# Patient Record
Sex: FEMALE | Race: WHITE | NOT HISPANIC OR LATINO
[De-identification: names, ages, dates, MRNs, and addresses within clinical notes are randomized per-mention and may not be internally consistent; named-entity substitution may affect disease eponyms.]

---

## 2017-03-06 ENCOUNTER — TRANSCRIPTION ENCOUNTER (OUTPATIENT)
Age: 62
End: 2017-03-06

## 2017-03-09 ENCOUNTER — TRANSCRIPTION ENCOUNTER (OUTPATIENT)
Age: 62
End: 2017-03-09

## 2017-03-09 ENCOUNTER — RESULT REVIEW (OUTPATIENT)
Age: 62
End: 2017-03-09

## 2017-03-10 ENCOUNTER — INPATIENT (INPATIENT)
Facility: HOSPITAL | Age: 62
LOS: 14 days | Discharge: HOME | DRG: 167 | End: 2017-03-25
Attending: INTERNAL MEDICINE | Admitting: INTERNAL MEDICINE
Payer: COMMERCIAL

## 2017-03-10 VITALS
WEIGHT: 169.98 LBS | SYSTOLIC BLOOD PRESSURE: 126 MMHG | OXYGEN SATURATION: 97 % | HEIGHT: 66 IN | DIASTOLIC BLOOD PRESSURE: 82 MMHG | HEART RATE: 93 BPM | RESPIRATION RATE: 19 BRPM | TEMPERATURE: 98 F

## 2017-03-10 DIAGNOSIS — R06.09 OTHER FORMS OF DYSPNEA: ICD-10-CM

## 2017-03-10 DIAGNOSIS — I42.2 OTHER HYPERTROPHIC CARDIOMYOPATHY: ICD-10-CM

## 2017-03-10 DIAGNOSIS — Z41.8 ENCOUNTER FOR OTHER PROCEDURES FOR PURPOSES OTHER THAN REMEDYING HEALTH STATE: ICD-10-CM

## 2017-03-10 DIAGNOSIS — Z72.0 TOBACCO USE: ICD-10-CM

## 2017-03-10 DIAGNOSIS — K29.90 GASTRODUODENITIS, UNSPECIFIED, WITHOUT BLEEDING: ICD-10-CM

## 2017-03-10 DIAGNOSIS — R07.89 OTHER CHEST PAIN: ICD-10-CM

## 2017-03-10 DIAGNOSIS — Z98.890 OTHER SPECIFIED POSTPROCEDURAL STATES: Chronic | ICD-10-CM

## 2017-03-10 DIAGNOSIS — J18.9 PNEUMONIA, UNSPECIFIED ORGANISM: ICD-10-CM

## 2017-03-10 DIAGNOSIS — I48.91 UNSPECIFIED ATRIAL FIBRILLATION: ICD-10-CM

## 2017-03-10 PROBLEM — Z00.00 ENCOUNTER FOR PREVENTIVE HEALTH EXAMINATION: Status: ACTIVE | Noted: 2017-03-10

## 2017-03-10 LAB
ALBUMIN SERPL ELPH-MCNC: 4.2 G/DL — SIGNIFICANT CHANGE UP (ref 3.5–5)
ALP SERPL-CCNC: 80 U/L — SIGNIFICANT CHANGE UP (ref 40–120)
ALT FLD-CCNC: 61 U/L DA — HIGH (ref 10–60)
ANION GAP SERPL CALC-SCNC: 12 MMOL/L — SIGNIFICANT CHANGE UP (ref 5–17)
APPEARANCE UR: CLEAR — SIGNIFICANT CHANGE UP
APTT BLD: 21.7 SEC — LOW (ref 27.5–37.4)
AST SERPL-CCNC: 45 U/L — HIGH (ref 10–40)
BASOPHILS # BLD AUTO: 0.1 K/UL — SIGNIFICANT CHANGE UP (ref 0–0.2)
BASOPHILS NFR BLD AUTO: 0.9 % — SIGNIFICANT CHANGE UP (ref 0–2)
BILIRUB SERPL-MCNC: 0.8 MG/DL — SIGNIFICANT CHANGE UP (ref 0.2–1.2)
BILIRUB UR-MCNC: NEGATIVE — SIGNIFICANT CHANGE UP
BUN SERPL-MCNC: 9 MG/DL — SIGNIFICANT CHANGE UP (ref 7–18)
CALCIUM SERPL-MCNC: 8.9 MG/DL — SIGNIFICANT CHANGE UP (ref 8.4–10.5)
CHLORIDE SERPL-SCNC: 106 MMOL/L — SIGNIFICANT CHANGE UP (ref 96–108)
CK MB BLD-MCNC: 3.5 % — SIGNIFICANT CHANGE UP (ref 0–3.5)
CK MB CFR SERPL CALC: 3.4 NG/ML — SIGNIFICANT CHANGE UP (ref 0–3.6)
CK SERPL-CCNC: 106 U/L — SIGNIFICANT CHANGE UP (ref 21–215)
CK SERPL-CCNC: 96 U/L — SIGNIFICANT CHANGE UP (ref 21–215)
CO2 SERPL-SCNC: 23 MMOL/L — SIGNIFICANT CHANGE UP (ref 22–31)
COLOR SPEC: YELLOW — SIGNIFICANT CHANGE UP
CREAT SERPL-MCNC: 0.72 MG/DL — SIGNIFICANT CHANGE UP (ref 0.5–1.3)
DIFF PNL FLD: ABNORMAL
EOSINOPHIL # BLD AUTO: 0.1 K/UL — SIGNIFICANT CHANGE UP (ref 0–0.5)
EOSINOPHIL NFR BLD AUTO: 1.8 % — SIGNIFICANT CHANGE UP (ref 0–6)
GLUCOSE SERPL-MCNC: 105 MG/DL — HIGH (ref 70–99)
GLUCOSE UR QL: NEGATIVE — SIGNIFICANT CHANGE UP
HCT VFR BLD CALC: 43.8 % — SIGNIFICANT CHANGE UP (ref 34.5–45)
HGB BLD-MCNC: 14.7 G/DL — SIGNIFICANT CHANGE UP (ref 11.5–15.5)
INR BLD: 1.42 RATIO — HIGH (ref 0.88–1.16)
KETONES UR-MCNC: NEGATIVE — SIGNIFICANT CHANGE UP
LACTATE SERPL-SCNC: 1.1 MMOL/L — SIGNIFICANT CHANGE UP (ref 0.7–2)
LEUKOCYTE ESTERASE UR-ACNC: NEGATIVE — SIGNIFICANT CHANGE UP
LYMPHOCYTES # BLD AUTO: 0.6 K/UL — LOW (ref 1–3.3)
LYMPHOCYTES # BLD AUTO: 9.6 % — LOW (ref 13–44)
MCHC RBC-ENTMCNC: 32 PG — SIGNIFICANT CHANGE UP (ref 27–34)
MCHC RBC-ENTMCNC: 33.6 GM/DL — SIGNIFICANT CHANGE UP (ref 32–36)
MCV RBC AUTO: 95.3 FL — SIGNIFICANT CHANGE UP (ref 80–100)
MONOCYTES # BLD AUTO: 0.6 K/UL — SIGNIFICANT CHANGE UP (ref 0–0.9)
MONOCYTES NFR BLD AUTO: 9.2 % — SIGNIFICANT CHANGE UP (ref 2–14)
NEUTROPHILS # BLD AUTO: 5 K/UL — SIGNIFICANT CHANGE UP (ref 1.8–7.4)
NEUTROPHILS NFR BLD AUTO: 78.5 % — HIGH (ref 43–77)
NITRITE UR-MCNC: NEGATIVE — SIGNIFICANT CHANGE UP
NT-PROBNP SERPL-SCNC: 3621 PG/ML — HIGH (ref 0–125)
PH UR: 7 — SIGNIFICANT CHANGE UP (ref 4.8–8)
PLATELET # BLD AUTO: 210 K/UL — SIGNIFICANT CHANGE UP (ref 150–400)
POTASSIUM SERPL-MCNC: 4 MMOL/L — SIGNIFICANT CHANGE UP (ref 3.5–5.3)
POTASSIUM SERPL-SCNC: 4 MMOL/L — SIGNIFICANT CHANGE UP (ref 3.5–5.3)
PROT SERPL-MCNC: 7.2 G/DL — SIGNIFICANT CHANGE UP (ref 6–8.3)
PROT UR-MCNC: 30 MG/DL
PROTHROM AB SERPL-ACNC: 16 SEC — HIGH (ref 10–13.1)
RBC # BLD: 4.6 M/UL — SIGNIFICANT CHANGE UP (ref 3.8–5.2)
RBC # FLD: 10.9 % — SIGNIFICANT CHANGE UP (ref 10.3–14.5)
SODIUM SERPL-SCNC: 141 MMOL/L — SIGNIFICANT CHANGE UP (ref 135–145)
SP GR SPEC: 1.01 — SIGNIFICANT CHANGE UP (ref 1.01–1.02)
TROPONIN I SERPL-MCNC: <0.015 NG/ML — SIGNIFICANT CHANGE UP (ref 0–0.04)
TROPONIN I SERPL-MCNC: <0.015 NG/ML — SIGNIFICANT CHANGE UP (ref 0–0.04)
UROBILINOGEN FLD QL: NEGATIVE — SIGNIFICANT CHANGE UP
WBC # BLD: 6.4 K/UL — SIGNIFICANT CHANGE UP (ref 3.8–10.5)
WBC # FLD AUTO: 6.4 K/UL — SIGNIFICANT CHANGE UP (ref 3.8–10.5)

## 2017-03-10 PROCEDURE — 88112 CYTOPATH CELL ENHANCE TECH: CPT | Mod: 26

## 2017-03-10 PROCEDURE — 88342 IMHCHEM/IMCYTCHM 1ST ANTB: CPT | Mod: 26,59

## 2017-03-10 PROCEDURE — 99223 1ST HOSP IP/OBS HIGH 75: CPT | Mod: GC

## 2017-03-10 PROCEDURE — 99053 MED SERV 10PM-8AM 24 HR FAC: CPT

## 2017-03-10 PROCEDURE — 99285 EMERGENCY DEPT VISIT HI MDM: CPT | Mod: 25

## 2017-03-10 PROCEDURE — 71020: CPT | Mod: 26

## 2017-03-10 PROCEDURE — 88341 IMHCHEM/IMCYTCHM EA ADD ANTB: CPT | Mod: 26,59

## 2017-03-10 PROCEDURE — 88360 TUMOR IMMUNOHISTOCHEM/MANUAL: CPT | Mod: 26

## 2017-03-10 PROCEDURE — 71250 CT THORAX DX C-: CPT | Mod: 26

## 2017-03-10 PROCEDURE — 88305 TISSUE EXAM BY PATHOLOGIST: CPT | Mod: 26

## 2017-03-10 RX ORDER — LIDOCAINE 4 G/100G
5 CREAM TOPICAL EVERY 8 HOURS
Qty: 0 | Refills: 0 | Status: COMPLETED | OUTPATIENT
Start: 2017-03-10 | End: 2017-03-11

## 2017-03-10 RX ORDER — HEPARIN SODIUM 5000 [USP'U]/ML
6500 INJECTION INTRAVENOUS; SUBCUTANEOUS ONCE
Qty: 0 | Refills: 0 | Status: COMPLETED | OUTPATIENT
Start: 2017-03-10 | End: 2017-03-10

## 2017-03-10 RX ORDER — ASPIRIN/CALCIUM CARB/MAGNESIUM 324 MG
0 TABLET ORAL
Qty: 0 | Refills: 0 | COMMUNITY

## 2017-03-10 RX ORDER — FAMOTIDINE 10 MG/ML
20 INJECTION INTRAVENOUS ONCE
Qty: 0 | Refills: 0 | Status: COMPLETED | OUTPATIENT
Start: 2017-03-10 | End: 2017-03-10

## 2017-03-10 RX ORDER — WARFARIN SODIUM 2.5 MG/1
0 TABLET ORAL
Qty: 0 | Refills: 0 | COMMUNITY

## 2017-03-10 RX ORDER — IPRATROPIUM/ALBUTEROL SULFATE 18-103MCG
3 AEROSOL WITH ADAPTER (GRAM) INHALATION EVERY 6 HOURS
Qty: 0 | Refills: 0 | Status: DISCONTINUED | OUTPATIENT
Start: 2017-03-10 | End: 2017-03-11

## 2017-03-10 RX ORDER — CEFDINIR 250 MG/5ML
0 POWDER, FOR SUSPENSION ORAL
Qty: 0 | Refills: 0 | COMMUNITY

## 2017-03-10 RX ORDER — VANCOMYCIN HCL 1 G
VIAL (EA) INTRAVENOUS
Qty: 0 | Refills: 0 | Status: DISCONTINUED | OUTPATIENT
Start: 2017-03-10 | End: 2017-03-12

## 2017-03-10 RX ORDER — PANTOPRAZOLE SODIUM 20 MG/1
20 TABLET, DELAYED RELEASE ORAL ONCE
Qty: 0 | Refills: 0 | Status: COMPLETED | OUTPATIENT
Start: 2017-03-10 | End: 2017-03-10

## 2017-03-10 RX ORDER — VANCOMYCIN HCL 1 G
1000 VIAL (EA) INTRAVENOUS ONCE
Qty: 0 | Refills: 0 | Status: COMPLETED | OUTPATIENT
Start: 2017-03-10 | End: 2017-03-10

## 2017-03-10 RX ORDER — VANCOMYCIN HCL 1 G
1000 VIAL (EA) INTRAVENOUS EVERY 12 HOURS
Qty: 0 | Refills: 0 | Status: DISCONTINUED | OUTPATIENT
Start: 2017-03-11 | End: 2017-03-12

## 2017-03-10 RX ORDER — HEPARIN SODIUM 5000 [USP'U]/ML
6500 INJECTION INTRAVENOUS; SUBCUTANEOUS EVERY 6 HOURS
Qty: 0 | Refills: 0 | Status: DISCONTINUED | OUTPATIENT
Start: 2017-03-10 | End: 2017-03-13

## 2017-03-10 RX ORDER — DIPHENHYDRAMINE HCL 50 MG
25 CAPSULE ORAL EVERY 4 HOURS
Qty: 0 | Refills: 0 | Status: DISCONTINUED | OUTPATIENT
Start: 2017-03-10 | End: 2017-03-25

## 2017-03-10 RX ORDER — HEPARIN SODIUM 5000 [USP'U]/ML
3000 INJECTION INTRAVENOUS; SUBCUTANEOUS EVERY 6 HOURS
Qty: 0 | Refills: 0 | Status: DISCONTINUED | OUTPATIENT
Start: 2017-03-10 | End: 2017-03-13

## 2017-03-10 RX ORDER — HEPARIN SODIUM 5000 [USP'U]/ML
INJECTION INTRAVENOUS; SUBCUTANEOUS
Qty: 25000 | Refills: 0 | Status: DISCONTINUED | OUTPATIENT
Start: 2017-03-10 | End: 2017-03-11

## 2017-03-10 RX ORDER — DILTIAZEM HCL 120 MG
120 CAPSULE, EXT RELEASE 24 HR ORAL DAILY
Qty: 0 | Refills: 0 | Status: DISCONTINUED | OUTPATIENT
Start: 2017-03-10 | End: 2017-03-12

## 2017-03-10 RX ORDER — PIPERACILLIN AND TAZOBACTAM 4; .5 G/20ML; G/20ML
3.38 INJECTION, POWDER, LYOPHILIZED, FOR SOLUTION INTRAVENOUS ONCE
Qty: 0 | Refills: 0 | Status: DISCONTINUED | OUTPATIENT
Start: 2017-03-10 | End: 2017-03-10

## 2017-03-10 RX ORDER — LANSOPRAZOLE 15 MG/1
0 CAPSULE, DELAYED RELEASE ORAL
Qty: 0 | Refills: 0 | COMMUNITY

## 2017-03-10 RX ORDER — PIPERACILLIN AND TAZOBACTAM 4; .5 G/20ML; G/20ML
3.38 INJECTION, POWDER, LYOPHILIZED, FOR SOLUTION INTRAVENOUS EVERY 8 HOURS
Qty: 0 | Refills: 0 | Status: DISCONTINUED | OUTPATIENT
Start: 2017-03-10 | End: 2017-03-10

## 2017-03-10 RX ORDER — WARFARIN SODIUM 2.5 MG/1
5 TABLET ORAL ONCE
Qty: 0 | Refills: 0 | Status: DISCONTINUED | OUTPATIENT
Start: 2017-03-10 | End: 2017-03-10

## 2017-03-10 RX ORDER — PANTOPRAZOLE SODIUM 20 MG/1
40 TABLET, DELAYED RELEASE ORAL
Qty: 0 | Refills: 0 | Status: DISCONTINUED | OUTPATIENT
Start: 2017-03-10 | End: 2017-03-24

## 2017-03-10 RX ADMIN — HEPARIN SODIUM 6500 UNIT(S): 5000 INJECTION INTRAVENOUS; SUBCUTANEOUS at 23:15

## 2017-03-10 RX ADMIN — Medication 250 MILLIGRAM(S): at 21:39

## 2017-03-10 RX ADMIN — HEPARIN SODIUM 1400 UNIT(S)/HR: 5000 INJECTION INTRAVENOUS; SUBCUTANEOUS at 23:15

## 2017-03-10 RX ADMIN — LIDOCAINE 5 MILLILITER(S): 4 CREAM TOPICAL at 23:50

## 2017-03-10 RX ADMIN — Medication 120 MILLIGRAM(S): at 18:55

## 2017-03-10 RX ADMIN — PANTOPRAZOLE SODIUM 20 MILLIGRAM(S): 20 TABLET, DELAYED RELEASE ORAL at 23:51

## 2017-03-10 RX ADMIN — Medication 30 MILLILITER(S): at 23:37

## 2017-03-10 RX ADMIN — FAMOTIDINE 20 MILLIGRAM(S): 10 INJECTION INTRAVENOUS at 18:50

## 2017-03-10 NOTE — H&P ADULT. - ATTENDING COMMENTS
I have seen and evaluated the patient at bedside on 3/10/2017. I agree with the resident note/outlined plan of care.

## 2017-03-10 NOTE — H&P ADULT. - PROBLEM SELECTOR PLAN 7
Patient is a smoker 1 PPD for several years ( 30 ) & is interested in quitting. She refused a nicotine patch as patient says that it makes her jittery.

## 2017-03-10 NOTE — H&P ADULT. - ASSESSMENT
Patient is a 61 year old Female from home with past medical history of septal ablation in 2003 for hypertrophic cardiomyopathy, atrial fibrillation on coumadin who came in to the ed with 2 weeks history of cough productive of yellow sputum associated with mild shortness of breath that is progressively worsening with exertion . In the ed vitals are Stable. Cxr shows small bilateral plueral effusions right greater than Left with adjacent airspace opacity in right lung base .Ct chest shows small to moderate right plueral effusion that is loculated with adjacent atelectasis . There is a 4 mm perifissular nodular opacity in the inferior right upper lobe . 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 Patient is a 61 year old Female from home with past medical history of septal ablation in 2003 for hypertrophic cardiomyopathy, atrial fibrillation on coumadin who came in to the ed with 2 weeks history of cough productive of yellow sputum associated with mild shortness of breath that is progressively worsening with exertion . In the ed vitals are Stable. Cxr shows small bilateral plueral effusions right greater than Left with adjacent airspace opacity in right lung base .Ct chest shows small to moderate right plueral effusion that is loculated with adjacent atelectasis . There is a 4 mm perifissular nodular opacity in the inferior right upper lobe . Cardiomegaly with a trace pericardial effusion and ascending aorta ectasia of 4.1 cm .Admitted for community aqquired pnuemonia after failing outpatient treatment with cefdinir and azithromycin

## 2017-03-10 NOTE — ED PROVIDER NOTE - OBJECTIVE STATEMENT
Patient with 2  weeks of worsening shortness of breath and WALKER. patient was recently diagnosed with afib during medical clearance in preparation for endoscopy for possible ulcer. She has been started on diltiazem and coumadin, feels that coumadin is causing more pain in her ulcer. denies black or bloody stool. patient has been going to [facility] care for the WALKER and was told she has a pleural effusion and a pneumonia. initialy took cefdinir but then developed diarrhea, was switched  to zpak which she completed. she returned to urgent care yesteday with worsening symptoms and was told her pleural effusion and pneumonia have gotten worse and she was urged to come to the Emergency Department right away.  notes palpitations and shortness of breath. no cough no fever.

## 2017-03-10 NOTE — H&P ADULT. - PROBLEM SELECTOR PLAN 3
Patient is on cardiazem for rate control and Coumadin for anticoagulation  Inr at admission is sub therapeutic at 1.42  F/u pt/inr in am and continue coumadin

## 2017-03-10 NOTE — H&P ADULT. - RS GEN PE MLT RESP DETAILS PC
clear to auscultation bilaterally/no rhonchi/airway patent/respirations non-labored/breath sounds equal/good air movement/no chest wall tenderness/no wheezes/no rales/no intercostal retractions

## 2017-03-10 NOTE — H&P ADULT. - FAMILY HISTORY
Father  Still living? Unknown  Family history of hypertrophic cardiomyopathy, Age at diagnosis: Age Unknown     Sibling  Still living? Unknown  Family history of hypertrophic cardiomyopathy, Age at diagnosis: Age Unknown     Mother  Still living? Unknown  Family history of liver cancer, Age at diagnosis: Age Unknown

## 2017-03-10 NOTE — H&P ADULT. - PROBLEM SELECTOR PLAN 4
Right sided chest pain radiating to back likely secondary to gastritis and unlikely to be cardiac etiology  Troponin 1 is normal.continue to trend troponins and monitor on tele  EKG shows Atrial fibrillation with rvr at 116  Will not start on aspirin per discussion with attending as patient has severe gastritis and chest pain unlikely to be cardiac etiology .follow echo  As per communication with cardiologist assistant at [facility] recent echo done 2 weeks ago showed dilated left ventricle [EF unknown as poor quality echo]  Continue protonix. Patient will need endoscopy as outpatient once pneumonia resolved

## 2017-03-10 NOTE — ED ADULT TRIAGE NOTE - CHIEF COMPLAINT QUOTE
went to urgent care center yesterday and was sent for further evaluation for worseningpleural effusion and possible pna

## 2017-03-10 NOTE — ED PROVIDER NOTE - MEDICAL DECISION MAKING DETAILS
patient with worsening WALKER and palpitations. awaiting labs and xray. requires admission for cardiac workup

## 2017-03-10 NOTE — ED ADULT NURSE NOTE - ED STAT RN HANDOFF DETAILS
patient in stable condition, IV med infusing well, no adverse reaction noted, on cardiac monitor, endorsed to RN venesa.

## 2017-03-10 NOTE — H&P ADULT. - PROBLEM SELECTOR PLAN 2
Dyspnea on exertion associated with cough likely secondary to pneumonia versus secondary to heart failure  Will start on zosyn for pneumonia  F/u echo and f/u cardiologist to get reports

## 2017-03-10 NOTE — H&P ADULT. - GASTROINTESTINAL DETAILS
How Severe Is It?: mild Is This A New Presentation, Or A Follow-Up?: Follow Up Isotretinoin no masses palpable/no guarding/no distention/normal/no rigidity/no rebound tenderness/bowel sounds normal/nontender/soft

## 2017-03-10 NOTE — H&P ADULT. - NEUROLOGICAL DETAILS
alert and oriented x 3/normal strength/responds to pain/responds to verbal commands/deep reflexes intact

## 2017-03-10 NOTE — ED PROVIDER NOTE - CARE PLAN
Principal Discharge DX:	Dyspnea on exertion  Secondary Diagnosis:	Atrial fibrillation Principal Discharge DX:	Dyspnea on exertion  Secondary Diagnosis:	Atrial fibrillation  Secondary Diagnosis:	Pleural effusion

## 2017-03-10 NOTE — H&P ADULT. - MUSCULOSKELETAL
details… detailed exam ROM intact/normal strength/no joint warmth/normal/no calf tenderness/no joint erythema/no joint swelling

## 2017-03-10 NOTE — H&P ADULT. - HISTORY OF PRESENT ILLNESS
Patient is a 61 year old Female from home with past medical history of septal ablation in  for hypertrophic cardiomyopathy, atrial fibrillation on coumadin who came in to the ed with 2 weeks history of cough productive of yellow sputum associated with progressive shortness of breath that is worse on exertion . 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 She went to the urgent care where she was diagnosed with pneumonia and small pleural effusions and was started on cefdinir which she took for 4 days without any improvement in her symptoms [and she started having diarrhea] so she went to the er at [facility] and they sent her home with a z pack . Patient completed the z pack but her symptoms did not improve so she is back in the ed .She also reports a pruritic rash on her back that started at the time she started working in the warehouse which is improving now. Patient also complains of right sided chest discomfort radiating to her back associated with heartburn that started around December and she went to see a gastroenterologist who started her on Prilosec and referred her to a cardiologist to be cleared for an endoscopy as patient had a septal ablation in  for hypertrophic cardiomyopathy. She went to the cardiologist in February when her epigastric pain got much worse and she was sent in to [facility] ed where she was diagnosed with atrial fibrillation . She was started on the coumadin by her cardiologist 2 days ago with a plan for future ablation of the ectopic focus . Patient reports that the Coumadin is exacerbating the pain from her gastritis so she stopped taking it ( last dose yesterday) . She denies any weight loss or black stools . She also denies any palpitations,swelling of legs.No fevers , chills or dysuria. no diarrhea, nausea/vomiting . No recent travel or sick contacts .Family significant for liver malignancy in mother in her 90's, hypertrophic cardiomyopathy in father (  in his 80's) and brother . Patient is a smoker 1 PPD for several years ( 30 ) & is interested in quitting. She refused a nicotine patch as patient says that it makes her jittery. Allergies to penicillin ( doesn't know what happens) . Unable to reach Cardiologist [phone] dr bryan at this time [his office to fax reports] Patient is a 61 year old Female from home with past medical history of septal ablation in  for hypertrophic cardiomyopathy, atrial fibrillation on coumadin who came in to the ed with 2 weeks history of cough productive of yellow sputum associated with progressive shortness of breath that is worse on exertion . 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 She went to the urgent care where she was diagnosed with pneumonia and small pleural effusions and was started on cefdinir which she took for 4 days without any improvement in her symptoms [and she started having diarrhea] so she went to the er at [facility] and they sent her home with a z pack . Patient completed the z pack but her symptoms did not improve so she is back in the ed .She also reports a pruritic rash on her back that started at the time she started working in the warehouse which is improving now. Patient also complains of right sided chest discomfort radiating to her back associated with heartburn that started around December and she went to see a gastroenterologist who started her on Prilosec and referred her to a cardiologist to be cleared for an endoscopy as patient had a septal ablation in  for hypertrophic cardiomyopathy. She went to the cardiologist in February when her epigastric pain got much worse and she was sent in to [facility] ed where she was diagnosed with atrial fibrillation . She was started on the coumadin by her cardiologist 2 days ago with a plan for future ablation of the ectopic focus . Patient reports that the Coumadin is exacerbating the pain from her gastritis so she stopped taking it ( last dose yesterday) . She denies any weight loss or black stools . She also denies any palpitations,swelling of legs.No fevers , chills or dysuria. no diarrhea, nausea/vomiting . No recent travel or sick contacts .Family significant for liver malignancy in mother in her 90's, hypertrophic cardiomyopathy in father (  in his 80's) and brother . Patient is a smoker 1 PPD for several years ( 30 ) & is interested in quitting. Allergies to penicillin ( doesn't know what happens) . Unable to reach Cardiologist [phone] dr bryan at this time [his office to fax reports]

## 2017-03-10 NOTE — H&P ADULT. - NEGATIVE NEUROLOGICAL SYMPTOMS
no tremors/no paresthesias/no focal seizures/no loss of consciousness/no loss of sensation/no generalized seizures/no syncope/no difficulty walking/no transient paralysis/no weakness/no vertigo

## 2017-03-10 NOTE — H&P ADULT. - OTHER
Ct chest shows small to moderate right plueral effusion that is loculated with adjacent atelectasis . There is a 4 mm perifissular nodular opacity in the inferior right upper lobe . 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

## 2017-03-10 NOTE — H&P ADULT. - PROBLEM SELECTOR PLAN 1
Failed outpatient treatment with cefdinir and azithromycin  Patient is afebrile in the ed with normal white count  Ct chest shows small to moderate right plueral effusion that is loculated with adjacent atelectasis . There is a 4 mm perifissular nodular opacity in the inferior right upper lobe and Cardiomegaly with a trace pericardial effusion and ascending aorta ectasia of 4.1 cm .  PSI score for pnuemonia is 69 indicating risk class 2 with 0.6-0.9% mortality  Will start on zosyn for now  F/u procalcitonin level and consider de- escalating antibiotics  ID dr Haddad consulted  Pulmonary dr Hirsch consulted

## 2017-03-11 LAB
ANION GAP SERPL CALC-SCNC: 9 MMOL/L — SIGNIFICANT CHANGE UP (ref 5–17)
APTT BLD: 107 SEC — HIGH (ref 27.5–37.4)
APTT BLD: >200 SEC — CRITICAL HIGH (ref 27.5–37.4)
BASOPHILS # BLD AUTO: 0.1 K/UL — SIGNIFICANT CHANGE UP (ref 0–0.2)
BASOPHILS NFR BLD AUTO: 1.8 % — SIGNIFICANT CHANGE UP (ref 0–2)
BUN SERPL-MCNC: 9 MG/DL — SIGNIFICANT CHANGE UP (ref 7–18)
CALCIUM SERPL-MCNC: 8.6 MG/DL — SIGNIFICANT CHANGE UP (ref 8.4–10.5)
CHLORIDE SERPL-SCNC: 108 MMOL/L — SIGNIFICANT CHANGE UP (ref 96–108)
CHOLEST SERPL-MCNC: 178 MG/DL — SIGNIFICANT CHANGE UP (ref 10–199)
CO2 SERPL-SCNC: 24 MMOL/L — SIGNIFICANT CHANGE UP (ref 22–31)
CREAT SERPL-MCNC: 0.75 MG/DL — SIGNIFICANT CHANGE UP (ref 0.5–1.3)
CULTURE RESULTS: NO GROWTH — SIGNIFICANT CHANGE UP
EOSINOPHIL # BLD AUTO: 0.2 K/UL — SIGNIFICANT CHANGE UP (ref 0–0.5)
EOSINOPHIL NFR BLD AUTO: 3.8 % — SIGNIFICANT CHANGE UP (ref 0–6)
GLUCOSE SERPL-MCNC: 105 MG/DL — HIGH (ref 70–99)
HBA1C BLD-MCNC: 6 % — HIGH (ref 4–5.6)
HCT VFR BLD CALC: 41.2 % — SIGNIFICANT CHANGE UP (ref 34.5–45)
HDLC SERPL-MCNC: 50 MG/DL — SIGNIFICANT CHANGE UP (ref 40–125)
HGB BLD-MCNC: 13.8 G/DL — SIGNIFICANT CHANGE UP (ref 11.5–15.5)
INR BLD: 1.71 RATIO — HIGH (ref 0.88–1.16)
LIDOCAIN IGE QN: 95 U/L — SIGNIFICANT CHANGE UP (ref 73–393)
LIPID PNL WITH DIRECT LDL SERPL: 115 MG/DL — SIGNIFICANT CHANGE UP
LYMPHOCYTES # BLD AUTO: 0.8 K/UL — LOW (ref 1–3.3)
LYMPHOCYTES # BLD AUTO: 15.1 % — SIGNIFICANT CHANGE UP (ref 13–44)
MAGNESIUM SERPL-MCNC: 2 MG/DL — SIGNIFICANT CHANGE UP (ref 1.8–2.4)
MCHC RBC-ENTMCNC: 32.5 PG — SIGNIFICANT CHANGE UP (ref 27–34)
MCHC RBC-ENTMCNC: 33.5 GM/DL — SIGNIFICANT CHANGE UP (ref 32–36)
MCV RBC AUTO: 96.9 FL — SIGNIFICANT CHANGE UP (ref 80–100)
MONOCYTES # BLD AUTO: 0.6 K/UL — SIGNIFICANT CHANGE UP (ref 0–0.9)
MONOCYTES NFR BLD AUTO: 12.7 % — SIGNIFICANT CHANGE UP (ref 2–14)
NEUTROPHILS # BLD AUTO: 3.3 K/UL — SIGNIFICANT CHANGE UP (ref 1.8–7.4)
NEUTROPHILS NFR BLD AUTO: 66.6 % — SIGNIFICANT CHANGE UP (ref 43–77)
PHOSPHATE SERPL-MCNC: 3.8 MG/DL — SIGNIFICANT CHANGE UP (ref 2.5–4.5)
PLATELET # BLD AUTO: 193 K/UL — SIGNIFICANT CHANGE UP (ref 150–400)
POTASSIUM SERPL-MCNC: 3.8 MMOL/L — SIGNIFICANT CHANGE UP (ref 3.5–5.3)
POTASSIUM SERPL-SCNC: 3.8 MMOL/L — SIGNIFICANT CHANGE UP (ref 3.5–5.3)
PROCALCITONIN SERPL-MCNC: <0.05 NG/ML — SIGNIFICANT CHANGE UP (ref 0–0.05)
PROTHROM AB SERPL-ACNC: 19.3 SEC — HIGH (ref 10–13.1)
RBC # BLD: 4.25 M/UL — SIGNIFICANT CHANGE UP (ref 3.8–5.2)
RBC # FLD: 11 % — SIGNIFICANT CHANGE UP (ref 10.3–14.5)
SODIUM SERPL-SCNC: 141 MMOL/L — SIGNIFICANT CHANGE UP (ref 135–145)
SPECIMEN SOURCE: SIGNIFICANT CHANGE UP
TOTAL CHOLESTEROL/HDL RATIO MEASUREMENT: 3.6 RATIO — SIGNIFICANT CHANGE UP (ref 3.3–7.1)
TRIGL SERPL-MCNC: 65 MG/DL — SIGNIFICANT CHANGE UP (ref 10–149)
TSH SERPL-MCNC: 5.17 UU/ML — HIGH (ref 0.34–4.82)
VIT B12 SERPL-MCNC: 567 PG/ML — SIGNIFICANT CHANGE UP (ref 243–894)
WBC # BLD: 5 K/UL — SIGNIFICANT CHANGE UP (ref 3.8–10.5)
WBC # FLD AUTO: 5 K/UL — SIGNIFICANT CHANGE UP (ref 3.8–10.5)

## 2017-03-11 PROCEDURE — 99232 SBSQ HOSP IP/OBS MODERATE 35: CPT | Mod: GC

## 2017-03-11 RX ORDER — TRAMADOL HYDROCHLORIDE 50 MG/1
50 TABLET ORAL EVERY 8 HOURS
Qty: 0 | Refills: 0 | Status: DISCONTINUED | OUTPATIENT
Start: 2017-03-11 | End: 2017-03-18

## 2017-03-11 RX ORDER — ACETAMINOPHEN 500 MG
650 TABLET ORAL EVERY 6 HOURS
Qty: 0 | Refills: 0 | Status: DISCONTINUED | OUTPATIENT
Start: 2017-03-11 | End: 2017-03-25

## 2017-03-11 RX ORDER — TIOTROPIUM BROMIDE 18 UG/1
1 CAPSULE ORAL; RESPIRATORY (INHALATION) DAILY
Qty: 0 | Refills: 0 | Status: DISCONTINUED | OUTPATIENT
Start: 2017-03-11 | End: 2017-03-25

## 2017-03-11 RX ORDER — LEVALBUTEROL 1.25 MG/.5ML
0.63 SOLUTION, CONCENTRATE RESPIRATORY (INHALATION) EVERY 8 HOURS
Qty: 0 | Refills: 0 | Status: DISCONTINUED | OUTPATIENT
Start: 2017-03-11 | End: 2017-03-25

## 2017-03-11 RX ORDER — HEPARIN SODIUM 5000 [USP'U]/ML
INJECTION INTRAVENOUS; SUBCUTANEOUS
Qty: 25000 | Refills: 0 | Status: COMPLETED | OUTPATIENT
Start: 2017-03-11 | End: 2017-03-12

## 2017-03-11 RX ORDER — WARFARIN SODIUM 2.5 MG/1
5 TABLET ORAL ONCE
Qty: 0 | Refills: 0 | Status: DISCONTINUED | OUTPATIENT
Start: 2017-03-11 | End: 2017-03-11

## 2017-03-11 RX ADMIN — Medication 3 MILLILITER(S): at 14:47

## 2017-03-11 RX ADMIN — Medication 30 MILLILITER(S): at 18:17

## 2017-03-11 RX ADMIN — HEPARIN SODIUM 1400 UNIT(S)/HR: 5000 INJECTION INTRAVENOUS; SUBCUTANEOUS at 18:06

## 2017-03-11 RX ADMIN — Medication 250 MILLIGRAM(S): at 18:10

## 2017-03-11 RX ADMIN — Medication 30 MILLILITER(S): at 06:15

## 2017-03-11 RX ADMIN — HEPARIN SODIUM 1100 UNIT(S)/HR: 5000 INJECTION INTRAVENOUS; SUBCUTANEOUS at 09:40

## 2017-03-11 RX ADMIN — TRAMADOL HYDROCHLORIDE 50 MILLIGRAM(S): 50 TABLET ORAL at 23:04

## 2017-03-11 RX ADMIN — Medication 250 MILLIGRAM(S): at 06:16

## 2017-03-11 RX ADMIN — PANTOPRAZOLE SODIUM 40 MILLIGRAM(S): 20 TABLET, DELAYED RELEASE ORAL at 05:17

## 2017-03-11 RX ADMIN — Medication 3 MILLILITER(S): at 08:34

## 2017-03-11 RX ADMIN — Medication 30 MILLILITER(S): at 13:22

## 2017-03-11 RX ADMIN — LIDOCAINE 5 MILLILITER(S): 4 CREAM TOPICAL at 06:16

## 2017-03-11 RX ADMIN — HEPARIN SODIUM 0 UNIT(S)/HR: 5000 INJECTION INTRAVENOUS; SUBCUTANEOUS at 08:26

## 2017-03-11 RX ADMIN — LIDOCAINE 5 MILLILITER(S): 4 CREAM TOPICAL at 13:23

## 2017-03-12 LAB
ALBUMIN FLD-MCNC: 2.6 G/DL — SIGNIFICANT CHANGE UP
ANION GAP SERPL CALC-SCNC: 10 MMOL/L — SIGNIFICANT CHANGE UP (ref 5–17)
APTT BLD: 143.2 SEC — CRITICAL HIGH (ref 27.5–37.4)
APTT BLD: 32.2 SEC — SIGNIFICANT CHANGE UP (ref 27.5–37.4)
B PERT IGG+IGM PNL SER: ABNORMAL
BUN SERPL-MCNC: 12 MG/DL — SIGNIFICANT CHANGE UP (ref 7–18)
CALCIUM SERPL-MCNC: 8.5 MG/DL — SIGNIFICANT CHANGE UP (ref 8.4–10.5)
CHLORIDE SERPL-SCNC: 107 MMOL/L — SIGNIFICANT CHANGE UP (ref 96–108)
CO2 SERPL-SCNC: 22 MMOL/L — SIGNIFICANT CHANGE UP (ref 22–31)
COLOR FLD: YELLOW — SIGNIFICANT CHANGE UP
COMMENT - FLUIDS: SIGNIFICANT CHANGE UP
CREAT FLD-MCNC: 0.71 MG/DL — SIGNIFICANT CHANGE UP
CREAT SERPL-MCNC: 0.92 MG/DL — SIGNIFICANT CHANGE UP (ref 0.5–1.3)
FLUID INTAKE SUBSTANCE CLASS: SIGNIFICANT CHANGE UP
FLUID SEGMENTED GRANULOCYTES: 1 % — SIGNIFICANT CHANGE UP
GLUCOSE FLD-MCNC: 99 MG/DL — SIGNIFICANT CHANGE UP
GLUCOSE SERPL-MCNC: 121 MG/DL — HIGH (ref 70–99)
HCT VFR BLD CALC: 38.2 % — SIGNIFICANT CHANGE UP (ref 34.5–45)
HCT VFR BLD CALC: 39.8 % — SIGNIFICANT CHANGE UP (ref 34.5–45)
HGB BLD-MCNC: 12.9 G/DL — SIGNIFICANT CHANGE UP (ref 11.5–15.5)
HGB BLD-MCNC: 13.4 G/DL — SIGNIFICANT CHANGE UP (ref 11.5–15.5)
INR BLD: 1.4 RATIO — HIGH (ref 0.88–1.16)
LDH SERPL L TO P-CCNC: 272 U/L — HIGH (ref 120–225)
LDH SERPL L TO P-CCNC: 518 U/L — SIGNIFICANT CHANGE UP
LYMPHOCYTES # FLD: 86 % — SIGNIFICANT CHANGE UP
MCHC RBC-ENTMCNC: 32.6 PG — SIGNIFICANT CHANGE UP (ref 27–34)
MCHC RBC-ENTMCNC: 32.7 PG — SIGNIFICANT CHANGE UP (ref 27–34)
MCHC RBC-ENTMCNC: 33.6 GM/DL — SIGNIFICANT CHANGE UP (ref 32–36)
MCHC RBC-ENTMCNC: 33.9 GM/DL — SIGNIFICANT CHANGE UP (ref 32–36)
MCV RBC AUTO: 96.5 FL — SIGNIFICANT CHANGE UP (ref 80–100)
MCV RBC AUTO: 97.1 FL — SIGNIFICANT CHANGE UP (ref 80–100)
MESOTHL CELL # FLD: 9 % — SIGNIFICANT CHANGE UP
MONOS+MACROS # FLD: 4 % — SIGNIFICANT CHANGE UP
PH FLD: 7.8 — SIGNIFICANT CHANGE UP
PLATELET # BLD AUTO: 179 K/UL — SIGNIFICANT CHANGE UP (ref 150–400)
PLATELET # BLD AUTO: 183 K/UL — SIGNIFICANT CHANGE UP (ref 150–400)
POTASSIUM SERPL-MCNC: 4.2 MMOL/L — SIGNIFICANT CHANGE UP (ref 3.5–5.3)
POTASSIUM SERPL-SCNC: 4.2 MMOL/L — SIGNIFICANT CHANGE UP (ref 3.5–5.3)
PROT FLD-MCNC: 3.6 G/DL — SIGNIFICANT CHANGE UP
PROTHROM AB SERPL-ACNC: 15.7 SEC — HIGH (ref 10–13.1)
RBC # BLD: 3.96 M/UL — SIGNIFICANT CHANGE UP (ref 3.8–5.2)
RBC # BLD: 4.1 M/UL — SIGNIFICANT CHANGE UP (ref 3.8–5.2)
RBC # FLD: 11.1 % — SIGNIFICANT CHANGE UP (ref 10.3–14.5)
RBC # FLD: 11.3 % — SIGNIFICANT CHANGE UP (ref 10.3–14.5)
RCV VOL RI: 2600 /UL — HIGH (ref 0–5)
SODIUM SERPL-SCNC: 139 MMOL/L — SIGNIFICANT CHANGE UP (ref 135–145)
TOTAL NUCLEATED CELL COUNT, BODY FLUID: 7550 /UL — HIGH (ref 0–5)
TRIGL FLD-MCNC: 16 MG/DL — SIGNIFICANT CHANGE UP
TUBE TYPE: SIGNIFICANT CHANGE UP
VANCOMYCIN TROUGH SERPL-MCNC: 10.3 UG/ML — SIGNIFICANT CHANGE UP (ref 10–20)
WBC # BLD: 5.2 K/UL — SIGNIFICANT CHANGE UP (ref 3.8–10.5)
WBC # BLD: 5.7 K/UL — SIGNIFICANT CHANGE UP (ref 3.8–10.5)
WBC # FLD AUTO: 5.2 K/UL — SIGNIFICANT CHANGE UP (ref 3.8–10.5)
WBC # FLD AUTO: 5.7 K/UL — SIGNIFICANT CHANGE UP (ref 3.8–10.5)

## 2017-03-12 PROCEDURE — 99233 SBSQ HOSP IP/OBS HIGH 50: CPT | Mod: GC

## 2017-03-12 PROCEDURE — 71010: CPT | Mod: 26,77

## 2017-03-12 PROCEDURE — 71010: CPT | Mod: 26

## 2017-03-12 RX ORDER — VANCOMYCIN HCL 1 G
1250 VIAL (EA) INTRAVENOUS EVERY 12 HOURS
Qty: 0 | Refills: 0 | Status: DISCONTINUED | OUTPATIENT
Start: 2017-03-12 | End: 2017-03-13

## 2017-03-12 RX ORDER — MORPHINE SULFATE 50 MG/1
2 CAPSULE, EXTENDED RELEASE ORAL ONCE
Qty: 0 | Refills: 0 | Status: DISCONTINUED | OUTPATIENT
Start: 2017-03-12 | End: 2017-03-12

## 2017-03-12 RX ORDER — ALPRAZOLAM 0.25 MG
0.25 TABLET ORAL EVERY 12 HOURS
Qty: 0 | Refills: 0 | Status: DISCONTINUED | OUTPATIENT
Start: 2017-03-12 | End: 2017-03-19

## 2017-03-12 RX ORDER — DILTIAZEM HCL 120 MG
180 CAPSULE, EXT RELEASE 24 HR ORAL DAILY
Qty: 0 | Refills: 0 | Status: DISCONTINUED | OUTPATIENT
Start: 2017-03-12 | End: 2017-03-13

## 2017-03-12 RX ORDER — WARFARIN SODIUM 2.5 MG/1
7.5 TABLET ORAL ONCE
Qty: 0 | Refills: 0 | Status: COMPLETED | OUTPATIENT
Start: 2017-03-12 | End: 2017-03-12

## 2017-03-12 RX ADMIN — TIOTROPIUM BROMIDE 1 CAPSULE(S): 18 CAPSULE ORAL; RESPIRATORY (INHALATION) at 12:21

## 2017-03-12 RX ADMIN — TRAMADOL HYDROCHLORIDE 50 MILLIGRAM(S): 50 TABLET ORAL at 00:04

## 2017-03-12 RX ADMIN — Medication 0.25 MILLIGRAM(S): at 17:31

## 2017-03-12 RX ADMIN — Medication 180 MILLIGRAM(S): at 17:22

## 2017-03-12 RX ADMIN — Medication 120 MILLIGRAM(S): at 07:05

## 2017-03-12 RX ADMIN — MORPHINE SULFATE 2 MILLIGRAM(S): 50 CAPSULE, EXTENDED RELEASE ORAL at 13:10

## 2017-03-12 RX ADMIN — Medication 100 MILLIGRAM(S): at 17:31

## 2017-03-12 RX ADMIN — HEPARIN SODIUM 0 UNIT(S)/HR: 5000 INJECTION INTRAVENOUS; SUBCUTANEOUS at 00:45

## 2017-03-12 RX ADMIN — Medication 166.67 MILLIGRAM(S): at 17:21

## 2017-03-12 RX ADMIN — WARFARIN SODIUM 7.5 MILLIGRAM(S): 2.5 TABLET ORAL at 22:07

## 2017-03-12 RX ADMIN — MORPHINE SULFATE 2 MILLIGRAM(S): 50 CAPSULE, EXTENDED RELEASE ORAL at 12:54

## 2017-03-12 RX ADMIN — LEVALBUTEROL 0.63 MILLIGRAM(S): 1.25 SOLUTION, CONCENTRATE RESPIRATORY (INHALATION) at 14:28

## 2017-03-12 RX ADMIN — LEVALBUTEROL 0.63 MILLIGRAM(S): 1.25 SOLUTION, CONCENTRATE RESPIRATORY (INHALATION) at 21:02

## 2017-03-12 RX ADMIN — Medication 250 MILLIGRAM(S): at 07:11

## 2017-03-12 RX ADMIN — PANTOPRAZOLE SODIUM 40 MILLIGRAM(S): 20 TABLET, DELAYED RELEASE ORAL at 07:05

## 2017-03-13 LAB
ANION GAP SERPL CALC-SCNC: 11 MMOL/L — SIGNIFICANT CHANGE UP (ref 5–17)
APTT BLD: 27.1 SEC — LOW (ref 27.5–37.4)
BASOPHILS # BLD AUTO: 0.1 K/UL — SIGNIFICANT CHANGE UP (ref 0–0.2)
BASOPHILS NFR BLD AUTO: 0.8 % — SIGNIFICANT CHANGE UP (ref 0–2)
BUN SERPL-MCNC: 11 MG/DL — SIGNIFICANT CHANGE UP (ref 7–18)
CALCIUM SERPL-MCNC: 8.4 MG/DL — SIGNIFICANT CHANGE UP (ref 8.4–10.5)
CHLORIDE SERPL-SCNC: 106 MMOL/L — SIGNIFICANT CHANGE UP (ref 96–108)
CO2 SERPL-SCNC: 24 MMOL/L — SIGNIFICANT CHANGE UP (ref 22–31)
CREAT SERPL-MCNC: 0.98 MG/DL — SIGNIFICANT CHANGE UP (ref 0.5–1.3)
EOSINOPHIL # BLD AUTO: 0.2 K/UL — SIGNIFICANT CHANGE UP (ref 0–0.5)
EOSINOPHIL NFR BLD AUTO: 2.3 % — SIGNIFICANT CHANGE UP (ref 0–6)
GLUCOSE SERPL-MCNC: 113 MG/DL — HIGH (ref 70–99)
GRAM STN FLD: SIGNIFICANT CHANGE UP
HCT VFR BLD CALC: 36.3 % — SIGNIFICANT CHANGE UP (ref 34.5–45)
HGB BLD-MCNC: 13 G/DL — SIGNIFICANT CHANGE UP (ref 11.5–15.5)
INR BLD: 1.41 RATIO — HIGH (ref 0.88–1.16)
LYMPHOCYTES # BLD AUTO: 0.9 K/UL — LOW (ref 1–3.3)
LYMPHOCYTES # BLD AUTO: 12.7 % — LOW (ref 13–44)
MAGNESIUM SERPL-MCNC: 2 MG/DL — SIGNIFICANT CHANGE UP (ref 1.8–2.4)
MCHC RBC-ENTMCNC: 34.6 PG — HIGH (ref 27–34)
MCHC RBC-ENTMCNC: 36 GM/DL — SIGNIFICANT CHANGE UP (ref 32–36)
MCV RBC AUTO: 96 FL — SIGNIFICANT CHANGE UP (ref 80–100)
MONOCYTES # BLD AUTO: 0.5 K/UL — SIGNIFICANT CHANGE UP (ref 0–0.9)
MONOCYTES NFR BLD AUTO: 7 % — SIGNIFICANT CHANGE UP (ref 2–14)
NEUTROPHILS # BLD AUTO: 5.4 K/UL — SIGNIFICANT CHANGE UP (ref 1.8–7.4)
NEUTROPHILS NFR BLD AUTO: 77.2 % — HIGH (ref 43–77)
NIGHT BLUE STAIN TISS: SIGNIFICANT CHANGE UP
PHOSPHATE SERPL-MCNC: 3.4 MG/DL — SIGNIFICANT CHANGE UP (ref 2.5–4.5)
PLATELET # BLD AUTO: 179 K/UL — SIGNIFICANT CHANGE UP (ref 150–400)
POTASSIUM SERPL-MCNC: 3.5 MMOL/L — SIGNIFICANT CHANGE UP (ref 3.5–5.3)
POTASSIUM SERPL-SCNC: 3.5 MMOL/L — SIGNIFICANT CHANGE UP (ref 3.5–5.3)
PROTHROM AB SERPL-ACNC: 15.8 SEC — HIGH (ref 10–13.1)
RBC # BLD: 3.78 M/UL — LOW (ref 3.8–5.2)
RBC # FLD: 11.1 % — SIGNIFICANT CHANGE UP (ref 10.3–14.5)
SODIUM SERPL-SCNC: 141 MMOL/L — SIGNIFICANT CHANGE UP (ref 135–145)
SPECIMEN SOURCE: SIGNIFICANT CHANGE UP
SPECIMEN SOURCE: SIGNIFICANT CHANGE UP
WBC # BLD: 7 K/UL — SIGNIFICANT CHANGE UP (ref 3.8–10.5)
WBC # FLD AUTO: 7 K/UL — SIGNIFICANT CHANGE UP (ref 3.8–10.5)

## 2017-03-13 RX ORDER — ENOXAPARIN SODIUM 100 MG/ML
80 INJECTION SUBCUTANEOUS
Qty: 0 | Refills: 0 | Status: DISCONTINUED | OUTPATIENT
Start: 2017-03-13 | End: 2017-03-14

## 2017-03-13 RX ORDER — WARFARIN SODIUM 2.5 MG/1
7.5 TABLET ORAL ONCE
Qty: 0 | Refills: 0 | Status: COMPLETED | OUTPATIENT
Start: 2017-03-13 | End: 2017-03-13

## 2017-03-13 RX ORDER — HEPARIN SODIUM 5000 [USP'U]/ML
INJECTION INTRAVENOUS; SUBCUTANEOUS
Qty: 25000 | Refills: 0 | Status: DISCONTINUED | OUTPATIENT
Start: 2017-03-13 | End: 2017-03-13

## 2017-03-13 RX ORDER — LIDOCAINE 4 G/100G
5 CREAM TOPICAL EVERY 8 HOURS
Qty: 0 | Refills: 0 | Status: DISCONTINUED | OUTPATIENT
Start: 2017-03-13 | End: 2017-03-24

## 2017-03-13 RX ADMIN — LEVALBUTEROL 0.63 MILLIGRAM(S): 1.25 SOLUTION, CONCENTRATE RESPIRATORY (INHALATION) at 14:52

## 2017-03-13 RX ADMIN — Medication 166.67 MILLIGRAM(S): at 06:36

## 2017-03-13 RX ADMIN — Medication 30 MILLILITER(S): at 16:07

## 2017-03-13 RX ADMIN — LEVALBUTEROL 0.63 MILLIGRAM(S): 1.25 SOLUTION, CONCENTRATE RESPIRATORY (INHALATION) at 21:02

## 2017-03-13 RX ADMIN — LIDOCAINE 5 MILLILITER(S): 4 CREAM TOPICAL at 21:09

## 2017-03-13 RX ADMIN — Medication 30 MILLILITER(S): at 10:21

## 2017-03-13 RX ADMIN — LEVALBUTEROL 0.63 MILLIGRAM(S): 1.25 SOLUTION, CONCENTRATE RESPIRATORY (INHALATION) at 05:51

## 2017-03-13 RX ADMIN — TRAMADOL HYDROCHLORIDE 50 MILLIGRAM(S): 50 TABLET ORAL at 13:40

## 2017-03-13 RX ADMIN — Medication 30 MILLILITER(S): at 21:09

## 2017-03-13 RX ADMIN — WARFARIN SODIUM 7.5 MILLIGRAM(S): 2.5 TABLET ORAL at 21:10

## 2017-03-13 RX ADMIN — PANTOPRAZOLE SODIUM 40 MILLIGRAM(S): 20 TABLET, DELAYED RELEASE ORAL at 06:36

## 2017-03-13 RX ADMIN — ENOXAPARIN SODIUM 80 MILLIGRAM(S): 100 INJECTION SUBCUTANEOUS at 17:13

## 2017-03-13 RX ADMIN — TRAMADOL HYDROCHLORIDE 50 MILLIGRAM(S): 50 TABLET ORAL at 07:47

## 2017-03-13 RX ADMIN — TRAMADOL HYDROCHLORIDE 50 MILLIGRAM(S): 50 TABLET ORAL at 06:49

## 2017-03-13 RX ADMIN — TIOTROPIUM BROMIDE 1 CAPSULE(S): 18 CAPSULE ORAL; RESPIRATORY (INHALATION) at 11:33

## 2017-03-13 RX ADMIN — Medication 30 MILLILITER(S): at 04:38

## 2017-03-13 RX ADMIN — TRAMADOL HYDROCHLORIDE 50 MILLIGRAM(S): 50 TABLET ORAL at 14:00

## 2017-03-14 ENCOUNTER — TRANSCRIPTION ENCOUNTER (OUTPATIENT)
Age: 62
End: 2017-03-14

## 2017-03-14 LAB
ANION GAP SERPL CALC-SCNC: 12 MMOL/L — SIGNIFICANT CHANGE UP (ref 5–17)
BUN SERPL-MCNC: 11 MG/DL — SIGNIFICANT CHANGE UP (ref 7–18)
CALCIUM SERPL-MCNC: 8.5 MG/DL — SIGNIFICANT CHANGE UP (ref 8.4–10.5)
CHLORIDE SERPL-SCNC: 104 MMOL/L — SIGNIFICANT CHANGE UP (ref 96–108)
CO2 SERPL-SCNC: 21 MMOL/L — LOW (ref 22–31)
CREAT SERPL-MCNC: 0.8 MG/DL — SIGNIFICANT CHANGE UP (ref 0.5–1.3)
GLUCOSE SERPL-MCNC: 101 MG/DL — HIGH (ref 70–99)
H PYLORI AB SER-ACNC: <5 UNITS — SIGNIFICANT CHANGE UP
HCT VFR BLD CALC: 38.8 % — SIGNIFICANT CHANGE UP (ref 34.5–45)
HGB BLD-MCNC: 13.3 G/DL — SIGNIFICANT CHANGE UP (ref 11.5–15.5)
INR BLD: 2.54 RATIO — HIGH (ref 0.88–1.16)
MAGNESIUM SERPL-MCNC: 2.1 MG/DL — SIGNIFICANT CHANGE UP (ref 1.8–2.4)
MCHC RBC-ENTMCNC: 33 PG — SIGNIFICANT CHANGE UP (ref 27–34)
MCHC RBC-ENTMCNC: 34.3 GM/DL — SIGNIFICANT CHANGE UP (ref 32–36)
MCV RBC AUTO: 96.3 FL — SIGNIFICANT CHANGE UP (ref 80–100)
PHOSPHATE SERPL-MCNC: 3.7 MG/DL — SIGNIFICANT CHANGE UP (ref 2.5–4.5)
PLATELET # BLD AUTO: 168 K/UL — SIGNIFICANT CHANGE UP (ref 150–400)
POTASSIUM SERPL-MCNC: 4 MMOL/L — SIGNIFICANT CHANGE UP (ref 3.5–5.3)
POTASSIUM SERPL-SCNC: 4 MMOL/L — SIGNIFICANT CHANGE UP (ref 3.5–5.3)
PROTHROM AB SERPL-ACNC: 28.7 SEC — HIGH (ref 10–13.1)
RBC # BLD: 4.03 M/UL — SIGNIFICANT CHANGE UP (ref 3.8–5.2)
RBC # FLD: 11.3 % — SIGNIFICANT CHANGE UP (ref 10.3–14.5)
SODIUM SERPL-SCNC: 137 MMOL/L — SIGNIFICANT CHANGE UP (ref 135–145)
WBC # BLD: 7.8 K/UL — SIGNIFICANT CHANGE UP (ref 3.8–10.5)
WBC # FLD AUTO: 7.8 K/UL — SIGNIFICANT CHANGE UP (ref 3.8–10.5)

## 2017-03-14 PROCEDURE — 99232 SBSQ HOSP IP/OBS MODERATE 35: CPT | Mod: GC

## 2017-03-14 RX ORDER — DIGOXIN 250 MCG
0.25 TABLET ORAL ONCE
Qty: 0 | Refills: 0 | Status: COMPLETED | OUTPATIENT
Start: 2017-03-14 | End: 2017-03-14

## 2017-03-14 RX ORDER — LIDOCAINE 4 G/100G
10 CREAM TOPICAL ONCE
Qty: 0 | Refills: 0 | Status: COMPLETED | OUTPATIENT
Start: 2017-03-14 | End: 2017-03-14

## 2017-03-14 RX ORDER — WARFARIN SODIUM 2.5 MG/1
5 TABLET ORAL DAILY
Qty: 0 | Refills: 0 | Status: DISCONTINUED | OUTPATIENT
Start: 2017-03-14 | End: 2017-03-14

## 2017-03-14 RX ORDER — TRAMADOL HYDROCHLORIDE 50 MG/1
50 TABLET ORAL ONCE
Qty: 0 | Refills: 0 | Status: DISCONTINUED | OUTPATIENT
Start: 2017-03-14 | End: 2017-03-14

## 2017-03-14 RX ORDER — SUCRALFATE 1 G
1 TABLET ORAL ONCE
Qty: 0 | Refills: 0 | Status: COMPLETED | OUTPATIENT
Start: 2017-03-14 | End: 2017-03-14

## 2017-03-14 RX ADMIN — Medication 1 GRAM(S): at 13:35

## 2017-03-14 RX ADMIN — TRAMADOL HYDROCHLORIDE 50 MILLIGRAM(S): 50 TABLET ORAL at 03:32

## 2017-03-14 RX ADMIN — TRAMADOL HYDROCHLORIDE 50 MILLIGRAM(S): 50 TABLET ORAL at 20:42

## 2017-03-14 RX ADMIN — Medication 30 MILLILITER(S): at 14:50

## 2017-03-14 RX ADMIN — PANTOPRAZOLE SODIUM 40 MILLIGRAM(S): 20 TABLET, DELAYED RELEASE ORAL at 05:54

## 2017-03-14 RX ADMIN — Medication 30 MILLILITER(S): at 21:22

## 2017-03-14 RX ADMIN — LIDOCAINE 5 MILLILITER(S): 4 CREAM TOPICAL at 21:21

## 2017-03-14 RX ADMIN — LEVALBUTEROL 0.63 MILLIGRAM(S): 1.25 SOLUTION, CONCENTRATE RESPIRATORY (INHALATION) at 06:20

## 2017-03-14 RX ADMIN — LEVALBUTEROL 0.63 MILLIGRAM(S): 1.25 SOLUTION, CONCENTRATE RESPIRATORY (INHALATION) at 21:11

## 2017-03-14 RX ADMIN — Medication 30 MILLILITER(S): at 05:52

## 2017-03-14 RX ADMIN — TRAMADOL HYDROCHLORIDE 50 MILLIGRAM(S): 50 TABLET ORAL at 13:07

## 2017-03-14 RX ADMIN — LEVALBUTEROL 0.63 MILLIGRAM(S): 1.25 SOLUTION, CONCENTRATE RESPIRATORY (INHALATION) at 13:07

## 2017-03-14 RX ADMIN — Medication 0.25 MILLIGRAM(S): at 13:28

## 2017-03-14 RX ADMIN — LIDOCAINE 5 MILLILITER(S): 4 CREAM TOPICAL at 14:58

## 2017-03-14 RX ADMIN — Medication 30 MILLILITER(S): at 10:00

## 2017-03-14 RX ADMIN — LIDOCAINE 10 MILLILITER(S): 4 CREAM TOPICAL at 10:00

## 2017-03-14 RX ADMIN — TRAMADOL HYDROCHLORIDE 50 MILLIGRAM(S): 50 TABLET ORAL at 13:40

## 2017-03-14 RX ADMIN — TRAMADOL HYDROCHLORIDE 50 MILLIGRAM(S): 50 TABLET ORAL at 03:28

## 2017-03-14 RX ADMIN — LIDOCAINE 5 MILLILITER(S): 4 CREAM TOPICAL at 05:52

## 2017-03-14 RX ADMIN — TRAMADOL HYDROCHLORIDE 50 MILLIGRAM(S): 50 TABLET ORAL at 21:15

## 2017-03-14 RX ADMIN — ENOXAPARIN SODIUM 80 MILLIGRAM(S): 100 INJECTION SUBCUTANEOUS at 05:53

## 2017-03-14 RX ADMIN — TIOTROPIUM BROMIDE 1 CAPSULE(S): 18 CAPSULE ORAL; RESPIRATORY (INHALATION) at 11:25

## 2017-03-14 NOTE — DISCHARGE NOTE ADULT - CARE PROVIDER_API CALL
Glenn Ray), Internal Medicine  [address] [place] [address]  Phone: [phone]  Fax: [phone]    Allen Keene), Cardiology  [address] [place] [address]  Phone: [phone]  Fax: [phone] Glenn Ray), Internal Medicine  [address] [place] [address]  Phone: [phone]  Fax: [phone]    Allen Keene (SEPIDEH), Cardiology  [address] [place] [address]  Phone: [phone]  Fax: [phone]    Robina Bond (), Medicine  [facility]  [address] [place] [address]  Phone: [phone]  Fax: [phone]

## 2017-03-14 NOTE — DISCHARGE NOTE ADULT - PATIENT PORTAL LINK FT
“You can access the [facility] Patient Portal, offered by , by registering with the following website: [email or web address]”

## 2017-03-14 NOTE — DISCHARGE NOTE ADULT - MEDICATION SUMMARY - MEDICATIONS TO TAKE
I will START or STAY ON the medications listed below when I get home from the hospital:    aluminum hydroxide-magnesium hydroxide 200 mg-200 mg/5 mL oral suspension  -- 30 milliliter(s) by mouth every 8 hours  -- Indication: For Gastritis and duodenitis    Cardizem 60 mg oral tablet  -- 1 tab(s) by mouth 3 times a day  -- It is very important that you take or use this exactly as directed.  Do not skip doses or discontinue unless directed by your doctor.  Some non-prescription drugs may aggravate your condition.  Read all labels carefully.  If a warning appears, check with your doctor before taking.    -- Indication: For A fib    Coumadin 3 mg oral tablet  -- 1 tab(s) by mouth once a day  -- Do not take this drug if you are pregnant.  It is very important that you take or use this exactly as directed.  Do not skip doses or discontinue unless directed by your doctor.  Obtain medical advice before taking any non-prescription drugs as some may affect the action of this medication.    -- Indication: For A fib    ALPRAZolam 0.25 mg oral tablet  -- 1 tab(s) by mouth every 12 hours, As needed, anxiety MDD:2 tabs  -- Indication: For Anxiety    pantoprazole 40 mg oral delayed release tablet  -- 1 tab(s) by mouth once a day (before a meal)  -- Indication: For Gastritis and duodenitis    levoFLOXacin 750 mg oral tablet  -- 1 tab(s) by mouth once a day  -- Avoid prolonged or excessive exposure to direct and/or artificial sunlight while taking this medication.  Do not take dairy products, antacids, or iron preparations within one hour of this medication.  Finish all this medication unless otherwise directed by prescriber.  May cause drowsiness or dizziness.  Medication should be taken with plenty of water.    -- Indication: For Pneumonia I will START or STAY ON the medications listed below when I get home from the hospital:    aluminum hydroxide-magnesium hydroxide 200 mg-200 mg/5 mL oral suspension  -- 30 milliliter(s) by mouth every 8 hours  -- Indication: For Gastritis and duodenitis    Cardizem 60 mg oral tablet  -- 1 tab(s) by mouth 3 times a day  -- It is very important that you take or use this exactly as directed.  Do not skip doses or discontinue unless directed by your doctor.  Some non-prescription drugs may aggravate your condition.  Read all labels carefully.  If a warning appears, check with your doctor before taking.    -- Indication: For A fib    Coumadin 3 mg oral tablet  -- 1 tab(s) by mouth once a day  -- Do not take this drug if you are pregnant.  It is very important that you take or use this exactly as directed.  Do not skip doses or discontinue unless directed by your doctor.  Obtain medical advice before taking any non-prescription drugs as some may affect the action of this medication.    -- Indication: For A fib    pantoprazole 40 mg oral delayed release tablet  -- 1 tab(s) by mouth once a day (before a meal)  -- Indication: For Gastritis and duodenitis    levoFLOXacin 750 mg oral tablet  -- 1 tab(s) by mouth once a day  -- Avoid prolonged or excessive exposure to direct and/or artificial sunlight while taking this medication.  Do not take dairy products, antacids, or iron preparations within one hour of this medication.  Finish all this medication unless otherwise directed by prescriber.  May cause drowsiness or dizziness.  Medication should be taken with plenty of water.    -- Indication: For Pneumonia I will START or STAY ON the medications listed below when I get home from the hospital:    aluminum hydroxide-magnesium hydroxide 200 mg-200 mg/5 mL oral suspension  -- 30 milliliter(s) by mouth every 8 hours  -- Indication: For Gastritis    Cardizem 60 mg oral tablet  -- 1 tab(s) by mouth 3 times a day  -- It is very important that you take or use this exactly as directed.  Do not skip doses or discontinue unless directed by your doctor.  Some non-prescription drugs may aggravate your condition.  Read all labels carefully.  If a warning appears, check with your doctor before taking.    -- Indication: For A fib    Coumadin 5 mg oral tablet  -- 1 tab(s) by mouth once a day  -- Do not take this drug if you are pregnant.  It is very important that you take or use this exactly as directed.  Do not skip doses or discontinue unless directed by your doctor.  Obtain medical advice before taking any non-prescription drugs as some may affect the action of this medication.    -- Indication: For A fib    enoxaparin 120 mg/0.8 mL injectable solution  -- 120 milligram(s) injectable subcutaneously once a day  -- It is very important that you take or use this exactly as directed.  Do not skip doses or discontinue unless directed by your doctor.    -- Indication: For Afib    pantoprazole 40 mg oral delayed release tablet  -- 1 tab(s) by mouth once a day (before a meal)  -- Indication: For Gastritis

## 2017-03-14 NOTE — DISCHARGE NOTE ADULT - PLAN OF CARE
with Right Parapneumonic effusion We removed 1.1 liter of fluid from Rt side of lung and treated with antibiotic levaquin for 6 days, advise to continue antibiotic for XXX days and follow up with PCP as outpatient. control of heart rate and prevention of complications like stroke We managed your heart rate with cardizem CD initially and since your heart rate was still high, cardiology was consulted who recommended to give cardizem 60 mg every Q8h and we gave anti coagulation with heparin initailly before procedure,and then with lovenox bridging with coumadin to get goal INR and once INR was therapeutic, we discontinued lovenox and next day we discontinued coumadin as there is plan to get endoscopy as discussed with GI Dr. Eisenberg. Advise to follow up with cardiologist as outpatient. prevention of complications Advise to follow up with cardiologist as out patient. resolution of symptoms. Advise to follow up with GI Dr. Eisenberg for Endoscopy and hold coumadin for XXXXXX days. We removed 1.1 liter of fluid from Rt side of lung and treated with antibiotic levaquin for 7 days, advise to continue antibiotic for 7 more days and follow up with PCP as outpatient. We managed your heart rate with cardizem CD initially and since your heart rate was still high, cardiology was consulted who recommended to give cardizem 60 mg every Q8h and we gave anti coagulation with heparin initailly before procedure,and then with lovenox bridging with coumadin to get goal INR and once INR was therapeutic, we discontinued lovenox and next day we discontinued coumadin as there is plan to get endoscopy as discussed with GI Dr. Eisenberg. But we advise to restart coumadin at 3mg once daily and Advise to follow up with cardiologist as outpatient. Advise to follow up with GI Dr. Eisenberg for Endoscopy and advise to continue coumadin 3 mg once daily and follow up with GI and follow his recommendations regarding anti coagulation. We removed 1.1 liter of fluid from Rt side of lung and treated with antibiotic levaquin for XXXXXdays,you had a rt sided chest tube placed , which was removed on D6 as you were unable to tolerate pain with suction and not much pleural fluid was being drained.  follow up with PCP/ pulmonologist as outpatient. Advise to follow up with GI Dr. Eisenberg for Endoscopy and advise to continue XXXXXX and follow up with GI and follow his recommendations regarding anti coagulation. Advise to follow up with cardiologist Dr. Keene as out patient. We managed your heart rate with cardizem CD initially and since your heart rate was still high, cardiology was consulted who recommended to give cardizem 60 mg every Q8h and we gave anti coagulation with heparin initailly before thoracentesis procedure, and then with lovenox bridging with coumadin to get goal INR and once INR was therapeutic, we discontinued lovenox and next day we discontinued coumadin as there is plan to get endoscopy as discussed with GI Dr. Eisenberg. and since you had chest tube placed we continued with full dose lovenox and after EGD, started bridging coumadin with lovenox.  we advise to follow up with PCP Dr. Bond for monitoring INR levels and adjusting dose of coumadin, hence discharging on lovenox and coumadin. We removed 1.1 liter of fluid from Rt side of lung and treated with antibiotic levaquin for 14 days,you had a rt sided chest tube placed , which was removed on D6 as you were unable to tolerate pain with suction and not much pleural fluid was being drained. post removal of chest tube, cxr s/o no pleural effusion. follow up with PCP/ pulmonologist as outpatient. Advise to follow up with GI Dr. Eisenberg in 2-3 weeks for outpatient screening colonoscopy. please call [phone] for appointment and continue protonix daily.

## 2017-03-14 NOTE — DISCHARGE NOTE ADULT - PROVIDER TOKENS
TOKEN:'[id number]',TOKEN:'[id number]' TOKEN:'[id number]:'[id number]',TOKEN:'[id number]'

## 2017-03-14 NOTE — DISCHARGE NOTE ADULT - NSTOBACCOHOTLINE_GEN_A_CS
[facility] Smokers Quitline (071-LD-YPOAZ) [facility] Smokers Quitline (661-TL-WJPOL) [facility] Smokers Quitline (345-TS-KNALH) [facility] Smokers Quitline (372-TV-SWPDW)

## 2017-03-14 NOTE — DISCHARGE NOTE ADULT - HOSPITAL COURSE
A 61 year old Female from home with past medical history of septal ablation in 2003 for hypertrophic cardiomyopathy, atrial fibrillation on coumadin who came in to the ed with 2 weeks history of cough productive of yellow sputum associated with progressive shortness of breath that is worse on exertion . 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 She went to the urgent care where she was diagnosed with pneumonia and small pleural effusions and was started on cefdinir which she took for 4 days without any improvement in her symptoms [and she started having diarrhea] so she went to the ER at [facility] and they sent her home with a z pack . Patient completed the z pack but her symptoms did not improve so she came to the ED .Patient also complained of right sided chest discomfort radiating to her back associated with heartburn that started around December and she went to see a gastroenterologist who started her on Prilosec and referred her to a cardiologist to be cleared for an endoscopy as patient had a septal ablation in 2003 for hypertrophic cardiomyopathy. She went to the cardiologist in February when her epigastric pain got much worse and she was sent in to [facility] ED where she was diagnosed with atrial fibrillation . She was started on the coumadin by her cardiologist 2 days ago before admission with a plan for future ablation of the ectopic focus . Patient reported that the Coumadin is exacerbating the pain from her gastritis so she stopped taking it ( last dose a day before admission) . She denied any weight loss or black stools . She also denied any palpitations, swelling of legs, fevers , chills or dysuria, diarrhea, nausea/vomiting . No recent travel or sick contacts. Patient is a smoker 1 PPD for several years ( 30 ) & is interested in quitting, but refused nicotine patch while in hospital. Patient is admitted to telemetry floor for management of Pneumonia with Rt parapneumonic effusion, after failing outpatient treatment with cefdinir and azithromycin. CXR- Small bilateral pleural effusions right greater than left with adjacent mild airspace opacity right lung base. Atelectasis left lower lung zone. Lobular borders of the right lateral pleura could be related to pleural   thickening or loculated pleural effusion. Patient was started on zosyn and vancomycin which was later changed to levofloxacin and vancomycin. ID Dr. Haddad was consulted who recommended to discontinue vancomycin and continue levofloxacin. CT chest was done s/o Small to moderate right pleural effusion, loculated, with adjacent   atelectasis.4 mm perifissural nodular opacity in the inferior right upper lobe. Advised to follow up chest CT is recommended in 6 months.Cardiomegaly. 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 Pulmonology Dr. Hirsch was consulted who recommended thoracentesis which was performed the next day of admission, and patient's coumadin was discontinued and started on heparin drip which was held before procedure, and 1.1 liter pleural fluid was removed from Rt side, and intra and post procedure was uneventful. Pleural fluid analysis was s/o exudative pleural fluid likely secondary to infection. Patient's Afib was rate controlled with cardizem  mg initially which was increased to 180 mg once daily as she was having rapid Afib to 140s,cardiac enzymes X 2 were                  2)Atrial fibrillation.    rate control with Cardizem 60 mg Q8h.  Cardiology- DR. Keene   Telemetry shows A fib- rate   Anti coagulation with full dose lovenox and Coumadin     one dose today   INR today is   f/u iNR in AM   Goal INR of 2-2.5    3)Hypertrophic Cardiomyopathy   s/p septal ablation in 2003 for hypertrophic cardiomyopathy.   Echo s/o normal EF, grade 2 diastolic dysfunction, moderate AR,TR, RVSp is moderately increased.     5) Gastritis and duodenitis.   Will continue Maalox and 2% lidocaine viscus  . Will continue protonix 40 mg once daily.  Plan to follow up with Dr. guaman as outpateint    6) Chronic active smoker  Patient refused for nicotine patch but interested in quitting.    7) Pulmonary nodule  Pateint has 4 mm perifissural nodular opacity in the inferior right upper lobe.   Follow up chest CT is recommended in 6 months as outpatient.      7) DVT prophylaxis  IMPROVED VTE score-2, patient is on lovenox  GI prophylaxis with protonix. A 61 year old Female from home with past medical history of septal ablation in 2003 for hypertrophic cardiomyopathy, atrial fibrillation on coumadin who came in to the ed with 2 weeks history of cough productive of yellow sputum associated with progressive shortness of breath that is worse on exertion . 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 She went to the urgent care where she was diagnosed with pneumonia and small pleural effusions and was started on cefdinir which she took for 4 days without any improvement in her symptoms [and she started having diarrhea] so she went to the ER at [facility] and they sent her home with a z pack . Patient completed the z pack but her symptoms did not improve so she came to the ED .Patient also complained of right sided chest discomfort radiating to her back associated with heartburn that started around December and she went to see a gastroenterologist who started her on Prilosec and referred her to a cardiologist to be cleared for an endoscopy as patient had a septal ablation in 2003 for hypertrophic cardiomyopathy. She went to the cardiologist in February when her epigastric pain got much worse and she was sent in to [facility] ED where she was diagnosed with atrial fibrillation . She was started on the coumadin by her cardiologist 2 days ago before admission with a plan for future ablation of the ectopic focus . Patient reported that the Coumadin is exacerbating the pain from her gastritis so she stopped taking it ( last dose a day before admission) . She denied any weight loss or black stools . She also denied any palpitations, swelling of legs, fevers , chills or dysuria, diarrhea, nausea/vomiting . No recent travel or sick contacts. Patient is a smoker 1 PPD for several years ( 30 ) & is interested in quitting, but refused nicotine patch while in hospital. Patient is admitted to telemetry floor for management of Pneumonia with Rt parapneumonic effusion, after failing outpatient treatment with cefdinir and azithromycin. CXR- Small bilateral pleural effusions right greater than left with adjacent mild airspace opacity right lung base. Atelectasis left lower lung zone. Lobular borders of the right lateral pleura could be related to pleural   thickening or loculated pleural effusion. Patient was started on zosyn and vancomycin which was later changed to levofloxacin and vancomycin. ID Dr. Haddad was consulted who recommended to discontinue vancomycin and continue levofloxacin. CT chest was done s/o Small to moderate right pleural effusion, loculated, with adjacent   atelectasis.4 mm perifissural nodular opacity in the inferior right upper lobe. Advised to follow up chest CT is recommended in 6 months.Cardiomegaly. 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 Pulmonology Dr. Hirsch was consulted who recommended thoracentesis which was performed the next day of admission, and patient's coumadin was discontinued and started on heparin drip which was held before procedure, and 1.1 liter pleural fluid was removed from Rt side, and intra and post procedure was uneventful. Pleural fluid analysis was s/o exudative pleural fluid likely secondary to infection. Patient's Afib was rate controlled with cardizem  mg initially which was increased to 180 mg once daily as she was having rapid Afib to 140s,cardiac enzymes X 2 were negative,  Echo s/o normal EF, grade 2 diastolic dysfunction, moderate AR,TR, RVSp is moderately increased., but patient's heart rate was high and her BP was on lower side, she was not getting cardizem as scheduled for her, so cardiology DR. Keene was consulted, who recommended to start on cardizem 60 mg every 8 hours , inspite of that her HR was on higher side, so one dose of digoxin was given and cardizem was continued. Her HR was under control later on. Patient was initially sub therapeutic so was started on heparin drip as she was planned for procedure, but s/p thoracentesis, she was started on lovenox bridged with coumadin to get goal INR of 2-2.5 and once INR of 2.5 was reached, lovenox was discontinued and coumadin was continued. Since patient had c/o epigastric pain likley to be gastric ulcer according to patient, GI- Dr. Eisenberg was consulted who recommended needs cardiology clearance for endoscopy and hold coumadin  to get INR<1.5. Hence coumadin was held. XXXXXXXXX.  Patient was also anxious post thoracentesis, and was given xanax which improved her symptoms. Patient was given maalox and lidocaine viscus, sucralfate and protonix which sometimes gave relief to patient, but patient still /o abdominal pain. patient is a   Chronic active smoker, refused for nicotine patch but interested in quitting and wants to follow up with acupuncturist as outpatient to quit smoking.  Patient is advised to continue taking antibiotics for another week and to follow up with GI as outpatient, clinically stable for discharge per attending, A 61 year old Female from home with past medical history of septal ablation in 2003 for hypertrophic cardiomyopathy, atrial fibrillation on coumadin who came in to the ed with 2 weeks history of cough productive of yellow sputum associated with progressive shortness of breath that is worse on exertion . 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 She went to the urgent care where she was diagnosed with pneumonia and small pleural effusions and was started on cefdinir which she took for 4 days without any improvement in her symptoms [and she started having diarrhea] so she went to the ER at [facility] and they sent her home with a z pack . Patient completed the z pack but her symptoms did not improve so she came to the ED .Patient also complained of right sided chest discomfort radiating to her back associated with heartburn that started around December and she went to see a gastroenterologist who started her on Prilosec and referred her to a cardiologist to be cleared for an endoscopy as patient had a septal ablation in 2003 for hypertrophic cardiomyopathy. She went to the cardiologist in February when her epigastric pain got much worse and she was sent in to [facility] ED where she was diagnosed with atrial fibrillation . She was started on the coumadin by her cardiologist 2 days ago before admission with a plan for future ablation of the ectopic focus . Patient reported that the Coumadin is exacerbating the pain from her gastritis so she stopped taking it ( last dose a day before admission) . She denied any weight loss or black stools . She also denied any palpitations, swelling of legs, fevers , chills or dysuria, diarrhea, nausea/vomiting . No recent travel or sick contacts. Patient is a smoker 1 PPD for several years ( 30 ) & is interested in quitting, but refused nicotine patch while in hospital. Patient is admitted to telemetry floor for management of Pneumonia with Rt parapneumonic effusion, after failing outpatient treatment with cefdinir and azithromycin. CXR- Small bilateral pleural effusions right greater than left with adjacent mild airspace opacity right lung base. Atelectasis left lower lung zone. Lobular borders of the right lateral pleura could be related to pleural   thickening or loculated pleural effusion. Patient was started on zosyn and vancomycin which was later changed to levofloxacin and vancomycin. ID Dr. Haddad was consulted who recommended to discontinue vancomycin and continue levofloxacin. CT chest was done s/o Small to moderate right pleural effusion, loculated, with adjacent   atelectasis.4 mm perifissural nodular opacity in the inferior right upper lobe. Advised to follow up chest CT is recommended in 6 months.Cardiomegaly. 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 Pulmonology Dr. Hirsch was consulted who recommended thoracentesis which was performed the next day of admission, and patient's coumadin was discontinued and started on heparin drip which was held before procedure, and 1.1 liter pleural fluid was removed from Rt side, and intra and post procedure was uneventful. Pleural fluid analysis was s/o exudative pleural fluid likely secondary to infection. Patient's Afib was rate controlled with cardizem  mg initially which was increased to 180 mg once daily as she was having rapid Afib to 140s,cardiac enzymes X 2 were negative,  Echo s/o normal EF, grade 2 diastolic dysfunction, moderate AR,TR, RVSp is moderately increased., but patient's heart rate was high and her BP was on lower side, she was not getting cardizem as scheduled for her, so cardiology DR. Keene was consulted, who recommended to start on cardizem 60 mg every 8 hours , inspite of that her HR was on higher side, so one dose of digoxin was given and cardizem was continued. Her HR was under control later on. Patient was initially sub therapeutic so was started on heparin drip as she was planned for procedure, but s/p thoracentesis, she was started on lovenox bridged with coumadin to get goal INR of 2-2.5 and once INR of 2.5 was reached, lovenox was discontinued and coumadin was continued. Since patient had c/o epigastric pain likley to be gastric ulcer according to patient, GI- Dr. Eisenberg was consulted who recommended needs cardiology clearance for endoscopy and hold coumadin  to get INR<1.5. Hence coumadin was held for a day and restarted with lower dose of coumadin as INR is 2.6. Patient was also anxious post thoracentesis, and was given xanax which improved her symptoms. Patient was given maalox and lidocaine viscus, sucralfate and protonix which sometimes gave relief to patient, but patient still /o abdominal pain. patient is a Chronic active smoker, refused for nicotine patch but interested in quitting and wants to follow up with acupuncturist as outpatient to quit smoking.  Patient is advised to continue taking antibiotics for another week and to follow up with GI as outpatient, clinically stable for discharge per attending, A 61 year old Female from home with past medical history of septal ablation in 2003 for hypertrophic cardiomyopathy, atrial fibrillation on coumadin who came in to the ed with 2 weeks history of cough productive of yellow sputum associated with progressive shortness of breath that is worse on exertion . 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 She went to the urgent care where she was diagnosed with pneumonia and small pleural effusions and was started on cefdinir which she took for 4 days without any improvement in her symptoms [and she started having diarrhea] so she went to the ER at [facility] and they sent her home with a z pack . Patient completed the z pack but her symptoms did not improve so she came to the ED .Patient also complained of right sided chest discomfort radiating to her back associated with heartburn that started around December and she went to see a gastroenterologist who started her on Prilosec and referred her to a cardiologist to be cleared for an endoscopy as patient had a septal ablation in 2003 for hypertrophic cardiomyopathy. She went to the cardiologist in February when her epigastric pain got much worse and she was sent in to [facility] ED where she was diagnosed with atrial fibrillation . She was started on the coumadin by her cardiologist 2 days ago before admission with a plan for future ablation of the ectopic focus . Patient reported that the Coumadin is exacerbating the pain from her gastritis so she stopped taking it ( last dose a day before admission) . She denied any weight loss or black stools . She also denied any palpitations, swelling of legs, fevers , chills or dysuria, diarrhea, nausea/vomiting . No recent travel or sick contacts. Patient is a smoker 1 PPD for several years ( 30 ) & is interested in quitting, but refused nicotine patch while in hospital. Patient is admitted to telemetry floor for management of Pneumonia with Rt parapneumonic effusion, after failing outpatient treatment with cefdinir and azithromycin. CXR- Small bilateral pleural effusions right greater than left with adjacent mild airspace opacity right lung base. Atelectasis left lower lung zone. Lobular borders of the right lateral pleura could be related to pleural   thickening or loculated pleural effusion. Patient was started on zosyn and vancomycin which was later changed to levofloxacin and vancomycin. ID Dr. Haddad was consulted who recommended to discontinue vancomycin and continue levofloxacin. CT chest was done s/o Small to moderate right pleural effusion, loculated, with adjacent   atelectasis.4 mm perifissural nodular opacity in the inferior right upper lobe. Advised to follow up chest CT is recommended in 6 months.Cardiomegaly. 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 Pulmonology Dr. Hirsch was consulted who recommended thoracentesis which was performed the next day of admission, and patient's coumadin was discontinued and started on heparin drip which was held before procedure, and 1.1 liter pleural fluid was removed from Rt side, and intra and post procedure was uneventful. Pleural fluid analysis was s/o exudative pleural fluid likely secondary to infection. Patient's Afib was rate controlled with cardizem  mg initially which was increased to 180 mg once daily as she was having rapid Afib to 140s,cardiac enzymes X 2 were negative,  Echo s/o normal EF, grade 2 diastolic dysfunction, moderate AR,TR, RVSp is moderately increased., but patient's heart rate was high and her BP was on lower side, she was not getting cardizem as scheduled for her, so cardiology DR. Keene was consulted, who recommended to start on cardizem 60 mg every 8 hours , inspite of that her HR was on higher side, so one dose of digoxin was given and cardizem was continued. Her HR was under control later on. Patient was initially sub therapeutic so was started on heparin drip as she was planned for procedure, but s/p thoracentesis, she was started on lovenox bridged with coumadin to get goal INR of 2-2.5 and once INR of 2.5 was reached, lovenox was discontinued and coumadin was continued. Since patient had c/o epigastric pain likley to be gastric ulcer according to patient, GI- Dr. Eisenberg was consulted who recommended needs cardiology clearance for endoscopy and hold coumadin  to get INR<1.5. Hence coumadin was held for a day and restarted with lower dose of coumadin as INR is 2.6. Patient was also anxious post thoracentesis, and was given xanax which improved her symptoms. Patient was given maalox and lidocaine viscus, sucralfate and protonix which sometimes gave relief to patient, but patient still /o abdominal pain. patient is a Chronic active smoker, refused for nicotine patch but interested in quitting and wants to follow up with acupuncturist as outpatient to quit smoking. Patient had PFTs as outpatient.  Patient is advised to continue taking antibiotics for another week and to follow up with GI as outpatient, clinically stable for discharge per attending, A 61 year old Female from home with past medical history of septal ablation in 2003 for hypertrophic cardiomyopathy, atrial fibrillation on coumadin who came in to the ed with 2 weeks history of cough productive of yellow sputum associated with progressive shortness of breath that is worse on exertion . 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 She went to the urgent care where she was diagnosed with pneumonia and small pleural effusions and was started on cefdinir which she took for 4 days without any improvement in her symptoms [and she started having diarrhea] so she went to the ER at [facility] and they sent her home with a z pack . Patient completed the z pack but her symptoms did not improve so she came to the ED .Patient also complained of right sided chest discomfort radiating to her back associated with heartburn that started around December and she went to see a gastroenterologist who started her on Prilosec and referred her to a cardiologist to be cleared for an endoscopy as patient had a septal ablation in 2003 for hypertrophic cardiomyopathy. She went to the cardiologist in February when her epigastric pain got much worse and she was sent in to [facility] ED where she was diagnosed with atrial fibrillation . She was started on the coumadin by her cardiologist 2 days ago before admission with a plan for future ablation of the ectopic focus . Patient reported that the Coumadin is exacerbating the pain from her gastritis so she stopped taking it ( last dose a day before admission) . She denied any weight loss or black stools . She also denied any palpitations, swelling of legs, fevers , chills or dysuria, diarrhea, nausea/vomiting . No recent travel or sick contacts. Patient is a smoker 1 PPD for several years ( 30 ) & is interested in quitting, but refused nicotine patch while in hospital. Patient is admitted to telemetry floor for management of Pneumonia with Rt parapneumonic effusion, after failing outpatient treatment with cefdinir and azithromycin. CXR- Small bilateral pleural effusions right greater than left with adjacent mild airspace opacity right lung base. Atelectasis left lower lung zone. Lobular borders of the right lateral pleura could be related to pleural   thickening or loculated pleural effusion. Patient was started on zosyn and vancomycin which was later changed to levofloxacin and vancomycin. ID Dr. Haddad was consulted who recommended to discontinue vancomycin and continue levofloxacin. CT chest was done s/o Small to moderate right pleural effusion, loculated, with adjacent   atelectasis.4 mm perifissural nodular opacity in the inferior right upper lobe. Advised to follow up chest CT is recommended in 6 months.Cardiomegaly. 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 Pulmonology Dr. Hirsch was consulted who recommended thoracentesis which was performed the next day of admission, and patient's coumadin was discontinued and started on heparin drip which was held before procedure, and 1.1 liter pleural fluid was removed from Rt side, and intra and post procedure was uneventful. Pleural fluid analysis was s/o exudative pleural fluid likely secondary to infection. Patient's Afib was rate controlled with cardizem  mg initially which was increased to 180 mg once daily as she was having rapid Afib to 140s,cardiac enzymes X 2 were negative,  Echo s/o normal EF, grade 2 diastolic dysfunction, moderate AR,TR, RVSp is moderately increased., but patient's heart rate was high and her BP was on lower side, she was not getting cardizem as scheduled for her, so cardiology DR. Keene was consulted, who recommended to start on cardizem 60 mg every 8 hours , inspite of that her HR was on higher side, so one dose of digoxin was given and cardizem was continued. Her HR was under control later on. Patient was initially sub therapeutic so was started on heparin drip as she was planned for procedure, but s/p thoracentesis, she was started on lovenox bridged with coumadin to get goal INR of 2-2.5 and once INR of 2.5 was reached, lovenox was discontinued and coumadin was continued. Since patient had c/o epigastric pain likley to be gastric ulcer according to patient, GI- Dr. Eisenberg was consulted who recommended needs cardiology clearance for endoscopy and hold coumadin  to get INR<1.5. Hence coumadin was held for a day and restarted with lower dose of coumadin as INR is 2.6. Patient was also anxious post thoracentesis, and was given xanax which improved her symptoms. Patient was given maalox and lidocaine viscus, sucralfate and protonix which sometimes gave relief to patient, but patient still /o abdominal pain. patient is a Chronic active smoker, refused for nicotine patch but interested in quitting and wants to follow up with acupuncturist as outpatient to quit smoking. Patient had PFTs inpatient, pulmonology Dr. Hirsch was following the case.  Patient is advised to continue taking antibiotics for another week and to follow up with GI as outpatient, clinically stable for discharge per attending, A 61 year old Female from home with past medical history of septal ablation in 2003 for hypertrophic cardiomyopathy, atrial fibrillation on coumadin who came in to the ed with 2 weeks history of cough productive of yellow sputum associated with progressive shortness of breath that is worse on exertion . 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 She went to the urgent care where she was diagnosed with pneumonia and small pleural effusions and was started on cefdinir which she took for 4 days without any improvement in her symptoms [and she started having diarrhea] so she went to the ER at [facility] and they sent her home with a z pack . Patient completed the z pack but her symptoms did not improve so she came to the ED .Patient also complained of right sided chest discomfort radiating to her back associated with heartburn that started around December and she went to see a gastroenterologist who started her on Prilosec and referred her to a cardiologist to be cleared for an endoscopy as patient had a septal ablation in 2003 for hypertrophic cardiomyopathy. She went to the cardiologist in February when her epigastric pain got much worse and she was sent in to [facility] ED where she was diagnosed with atrial fibrillation . She was started on the coumadin by her cardiologist 2 days ago before admission with a plan for future ablation of the ectopic focus . Patient reported that the Coumadin is exacerbating the pain from her gastritis so she stopped taking it ( last dose a day before admission) . She denied any weight loss or black stools . She also denied any palpitations, swelling of legs, fevers , chills or dysuria, diarrhea, nausea/vomiting . No recent travel or sick contacts. Patient is a smoker 1 PPD for several years ( 30 ) & is interested in quitting, but refused nicotine patch while in hospital. Patient is admitted to telemetry floor for management of Pneumonia with Rt parapneumonic effusion, after failing outpatient treatment with cefdinir and azithromycin. CXR- Small bilateral pleural effusions right greater than left with adjacent mild airspace opacity right lung base. Atelectasis left lower lung zone. Lobular borders of the right lateral pleura could be related to pleural   thickening or loculated pleural effusion. Patient was started on zosyn and vancomycin which was later changed to levofloxacin and vancomycin. ID Dr. Haddad was consulted who recommended to discontinue vancomycin and continue levofloxacin. CT chest was done s/o Small to moderate right pleural effusion, loculated, with adjacent   atelectasis.4 mm perifissural nodular opacity in the inferior right upper lobe. Advised to follow up chest CT is recommended in 6 months.Cardiomegaly. 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 Pulmonology Dr. Hirsch was consulted who recommended thoracentesis which was performed the next day of admission, and patient's coumadin was discontinued and started on heparin drip which was held before procedure, and 1.1 liter pleural fluid was removed from Rt side, and intra and post procedure was uneventful. Pleural fluid analysis was s/o exudative pleural fluid likely secondary to infection. Patient's Afib was rate controlled with cardizem  mg initially which was increased to 180 mg once daily as she was having rapid Afib to 140s,cardiac enzymes X 2 were negative,  Echo s/o normal EF, grade 2 diastolic dysfunction, moderate AR,TR, RVSp is moderately increased., but patient's heart rate was high and her BP was on lower side, she was not getting cardizem as scheduled for her, so cardiology DR. Keene was consulted, who recommended to start on cardizem 60 mg every 8 hours , inspite of that her HR was on higher side, so one dose of digoxin was given and cardizem was continued. Her HR was under control later on. Patient was initially sub therapeutic so was started on heparin drip as she was planned for procedure, but s/p thoracentesis, she was started on lovenox bridged with coumadin to get goal INR of 2-2.5 and once INR of 2.5 was reached, lovenox was discontinued and coumadin was continued. Since patient had c/o epigastric pain likley to be gastric ulcer according to patient, GI- Dr. Eisenberg was consulted who recommended needs cardiology clearance for endoscopy and hold coumadin  to get INR<1.5. Hence coumadin was held for a day and restarted with lower dose of coumadin as INR is 2.6. Patient was also anxious post thoracentesis, and was given xanax which improved her symptoms. Patient was given maalox and lidocaine viscus, sucralfate and protonix which sometimes gave relief to patient, but patient still /o abdominal pain. patient is a Chronic active smoker, refused for nicotine patch but interested in quitting and wants to follow up with acupuncturist as outpatient to quit smoking. Patient had PFTs inpatient, pulmonology Dr. Hirsch was following the case. Since CXR was showing persistent pleural effusion, chest tube was placed on RT side and was continued for 6 days, since patient was unable to tolerate pain with suction On, inspite of being on morphine, tramadol and fentanyl patch, and chest tube output was less than 150cc/ 24 hours before removing tube. S/p removal of chest tube, CXR was repeated no evidence of pneumothorax, next day CXR was again repeated which s/o XXXXXXXXX. Patient was planned for endocopy while being on chest tube as patient was started on full dose lovenox as anticoagulation for A fib, but anesthesia didnot optimised patient for EGD while she is on chest tube. hence EGD was cancelled. XXXXXXX  Patient is advised to [id number] and to follow up with GI as outpatient, clinically stable for discharge per attending, A 61 year old Female from home with past medical history of septal ablation in 2003 for hypertrophic cardiomyopathy, atrial fibrillation on coumadin who came in to the ed with 2 weeks history of cough productive of yellow sputum associated with progressive shortness of breath that is worse on exertion . 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 She went to the urgent care where she was diagnosed with pneumonia and small pleural effusions and was started on cefdinir which she took for 4 days without any improvement in her symptoms [and she started having diarrhea] so she went to the ER at [facility] and they sent her home with a z pack . Patient completed the z pack but her symptoms did not improve so she came to the ED .Patient also complained of right sided chest discomfort radiating to her back associated with heartburn that started around December and she went to see a gastroenterologist who started her on Prilosec and referred her to a cardiologist to be cleared for an endoscopy as patient had a septal ablation in 2003 for hypertrophic cardiomyopathy. She went to the cardiologist in February when her epigastric pain got much worse and she was sent in to [facility] ED where she was diagnosed with atrial fibrillation . She was started on the coumadin by her cardiologist 2 days ago before admission with a plan for future ablation of the ectopic focus . Patient reported that the Coumadin is exacerbating the pain from her gastritis so she stopped taking it ( last dose a day before admission) . She denied any weight loss or black stools . She also denied any palpitations, swelling of legs, fevers , chills or dysuria, diarrhea, nausea/vomiting . No recent travel or sick contacts. Patient is a smoker 1 PPD for several years ( 30 ) & is interested in quitting, but refused nicotine patch while in hospital. Patient is admitted to telemetry floor for management of Pneumonia with Rt parapneumonic effusion, after failing outpatient treatment with cefdinir and azithromycin. CXR- Small bilateral pleural effusions right greater than left with adjacent mild airspace opacity right lung base. Atelectasis left lower lung zone. Lobular borders of the right lateral pleura could be related to pleural   thickening or loculated pleural effusion. Patient was started on zosyn and vancomycin which was later changed to levofloxacin and vancomycin. ID Dr. Haddad was consulted who recommended to discontinue vancomycin and continue levofloxacin. CT chest was done s/o Small to moderate right pleural effusion, loculated, with adjacent   atelectasis.4 mm perifissural nodular opacity in the inferior right upper lobe. Advised to follow up chest CT is recommended in 6 months.Cardiomegaly. 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 Pulmonology Dr. Hirsch was consulted who recommended thoracentesis which was performed the next day of admission, and patient's coumadin was discontinued and started on heparin drip which was held before procedure, and 1.1 liter pleural fluid was removed from Rt side, and intra and post procedure was uneventful. Pleural fluid analysis was s/o exudative pleural fluid likely secondary to infection. Patient's Afib was rate controlled with cardizem  mg initially which was increased to 180 mg once daily as she was having rapid Afib to 140s,cardiac enzymes X 2 were negative,  Echo s/o normal EF, grade 2 diastolic dysfunction, moderate AR,TR, RVSp is moderately increased., but patient's heart rate was high and her BP was on lower side, she was not getting cardizem as scheduled for her, so cardiology DR. Keene was consulted, who recommended to start on cardizem 60 mg every 8 hours , inspite of that her HR was on higher side, so one dose of digoxin was given and cardizem was continued. Her HR was under control later on. Patient was initially sub therapeutic so was started on heparin drip as she was planned for procedure, but s/p thoracentesis, she was started on lovenox bridged with coumadin to get goal INR of 2-2.5 and once INR of 2.5 was reached, lovenox was discontinued and coumadin was continued. Since patient had c/o epigastric pain likley to be gastric ulcer according to patient, GI- Dr. Eisenberg was consulted who recommended needs cardiology clearance for endoscopy and hold coumadin  to get INR<1.5. Hence coumadin was held for a day and restarted with lower dose of coumadin as INR is 2.6. Patient was also anxious post thoracentesis, and was given xanax which improved her symptoms. Patient was given maalox and lidocaine viscus, sucralfate and protonix which sometimes gave relief to patient, but patient still /o abdominal pain. patient is a Chronic active smoker, refused for nicotine patch but interested in quitting and wants to follow up with acupuncturist as outpatient to quit smoking. Patient had PFTs inpatient, pulmonology Dr. Hirsch was following the case. Since CXR was showing persistent pleural effusion, chest tube was placed on RT side and was continued for 6 days, since patient was unable to tolerate pain with suction On, inspite of being on morphine, tramadol and fentanyl patch, and chest tube output was less than 150cc/ 24 hours before removing tube. S/p removal of chest tube, CXR was repeated no evidence of pneumothorax, next day CXR was again repeated which s/o no evidence of pleural effusion. Patient was planned for endoscopy while being on chest tube as patient was started on full dose lovenox as anticoagulation for A fib, but anesthesia didnot optimised patient for EGD while she is on chest tube. hence EGD was cancelled. Once chest tube was removed, patient had EGD s/o gastritis, nodular mucosa at the body. Dr. Eisenberg advised to continue protonix and screening colonoscopy outpatient, follow up in 2-3 weeks. hence patient was restarted on lovenox, bridging with coumadin, INR at the time of discharge is 1.14.  Patient is advised to follow up with DR. Bond on 3/27/17 for titrating dose of coumadin with lovenox for anticoagulation and to follow up with GI as outpatient, clinically stable for discharge per attending, A 61 year old Female from home with past medical history of septal ablation in 2003 for hypertrophic cardiomyopathy, atrial fibrillation on coumadin who came in to the ed with 2 weeks history of cough productive of yellow sputum associated with progressive shortness of breath that is worse on exertion . 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 She went to the urgent care where she was diagnosed with pneumonia and small pleural effusions and was started on cefdinir which she took for 4 days without any improvement in her symptoms [and she started having diarrhea] so she went to the ER at [facility] and they sent her home with a z pack . Patient completed the z pack but her symptoms did not improve so she came to the ED .Patient also complained of right sided chest discomfort radiating to her back associated with heartburn that started around December and she went to see a gastroenterologist who started her on Prilosec and referred her to a cardiologist to be cleared for an endoscopy as patient had a septal ablation in 2003 for hypertrophic cardiomyopathy. She went to the cardiologist in February when her epigastric pain got much worse and she was sent in to [facility] ED where she was diagnosed with atrial fibrillation . She was started on the coumadin by her cardiologist 2 days ago before admission with a plan for future ablation of the ectopic focus . Patient reported that the Coumadin is exacerbating the pain from her gastritis so she stopped taking it ( last dose a day before admission) . She denied any weight loss or black stools . She also denied any palpitations, swelling of legs, fevers , chills or dysuria, diarrhea, nausea/vomiting . No recent travel or sick contacts. Patient is a smoker 1 PPD for several years ( 30 ) & is interested in quitting, but refused nicotine patch while in hospital. Patient is admitted to telemetry floor for management of Pneumonia with Rt parapneumonic effusion, after failing outpatient treatment with cefdinir and azithromycin. CXR- Small bilateral pleural effusions right greater than left with adjacent mild airspace opacity right lung base. Atelectasis left lower lung zone. Lobular borders of the right lateral pleura could be related to pleural   thickening or loculated pleural effusion. Patient was started on zosyn and vancomycin which was later changed to levofloxacin and vancomycin. ID Dr. Haddad was consulted who recommended to discontinue vancomycin and continue levofloxacin. CT chest was done s/o Small to moderate right pleural effusion, loculated, with adjacent   atelectasis.4 mm perifissural nodular opacity in the inferior right upper lobe. Advised to follow up chest CT is recommended in 6 months.Cardiomegaly. 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 Pulmonology Dr. Hirsch was consulted who recommended thoracentesis which was performed the next day of admission, and patient's coumadin was discontinued and started on heparin drip which was held before procedure, and 1.1 liter pleural fluid was removed from Rt side, and intra and post procedure was uneventful. Pleural fluid analysis was s/o exudative pleural fluid likely secondary to infection. Patient's Afib was rate controlled with cardizem  mg initially which was increased to 180 mg once daily as she was having rapid Afib to 140s,cardiac enzymes X 2 were negative,  Echo s/o normal EF, grade 2 diastolic dysfunction, moderate AR,TR, RVSp is moderately increased., but patient's heart rate was high and her BP was on lower side, she was not getting cardizem as scheduled for her, so cardiology DR. Keene was consulted, who recommended to start on cardizem 60 mg every 8 hours , inspite of that her HR was on higher side, so one dose of digoxin was given and cardizem was continued. Her HR was under control later on. Patient was initially sub therapeutic so was started on heparin drip as she was planned for procedure, but s/p thoracentesis, she was started on lovenox bridged with coumadin to get goal INR of 2-2.5 and once INR of 2.5 was reached, lovenox was discontinued and coumadin was continued. Since patient had c/o epigastric pain likley to be gastric ulcer according to patient, GI- Dr. Eisenberg was consulted who recommended needs cardiology clearance for endoscopy and hold coumadin  to get INR<1.5. Hence coumadin was held for a day and restarted with lower dose of coumadin as INR is 2.6. Patient was also anxious post thoracentesis, and was given xanax which improved her symptoms. Patient was given maalox and lidocaine viscus, sucralfate and protonix which sometimes gave relief to patient, but patient still /o abdominal pain. patient is a Chronic active smoker, refused for nicotine patch but interested in quitting and wants to follow up with acupuncturist as outpatient to quit smoking. Patient had PFTs inpatient, pulmonology Dr. Hirsch was following the case. Since CXR was showing persistent pleural effusion, chest tube was placed on RT side and was continued for 6 days, since patient was unable to tolerate pain with suction On, inspite of being on morphine, tramadol and fentanyl patch, and chest tube output was less than 150cc/ 24 hours before removing tube. S/p removal of chest tube, CXR was repeated no evidence of pneumothorax, next day CXR was again repeated which s/o no evidence of pleural effusion. Patient was planned for endoscopy while being on chest tube as patient was started on full dose lovenox as anticoagulation for A fib, but anesthesia didnot optimised patient for EGD while she is on chest tube. hence EGD was cancelled. Once chest tube was removed, patient had EGD s/o gastritis, nodular mucosa at the body. Dr. Eisenberg advised to continue protonix and screening colonoscopy outpatient, follow up in 2-3 weeks. hence patient was restarted on lovenox, bridging with coumadin, INR at the time of discharge is 1.14.  Patient is advised to follow up with DR. Bond on 3/27/17 at [address] [place] [address] office for titrating dose of coumadin with lovenox for anticoagulation and to follow up with GI as outpatient, clinically stable for discharge per attending, A 61 year old Female from home with past medical history of septal ablation in 2003 for hypertrophic cardiomyopathy, atrial fibrillation on coumadin who came in to the ed with 2 weeks history of cough productive of yellow sputum associated with progressive shortness of breath that is worse on exertion . 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 She went to the urgent care where she was diagnosed with pneumonia and small pleural effusions and was started on cefdinir which she took for 4 days without any improvement in her symptoms [and she started having diarrhea] so she went to the ER at [facility] and they sent her home with a z pack . Patient completed the z pack but her symptoms did not improve so she came to the ED .Patient also complained of right sided chest discomfort radiating to her back associated with heartburn that started around December and she went to see a gastroenterologist who started her on Prilosec and referred her to a cardiologist to be cleared for an endoscopy as patient had a septal ablation in 2003 for hypertrophic cardiomyopathy. She went to the cardiologist in February when her epigastric pain got much worse and she was sent in to [facility] ED where she was diagnosed with atrial fibrillation . She was started on the coumadin by her cardiologist 2 days ago before admission with a plan for future ablation of the ectopic focus . Patient reported that the Coumadin is exacerbating the pain from her gastritis so she stopped taking it ( last dose a day before admission) . She denied any weight loss or black stools . She also denied any palpitations, swelling of legs, fevers , chills or dysuria, diarrhea, nausea/vomiting . No recent travel or sick contacts. Patient is a smoker 1 PPD for several years ( 30 ) & is interested in quitting, but refused nicotine patch while in hospital. Patient is admitted to telemetry floor for management of Pneumonia with Rt parapneumonic effusion, after failing outpatient treatment with cefdinir and azithromycin. CXR- Small bilateral pleural effusions right greater than left with adjacent mild airspace opacity right lung base. Atelectasis left lower lung zone. Lobular borders of the right lateral pleura could be related to pleural   thickening or loculated pleural effusion. Patient was started on zosyn and vancomycin which was later changed to levofloxacin and vancomycin. ID Dr. Haddad was consulted who recommended to discontinue vancomycin and continue levofloxacin. CT chest was done s/o Small to moderate right pleural effusion, loculated, with adjacent   atelectasis.4 mm perifissural nodular opacity in the inferior right upper lobe. Advised to follow up chest CT is recommended in 6 months.Cardiomegaly. 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 Pulmonology Dr. Hirsch was consulted who recommended thoracentesis which was performed the next day of admission, and patient's coumadin was discontinued and started on heparin drip which was held before procedure, and 1.1 liter pleural fluid was removed from Rt side, and intra and post procedure was uneventful. Pleural fluid analysis was s/o exudative pleural fluid likely secondary to infection. Patient's Afib was rate controlled with cardizem  mg initially which was increased to 180 mg once daily as she was having rapid Afib to 140s,cardiac enzymes X 2 were negative,  Echo s/o normal EF, grade 2 diastolic dysfunction, moderate AR,TR, RVSp is moderately increased., but patient's heart rate was high and her BP was on lower side, she was not getting cardizem as scheduled for her, so cardiology DR. Keene was consulted, who recommended to start on cardizem 60 mg every 8 hours , inspite of that her HR was on higher side, so one dose of digoxin was given and cardizem was continued. Her HR was under control later on. Patient was initially sub therapeutic so was started on heparin drip as she was planned for procedure, but s/p thoracentesis, she was started on lovenox bridged with coumadin to get goal INR of 2-2.5 and once INR of 2.5 was reached, lovenox was discontinued and coumadin was continued. Since patient had c/o epigastric pain likley to be gastric ulcer according to patient, GI- Dr. Eisenberg was consulted who recommended needs cardiology clearance for endoscopy and hold coumadin  to get INR<1.5. Hence coumadin was held for a day and restarted with lower dose of coumadin as INR is 2.6. Patient was also anxious post thoracentesis, and was given xanax which improved her symptoms. Patient was given maalox and lidocaine viscus, sucralfate and protonix which sometimes gave relief to patient, but patient still /o abdominal pain. patient is a Chronic active smoker, refused for nicotine patch but interested in quitting and wants to follow up with acupuncturist as outpatient to quit smoking. Patient had PFTs inpatient, pulmonology Dr. Hirsch was following the case. Since CXR was showing persistent pleural effusion, chest tube was placed on RT side and was continued for 6 days, since patient was unable to tolerate pain with suction On, inspite of being on morphine, tramadol and fentanyl patch, and chest tube output was less than 150cc/ 24 hours before removing tube. S/p removal of chest tube, CXR was repeated no evidence of pneumothorax, next day CXR was again repeated which s/o no evidence of pleural effusion. Patient was planned for endoscopy while being on chest tube as patient was started on full dose lovenox as anticoagulation for A fib, but anesthesia did not optimised patient for EGD while she is on chest tube. hence EGD was cancelled. Once chest tube was removed, patient had EGD s/o gastritis, nodular mucosa at the body. Dr. Eisenberg advised to continue protonix and screening colonoscopy outpatient, follow up in 2-3 weeks. hence patient was restarted on lovenox, bridging with coumadin, INR at the time of discharge is 1.14.    Patient is advised to follow up with DR. Bond on 3/27/17 at [address] [place] [address] office for titrating dose of coumadin with lovenox for anticoagulation and to follow up with GI as outpatient, clinically stable for discharge per attending,    Issues for follow up include  []INR  []post EGD biopsy results  []pulm nodule A 61 year old Female from home with past medical history of septal ablation in 2003 for hypertrophic cardiomyopathy, atrial fibrillation on coumadin who came in to the ed with 2 weeks history of cough productive of yellow sputum associated with progressive shortness of breath that is worse on exertion . 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 She went to the urgent care where she was diagnosed with pneumonia and small pleural effusions and was started on cefdinir which she took for 4 days without any improvement in her symptoms [and she started having diarrhea] so she went to the ER at [facility] and they sent her home with a z pack . Patient completed the z pack but her symptoms did not improve so she came to the ED .Patient also complained of right sided chest discomfort radiating to her back associated with heartburn that started around December and she went to see a gastroenterologist who started her on Prilosec and referred her to a cardiologist to be cleared for an endoscopy as patient had a septal ablation in 2003 for hypertrophic cardiomyopathy. She went to the cardiologist in February when her epigastric pain got much worse and she was sent in to [facility] ED where she was diagnosed with atrial fibrillation . She was started on the coumadin by her cardiologist 2 days ago before admission with a plan for future ablation of the ectopic focus . Patient reported that the Coumadin is exacerbating the pain from her gastritis so she stopped taking it ( last dose a day before admission) . She denied any weight loss or black stools . She also denied any palpitations, swelling of legs, fevers , chills or dysuria, diarrhea, nausea/vomiting . No recent travel or sick contacts. Patient is a smoker 1 PPD for several years ( 30 ) & is interested in quitting, but refused nicotine patch while in hospital. Patient is admitted to telemetry floor for management of Pneumonia with Rt parapneumonic effusion, after failing outpatient treatment with cefdinir and azithromycin. CXR- Small bilateral pleural effusions right greater than left with adjacent mild airspace opacity right lung base. Atelectasis left lower lung zone. Lobular borders of the right lateral pleura could be related to pleural   thickening or loculated pleural effusion. Patient was started on zosyn and vancomycin which was later changed to levofloxacin and vancomycin. ID Dr. Haddad was consulted who recommended to discontinue vancomycin and continue levofloxacin. CT chest was done s/o Small to moderate right pleural effusion, loculated, with adjacent   atelectasis.4 mm perifissural nodular opacity in the inferior right upper lobe. Advised to follow up chest CT is recommended in 6 months.Cardiomegaly. 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 Pulmonology Dr. Hirsch was consulted who recommended thoracentesis which was performed the next day of admission, and patient's coumadin was discontinued and started on heparin drip which was held before procedure, and 1.1 liter pleural fluid was removed from Rt side, and intra and post procedure was uneventful. Pleural fluid analysis was s/o exudative pleural fluid likely secondary to infection. Patient's Afib was rate controlled with cardizem  mg initially which was increased to 180 mg once daily as she was having rapid Afib to 140s,cardiac enzymes X 2 were negative,  Echo s/o normal EF, grade 2 diastolic dysfunction, moderate AR,TR, RVSp is moderately increased., but patient's heart rate was high and her BP was on lower side, she was not getting cardizem as scheduled for her, so cardiology DR. Keene was consulted, who recommended to start on cardizem 60 mg every 8 hours , inspite of that her HR was on higher side, so one dose of digoxin was given and cardizem was continued. Her HR was under control later on. Patient was initially sub therapeutic so was started on heparin drip as she was planned for procedure, but s/p thoracentesis, she was started on lovenox bridged with coumadin to get goal INR of 2-2.5 and once INR of 2.5 was reached, lovenox was discontinued and coumadin was continued. Since patient had c/o epigastric pain likley to be gastric ulcer according to patient, GI- Dr. Eisenberg was consulted who recommended needs cardiology clearance for endoscopy and hold coumadin  to get INR<1.5. Hence coumadin was held for a day and restarted with lower dose of coumadin as INR is 2.6. Patient was also anxious post thoracentesis, and was given xanax which improved her symptoms. Patient was given maalox and lidocaine viscus, sucralfate and protonix which sometimes gave relief to patient, but patient still /o abdominal pain. patient is a Chronic active smoker, refused for nicotine patch but interested in quitting and wants to follow up with acupuncturist as outpatient to quit smoking. Patient had PFTs inpatient, pulmonology Dr. Hirsch was following the case. Since CXR was showing persistent pleural effusion, chest tube was placed on RT side and was continued for 6 days, since patient was unable to tolerate pain with suction On, inspite of being on morphine, tramadol and fentanyl patch, and chest tube output was less than 150cc/ 24 hours before removing tube. S/p removal of chest tube, CXR was repeated no evidence of pneumothorax, next day CXR was again repeated which s/o no evidence of pleural effusion. Patient was planned for endoscopy while being on chest tube as patient was started on full dose lovenox as anticoagulation for A fib, but anesthesia did not optimised patient for EGD while she is on chest tube. hence EGD was cancelled. Once chest tube was removed, patient had EGD s/o gastritis, nodular mucosa at the body. Dr. Eisenberg advised to continue protonix and screening colonoscopy outpatient, follow up in 2-3 weeks. hence patient was restarted on lovenox, bridging with coumadin, INR at the time of discharge is 1.14.    Patient is advised to follow up with DR. Bond on 3/27/17 at [address] [place] [address] office for titrating dose of coumadin with lovenox for anticoagulation and to follow up with GI as outpatient, clinically stable for discharge per attending,    Issues for follow up include  []INR  []post EGD biopsy results  []pulm nodule    35 min spent on exam, discharge planning/coordination on the day of discharge

## 2017-03-14 NOTE — DISCHARGE NOTE ADULT - CARE PLAN
Principal Discharge DX:	Pneumonia  Goal:	with Right Parapneumonic effusion  Instructions for follow-up, activity and diet:	We removed 1.1 liter of fluid from Rt side of lung and treated with antibiotic levaquin for 6 days, advise to continue antibiotic for XXX days and follow up with PCP as outpatient.  Secondary Diagnosis:	Atrial fibrillation  Goal:	control of heart rate and prevention of complications like stroke  Instructions for follow-up, activity and diet:	We managed your heart rate with cardizem CD initially and since your heart rate was still high, cardiology was consulted who recommended to give cardizem 60 mg every Q8h and we gave anti coagulation with heparin initailly before procedure,and then with lovenox bridging with coumadin to get goal INR and once INR was therapeutic, we discontinued lovenox and next day we discontinued coumadin as there is plan to get endoscopy as discussed with GI Dr. Eisenberg. Advise to follow up with cardiologist as outpatient.  Secondary Diagnosis:	Cardiomyopathy, hypertrophic  Goal:	prevention of complications  Instructions for follow-up, activity and diet:	Advise to follow up with cardiologist as out patient.  Secondary Diagnosis:	Gastritis and duodenitis  Goal:	resolution of symptoms.  Instructions for follow-up, activity and diet:	Advise to follow up with GI Dr. Eisenberg for Endoscopy and hold coumadin for XXXXXX days. Principal Discharge DX:	Pneumonia  Goal:	with Right Parapneumonic effusion  Instructions for follow-up, activity and diet:	We removed 1.1 liter of fluid from Rt side of lung and treated with antibiotic levaquin for 7 days, advise to continue antibiotic for 7 more days and follow up with PCP as outpatient.  Secondary Diagnosis:	Atrial fibrillation  Goal:	control of heart rate and prevention of complications like stroke  Instructions for follow-up, activity and diet:	We managed your heart rate with cardizem CD initially and since your heart rate was still high, cardiology was consulted who recommended to give cardizem 60 mg every Q8h and we gave anti coagulation with heparin initailly before procedure,and then with lovenox bridging with coumadin to get goal INR and once INR was therapeutic, we discontinued lovenox and next day we discontinued coumadin as there is plan to get endoscopy as discussed with GI Dr. Eisenberg. But we advise to restart coumadin at 3mg once daily and Advise to follow up with cardiologist as outpatient.  Secondary Diagnosis:	Cardiomyopathy, hypertrophic  Goal:	prevention of complications  Instructions for follow-up, activity and diet:	Advise to follow up with cardiologist as out patient.  Secondary Diagnosis:	Gastritis and duodenitis  Goal:	resolution of symptoms.  Instructions for follow-up, activity and diet:	Advise to follow up with GI Dr. Eisenberg for Endoscopy and advise to continue coumadin 3 mg once daily and follow up with GI and follow his recommendations regarding anti coagulation. Principal Discharge DX:	Pneumonia  Goal:	with Right Parapneumonic effusion  Instructions for follow-up, activity and diet:	We removed 1.1 liter of fluid from Rt side of lung and treated with antibiotic levaquin for XXXXXdays,you had a rt sided chest tube placed , which was removed on D6 as you were unable to tolerate pain with suction and not much pleural fluid was being drained.  follow up with PCP/ pulmonologist as outpatient.  Secondary Diagnosis:	Atrial fibrillation  Goal:	control of heart rate and prevention of complications like stroke  Instructions for follow-up, activity and diet:	We managed your heart rate with cardizem CD initially and since your heart rate was still high, cardiology was consulted who recommended to give cardizem 60 mg every Q8h and we gave anti coagulation with heparin initailly before procedure,and then with lovenox bridging with coumadin to get goal INR and once INR was therapeutic, we discontinued lovenox and next day we discontinued coumadin as there is plan to get endoscopy as discussed with GI Dr. Eisenberg. But we advise to restart coumadin at 3mg once daily and Advise to follow up with cardiologist as outpatient.  Secondary Diagnosis:	Cardiomyopathy, hypertrophic  Goal:	prevention of complications  Instructions for follow-up, activity and diet:	Advise to follow up with cardiologist as out patient.  Secondary Diagnosis:	Gastritis and duodenitis  Goal:	resolution of symptoms.  Instructions for follow-up, activity and diet:	Advise to follow up with GI Dr. Eisenberg for Endoscopy and advise to continue XXXXXX and follow up with GI and follow his recommendations regarding anti coagulation. Principal Discharge DX:	Pneumonia  Goal:	with Right Parapneumonic effusion  Instructions for follow-up, activity and diet:	We removed 1.1 liter of fluid from Rt side of lung and treated with antibiotic levaquin for 14 days,you had a rt sided chest tube placed , which was removed on D6 as you were unable to tolerate pain with suction and not much pleural fluid was being drained. post removal of chest tube, cxr s/o no pleural effusion. follow up with PCP/ pulmonologist as outpatient.  Secondary Diagnosis:	Atrial fibrillation  Goal:	control of heart rate and prevention of complications like stroke  Instructions for follow-up, activity and diet:	We managed your heart rate with cardizem CD initially and since your heart rate was still high, cardiology was consulted who recommended to give cardizem 60 mg every Q8h and we gave anti coagulation with heparin initailly before thoracentesis procedure, and then with lovenox bridging with coumadin to get goal INR and once INR was therapeutic, we discontinued lovenox and next day we discontinued coumadin as there is plan to get endoscopy as discussed with GI Dr. Eisenberg. and since you had chest tube placed we continued with full dose lovenox and after EGD, started bridging coumadin with lovenox.  we advise to follow up with PCP Dr. Bond for monitoring INR levels and adjusting dose of coumadin, hence discharging on lovenox and coumadin.  Secondary Diagnosis:	Cardiomyopathy, hypertrophic  Goal:	prevention of complications  Instructions for follow-up, activity and diet:	Advise to follow up with cardiologist Dr. Keene as out patient.  Secondary Diagnosis:	Gastritis and duodenitis  Goal:	resolution of symptoms.  Instructions for follow-up, activity and diet:	Advise to follow up with GI Dr. Eisenberg in 2-3 weeks for outpatient screening colonoscopy. please call [phone] for appointment and continue protonix daily. Principal Discharge DX:	Pneumonia  Goal:	with Right Parapneumonic effusion  Instructions for follow-up, activity and diet:	We removed 1.1 liter of fluid from Rt side of lung and treated with antibiotic levaquin for 14 days,you had a rt sided chest tube placed , which was removed on D6 as you were unable to tolerate pain with suction and not much pleural fluid was being drained. post removal of chest tube, cxr s/o no pleural effusion. follow up with PCP/ pulmonologist as outpatient.  Secondary Diagnosis:	Atrial fibrillation  Goal:	control of heart rate and prevention of complications like stroke  Instructions for follow-up, activity and diet:	We managed your heart rate with cardizem CD initially and since your heart rate was still high, cardiology was consulted who recommended to give cardizem 60 mg every Q8h and we gave anti coagulation with heparin initailly before thoracentesis procedure, and then with lovenox bridging with coumadin to get goal INR and once INR was therapeutic, we discontinued lovenox and next day we discontinued coumadin as there is plan to get endoscopy as discussed with GI Dr. Eisenberg. and since you had chest tube placed we continued with full dose lovenox and after EGD, started bridging coumadin with lovenox.  we advise to follow up with PCP Dr. Bond for monitoring INR levels and adjusting dose of coumadin, hence discharging on lovenox and coumadin.  Secondary Diagnosis:	Cardiomyopathy, hypertrophic  Goal:	prevention of complications  Instructions for follow-up, activity and diet:	Advise to follow up with cardiologist Dr. Keene as out patient.  Secondary Diagnosis:	Gastritis and duodenitis  Goal:	resolution of symptoms.  Instructions for follow-up, activity and diet:	Advise to follow up with GI Dr. Eisenberg in 2-3 weeks for outpatient screening colonoscopy. please call [phone] for appointment and continue protonix daily. Principal Discharge DX:	Pneumonia  Goal:	with Right Parapneumonic effusion  Instructions for follow-up, activity and diet:	We removed 1.1 liter of fluid from Rt side of lung and treated with antibiotic levaquin for 14 days,you had a rt sided chest tube placed , which was removed on D6 as you were unable to tolerate pain with suction and not much pleural fluid was being drained. post removal of chest tube, cxr s/o no pleural effusion. follow up with PCP/ pulmonologist as outpatient.  Secondary Diagnosis:	Atrial fibrillation  Goal:	control of heart rate and prevention of complications like stroke  Instructions for follow-up, activity and diet:	We managed your heart rate with cardizem CD initially and since your heart rate was still high, cardiology was consulted who recommended to give cardizem 60 mg every Q8h and we gave anti coagulation with heparin initailly before thoracentesis procedure, and then with lovenox bridging with coumadin to get goal INR and once INR was therapeutic, we discontinued lovenox and next day we discontinued coumadin as there is plan to get endoscopy as discussed with GI Dr. Eisenberg. and since you had chest tube placed we continued with full dose lovenox and after EGD, started bridging coumadin with lovenox.  we advise to follow up with PCP Dr. Bond for monitoring INR levels and adjusting dose of coumadin, hence discharging on lovenox and coumadin.  Secondary Diagnosis:	Cardiomyopathy, hypertrophic  Goal:	prevention of complications  Instructions for follow-up, activity and diet:	Advise to follow up with cardiologist Dr. Keene as out patient.  Secondary Diagnosis:	Gastritis and duodenitis  Goal:	resolution of symptoms.  Instructions for follow-up, activity and diet:	Advise to follow up with GI Dr. Eisenberg in 2-3 weeks for outpatient screening colonoscopy. please call [phone] for appointment and continue protonix daily. Principal Discharge DX:	Pneumonia  Goal:	with Right Parapneumonic effusion  Instructions for follow-up, activity and diet:	We removed 1.1 liter of fluid from Rt side of lung and treated with antibiotic levaquin for 14 days,you had a rt sided chest tube placed , which was removed on D6 as you were unable to tolerate pain with suction and not much pleural fluid was being drained. post removal of chest tube, cxr s/o no pleural effusion. follow up with PCP/ pulmonologist as outpatient.  Secondary Diagnosis:	Atrial fibrillation  Goal:	control of heart rate and prevention of complications like stroke  Instructions for follow-up, activity and diet:	We managed your heart rate with cardizem CD initially and since your heart rate was still high, cardiology was consulted who recommended to give cardizem 60 mg every Q8h and we gave anti coagulation with heparin initailly before thoracentesis procedure, and then with lovenox bridging with coumadin to get goal INR and once INR was therapeutic, we discontinued lovenox and next day we discontinued coumadin as there is plan to get endoscopy as discussed with GI Dr. Eisenberg. and since you had chest tube placed we continued with full dose lovenox and after EGD, started bridging coumadin with lovenox.  we advise to follow up with PCP Dr. Bond for monitoring INR levels and adjusting dose of coumadin, hence discharging on lovenox and coumadin.  Secondary Diagnosis:	Cardiomyopathy, hypertrophic  Goal:	prevention of complications  Instructions for follow-up, activity and diet:	Advise to follow up with cardiologist Dr. Keene as out patient.  Secondary Diagnosis:	Gastritis and duodenitis  Goal:	resolution of symptoms.  Instructions for follow-up, activity and diet:	Advise to follow up with GI Dr. Eisenberg in 2-3 weeks for outpatient screening colonoscopy. please call [phone] for appointment and continue protonix daily. Principal Discharge DX:	Pneumonia  Goal:	with Right Parapneumonic effusion  Instructions for follow-up, activity and diet:	We removed 1.1 liter of fluid from Rt side of lung and treated with antibiotic levaquin for 14 days,you had a rt sided chest tube placed , which was removed on D6 as you were unable to tolerate pain with suction and not much pleural fluid was being drained. post removal of chest tube, cxr s/o no pleural effusion. follow up with PCP/ pulmonologist as outpatient.  Secondary Diagnosis:	Atrial fibrillation  Goal:	control of heart rate and prevention of complications like stroke  Instructions for follow-up, activity and diet:	We managed your heart rate with cardizem CD initially and since your heart rate was still high, cardiology was consulted who recommended to give cardizem 60 mg every Q8h and we gave anti coagulation with heparin initailly before thoracentesis procedure, and then with lovenox bridging with coumadin to get goal INR and once INR was therapeutic, we discontinued lovenox and next day we discontinued coumadin as there is plan to get endoscopy as discussed with GI Dr. Eisenberg. and since you had chest tube placed we continued with full dose lovenox and after EGD, started bridging coumadin with lovenox.  we advise to follow up with PCP Dr. Bond for monitoring INR levels and adjusting dose of coumadin, hence discharging on lovenox and coumadin.  Secondary Diagnosis:	Cardiomyopathy, hypertrophic  Goal:	prevention of complications  Instructions for follow-up, activity and diet:	Advise to follow up with cardiologist Dr. Keene as out patient.  Secondary Diagnosis:	Gastritis and duodenitis  Goal:	resolution of symptoms.  Instructions for follow-up, activity and diet:	Advise to follow up with GI Dr. Eisenberg in 2-3 weeks for outpatient screening colonoscopy. please call [phone] for appointment and continue protonix daily. Principal Discharge DX:	Pneumonia  Goal:	with Right Parapneumonic effusion  Instructions for follow-up, activity and diet:	We removed 1.1 liter of fluid from Rt side of lung and treated with antibiotic levaquin for 14 days,you had a rt sided chest tube placed , which was removed on D6 as you were unable to tolerate pain with suction and not much pleural fluid was being drained. post removal of chest tube, cxr s/o no pleural effusion. follow up with PCP/ pulmonologist as outpatient.  Secondary Diagnosis:	Atrial fibrillation  Goal:	control of heart rate and prevention of complications like stroke  Instructions for follow-up, activity and diet:	We managed your heart rate with cardizem CD initially and since your heart rate was still high, cardiology was consulted who recommended to give cardizem 60 mg every Q8h and we gave anti coagulation with heparin initailly before thoracentesis procedure, and then with lovenox bridging with coumadin to get goal INR and once INR was therapeutic, we discontinued lovenox and next day we discontinued coumadin as there is plan to get endoscopy as discussed with GI Dr. Eisenberg. and since you had chest tube placed we continued with full dose lovenox and after EGD, started bridging coumadin with lovenox.  we advise to follow up with PCP Dr. Bond for monitoring INR levels and adjusting dose of coumadin, hence discharging on lovenox and coumadin.  Secondary Diagnosis:	Cardiomyopathy, hypertrophic  Goal:	prevention of complications  Instructions for follow-up, activity and diet:	Advise to follow up with cardiologist Dr. Keene as out patient.  Secondary Diagnosis:	Gastritis and duodenitis  Goal:	resolution of symptoms.  Instructions for follow-up, activity and diet:	Advise to follow up with GI Dr. Eisenberg in 2-3 weeks for outpatient screening colonoscopy. please call [phone] for appointment and continue protonix daily.

## 2017-03-14 NOTE — DISCHARGE NOTE ADULT - NSTOBACCOWEBSITE_GEN_A_CS
[place] [email or web address] Website --- [email or web address] [place] Website --- [email or web address] [place] --- [email or web address] website --- [email or web address]

## 2017-03-14 NOTE — DISCHARGE NOTE ADULT - CARE PROVIDERS DIRECT ADDRESSES
,[email or web address],DirectAddress_Unknown,DirectAddress_Unknown [email or web address]_Unknown,[email or web address],DirectAddress_Unknown

## 2017-03-14 NOTE — DISCHARGE NOTE ADULT - MEDICATION SUMMARY - MEDICATIONS TO CHANGE
I will SWITCH the dose or number of times a day I take the medications listed below when I get home from the hospital:    Cardizem  mg/24 hours oral tablet, extended release  -- 1 tab(s) by mouth once a day    Coumadin 5 mg oral tablet  -- 1 tab(s) by mouth once a day

## 2017-03-15 LAB
ANION GAP SERPL CALC-SCNC: 12 MMOL/L — SIGNIFICANT CHANGE UP (ref 5–17)
BUN SERPL-MCNC: 9 MG/DL — SIGNIFICANT CHANGE UP (ref 7–18)
CALCIUM SERPL-MCNC: 8.5 MG/DL — SIGNIFICANT CHANGE UP (ref 8.4–10.5)
CHLORIDE SERPL-SCNC: 104 MMOL/L — SIGNIFICANT CHANGE UP (ref 96–108)
CO2 SERPL-SCNC: 23 MMOL/L — SIGNIFICANT CHANGE UP (ref 22–31)
CREAT SERPL-MCNC: 0.84 MG/DL — SIGNIFICANT CHANGE UP (ref 0.5–1.3)
CULTURE RESULTS: SIGNIFICANT CHANGE UP
CULTURE RESULTS: SIGNIFICANT CHANGE UP
GLUCOSE SERPL-MCNC: 102 MG/DL — HIGH (ref 70–99)
HCT VFR BLD CALC: 42.9 % — SIGNIFICANT CHANGE UP (ref 34.5–45)
HGB BLD-MCNC: 13.8 G/DL — SIGNIFICANT CHANGE UP (ref 11.5–15.5)
INR BLD: 2.65 RATIO — HIGH (ref 0.88–1.16)
MAGNESIUM SERPL-MCNC: 2.1 MG/DL — SIGNIFICANT CHANGE UP (ref 1.8–2.4)
MCHC RBC-ENTMCNC: 32.2 GM/DL — SIGNIFICANT CHANGE UP (ref 32–36)
MCHC RBC-ENTMCNC: 32.3 PG — SIGNIFICANT CHANGE UP (ref 27–34)
MCV RBC AUTO: 100.5 FL — HIGH (ref 80–100)
PHOSPHATE SERPL-MCNC: 4 MG/DL — SIGNIFICANT CHANGE UP (ref 2.5–4.5)
PLATELET # BLD AUTO: 180 K/UL — SIGNIFICANT CHANGE UP (ref 150–400)
POTASSIUM SERPL-MCNC: 4 MMOL/L — SIGNIFICANT CHANGE UP (ref 3.5–5.3)
POTASSIUM SERPL-SCNC: 4 MMOL/L — SIGNIFICANT CHANGE UP (ref 3.5–5.3)
PROTHROM AB SERPL-ACNC: 30 SEC — HIGH (ref 10–13.1)
RBC # BLD: 4.27 M/UL — SIGNIFICANT CHANGE UP (ref 3.8–5.2)
RBC # FLD: 11.5 % — SIGNIFICANT CHANGE UP (ref 10.3–14.5)
SODIUM SERPL-SCNC: 139 MMOL/L — SIGNIFICANT CHANGE UP (ref 135–145)
SPECIMEN SOURCE: SIGNIFICANT CHANGE UP
SPECIMEN SOURCE: SIGNIFICANT CHANGE UP
WBC # BLD: 6.5 K/UL — SIGNIFICANT CHANGE UP (ref 3.8–10.5)
WBC # FLD AUTO: 6.5 K/UL — SIGNIFICANT CHANGE UP (ref 3.8–10.5)

## 2017-03-15 PROCEDURE — 71010: CPT | Mod: 26

## 2017-03-15 PROCEDURE — 99222 1ST HOSP IP/OBS MODERATE 55: CPT

## 2017-03-15 PROCEDURE — 99232 SBSQ HOSP IP/OBS MODERATE 35: CPT | Mod: GC

## 2017-03-15 RX ORDER — WARFARIN SODIUM 2.5 MG/1
1 TABLET ORAL
Qty: 14 | Refills: 0 | OUTPATIENT
Start: 2017-03-15 | End: 2017-03-29

## 2017-03-15 RX ORDER — CIPROFLOXACIN LACTATE 400MG/40ML
1 VIAL (ML) INTRAVENOUS
Qty: 7 | Refills: 0 | OUTPATIENT
Start: 2017-03-15 | End: 2017-03-22

## 2017-03-15 RX ORDER — FAMOTIDINE 10 MG/ML
40 INJECTION INTRAVENOUS AT BEDTIME
Qty: 0 | Refills: 0 | Status: DISCONTINUED | OUTPATIENT
Start: 2017-03-15 | End: 2017-03-25

## 2017-03-15 RX ORDER — OMEPRAZOLE 10 MG/1
1 CAPSULE, DELAYED RELEASE ORAL
Qty: 0 | Refills: 0 | COMMUNITY

## 2017-03-15 RX ORDER — WARFARIN SODIUM 2.5 MG/1
1 TABLET ORAL
Qty: 0 | Refills: 0 | COMMUNITY

## 2017-03-15 RX ORDER — SUCRALFATE 1 G
1 TABLET ORAL ONCE
Qty: 0 | Refills: 0 | Status: COMPLETED | OUTPATIENT
Start: 2017-03-15 | End: 2017-03-15

## 2017-03-15 RX ORDER — PANTOPRAZOLE SODIUM 20 MG/1
1 TABLET, DELAYED RELEASE ORAL
Qty: 14 | Refills: 0 | OUTPATIENT
Start: 2017-03-15 | End: 2017-03-29

## 2017-03-15 RX ORDER — ALPRAZOLAM 0.25 MG
1 TABLET ORAL
Qty: 10 | Refills: 0 | OUTPATIENT
Start: 2017-03-15 | End: 2017-03-20

## 2017-03-15 RX ADMIN — Medication 30 MILLILITER(S): at 05:51

## 2017-03-15 RX ADMIN — Medication 100 MILLIGRAM(S): at 21:01

## 2017-03-15 RX ADMIN — PANTOPRAZOLE SODIUM 40 MILLIGRAM(S): 20 TABLET, DELAYED RELEASE ORAL at 05:53

## 2017-03-15 RX ADMIN — Medication 30 MILLILITER(S): at 14:02

## 2017-03-15 RX ADMIN — LEVALBUTEROL 0.63 MILLIGRAM(S): 1.25 SOLUTION, CONCENTRATE RESPIRATORY (INHALATION) at 05:32

## 2017-03-15 RX ADMIN — Medication 30 MILLILITER(S): at 21:00

## 2017-03-15 RX ADMIN — LEVALBUTEROL 0.63 MILLIGRAM(S): 1.25 SOLUTION, CONCENTRATE RESPIRATORY (INHALATION) at 21:02

## 2017-03-15 RX ADMIN — LIDOCAINE 5 MILLILITER(S): 4 CREAM TOPICAL at 05:52

## 2017-03-15 RX ADMIN — Medication 650 MILLIGRAM(S): at 23:51

## 2017-03-15 RX ADMIN — FAMOTIDINE 40 MILLIGRAM(S): 10 INJECTION INTRAVENOUS at 21:00

## 2017-03-15 RX ADMIN — LIDOCAINE 5 MILLILITER(S): 4 CREAM TOPICAL at 21:00

## 2017-03-15 RX ADMIN — Medication 1 GRAM(S): at 22:52

## 2017-03-15 RX ADMIN — TRAMADOL HYDROCHLORIDE 50 MILLIGRAM(S): 50 TABLET ORAL at 19:42

## 2017-03-15 RX ADMIN — TRAMADOL HYDROCHLORIDE 50 MILLIGRAM(S): 50 TABLET ORAL at 18:31

## 2017-03-15 RX ADMIN — Medication 650 MILLIGRAM(S): at 22:52

## 2017-03-15 RX ADMIN — LIDOCAINE 5 MILLILITER(S): 4 CREAM TOPICAL at 14:02

## 2017-03-15 NOTE — DIETITIAN INITIAL EVALUATION ADULT. - OTHER INFO
Nutrition consult requested for Assessment & Education. Patient from home lives with family. Visited pt. report appetite very good PTA, denies nausea/vomiting or diarrhea/constipation PTA, stated follows gluten free diet to lose weight but not allergic to gluten. Stated on this diet lost 16 Lbs intentional,  Lbs, current wt. 172 Lbs & maintained for >2 1/2yrs. In house intake >50% & tolerating, pt. requesting education/diet meal planning for GERD, give copy & reinforce information, pt. receptive, planned EGD for today in am. Skin intact.

## 2017-03-15 NOTE — DIETITIAN INITIAL EVALUATION ADULT. - PROBLEM SELECTOR PLAN 4
Right sided chest pain radiating to back likely secondary to gastritis and unlikely to be cardiac etiology  Troponin 1 is normal.continue to trend troponins and monitor on tele  EKG shows Atrial fibrillation with rvr at 116  Will not start on aspirin per discussion with attending as patient has severe gastritis and chest pain unlikely to be cardiac etiology .follow echo  As per communication with cardiologist assistant at [facility] recent echo done 2 weeks ago showed dilated left ventricle [EF unknown as poor quality echo]  Continue protonix. Patient will need endoscopy as outpatient once pneumonia resolved

## 2017-03-16 LAB
ANION GAP SERPL CALC-SCNC: 7 MMOL/L — SIGNIFICANT CHANGE UP (ref 5–17)
APTT BLD: 32.2 SEC — SIGNIFICANT CHANGE UP (ref 27.5–37.4)
BUN SERPL-MCNC: 8 MG/DL — SIGNIFICANT CHANGE UP (ref 7–18)
CALCIUM SERPL-MCNC: 8.4 MG/DL — SIGNIFICANT CHANGE UP (ref 8.4–10.5)
CHLORIDE SERPL-SCNC: 104 MMOL/L — SIGNIFICANT CHANGE UP (ref 96–108)
CO2 SERPL-SCNC: 27 MMOL/L — SIGNIFICANT CHANGE UP (ref 22–31)
CREAT SERPL-MCNC: 0.84 MG/DL — SIGNIFICANT CHANGE UP (ref 0.5–1.3)
GLUCOSE SERPL-MCNC: 105 MG/DL — HIGH (ref 70–99)
HCT VFR BLD CALC: 41.2 % — SIGNIFICANT CHANGE UP (ref 34.5–45)
HGB BLD-MCNC: 13.8 G/DL — SIGNIFICANT CHANGE UP (ref 11.5–15.5)
INR BLD: 2.26 RATIO — HIGH (ref 0.88–1.16)
MAGNESIUM SERPL-MCNC: 2.2 MG/DL — SIGNIFICANT CHANGE UP (ref 1.8–2.4)
MCHC RBC-ENTMCNC: 32.3 PG — SIGNIFICANT CHANGE UP (ref 27–34)
MCHC RBC-ENTMCNC: 33.4 GM/DL — SIGNIFICANT CHANGE UP (ref 32–36)
MCV RBC AUTO: 96.6 FL — SIGNIFICANT CHANGE UP (ref 80–100)
PHOSPHATE SERPL-MCNC: 3.8 MG/DL — SIGNIFICANT CHANGE UP (ref 2.5–4.5)
PLATELET # BLD AUTO: 188 K/UL — SIGNIFICANT CHANGE UP (ref 150–400)
POTASSIUM SERPL-MCNC: 4.2 MMOL/L — SIGNIFICANT CHANGE UP (ref 3.5–5.3)
POTASSIUM SERPL-SCNC: 4.2 MMOL/L — SIGNIFICANT CHANGE UP (ref 3.5–5.3)
PROTHROM AB SERPL-ACNC: 25.5 SEC — HIGH (ref 10–13.1)
RBC # BLD: 4.26 M/UL — SIGNIFICANT CHANGE UP (ref 3.8–5.2)
RBC # FLD: 11.3 % — SIGNIFICANT CHANGE UP (ref 10.3–14.5)
SODIUM SERPL-SCNC: 138 MMOL/L — SIGNIFICANT CHANGE UP (ref 135–145)
WBC # BLD: 6.8 K/UL — SIGNIFICANT CHANGE UP (ref 3.8–10.5)
WBC # FLD AUTO: 6.8 K/UL — SIGNIFICANT CHANGE UP (ref 3.8–10.5)

## 2017-03-16 PROCEDURE — 99232 SBSQ HOSP IP/OBS MODERATE 35: CPT | Mod: GC

## 2017-03-16 PROCEDURE — 71250 CT THORAX DX C-: CPT | Mod: 26

## 2017-03-16 RX ORDER — SUCRALFATE 1 G
1 TABLET ORAL EVERY 8 HOURS
Qty: 0 | Refills: 0 | Status: DISCONTINUED | OUTPATIENT
Start: 2017-03-16 | End: 2017-03-24

## 2017-03-16 RX ADMIN — LIDOCAINE 5 MILLILITER(S): 4 CREAM TOPICAL at 14:34

## 2017-03-16 RX ADMIN — LEVALBUTEROL 0.63 MILLIGRAM(S): 1.25 SOLUTION, CONCENTRATE RESPIRATORY (INHALATION) at 06:30

## 2017-03-16 RX ADMIN — TRAMADOL HYDROCHLORIDE 50 MILLIGRAM(S): 50 TABLET ORAL at 19:15

## 2017-03-16 RX ADMIN — TRAMADOL HYDROCHLORIDE 50 MILLIGRAM(S): 50 TABLET ORAL at 10:45

## 2017-03-16 RX ADMIN — TRAMADOL HYDROCHLORIDE 50 MILLIGRAM(S): 50 TABLET ORAL at 18:26

## 2017-03-16 RX ADMIN — Medication 1 GRAM(S): at 14:34

## 2017-03-16 RX ADMIN — Medication 1 GRAM(S): at 21:31

## 2017-03-16 RX ADMIN — TRAMADOL HYDROCHLORIDE 50 MILLIGRAM(S): 50 TABLET ORAL at 11:15

## 2017-03-16 RX ADMIN — Medication 30 MILLILITER(S): at 14:37

## 2017-03-16 RX ADMIN — PANTOPRAZOLE SODIUM 40 MILLIGRAM(S): 20 TABLET, DELAYED RELEASE ORAL at 05:43

## 2017-03-16 RX ADMIN — Medication 650 MILLIGRAM(S): at 15:10

## 2017-03-16 RX ADMIN — FAMOTIDINE 40 MILLIGRAM(S): 10 INJECTION INTRAVENOUS at 21:32

## 2017-03-16 RX ADMIN — LEVALBUTEROL 0.63 MILLIGRAM(S): 1.25 SOLUTION, CONCENTRATE RESPIRATORY (INHALATION) at 21:00

## 2017-03-16 RX ADMIN — Medication 30 MILLILITER(S): at 05:43

## 2017-03-16 RX ADMIN — TIOTROPIUM BROMIDE 1 CAPSULE(S): 18 CAPSULE ORAL; RESPIRATORY (INHALATION) at 11:15

## 2017-03-16 RX ADMIN — Medication 650 MILLIGRAM(S): at 14:37

## 2017-03-16 RX ADMIN — Medication 0.25 MILLIGRAM(S): at 10:59

## 2017-03-16 RX ADMIN — Medication 30 MILLILITER(S): at 21:31

## 2017-03-16 RX ADMIN — LIDOCAINE 5 MILLILITER(S): 4 CREAM TOPICAL at 05:43

## 2017-03-16 RX ADMIN — LEVALBUTEROL 0.63 MILLIGRAM(S): 1.25 SOLUTION, CONCENTRATE RESPIRATORY (INHALATION) at 14:33

## 2017-03-16 RX ADMIN — LIDOCAINE 5 MILLILITER(S): 4 CREAM TOPICAL at 21:32

## 2017-03-17 LAB
ANION GAP SERPL CALC-SCNC: 11 MMOL/L — SIGNIFICANT CHANGE UP (ref 5–17)
APTT BLD: 30.3 SEC — SIGNIFICANT CHANGE UP (ref 27.5–37.4)
BUN SERPL-MCNC: 8 MG/DL — SIGNIFICANT CHANGE UP (ref 7–18)
CALCIUM SERPL-MCNC: 8.6 MG/DL — SIGNIFICANT CHANGE UP (ref 8.4–10.5)
CHLORIDE SERPL-SCNC: 103 MMOL/L — SIGNIFICANT CHANGE UP (ref 96–108)
CO2 SERPL-SCNC: 24 MMOL/L — SIGNIFICANT CHANGE UP (ref 22–31)
CREAT SERPL-MCNC: 0.73 MG/DL — SIGNIFICANT CHANGE UP (ref 0.5–1.3)
CULTURE RESULTS: NO GROWTH — SIGNIFICANT CHANGE UP
GLUCOSE SERPL-MCNC: 103 MG/DL — HIGH (ref 70–99)
HCT VFR BLD CALC: 39.3 % — SIGNIFICANT CHANGE UP (ref 34.5–45)
HGB BLD-MCNC: 13.4 G/DL — SIGNIFICANT CHANGE UP (ref 11.5–15.5)
INR BLD: 1.59 RATIO — HIGH (ref 0.88–1.16)
MAGNESIUM SERPL-MCNC: 2.2 MG/DL — SIGNIFICANT CHANGE UP (ref 1.8–2.4)
MCHC RBC-ENTMCNC: 32.8 PG — SIGNIFICANT CHANGE UP (ref 27–34)
MCHC RBC-ENTMCNC: 34.1 GM/DL — SIGNIFICANT CHANGE UP (ref 32–36)
MCV RBC AUTO: 96.4 FL — SIGNIFICANT CHANGE UP (ref 80–100)
PHOSPHATE SERPL-MCNC: 3.6 MG/DL — SIGNIFICANT CHANGE UP (ref 2.5–4.5)
PLATELET # BLD AUTO: 199 K/UL — SIGNIFICANT CHANGE UP (ref 150–400)
POTASSIUM SERPL-MCNC: 3.6 MMOL/L — SIGNIFICANT CHANGE UP (ref 3.5–5.3)
POTASSIUM SERPL-SCNC: 3.6 MMOL/L — SIGNIFICANT CHANGE UP (ref 3.5–5.3)
PROTHROM AB SERPL-ACNC: 17.9 SEC — HIGH (ref 10–13.1)
RBC # BLD: 4.08 M/UL — SIGNIFICANT CHANGE UP (ref 3.8–5.2)
RBC # FLD: 11.1 % — SIGNIFICANT CHANGE UP (ref 10.3–14.5)
SODIUM SERPL-SCNC: 138 MMOL/L — SIGNIFICANT CHANGE UP (ref 135–145)
SPECIMEN SOURCE: SIGNIFICANT CHANGE UP
WBC # BLD: 7.9 K/UL — SIGNIFICANT CHANGE UP (ref 3.8–10.5)
WBC # FLD AUTO: 7.9 K/UL — SIGNIFICANT CHANGE UP (ref 3.8–10.5)

## 2017-03-17 PROCEDURE — 99232 SBSQ HOSP IP/OBS MODERATE 35: CPT

## 2017-03-17 PROCEDURE — 71010: CPT | Mod: 26

## 2017-03-17 RX ORDER — MORPHINE SULFATE 50 MG/1
2 CAPSULE, EXTENDED RELEASE ORAL ONCE
Qty: 0 | Refills: 0 | Status: DISCONTINUED | OUTPATIENT
Start: 2017-03-17 | End: 2017-03-17

## 2017-03-17 RX ORDER — ENOXAPARIN SODIUM 100 MG/ML
80 INJECTION SUBCUTANEOUS
Qty: 0 | Refills: 0 | Status: DISCONTINUED | OUTPATIENT
Start: 2017-03-17 | End: 2017-03-25

## 2017-03-17 RX ADMIN — LIDOCAINE 5 MILLILITER(S): 4 CREAM TOPICAL at 22:19

## 2017-03-17 RX ADMIN — Medication 1 GRAM(S): at 13:55

## 2017-03-17 RX ADMIN — TRAMADOL HYDROCHLORIDE 50 MILLIGRAM(S): 50 TABLET ORAL at 22:19

## 2017-03-17 RX ADMIN — FAMOTIDINE 40 MILLIGRAM(S): 10 INJECTION INTRAVENOUS at 22:20

## 2017-03-17 RX ADMIN — LEVALBUTEROL 0.63 MILLIGRAM(S): 1.25 SOLUTION, CONCENTRATE RESPIRATORY (INHALATION) at 06:21

## 2017-03-17 RX ADMIN — LEVALBUTEROL 0.63 MILLIGRAM(S): 1.25 SOLUTION, CONCENTRATE RESPIRATORY (INHALATION) at 15:21

## 2017-03-17 RX ADMIN — MORPHINE SULFATE 2 MILLIGRAM(S): 50 CAPSULE, EXTENDED RELEASE ORAL at 13:04

## 2017-03-17 RX ADMIN — LEVALBUTEROL 0.63 MILLIGRAM(S): 1.25 SOLUTION, CONCENTRATE RESPIRATORY (INHALATION) at 21:06

## 2017-03-17 RX ADMIN — Medication 650 MILLIGRAM(S): at 16:25

## 2017-03-17 RX ADMIN — ENOXAPARIN SODIUM 80 MILLIGRAM(S): 100 INJECTION SUBCUTANEOUS at 18:25

## 2017-03-17 RX ADMIN — Medication 0.25 MILLIGRAM(S): at 00:29

## 2017-03-17 RX ADMIN — TRAMADOL HYDROCHLORIDE 50 MILLIGRAM(S): 50 TABLET ORAL at 11:27

## 2017-03-17 RX ADMIN — Medication 30 MILLILITER(S): at 22:19

## 2017-03-17 RX ADMIN — Medication 650 MILLIGRAM(S): at 15:14

## 2017-03-17 RX ADMIN — MORPHINE SULFATE 2 MILLIGRAM(S): 50 CAPSULE, EXTENDED RELEASE ORAL at 13:51

## 2017-03-17 RX ADMIN — Medication 1 GRAM(S): at 22:20

## 2017-03-17 RX ADMIN — TRAMADOL HYDROCHLORIDE 50 MILLIGRAM(S): 50 TABLET ORAL at 11:34

## 2017-03-18 LAB
ANION GAP SERPL CALC-SCNC: 9 MMOL/L — SIGNIFICANT CHANGE UP (ref 5–17)
BUN SERPL-MCNC: 11 MG/DL — SIGNIFICANT CHANGE UP (ref 7–18)
CALCIUM SERPL-MCNC: 8.4 MG/DL — SIGNIFICANT CHANGE UP (ref 8.4–10.5)
CHLORIDE SERPL-SCNC: 102 MMOL/L — SIGNIFICANT CHANGE UP (ref 96–108)
CO2 SERPL-SCNC: 24 MMOL/L — SIGNIFICANT CHANGE UP (ref 22–31)
CREAT SERPL-MCNC: 0.74 MG/DL — SIGNIFICANT CHANGE UP (ref 0.5–1.3)
GLUCOSE SERPL-MCNC: 112 MG/DL — HIGH (ref 70–99)
HCT VFR BLD CALC: 39.2 % — SIGNIFICANT CHANGE UP (ref 34.5–45)
HGB BLD-MCNC: 13.4 G/DL — SIGNIFICANT CHANGE UP (ref 11.5–15.5)
INR BLD: 1.71 RATIO — HIGH (ref 0.88–1.16)
MAGNESIUM SERPL-MCNC: 2.1 MG/DL — SIGNIFICANT CHANGE UP (ref 1.8–2.4)
MCHC RBC-ENTMCNC: 32.5 PG — SIGNIFICANT CHANGE UP (ref 27–34)
MCHC RBC-ENTMCNC: 34.1 GM/DL — SIGNIFICANT CHANGE UP (ref 32–36)
MCV RBC AUTO: 95.2 FL — SIGNIFICANT CHANGE UP (ref 80–100)
PHOSPHATE SERPL-MCNC: 3.5 MG/DL — SIGNIFICANT CHANGE UP (ref 2.5–4.5)
PLATELET # BLD AUTO: 226 K/UL — SIGNIFICANT CHANGE UP (ref 150–400)
POTASSIUM SERPL-MCNC: 3.5 MMOL/L — SIGNIFICANT CHANGE UP (ref 3.5–5.3)
POTASSIUM SERPL-SCNC: 3.5 MMOL/L — SIGNIFICANT CHANGE UP (ref 3.5–5.3)
PROTHROM AB SERPL-ACNC: 19.2 SEC — HIGH (ref 10–13.1)
RBC # BLD: 4.12 M/UL — SIGNIFICANT CHANGE UP (ref 3.8–5.2)
RBC # FLD: 11.1 % — SIGNIFICANT CHANGE UP (ref 10.3–14.5)
SODIUM SERPL-SCNC: 135 MMOL/L — SIGNIFICANT CHANGE UP (ref 135–145)
WBC # BLD: 5.6 K/UL — SIGNIFICANT CHANGE UP (ref 3.8–10.5)
WBC # FLD AUTO: 5.6 K/UL — SIGNIFICANT CHANGE UP (ref 3.8–10.5)

## 2017-03-18 PROCEDURE — 99232 SBSQ HOSP IP/OBS MODERATE 35: CPT | Mod: GC

## 2017-03-18 RX ADMIN — LIDOCAINE 5 MILLILITER(S): 4 CREAM TOPICAL at 14:39

## 2017-03-18 RX ADMIN — LEVALBUTEROL 0.63 MILLIGRAM(S): 1.25 SOLUTION, CONCENTRATE RESPIRATORY (INHALATION) at 05:13

## 2017-03-18 RX ADMIN — Medication 100 MILLIGRAM(S): at 21:43

## 2017-03-18 RX ADMIN — TRAMADOL HYDROCHLORIDE 50 MILLIGRAM(S): 50 TABLET ORAL at 09:00

## 2017-03-18 RX ADMIN — LIDOCAINE 5 MILLILITER(S): 4 CREAM TOPICAL at 05:13

## 2017-03-18 RX ADMIN — LEVALBUTEROL 0.63 MILLIGRAM(S): 1.25 SOLUTION, CONCENTRATE RESPIRATORY (INHALATION) at 21:02

## 2017-03-18 RX ADMIN — Medication 30 MILLILITER(S): at 21:49

## 2017-03-18 RX ADMIN — TIOTROPIUM BROMIDE 1 CAPSULE(S): 18 CAPSULE ORAL; RESPIRATORY (INHALATION) at 12:23

## 2017-03-18 RX ADMIN — FAMOTIDINE 40 MILLIGRAM(S): 10 INJECTION INTRAVENOUS at 21:36

## 2017-03-18 RX ADMIN — Medication 650 MILLIGRAM(S): at 21:38

## 2017-03-18 RX ADMIN — ENOXAPARIN SODIUM 80 MILLIGRAM(S): 100 INJECTION SUBCUTANEOUS at 16:49

## 2017-03-18 RX ADMIN — Medication 650 MILLIGRAM(S): at 05:12

## 2017-03-18 RX ADMIN — Medication 650 MILLIGRAM(S): at 05:42

## 2017-03-18 RX ADMIN — PANTOPRAZOLE SODIUM 40 MILLIGRAM(S): 20 TABLET, DELAYED RELEASE ORAL at 05:12

## 2017-03-18 RX ADMIN — ENOXAPARIN SODIUM 80 MILLIGRAM(S): 100 INJECTION SUBCUTANEOUS at 05:13

## 2017-03-18 RX ADMIN — Medication 1 GRAM(S): at 21:37

## 2017-03-18 RX ADMIN — LEVALBUTEROL 0.63 MILLIGRAM(S): 1.25 SOLUTION, CONCENTRATE RESPIRATORY (INHALATION) at 15:07

## 2017-03-18 RX ADMIN — Medication 650 MILLIGRAM(S): at 22:40

## 2017-03-18 RX ADMIN — Medication 1 GRAM(S): at 05:12

## 2017-03-18 RX ADMIN — TRAMADOL HYDROCHLORIDE 50 MILLIGRAM(S): 50 TABLET ORAL at 09:30

## 2017-03-18 RX ADMIN — Medication 30 MILLILITER(S): at 05:12

## 2017-03-18 RX ADMIN — TRAMADOL HYDROCHLORIDE 50 MILLIGRAM(S): 50 TABLET ORAL at 16:48

## 2017-03-18 RX ADMIN — LIDOCAINE 5 MILLILITER(S): 4 CREAM TOPICAL at 21:37

## 2017-03-18 RX ADMIN — Medication 1 GRAM(S): at 14:39

## 2017-03-18 RX ADMIN — Medication 0.25 MILLIGRAM(S): at 05:12

## 2017-03-18 RX ADMIN — Medication 30 MILLILITER(S): at 14:39

## 2017-03-18 RX ADMIN — TRAMADOL HYDROCHLORIDE 50 MILLIGRAM(S): 50 TABLET ORAL at 17:20

## 2017-03-19 LAB
ANION GAP SERPL CALC-SCNC: 8 MMOL/L — SIGNIFICANT CHANGE UP (ref 5–17)
BUN SERPL-MCNC: 13 MG/DL — SIGNIFICANT CHANGE UP (ref 7–18)
CALCIUM SERPL-MCNC: 8.4 MG/DL — SIGNIFICANT CHANGE UP (ref 8.4–10.5)
CHLORIDE SERPL-SCNC: 102 MMOL/L — SIGNIFICANT CHANGE UP (ref 96–108)
CO2 SERPL-SCNC: 28 MMOL/L — SIGNIFICANT CHANGE UP (ref 22–31)
CREAT SERPL-MCNC: 0.82 MG/DL — SIGNIFICANT CHANGE UP (ref 0.5–1.3)
GLUCOSE SERPL-MCNC: 102 MG/DL — HIGH (ref 70–99)
HCT VFR BLD CALC: 41.6 % — SIGNIFICANT CHANGE UP (ref 34.5–45)
HGB BLD-MCNC: 13.8 G/DL — SIGNIFICANT CHANGE UP (ref 11.5–15.5)
INR BLD: 1.34 RATIO — HIGH (ref 0.88–1.16)
MCHC RBC-ENTMCNC: 32.4 PG — SIGNIFICANT CHANGE UP (ref 27–34)
MCHC RBC-ENTMCNC: 33.2 GM/DL — SIGNIFICANT CHANGE UP (ref 32–36)
MCV RBC AUTO: 97.3 FL — SIGNIFICANT CHANGE UP (ref 80–100)
PLATELET # BLD AUTO: 256 K/UL — SIGNIFICANT CHANGE UP (ref 150–400)
POTASSIUM SERPL-MCNC: 4 MMOL/L — SIGNIFICANT CHANGE UP (ref 3.5–5.3)
POTASSIUM SERPL-SCNC: 4 MMOL/L — SIGNIFICANT CHANGE UP (ref 3.5–5.3)
PROTHROM AB SERPL-ACNC: 15 SEC — HIGH (ref 10–13.1)
RBC # BLD: 4.27 M/UL — SIGNIFICANT CHANGE UP (ref 3.8–5.2)
RBC # FLD: 11 % — SIGNIFICANT CHANGE UP (ref 10.3–14.5)
SODIUM SERPL-SCNC: 138 MMOL/L — SIGNIFICANT CHANGE UP (ref 135–145)
WBC # BLD: 4.8 K/UL — SIGNIFICANT CHANGE UP (ref 3.8–10.5)
WBC # FLD AUTO: 4.8 K/UL — SIGNIFICANT CHANGE UP (ref 3.8–10.5)

## 2017-03-19 PROCEDURE — 71010: CPT | Mod: 26

## 2017-03-19 PROCEDURE — 99232 SBSQ HOSP IP/OBS MODERATE 35: CPT | Mod: GC

## 2017-03-19 RX ORDER — TRAMADOL HYDROCHLORIDE 50 MG/1
50 TABLET ORAL ONCE
Qty: 0 | Refills: 0 | Status: DISCONTINUED | OUTPATIENT
Start: 2017-03-19 | End: 2017-03-19

## 2017-03-19 RX ORDER — MORPHINE SULFATE 50 MG/1
1 CAPSULE, EXTENDED RELEASE ORAL ONCE
Qty: 0 | Refills: 0 | Status: DISCONTINUED | OUTPATIENT
Start: 2017-03-19 | End: 2017-03-19

## 2017-03-19 RX ORDER — TRAMADOL HYDROCHLORIDE 50 MG/1
50 TABLET ORAL EVERY 8 HOURS
Qty: 0 | Refills: 0 | Status: DISCONTINUED | OUTPATIENT
Start: 2017-03-19 | End: 2017-03-25

## 2017-03-19 RX ADMIN — LIDOCAINE 5 MILLILITER(S): 4 CREAM TOPICAL at 06:04

## 2017-03-19 RX ADMIN — TRAMADOL HYDROCHLORIDE 50 MILLIGRAM(S): 50 TABLET ORAL at 19:00

## 2017-03-19 RX ADMIN — TRAMADOL HYDROCHLORIDE 50 MILLIGRAM(S): 50 TABLET ORAL at 10:51

## 2017-03-19 RX ADMIN — Medication 30 MILLILITER(S): at 22:30

## 2017-03-19 RX ADMIN — TRAMADOL HYDROCHLORIDE 50 MILLIGRAM(S): 50 TABLET ORAL at 18:24

## 2017-03-19 RX ADMIN — ENOXAPARIN SODIUM 80 MILLIGRAM(S): 100 INJECTION SUBCUTANEOUS at 06:04

## 2017-03-19 RX ADMIN — Medication 1 GRAM(S): at 13:03

## 2017-03-19 RX ADMIN — LIDOCAINE 5 MILLILITER(S): 4 CREAM TOPICAL at 22:31

## 2017-03-19 RX ADMIN — Medication 650 MILLIGRAM(S): at 06:05

## 2017-03-19 RX ADMIN — FAMOTIDINE 40 MILLIGRAM(S): 10 INJECTION INTRAVENOUS at 22:31

## 2017-03-19 RX ADMIN — Medication 0.25 MILLIGRAM(S): at 08:26

## 2017-03-19 RX ADMIN — TIOTROPIUM BROMIDE 1 CAPSULE(S): 18 CAPSULE ORAL; RESPIRATORY (INHALATION) at 10:52

## 2017-03-19 RX ADMIN — MORPHINE SULFATE 1 MILLIGRAM(S): 50 CAPSULE, EXTENDED RELEASE ORAL at 15:07

## 2017-03-19 RX ADMIN — Medication 650 MILLIGRAM(S): at 07:09

## 2017-03-19 RX ADMIN — PANTOPRAZOLE SODIUM 40 MILLIGRAM(S): 20 TABLET, DELAYED RELEASE ORAL at 06:03

## 2017-03-19 RX ADMIN — Medication 1 GRAM(S): at 06:03

## 2017-03-19 RX ADMIN — LEVALBUTEROL 0.63 MILLIGRAM(S): 1.25 SOLUTION, CONCENTRATE RESPIRATORY (INHALATION) at 21:17

## 2017-03-19 RX ADMIN — ENOXAPARIN SODIUM 80 MILLIGRAM(S): 100 INJECTION SUBCUTANEOUS at 18:25

## 2017-03-19 RX ADMIN — Medication 30 MILLILITER(S): at 13:05

## 2017-03-19 RX ADMIN — LIDOCAINE 5 MILLILITER(S): 4 CREAM TOPICAL at 13:03

## 2017-03-19 RX ADMIN — Medication 1 GRAM(S): at 22:31

## 2017-03-19 RX ADMIN — LEVALBUTEROL 0.63 MILLIGRAM(S): 1.25 SOLUTION, CONCENTRATE RESPIRATORY (INHALATION) at 13:05

## 2017-03-19 RX ADMIN — MORPHINE SULFATE 1 MILLIGRAM(S): 50 CAPSULE, EXTENDED RELEASE ORAL at 15:40

## 2017-03-19 RX ADMIN — TRAMADOL HYDROCHLORIDE 50 MILLIGRAM(S): 50 TABLET ORAL at 11:30

## 2017-03-19 RX ADMIN — Medication 30 MILLILITER(S): at 06:04

## 2017-03-19 RX ADMIN — LEVALBUTEROL 0.63 MILLIGRAM(S): 1.25 SOLUTION, CONCENTRATE RESPIRATORY (INHALATION) at 06:14

## 2017-03-20 LAB
ANION GAP SERPL CALC-SCNC: 11 MMOL/L — SIGNIFICANT CHANGE UP (ref 5–17)
BUN SERPL-MCNC: 11 MG/DL — SIGNIFICANT CHANGE UP (ref 7–18)
CALCIUM SERPL-MCNC: 8.5 MG/DL — SIGNIFICANT CHANGE UP (ref 8.4–10.5)
CHLORIDE SERPL-SCNC: 103 MMOL/L — SIGNIFICANT CHANGE UP (ref 96–108)
CO2 SERPL-SCNC: 25 MMOL/L — SIGNIFICANT CHANGE UP (ref 22–31)
CREAT SERPL-MCNC: 0.76 MG/DL — SIGNIFICANT CHANGE UP (ref 0.5–1.3)
GLUCOSE SERPL-MCNC: 106 MG/DL — HIGH (ref 70–99)
HCT VFR BLD CALC: 40.3 % — SIGNIFICANT CHANGE UP (ref 34.5–45)
HGB BLD-MCNC: 13.6 G/DL — SIGNIFICANT CHANGE UP (ref 11.5–15.5)
INR BLD: 1.15 RATIO — SIGNIFICANT CHANGE UP (ref 0.88–1.16)
MCHC RBC-ENTMCNC: 32.2 PG — SIGNIFICANT CHANGE UP (ref 27–34)
MCHC RBC-ENTMCNC: 33.6 GM/DL — SIGNIFICANT CHANGE UP (ref 32–36)
MCV RBC AUTO: 95.8 FL — SIGNIFICANT CHANGE UP (ref 80–100)
PLATELET # BLD AUTO: 272 K/UL — SIGNIFICANT CHANGE UP (ref 150–400)
POTASSIUM SERPL-MCNC: 3.9 MMOL/L — SIGNIFICANT CHANGE UP (ref 3.5–5.3)
POTASSIUM SERPL-SCNC: 3.9 MMOL/L — SIGNIFICANT CHANGE UP (ref 3.5–5.3)
PROTHROM AB SERPL-ACNC: 12.9 SEC — SIGNIFICANT CHANGE UP (ref 10–13.1)
RBC # BLD: 4.21 M/UL — SIGNIFICANT CHANGE UP (ref 3.8–5.2)
RBC # FLD: 11.2 % — SIGNIFICANT CHANGE UP (ref 10.3–14.5)
SODIUM SERPL-SCNC: 139 MMOL/L — SIGNIFICANT CHANGE UP (ref 135–145)
WBC # BLD: 4.7 K/UL — SIGNIFICANT CHANGE UP (ref 3.8–10.5)
WBC # FLD AUTO: 4.7 K/UL — SIGNIFICANT CHANGE UP (ref 3.8–10.5)

## 2017-03-20 PROCEDURE — 99232 SBSQ HOSP IP/OBS MODERATE 35: CPT

## 2017-03-20 PROCEDURE — 71010: CPT | Mod: 26

## 2017-03-20 RX ORDER — FENTANYL CITRATE 50 UG/ML
1 INJECTION INTRAVENOUS
Qty: 0 | Refills: 0 | Status: DISCONTINUED | OUTPATIENT
Start: 2017-03-20 | End: 2017-03-23

## 2017-03-20 RX ORDER — MORPHINE SULFATE 50 MG/1
2 CAPSULE, EXTENDED RELEASE ORAL EVERY 4 HOURS
Qty: 0 | Refills: 0 | Status: DISCONTINUED | OUTPATIENT
Start: 2017-03-20 | End: 2017-03-23

## 2017-03-20 RX ADMIN — Medication 650 MILLIGRAM(S): at 09:59

## 2017-03-20 RX ADMIN — LEVALBUTEROL 0.63 MILLIGRAM(S): 1.25 SOLUTION, CONCENTRATE RESPIRATORY (INHALATION) at 05:46

## 2017-03-20 RX ADMIN — Medication 1 GRAM(S): at 21:45

## 2017-03-20 RX ADMIN — TRAMADOL HYDROCHLORIDE 50 MILLIGRAM(S): 50 TABLET ORAL at 04:45

## 2017-03-20 RX ADMIN — Medication 1 GRAM(S): at 15:52

## 2017-03-20 RX ADMIN — LEVALBUTEROL 0.63 MILLIGRAM(S): 1.25 SOLUTION, CONCENTRATE RESPIRATORY (INHALATION) at 21:17

## 2017-03-20 RX ADMIN — TRAMADOL HYDROCHLORIDE 50 MILLIGRAM(S): 50 TABLET ORAL at 15:53

## 2017-03-20 RX ADMIN — TRAMADOL HYDROCHLORIDE 50 MILLIGRAM(S): 50 TABLET ORAL at 05:46

## 2017-03-20 RX ADMIN — LEVALBUTEROL 0.63 MILLIGRAM(S): 1.25 SOLUTION, CONCENTRATE RESPIRATORY (INHALATION) at 14:14

## 2017-03-20 RX ADMIN — LIDOCAINE 5 MILLILITER(S): 4 CREAM TOPICAL at 21:45

## 2017-03-20 RX ADMIN — FENTANYL CITRATE 1 PATCH: 50 INJECTION INTRAVENOUS at 11:48

## 2017-03-20 RX ADMIN — Medication 30 MILLILITER(S): at 15:52

## 2017-03-20 RX ADMIN — LIDOCAINE 5 MILLILITER(S): 4 CREAM TOPICAL at 15:53

## 2017-03-20 RX ADMIN — TRAMADOL HYDROCHLORIDE 50 MILLIGRAM(S): 50 TABLET ORAL at 16:00

## 2017-03-20 RX ADMIN — ENOXAPARIN SODIUM 80 MILLIGRAM(S): 100 INJECTION SUBCUTANEOUS at 17:25

## 2017-03-20 RX ADMIN — MORPHINE SULFATE 2 MILLIGRAM(S): 50 CAPSULE, EXTENDED RELEASE ORAL at 12:00

## 2017-03-20 RX ADMIN — Medication 650 MILLIGRAM(S): at 10:45

## 2017-03-20 RX ADMIN — Medication 30 MILLILITER(S): at 21:45

## 2017-03-20 RX ADMIN — MORPHINE SULFATE 2 MILLIGRAM(S): 50 CAPSULE, EXTENDED RELEASE ORAL at 11:44

## 2017-03-20 RX ADMIN — TIOTROPIUM BROMIDE 1 CAPSULE(S): 18 CAPSULE ORAL; RESPIRATORY (INHALATION) at 11:49

## 2017-03-21 PROCEDURE — 71010: CPT | Mod: 26

## 2017-03-21 RX ORDER — ONDANSETRON 8 MG/1
4 TABLET, FILM COATED ORAL EVERY 4 HOURS
Qty: 0 | Refills: 0 | Status: DISCONTINUED | OUTPATIENT
Start: 2017-03-21 | End: 2017-03-25

## 2017-03-21 RX ADMIN — ENOXAPARIN SODIUM 80 MILLIGRAM(S): 100 INJECTION SUBCUTANEOUS at 17:52

## 2017-03-21 RX ADMIN — Medication 30 MILLILITER(S): at 14:32

## 2017-03-21 RX ADMIN — FAMOTIDINE 40 MILLIGRAM(S): 10 INJECTION INTRAVENOUS at 21:20

## 2017-03-21 RX ADMIN — Medication 650 MILLIGRAM(S): at 11:34

## 2017-03-21 RX ADMIN — Medication 30 MILLILITER(S): at 21:20

## 2017-03-21 RX ADMIN — LEVALBUTEROL 0.63 MILLIGRAM(S): 1.25 SOLUTION, CONCENTRATE RESPIRATORY (INHALATION) at 21:14

## 2017-03-21 RX ADMIN — ONDANSETRON 4 MILLIGRAM(S): 8 TABLET, FILM COATED ORAL at 10:00

## 2017-03-21 RX ADMIN — Medication 1 GRAM(S): at 21:19

## 2017-03-21 RX ADMIN — Medication 650 MILLIGRAM(S): at 12:05

## 2017-03-21 RX ADMIN — PANTOPRAZOLE SODIUM 40 MILLIGRAM(S): 20 TABLET, DELAYED RELEASE ORAL at 06:17

## 2017-03-21 RX ADMIN — Medication 1 GRAM(S): at 14:30

## 2017-03-21 RX ADMIN — Medication 1 GRAM(S): at 06:17

## 2017-03-21 RX ADMIN — ENOXAPARIN SODIUM 80 MILLIGRAM(S): 100 INJECTION SUBCUTANEOUS at 06:17

## 2017-03-21 RX ADMIN — LEVALBUTEROL 0.63 MILLIGRAM(S): 1.25 SOLUTION, CONCENTRATE RESPIRATORY (INHALATION) at 14:29

## 2017-03-21 RX ADMIN — TIOTROPIUM BROMIDE 1 CAPSULE(S): 18 CAPSULE ORAL; RESPIRATORY (INHALATION) at 11:32

## 2017-03-21 RX ADMIN — Medication 30 MILLILITER(S): at 06:17

## 2017-03-22 PROCEDURE — 71010: CPT | Mod: 26,77

## 2017-03-22 PROCEDURE — 71010: CPT | Mod: 26

## 2017-03-22 RX ADMIN — MORPHINE SULFATE 2 MILLIGRAM(S): 50 CAPSULE, EXTENDED RELEASE ORAL at 02:05

## 2017-03-22 RX ADMIN — LEVALBUTEROL 0.63 MILLIGRAM(S): 1.25 SOLUTION, CONCENTRATE RESPIRATORY (INHALATION) at 05:34

## 2017-03-22 RX ADMIN — Medication 30 MILLILITER(S): at 13:25

## 2017-03-22 RX ADMIN — LEVALBUTEROL 0.63 MILLIGRAM(S): 1.25 SOLUTION, CONCENTRATE RESPIRATORY (INHALATION) at 14:40

## 2017-03-22 RX ADMIN — MORPHINE SULFATE 2 MILLIGRAM(S): 50 CAPSULE, EXTENDED RELEASE ORAL at 01:40

## 2017-03-22 RX ADMIN — TIOTROPIUM BROMIDE 1 CAPSULE(S): 18 CAPSULE ORAL; RESPIRATORY (INHALATION) at 12:25

## 2017-03-22 RX ADMIN — FENTANYL CITRATE 1 PATCH: 50 INJECTION INTRAVENOUS at 12:29

## 2017-03-22 RX ADMIN — Medication 30 MILLILITER(S): at 06:02

## 2017-03-22 RX ADMIN — Medication 1 GRAM(S): at 06:02

## 2017-03-22 RX ADMIN — MORPHINE SULFATE 2 MILLIGRAM(S): 50 CAPSULE, EXTENDED RELEASE ORAL at 19:00

## 2017-03-22 RX ADMIN — PANTOPRAZOLE SODIUM 40 MILLIGRAM(S): 20 TABLET, DELAYED RELEASE ORAL at 06:02

## 2017-03-22 RX ADMIN — TRAMADOL HYDROCHLORIDE 50 MILLIGRAM(S): 50 TABLET ORAL at 01:03

## 2017-03-22 RX ADMIN — Medication 650 MILLIGRAM(S): at 10:30

## 2017-03-22 RX ADMIN — TRAMADOL HYDROCHLORIDE 50 MILLIGRAM(S): 50 TABLET ORAL at 14:00

## 2017-03-22 RX ADMIN — Medication 1 GRAM(S): at 13:25

## 2017-03-22 RX ADMIN — FENTANYL CITRATE 1 PATCH: 50 INJECTION INTRAVENOUS at 12:28

## 2017-03-22 RX ADMIN — TRAMADOL HYDROCHLORIDE 50 MILLIGRAM(S): 50 TABLET ORAL at 13:27

## 2017-03-22 RX ADMIN — MORPHINE SULFATE 2 MILLIGRAM(S): 50 CAPSULE, EXTENDED RELEASE ORAL at 18:22

## 2017-03-22 RX ADMIN — ENOXAPARIN SODIUM 80 MILLIGRAM(S): 100 INJECTION SUBCUTANEOUS at 06:02

## 2017-03-22 RX ADMIN — Medication 650 MILLIGRAM(S): at 10:02

## 2017-03-22 RX ADMIN — ENOXAPARIN SODIUM 80 MILLIGRAM(S): 100 INJECTION SUBCUTANEOUS at 17:28

## 2017-03-22 RX ADMIN — TRAMADOL HYDROCHLORIDE 50 MILLIGRAM(S): 50 TABLET ORAL at 01:33

## 2017-03-23 ENCOUNTER — RESULT REVIEW (OUTPATIENT)
Age: 62
End: 2017-03-23

## 2017-03-23 PROCEDURE — 71020: CPT | Mod: 26

## 2017-03-23 PROCEDURE — 99232 SBSQ HOSP IP/OBS MODERATE 35: CPT | Mod: GC

## 2017-03-23 RX ORDER — ONDANSETRON 8 MG/1
4 TABLET, FILM COATED ORAL EVERY 6 HOURS
Qty: 0 | Refills: 0 | Status: DISCONTINUED | OUTPATIENT
Start: 2017-03-23 | End: 2017-03-23

## 2017-03-23 RX ORDER — SODIUM CHLORIDE 9 MG/ML
1000 INJECTION, SOLUTION INTRAVENOUS
Qty: 0 | Refills: 0 | Status: DISCONTINUED | OUTPATIENT
Start: 2017-03-23 | End: 2017-03-24

## 2017-03-23 RX ADMIN — Medication 30 MILLILITER(S): at 13:14

## 2017-03-23 RX ADMIN — ENOXAPARIN SODIUM 80 MILLIGRAM(S): 100 INJECTION SUBCUTANEOUS at 06:40

## 2017-03-23 RX ADMIN — PANTOPRAZOLE SODIUM 40 MILLIGRAM(S): 20 TABLET, DELAYED RELEASE ORAL at 06:41

## 2017-03-23 RX ADMIN — TIOTROPIUM BROMIDE 1 CAPSULE(S): 18 CAPSULE ORAL; RESPIRATORY (INHALATION) at 13:14

## 2017-03-23 RX ADMIN — Medication 650 MILLIGRAM(S): at 13:13

## 2017-03-23 RX ADMIN — ONDANSETRON 4 MILLIGRAM(S): 8 TABLET, FILM COATED ORAL at 00:27

## 2017-03-23 RX ADMIN — LEVALBUTEROL 0.63 MILLIGRAM(S): 1.25 SOLUTION, CONCENTRATE RESPIRATORY (INHALATION) at 15:33

## 2017-03-23 RX ADMIN — Medication 30 MILLILITER(S): at 06:40

## 2017-03-23 RX ADMIN — LIDOCAINE 5 MILLILITER(S): 4 CREAM TOPICAL at 13:14

## 2017-03-23 RX ADMIN — Medication 1 GRAM(S): at 21:10

## 2017-03-23 RX ADMIN — Medication 1 GRAM(S): at 06:41

## 2017-03-23 RX ADMIN — Medication 30 MILLILITER(S): at 21:10

## 2017-03-23 RX ADMIN — ONDANSETRON 4 MILLIGRAM(S): 8 TABLET, FILM COATED ORAL at 06:41

## 2017-03-23 RX ADMIN — LIDOCAINE 5 MILLILITER(S): 4 CREAM TOPICAL at 21:10

## 2017-03-23 RX ADMIN — SODIUM CHLORIDE 75 MILLILITER(S): 9 INJECTION, SOLUTION INTRAVENOUS at 18:42

## 2017-03-23 RX ADMIN — Medication 650 MILLIGRAM(S): at 10:38

## 2017-03-23 RX ADMIN — ENOXAPARIN SODIUM 80 MILLIGRAM(S): 100 INJECTION SUBCUTANEOUS at 17:03

## 2017-03-23 RX ADMIN — LIDOCAINE 5 MILLILITER(S): 4 CREAM TOPICAL at 06:40

## 2017-03-23 RX ADMIN — FAMOTIDINE 40 MILLIGRAM(S): 10 INJECTION INTRAVENOUS at 21:10

## 2017-03-23 RX ADMIN — Medication 1 GRAM(S): at 13:14

## 2017-03-23 RX ADMIN — LEVALBUTEROL 0.63 MILLIGRAM(S): 1.25 SOLUTION, CONCENTRATE RESPIRATORY (INHALATION) at 05:36

## 2017-03-24 LAB
HCT VFR BLD CALC: 39.6 % — SIGNIFICANT CHANGE UP (ref 34.5–45)
HGB BLD-MCNC: 13.3 G/DL — SIGNIFICANT CHANGE UP (ref 11.5–15.5)
INR BLD: 1.08 RATIO — SIGNIFICANT CHANGE UP (ref 0.88–1.16)
MCHC RBC-ENTMCNC: 32.3 PG — SIGNIFICANT CHANGE UP (ref 27–34)
MCHC RBC-ENTMCNC: 33.5 GM/DL — SIGNIFICANT CHANGE UP (ref 32–36)
MCV RBC AUTO: 96.2 FL — SIGNIFICANT CHANGE UP (ref 80–100)
PLATELET # BLD AUTO: 319 K/UL — SIGNIFICANT CHANGE UP (ref 150–400)
PROTHROM AB SERPL-ACNC: 11.8 SEC — SIGNIFICANT CHANGE UP (ref 9.8–12.7)
RBC # BLD: 4.12 M/UL — SIGNIFICANT CHANGE UP (ref 3.8–5.2)
RBC # FLD: 11.5 % — SIGNIFICANT CHANGE UP (ref 10.3–14.5)
WBC # BLD: 3.8 K/UL — SIGNIFICANT CHANGE UP (ref 3.8–10.5)
WBC # FLD AUTO: 3.8 K/UL — SIGNIFICANT CHANGE UP (ref 3.8–10.5)

## 2017-03-24 PROCEDURE — 88305 TISSUE EXAM BY PATHOLOGIST: CPT | Mod: 26

## 2017-03-24 PROCEDURE — 88312 SPECIAL STAINS GROUP 1: CPT | Mod: 26

## 2017-03-24 PROCEDURE — 43239 EGD BIOPSY SINGLE/MULTIPLE: CPT

## 2017-03-24 PROCEDURE — 99232 SBSQ HOSP IP/OBS MODERATE 35: CPT | Mod: GC

## 2017-03-24 RX ORDER — WARFARIN SODIUM 2.5 MG/1
7.5 TABLET ORAL ONCE
Qty: 0 | Refills: 0 | Status: COMPLETED | OUTPATIENT
Start: 2017-03-24 | End: 2017-03-24

## 2017-03-24 RX ORDER — PANTOPRAZOLE SODIUM 20 MG/1
40 TABLET, DELAYED RELEASE ORAL
Qty: 0 | Refills: 0 | Status: DISCONTINUED | OUTPATIENT
Start: 2017-03-24 | End: 2017-03-25

## 2017-03-24 RX ORDER — LIDOCAINE 4 G/100G
10 CREAM TOPICAL EVERY 8 HOURS
Qty: 0 | Refills: 0 | Status: COMPLETED | OUTPATIENT
Start: 2017-03-24 | End: 2017-03-25

## 2017-03-24 RX ORDER — SUCRALFATE 1 G
1 TABLET ORAL EVERY 8 HOURS
Qty: 0 | Refills: 0 | Status: COMPLETED | OUTPATIENT
Start: 2017-03-24 | End: 2017-03-25

## 2017-03-24 RX ORDER — MORPHINE SULFATE 50 MG/1
2 CAPSULE, EXTENDED RELEASE ORAL ONCE
Qty: 0 | Refills: 0 | Status: DISCONTINUED | OUTPATIENT
Start: 2017-03-24 | End: 2017-03-24

## 2017-03-24 RX ADMIN — TRAMADOL HYDROCHLORIDE 50 MILLIGRAM(S): 50 TABLET ORAL at 14:40

## 2017-03-24 RX ADMIN — ENOXAPARIN SODIUM 80 MILLIGRAM(S): 100 INJECTION SUBCUTANEOUS at 18:08

## 2017-03-24 RX ADMIN — WARFARIN SODIUM 7.5 MILLIGRAM(S): 2.5 TABLET ORAL at 21:47

## 2017-03-24 RX ADMIN — MORPHINE SULFATE 2 MILLIGRAM(S): 50 CAPSULE, EXTENDED RELEASE ORAL at 20:03

## 2017-03-24 RX ADMIN — Medication 1 GRAM(S): at 21:48

## 2017-03-24 RX ADMIN — FAMOTIDINE 40 MILLIGRAM(S): 10 INJECTION INTRAVENOUS at 21:48

## 2017-03-24 RX ADMIN — MORPHINE SULFATE 2 MILLIGRAM(S): 50 CAPSULE, EXTENDED RELEASE ORAL at 20:30

## 2017-03-24 RX ADMIN — LEVALBUTEROL 0.63 MILLIGRAM(S): 1.25 SOLUTION, CONCENTRATE RESPIRATORY (INHALATION) at 14:36

## 2017-03-24 RX ADMIN — Medication 10 MILLILITER(S): at 15:24

## 2017-03-24 RX ADMIN — Medication 1 GRAM(S): at 14:34

## 2017-03-24 RX ADMIN — Medication 30 MILLILITER(S): at 21:57

## 2017-03-24 RX ADMIN — LEVALBUTEROL 0.63 MILLIGRAM(S): 1.25 SOLUTION, CONCENTRATE RESPIRATORY (INHALATION) at 22:38

## 2017-03-24 RX ADMIN — TRAMADOL HYDROCHLORIDE 50 MILLIGRAM(S): 50 TABLET ORAL at 14:37

## 2017-03-24 RX ADMIN — FENTANYL CITRATE 1 PATCH: 50 INJECTION INTRAVENOUS at 14:51

## 2017-03-25 VITALS
DIASTOLIC BLOOD PRESSURE: 63 MMHG | RESPIRATION RATE: 17 BRPM | TEMPERATURE: 98 F | SYSTOLIC BLOOD PRESSURE: 94 MMHG | HEART RATE: 60 BPM | OXYGEN SATURATION: 95 %

## 2017-03-25 LAB
INR BLD: 1.14 RATIO — SIGNIFICANT CHANGE UP (ref 0.88–1.16)
PROTHROM AB SERPL-ACNC: 12.5 SEC — SIGNIFICANT CHANGE UP (ref 9.8–12.7)

## 2017-03-25 PROCEDURE — 82042 OTHER SOURCE ALBUMIN QUAN EA: CPT

## 2017-03-25 PROCEDURE — 88312 SPECIAL STAINS GROUP 1: CPT

## 2017-03-25 PROCEDURE — 87070 CULTURE OTHR SPECIMN AEROBIC: CPT

## 2017-03-25 PROCEDURE — 93306 TTE W/DOPPLER COMPLETE: CPT

## 2017-03-25 PROCEDURE — 85027 COMPLETE CBC AUTOMATED: CPT

## 2017-03-25 PROCEDURE — 83735 ASSAY OF MAGNESIUM: CPT

## 2017-03-25 PROCEDURE — 93005 ELECTROCARDIOGRAM TRACING: CPT

## 2017-03-25 PROCEDURE — 84484 ASSAY OF TROPONIN QUANT: CPT

## 2017-03-25 PROCEDURE — 80048 BASIC METABOLIC PNL TOTAL CA: CPT

## 2017-03-25 PROCEDURE — 87116 MYCOBACTERIA CULTURE: CPT

## 2017-03-25 PROCEDURE — 82607 VITAMIN B-12: CPT

## 2017-03-25 PROCEDURE — 87102 FUNGUS ISOLATION CULTURE: CPT

## 2017-03-25 PROCEDURE — 84478 ASSAY OF TRIGLYCERIDES: CPT

## 2017-03-25 PROCEDURE — 82553 CREATINE MB FRACTION: CPT

## 2017-03-25 PROCEDURE — 87075 CULTR BACTERIA EXCEPT BLOOD: CPT

## 2017-03-25 PROCEDURE — 80061 LIPID PANEL: CPT

## 2017-03-25 PROCEDURE — P9059: CPT

## 2017-03-25 PROCEDURE — 71045 X-RAY EXAM CHEST 1 VIEW: CPT

## 2017-03-25 PROCEDURE — 89051 BODY FLUID CELL COUNT: CPT

## 2017-03-25 PROCEDURE — 88341 IMHCHEM/IMCYTCHM EA ADD ANTB: CPT

## 2017-03-25 PROCEDURE — 88112 CYTOPATH CELL ENHANCE TECH: CPT

## 2017-03-25 PROCEDURE — 87086 URINE CULTURE/COLONY COUNT: CPT

## 2017-03-25 PROCEDURE — 85610 PROTHROMBIN TIME: CPT

## 2017-03-25 PROCEDURE — 82945 GLUCOSE OTHER FLUID: CPT

## 2017-03-25 PROCEDURE — 87206 SMEAR FLUORESCENT/ACID STAI: CPT

## 2017-03-25 PROCEDURE — 71046 X-RAY EXAM CHEST 2 VIEWS: CPT

## 2017-03-25 PROCEDURE — 83880 ASSAY OF NATRIURETIC PEPTIDE: CPT

## 2017-03-25 PROCEDURE — 80053 COMPREHEN METABOLIC PANEL: CPT

## 2017-03-25 PROCEDURE — 80202 ASSAY OF VANCOMYCIN: CPT

## 2017-03-25 PROCEDURE — 82570 ASSAY OF URINE CREATININE: CPT

## 2017-03-25 PROCEDURE — 94200 LUNG FUNCTION TEST (MBC/MVV): CPT

## 2017-03-25 PROCEDURE — 99239 HOSP IP/OBS DSCHRG MGMT >30: CPT

## 2017-03-25 PROCEDURE — 86901 BLOOD TYPING SEROLOGIC RH(D): CPT

## 2017-03-25 PROCEDURE — 83986 ASSAY PH BODY FLUID NOS: CPT

## 2017-03-25 PROCEDURE — 83690 ASSAY OF LIPASE: CPT

## 2017-03-25 PROCEDURE — 86850 RBC ANTIBODY SCREEN: CPT

## 2017-03-25 PROCEDURE — 84145 PROCALCITONIN (PCT): CPT

## 2017-03-25 PROCEDURE — 87015 SPECIMEN INFECT AGNT CONCNTJ: CPT

## 2017-03-25 PROCEDURE — 84443 ASSAY THYROID STIM HORMONE: CPT

## 2017-03-25 PROCEDURE — 83615 LACTATE (LD) (LDH) ENZYME: CPT

## 2017-03-25 PROCEDURE — 87040 BLOOD CULTURE FOR BACTERIA: CPT

## 2017-03-25 PROCEDURE — 84100 ASSAY OF PHOSPHORUS: CPT

## 2017-03-25 PROCEDURE — 94729 DIFFUSING CAPACITY: CPT

## 2017-03-25 PROCEDURE — 94060 EVALUATION OF WHEEZING: CPT

## 2017-03-25 PROCEDURE — 71250 CT THORAX DX C-: CPT

## 2017-03-25 PROCEDURE — 84157 ASSAY OF PROTEIN OTHER: CPT

## 2017-03-25 PROCEDURE — 88360 TUMOR IMMUNOHISTOCHEM/MANUAL: CPT

## 2017-03-25 PROCEDURE — 88342 IMHCHEM/IMCYTCHM 1ST ANTB: CPT

## 2017-03-25 PROCEDURE — 83605 ASSAY OF LACTIC ACID: CPT

## 2017-03-25 PROCEDURE — 94640 AIRWAY INHALATION TREATMENT: CPT

## 2017-03-25 PROCEDURE — 83036 HEMOGLOBIN GLYCOSYLATED A1C: CPT

## 2017-03-25 PROCEDURE — 82550 ASSAY OF CK (CPK): CPT

## 2017-03-25 PROCEDURE — 99285 EMERGENCY DEPT VISIT HI MDM: CPT | Mod: 25

## 2017-03-25 PROCEDURE — 81001 URINALYSIS AUTO W/SCOPE: CPT

## 2017-03-25 PROCEDURE — 88305 TISSUE EXAM BY PATHOLOGIST: CPT

## 2017-03-25 PROCEDURE — 87205 SMEAR GRAM STAIN: CPT

## 2017-03-25 PROCEDURE — 85730 THROMBOPLASTIN TIME PARTIAL: CPT

## 2017-03-25 PROCEDURE — 36430 TRANSFUSION BLD/BLD COMPNT: CPT

## 2017-03-25 RX ORDER — ENOXAPARIN SODIUM 100 MG/ML
120 INJECTION SUBCUTANEOUS
Qty: 7 | Refills: 0 | OUTPATIENT
Start: 2017-03-25 | End: 2017-04-01

## 2017-03-25 RX ORDER — WARFARIN SODIUM 2.5 MG/1
1 TABLET ORAL
Qty: 7 | Refills: 0 | OUTPATIENT
Start: 2017-03-25 | End: 2017-04-01

## 2017-03-25 RX ADMIN — Medication 1 GRAM(S): at 05:56

## 2017-03-25 RX ADMIN — ENOXAPARIN SODIUM 80 MILLIGRAM(S): 100 INJECTION SUBCUTANEOUS at 05:57

## 2017-03-25 RX ADMIN — Medication 30 MILLILITER(S): at 06:01

## 2017-03-25 RX ADMIN — TRAMADOL HYDROCHLORIDE 50 MILLIGRAM(S): 50 TABLET ORAL at 06:03

## 2017-03-25 RX ADMIN — PANTOPRAZOLE SODIUM 40 MILLIGRAM(S): 20 TABLET, DELAYED RELEASE ORAL at 07:00

## 2017-03-25 RX ADMIN — TIOTROPIUM BROMIDE 1 CAPSULE(S): 18 CAPSULE ORAL; RESPIRATORY (INHALATION) at 12:21

## 2017-03-25 RX ADMIN — TRAMADOL HYDROCHLORIDE 50 MILLIGRAM(S): 50 TABLET ORAL at 07:30

## 2017-03-27 ENCOUNTER — OUTPATIENT (OUTPATIENT)
Dept: OUTPATIENT SERVICES | Facility: HOSPITAL | Age: 62
LOS: 1 days | End: 2017-03-27
Payer: COMMERCIAL

## 2017-03-27 ENCOUNTER — APPOINTMENT (OUTPATIENT)
Dept: INTERNAL MEDICINE | Facility: CLINIC | Age: 62
End: 2017-03-27

## 2017-03-27 VITALS
DIASTOLIC BLOOD PRESSURE: 72 MMHG | HEIGHT: 67 IN | HEART RATE: 102 BPM | WEIGHT: 168 LBS | SYSTOLIC BLOOD PRESSURE: 102 MMHG | BODY MASS INDEX: 26.37 KG/M2

## 2017-03-27 DIAGNOSIS — Z00.00 ENCOUNTER FOR GENERAL ADULT MEDICAL EXAMINATION WITHOUT ABNORMAL FINDINGS: ICD-10-CM

## 2017-03-27 DIAGNOSIS — Z84.1 FAMILY HISTORY OF DISORDERS OF KIDNEY AND URETER: ICD-10-CM

## 2017-03-27 DIAGNOSIS — Z87.891 PERSONAL HISTORY OF NICOTINE DEPENDENCE: ICD-10-CM

## 2017-03-27 DIAGNOSIS — Z84.0 FAMILY HISTORY OF DISEASES OF THE SKIN AND SUBCUTANEOUS TISSUE: ICD-10-CM

## 2017-03-27 DIAGNOSIS — J18.9 PNEUMONIA, UNSPECIFIED ORGANISM: ICD-10-CM

## 2017-03-27 DIAGNOSIS — Z98.890 OTHER SPECIFIED POSTPROCEDURAL STATES: Chronic | ICD-10-CM

## 2017-03-27 DIAGNOSIS — R10.13 EPIGASTRIC PAIN: ICD-10-CM

## 2017-03-27 DIAGNOSIS — Z86.79 PERSONAL HISTORY OF OTHER DISEASES OF THE CIRCULATORY SYSTEM: ICD-10-CM

## 2017-03-27 LAB
INR BLD: 1.23 RATIO — HIGH (ref 0.88–1.16)
PROTHROM AB SERPL-ACNC: 13.9 SEC — HIGH (ref 10–13.1)

## 2017-03-27 PROCEDURE — G0463: CPT

## 2017-03-27 PROCEDURE — 85610 PROTHROMBIN TIME: CPT

## 2017-03-28 DIAGNOSIS — I48.1 PERSISTENT ATRIAL FIBRILLATION: ICD-10-CM

## 2017-03-28 DIAGNOSIS — R10.13 EPIGASTRIC PAIN: ICD-10-CM

## 2017-03-28 DIAGNOSIS — J18.9 PNEUMONIA, UNSPECIFIED ORGANISM: ICD-10-CM

## 2017-03-29 ENCOUNTER — RESULT REVIEW (OUTPATIENT)
Age: 62
End: 2017-03-29

## 2017-03-29 ENCOUNTER — OTHER (OUTPATIENT)
Age: 62
End: 2017-03-29

## 2017-03-29 DIAGNOSIS — J18.9 PNEUMONIA, UNSPECIFIED ORGANISM: ICD-10-CM

## 2017-03-29 DIAGNOSIS — R52 PAIN, UNSPECIFIED: ICD-10-CM

## 2017-03-29 DIAGNOSIS — J91.8 PLEURAL EFFUSION IN OTHER CONDITIONS CLASSIFIED ELSEWHERE: ICD-10-CM

## 2017-03-29 DIAGNOSIS — R07.89 OTHER CHEST PAIN: ICD-10-CM

## 2017-03-29 DIAGNOSIS — R91.1 SOLITARY PULMONARY NODULE: ICD-10-CM

## 2017-03-29 DIAGNOSIS — Z79.01 LONG TERM (CURRENT) USE OF ANTICOAGULANTS: ICD-10-CM

## 2017-03-29 DIAGNOSIS — F41.0 PANIC DISORDER [EPISODIC PAROXYSMAL ANXIETY]: ICD-10-CM

## 2017-03-29 DIAGNOSIS — I42.1 OBSTRUCTIVE HYPERTROPHIC CARDIOMYOPATHY: ICD-10-CM

## 2017-03-29 DIAGNOSIS — L29.9 PRURITUS, UNSPECIFIED: ICD-10-CM

## 2017-03-29 DIAGNOSIS — Z93.8 OTHER ARTIFICIAL OPENING STATUS: ICD-10-CM

## 2017-03-29 DIAGNOSIS — K25.9 GASTRIC ULCER, UNSPECIFIED AS ACUTE OR CHRONIC, WITHOUT HEMORRHAGE OR PERFORATION: ICD-10-CM

## 2017-03-29 DIAGNOSIS — K29.70 GASTRITIS, UNSPECIFIED, WITHOUT BLEEDING: ICD-10-CM

## 2017-03-29 DIAGNOSIS — F17.210 NICOTINE DEPENDENCE, CIGARETTES, UNCOMPLICATED: ICD-10-CM

## 2017-03-29 DIAGNOSIS — K29.80 DUODENITIS WITHOUT BLEEDING: ICD-10-CM

## 2017-03-29 DIAGNOSIS — I48.91 UNSPECIFIED ATRIAL FIBRILLATION: ICD-10-CM

## 2017-03-30 ENCOUNTER — INPATIENT (INPATIENT)
Facility: HOSPITAL | Age: 62
LOS: 1 days | Discharge: ANOTHER HOSPITAL | DRG: 305 | End: 2017-04-01
Attending: INTERNAL MEDICINE | Admitting: INTERNAL MEDICINE
Payer: COMMERCIAL

## 2017-03-30 VITALS
SYSTOLIC BLOOD PRESSURE: 135 MMHG | WEIGHT: 167.99 LBS | TEMPERATURE: 97 F | HEART RATE: 88 BPM | HEIGHT: 66 IN | RESPIRATION RATE: 20 BRPM | OXYGEN SATURATION: 96 % | DIASTOLIC BLOOD PRESSURE: 95 MMHG

## 2017-03-30 DIAGNOSIS — Z29.9 ENCOUNTER FOR PROPHYLACTIC MEASURES, UNSPECIFIED: ICD-10-CM

## 2017-03-30 DIAGNOSIS — K29.70 GASTRITIS, UNSPECIFIED, WITHOUT BLEEDING: ICD-10-CM

## 2017-03-30 DIAGNOSIS — I48.91 UNSPECIFIED ATRIAL FIBRILLATION: ICD-10-CM

## 2017-03-30 DIAGNOSIS — R06.09 OTHER FORMS OF DYSPNEA: ICD-10-CM

## 2017-03-30 DIAGNOSIS — Z98.890 OTHER SPECIFIED POSTPROCEDURAL STATES: Chronic | ICD-10-CM

## 2017-03-30 LAB
ALBUMIN SERPL ELPH-MCNC: 3.5 G/DL — SIGNIFICANT CHANGE UP (ref 3.5–5)
ALP SERPL-CCNC: 76 U/L — SIGNIFICANT CHANGE UP (ref 40–120)
ALT FLD-CCNC: 80 U/L DA — HIGH (ref 10–60)
AMYLASE P1 CFR SERPL: 21 U/L — LOW (ref 25–115)
ANION GAP SERPL CALC-SCNC: 10 MMOL/L — SIGNIFICANT CHANGE UP (ref 5–17)
APTT BLD: 40 SEC — HIGH (ref 27.5–37.4)
AST SERPL-CCNC: 42 U/L — HIGH (ref 10–40)
BASE EXCESS BLDA CALC-SCNC: -1.5 MMOL/L — SIGNIFICANT CHANGE UP (ref -2–2)
BASOPHILS # BLD AUTO: 0.1 K/UL — SIGNIFICANT CHANGE UP (ref 0–0.2)
BASOPHILS NFR BLD AUTO: 2.5 % — HIGH (ref 0–2)
BILIRUB SERPL-MCNC: 0.3 MG/DL — SIGNIFICANT CHANGE UP (ref 0.2–1.2)
BLOOD GAS COMMENTS ARTERIAL: SIGNIFICANT CHANGE UP
BUN SERPL-MCNC: 11 MG/DL — SIGNIFICANT CHANGE UP (ref 7–18)
CALCIUM SERPL-MCNC: 8.7 MG/DL — SIGNIFICANT CHANGE UP (ref 8.4–10.5)
CHLORIDE SERPL-SCNC: 109 MMOL/L — HIGH (ref 96–108)
CK MB BLD-MCNC: 5.4 % — HIGH (ref 0–3.5)
CK MB BLD-MCNC: 5.7 % — HIGH (ref 0–3.5)
CK MB CFR SERPL CALC: 3.9 NG/ML — HIGH (ref 0–3.6)
CK MB CFR SERPL CALC: 4.3 NG/ML — HIGH (ref 0–3.6)
CK SERPL-CCNC: 68 U/L — SIGNIFICANT CHANGE UP (ref 21–215)
CK SERPL-CCNC: 72 U/L — SIGNIFICANT CHANGE UP (ref 21–215)
CK SERPL-CCNC: 76 U/L — SIGNIFICANT CHANGE UP (ref 21–215)
CO2 SERPL-SCNC: 23 MMOL/L — SIGNIFICANT CHANGE UP (ref 22–31)
CREAT SERPL-MCNC: 0.79 MG/DL — SIGNIFICANT CHANGE UP (ref 0.5–1.3)
EOSINOPHIL # BLD AUTO: 0.1 K/UL — SIGNIFICANT CHANGE UP (ref 0–0.5)
EOSINOPHIL NFR BLD AUTO: 3.1 % — SIGNIFICANT CHANGE UP (ref 0–6)
GLUCOSE SERPL-MCNC: 91 MG/DL — SIGNIFICANT CHANGE UP (ref 70–99)
HCO3 BLDA-SCNC: 19 MMOL/L — LOW (ref 23–27)
HCT VFR BLD CALC: 40.7 % — SIGNIFICANT CHANGE UP (ref 34.5–45)
HGB BLD-MCNC: 13.3 G/DL — SIGNIFICANT CHANGE UP (ref 11.5–15.5)
HOROWITZ INDEX BLDA+IHG-RTO: 21 — SIGNIFICANT CHANGE UP
INR BLD: 2.2 RATIO — HIGH (ref 0.88–1.16)
LIDOCAIN IGE QN: 77 U/L — SIGNIFICANT CHANGE UP (ref 73–393)
LYMPHOCYTES # BLD AUTO: 0.8 K/UL — LOW (ref 1–3.3)
LYMPHOCYTES # BLD AUTO: 16.4 % — SIGNIFICANT CHANGE UP (ref 13–44)
MCHC RBC-ENTMCNC: 31.6 PG — SIGNIFICANT CHANGE UP (ref 27–34)
MCHC RBC-ENTMCNC: 32.6 GM/DL — SIGNIFICANT CHANGE UP (ref 32–36)
MCV RBC AUTO: 97 FL — SIGNIFICANT CHANGE UP (ref 80–100)
MONOCYTES # BLD AUTO: 0.5 K/UL — SIGNIFICANT CHANGE UP (ref 0–0.9)
MONOCYTES NFR BLD AUTO: 9.9 % — SIGNIFICANT CHANGE UP (ref 2–14)
NEUTROPHILS # BLD AUTO: 3.2 K/UL — SIGNIFICANT CHANGE UP (ref 1.8–7.4)
NEUTROPHILS NFR BLD AUTO: 68.1 % — SIGNIFICANT CHANGE UP (ref 43–77)
NT-PROBNP SERPL-SCNC: 3892 PG/ML — HIGH (ref 0–125)
PCO2 BLDA: 22 MMHG — LOW (ref 32–46)
PH BLDA: 7.54 — HIGH (ref 7.35–7.45)
PLATELET # BLD AUTO: 316 K/UL — SIGNIFICANT CHANGE UP (ref 150–400)
PO2 BLDA: 73 MMHG — LOW (ref 74–108)
POTASSIUM SERPL-MCNC: 4 MMOL/L — SIGNIFICANT CHANGE UP (ref 3.5–5.3)
POTASSIUM SERPL-SCNC: 4 MMOL/L — SIGNIFICANT CHANGE UP (ref 3.5–5.3)
PROT SERPL-MCNC: 6.4 G/DL — SIGNIFICANT CHANGE UP (ref 6–8.3)
PROTHROM AB SERPL-ACNC: 24.4 SEC — HIGH (ref 9.8–12.7)
RBC # BLD: 4.2 M/UL — SIGNIFICANT CHANGE UP (ref 3.8–5.2)
RBC # FLD: 11.7 % — SIGNIFICANT CHANGE UP (ref 10.3–14.5)
SAO2 % BLDA: 96 % — SIGNIFICANT CHANGE UP (ref 92–96)
SODIUM SERPL-SCNC: 142 MMOL/L — SIGNIFICANT CHANGE UP (ref 135–145)
TROPONIN I SERPL-MCNC: <0.015 NG/ML — SIGNIFICANT CHANGE UP (ref 0–0.04)
TSH SERPL-MCNC: 3.77 UU/ML — SIGNIFICANT CHANGE UP (ref 0.34–4.82)
WBC # BLD: 4.7 K/UL — SIGNIFICANT CHANGE UP (ref 3.8–10.5)
WBC # FLD AUTO: 4.7 K/UL — SIGNIFICANT CHANGE UP (ref 3.8–10.5)

## 2017-03-30 PROCEDURE — 71275 CT ANGIOGRAPHY CHEST: CPT | Mod: 26

## 2017-03-30 PROCEDURE — 71020: CPT | Mod: 26

## 2017-03-30 PROCEDURE — 99285 EMERGENCY DEPT VISIT HI MDM: CPT | Mod: 25

## 2017-03-30 PROCEDURE — 99253 IP/OBS CNSLTJ NEW/EST LOW 45: CPT

## 2017-03-30 RX ORDER — ASPIRIN/CALCIUM CARB/MAGNESIUM 324 MG
81 TABLET ORAL DAILY
Qty: 0 | Refills: 0 | Status: DISCONTINUED | OUTPATIENT
Start: 2017-03-30 | End: 2017-04-01

## 2017-03-30 RX ORDER — PANTOPRAZOLE SODIUM 20 MG/1
40 TABLET, DELAYED RELEASE ORAL
Qty: 0 | Refills: 0 | Status: DISCONTINUED | OUTPATIENT
Start: 2017-03-30 | End: 2017-03-30

## 2017-03-30 RX ORDER — WARFARIN SODIUM 2.5 MG/1
5 TABLET ORAL ONCE
Qty: 0 | Refills: 0 | Status: COMPLETED | OUTPATIENT
Start: 2017-03-30 | End: 2017-03-30

## 2017-03-30 RX ORDER — MORPHINE SULFATE 50 MG/1
2 CAPSULE, EXTENDED RELEASE ORAL ONCE
Qty: 0 | Refills: 0 | Status: DISCONTINUED | OUTPATIENT
Start: 2017-03-30 | End: 2017-03-30

## 2017-03-30 RX ORDER — LANOLIN ALCOHOL/MO/W.PET/CERES
3 CREAM (GRAM) TOPICAL AT BEDTIME
Qty: 0 | Refills: 0 | Status: DISCONTINUED | OUTPATIENT
Start: 2017-03-30 | End: 2017-04-01

## 2017-03-30 RX ORDER — ALBUTEROL 90 UG/1
2 AEROSOL, METERED ORAL EVERY 6 HOURS
Qty: 0 | Refills: 0 | Status: DISCONTINUED | OUTPATIENT
Start: 2017-03-30 | End: 2017-04-01

## 2017-03-30 RX ORDER — FUROSEMIDE 40 MG
40 TABLET ORAL ONCE
Qty: 0 | Refills: 0 | Status: COMPLETED | OUTPATIENT
Start: 2017-03-30 | End: 2017-03-30

## 2017-03-30 RX ORDER — NITROGLYCERIN 6.5 MG
0.4 CAPSULE, EXTENDED RELEASE ORAL
Qty: 0 | Refills: 0 | Status: DISCONTINUED | OUTPATIENT
Start: 2017-03-30 | End: 2017-04-01

## 2017-03-30 RX ORDER — ATORVASTATIN CALCIUM 80 MG/1
40 TABLET, FILM COATED ORAL AT BEDTIME
Qty: 0 | Refills: 0 | Status: DISCONTINUED | OUTPATIENT
Start: 2017-03-30 | End: 2017-04-01

## 2017-03-30 RX ORDER — FAMOTIDINE 10 MG/ML
20 INJECTION INTRAVENOUS ONCE
Qty: 0 | Refills: 0 | Status: COMPLETED | OUTPATIENT
Start: 2017-03-30 | End: 2017-03-30

## 2017-03-30 RX ORDER — SODIUM CHLORIDE 9 MG/ML
1000 INJECTION, SOLUTION INTRAVENOUS
Qty: 0 | Refills: 0 | Status: DISCONTINUED | OUTPATIENT
Start: 2017-03-30 | End: 2017-03-31

## 2017-03-30 RX ORDER — PANTOPRAZOLE SODIUM 20 MG/1
40 TABLET, DELAYED RELEASE ORAL EVERY 12 HOURS
Qty: 0 | Refills: 0 | Status: DISCONTINUED | OUTPATIENT
Start: 2017-03-30 | End: 2017-03-31

## 2017-03-30 RX ADMIN — MORPHINE SULFATE 2 MILLIGRAM(S): 50 CAPSULE, EXTENDED RELEASE ORAL at 23:02

## 2017-03-30 RX ADMIN — Medication 40 MILLIGRAM(S): at 18:33

## 2017-03-30 RX ADMIN — Medication 81 MILLIGRAM(S): at 16:10

## 2017-03-30 RX ADMIN — ATORVASTATIN CALCIUM 40 MILLIGRAM(S): 80 TABLET, FILM COATED ORAL at 21:39

## 2017-03-30 RX ADMIN — MORPHINE SULFATE 2 MILLIGRAM(S): 50 CAPSULE, EXTENDED RELEASE ORAL at 23:30

## 2017-03-30 RX ADMIN — WARFARIN SODIUM 5 MILLIGRAM(S): 2.5 TABLET ORAL at 21:39

## 2017-03-30 RX ADMIN — PANTOPRAZOLE SODIUM 40 MILLIGRAM(S): 20 TABLET, DELAYED RELEASE ORAL at 18:55

## 2017-03-30 RX ADMIN — Medication 30 MILLILITER(S): at 21:39

## 2017-03-30 RX ADMIN — MORPHINE SULFATE 2 MILLIGRAM(S): 50 CAPSULE, EXTENDED RELEASE ORAL at 18:33

## 2017-03-30 NOTE — H&P ADULT. - ATTENDING COMMENTS
Patient seen/evaluated at bedside on 3/30/17 at 7pm. I agree with the full details of the resident note/outlined plan of care.

## 2017-03-30 NOTE — H&P ADULT. - HISTORY OF PRESENT ILLNESS
61 F from home with PMH mild gastritis on recent EGD, HOCM s/p septal ablation, A fib on coumadin, recently treated for pneumonia complicated by pleural effusion s/p chest tube drainage p/w familiar complaint of lower substernal sharp chest and epigastric abdominal pain persistent in nature with episodic worsening lasting minutes at a time a/w dyspnea. Pt notes that cough and fever have resolved and pt clinically improved for a few days, but now returns with worsening of chest discomfort. Pt admits that she walked 3 blocks yesterday and became SOB. In addition, pt claims that pain at times comes about 1-2 hours postprandially, but now is consistent. Pt denies fever, chills, n/v, diaphoresis, paresthesias, dizziness, diarrhea, urinary complaints, new LE swelling, syncope, pleurisy. In ED, pt with found to be in A Fib with RVR to 120, given cardizem 60mg PO x1. 61 F from home with PMH mild gastritis on recent EGD, HOCM s/p septal ablation, HFpEF grade II DD with moderate MR and moderate PH, A fib on coumadin, recently treated for pneumonia complicated by pleural effusion s/p chest tube drainage p/w familiar complaint of lower substernal sharp chest and epigastric abdominal pain persistent in nature with episodic worsening lasting minutes at a time a/w dyspnea. Pt notes that cough and fever have resolved and pt clinically improved for a few days, but now returns with worsening of chest discomfort. Pt admits that she walked 3 blocks yesterday and became SOB. In addition, pt claims that pain at times comes about 1-2 hours postprandially, but now is consistent. Pt denies fever, chills, n/v, diaphoresis, paresthesias, dizziness, diarrhea, urinary complaints, new LE swelling, syncope, pleurisy. In ED, pt with found to be in A Fib with RVR to 120, given cardizem 60mg PO x1.   SH: former longstanding heavy smoker and alcohol use

## 2017-03-30 NOTE — H&P ADULT. - PROBLEM SELECTOR PLAN 1
c/w cardizem for rate control and coumadin for anticoagulation  trend CEx3, pt with recent ECHO revealing DD grade II, moderate MR, RV systolic pressure to 46 indicated moderate PH, severe LAE  initiate ASA, statin, centrally acting CCB  Dr Keene Cardiology

## 2017-03-30 NOTE — H&P ADULT. - MUSCULOSKELETAL
negative detailed exam no joint warmth/no joint erythema/no calf tenderness/no joint swelling/ROM intact

## 2017-03-30 NOTE — H&P ADULT. - FAMILY HISTORY
<<-----Click on this checkbox to enter Family History Family history of liver cancer     Sibling  Still living? Unknown  Family history of hypertrophic cardiomyopathy, Age at diagnosis: Age Unknown

## 2017-03-30 NOTE — ED PROVIDER NOTE - OBJECTIVE STATEMENT
Patient returns to Emergency Department with continued chest pain since discharge. also notes shortness of breath. patient on coumadin for afib, had a  chest tube placed for pleural effusion during prior admission. patient notes occasional shortness of breath. no fever.

## 2017-03-30 NOTE — ED ADULT NURSE REASSESSMENT NOTE - NS ED NURSE REASSESS COMMENT FT1
patient complains of chest pain , EKG indicated Atrial Flutter ,  , rapid response called , medicated as per Residents order , pt tolerated medications well , no adverse reactions noted 
receivedptawake alert oriented x 3 not corbin distress hooked to cardiac monitor.
verbal report given to nurse ruiz.
patient for admission in stable condition  ,continue observation via cardiac overhead monitor , NAD noted

## 2017-03-30 NOTE — H&P ADULT. - ASSESSMENT
61 F former longstanding smoker with PMH Afib on coumadin, gastritis, p/w persistent complaint of chest pain, SOB with exertion

## 2017-03-30 NOTE — H&P ADULT. - PROBLEM SELECTOR PLAN 2
a/w atypical chest pain, reproducible on palpation over subxiphoid region  pt notes that functional capacity has decreased- managing 3 blocks at normal pace before becoming dyspneic. will obtain ABG and further evaluate  pt should go for outpatient PFT, 6MWT. Pt without symptoms concerning for SABRINA and normal mallampati class I. a/w atypical chest pain, reproducible on palpation over subxiphoid region  pt notes that functional capacity has decreased- managing 3 blocks at normal pace before becoming dyspneic. will obtain ABG and further evaluate  pt should go for outpatient PFT, 6MWT.   Pt without symptoms concerning for SABRINA and normal mallampati class I.  elevated proBNP likely 2/2 chronic pulmonary process (pulmonary HTN) and/or Afib as there is no clinical evidence of acute CHF at this time. a/w atypical chest pain, reproducible on palpation over subxiphoid region  pt notes that functional capacity has decreased- managing 3 blocks at normal pace before becoming dyspneic. ABG consistent with respiratory alkalosis  R pleural effusion appears substantial on CT. Pulmonary Dr Hirsch  pt should go for outpatient PFT, 6MWT.   Pt without symptoms concerning for SABRINA and normal mallampati class I.  elevated proBNP likely 2/2 chronic pulmonary process (pulmonary HTN) and/or Afib as there is no clinical evidence of acute CHF at this time.

## 2017-03-30 NOTE — ED PROVIDER NOTE - MEDICAL DECISION MAKING DETAILS
patient with chest pain and shortness of breath, getting worse since being discharged. will give morning dose of cardizem. admit for cardiac workup.

## 2017-03-30 NOTE — H&P ADULT. - RS GEN PE MLT RESP DETAILS PC
breath sounds equal/clear to auscultation bilaterally/airway patent/respirations non-labored/diminished breath sounds, R breath sounds equal/airway patent/respirations non-labored/clear to auscultation bilaterally/chest wall tenderness/diminished breath sounds, R

## 2017-03-31 ENCOUNTER — TRANSCRIPTION ENCOUNTER (OUTPATIENT)
Age: 62
End: 2017-03-31

## 2017-03-31 LAB
ANION GAP SERPL CALC-SCNC: 13 MMOL/L — SIGNIFICANT CHANGE UP (ref 5–17)
APTT BLD: 36.2 SEC — SIGNIFICANT CHANGE UP (ref 27.5–37.4)
BASOPHILS # BLD AUTO: 0.1 K/UL — SIGNIFICANT CHANGE UP (ref 0–0.2)
BASOPHILS NFR BLD AUTO: 1.4 % — SIGNIFICANT CHANGE UP (ref 0–2)
BUN SERPL-MCNC: 11 MG/DL — SIGNIFICANT CHANGE UP (ref 7–18)
CALCIUM SERPL-MCNC: 8.7 MG/DL — SIGNIFICANT CHANGE UP (ref 8.4–10.5)
CHLORIDE SERPL-SCNC: 105 MMOL/L — SIGNIFICANT CHANGE UP (ref 96–108)
CHOLEST SERPL-MCNC: 180 MG/DL — SIGNIFICANT CHANGE UP (ref 10–199)
CO2 SERPL-SCNC: 22 MMOL/L — SIGNIFICANT CHANGE UP (ref 22–31)
CREAT SERPL-MCNC: 0.73 MG/DL — SIGNIFICANT CHANGE UP (ref 0.5–1.3)
EOSINOPHIL # BLD AUTO: 0.2 K/UL — SIGNIFICANT CHANGE UP (ref 0–0.5)
EOSINOPHIL NFR BLD AUTO: 3.1 % — SIGNIFICANT CHANGE UP (ref 0–6)
GLUCOSE SERPL-MCNC: 87 MG/DL — SIGNIFICANT CHANGE UP (ref 70–99)
HAV IGM SER-ACNC: SIGNIFICANT CHANGE UP
HBV CORE IGM SER-ACNC: SIGNIFICANT CHANGE UP
HBV SURFACE AG SER-ACNC: SIGNIFICANT CHANGE UP
HCT VFR BLD CALC: 39.2 % — SIGNIFICANT CHANGE UP (ref 34.5–45)
HCV AB S/CO SERPL IA: 0.15 S/CO — SIGNIFICANT CHANGE UP
HCV AB SERPL-IMP: SIGNIFICANT CHANGE UP
HDLC SERPL-MCNC: 38 MG/DL — LOW (ref 40–125)
HGB BLD-MCNC: 13.2 G/DL — SIGNIFICANT CHANGE UP (ref 11.5–15.5)
INR BLD: 2.41 RATIO — HIGH (ref 0.88–1.16)
LIPID PNL WITH DIRECT LDL SERPL: 118 MG/DL — SIGNIFICANT CHANGE UP
LYMPHOCYTES # BLD AUTO: 0.5 K/UL — LOW (ref 1–3.3)
LYMPHOCYTES # BLD AUTO: 10.9 % — LOW (ref 13–44)
MAGNESIUM SERPL-MCNC: 2.1 MG/DL — SIGNIFICANT CHANGE UP (ref 1.8–2.4)
MCHC RBC-ENTMCNC: 32.4 PG — SIGNIFICANT CHANGE UP (ref 27–34)
MCHC RBC-ENTMCNC: 33.6 GM/DL — SIGNIFICANT CHANGE UP (ref 32–36)
MCV RBC AUTO: 96.5 FL — SIGNIFICANT CHANGE UP (ref 80–100)
MONOCYTES # BLD AUTO: 0.5 K/UL — SIGNIFICANT CHANGE UP (ref 0–0.9)
MONOCYTES NFR BLD AUTO: 10.6 % — SIGNIFICANT CHANGE UP (ref 2–14)
NEUTROPHILS # BLD AUTO: 3.6 K/UL — SIGNIFICANT CHANGE UP (ref 1.8–7.4)
NEUTROPHILS NFR BLD AUTO: 74 % — SIGNIFICANT CHANGE UP (ref 43–77)
NT-PROBNP SERPL-SCNC: 4003 PG/ML — HIGH (ref 0–125)
PHOSPHATE SERPL-MCNC: 4.4 MG/DL — SIGNIFICANT CHANGE UP (ref 2.5–4.5)
PLATELET # BLD AUTO: 306 K/UL — SIGNIFICANT CHANGE UP (ref 150–400)
POTASSIUM SERPL-MCNC: 3.7 MMOL/L — SIGNIFICANT CHANGE UP (ref 3.5–5.3)
POTASSIUM SERPL-SCNC: 3.7 MMOL/L — SIGNIFICANT CHANGE UP (ref 3.5–5.3)
PROTHROM AB SERPL-ACNC: 26.7 SEC — HIGH (ref 9.8–12.7)
RBC # BLD: 4.06 M/UL — SIGNIFICANT CHANGE UP (ref 3.8–5.2)
RBC # FLD: 11.6 % — SIGNIFICANT CHANGE UP (ref 10.3–14.5)
SODIUM SERPL-SCNC: 140 MMOL/L — SIGNIFICANT CHANGE UP (ref 135–145)
TOTAL CHOLESTEROL/HDL RATIO MEASUREMENT: 4.7 RATIO — SIGNIFICANT CHANGE UP (ref 3.3–7.1)
TRIGL SERPL-MCNC: 120 MG/DL — SIGNIFICANT CHANGE UP (ref 10–149)
WBC # BLD: 4.9 K/UL — SIGNIFICANT CHANGE UP (ref 3.8–10.5)
WBC # FLD AUTO: 4.9 K/UL — SIGNIFICANT CHANGE UP (ref 3.8–10.5)

## 2017-03-31 PROCEDURE — 99232 SBSQ HOSP IP/OBS MODERATE 35: CPT

## 2017-03-31 PROCEDURE — 76705 ECHO EXAM OF ABDOMEN: CPT | Mod: 26

## 2017-03-31 PROCEDURE — 71010: CPT | Mod: 26

## 2017-03-31 RX ORDER — ATORVASTATIN CALCIUM 80 MG/1
1 TABLET, FILM COATED ORAL
Qty: 0 | Refills: 0 | COMMUNITY
Start: 2017-03-31

## 2017-03-31 RX ORDER — TRAMADOL HYDROCHLORIDE 50 MG/1
50 TABLET ORAL EVERY 8 HOURS
Qty: 0 | Refills: 0 | Status: DISCONTINUED | OUTPATIENT
Start: 2017-03-31 | End: 2017-04-01

## 2017-03-31 RX ORDER — LIDOCAINE 4 G/100G
10 CREAM TOPICAL EVERY 8 HOURS
Qty: 0 | Refills: 0 | Status: DISCONTINUED | OUTPATIENT
Start: 2017-03-31 | End: 2017-03-31

## 2017-03-31 RX ORDER — PANTOPRAZOLE SODIUM 20 MG/1
40 TABLET, DELAYED RELEASE ORAL
Qty: 0 | Refills: 0 | COMMUNITY
Start: 2017-03-31

## 2017-03-31 RX ORDER — PANTOPRAZOLE SODIUM 20 MG/1
40 TABLET, DELAYED RELEASE ORAL
Qty: 0 | Refills: 0 | Status: DISCONTINUED | OUTPATIENT
Start: 2017-03-31 | End: 2017-04-01

## 2017-03-31 RX ORDER — SUCRALFATE 1 G
1 TABLET ORAL
Qty: 0 | Refills: 0 | COMMUNITY
Start: 2017-03-31

## 2017-03-31 RX ORDER — FUROSEMIDE 40 MG
20 TABLET ORAL DAILY
Qty: 0 | Refills: 0 | Status: DISCONTINUED | OUTPATIENT
Start: 2017-03-31 | End: 2017-04-01

## 2017-03-31 RX ORDER — TRAMADOL HYDROCHLORIDE 50 MG/1
1 TABLET ORAL
Qty: 0 | Refills: 0 | COMMUNITY
Start: 2017-03-31

## 2017-03-31 RX ORDER — ALBUTEROL 90 UG/1
2 AEROSOL, METERED ORAL
Qty: 0 | Refills: 0 | COMMUNITY
Start: 2017-03-31

## 2017-03-31 RX ORDER — LANOLIN ALCOHOL/MO/W.PET/CERES
1 CREAM (GRAM) TOPICAL
Qty: 0 | Refills: 0 | COMMUNITY
Start: 2017-03-31

## 2017-03-31 RX ORDER — SUCRALFATE 1 G
1 TABLET ORAL EVERY 8 HOURS
Qty: 0 | Refills: 0 | Status: DISCONTINUED | OUTPATIENT
Start: 2017-03-31 | End: 2017-04-01

## 2017-03-31 RX ORDER — LIDOCAINE 4 G/100G
0 CREAM TOPICAL
Qty: 0 | Refills: 0 | COMMUNITY
Start: 2017-03-31

## 2017-03-31 RX ORDER — WARFARIN SODIUM 2.5 MG/1
5 TABLET ORAL ONCE
Qty: 0 | Refills: 0 | Status: COMPLETED | OUTPATIENT
Start: 2017-03-31 | End: 2017-03-31

## 2017-03-31 RX ORDER — ASPIRIN/CALCIUM CARB/MAGNESIUM 324 MG
1 TABLET ORAL
Qty: 0 | Refills: 0 | COMMUNITY
Start: 2017-03-31

## 2017-03-31 RX ORDER — MORPHINE SULFATE 50 MG/1
2 CAPSULE, EXTENDED RELEASE ORAL EVERY 6 HOURS
Qty: 0 | Refills: 0 | Status: DISCONTINUED | OUTPATIENT
Start: 2017-03-31 | End: 2017-03-31

## 2017-03-31 RX ORDER — LIDOCAINE 4 G/100G
5 CREAM TOPICAL EVERY 8 HOURS
Qty: 0 | Refills: 0 | Status: DISCONTINUED | OUTPATIENT
Start: 2017-03-31 | End: 2017-04-01

## 2017-03-31 RX ADMIN — PANTOPRAZOLE SODIUM 40 MILLIGRAM(S): 20 TABLET, DELAYED RELEASE ORAL at 17:18

## 2017-03-31 RX ADMIN — ATORVASTATIN CALCIUM 40 MILLIGRAM(S): 80 TABLET, FILM COATED ORAL at 23:01

## 2017-03-31 RX ADMIN — Medication 30 MILLILITER(S): at 23:01

## 2017-03-31 RX ADMIN — SODIUM CHLORIDE 60 MILLILITER(S): 9 INJECTION, SOLUTION INTRAVENOUS at 01:20

## 2017-03-31 RX ADMIN — TRAMADOL HYDROCHLORIDE 50 MILLIGRAM(S): 50 TABLET ORAL at 18:34

## 2017-03-31 RX ADMIN — Medication 3 MILLIGRAM(S): at 01:19

## 2017-03-31 RX ADMIN — FAMOTIDINE 20 MILLIGRAM(S): 10 INJECTION INTRAVENOUS at 00:41

## 2017-03-31 RX ADMIN — LIDOCAINE 5 MILLILITER(S): 4 CREAM TOPICAL at 23:01

## 2017-03-31 RX ADMIN — Medication 81 MILLIGRAM(S): at 12:09

## 2017-03-31 RX ADMIN — TRAMADOL HYDROCHLORIDE 50 MILLIGRAM(S): 50 TABLET ORAL at 19:30

## 2017-03-31 RX ADMIN — Medication 1 GRAM(S): at 23:01

## 2017-03-31 RX ADMIN — WARFARIN SODIUM 5 MILLIGRAM(S): 2.5 TABLET ORAL at 23:01

## 2017-03-31 RX ADMIN — PANTOPRAZOLE SODIUM 40 MILLIGRAM(S): 20 TABLET, DELAYED RELEASE ORAL at 05:36

## 2017-03-31 RX ADMIN — MORPHINE SULFATE 2 MILLIGRAM(S): 50 CAPSULE, EXTENDED RELEASE ORAL at 11:07

## 2017-03-31 RX ADMIN — MORPHINE SULFATE 2 MILLIGRAM(S): 50 CAPSULE, EXTENDED RELEASE ORAL at 10:13

## 2017-03-31 NOTE — DISCHARGE NOTE ADULT - HOSPITAL COURSE
HPI: 61 F from home with PMH mild gastritis on recent EGD, HOCM s/p septal ablation, HFpEF grade II DD with moderate MR and moderate PH, A fib on coumadin, recently treated for pneumonia complicated by pleural effusion s/p chest tube drainage p/w familiar complaint of lower substernal sharp chest and epigastric abdominal pain persistent in nature with episodic worsening lasting minutes at a time a/w dyspnea. Pt notes that cough and fever have resolved and pt clinically improved for a few days, but now returns with worsening of chest discomfort. Pt admits that she walked 3 blocks yesterday and became SOB. In addition, pt claims that pain at times comes about 1-2 hours postprandially, but now is consistent. Pt denies fever, chills, n/v, diaphoresis, paresthesias, dizziness, diarrhea, urinary complaints, new LE swelling, syncope, pleurisy. In ED, pt with found to be in A Fib with RVR to 120, given cardizem 60mg PO x1.   SH: former longstanding heavy smoker and alcohol use    Hospital Course:  61F PMH AFib (on Coumadin, recently diagnosed), HOCM s/p septal ablation, former longstanding smoker, gastritis (persistent R sided chest discomfort since Dec 2016, recent EGD suggestive of gastritis, nodular mucosa at body) p/w persistent complaint of chest pain, SOB with exertion. Recently admitted for pneumonia s/b parapneumonic effusion. +Respiratory Alkalosis.    #A-Fib w/ RVR: trop -ve x3. Recent ECHO revealing DD grade II, moderate MR, RV systolic pressure to 46 indicated moderate PH, severe LAE  -C/w Cardizem 60mg q6H for rate control  -Home Coumadin 5mg QD  -Giving 5mg Coumadin tonight.  -C/w ASA, Statin  -F/up Cardio (Dr. Keene)    #Chest Pain / Exertional Dyspnea: Atypical chest pain, +/- reproducible on palpation over subxiphoid region. Appears to be epigastric / R sided component. Pt notes that functional capacity has decreased- managing 3 blocks at normal pace before becoming dyspneic. ABG consistent with respiratory alkalosis. However, CP appears to be worsening recently, prompting admission. Elevated BNP: though no clinical evidence of acute CHF  -Outpatient PFT and 6MWT per pulm (Dr. Hirsch)  -Management of loculated parapneumonic effusion.    #Gastritis: RUQ ultrasound unremarkeable. Possible contributing factor to CP syndrome.  -C/w viscous lidocaine, sucralfate, and tramadol    #Loculated R Pleural Effusion: CT-A no significant change in small to moderate loculated R pleural effusion with adjacent atelectasis / consolidation on R ML and LLs. +2 perifissural nodules probably representing intrapulmonary LNs.  -Loculated effusion possibly contributing to CP / Dyspnea syndrome  -F/up Pulm (Dr. Hirsch): recommend CT Surg eval and Transfer to [facility] for possible decortication    #PPx: Therapeutic on Coumadin.	    Pulm (Dr. Hirsch) recommendation, patient to be transferred to [facility] for management by CT Surgery service (Dr. Solomon) and consideration of decortication. Discussed with medical attending Dr. Zazueta, patient medically stable for discharge.

## 2017-03-31 NOTE — DISCHARGE NOTE ADULT - MEDICATION SUMMARY - MEDICATIONS TO CHANGE
I will SWITCH the dose or number of times a day I take the medications listed below when I get home from the hospital:    Cardizem 60 mg oral tablet  -- 1 tab(s) by mouth 3 times a day  -- It is very important that you take or use this exactly as directed.  Do not skip doses or discontinue unless directed by your doctor.  Some non-prescription drugs may aggravate your condition.  Read all labels carefully.  If a warning appears, check with your doctor before taking. I will SWITCH the dose or number of times a day I take the medications listed below when I get home from the hospital:    Cardizem 60 mg oral tablet  -- 1 tab(s) by mouth 3 times a day  -- It is very important that you take or use this exactly as directed.  Do not skip doses or discontinue unless directed by your doctor.  Some non-prescription drugs may aggravate your condition.  Read all labels carefully.  If a warning appears, check with your doctor before taking.    pantoprazole 40 mg oral delayed release tablet  -- 1 tab(s) by mouth once a day (before a meal)

## 2017-03-31 NOTE — DISCHARGE NOTE ADULT - PLAN OF CARE
Proceed with decortication Transfer to [facility] under the care of Dr. Solomon (CT Surgery) for consideration of decortication procedure. Keep rate controlled and INR therapeutic Continue the digoxin 60 mg q 6h for rate control. Keep INR 2 - 3. Home dose warfarin 5mg QD keep controlled Continue pain control with viscous lidocaine, sucralfate, and tramadol. F/up GI outpatient Follow-up pulm as outpatient for Follow-up pulm as outpatient for PFT and 6MWT

## 2017-03-31 NOTE — DISCHARGE NOTE ADULT - MEDICATION SUMMARY - MEDICATIONS TO TAKE
I will START or STAY ON the medications listed below when I get home from the hospital:    traMADol 50 mg oral tablet  -- 1 tab(s) by mouth every 8 hours, As needed, Moderate Pain (4 - 6)  -- Indication: For Chest pain    aspirin 81 mg oral tablet, chewable  -- 1 tab(s) by mouth once a day  -- Indication: For Chest pain    aluminum hydroxide-magnesium hydroxide 200 mg-200 mg/5 mL oral suspension  -- 30 milliliter(s) by mouth every 8 hours, As needed, Dyspepsia  -- Indication: For Gastritis    diltiaZEM 60 mg oral tablet  -- 1 tab(s) by mouth every 6 hours  -- Indication: For Atrial fibrillation with RVR    Coumadin 5 mg oral tablet  -- 1 tab(s) by mouth once a day  -- Do not take this drug if you are pregnant.  It is very important that you take or use this exactly as directed.  Do not skip doses or discontinue unless directed by your doctor.  Obtain medical advice before taking any non-prescription drugs as some may affect the action of this medication.    -- Indication: For Atrial fibrillation with RVR    atorvastatin 40 mg oral tablet  -- 1 tab(s) by mouth once a day (at bedtime)  -- Indication: For Chest pain    albuterol 90 mcg/inh inhalation aerosol  -- 2 puff(s) inhaled every 6 hours, As needed, Shortness of Breath and/or Wheezing  -- Indication: For Exertional dyspnea    lidocaine 2% mucous membrane solution  --  mucous membrane   -- Indication: For Gastritis    sucralfate 1 g oral tablet  -- 1 tab(s) by mouth every 8 hours  -- Indication: For Gastritis    melatonin 3 mg oral tablet  -- 1 tab(s) by mouth once a day (at bedtime), As needed, Insomnia  -- Indication: For sleep    pantoprazole 40 mg intravenous injection  -- 40 milligram(s) intravenous every 12 hours  -- Indication: For Gastritis I will START or STAY ON the medications listed below when I get home from the hospital:    traMADol 50 mg oral tablet  -- 1 tab(s) by mouth every 8 hours, As needed, Moderate Pain (4 - 6)  -- Indication: For Chest pain    aspirin 81 mg oral tablet, chewable  -- 1 tab(s) by mouth once a day  -- Indication: For Chest pain    morphine  --   1mg q6h PRN for severe pain  -- Indication: For chest pain    aluminum hydroxide-magnesium hydroxide 200 mg-200 mg/5 mL oral suspension  -- 30 milliliter(s) by mouth every 8 hours  -- Indication: For Gastritis    diltiaZEM 60 mg oral tablet  -- 1 tab(s) by mouth every 6 hours  -- Indication: For Atrial fibrillation with RVR    Coumadin 5 mg oral tablet  -- 1 tab(s) by mouth once a day  -- Do not take this drug if you are pregnant.  It is very important that you take or use this exactly as directed.  Do not skip doses or discontinue unless directed by your doctor.  Obtain medical advice before taking any non-prescription drugs as some may affect the action of this medication.    -- Indication: For Atrial fibrillation with RVR    atorvastatin 40 mg oral tablet  -- 1 tab(s) by mouth once a day (at bedtime)  -- Indication: For Chest pain    albuterol 90 mcg/inh inhalation aerosol  -- 2 puff(s) inhaled every 6 hours, As needed, Shortness of Breath and/or Wheezing  -- Indication: For Exertional dyspnea    lidocaine 2% mucous membrane solution  --  mucous membrane   -- Indication: For Gastritis    furosemide 20 mg oral tablet  -- 1 tab(s) by mouth once a day  -- Indication: For swelling    sucralfate 1 g oral tablet  -- 1 tab(s) by mouth every 8 hours  -- Indication: For Gastritis    melatonin 3 mg oral tablet  -- 1 tab(s) by mouth once a day (at bedtime), As needed, Insomnia  -- Indication: For sleep    pantoprazole 40 mg intravenous injection  -- 40 milligram(s) intravenous every 12 hours  -- Indication: For Gastritis

## 2017-03-31 NOTE — PHYSICAL THERAPY INITIAL EVALUATION ADULT - PERTINENT HX OF CURRENT PROBLEM, REHAB EVAL
Pt. was recently discharged from [facility]; states returned to ED due to similar substernal area. Pt. w/ h/o Mild Gastritis, HOCM s/p septal ablation

## 2017-03-31 NOTE — DISCHARGE NOTE ADULT - MEDICATION SUMMARY - MEDICATIONS TO STOP TAKING
I will STOP taking the medications listed below when I get home from the hospital:    enoxaparin 120 mg/0.8 mL injectable solution  -- 120 milligram(s) injectable subcutaneously once a day  -- It is very important that you take or use this exactly as directed.  Do not skip doses or discontinue unless directed by your doctor.

## 2017-03-31 NOTE — DISCHARGE NOTE ADULT - CARE PLAN
Principal Discharge DX:	Loculated pleural effusion  Goal:	Proceed with decortication  Instructions for follow-up, activity and diet:	Transfer to [facility] under the care of Dr. Solomon (CT Surgery) for consideration of decortication procedure.  Secondary Diagnosis:	Atrial fibrillation  Goal:	Keep rate controlled and INR therapeutic  Instructions for follow-up, activity and diet:	Continue the digoxin 60 mg q 6h for rate control. Keep INR 2 - 3. Home dose warfarin 5mg QD  Secondary Diagnosis:	Gastritis  Goal:	keep controlled  Instructions for follow-up, activity and diet:	Continue pain control with viscous lidocaine, sucralfate, and tramadol. F/up GI outpatient  Secondary Diagnosis:	H/O cardiac radiofrequency ablation  Secondary Diagnosis:	Exertional dyspnea  Instructions for follow-up, activity and diet:	Follow-up pulm as outpatient for Principal Discharge DX:	Loculated pleural effusion  Goal:	Proceed with decortication  Instructions for follow-up, activity and diet:	Transfer to [facility] under the care of Dr. Solomon (CT Surgery) for consideration of decortication procedure.  Secondary Diagnosis:	Atrial fibrillation  Goal:	Keep rate controlled and INR therapeutic  Instructions for follow-up, activity and diet:	Continue the digoxin 60 mg q 6h for rate control. Keep INR 2 - 3. Home dose warfarin 5mg QD  Secondary Diagnosis:	Gastritis  Goal:	keep controlled  Instructions for follow-up, activity and diet:	Continue pain control with viscous lidocaine, sucralfate, and tramadol. F/up GI outpatient  Secondary Diagnosis:	H/O cardiac radiofrequency ablation  Secondary Diagnosis:	Exertional dyspnea  Instructions for follow-up, activity and diet:	Follow-up pulm as outpatient for PFT and 6MWT Principal Discharge DX:	Loculated pleural effusion  Goal:	Proceed with decortication  Instructions for follow-up, activity and diet:	Transfer to [facility] under the care of Dr. Solomon (CT Surgery) for consideration of decortication procedure.  Secondary Diagnosis:	Atrial fibrillation  Goal:	Keep rate controlled and INR therapeutic  Instructions for follow-up, activity and diet:	Continue the digoxin 60 mg q 6h for rate control. Keep INR 2 - 3. Home dose warfarin 5mg QD  Secondary Diagnosis:	Gastritis  Goal:	keep controlled  Instructions for follow-up, activity and diet:	Continue pain control with viscous lidocaine, sucralfate, and tramadol. F/up GI outpatient  Secondary Diagnosis:	H/O cardiac radiofrequency ablation  Secondary Diagnosis:	Exertional dyspnea  Instructions for follow-up, activity and diet:	Follow-up pulm as outpatient for PFT and 6MWT

## 2017-03-31 NOTE — DISCHARGE NOTE ADULT - PATIENT PORTAL LINK FT
“You can access the FollowHealth Patient Portal, offered by [facility], by registering with the following website: [email or web address]”

## 2017-04-01 ENCOUNTER — INPATIENT (INPATIENT)
Facility: HOSPITAL | Age: 62
LOS: 11 days | Discharge: HOME | End: 2017-04-13
Attending: THORACIC SURGERY (CARDIOTHORACIC VASCULAR SURGERY) | Admitting: THORACIC SURGERY (CARDIOTHORACIC VASCULAR SURGERY)
Payer: COMMERCIAL

## 2017-04-01 VITALS
SYSTOLIC BLOOD PRESSURE: 114 MMHG | OXYGEN SATURATION: 95 % | TEMPERATURE: 98 F | RESPIRATION RATE: 18 BRPM | HEART RATE: 62 BPM | DIASTOLIC BLOOD PRESSURE: 78 MMHG

## 2017-04-01 VITALS
SYSTOLIC BLOOD PRESSURE: 124 MMHG | TEMPERATURE: 97 F | HEART RATE: 91 BPM | DIASTOLIC BLOOD PRESSURE: 69 MMHG | OXYGEN SATURATION: 97 % | RESPIRATION RATE: 19 BRPM

## 2017-04-01 DIAGNOSIS — J90 PLEURAL EFFUSION, NOT ELSEWHERE CLASSIFIED: ICD-10-CM

## 2017-04-01 DIAGNOSIS — Z98.890 OTHER SPECIFIED POSTPROCEDURAL STATES: Chronic | ICD-10-CM

## 2017-04-01 LAB
ANION GAP SERPL CALC-SCNC: 10 MMOL/L — SIGNIFICANT CHANGE UP (ref 5–17)
BASOPHILS # BLD AUTO: 0.1 K/UL — SIGNIFICANT CHANGE UP (ref 0–0.2)
BASOPHILS NFR BLD AUTO: 1.4 % — SIGNIFICANT CHANGE UP (ref 0–2)
BUN SERPL-MCNC: 12 MG/DL — SIGNIFICANT CHANGE UP (ref 7–18)
CALCIUM SERPL-MCNC: 8.4 MG/DL — SIGNIFICANT CHANGE UP (ref 8.4–10.5)
CHLORIDE SERPL-SCNC: 106 MMOL/L — SIGNIFICANT CHANGE UP (ref 96–108)
CO2 SERPL-SCNC: 23 MMOL/L — SIGNIFICANT CHANGE UP (ref 22–31)
CREAT SERPL-MCNC: 0.76 MG/DL — SIGNIFICANT CHANGE UP (ref 0.5–1.3)
EOSINOPHIL # BLD AUTO: 0.2 K/UL — SIGNIFICANT CHANGE UP (ref 0–0.5)
EOSINOPHIL NFR BLD AUTO: 3.5 % — SIGNIFICANT CHANGE UP (ref 0–6)
GLUCOSE SERPL-MCNC: 90 MG/DL — SIGNIFICANT CHANGE UP (ref 70–99)
HCT VFR BLD CALC: 41.1 % — SIGNIFICANT CHANGE UP (ref 34.5–45)
HGB BLD-MCNC: 13.5 G/DL — SIGNIFICANT CHANGE UP (ref 11.5–15.5)
INR BLD: 2.99 RATIO — HIGH (ref 0.88–1.16)
LYMPHOCYTES # BLD AUTO: 0.7 K/UL — LOW (ref 1–3.3)
LYMPHOCYTES # BLD AUTO: 15 % — SIGNIFICANT CHANGE UP (ref 13–44)
MAGNESIUM SERPL-MCNC: 2 MG/DL — SIGNIFICANT CHANGE UP (ref 1.8–2.4)
MCHC RBC-ENTMCNC: 31.5 PG — SIGNIFICANT CHANGE UP (ref 27–34)
MCHC RBC-ENTMCNC: 32.8 GM/DL — SIGNIFICANT CHANGE UP (ref 32–36)
MCV RBC AUTO: 96 FL — SIGNIFICANT CHANGE UP (ref 80–100)
MONOCYTES # BLD AUTO: 0.5 K/UL — SIGNIFICANT CHANGE UP (ref 0–0.9)
MONOCYTES NFR BLD AUTO: 10.5 % — SIGNIFICANT CHANGE UP (ref 2–14)
NEUTROPHILS # BLD AUTO: 3.2 K/UL — SIGNIFICANT CHANGE UP (ref 1.8–7.4)
NEUTROPHILS NFR BLD AUTO: 69.7 % — SIGNIFICANT CHANGE UP (ref 43–77)
PHOSPHATE SERPL-MCNC: 4.1 MG/DL — SIGNIFICANT CHANGE UP (ref 2.5–4.5)
PLATELET # BLD AUTO: 305 K/UL — SIGNIFICANT CHANGE UP (ref 150–400)
POTASSIUM SERPL-MCNC: 4.2 MMOL/L — SIGNIFICANT CHANGE UP (ref 3.5–5.3)
POTASSIUM SERPL-SCNC: 4.2 MMOL/L — SIGNIFICANT CHANGE UP (ref 3.5–5.3)
PROTHROM AB SERPL-ACNC: 33.3 SEC — HIGH (ref 9.8–12.7)
RBC # BLD: 4.28 M/UL — SIGNIFICANT CHANGE UP (ref 3.8–5.2)
RBC # FLD: 11.7 % — SIGNIFICANT CHANGE UP (ref 10.3–14.5)
SODIUM SERPL-SCNC: 139 MMOL/L — SIGNIFICANT CHANGE UP (ref 135–145)
WBC # BLD: 4.6 K/UL — SIGNIFICANT CHANGE UP (ref 3.8–10.5)
WBC # FLD AUTO: 4.6 K/UL — SIGNIFICANT CHANGE UP (ref 3.8–10.5)

## 2017-04-01 PROCEDURE — 93005 ELECTROCARDIOGRAM TRACING: CPT

## 2017-04-01 PROCEDURE — 84484 ASSAY OF TROPONIN QUANT: CPT

## 2017-04-01 PROCEDURE — 76705 ECHO EXAM OF ABDOMEN: CPT

## 2017-04-01 PROCEDURE — 82803 BLOOD GASES ANY COMBINATION: CPT

## 2017-04-01 PROCEDURE — 85610 PROTHROMBIN TIME: CPT

## 2017-04-01 PROCEDURE — 80053 COMPREHEN METABOLIC PANEL: CPT

## 2017-04-01 PROCEDURE — 82553 CREATINE MB FRACTION: CPT

## 2017-04-01 PROCEDURE — 71046 X-RAY EXAM CHEST 2 VIEWS: CPT

## 2017-04-01 PROCEDURE — 85027 COMPLETE CBC AUTOMATED: CPT

## 2017-04-01 PROCEDURE — 99285 EMERGENCY DEPT VISIT HI MDM: CPT | Mod: 25

## 2017-04-01 PROCEDURE — 80061 LIPID PANEL: CPT

## 2017-04-01 PROCEDURE — 80074 ACUTE HEPATITIS PANEL: CPT

## 2017-04-01 PROCEDURE — 83735 ASSAY OF MAGNESIUM: CPT

## 2017-04-01 PROCEDURE — 82550 ASSAY OF CK (CPK): CPT

## 2017-04-01 PROCEDURE — 80048 BASIC METABOLIC PNL TOTAL CA: CPT

## 2017-04-01 PROCEDURE — 83880 ASSAY OF NATRIURETIC PEPTIDE: CPT

## 2017-04-01 PROCEDURE — 85730 THROMBOPLASTIN TIME PARTIAL: CPT

## 2017-04-01 PROCEDURE — 83690 ASSAY OF LIPASE: CPT

## 2017-04-01 PROCEDURE — 71045 X-RAY EXAM CHEST 1 VIEW: CPT

## 2017-04-01 PROCEDURE — 84100 ASSAY OF PHOSPHORUS: CPT

## 2017-04-01 PROCEDURE — 82150 ASSAY OF AMYLASE: CPT

## 2017-04-01 PROCEDURE — 71275 CT ANGIOGRAPHY CHEST: CPT

## 2017-04-01 PROCEDURE — 84443 ASSAY THYROID STIM HORMONE: CPT

## 2017-04-01 RX ORDER — FUROSEMIDE 40 MG
1 TABLET ORAL
Qty: 0 | Refills: 0 | COMMUNITY
Start: 2017-04-01

## 2017-04-01 RX ORDER — MORPHINE SULFATE 50 MG/1
1 CAPSULE, EXTENDED RELEASE ORAL EVERY 6 HOURS
Qty: 0 | Refills: 0 | Status: DISCONTINUED | OUTPATIENT
Start: 2017-04-01 | End: 2017-04-01

## 2017-04-01 RX ORDER — MORPHINE SULFATE 50 MG/1
2 CAPSULE, EXTENDED RELEASE ORAL EVERY 4 HOURS
Qty: 0 | Refills: 0 | Status: DISCONTINUED | OUTPATIENT
Start: 2017-04-01 | End: 2017-04-01

## 2017-04-01 RX ORDER — LIDOCAINE 4 G/100G
5 CREAM TOPICAL EVERY 8 HOURS
Qty: 0 | Refills: 0 | Status: DISCONTINUED | OUTPATIENT
Start: 2017-04-01 | End: 2017-04-01

## 2017-04-01 RX ORDER — MORPHINE SULFATE 50 MG/1
2 CAPSULE, EXTENDED RELEASE ORAL ONCE
Qty: 0 | Refills: 0 | Status: DISCONTINUED | OUTPATIENT
Start: 2017-04-01 | End: 2017-04-01

## 2017-04-01 RX ORDER — MORPHINE SULFATE 50 MG/1
0 CAPSULE, EXTENDED RELEASE ORAL
Qty: 0 | Refills: 0 | COMMUNITY
Start: 2017-04-01

## 2017-04-01 RX ADMIN — Medication 1 GRAM(S): at 05:50

## 2017-04-01 RX ADMIN — Medication 1 GRAM(S): at 13:22

## 2017-04-01 RX ADMIN — LIDOCAINE 5 MILLILITER(S): 4 CREAM TOPICAL at 05:49

## 2017-04-01 RX ADMIN — MORPHINE SULFATE 1 MILLIGRAM(S): 50 CAPSULE, EXTENDED RELEASE ORAL at 01:00

## 2017-04-01 RX ADMIN — TRAMADOL HYDROCHLORIDE 50 MILLIGRAM(S): 50 TABLET ORAL at 06:46

## 2017-04-01 RX ADMIN — MORPHINE SULFATE 2 MILLIGRAM(S): 50 CAPSULE, EXTENDED RELEASE ORAL at 14:47

## 2017-04-01 RX ADMIN — MORPHINE SULFATE 2 MILLIGRAM(S): 50 CAPSULE, EXTENDED RELEASE ORAL at 19:50

## 2017-04-01 RX ADMIN — TRAMADOL HYDROCHLORIDE 50 MILLIGRAM(S): 50 TABLET ORAL at 14:30

## 2017-04-01 RX ADMIN — TRAMADOL HYDROCHLORIDE 50 MILLIGRAM(S): 50 TABLET ORAL at 05:49

## 2017-04-01 RX ADMIN — MORPHINE SULFATE 1 MILLIGRAM(S): 50 CAPSULE, EXTENDED RELEASE ORAL at 10:09

## 2017-04-01 RX ADMIN — LIDOCAINE 5 MILLILITER(S): 4 CREAM TOPICAL at 21:29

## 2017-04-01 RX ADMIN — Medication 1 GRAM(S): at 21:30

## 2017-04-01 RX ADMIN — TRAMADOL HYDROCHLORIDE 50 MILLIGRAM(S): 50 TABLET ORAL at 15:00

## 2017-04-01 RX ADMIN — Medication 30 MILLILITER(S): at 13:22

## 2017-04-01 RX ADMIN — LIDOCAINE 5 MILLILITER(S): 4 CREAM TOPICAL at 13:22

## 2017-04-01 RX ADMIN — ATORVASTATIN CALCIUM 40 MILLIGRAM(S): 80 TABLET, FILM COATED ORAL at 21:30

## 2017-04-01 RX ADMIN — Medication 30 MILLILITER(S): at 21:29

## 2017-04-01 RX ADMIN — Medication 20 MILLIGRAM(S): at 05:50

## 2017-04-01 RX ADMIN — PANTOPRAZOLE SODIUM 40 MILLIGRAM(S): 20 TABLET, DELAYED RELEASE ORAL at 05:50

## 2017-04-01 RX ADMIN — Medication 81 MILLIGRAM(S): at 13:21

## 2017-04-01 RX ADMIN — MORPHINE SULFATE 2 MILLIGRAM(S): 50 CAPSULE, EXTENDED RELEASE ORAL at 15:26

## 2017-04-01 RX ADMIN — MORPHINE SULFATE 1 MILLIGRAM(S): 50 CAPSULE, EXTENDED RELEASE ORAL at 00:24

## 2017-04-02 DIAGNOSIS — Z98.890 OTHER SPECIFIED POSTPROCEDURAL STATES: Chronic | ICD-10-CM

## 2017-04-02 DIAGNOSIS — I48.2 CHRONIC ATRIAL FIBRILLATION: ICD-10-CM

## 2017-04-02 DIAGNOSIS — Z98.891 HISTORY OF UTERINE SCAR FROM PREVIOUS SURGERY: Chronic | ICD-10-CM

## 2017-04-02 DIAGNOSIS — J90 PLEURAL EFFUSION, NOT ELSEWHERE CLASSIFIED: ICD-10-CM

## 2017-04-02 LAB
ALBUMIN SERPL ELPH-MCNC: 3.6 G/DL — SIGNIFICANT CHANGE UP (ref 3.3–5)
ALP SERPL-CCNC: 64 U/L — SIGNIFICANT CHANGE UP (ref 40–120)
ALT FLD-CCNC: 61 U/L — HIGH (ref 4–33)
APTT BLD: 35.2 SEC — SIGNIFICANT CHANGE UP (ref 27.5–37.4)
AST SERPL-CCNC: 42 U/L — HIGH (ref 4–32)
BILIRUB SERPL-MCNC: 0.5 MG/DL — SIGNIFICANT CHANGE UP (ref 0.2–1.2)
BLD GP AB SCN SERPL QL: NEGATIVE — SIGNIFICANT CHANGE UP
BUN SERPL-MCNC: 14 MG/DL — SIGNIFICANT CHANGE UP (ref 7–23)
CALCIUM SERPL-MCNC: 8.8 MG/DL — SIGNIFICANT CHANGE UP (ref 8.4–10.5)
CHLORIDE SERPL-SCNC: 102 MMOL/L — SIGNIFICANT CHANGE UP (ref 98–107)
CO2 SERPL-SCNC: 22 MMOL/L — SIGNIFICANT CHANGE UP (ref 22–31)
CREAT SERPL-MCNC: 0.71 MG/DL — SIGNIFICANT CHANGE UP (ref 0.5–1.3)
GLUCOSE SERPL-MCNC: 88 MG/DL — SIGNIFICANT CHANGE UP (ref 70–99)
HCT VFR BLD CALC: 39 % — SIGNIFICANT CHANGE UP (ref 34.5–45)
HGB BLD-MCNC: 13.2 G/DL — SIGNIFICANT CHANGE UP (ref 11.5–15.5)
INR BLD: 2.75 — HIGH (ref 0.88–1.17)
MCHC RBC-ENTMCNC: 31.4 PG — SIGNIFICANT CHANGE UP (ref 27–34)
MCHC RBC-ENTMCNC: 33.8 % — SIGNIFICANT CHANGE UP (ref 32–36)
MCV RBC AUTO: 92.9 FL — SIGNIFICANT CHANGE UP (ref 80–100)
PLATELET # BLD AUTO: 299 K/UL — SIGNIFICANT CHANGE UP (ref 150–400)
PMV BLD: 10.2 FL — SIGNIFICANT CHANGE UP (ref 7–13)
POTASSIUM SERPL-MCNC: 3.8 MMOL/L — SIGNIFICANT CHANGE UP (ref 3.5–5.3)
POTASSIUM SERPL-SCNC: 3.8 MMOL/L — SIGNIFICANT CHANGE UP (ref 3.5–5.3)
PROT SERPL-MCNC: 6 G/DL — SIGNIFICANT CHANGE UP (ref 6–8.3)
PROTHROM AB SERPL-ACNC: 31.4 SEC — HIGH (ref 9.8–13.1)
RBC # BLD: 4.2 M/UL — SIGNIFICANT CHANGE UP (ref 3.8–5.2)
RBC # FLD: 12.8 % — SIGNIFICANT CHANGE UP (ref 10.3–14.5)
RH IG SCN BLD-IMP: POSITIVE — SIGNIFICANT CHANGE UP
SODIUM SERPL-SCNC: 142 MMOL/L — SIGNIFICANT CHANGE UP (ref 135–145)
WBC # BLD: 4.97 K/UL — SIGNIFICANT CHANGE UP (ref 3.8–10.5)
WBC # FLD AUTO: 4.97 K/UL — SIGNIFICANT CHANGE UP (ref 3.8–10.5)

## 2017-04-02 PROCEDURE — 71010: CPT | Mod: 26

## 2017-04-02 RX ORDER — PANTOPRAZOLE SODIUM 20 MG/1
40 TABLET, DELAYED RELEASE ORAL
Qty: 0 | Refills: 0 | Status: DISCONTINUED | OUTPATIENT
Start: 2017-04-02 | End: 2017-04-04

## 2017-04-02 RX ORDER — LANOLIN ALCOHOL/MO/W.PET/CERES
3 CREAM (GRAM) TOPICAL AT BEDTIME
Qty: 0 | Refills: 0 | Status: DISCONTINUED | OUTPATIENT
Start: 2017-04-02 | End: 2017-04-04

## 2017-04-02 RX ORDER — ATORVASTATIN CALCIUM 80 MG/1
40 TABLET, FILM COATED ORAL AT BEDTIME
Qty: 0 | Refills: 0 | Status: DISCONTINUED | OUTPATIENT
Start: 2017-04-02 | End: 2017-04-04

## 2017-04-02 RX ORDER — MORPHINE SULFATE 50 MG/1
1 CAPSULE, EXTENDED RELEASE ORAL EVERY 6 HOURS
Qty: 0 | Refills: 0 | Status: DISCONTINUED | OUTPATIENT
Start: 2017-04-02 | End: 2017-04-03

## 2017-04-02 RX ORDER — ASPIRIN/CALCIUM CARB/MAGNESIUM 324 MG
81 TABLET ORAL DAILY
Qty: 0 | Refills: 0 | Status: DISCONTINUED | OUTPATIENT
Start: 2017-04-02 | End: 2017-04-04

## 2017-04-02 RX ORDER — TRAMADOL HYDROCHLORIDE 50 MG/1
50 TABLET ORAL EVERY 8 HOURS
Qty: 0 | Refills: 0 | Status: DISCONTINUED | OUTPATIENT
Start: 2017-04-02 | End: 2017-04-04

## 2017-04-02 RX ORDER — SODIUM CHLORIDE 9 MG/ML
3 INJECTION INTRAMUSCULAR; INTRAVENOUS; SUBCUTANEOUS EVERY 8 HOURS
Qty: 0 | Refills: 0 | Status: DISCONTINUED | OUTPATIENT
Start: 2017-04-02 | End: 2017-04-04

## 2017-04-02 RX ORDER — ALBUTEROL 90 UG/1
2 AEROSOL, METERED ORAL EVERY 6 HOURS
Qty: 0 | Refills: 0 | Status: DISCONTINUED | OUTPATIENT
Start: 2017-04-02 | End: 2017-04-04

## 2017-04-02 RX ORDER — FUROSEMIDE 40 MG
20 TABLET ORAL DAILY
Qty: 0 | Refills: 0 | Status: DISCONTINUED | OUTPATIENT
Start: 2017-04-02 | End: 2017-04-04

## 2017-04-02 RX ORDER — SUCRALFATE 1 G
1 TABLET ORAL EVERY 8 HOURS
Qty: 0 | Refills: 0 | Status: DISCONTINUED | OUTPATIENT
Start: 2017-04-02 | End: 2017-04-04

## 2017-04-02 RX ADMIN — PANTOPRAZOLE SODIUM 40 MILLIGRAM(S): 20 TABLET, DELAYED RELEASE ORAL at 06:23

## 2017-04-02 RX ADMIN — Medication 30 MILLILITER(S): at 06:23

## 2017-04-02 RX ADMIN — ATORVASTATIN CALCIUM 40 MILLIGRAM(S): 80 TABLET, FILM COATED ORAL at 21:38

## 2017-04-02 RX ADMIN — TRAMADOL HYDROCHLORIDE 50 MILLIGRAM(S): 50 TABLET ORAL at 20:25

## 2017-04-02 RX ADMIN — Medication 20 MILLIGRAM(S): at 06:23

## 2017-04-02 RX ADMIN — MORPHINE SULFATE 1 MILLIGRAM(S): 50 CAPSULE, EXTENDED RELEASE ORAL at 04:14

## 2017-04-02 RX ADMIN — Medication 81 MILLIGRAM(S): at 12:22

## 2017-04-02 RX ADMIN — Medication 1 GRAM(S): at 06:23

## 2017-04-02 RX ADMIN — Medication 30 MILLILITER(S): at 21:38

## 2017-04-02 RX ADMIN — Medication 1 GRAM(S): at 21:38

## 2017-04-02 RX ADMIN — SODIUM CHLORIDE 3 MILLILITER(S): 9 INJECTION INTRAMUSCULAR; INTRAVENOUS; SUBCUTANEOUS at 13:38

## 2017-04-02 RX ADMIN — Medication 30 MILLILITER(S): at 13:37

## 2017-04-02 RX ADMIN — Medication 1 GRAM(S): at 13:37

## 2017-04-02 RX ADMIN — TRAMADOL HYDROCHLORIDE 50 MILLIGRAM(S): 50 TABLET ORAL at 11:02

## 2017-04-02 RX ADMIN — TRAMADOL HYDROCHLORIDE 50 MILLIGRAM(S): 50 TABLET ORAL at 19:50

## 2017-04-02 RX ADMIN — TRAMADOL HYDROCHLORIDE 50 MILLIGRAM(S): 50 TABLET ORAL at 12:00

## 2017-04-02 RX ADMIN — SODIUM CHLORIDE 3 MILLILITER(S): 9 INJECTION INTRAMUSCULAR; INTRAVENOUS; SUBCUTANEOUS at 06:24

## 2017-04-02 RX ADMIN — SODIUM CHLORIDE 3 MILLILITER(S): 9 INJECTION INTRAMUSCULAR; INTRAVENOUS; SUBCUTANEOUS at 22:11

## 2017-04-02 NOTE — TRANSFER ACCEPTANCE NOTE - COMMENTS
Smoked 1 PPD x 40 years and recently quit 3/2017  Drank 1 to 3 glasses of wine per day- quit 3/2017  Drank 1 to 2 cups of coffee per day - quit 3/2017

## 2017-04-02 NOTE — TRANSFER ACCEPTANCE NOTE - PMH
Chronic atrial fibrillation    Chronic gastritis without bleeding, unspecified gastritis type    HF (heart failure), diastolic  Grade II DD  HOCM (hypertrophic obstructive cardiomyopathy)  s/p septal ablation  Mitral valve insufficiency, unspecified etiology  Moderate  Pneumonia  Admitted to [facility] 2017  Pulmonary hypertension  Moderate Chronic atrial fibrillation    Gastritis and duodenitis  Mild - dx via EGD 2017  HF (heart failure), diastolic  Grade II DD  HOCM (hypertrophic obstructive cardiomyopathy)  s/p septal ablation  Mitral valve insufficiency, unspecified etiology  Moderate  Pleural effusion  Right parapneumonic drained twice 3/2017 at [facility]  Pneumonia  RML & RLL at [facility] 3/2017  Pulmonary hypertension  Moderate

## 2017-04-02 NOTE — TRANSFER ACCEPTANCE NOTE - RS GEN PE MLT RESP DETAILS PC
no rales/no intercostal retractions/chest wall tenderness/diminished breath sounds, R/no rhonchi/good air movement/no wheezes/respirations non-labored

## 2017-04-02 NOTE — TRANSFER ACCEPTANCE NOTE - HISTORY OF PRESENT ILLNESS
61 year old female was admitted to [facility] from home on 3/30/17.  Her PMH was significant for gastritis, Hypertrophic Obstructive Cardiomyopathy s/p septal ablation, CHF (Grade II DD), moderate MR, moderate Pulmonary Hypertension, A-Fib on Coumadin, recent pneumonia, and recent pleural effusion & chest tube drainage.  She was admitted c/o a recurrence of lower substernal sharp chest and epigastric pains that were persistent in nature but episodic and associated with dyspnea. She was found to be in rapid A-fib and was treated with Cardizem.  A CT scan showed no change in a small to moderate loculated right pleural effusion with adjacent atelectasis/ consolidation of the RML and RLL,  The chest pain and dyspnea were attributed to the loculated effusion and her pulmonologist consulted thoracic surgery for a possible decortication,  She was therefore transferred to [facility].  She was therapeutically anticoagulated for her A-Fib with Coumadin upon transfer. 61 year old female was admitted to [facility] from home on 3/30/17.  Her PMH was significant for gastritis, Hypertrophic Obstructive Cardiomyopathy s/p septal ablation, CHF (Grade II DD), moderate MR, moderate Pulmonary Hypertension, A-Fib on Coumadin, recent pneumonia (3/2017), and recent right pleural effusion & chest tube drainage x 2 (3/2017).  She was admitted c/o a recurrence of lower substernal sharp chest and epigastric pains that were persistent in nature but episodic and associated with dyspnea. This was after she had been discharged from [facility] for several days and had been asymptomatic.  She was found to be in rapid A-fib and was treated with Cardizem.  A CT scan showed no change in a small to moderate loculated right pleural effusion with adjacent atelectasis/ consolidation of the RML and RLL,  The chest pain and dyspnea were attributed to the loculated parapneumonic effusion and her pulmonologist consulted thoracic surgery for a possible decortication,  She was therefore transferred to [facility].  She was therapeutically anticoagulated for her A-Fib with Coumadin upon transfer.

## 2017-04-02 NOTE — TRANSFER ACCEPTANCE NOTE - RADIOLOGY RESULTS AND INTERPRETATION
CTA 3/31/17 - no significant change in moderate loculated right pleural effusion with adjacent atelectasis/ consolidation on RML and RLL

## 2017-04-03 ENCOUNTER — RESULT REVIEW (OUTPATIENT)
Age: 62
End: 2017-04-03

## 2017-04-03 ENCOUNTER — APPOINTMENT (OUTPATIENT)
Dept: INTERNAL MEDICINE | Facility: CLINIC | Age: 62
End: 2017-04-03

## 2017-04-03 LAB
BLD GP AB SCN SERPL QL: NEGATIVE — SIGNIFICANT CHANGE UP
INR BLD: 1.62 — HIGH (ref 0.88–1.17)
PROTHROM AB SERPL-ACNC: 18.3 SEC — HIGH (ref 9.8–13.1)
RH IG SCN BLD-IMP: POSITIVE — SIGNIFICANT CHANGE UP

## 2017-04-03 PROCEDURE — 99232 SBSQ HOSP IP/OBS MODERATE 35: CPT

## 2017-04-03 RX ORDER — MORPHINE SULFATE 50 MG/1
2 CAPSULE, EXTENDED RELEASE ORAL EVERY 4 HOURS
Qty: 0 | Refills: 0 | Status: DISCONTINUED | OUTPATIENT
Start: 2017-04-03 | End: 2017-04-04

## 2017-04-03 RX ADMIN — TRAMADOL HYDROCHLORIDE 50 MILLIGRAM(S): 50 TABLET ORAL at 18:26

## 2017-04-03 RX ADMIN — ATORVASTATIN CALCIUM 40 MILLIGRAM(S): 80 TABLET, FILM COATED ORAL at 21:38

## 2017-04-03 RX ADMIN — Medication 1 GRAM(S): at 13:36

## 2017-04-03 RX ADMIN — SODIUM CHLORIDE 3 MILLILITER(S): 9 INJECTION INTRAMUSCULAR; INTRAVENOUS; SUBCUTANEOUS at 13:36

## 2017-04-03 RX ADMIN — PANTOPRAZOLE SODIUM 40 MILLIGRAM(S): 20 TABLET, DELAYED RELEASE ORAL at 05:29

## 2017-04-03 RX ADMIN — Medication 1 GRAM(S): at 21:38

## 2017-04-03 RX ADMIN — Medication 1 GRAM(S): at 05:29

## 2017-04-03 RX ADMIN — Medication 20 MILLIGRAM(S): at 05:29

## 2017-04-03 RX ADMIN — MORPHINE SULFATE 2 MILLIGRAM(S): 50 CAPSULE, EXTENDED RELEASE ORAL at 19:35

## 2017-04-03 RX ADMIN — Medication 30 MILLILITER(S): at 13:35

## 2017-04-03 RX ADMIN — MORPHINE SULFATE 1 MILLIGRAM(S): 50 CAPSULE, EXTENDED RELEASE ORAL at 06:24

## 2017-04-03 RX ADMIN — SODIUM CHLORIDE 3 MILLILITER(S): 9 INJECTION INTRAMUSCULAR; INTRAVENOUS; SUBCUTANEOUS at 21:38

## 2017-04-03 RX ADMIN — Medication 30 MILLILITER(S): at 21:38

## 2017-04-03 RX ADMIN — TRAMADOL HYDROCHLORIDE 50 MILLIGRAM(S): 50 TABLET ORAL at 05:29

## 2017-04-03 RX ADMIN — TRAMADOL HYDROCHLORIDE 50 MILLIGRAM(S): 50 TABLET ORAL at 18:57

## 2017-04-03 RX ADMIN — MORPHINE SULFATE 1 MILLIGRAM(S): 50 CAPSULE, EXTENDED RELEASE ORAL at 06:45

## 2017-04-03 RX ADMIN — MORPHINE SULFATE 2 MILLIGRAM(S): 50 CAPSULE, EXTENDED RELEASE ORAL at 23:52

## 2017-04-03 RX ADMIN — MORPHINE SULFATE 2 MILLIGRAM(S): 50 CAPSULE, EXTENDED RELEASE ORAL at 15:51

## 2017-04-03 RX ADMIN — Medication 81 MILLIGRAM(S): at 13:34

## 2017-04-03 RX ADMIN — SODIUM CHLORIDE 3 MILLILITER(S): 9 INJECTION INTRAMUSCULAR; INTRAVENOUS; SUBCUTANEOUS at 05:29

## 2017-04-03 RX ADMIN — Medication 30 MILLILITER(S): at 05:29

## 2017-04-03 RX ADMIN — MORPHINE SULFATE 2 MILLIGRAM(S): 50 CAPSULE, EXTENDED RELEASE ORAL at 19:53

## 2017-04-03 RX ADMIN — MORPHINE SULFATE 2 MILLIGRAM(S): 50 CAPSULE, EXTENDED RELEASE ORAL at 15:35

## 2017-04-03 RX ADMIN — TRAMADOL HYDROCHLORIDE 50 MILLIGRAM(S): 50 TABLET ORAL at 06:02

## 2017-04-03 NOTE — ED CLERICAL - NS ED CARE COORDINATION INFORMATION
This patient is eligible for (or currently enrolled in) an outpatient care management program available through [facility]. This program can coordinate outpatient follow up and assist the patient in accessing a variety of outpatient resources. Since patient has been admitted Nurse Care Manager Barbara Molina [phone]) will follow up with patient post - discharge. If you need any further assistance or have any questions/concerns please call  Ronny Rae at [phone] with any questions or for assistance in discharge planning.

## 2017-04-04 ENCOUNTER — APPOINTMENT (OUTPATIENT)
Dept: THORACIC SURGERY | Facility: HOSPITAL | Age: 62
End: 2017-04-04
Payer: COMMERCIAL

## 2017-04-04 LAB
GLUCOSE FLD-MCNC: 99 MG/DL — SIGNIFICANT CHANGE UP
GRAM STN WND: SIGNIFICANT CHANGE UP
HAV IGM SER-ACNC: NONREACTIVE — SIGNIFICANT CHANGE UP
HBV CORE IGM SER-ACNC: NONREACTIVE — SIGNIFICANT CHANGE UP
HBV SURFACE AG SER-ACNC: NONREACTIVE — SIGNIFICANT CHANGE UP
HCV AB S/CO SERPL IA: 0.14 S/CO — SIGNIFICANT CHANGE UP
HCV AB SERPL-IMP: SIGNIFICANT CHANGE UP
INR BLD: 1.33 — HIGH (ref 0.88–1.17)
LDH SERPL L TO P-CCNC: 209 U/L — SIGNIFICANT CHANGE UP
PROT FLD-MCNC: 3.4 G/DL — SIGNIFICANT CHANGE UP
PROTHROM AB SERPL-ACNC: 15 SEC — HIGH (ref 9.8–13.1)
SPECIMEN SOURCE: SIGNIFICANT CHANGE UP

## 2017-04-04 PROCEDURE — 88305 TISSUE EXAM BY PATHOLOGIST: CPT | Mod: 26

## 2017-04-04 PROCEDURE — 88342 IMHCHEM/IMCYTCHM 1ST ANTB: CPT | Mod: 26,59

## 2017-04-04 PROCEDURE — 88112 CYTOPATH CELL ENHANCE TECH: CPT | Mod: 26

## 2017-04-04 PROCEDURE — 88341 IMHCHEM/IMCYTCHM EA ADD ANTB: CPT | Mod: 26,59

## 2017-04-04 PROCEDURE — 71010: CPT | Mod: 26

## 2017-04-04 PROCEDURE — 31622 DX BRONCHOSCOPE/WASH: CPT

## 2017-04-04 PROCEDURE — 32651 THORACOSCOPY REMOVE CORTEX: CPT

## 2017-04-04 PROCEDURE — 88365 INSITU HYBRIDIZATION (FISH): CPT | Mod: 26,59

## 2017-04-04 PROCEDURE — 88342 IMHCHEM/IMCYTCHM 1ST ANTB: CPT | Mod: 26

## 2017-04-04 PROCEDURE — 32609 THORACOSCOPY W/BX PLEURA: CPT

## 2017-04-04 PROCEDURE — 88367 INSITU HYBRIDIZATION AUTO: CPT | Mod: 26

## 2017-04-04 RX ORDER — SENNA PLUS 8.6 MG/1
2 TABLET ORAL AT BEDTIME
Qty: 0 | Refills: 0 | Status: DISCONTINUED | OUTPATIENT
Start: 2017-04-04 | End: 2017-04-08

## 2017-04-04 RX ORDER — HYDROMORPHONE HYDROCHLORIDE 2 MG/ML
0.5 INJECTION INTRAMUSCULAR; INTRAVENOUS; SUBCUTANEOUS
Qty: 0 | Refills: 0 | Status: DISCONTINUED | OUTPATIENT
Start: 2017-04-04 | End: 2017-04-06

## 2017-04-04 RX ORDER — HEPARIN SODIUM 5000 [USP'U]/ML
5000 INJECTION INTRAVENOUS; SUBCUTANEOUS EVERY 8 HOURS
Qty: 0 | Refills: 0 | Status: DISCONTINUED | OUTPATIENT
Start: 2017-04-04 | End: 2017-04-13

## 2017-04-04 RX ORDER — HYDROMORPHONE HYDROCHLORIDE 2 MG/ML
30 INJECTION INTRAMUSCULAR; INTRAVENOUS; SUBCUTANEOUS
Qty: 0 | Refills: 0 | Status: DISCONTINUED | OUTPATIENT
Start: 2017-04-04 | End: 2017-04-06

## 2017-04-04 RX ORDER — SODIUM CHLORIDE 9 MG/ML
3 INJECTION INTRAMUSCULAR; INTRAVENOUS; SUBCUTANEOUS EVERY 8 HOURS
Qty: 0 | Refills: 0 | Status: DISCONTINUED | OUTPATIENT
Start: 2017-04-04 | End: 2017-04-13

## 2017-04-04 RX ORDER — HYDROMORPHONE HYDROCHLORIDE 2 MG/ML
0.5 INJECTION INTRAMUSCULAR; INTRAVENOUS; SUBCUTANEOUS
Qty: 0 | Refills: 0 | Status: DISCONTINUED | OUTPATIENT
Start: 2017-04-04 | End: 2017-04-04

## 2017-04-04 RX ORDER — METOCLOPRAMIDE HCL 10 MG
10 TABLET ORAL ONCE
Qty: 0 | Refills: 0 | Status: COMPLETED | OUTPATIENT
Start: 2017-04-04 | End: 2017-04-04

## 2017-04-04 RX ORDER — IPRATROPIUM BROMIDE 0.2 MG/ML
500 SOLUTION, NON-ORAL INHALATION EVERY 6 HOURS
Qty: 0 | Refills: 0 | Status: DISCONTINUED | OUTPATIENT
Start: 2017-04-04 | End: 2017-04-13

## 2017-04-04 RX ORDER — ONDANSETRON 8 MG/1
4 TABLET, FILM COATED ORAL EVERY 6 HOURS
Qty: 0 | Refills: 0 | Status: DISCONTINUED | OUTPATIENT
Start: 2017-04-04 | End: 2017-04-13

## 2017-04-04 RX ORDER — ASPIRIN/CALCIUM CARB/MAGNESIUM 324 MG
81 TABLET ORAL DAILY
Qty: 0 | Refills: 0 | Status: DISCONTINUED | OUTPATIENT
Start: 2017-04-04 | End: 2017-04-13

## 2017-04-04 RX ORDER — NALOXONE HYDROCHLORIDE 4 MG/.1ML
0.1 SPRAY NASAL
Qty: 0 | Refills: 0 | Status: DISCONTINUED | OUTPATIENT
Start: 2017-04-04 | End: 2017-04-13

## 2017-04-04 RX ORDER — SUCRALFATE 1 G
1 TABLET ORAL EVERY 8 HOURS
Qty: 0 | Refills: 0 | Status: DISCONTINUED | OUTPATIENT
Start: 2017-04-04 | End: 2017-04-13

## 2017-04-04 RX ORDER — ATORVASTATIN CALCIUM 80 MG/1
40 TABLET, FILM COATED ORAL AT BEDTIME
Qty: 0 | Refills: 0 | Status: DISCONTINUED | OUTPATIENT
Start: 2017-04-04 | End: 2017-04-13

## 2017-04-04 RX ORDER — HYDROMORPHONE HYDROCHLORIDE 2 MG/ML
1 INJECTION INTRAMUSCULAR; INTRAVENOUS; SUBCUTANEOUS
Qty: 0 | Refills: 0 | Status: DISCONTINUED | OUTPATIENT
Start: 2017-04-04 | End: 2017-04-04

## 2017-04-04 RX ORDER — FUROSEMIDE 40 MG
20 TABLET ORAL DAILY
Qty: 0 | Refills: 0 | Status: DISCONTINUED | OUTPATIENT
Start: 2017-04-04 | End: 2017-04-13

## 2017-04-04 RX ORDER — SODIUM CHLORIDE 9 MG/ML
1000 INJECTION, SOLUTION INTRAVENOUS
Qty: 0 | Refills: 0 | Status: DISCONTINUED | OUTPATIENT
Start: 2017-04-04 | End: 2017-04-06

## 2017-04-04 RX ORDER — ALBUTEROL 90 UG/1
2 AEROSOL, METERED ORAL EVERY 6 HOURS
Qty: 0 | Refills: 0 | Status: DISCONTINUED | OUTPATIENT
Start: 2017-04-04 | End: 2017-04-08

## 2017-04-04 RX ORDER — ALBUTEROL 90 UG/1
2.5 AEROSOL, METERED ORAL EVERY 6 HOURS
Qty: 0 | Refills: 0 | Status: DISCONTINUED | OUTPATIENT
Start: 2017-04-04 | End: 2017-04-04

## 2017-04-04 RX ORDER — PANTOPRAZOLE SODIUM 20 MG/1
40 TABLET, DELAYED RELEASE ORAL
Qty: 0 | Refills: 0 | Status: DISCONTINUED | OUTPATIENT
Start: 2017-04-04 | End: 2017-04-13

## 2017-04-04 RX ORDER — DOCUSATE SODIUM 100 MG
100 CAPSULE ORAL THREE TIMES A DAY
Qty: 0 | Refills: 0 | Status: DISCONTINUED | OUTPATIENT
Start: 2017-04-04 | End: 2017-04-13

## 2017-04-04 RX ORDER — DIPHENHYDRAMINE HCL 50 MG
25 CAPSULE ORAL EVERY 4 HOURS
Qty: 0 | Refills: 0 | Status: DISCONTINUED | OUTPATIENT
Start: 2017-04-04 | End: 2017-04-08

## 2017-04-04 RX ADMIN — HYDROMORPHONE HYDROCHLORIDE 30 MILLILITER(S): 2 INJECTION INTRAMUSCULAR; INTRAVENOUS; SUBCUTANEOUS at 20:29

## 2017-04-04 RX ADMIN — Medication 30 MILLILITER(S): at 21:18

## 2017-04-04 RX ADMIN — SODIUM CHLORIDE 3 MILLILITER(S): 9 INJECTION INTRAMUSCULAR; INTRAVENOUS; SUBCUTANEOUS at 21:18

## 2017-04-04 RX ADMIN — HEPARIN SODIUM 5000 UNIT(S): 5000 INJECTION INTRAVENOUS; SUBCUTANEOUS at 21:18

## 2017-04-04 RX ADMIN — Medication 10 MILLIGRAM(S): at 20:00

## 2017-04-04 RX ADMIN — HYDROMORPHONE HYDROCHLORIDE 30 MILLILITER(S): 2 INJECTION INTRAMUSCULAR; INTRAVENOUS; SUBCUTANEOUS at 17:29

## 2017-04-04 RX ADMIN — MORPHINE SULFATE 2 MILLIGRAM(S): 50 CAPSULE, EXTENDED RELEASE ORAL at 13:38

## 2017-04-04 RX ADMIN — ATORVASTATIN CALCIUM 40 MILLIGRAM(S): 80 TABLET, FILM COATED ORAL at 21:18

## 2017-04-04 RX ADMIN — MORPHINE SULFATE 2 MILLIGRAM(S): 50 CAPSULE, EXTENDED RELEASE ORAL at 05:16

## 2017-04-04 RX ADMIN — Medication 20 MILLIGRAM(S): at 05:00

## 2017-04-04 RX ADMIN — PANTOPRAZOLE SODIUM 40 MILLIGRAM(S): 20 TABLET, DELAYED RELEASE ORAL at 05:00

## 2017-04-04 RX ADMIN — MORPHINE SULFATE 2 MILLIGRAM(S): 50 CAPSULE, EXTENDED RELEASE ORAL at 14:00

## 2017-04-04 RX ADMIN — SENNA PLUS 2 TABLET(S): 8.6 TABLET ORAL at 21:18

## 2017-04-04 RX ADMIN — MORPHINE SULFATE 2 MILLIGRAM(S): 50 CAPSULE, EXTENDED RELEASE ORAL at 09:39

## 2017-04-04 RX ADMIN — Medication 1 GRAM(S): at 05:00

## 2017-04-04 RX ADMIN — SODIUM CHLORIDE 50 MILLILITER(S): 9 INJECTION, SOLUTION INTRAVENOUS at 21:18

## 2017-04-04 RX ADMIN — Medication 100 MILLIGRAM(S): at 21:18

## 2017-04-04 RX ADMIN — MORPHINE SULFATE 2 MILLIGRAM(S): 50 CAPSULE, EXTENDED RELEASE ORAL at 00:17

## 2017-04-04 RX ADMIN — ONDANSETRON 4 MILLIGRAM(S): 8 TABLET, FILM COATED ORAL at 18:54

## 2017-04-04 RX ADMIN — MORPHINE SULFATE 2 MILLIGRAM(S): 50 CAPSULE, EXTENDED RELEASE ORAL at 09:19

## 2017-04-04 RX ADMIN — SODIUM CHLORIDE 3 MILLILITER(S): 9 INJECTION INTRAMUSCULAR; INTRAVENOUS; SUBCUTANEOUS at 06:05

## 2017-04-04 RX ADMIN — MORPHINE SULFATE 2 MILLIGRAM(S): 50 CAPSULE, EXTENDED RELEASE ORAL at 04:57

## 2017-04-04 RX ADMIN — SODIUM CHLORIDE 3 MILLILITER(S): 9 INJECTION INTRAMUSCULAR; INTRAVENOUS; SUBCUTANEOUS at 13:41

## 2017-04-04 RX ADMIN — Medication 1 GRAM(S): at 21:18

## 2017-04-04 RX ADMIN — Medication 30 MILLILITER(S): at 05:00

## 2017-04-04 NOTE — BRIEF OPERATIVE NOTE - PROCEDURE
Thoracoscopic biopsy of pleura  04/04/2017  RVATS, Pleural biopsy, partial decortication, mechanical pleurodesis, drainage of 1000cc pleural effusion  Active  [email or web address]

## 2017-04-05 ENCOUNTER — TRANSCRIPTION ENCOUNTER (OUTPATIENT)
Age: 62
End: 2017-04-05

## 2017-04-05 LAB
BASOPHILS # BLD AUTO: 0 K/UL — SIGNIFICANT CHANGE UP (ref 0–0.2)
BASOPHILS NFR BLD AUTO: 0 % — SIGNIFICANT CHANGE UP (ref 0–2)
BUN SERPL-MCNC: 19 MG/DL — SIGNIFICANT CHANGE UP (ref 7–23)
CALCIUM SERPL-MCNC: 8.8 MG/DL — SIGNIFICANT CHANGE UP (ref 8.4–10.5)
CHLORIDE SERPL-SCNC: 99 MMOL/L — SIGNIFICANT CHANGE UP (ref 98–107)
CO2 SERPL-SCNC: 21 MMOL/L — LOW (ref 22–31)
CREAT SERPL-MCNC: 0.76 MG/DL — SIGNIFICANT CHANGE UP (ref 0.5–1.3)
CULTURE - ACID FAST SMEAR CONCENTRATED: SIGNIFICANT CHANGE UP
EOSINOPHIL # BLD AUTO: 0 K/UL — SIGNIFICANT CHANGE UP (ref 0–0.5)
EOSINOPHIL NFR BLD AUTO: 0 % — SIGNIFICANT CHANGE UP (ref 0–6)
GLUCOSE SERPL-MCNC: 132 MG/DL — HIGH (ref 70–99)
HCT VFR BLD CALC: 40.9 % — SIGNIFICANT CHANGE UP (ref 34.5–45)
HGB BLD-MCNC: 13.7 G/DL — SIGNIFICANT CHANGE UP (ref 11.5–15.5)
IMM GRANULOCYTES NFR BLD AUTO: 0 % — SIGNIFICANT CHANGE UP (ref 0–1.5)
LYMPHOCYTES # BLD AUTO: 0.51 K/UL — LOW (ref 1–3.3)
LYMPHOCYTES # BLD AUTO: 7.9 % — LOW (ref 13–44)
MCHC RBC-ENTMCNC: 31.6 PG — SIGNIFICANT CHANGE UP (ref 27–34)
MCHC RBC-ENTMCNC: 33.5 % — SIGNIFICANT CHANGE UP (ref 32–36)
MCV RBC AUTO: 94.2 FL — SIGNIFICANT CHANGE UP (ref 80–100)
MONOCYTES # BLD AUTO: 0.17 K/UL — SIGNIFICANT CHANGE UP (ref 0–0.9)
MONOCYTES NFR BLD AUTO: 2.6 % — SIGNIFICANT CHANGE UP (ref 2–14)
NEUTROPHILS # BLD AUTO: 5.79 K/UL — SIGNIFICANT CHANGE UP (ref 1.8–7.4)
NEUTROPHILS NFR BLD AUTO: 89.5 % — HIGH (ref 43–77)
PH FLD: 8 PH — SIGNIFICANT CHANGE UP
PLATELET # BLD AUTO: 291 K/UL — SIGNIFICANT CHANGE UP (ref 150–400)
PMV BLD: 11.1 FL — SIGNIFICANT CHANGE UP (ref 7–13)
POTASSIUM SERPL-MCNC: 4.7 MMOL/L — SIGNIFICANT CHANGE UP (ref 3.5–5.3)
POTASSIUM SERPL-SCNC: 4.7 MMOL/L — SIGNIFICANT CHANGE UP (ref 3.5–5.3)
RBC # BLD: 4.34 M/UL — SIGNIFICANT CHANGE UP (ref 3.8–5.2)
RBC # FLD: 12.5 % — SIGNIFICANT CHANGE UP (ref 10.3–14.5)
SODIUM SERPL-SCNC: 137 MMOL/L — SIGNIFICANT CHANGE UP (ref 135–145)
SPECIMEN SOURCE: SIGNIFICANT CHANGE UP
WBC # BLD: 6.47 K/UL — SIGNIFICANT CHANGE UP (ref 3.8–10.5)
WBC # FLD AUTO: 6.47 K/UL — SIGNIFICANT CHANGE UP (ref 3.8–10.5)

## 2017-04-05 PROCEDURE — 71010: CPT | Mod: 26

## 2017-04-05 RX ORDER — KETOROLAC TROMETHAMINE 30 MG/ML
30 SYRINGE (ML) INJECTION ONCE
Qty: 0 | Refills: 0 | Status: DISCONTINUED | OUTPATIENT
Start: 2017-04-05 | End: 2017-04-05

## 2017-04-05 RX ORDER — WARFARIN SODIUM 2.5 MG/1
5 TABLET ORAL ONCE
Qty: 0 | Refills: 0 | Status: COMPLETED | OUTPATIENT
Start: 2017-04-05 | End: 2017-04-05

## 2017-04-05 RX ORDER — SODIUM CHLORIDE 9 MG/ML
250 INJECTION INTRAMUSCULAR; INTRAVENOUS; SUBCUTANEOUS ONCE
Qty: 0 | Refills: 0 | Status: COMPLETED | OUTPATIENT
Start: 2017-04-05 | End: 2017-04-05

## 2017-04-05 RX ADMIN — Medication 100 MILLIGRAM(S): at 06:30

## 2017-04-05 RX ADMIN — ATORVASTATIN CALCIUM 40 MILLIGRAM(S): 80 TABLET, FILM COATED ORAL at 22:04

## 2017-04-05 RX ADMIN — SENNA PLUS 2 TABLET(S): 8.6 TABLET ORAL at 22:04

## 2017-04-05 RX ADMIN — WARFARIN SODIUM 5 MILLIGRAM(S): 2.5 TABLET ORAL at 18:10

## 2017-04-05 RX ADMIN — ONDANSETRON 4 MILLIGRAM(S): 8 TABLET, FILM COATED ORAL at 17:30

## 2017-04-05 RX ADMIN — HEPARIN SODIUM 5000 UNIT(S): 5000 INJECTION INTRAVENOUS; SUBCUTANEOUS at 13:07

## 2017-04-05 RX ADMIN — Medication 1 GRAM(S): at 06:30

## 2017-04-05 RX ADMIN — HEPARIN SODIUM 5000 UNIT(S): 5000 INJECTION INTRAVENOUS; SUBCUTANEOUS at 06:30

## 2017-04-05 RX ADMIN — SODIUM CHLORIDE 3 MILLILITER(S): 9 INJECTION INTRAMUSCULAR; INTRAVENOUS; SUBCUTANEOUS at 16:23

## 2017-04-05 RX ADMIN — Medication 1 GRAM(S): at 13:07

## 2017-04-05 RX ADMIN — HYDROMORPHONE HYDROCHLORIDE 30 MILLILITER(S): 2 INJECTION INTRAMUSCULAR; INTRAVENOUS; SUBCUTANEOUS at 19:49

## 2017-04-05 RX ADMIN — SODIUM CHLORIDE 3 MILLILITER(S): 9 INJECTION INTRAMUSCULAR; INTRAVENOUS; SUBCUTANEOUS at 22:05

## 2017-04-05 RX ADMIN — PANTOPRAZOLE SODIUM 40 MILLIGRAM(S): 20 TABLET, DELAYED RELEASE ORAL at 06:30

## 2017-04-05 RX ADMIN — HEPARIN SODIUM 5000 UNIT(S): 5000 INJECTION INTRAVENOUS; SUBCUTANEOUS at 22:04

## 2017-04-05 RX ADMIN — Medication 30 MILLILITER(S): at 22:04

## 2017-04-05 RX ADMIN — Medication 100 MILLIGRAM(S): at 22:04

## 2017-04-05 RX ADMIN — HYDROMORPHONE HYDROCHLORIDE 30 MILLILITER(S): 2 INJECTION INTRAMUSCULAR; INTRAVENOUS; SUBCUTANEOUS at 07:23

## 2017-04-05 RX ADMIN — Medication 81 MILLIGRAM(S): at 13:07

## 2017-04-05 RX ADMIN — Medication 30 MILLILITER(S): at 13:07

## 2017-04-05 RX ADMIN — Medication 30 MILLIGRAM(S): at 16:42

## 2017-04-05 RX ADMIN — SODIUM CHLORIDE 500 MILLILITER(S): 9 INJECTION INTRAMUSCULAR; INTRAVENOUS; SUBCUTANEOUS at 05:44

## 2017-04-05 RX ADMIN — Medication 100 MILLIGRAM(S): at 13:07

## 2017-04-05 RX ADMIN — Medication 1 GRAM(S): at 22:04

## 2017-04-05 RX ADMIN — Medication 30 MILLIGRAM(S): at 17:00

## 2017-04-05 RX ADMIN — SODIUM CHLORIDE 50 MILLILITER(S): 9 INJECTION, SOLUTION INTRAVENOUS at 17:47

## 2017-04-05 RX ADMIN — SODIUM CHLORIDE 3 MILLILITER(S): 9 INJECTION INTRAMUSCULAR; INTRAVENOUS; SUBCUTANEOUS at 06:31

## 2017-04-05 RX ADMIN — Medication 30 MILLILITER(S): at 06:30

## 2017-04-06 LAB
INR BLD: 1.47 — HIGH (ref 0.88–1.17)
PROTHROM AB SERPL-ACNC: 16.6 SEC — HIGH (ref 9.8–13.1)
SPECIMEN SOURCE: SIGNIFICANT CHANGE UP

## 2017-04-06 RX ORDER — MORPHINE SULFATE 50 MG/1
2 CAPSULE, EXTENDED RELEASE ORAL ONCE
Qty: 0 | Refills: 0 | Status: DISCONTINUED | OUTPATIENT
Start: 2017-04-06 | End: 2017-04-06

## 2017-04-06 RX ORDER — ACETAMINOPHEN 500 MG
1000 TABLET ORAL ONCE
Qty: 0 | Refills: 0 | Status: COMPLETED | OUTPATIENT
Start: 2017-04-06 | End: 2017-04-06

## 2017-04-06 RX ORDER — POLYETHYLENE GLYCOL 3350 17 G/17G
17 POWDER, FOR SOLUTION ORAL DAILY
Qty: 0 | Refills: 0 | Status: DISCONTINUED | OUTPATIENT
Start: 2017-04-06 | End: 2017-04-08

## 2017-04-06 RX ORDER — WARFARIN SODIUM 2.5 MG/1
5 TABLET ORAL ONCE
Qty: 0 | Refills: 0 | Status: COMPLETED | OUTPATIENT
Start: 2017-04-06 | End: 2017-04-06

## 2017-04-06 RX ORDER — OXYCODONE HYDROCHLORIDE 5 MG/1
10 TABLET ORAL EVERY 4 HOURS
Qty: 0 | Refills: 0 | Status: DISCONTINUED | OUTPATIENT
Start: 2017-04-06 | End: 2017-04-07

## 2017-04-06 RX ORDER — OXYCODONE HYDROCHLORIDE 5 MG/1
5 TABLET ORAL EVERY 4 HOURS
Qty: 0 | Refills: 0 | Status: DISCONTINUED | OUTPATIENT
Start: 2017-04-06 | End: 2017-04-13

## 2017-04-06 RX ADMIN — MORPHINE SULFATE 2 MILLIGRAM(S): 50 CAPSULE, EXTENDED RELEASE ORAL at 07:49

## 2017-04-06 RX ADMIN — OXYCODONE HYDROCHLORIDE 10 MILLIGRAM(S): 5 TABLET ORAL at 06:44

## 2017-04-06 RX ADMIN — Medication 1 GRAM(S): at 14:12

## 2017-04-06 RX ADMIN — Medication 400 MILLIGRAM(S): at 09:49

## 2017-04-06 RX ADMIN — OXYCODONE HYDROCHLORIDE 10 MILLIGRAM(S): 5 TABLET ORAL at 19:45

## 2017-04-06 RX ADMIN — Medication 20 MILLIGRAM(S): at 05:49

## 2017-04-06 RX ADMIN — WARFARIN SODIUM 5 MILLIGRAM(S): 2.5 TABLET ORAL at 18:23

## 2017-04-06 RX ADMIN — Medication 1000 MILLIGRAM(S): at 10:16

## 2017-04-06 RX ADMIN — PANTOPRAZOLE SODIUM 40 MILLIGRAM(S): 20 TABLET, DELAYED RELEASE ORAL at 05:49

## 2017-04-06 RX ADMIN — HEPARIN SODIUM 5000 UNIT(S): 5000 INJECTION INTRAVENOUS; SUBCUTANEOUS at 05:49

## 2017-04-06 RX ADMIN — Medication 30 MILLILITER(S): at 05:49

## 2017-04-06 RX ADMIN — POLYETHYLENE GLYCOL 3350 17 GRAM(S): 17 POWDER, FOR SOLUTION ORAL at 12:08

## 2017-04-06 RX ADMIN — SODIUM CHLORIDE 3 MILLILITER(S): 9 INJECTION INTRAMUSCULAR; INTRAVENOUS; SUBCUTANEOUS at 05:48

## 2017-04-06 RX ADMIN — Medication 100 MILLIGRAM(S): at 21:39

## 2017-04-06 RX ADMIN — SENNA PLUS 2 TABLET(S): 8.6 TABLET ORAL at 21:39

## 2017-04-06 RX ADMIN — Medication 100 MILLIGRAM(S): at 14:12

## 2017-04-06 RX ADMIN — Medication 400 MILLIGRAM(S): at 20:43

## 2017-04-06 RX ADMIN — OXYCODONE HYDROCHLORIDE 10 MILLIGRAM(S): 5 TABLET ORAL at 05:49

## 2017-04-06 RX ADMIN — Medication 400 MILLIGRAM(S): at 03:26

## 2017-04-06 RX ADMIN — ATORVASTATIN CALCIUM 40 MILLIGRAM(S): 80 TABLET, FILM COATED ORAL at 21:39

## 2017-04-06 RX ADMIN — Medication 400 MILLIGRAM(S): at 14:12

## 2017-04-06 RX ADMIN — Medication 30 MILLILITER(S): at 18:21

## 2017-04-06 RX ADMIN — Medication 1000 MILLIGRAM(S): at 03:30

## 2017-04-06 RX ADMIN — SODIUM CHLORIDE 3 MILLILITER(S): 9 INJECTION INTRAMUSCULAR; INTRAVENOUS; SUBCUTANEOUS at 21:37

## 2017-04-06 RX ADMIN — HEPARIN SODIUM 5000 UNIT(S): 5000 INJECTION INTRAVENOUS; SUBCUTANEOUS at 21:39

## 2017-04-06 RX ADMIN — MORPHINE SULFATE 2 MILLIGRAM(S): 50 CAPSULE, EXTENDED RELEASE ORAL at 08:00

## 2017-04-06 RX ADMIN — Medication 30 MILLILITER(S): at 14:11

## 2017-04-06 RX ADMIN — Medication 1 GRAM(S): at 21:39

## 2017-04-06 RX ADMIN — HEPARIN SODIUM 5000 UNIT(S): 5000 INJECTION INTRAVENOUS; SUBCUTANEOUS at 14:12

## 2017-04-06 RX ADMIN — Medication 1000 MILLIGRAM(S): at 14:40

## 2017-04-06 RX ADMIN — OXYCODONE HYDROCHLORIDE 10 MILLIGRAM(S): 5 TABLET ORAL at 19:23

## 2017-04-06 RX ADMIN — Medication 100 MILLIGRAM(S): at 05:49

## 2017-04-06 RX ADMIN — Medication 81 MILLIGRAM(S): at 12:09

## 2017-04-06 RX ADMIN — SODIUM CHLORIDE 3 MILLILITER(S): 9 INJECTION INTRAMUSCULAR; INTRAVENOUS; SUBCUTANEOUS at 14:10

## 2017-04-06 RX ADMIN — Medication 1 GRAM(S): at 05:49

## 2017-04-06 RX ADMIN — Medication 1000 MILLIGRAM(S): at 20:30

## 2017-04-07 DIAGNOSIS — I10 ESSENTIAL (PRIMARY) HYPERTENSION: ICD-10-CM

## 2017-04-07 LAB
BUN SERPL-MCNC: 19 MG/DL — SIGNIFICANT CHANGE UP (ref 7–23)
CALCIUM SERPL-MCNC: 8.6 MG/DL — SIGNIFICANT CHANGE UP (ref 8.4–10.5)
CHLORIDE SERPL-SCNC: 100 MMOL/L — SIGNIFICANT CHANGE UP (ref 98–107)
CO2 SERPL-SCNC: 22 MMOL/L — SIGNIFICANT CHANGE UP (ref 22–31)
CREAT SERPL-MCNC: 0.73 MG/DL — SIGNIFICANT CHANGE UP (ref 0.5–1.3)
GLUCOSE SERPL-MCNC: 97 MG/DL — SIGNIFICANT CHANGE UP (ref 70–99)
HCT VFR BLD CALC: 39.2 % — SIGNIFICANT CHANGE UP (ref 34.5–45)
HGB BLD-MCNC: 13.3 G/DL — SIGNIFICANT CHANGE UP (ref 11.5–15.5)
INR BLD: 1.77 — HIGH (ref 0.88–1.17)
MCHC RBC-ENTMCNC: 32 PG — SIGNIFICANT CHANGE UP (ref 27–34)
MCHC RBC-ENTMCNC: 33.9 % — SIGNIFICANT CHANGE UP (ref 32–36)
MCV RBC AUTO: 94.2 FL — SIGNIFICANT CHANGE UP (ref 80–100)
PLATELET # BLD AUTO: 240 K/UL — SIGNIFICANT CHANGE UP (ref 150–400)
PMV BLD: 10.8 FL — SIGNIFICANT CHANGE UP (ref 7–13)
POTASSIUM SERPL-MCNC: 4 MMOL/L — SIGNIFICANT CHANGE UP (ref 3.5–5.3)
POTASSIUM SERPL-SCNC: 4 MMOL/L — SIGNIFICANT CHANGE UP (ref 3.5–5.3)
PROTHROM AB SERPL-ACNC: 20.1 SEC — HIGH (ref 9.8–13.1)
RBC # BLD: 4.16 M/UL — SIGNIFICANT CHANGE UP (ref 3.8–5.2)
RBC # FLD: 12.8 % — SIGNIFICANT CHANGE UP (ref 10.3–14.5)
SODIUM SERPL-SCNC: 137 MMOL/L — SIGNIFICANT CHANGE UP (ref 135–145)
WBC # BLD: 6.25 K/UL — SIGNIFICANT CHANGE UP (ref 3.8–10.5)
WBC # FLD AUTO: 6.25 K/UL — SIGNIFICANT CHANGE UP (ref 3.8–10.5)

## 2017-04-07 PROCEDURE — 71010: CPT | Mod: 26

## 2017-04-07 RX ORDER — OXYCODONE HYDROCHLORIDE 5 MG/1
10 TABLET ORAL
Qty: 0 | Refills: 0 | Status: DISCONTINUED | OUTPATIENT
Start: 2017-04-07 | End: 2017-04-13

## 2017-04-07 RX ORDER — WARFARIN SODIUM 2.5 MG/1
3 TABLET ORAL ONCE
Qty: 0 | Refills: 0 | Status: COMPLETED | OUTPATIENT
Start: 2017-04-07 | End: 2017-04-07

## 2017-04-07 RX ORDER — GABAPENTIN 400 MG/1
100 CAPSULE ORAL THREE TIMES A DAY
Qty: 0 | Refills: 0 | Status: DISCONTINUED | OUTPATIENT
Start: 2017-04-07 | End: 2017-04-13

## 2017-04-07 RX ADMIN — Medication 100 MILLIGRAM(S): at 21:24

## 2017-04-07 RX ADMIN — OXYCODONE HYDROCHLORIDE 10 MILLIGRAM(S): 5 TABLET ORAL at 00:37

## 2017-04-07 RX ADMIN — Medication 1 GRAM(S): at 21:24

## 2017-04-07 RX ADMIN — Medication 1 GRAM(S): at 14:40

## 2017-04-07 RX ADMIN — OXYCODONE HYDROCHLORIDE 10 MILLIGRAM(S): 5 TABLET ORAL at 00:48

## 2017-04-07 RX ADMIN — HEPARIN SODIUM 5000 UNIT(S): 5000 INJECTION INTRAVENOUS; SUBCUTANEOUS at 05:27

## 2017-04-07 RX ADMIN — OXYCODONE HYDROCHLORIDE 5 MILLIGRAM(S): 5 TABLET ORAL at 22:13

## 2017-04-07 RX ADMIN — Medication 30 MILLILITER(S): at 14:39

## 2017-04-07 RX ADMIN — GABAPENTIN 100 MILLIGRAM(S): 400 CAPSULE ORAL at 21:24

## 2017-04-07 RX ADMIN — GABAPENTIN 100 MILLIGRAM(S): 400 CAPSULE ORAL at 11:21

## 2017-04-07 RX ADMIN — Medication 1 GRAM(S): at 05:27

## 2017-04-07 RX ADMIN — OXYCODONE HYDROCHLORIDE 10 MILLIGRAM(S): 5 TABLET ORAL at 11:50

## 2017-04-07 RX ADMIN — Medication 30 MILLILITER(S): at 21:24

## 2017-04-07 RX ADMIN — OXYCODONE HYDROCHLORIDE 10 MILLIGRAM(S): 5 TABLET ORAL at 08:38

## 2017-04-07 RX ADMIN — OXYCODONE HYDROCHLORIDE 10 MILLIGRAM(S): 5 TABLET ORAL at 15:10

## 2017-04-07 RX ADMIN — OXYCODONE HYDROCHLORIDE 10 MILLIGRAM(S): 5 TABLET ORAL at 11:21

## 2017-04-07 RX ADMIN — OXYCODONE HYDROCHLORIDE 5 MILLIGRAM(S): 5 TABLET ORAL at 21:29

## 2017-04-07 RX ADMIN — WARFARIN SODIUM 3 MILLIGRAM(S): 2.5 TABLET ORAL at 17:15

## 2017-04-07 RX ADMIN — ATORVASTATIN CALCIUM 40 MILLIGRAM(S): 80 TABLET, FILM COATED ORAL at 21:24

## 2017-04-07 RX ADMIN — Medication 100 MILLIGRAM(S): at 15:02

## 2017-04-07 RX ADMIN — HEPARIN SODIUM 5000 UNIT(S): 5000 INJECTION INTRAVENOUS; SUBCUTANEOUS at 21:25

## 2017-04-07 RX ADMIN — Medication 81 MILLIGRAM(S): at 11:21

## 2017-04-07 RX ADMIN — SODIUM CHLORIDE 3 MILLILITER(S): 9 INJECTION INTRAMUSCULAR; INTRAVENOUS; SUBCUTANEOUS at 21:23

## 2017-04-07 RX ADMIN — Medication 30 MILLILITER(S): at 05:27

## 2017-04-07 RX ADMIN — OXYCODONE HYDROCHLORIDE 10 MILLIGRAM(S): 5 TABLET ORAL at 14:40

## 2017-04-07 RX ADMIN — SODIUM CHLORIDE 3 MILLILITER(S): 9 INJECTION INTRAMUSCULAR; INTRAVENOUS; SUBCUTANEOUS at 15:02

## 2017-04-07 RX ADMIN — Medication 100 MILLIGRAM(S): at 05:27

## 2017-04-07 RX ADMIN — PANTOPRAZOLE SODIUM 40 MILLIGRAM(S): 20 TABLET, DELAYED RELEASE ORAL at 05:27

## 2017-04-07 RX ADMIN — OXYCODONE HYDROCHLORIDE 10 MILLIGRAM(S): 5 TABLET ORAL at 08:08

## 2017-04-07 RX ADMIN — SENNA PLUS 2 TABLET(S): 8.6 TABLET ORAL at 21:24

## 2017-04-07 RX ADMIN — HEPARIN SODIUM 5000 UNIT(S): 5000 INJECTION INTRAVENOUS; SUBCUTANEOUS at 14:39

## 2017-04-07 RX ADMIN — SODIUM CHLORIDE 3 MILLILITER(S): 9 INJECTION INTRAMUSCULAR; INTRAVENOUS; SUBCUTANEOUS at 05:17

## 2017-04-08 ENCOUNTER — TRANSCRIPTION ENCOUNTER (OUTPATIENT)
Age: 62
End: 2017-04-08

## 2017-04-08 LAB
HCT VFR BLD CALC: 39.4 % — SIGNIFICANT CHANGE UP (ref 34.5–45)
HGB BLD-MCNC: 13 G/DL — SIGNIFICANT CHANGE UP (ref 11.5–15.5)
INR BLD: 1.89 — HIGH (ref 0.88–1.17)
MCHC RBC-ENTMCNC: 31.2 PG — SIGNIFICANT CHANGE UP (ref 27–34)
MCHC RBC-ENTMCNC: 33 % — SIGNIFICANT CHANGE UP (ref 32–36)
MCV RBC AUTO: 94.5 FL — SIGNIFICANT CHANGE UP (ref 80–100)
PLATELET # BLD AUTO: 246 K/UL — SIGNIFICANT CHANGE UP (ref 150–400)
PMV BLD: 10.8 FL — SIGNIFICANT CHANGE UP (ref 7–13)
PROTHROM AB SERPL-ACNC: 21.5 SEC — HIGH (ref 9.8–13.1)
RBC # BLD: 4.17 M/UL — SIGNIFICANT CHANGE UP (ref 3.8–5.2)
RBC # FLD: 12.8 % — SIGNIFICANT CHANGE UP (ref 10.3–14.5)
WBC # BLD: 5.91 K/UL — SIGNIFICANT CHANGE UP (ref 3.8–10.5)
WBC # FLD AUTO: 5.91 K/UL — SIGNIFICANT CHANGE UP (ref 3.8–10.5)

## 2017-04-08 PROCEDURE — 71010: CPT | Mod: 26

## 2017-04-08 RX ORDER — WARFARIN SODIUM 2.5 MG/1
2 TABLET ORAL ONCE
Qty: 0 | Refills: 0 | Status: COMPLETED | OUTPATIENT
Start: 2017-04-08 | End: 2017-04-08

## 2017-04-08 RX ORDER — CALCIUM CARBONATE 500(1250)
2 TABLET ORAL EVERY 4 HOURS
Qty: 0 | Refills: 0 | Status: DISCONTINUED | OUTPATIENT
Start: 2017-04-08 | End: 2017-04-13

## 2017-04-08 RX ORDER — HYDROMORPHONE HYDROCHLORIDE 2 MG/ML
0.5 INJECTION INTRAMUSCULAR; INTRAVENOUS; SUBCUTANEOUS ONCE
Qty: 0 | Refills: 0 | Status: DISCONTINUED | OUTPATIENT
Start: 2017-04-08 | End: 2017-04-08

## 2017-04-08 RX ORDER — OXYCODONE HYDROCHLORIDE 5 MG/1
10 TABLET ORAL EVERY 12 HOURS
Qty: 0 | Refills: 0 | Status: DISCONTINUED | OUTPATIENT
Start: 2017-04-08 | End: 2017-04-13

## 2017-04-08 RX ADMIN — PANTOPRAZOLE SODIUM 40 MILLIGRAM(S): 20 TABLET, DELAYED RELEASE ORAL at 06:04

## 2017-04-08 RX ADMIN — OXYCODONE HYDROCHLORIDE 10 MILLIGRAM(S): 5 TABLET ORAL at 07:05

## 2017-04-08 RX ADMIN — Medication 30 MILLILITER(S): at 06:04

## 2017-04-08 RX ADMIN — Medication 81 MILLIGRAM(S): at 12:06

## 2017-04-08 RX ADMIN — Medication 20 MILLIGRAM(S): at 06:05

## 2017-04-08 RX ADMIN — SODIUM CHLORIDE 3 MILLILITER(S): 9 INJECTION INTRAMUSCULAR; INTRAVENOUS; SUBCUTANEOUS at 21:46

## 2017-04-08 RX ADMIN — OXYCODONE HYDROCHLORIDE 10 MILLIGRAM(S): 5 TABLET ORAL at 15:01

## 2017-04-08 RX ADMIN — Medication 1 GRAM(S): at 21:47

## 2017-04-08 RX ADMIN — OXYCODONE HYDROCHLORIDE 10 MILLIGRAM(S): 5 TABLET ORAL at 18:25

## 2017-04-08 RX ADMIN — HYDROMORPHONE HYDROCHLORIDE 0.5 MILLIGRAM(S): 2 INJECTION INTRAMUSCULAR; INTRAVENOUS; SUBCUTANEOUS at 21:47

## 2017-04-08 RX ADMIN — Medication 30 MILLILITER(S): at 21:56

## 2017-04-08 RX ADMIN — HYDROMORPHONE HYDROCHLORIDE 0.5 MILLIGRAM(S): 2 INJECTION INTRAMUSCULAR; INTRAVENOUS; SUBCUTANEOUS at 15:17

## 2017-04-08 RX ADMIN — Medication 1 GRAM(S): at 14:14

## 2017-04-08 RX ADMIN — Medication 100 MILLIGRAM(S): at 21:47

## 2017-04-08 RX ADMIN — SODIUM CHLORIDE 3 MILLILITER(S): 9 INJECTION INTRAMUSCULAR; INTRAVENOUS; SUBCUTANEOUS at 14:13

## 2017-04-08 RX ADMIN — GABAPENTIN 100 MILLIGRAM(S): 400 CAPSULE ORAL at 06:05

## 2017-04-08 RX ADMIN — Medication 100 MILLIGRAM(S): at 06:05

## 2017-04-08 RX ADMIN — OXYCODONE HYDROCHLORIDE 10 MILLIGRAM(S): 5 TABLET ORAL at 06:36

## 2017-04-08 RX ADMIN — OXYCODONE HYDROCHLORIDE 10 MILLIGRAM(S): 5 TABLET ORAL at 17:44

## 2017-04-08 RX ADMIN — WARFARIN SODIUM 2 MILLIGRAM(S): 2.5 TABLET ORAL at 17:42

## 2017-04-08 RX ADMIN — Medication 1 GRAM(S): at 06:05

## 2017-04-08 RX ADMIN — HEPARIN SODIUM 5000 UNIT(S): 5000 INJECTION INTRAVENOUS; SUBCUTANEOUS at 21:47

## 2017-04-08 RX ADMIN — GABAPENTIN 100 MILLIGRAM(S): 400 CAPSULE ORAL at 14:14

## 2017-04-08 RX ADMIN — OXYCODONE HYDROCHLORIDE 10 MILLIGRAM(S): 5 TABLET ORAL at 14:14

## 2017-04-08 RX ADMIN — HYDROMORPHONE HYDROCHLORIDE 0.5 MILLIGRAM(S): 2 INJECTION INTRAMUSCULAR; INTRAVENOUS; SUBCUTANEOUS at 16:10

## 2017-04-08 RX ADMIN — HYDROMORPHONE HYDROCHLORIDE 0.5 MILLIGRAM(S): 2 INJECTION INTRAMUSCULAR; INTRAVENOUS; SUBCUTANEOUS at 21:30

## 2017-04-08 RX ADMIN — GABAPENTIN 100 MILLIGRAM(S): 400 CAPSULE ORAL at 21:47

## 2017-04-08 RX ADMIN — SODIUM CHLORIDE 3 MILLILITER(S): 9 INJECTION INTRAMUSCULAR; INTRAVENOUS; SUBCUTANEOUS at 06:07

## 2017-04-08 RX ADMIN — ATORVASTATIN CALCIUM 40 MILLIGRAM(S): 80 TABLET, FILM COATED ORAL at 21:47

## 2017-04-08 RX ADMIN — HEPARIN SODIUM 5000 UNIT(S): 5000 INJECTION INTRAVENOUS; SUBCUTANEOUS at 14:14

## 2017-04-08 RX ADMIN — HEPARIN SODIUM 5000 UNIT(S): 5000 INJECTION INTRAVENOUS; SUBCUTANEOUS at 06:04

## 2017-04-09 LAB
INR BLD: 1.55 — HIGH (ref 0.88–1.17)
PROTHROM AB SERPL-ACNC: 17.5 SEC — HIGH (ref 9.8–13.1)

## 2017-04-09 PROCEDURE — 71010: CPT | Mod: 26

## 2017-04-09 RX ORDER — DIGOXIN 250 MCG
0.5 TABLET ORAL ONCE
Qty: 0 | Refills: 0 | Status: COMPLETED | OUTPATIENT
Start: 2017-04-09 | End: 2017-04-09

## 2017-04-09 RX ORDER — WARFARIN SODIUM 2.5 MG/1
3 TABLET ORAL ONCE
Qty: 0 | Refills: 0 | Status: COMPLETED | OUTPATIENT
Start: 2017-04-09 | End: 2017-04-09

## 2017-04-09 RX ORDER — DIGOXIN 250 MCG
0.12 TABLET ORAL DAILY
Qty: 0 | Refills: 0 | Status: DISCONTINUED | OUTPATIENT
Start: 2017-04-10 | End: 2017-04-13

## 2017-04-09 RX ADMIN — OXYCODONE HYDROCHLORIDE 5 MILLIGRAM(S): 5 TABLET ORAL at 14:18

## 2017-04-09 RX ADMIN — Medication 30 MILLILITER(S): at 06:18

## 2017-04-09 RX ADMIN — Medication 30 MILLILITER(S): at 22:20

## 2017-04-09 RX ADMIN — GABAPENTIN 100 MILLIGRAM(S): 400 CAPSULE ORAL at 06:18

## 2017-04-09 RX ADMIN — SODIUM CHLORIDE 3 MILLILITER(S): 9 INJECTION INTRAMUSCULAR; INTRAVENOUS; SUBCUTANEOUS at 06:14

## 2017-04-09 RX ADMIN — Medication 1 GRAM(S): at 22:20

## 2017-04-09 RX ADMIN — Medication 1 GRAM(S): at 14:17

## 2017-04-09 RX ADMIN — OXYCODONE HYDROCHLORIDE 10 MILLIGRAM(S): 5 TABLET ORAL at 07:44

## 2017-04-09 RX ADMIN — OXYCODONE HYDROCHLORIDE 10 MILLIGRAM(S): 5 TABLET ORAL at 19:52

## 2017-04-09 RX ADMIN — GABAPENTIN 100 MILLIGRAM(S): 400 CAPSULE ORAL at 14:17

## 2017-04-09 RX ADMIN — Medication 30 MILLILITER(S): at 14:17

## 2017-04-09 RX ADMIN — Medication 100 MILLIGRAM(S): at 22:20

## 2017-04-09 RX ADMIN — HEPARIN SODIUM 5000 UNIT(S): 5000 INJECTION INTRAVENOUS; SUBCUTANEOUS at 22:20

## 2017-04-09 RX ADMIN — OXYCODONE HYDROCHLORIDE 10 MILLIGRAM(S): 5 TABLET ORAL at 17:57

## 2017-04-09 RX ADMIN — OXYCODONE HYDROCHLORIDE 5 MILLIGRAM(S): 5 TABLET ORAL at 09:17

## 2017-04-09 RX ADMIN — OXYCODONE HYDROCHLORIDE 10 MILLIGRAM(S): 5 TABLET ORAL at 18:38

## 2017-04-09 RX ADMIN — Medication 1 GRAM(S): at 06:18

## 2017-04-09 RX ADMIN — GABAPENTIN 100 MILLIGRAM(S): 400 CAPSULE ORAL at 22:21

## 2017-04-09 RX ADMIN — WARFARIN SODIUM 3 MILLIGRAM(S): 2.5 TABLET ORAL at 17:57

## 2017-04-09 RX ADMIN — Medication 100 MILLIGRAM(S): at 06:18

## 2017-04-09 RX ADMIN — Medication 81 MILLIGRAM(S): at 12:30

## 2017-04-09 RX ADMIN — Medication 0.5 MILLIGRAM(S): at 12:31

## 2017-04-09 RX ADMIN — SODIUM CHLORIDE 3 MILLILITER(S): 9 INJECTION INTRAMUSCULAR; INTRAVENOUS; SUBCUTANEOUS at 14:10

## 2017-04-09 RX ADMIN — HEPARIN SODIUM 5000 UNIT(S): 5000 INJECTION INTRAVENOUS; SUBCUTANEOUS at 06:18

## 2017-04-09 RX ADMIN — ATORVASTATIN CALCIUM 40 MILLIGRAM(S): 80 TABLET, FILM COATED ORAL at 22:20

## 2017-04-09 RX ADMIN — OXYCODONE HYDROCHLORIDE 5 MILLIGRAM(S): 5 TABLET ORAL at 12:04

## 2017-04-09 RX ADMIN — SODIUM CHLORIDE 3 MILLILITER(S): 9 INJECTION INTRAMUSCULAR; INTRAVENOUS; SUBCUTANEOUS at 22:21

## 2017-04-09 RX ADMIN — HEPARIN SODIUM 5000 UNIT(S): 5000 INJECTION INTRAVENOUS; SUBCUTANEOUS at 14:19

## 2017-04-09 RX ADMIN — OXYCODONE HYDROCHLORIDE 5 MILLIGRAM(S): 5 TABLET ORAL at 15:48

## 2017-04-09 RX ADMIN — OXYCODONE HYDROCHLORIDE 10 MILLIGRAM(S): 5 TABLET ORAL at 06:18

## 2017-04-09 RX ADMIN — PANTOPRAZOLE SODIUM 40 MILLIGRAM(S): 20 TABLET, DELAYED RELEASE ORAL at 06:18

## 2017-04-09 RX ADMIN — Medication 100 MILLIGRAM(S): at 14:19

## 2017-04-09 RX ADMIN — OXYCODONE HYDROCHLORIDE 10 MILLIGRAM(S): 5 TABLET ORAL at 20:50

## 2017-04-09 NOTE — DISCHARGE NOTE ADULT - NS AS ACTIVITY OBS
No Heavy lifting/straining/Do not drive or operate machinery/Showering allowed/Walking-Outdoors allowed/Walking-Indoors allowed/Stairs allowed

## 2017-04-09 NOTE — DISCHARGE NOTE ADULT - CARE PROVIDER_API CALL
Jack Solomon), Thoracic Surgery  [address] [place] [address]  Phone: [phone]  Fax: [phone] Jack Solomon  Oncology Building  Level C  Phone: [phone]  Fax: (   )    - aJck Solomon  Oncology Building  Level C  Phone: [phone]  Fax: (   )    -    Robina Bond (DO), [place] [address]  Phone: [phone]  Fax: [phone]

## 2017-04-09 NOTE — DISCHARGE NOTE ADULT - PATIENT PORTAL LINK FT
“You can access the FollowHealth Patient Portal, offered by [facility], by registering with the following website: [email or web address]”

## 2017-04-09 NOTE — DISCHARGE NOTE ADULT - CARE PLAN
Principal Discharge DX:	Pleural effusion  Goal:	prevention of recurrence  Instructions for follow-up, activity and diet:	As above Principal Discharge DX:	Pleural effusion  Goal:	prevention of recurrence; follow up final pathology for treatment plan  Instructions for follow-up, activity and diet:	As above Principal Discharge DX:	Pleural effusion  Goal:	prevention of recurrence; follow up final pathology for treatment plan  Instructions for follow-up, activity and diet:	Follow up with Dr. Solomon in 2 weeks, you must call  to make an appointment. A prescription for chest x-ray will be provided, please have it completed 2 days prior to meeting Dr. Solomon.  Follow up with PCP in 1 week for management of coumadin dosing with INR values  Follow up with your cardiologist in 1 week Principal Discharge DX:	Pleural effusion  Goal:	prevention of recurrence; follow up final pathology for treatment plan  Instructions for follow-up, activity and diet:	Follow up with Dr. Solomon in 2 weeks, you must call  to make an appointment.   Follow up with PCP in 1 week for management of coumadin dosing with INR values  Follow up with your cardiologist in 1 week  Increase walking distance daily  Use spirometry as directed

## 2017-04-09 NOTE — DISCHARGE NOTE ADULT - HOSPITAL COURSE
61 year old female was admitted to [facility] from home on 3/30/17.  Her PMH was significant for gastritis, Hypertrophic Obstructive Cardiomyopathy s/p septal ablation, CHF (Grade II DD), moderate MR, moderate Pulmonary Hypertension, A-Fib on Coumadin, recent pneumonia (3/2017), and recent right pleural effusion & chest tube drainage x 2 (3/2017).  She was admitted c/o a recurrence of lower substernal sharp chest and epigastric pains that were persistent in nature but episodic and associated with dyspnea. This was after she had been discharged from [facility] for several days and had been asymptomatic.  She was found to be in rapid A-fib and was treated with Cardizem.  A CT scan showed no change in a small to moderate loculated right pleural effusion with adjacent atelectasis/ consolidation of the RML and RLL,  The chest pain and dyspnea were attributed to the loculated parapneumonic effusion and her pulmonologist consulted thoracic surgery for a possible decortication,  She was therefore transferred to [facility].  She was therapeutically anticoagulated for her A-Fib with Coumadin upon transfer.  She underwent a right VATS, partial decortication, mechanical pleurodesis, pleural bx, and drainage of 1L of fluid.  She needed her coumadin started and her INR to be therapeutic for discharge.

## 2017-04-09 NOTE — DISCHARGE NOTE ADULT - PROVIDER TOKENS
ADÁN:'[id number]' FREE:[LAST:[Juanito],FIRST:[Jack],PHONE:[phone]],FAX:[(   )    -],ADDRESS:[[facility]  Oncology [facility]  Level C]] FREE:[LAST:[Juanito],FIRST:[Jack],PHONE:[phone]],FAX:[(   )    -],ADDRESS:[[facility]  Oncology [facility]  Level C]],TOKEN:'[id number]'

## 2017-04-09 NOTE — DISCHARGE NOTE ADULT - CARE PROVIDERS DIRECT ADDRESSES
,[email or web address] ,[email or web address] ,[email or web address]_Unknown,[email or web address]

## 2017-04-09 NOTE — DISCHARGE NOTE ADULT - ADDITIONAL INSTRUCTIONS
You may remove your dressing and take a shower in 48 hours. Allow warm water to run over incision when taking a shower, pat dry and leave to air. Please continue with daily walks and use of incentive spirometer. Call the office at  if you notice an increase in shortness of breath, fever, chills, swelling around neck/chest, purulent discharge from site of incision, or pain not relived by medication or rest.

## 2017-04-09 NOTE — DISCHARGE NOTE ADULT - MEDICATION SUMMARY - MEDICATIONS TO STOP TAKING
I will STOP taking the medications listed below when I get home from the hospital:    traMADol 50 mg oral tablet  -- 1 tab(s) by mouth every 8 hours, As Needed    morphine 1 mg/mL injectable solution  -- 1 milligram(s) injectable every 6 hours, As Needed

## 2017-04-09 NOTE — DISCHARGE NOTE ADULT - MEDICATION SUMMARY - MEDICATIONS TO TAKE
I will START or STAY ON the medications listed below when I get home from the hospital:    aspirin 81 mg oral tablet, chewable  -- 1 tab(s) by mouth once a day  -- Indication: For Home med    OxyCONTIN 10 mg oral tablet, extended release  -- 1 tab(s) by mouth every 12 hours MDD:2  -- Indication: For Pain    oxyCODONE 5 mg oral tablet  -- 2 tab(s) by mouth every 4 hours, As Needed, Moderate to severe brakethrough Pain (4 - 6) MDD:6  -- Indication: For Pain    traMADol 50 mg oral tablet  -- 1 tab(s) by mouth every 8 hours, As Needed  may resume after you have finished the oxycodone  -- Indication: For Home med May resume after youhave completed the oxycondone     calcium carbonate 500 mg (200 mg elemental calcium) oral tablet, chewable  -- 2 tab(s) by mouth every 4 hours, As needed, Indigestion  -- Indication: For Home med    aluminum hydroxide-magnesium hydroxide 200 mg-200 mg/5 mL oral suspension  -- 30 milliliter(s) by mouth every 8 hours  -- Indication: For Home med    diltiaZEM 60 mg oral tablet  -- 1 tab(s) by mouth 3 times a day  -- Indication: For Home med    digoxin 125 mcg (0.125 mg) oral tablet  -- 1 tab(s) by mouth once a day  -- Indication: For Heart rate control     Coumadin 5 mg oral tablet  -- 1 tab(s) by mouth once a day  -- Indication: For Home med    gabapentin 100 mg oral capsule  -- 1 cap(s) by mouth 3 times a day  -- Indication: For Pain    atorvastatin 40 mg oral tablet  -- 1 tab(s) by mouth once a day (at bedtime)  -- Indication: For Home med    albuterol 90 mcg/inh inhalation aerosol  -- 2 puff(s) inhaled every 6 hours, As Needed  -- Indication: For Home med    furosemide 20 mg oral tablet  -- 1 tab(s) by mouth once a day  -- Indication: For Home med    docusate sodium 100 mg oral capsule  -- 1 cap(s) by mouth 3 times a day  -- Indication: For Constipation    sucralfate 1 g oral tablet  -- 1 tab(s) by mouth every 8 hours  -- Indication: For Home med    melatonin 3 mg oral tablet  -- 1 tab(s) by mouth once (at bedtime), As Needed  -- Indication: For Home med    pantoprazole 40 mg oral delayed release tablet  -- 1 tab(s) by mouth every 12 hours  -- Indication: For Home med I will START or STAY ON the medications listed below when I get home from the hospital:    aspirin 81 mg oral tablet, chewable  -- 1 tab(s) by mouth once a day  -- Indication: For vessel protection    OxyCONTIN 10 mg oral tablet, extended release  -- 1 tab(s) by mouth every 12 hours MDD:2  -- Indication: For Pain control    oxyCODONE 5 mg oral tablet  -- 2 tab(s) by mouth every 4 hours, As Needed, Moderate to severe brakethrough Pain (4 - 6) MDD:6  -- Indication: For Pain control    traMADol 50 mg oral tablet  -- 1 tab(s) by mouth every 8 hours, As Needed  may resume after you have finished the oxycodone  -- Indication: For Home med May resume after youhave completed the oxycondone     Tylenol 500 mg oral tablet  -- 2 tab(s) by mouth every 6 hours as needed for mild pain  -- Indication: For Pain control    calcium carbonate 500 mg (200 mg elemental calcium) oral tablet, chewable  -- 2 tab(s) by mouth every 4 hours, As needed, Indigestion  -- Indication: For Home med    aluminum hydroxide-magnesium hydroxide 200 mg-200 mg/5 mL oral suspension  -- 30 milliliter(s) by mouth every 8 hours  -- Indication: For Home med    diltiaZEM 60 mg oral tablet  -- 1 tab(s) by mouth 3 times a day  -- Indication: For Heart rate, rhythm    digoxin 125 mcg (0.125 mg) oral tablet  -- 1 tab(s) by mouth once a day  -- Indication: For Heart rhythm    Coumadin 5 mg oral tablet  -- 1 tab(s) by mouth once a day  -- Indication: For anticoagulation    gabapentin 100 mg oral capsule  -- 1 cap(s) by mouth 3 times a day  -- Indication: For Pain control    atorvastatin 40 mg oral tablet  -- 1 tab(s) by mouth once a day (at bedtime)  -- Indication: For Hyperlipidemia    albuterol 90 mcg/inh inhalation aerosol  -- 2 puff(s) inhaled every 6 hours, As Needed  -- Indication: For opens airways    furosemide 20 mg oral tablet  -- 1 tab(s) by mouth once a day  -- Indication: For diuretic    docusate sodium 100 mg oral capsule  -- 1 cap(s) by mouth 3 times a day  -- Indication: For as needed for constipation    sucralfate 1 g oral tablet  -- 1 tab(s) by mouth every 8 hours  -- Indication: For stomach protection    melatonin 3 mg oral tablet  -- 1 tab(s) by mouth once (at bedtime), As Needed  -- Indication: For sleep aid    pantoprazole 40 mg oral delayed release tablet  -- 1 tab(s) by mouth every 12 hours  -- Indication: For stomach protection

## 2017-04-09 NOTE — DISCHARGE NOTE ADULT - PLAN OF CARE
prevention of recurrence As above prevention of recurrence; follow up final pathology for treatment plan Follow up with Dr. Solomon in 2 weeks, you must call  to make an appointment. A prescription for chest x-ray will be provided, please have it completed 2 days prior to meeting Dr. Solomon.  Follow up with PCP in 1 week for management of coumadin dosing with INR values  Follow up with your cardiologist in 1 week Follow up with Dr. Solomon in 2 weeks, you must call  to make an appointment.   Follow up with PCP in 1 week for management of coumadin dosing with INR values  Follow up with your cardiologist in 1 week  Increase walking distance daily  Use spirometry as directed

## 2017-04-10 LAB
CULTURE - SURGICAL SITE: SIGNIFICANT CHANGE UP
INR BLD: 1.35 — HIGH (ref 0.88–1.17)
PROTHROM AB SERPL-ACNC: 15.2 SEC — HIGH (ref 9.8–13.1)

## 2017-04-10 PROCEDURE — 71010: CPT | Mod: 26

## 2017-04-10 RX ORDER — WARFARIN SODIUM 2.5 MG/1
5 TABLET ORAL ONCE
Qty: 0 | Refills: 0 | Status: COMPLETED | OUTPATIENT
Start: 2017-04-10 | End: 2017-04-10

## 2017-04-10 RX ADMIN — Medication 1 GRAM(S): at 21:15

## 2017-04-10 RX ADMIN — OXYCODONE HYDROCHLORIDE 10 MILLIGRAM(S): 5 TABLET ORAL at 22:00

## 2017-04-10 RX ADMIN — SODIUM CHLORIDE 3 MILLILITER(S): 9 INJECTION INTRAMUSCULAR; INTRAVENOUS; SUBCUTANEOUS at 21:05

## 2017-04-10 RX ADMIN — OXYCODONE HYDROCHLORIDE 5 MILLIGRAM(S): 5 TABLET ORAL at 19:36

## 2017-04-10 RX ADMIN — OXYCODONE HYDROCHLORIDE 10 MILLIGRAM(S): 5 TABLET ORAL at 23:17

## 2017-04-10 RX ADMIN — Medication 30 MILLILITER(S): at 13:52

## 2017-04-10 RX ADMIN — HEPARIN SODIUM 5000 UNIT(S): 5000 INJECTION INTRAVENOUS; SUBCUTANEOUS at 05:17

## 2017-04-10 RX ADMIN — OXYCODONE HYDROCHLORIDE 10 MILLIGRAM(S): 5 TABLET ORAL at 15:43

## 2017-04-10 RX ADMIN — OXYCODONE HYDROCHLORIDE 10 MILLIGRAM(S): 5 TABLET ORAL at 06:10

## 2017-04-10 RX ADMIN — GABAPENTIN 100 MILLIGRAM(S): 400 CAPSULE ORAL at 13:52

## 2017-04-10 RX ADMIN — Medication 30 MILLILITER(S): at 21:15

## 2017-04-10 RX ADMIN — PANTOPRAZOLE SODIUM 40 MILLIGRAM(S): 20 TABLET, DELAYED RELEASE ORAL at 06:52

## 2017-04-10 RX ADMIN — Medication 1 GRAM(S): at 13:53

## 2017-04-10 RX ADMIN — Medication 100 MILLIGRAM(S): at 13:52

## 2017-04-10 RX ADMIN — Medication 0.12 MILLIGRAM(S): at 05:16

## 2017-04-10 RX ADMIN — ATORVASTATIN CALCIUM 40 MILLIGRAM(S): 80 TABLET, FILM COATED ORAL at 21:15

## 2017-04-10 RX ADMIN — HEPARIN SODIUM 5000 UNIT(S): 5000 INJECTION INTRAVENOUS; SUBCUTANEOUS at 13:52

## 2017-04-10 RX ADMIN — Medication 20 MILLIGRAM(S): at 05:17

## 2017-04-10 RX ADMIN — Medication 100 MILLIGRAM(S): at 05:16

## 2017-04-10 RX ADMIN — OXYCODONE HYDROCHLORIDE 10 MILLIGRAM(S): 5 TABLET ORAL at 22:25

## 2017-04-10 RX ADMIN — OXYCODONE HYDROCHLORIDE 10 MILLIGRAM(S): 5 TABLET ORAL at 21:11

## 2017-04-10 RX ADMIN — GABAPENTIN 100 MILLIGRAM(S): 400 CAPSULE ORAL at 21:16

## 2017-04-10 RX ADMIN — HEPARIN SODIUM 5000 UNIT(S): 5000 INJECTION INTRAVENOUS; SUBCUTANEOUS at 21:16

## 2017-04-10 RX ADMIN — OXYCODONE HYDROCHLORIDE 10 MILLIGRAM(S): 5 TABLET ORAL at 10:09

## 2017-04-10 RX ADMIN — Medication 81 MILLIGRAM(S): at 13:52

## 2017-04-10 RX ADMIN — WARFARIN SODIUM 5 MILLIGRAM(S): 2.5 TABLET ORAL at 08:39

## 2017-04-10 RX ADMIN — SODIUM CHLORIDE 3 MILLILITER(S): 9 INJECTION INTRAMUSCULAR; INTRAVENOUS; SUBCUTANEOUS at 05:18

## 2017-04-10 RX ADMIN — OXYCODONE HYDROCHLORIDE 5 MILLIGRAM(S): 5 TABLET ORAL at 20:18

## 2017-04-10 RX ADMIN — Medication 30 MILLILITER(S): at 05:15

## 2017-04-10 RX ADMIN — OXYCODONE HYDROCHLORIDE 10 MILLIGRAM(S): 5 TABLET ORAL at 05:15

## 2017-04-10 RX ADMIN — SODIUM CHLORIDE 3 MILLILITER(S): 9 INJECTION INTRAMUSCULAR; INTRAVENOUS; SUBCUTANEOUS at 15:33

## 2017-04-10 RX ADMIN — OXYCODONE HYDROCHLORIDE 10 MILLIGRAM(S): 5 TABLET ORAL at 16:42

## 2017-04-10 RX ADMIN — OXYCODONE HYDROCHLORIDE 10 MILLIGRAM(S): 5 TABLET ORAL at 11:00

## 2017-04-10 RX ADMIN — GABAPENTIN 100 MILLIGRAM(S): 400 CAPSULE ORAL at 05:17

## 2017-04-10 RX ADMIN — Medication 1 GRAM(S): at 05:16

## 2017-04-11 LAB
INR BLD: 1.46 — HIGH (ref 0.88–1.17)
PROTHROM AB SERPL-ACNC: 16.5 SEC — HIGH (ref 9.8–13.1)
SPECIMEN SOURCE: SIGNIFICANT CHANGE UP

## 2017-04-11 PROCEDURE — 71010: CPT | Mod: 26

## 2017-04-11 RX ORDER — WARFARIN SODIUM 2.5 MG/1
4 TABLET ORAL ONCE
Qty: 0 | Refills: 0 | Status: COMPLETED | OUTPATIENT
Start: 2017-04-11 | End: 2017-04-11

## 2017-04-11 RX ADMIN — Medication 1 GRAM(S): at 22:13

## 2017-04-11 RX ADMIN — HEPARIN SODIUM 5000 UNIT(S): 5000 INJECTION INTRAVENOUS; SUBCUTANEOUS at 22:14

## 2017-04-11 RX ADMIN — OXYCODONE HYDROCHLORIDE 10 MILLIGRAM(S): 5 TABLET ORAL at 23:10

## 2017-04-11 RX ADMIN — OXYCODONE HYDROCHLORIDE 10 MILLIGRAM(S): 5 TABLET ORAL at 20:03

## 2017-04-11 RX ADMIN — GABAPENTIN 100 MILLIGRAM(S): 400 CAPSULE ORAL at 22:13

## 2017-04-11 RX ADMIN — HEPARIN SODIUM 5000 UNIT(S): 5000 INJECTION INTRAVENOUS; SUBCUTANEOUS at 13:00

## 2017-04-11 RX ADMIN — GABAPENTIN 100 MILLIGRAM(S): 400 CAPSULE ORAL at 06:00

## 2017-04-11 RX ADMIN — GABAPENTIN 100 MILLIGRAM(S): 400 CAPSULE ORAL at 13:00

## 2017-04-11 RX ADMIN — OXYCODONE HYDROCHLORIDE 10 MILLIGRAM(S): 5 TABLET ORAL at 22:13

## 2017-04-11 RX ADMIN — Medication 20 MILLIGRAM(S): at 06:00

## 2017-04-11 RX ADMIN — Medication 100 MILLIGRAM(S): at 13:00

## 2017-04-11 RX ADMIN — PANTOPRAZOLE SODIUM 40 MILLIGRAM(S): 20 TABLET, DELAYED RELEASE ORAL at 06:02

## 2017-04-11 RX ADMIN — Medication 100 MILLIGRAM(S): at 22:13

## 2017-04-11 RX ADMIN — SODIUM CHLORIDE 3 MILLILITER(S): 9 INJECTION INTRAMUSCULAR; INTRAVENOUS; SUBCUTANEOUS at 22:50

## 2017-04-11 RX ADMIN — HEPARIN SODIUM 5000 UNIT(S): 5000 INJECTION INTRAVENOUS; SUBCUTANEOUS at 06:00

## 2017-04-11 RX ADMIN — OXYCODONE HYDROCHLORIDE 10 MILLIGRAM(S): 5 TABLET ORAL at 14:43

## 2017-04-11 RX ADMIN — OXYCODONE HYDROCHLORIDE 10 MILLIGRAM(S): 5 TABLET ORAL at 15:30

## 2017-04-11 RX ADMIN — SODIUM CHLORIDE 3 MILLILITER(S): 9 INJECTION INTRAMUSCULAR; INTRAVENOUS; SUBCUTANEOUS at 05:58

## 2017-04-11 RX ADMIN — OXYCODONE HYDROCHLORIDE 10 MILLIGRAM(S): 5 TABLET ORAL at 21:02

## 2017-04-11 RX ADMIN — Medication 0.12 MILLIGRAM(S): at 06:01

## 2017-04-11 RX ADMIN — Medication 1 GRAM(S): at 06:00

## 2017-04-11 RX ADMIN — OXYCODONE HYDROCHLORIDE 10 MILLIGRAM(S): 5 TABLET ORAL at 04:22

## 2017-04-11 RX ADMIN — Medication 30 MILLILITER(S): at 13:00

## 2017-04-11 RX ADMIN — Medication 1 GRAM(S): at 13:00

## 2017-04-11 RX ADMIN — OXYCODONE HYDROCHLORIDE 10 MILLIGRAM(S): 5 TABLET ORAL at 09:15

## 2017-04-11 RX ADMIN — OXYCODONE HYDROCHLORIDE 10 MILLIGRAM(S): 5 TABLET ORAL at 05:10

## 2017-04-11 RX ADMIN — Medication 30 MILLILITER(S): at 22:13

## 2017-04-11 RX ADMIN — Medication 30 MILLILITER(S): at 06:00

## 2017-04-11 RX ADMIN — Medication 81 MILLIGRAM(S): at 13:00

## 2017-04-11 RX ADMIN — ATORVASTATIN CALCIUM 40 MILLIGRAM(S): 80 TABLET, FILM COATED ORAL at 22:13

## 2017-04-11 RX ADMIN — SODIUM CHLORIDE 3 MILLILITER(S): 9 INJECTION INTRAMUSCULAR; INTRAVENOUS; SUBCUTANEOUS at 13:00

## 2017-04-11 RX ADMIN — WARFARIN SODIUM 4 MILLIGRAM(S): 2.5 TABLET ORAL at 17:57

## 2017-04-11 RX ADMIN — ONDANSETRON 4 MILLIGRAM(S): 8 TABLET, FILM COATED ORAL at 14:43

## 2017-04-11 NOTE — DIETITIAN INITIAL EVALUATION ADULT. - OTHER INFO
Patient was transferred to [facility] from [facility].  In the hospital, pt reports improved intake at 1/2 to 2/3 of meals.  Patient is tolerating both meals and Ensure Enlive 3x/day, and increasingly eating more food and less Ensure.  She currently has no nausea or vomiting.  Pt anticipates that intake will go down again after chest tube is removed.  Patient has been put on Warfarin and patient was educated on Vitamin K consistency while on this drug.  Patient takes a daily Multivitamin at home and was advised to share this with her MD.  Patient reports no known food allergies, but avoids gluten and reports feeling better when following this diet.

## 2017-04-11 NOTE — DIETITIAN INITIAL EVALUATION ADULT. - FACTORS AFF FOOD INTAKE
Intake changed with stomach pain that changed food prep and reduced intake./pain heart burn, stomach pain/pain

## 2017-04-11 NOTE — DIETITIAN INITIAL EVALUATION ADULT. - ADHERENCE
Patient did not restrict salt, but did follow and gluten free diet./n/a Patient follows a gluten free diet./n/a

## 2017-04-11 NOTE — DIETITIAN INITIAL EVALUATION ADULT. - DIET TYPE
DASH/TLC (sodium and cholesterol restricted diet)/Oral Ensure Enlive 3x per day./Gluten-Gliadin Restricted/supplement (specify)/regular

## 2017-04-11 NOTE — DIETITIAN INITIAL EVALUATION ADULT. - NS AS NUTRI INTERV MEALS SNACK
[place] - modified diet/Diets modified for specific foods and ingredients/1)  Continue with DASH/TLC diet and Gluten-gliadin restriction, 2)  Encouraged intake, 3)  Patient did not have any additional food requests, 4)  Continue to monitor intake, weights and labs

## 2017-04-11 NOTE — DIETITIAN INITIAL EVALUATION ADULT. - NS AS NUTRI INTERV ED CONTENT
Nutrition relationship to health/disease/Educated patient on Vitamin K and Warfarin interaction, and need for consistent intake of Vitamin K. Nutrition relationship to health/disease/Educated patient on Vitamin K and Warfarin interaction, and need for consistent intake of Vitamin K.  Advised patient that she was currently on a low salt DASH/TLC diet which was recommended to be continued.  Patient may benefit from additional education as an outpatient to reinforce low salt eating and Vitamin K consistency.

## 2017-04-11 NOTE — DIETITIAN INITIAL EVALUATION ADULT. - ORAL INTAKE PTA
fair/Before illness, patient had normal intake and appetite.  Diet consisted of three meals per day, with combination of healthy meals and then some out of home or takeout meals (ex. Chinese food, Mexican).  4-6 Weeks ago, patient started to  have an upset stomach and intake decreased and she was eating food prepared more blandly. fair/Before illness, patient had normal intake and appetite.  Diet consisted of three meals per day, with combination of healthy meals and then some out of home or takeout meals (ex. Chinese food, Mexican).  4-6 Weeks ago, patient started to  have an upset stomach resulting in decreased intake and blander food preparation.

## 2017-04-11 NOTE — DIETITIAN INITIAL EVALUATION ADULT. - NS FNS REASON FOR WEIGHT CHANG
Patient reports UBW of 184 pounds and drop of ~20 pounds due to stomach issues.   Per medical record, patient was admitted at 173 pounds from [facility] and is currently 179.8 pounds./decreased po intake Patient reports UBW of 184 pounds and drop of ~20 pounds due to stomach issues.   Per medical record, patient was admitted at 173 pounds from [facility] and is currently 179.8 pounds (net loss of 4.2 pounds)./decreased po intake

## 2017-04-12 LAB
APTT BLD: 32.1 SEC — SIGNIFICANT CHANGE UP (ref 27.5–37.4)
BUN SERPL-MCNC: 11 MG/DL — SIGNIFICANT CHANGE UP (ref 7–23)
CALCIUM SERPL-MCNC: 8.8 MG/DL — SIGNIFICANT CHANGE UP (ref 8.4–10.5)
CHLORIDE SERPL-SCNC: 100 MMOL/L — SIGNIFICANT CHANGE UP (ref 98–107)
CO2 SERPL-SCNC: 24 MMOL/L — SIGNIFICANT CHANGE UP (ref 22–31)
CREAT SERPL-MCNC: 0.79 MG/DL — SIGNIFICANT CHANGE UP (ref 0.5–1.3)
CULTURE RESULTS: SIGNIFICANT CHANGE UP
GLUCOSE SERPL-MCNC: 98 MG/DL — SIGNIFICANT CHANGE UP (ref 70–99)
HCT VFR BLD CALC: 37.2 % — SIGNIFICANT CHANGE UP (ref 34.5–45)
HGB BLD-MCNC: 12.3 G/DL — SIGNIFICANT CHANGE UP (ref 11.5–15.5)
INR BLD: 1.31 — HIGH (ref 0.88–1.17)
MCHC RBC-ENTMCNC: 30.8 PG — SIGNIFICANT CHANGE UP (ref 27–34)
MCHC RBC-ENTMCNC: 33.1 % — SIGNIFICANT CHANGE UP (ref 32–36)
MCV RBC AUTO: 93 FL — SIGNIFICANT CHANGE UP (ref 80–100)
PLATELET # BLD AUTO: 245 K/UL — SIGNIFICANT CHANGE UP (ref 150–400)
PMV BLD: 10.1 FL — SIGNIFICANT CHANGE UP (ref 7–13)
POTASSIUM SERPL-MCNC: 4.4 MMOL/L — SIGNIFICANT CHANGE UP (ref 3.5–5.3)
POTASSIUM SERPL-SCNC: 4.4 MMOL/L — SIGNIFICANT CHANGE UP (ref 3.5–5.3)
PROTHROM AB SERPL-ACNC: 14.7 SEC — HIGH (ref 9.8–13.1)
RBC # BLD: 4 M/UL — SIGNIFICANT CHANGE UP (ref 3.8–5.2)
RBC # FLD: 12.8 % — SIGNIFICANT CHANGE UP (ref 10.3–14.5)
SODIUM SERPL-SCNC: 137 MMOL/L — SIGNIFICANT CHANGE UP (ref 135–145)
SPECIMEN SOURCE: SIGNIFICANT CHANGE UP
WBC # BLD: 5.04 K/UL — SIGNIFICANT CHANGE UP (ref 3.8–10.5)
WBC # FLD AUTO: 5.04 K/UL — SIGNIFICANT CHANGE UP (ref 3.8–10.5)

## 2017-04-12 PROCEDURE — 71010: CPT | Mod: 26

## 2017-04-12 RX ORDER — DILTIAZEM HCL 120 MG
1 CAPSULE, EXT RELEASE 24 HR ORAL
Qty: 0 | Refills: 0 | DISCHARGE
Start: 2017-04-12

## 2017-04-12 RX ORDER — OXYCODONE HYDROCHLORIDE 5 MG/1
2 TABLET ORAL
Qty: 60 | Refills: 0 | DISCHARGE
Start: 2017-04-12 | End: 2017-04-17

## 2017-04-12 RX ORDER — DIGOXIN 250 MCG
1 TABLET ORAL
Qty: 0 | Refills: 0 | COMMUNITY
Start: 2017-04-12

## 2017-04-12 RX ORDER — CALCIUM CARBONATE 500(1250)
2 TABLET ORAL
Qty: 0 | Refills: 0 | DISCHARGE
Start: 2017-04-12

## 2017-04-12 RX ORDER — OXYCODONE HYDROCHLORIDE 5 MG/1
1 TABLET ORAL
Qty: 10 | Refills: 0
Start: 2017-04-12 | End: 2017-04-17

## 2017-04-12 RX ORDER — WARFARIN SODIUM 2.5 MG/1
5 TABLET ORAL ONCE
Qty: 0 | Refills: 0 | Status: COMPLETED | OUTPATIENT
Start: 2017-04-12 | End: 2017-04-12

## 2017-04-12 RX ORDER — DOCUSATE SODIUM 100 MG
1 CAPSULE ORAL
Qty: 0 | Refills: 0 | COMMUNITY
Start: 2017-04-12

## 2017-04-12 RX ORDER — DOCUSATE SODIUM 100 MG
1 CAPSULE ORAL
Qty: 90 | Refills: 0
Start: 2017-04-12 | End: 2017-05-12

## 2017-04-12 RX ORDER — DIGOXIN 250 MCG
1 TABLET ORAL
Qty: 30 | Refills: 0 | OUTPATIENT
Start: 2017-04-12 | End: 2017-05-12

## 2017-04-12 RX ORDER — OXYCODONE HYDROCHLORIDE 5 MG/1
2 TABLET ORAL
Qty: 60 | Refills: 0 | OUTPATIENT
Start: 2017-04-12 | End: 2017-04-17

## 2017-04-12 RX ORDER — KETOROLAC TROMETHAMINE 30 MG/ML
15 SYRINGE (ML) INJECTION ONCE
Qty: 0 | Refills: 0 | Status: DISCONTINUED | OUTPATIENT
Start: 2017-04-12 | End: 2017-04-12

## 2017-04-12 RX ORDER — GABAPENTIN 400 MG/1
1 CAPSULE ORAL
Qty: 90 | Refills: 0
Start: 2017-04-12 | End: 2017-05-12

## 2017-04-12 RX ADMIN — OXYCODONE HYDROCHLORIDE 10 MILLIGRAM(S): 5 TABLET ORAL at 23:36

## 2017-04-12 RX ADMIN — Medication 1 GRAM(S): at 13:50

## 2017-04-12 RX ADMIN — OXYCODONE HYDROCHLORIDE 5 MILLIGRAM(S): 5 TABLET ORAL at 20:53

## 2017-04-12 RX ADMIN — OXYCODONE HYDROCHLORIDE 10 MILLIGRAM(S): 5 TABLET ORAL at 22:36

## 2017-04-12 RX ADMIN — OXYCODONE HYDROCHLORIDE 5 MILLIGRAM(S): 5 TABLET ORAL at 10:36

## 2017-04-12 RX ADMIN — SODIUM CHLORIDE 3 MILLILITER(S): 9 INJECTION INTRAMUSCULAR; INTRAVENOUS; SUBCUTANEOUS at 22:37

## 2017-04-12 RX ADMIN — Medication 1 GRAM(S): at 22:36

## 2017-04-12 RX ADMIN — OXYCODONE HYDROCHLORIDE 10 MILLIGRAM(S): 5 TABLET ORAL at 09:19

## 2017-04-12 RX ADMIN — HEPARIN SODIUM 5000 UNIT(S): 5000 INJECTION INTRAVENOUS; SUBCUTANEOUS at 13:47

## 2017-04-12 RX ADMIN — OXYCODONE HYDROCHLORIDE 10 MILLIGRAM(S): 5 TABLET ORAL at 10:32

## 2017-04-12 RX ADMIN — Medication 100 MILLIGRAM(S): at 06:06

## 2017-04-12 RX ADMIN — SODIUM CHLORIDE 3 MILLILITER(S): 9 INJECTION INTRAMUSCULAR; INTRAVENOUS; SUBCUTANEOUS at 06:12

## 2017-04-12 RX ADMIN — GABAPENTIN 100 MILLIGRAM(S): 400 CAPSULE ORAL at 13:47

## 2017-04-12 RX ADMIN — OXYCODONE HYDROCHLORIDE 10 MILLIGRAM(S): 5 TABLET ORAL at 15:43

## 2017-04-12 RX ADMIN — GABAPENTIN 100 MILLIGRAM(S): 400 CAPSULE ORAL at 06:05

## 2017-04-12 RX ADMIN — Medication 81 MILLIGRAM(S): at 13:47

## 2017-04-12 RX ADMIN — SODIUM CHLORIDE 3 MILLILITER(S): 9 INJECTION INTRAMUSCULAR; INTRAVENOUS; SUBCUTANEOUS at 13:50

## 2017-04-12 RX ADMIN — GABAPENTIN 100 MILLIGRAM(S): 400 CAPSULE ORAL at 22:35

## 2017-04-12 RX ADMIN — OXYCODONE HYDROCHLORIDE 5 MILLIGRAM(S): 5 TABLET ORAL at 20:38

## 2017-04-12 RX ADMIN — HEPARIN SODIUM 5000 UNIT(S): 5000 INJECTION INTRAVENOUS; SUBCUTANEOUS at 06:06

## 2017-04-12 RX ADMIN — ATORVASTATIN CALCIUM 40 MILLIGRAM(S): 80 TABLET, FILM COATED ORAL at 22:36

## 2017-04-12 RX ADMIN — OXYCODONE HYDROCHLORIDE 5 MILLIGRAM(S): 5 TABLET ORAL at 12:00

## 2017-04-12 RX ADMIN — OXYCODONE HYDROCHLORIDE 10 MILLIGRAM(S): 5 TABLET ORAL at 16:40

## 2017-04-12 RX ADMIN — Medication 20 MILLIGRAM(S): at 06:06

## 2017-04-12 RX ADMIN — Medication 30 MILLILITER(S): at 06:06

## 2017-04-12 RX ADMIN — Medication 30 MILLILITER(S): at 22:35

## 2017-04-12 RX ADMIN — Medication 100 MILLIGRAM(S): at 22:36

## 2017-04-12 RX ADMIN — Medication 30 MILLILITER(S): at 13:47

## 2017-04-12 RX ADMIN — Medication 0.12 MILLIGRAM(S): at 06:05

## 2017-04-12 RX ADMIN — WARFARIN SODIUM 5 MILLIGRAM(S): 2.5 TABLET ORAL at 18:29

## 2017-04-12 RX ADMIN — PANTOPRAZOLE SODIUM 40 MILLIGRAM(S): 20 TABLET, DELAYED RELEASE ORAL at 06:06

## 2017-04-12 RX ADMIN — HEPARIN SODIUM 5000 UNIT(S): 5000 INJECTION INTRAVENOUS; SUBCUTANEOUS at 22:35

## 2017-04-12 RX ADMIN — Medication 100 MILLIGRAM(S): at 13:47

## 2017-04-12 RX ADMIN — Medication 15 MILLIGRAM(S): at 03:15

## 2017-04-12 RX ADMIN — Medication 15 MILLIGRAM(S): at 03:00

## 2017-04-12 RX ADMIN — Medication 1 GRAM(S): at 06:06

## 2017-04-12 NOTE — PROVIDER CONTACT NOTE (OTHER) - ASSESSMENT
pt sleeping, having pauses on monitor longest being 2.11 seconds
Asymptomatic. Denies any dizziness
Pt resting in bed, asymptomatic, bp 89/47, hr 76.

## 2017-04-13 VITALS — SYSTOLIC BLOOD PRESSURE: 96 MMHG | DIASTOLIC BLOOD PRESSURE: 50 MMHG

## 2017-04-13 LAB
DIGOXIN SERPL-MCNC: 0.6 NG/ML — LOW (ref 0.8–2)
INR BLD: 1.37 — HIGH (ref 0.88–1.17)
PROTHROM AB SERPL-ACNC: 15.4 SEC — HIGH (ref 9.8–13.1)

## 2017-04-13 PROCEDURE — 71010: CPT | Mod: 26

## 2017-04-13 PROCEDURE — 99238 HOSP IP/OBS DSCHRG MGMT 30/<: CPT

## 2017-04-13 RX ORDER — DIGOXIN 250 MCG
1 TABLET ORAL
Qty: 30 | Refills: 0
Start: 2017-04-13 | End: 2017-05-13

## 2017-04-13 RX ADMIN — OXYCODONE HYDROCHLORIDE 10 MILLIGRAM(S): 5 TABLET ORAL at 10:35

## 2017-04-13 RX ADMIN — OXYCODONE HYDROCHLORIDE 5 MILLIGRAM(S): 5 TABLET ORAL at 14:09

## 2017-04-13 RX ADMIN — GABAPENTIN 100 MILLIGRAM(S): 400 CAPSULE ORAL at 05:19

## 2017-04-13 RX ADMIN — Medication 20 MILLIGRAM(S): at 05:20

## 2017-04-13 RX ADMIN — HEPARIN SODIUM 5000 UNIT(S): 5000 INJECTION INTRAVENOUS; SUBCUTANEOUS at 05:19

## 2017-04-13 RX ADMIN — OXYCODONE HYDROCHLORIDE 5 MILLIGRAM(S): 5 TABLET ORAL at 10:34

## 2017-04-13 RX ADMIN — GABAPENTIN 100 MILLIGRAM(S): 400 CAPSULE ORAL at 14:09

## 2017-04-13 RX ADMIN — Medication 100 MILLIGRAM(S): at 14:09

## 2017-04-13 RX ADMIN — Medication 1 GRAM(S): at 14:09

## 2017-04-13 RX ADMIN — SODIUM CHLORIDE 3 MILLILITER(S): 9 INJECTION INTRAMUSCULAR; INTRAVENOUS; SUBCUTANEOUS at 05:20

## 2017-04-13 RX ADMIN — Medication 81 MILLIGRAM(S): at 11:50

## 2017-04-13 RX ADMIN — Medication 100 MILLIGRAM(S): at 05:19

## 2017-04-13 RX ADMIN — OXYCODONE HYDROCHLORIDE 5 MILLIGRAM(S): 5 TABLET ORAL at 10:04

## 2017-04-13 RX ADMIN — Medication 1 GRAM(S): at 05:19

## 2017-04-13 RX ADMIN — Medication 30 MILLILITER(S): at 14:09

## 2017-04-13 RX ADMIN — OXYCODONE HYDROCHLORIDE 10 MILLIGRAM(S): 5 TABLET ORAL at 12:00

## 2017-04-13 RX ADMIN — PANTOPRAZOLE SODIUM 40 MILLIGRAM(S): 20 TABLET, DELAYED RELEASE ORAL at 05:19

## 2017-04-13 RX ADMIN — Medication 0.12 MILLIGRAM(S): at 05:19

## 2017-04-13 RX ADMIN — Medication 30 MILLILITER(S): at 05:19

## 2017-04-13 RX ADMIN — HEPARIN SODIUM 5000 UNIT(S): 5000 INJECTION INTRAVENOUS; SUBCUTANEOUS at 14:09

## 2017-04-14 RX ORDER — FUROSEMIDE 40 MG
1 TABLET ORAL
Qty: 30 | Refills: 0 | OUTPATIENT
Start: 2017-04-14 | End: 2017-05-14

## 2017-04-14 RX ORDER — WARFARIN SODIUM 2.5 MG/1
1 TABLET ORAL
Qty: 30 | Refills: 0 | OUTPATIENT
Start: 2017-04-14 | End: 2017-05-14

## 2017-04-15 RX ORDER — FUROSEMIDE 40 MG
1 TABLET ORAL
Qty: 0 | Refills: 0 | COMMUNITY

## 2017-04-15 RX ORDER — MORPHINE SULFATE 50 MG/1
1 CAPSULE, EXTENDED RELEASE ORAL
Qty: 0 | Refills: 0 | COMMUNITY

## 2017-04-15 RX ORDER — TRAMADOL HYDROCHLORIDE 50 MG/1
1 TABLET ORAL
Qty: 0 | Refills: 0 | COMMUNITY

## 2017-04-15 RX ORDER — WARFARIN SODIUM 2.5 MG/1
1 TABLET ORAL
Qty: 0 | Refills: 0 | COMMUNITY

## 2017-04-19 ENCOUNTER — APPOINTMENT (OUTPATIENT)
Dept: INTERNAL MEDICINE | Facility: CLINIC | Age: 62
End: 2017-04-19

## 2017-04-19 ENCOUNTER — OUTPATIENT (OUTPATIENT)
Dept: OUTPATIENT SERVICES | Facility: HOSPITAL | Age: 62
LOS: 1 days | End: 2017-04-19
Payer: COMMERCIAL

## 2017-04-19 VITALS
SYSTOLIC BLOOD PRESSURE: 107 MMHG | HEIGHT: 67 IN | HEART RATE: 77 BPM | WEIGHT: 162 LBS | BODY MASS INDEX: 25.43 KG/M2 | DIASTOLIC BLOOD PRESSURE: 69 MMHG

## 2017-04-19 DIAGNOSIS — I25.10 ATHEROSCLEROTIC HEART DISEASE OF NATIVE CORONARY ARTERY WITHOUT ANGINA PECTORIS: ICD-10-CM

## 2017-04-19 DIAGNOSIS — I48.1 PERSISTENT ATRIAL FIBRILLATION: ICD-10-CM

## 2017-04-19 DIAGNOSIS — Z98.890 OTHER SPECIFIED POSTPROCEDURAL STATES: Chronic | ICD-10-CM

## 2017-04-19 DIAGNOSIS — I25.10 ATHEROSCLEROTIC HEART DISEASE OF NATIVE CORONARY ARTERY W/OUT ANGINA PECTORIS: ICD-10-CM

## 2017-04-19 DIAGNOSIS — Z00.00 ENCOUNTER FOR GENERAL ADULT MEDICAL EXAMINATION WITHOUT ABNORMAL FINDINGS: ICD-10-CM

## 2017-04-19 DIAGNOSIS — Z98.891 HISTORY OF UTERINE SCAR FROM PREVIOUS SURGERY: Chronic | ICD-10-CM

## 2017-04-19 DIAGNOSIS — J18.9 PNEUMONIA, UNSPECIFIED ORGANISM: ICD-10-CM

## 2017-04-19 LAB — APTT BLD: 39.2 SEC — HIGH (ref 27.5–37.4)

## 2017-04-19 PROCEDURE — 85730 THROMBOPLASTIN TIME PARTIAL: CPT

## 2017-04-19 PROCEDURE — G0463: CPT

## 2017-04-19 RX ORDER — DOCUSATE SODIUM 100 MG/1
100 CAPSULE ORAL
Refills: 0 | Status: ACTIVE | COMMUNITY

## 2017-04-19 RX ORDER — CHLORHEXIDINE GLUCONATE 4 %
LIQUID (ML) TOPICAL
Refills: 0 | Status: ACTIVE | COMMUNITY

## 2017-04-19 RX ORDER — ENOXAPARIN SODIUM 150 MG/ML
120 INJECTION SUBCUTANEOUS
Refills: 0 | Status: DISCONTINUED | COMMUNITY
End: 2017-04-19

## 2017-04-19 RX ORDER — PHENOL 1.4 %
600 AEROSOL, SPRAY (ML) MUCOUS MEMBRANE
Refills: 0 | Status: ACTIVE | COMMUNITY

## 2017-04-19 RX ORDER — AZITHROMYCIN 250 MG/1
250 TABLET, FILM COATED ORAL
Qty: 6 | Refills: 0 | Status: DISCONTINUED | COMMUNITY
Start: 2017-02-17

## 2017-04-19 RX ORDER — BENZOCAINE 20 G/100G
100 GEL, DENTIFRICE ORAL
Qty: 90 | Refills: 0 | Status: DISCONTINUED | COMMUNITY
Start: 2017-04-12

## 2017-04-19 RX ORDER — DILTIAZEM HYDROCHLORIDE 120 MG/1
120 CAPSULE, EXTENDED RELEASE ORAL
Qty: 30 | Refills: 0 | Status: DISCONTINUED | COMMUNITY
Start: 2017-01-27

## 2017-04-19 RX ORDER — WARFARIN 3 MG/1
3 TABLET ORAL
Qty: 14 | Refills: 0 | Status: DISCONTINUED | COMMUNITY
Start: 2017-03-15

## 2017-04-19 RX ORDER — LEVOFLOXACIN 750 MG/1
750 TABLET, FILM COATED ORAL
Qty: 7 | Refills: 0 | Status: DISCONTINUED | COMMUNITY
Start: 2017-03-15

## 2017-04-19 RX ORDER — MAG HYDROX/ALUMINUM HYD/SIMETH 200-200-20
SUSPENSION, ORAL (FINAL DOSE FORM) ORAL
Refills: 0 | Status: DISCONTINUED | COMMUNITY
End: 2017-04-19

## 2017-04-19 RX ORDER — CEFDINIR 300 MG/1
300 CAPSULE ORAL
Qty: 20 | Refills: 0 | Status: DISCONTINUED | COMMUNITY
Start: 2017-02-13

## 2017-04-19 RX ORDER — OXYCODONE HYDROCHLORIDE 10 MG/1
10 TABLET, FILM COATED, EXTENDED RELEASE ORAL
Qty: 10 | Refills: 0 | Status: DISCONTINUED | COMMUNITY
Start: 2017-04-12

## 2017-04-23 RX ORDER — TRAMADOL HYDROCHLORIDE 50 MG/1
1 TABLET ORAL
Qty: 9 | Refills: 0
Start: 2017-04-23 | End: 2017-04-26

## 2017-04-24 DIAGNOSIS — J91.8 PNEUMONIA, UNSPECIFIED ORGANISM: ICD-10-CM

## 2017-04-24 DIAGNOSIS — J18.9 PNEUMONIA, UNSPECIFIED ORGANISM: ICD-10-CM

## 2017-04-25 ENCOUNTER — APPOINTMENT (OUTPATIENT)
Dept: GASTROENTEROLOGY | Facility: CLINIC | Age: 62
End: 2017-04-25

## 2017-04-25 ENCOUNTER — EMERGENCY (EMERGENCY)
Facility: HOSPITAL | Age: 62
LOS: 1 days | Discharge: HOME | End: 2017-04-25
Attending: EMERGENCY MEDICINE | Admitting: EMERGENCY MEDICINE
Payer: COMMERCIAL

## 2017-04-25 ENCOUNTER — OUTPATIENT (OUTPATIENT)
Dept: OUTPATIENT SERVICES | Facility: HOSPITAL | Age: 62
LOS: 1 days | End: 2017-04-25
Payer: COMMERCIAL

## 2017-04-25 VITALS
WEIGHT: 159 LBS | HEIGHT: 67 IN | OXYGEN SATURATION: 96 % | HEART RATE: 89 BPM | DIASTOLIC BLOOD PRESSURE: 70 MMHG | BODY MASS INDEX: 24.96 KG/M2 | SYSTOLIC BLOOD PRESSURE: 104 MMHG | TEMPERATURE: 97.6 F

## 2017-04-25 VITALS
RESPIRATION RATE: 18 BRPM | SYSTOLIC BLOOD PRESSURE: 141 MMHG | HEART RATE: 84 BPM | DIASTOLIC BLOOD PRESSURE: 100 MMHG | OXYGEN SATURATION: 98 %

## 2017-04-25 VITALS
SYSTOLIC BLOOD PRESSURE: 124 MMHG | TEMPERATURE: 98 F | RESPIRATION RATE: 15 BRPM | DIASTOLIC BLOOD PRESSURE: 87 MMHG | HEART RATE: 84 BPM | OXYGEN SATURATION: 100 %

## 2017-04-25 DIAGNOSIS — Z98.891 HISTORY OF UTERINE SCAR FROM PREVIOUS SURGERY: Chronic | ICD-10-CM

## 2017-04-25 DIAGNOSIS — Z98.890 OTHER SPECIFIED POSTPROCEDURAL STATES: Chronic | ICD-10-CM

## 2017-04-25 DIAGNOSIS — Z00.00 ENCOUNTER FOR GENERAL ADULT MEDICAL EXAMINATION WITHOUT ABNORMAL FINDINGS: ICD-10-CM

## 2017-04-25 DIAGNOSIS — R10.9 UNSPECIFIED ABDOMINAL PAIN: ICD-10-CM

## 2017-04-25 LAB
ALBUMIN SERPL ELPH-MCNC: 4.2 G/DL — SIGNIFICANT CHANGE UP (ref 3.3–5)
ALP SERPL-CCNC: 73 U/L — SIGNIFICANT CHANGE UP (ref 40–120)
ALT FLD-CCNC: 26 U/L — SIGNIFICANT CHANGE UP (ref 4–33)
APTT BLD: 33.2 SEC — SIGNIFICANT CHANGE UP (ref 27.5–37.4)
AST SERPL-CCNC: 28 U/L — SIGNIFICANT CHANGE UP (ref 4–32)
BASOPHILS # BLD AUTO: 0.04 K/UL — SIGNIFICANT CHANGE UP (ref 0–0.2)
BASOPHILS NFR BLD AUTO: 0.8 % — SIGNIFICANT CHANGE UP (ref 0–2)
BILIRUB SERPL-MCNC: 0.4 MG/DL — SIGNIFICANT CHANGE UP (ref 0.2–1.2)
BUN SERPL-MCNC: 9 MG/DL — SIGNIFICANT CHANGE UP (ref 7–23)
CALCIUM SERPL-MCNC: 9.1 MG/DL — SIGNIFICANT CHANGE UP (ref 8.4–10.5)
CHLORIDE SERPL-SCNC: 104 MMOL/L — SIGNIFICANT CHANGE UP (ref 98–107)
CO2 SERPL-SCNC: 24 MMOL/L — SIGNIFICANT CHANGE UP (ref 22–31)
CREAT SERPL-MCNC: 0.71 MG/DL — SIGNIFICANT CHANGE UP (ref 0.5–1.3)
EOSINOPHIL # BLD AUTO: 0.24 K/UL — SIGNIFICANT CHANGE UP (ref 0–0.5)
EOSINOPHIL NFR BLD AUTO: 4.7 % — SIGNIFICANT CHANGE UP (ref 0–6)
GLUCOSE SERPL-MCNC: 74 MG/DL — SIGNIFICANT CHANGE UP (ref 70–99)
HCT VFR BLD CALC: 40.5 % — SIGNIFICANT CHANGE UP (ref 34.5–45)
HGB BLD-MCNC: 13.3 G/DL — SIGNIFICANT CHANGE UP (ref 11.5–15.5)
IMM GRANULOCYTES NFR BLD AUTO: 0.2 % — SIGNIFICANT CHANGE UP (ref 0–1.5)
INR BLD: 1.82 RATIO — HIGH (ref 0.88–1.16)
INR BLD: 1.96 — HIGH (ref 0.88–1.17)
LYMPHOCYTES # BLD AUTO: 0.94 K/UL — LOW (ref 1–3.3)
LYMPHOCYTES # BLD AUTO: 18.3 % — SIGNIFICANT CHANGE UP (ref 13–44)
MCHC RBC-ENTMCNC: 30.9 PG — SIGNIFICANT CHANGE UP (ref 27–34)
MCHC RBC-ENTMCNC: 32.8 % — SIGNIFICANT CHANGE UP (ref 32–36)
MCV RBC AUTO: 94 FL — SIGNIFICANT CHANGE UP (ref 80–100)
MONOCYTES # BLD AUTO: 0.63 K/UL — SIGNIFICANT CHANGE UP (ref 0–0.9)
MONOCYTES NFR BLD AUTO: 12.2 % — SIGNIFICANT CHANGE UP (ref 2–14)
NEUTROPHILS # BLD AUTO: 3.29 K/UL — SIGNIFICANT CHANGE UP (ref 1.8–7.4)
NEUTROPHILS NFR BLD AUTO: 63.8 % — SIGNIFICANT CHANGE UP (ref 43–77)
PLATELET # BLD AUTO: 328 K/UL — SIGNIFICANT CHANGE UP (ref 150–400)
PMV BLD: 9.9 FL — SIGNIFICANT CHANGE UP (ref 7–13)
POTASSIUM SERPL-MCNC: 3.6 MMOL/L — SIGNIFICANT CHANGE UP (ref 3.5–5.3)
POTASSIUM SERPL-SCNC: 3.6 MMOL/L — SIGNIFICANT CHANGE UP (ref 3.5–5.3)
PROT SERPL-MCNC: 6.9 G/DL — SIGNIFICANT CHANGE UP (ref 6–8.3)
PROTHROM AB SERPL-ACNC: 20.8 SEC — HIGH (ref 10–13.1)
PROTHROM AB SERPL-ACNC: 22.3 SEC — HIGH (ref 9.8–13.1)
RBC # BLD: 4.31 M/UL — SIGNIFICANT CHANGE UP (ref 3.8–5.2)
RBC # FLD: 12.9 % — SIGNIFICANT CHANGE UP (ref 10.3–14.5)
SODIUM SERPL-SCNC: 142 MMOL/L — SIGNIFICANT CHANGE UP (ref 135–145)
WBC # BLD: 5.15 K/UL — SIGNIFICANT CHANGE UP (ref 3.8–10.5)
WBC # FLD AUTO: 5.15 K/UL — SIGNIFICANT CHANGE UP (ref 3.8–10.5)

## 2017-04-25 PROCEDURE — 85610 PROTHROMBIN TIME: CPT

## 2017-04-25 PROCEDURE — 71020: CPT | Mod: 26

## 2017-04-25 PROCEDURE — 99285 EMERGENCY DEPT VISIT HI MDM: CPT

## 2017-04-25 RX ORDER — OXYCODONE HYDROCHLORIDE 5 MG/1
1 TABLET ORAL
Qty: 12 | Refills: 0 | OUTPATIENT
Start: 2017-04-25 | End: 2017-04-28

## 2017-04-25 RX ORDER — OXYCODONE HYDROCHLORIDE 5 MG/1
10 TABLET ORAL ONCE
Qty: 0 | Refills: 0 | Status: DISCONTINUED | OUTPATIENT
Start: 2017-04-25 | End: 2017-04-25

## 2017-04-25 RX ORDER — OXYCODONE HYDROCHLORIDE 5 MG/1
1 TABLET ORAL
Qty: 12 | Refills: 0 | OUTPATIENT
Start: 2017-04-25

## 2017-04-25 RX ADMIN — OXYCODONE HYDROCHLORIDE 10 MILLIGRAM(S): 5 TABLET ORAL at 19:43

## 2017-04-25 RX ADMIN — OXYCODONE HYDROCHLORIDE 10 MILLIGRAM(S): 5 TABLET ORAL at 16:54

## 2017-04-25 RX ADMIN — OXYCODONE HYDROCHLORIDE 10 MILLIGRAM(S): 5 TABLET ORAL at 17:00

## 2017-04-25 NOTE — ED POST DISCHARGE NOTE - RESULT SUMMARY
I rec'd a call from [employer] regarding this patient; pharmacy hung up as I was speaking to [employer] rep; rep provided me with pharmacy info ([facility] [phone]).  I called [facility] and spoke with pharmacist; issue is that Rx was written for Oxaydo, this brand name not available in pharmacy; pharmacist requesting Rx be sent for generic equivalent, oxycodone.  Rx for oxycodone 5 mg: One tab every 6 hours as needed for pain (MDD: Four); Dispense # 12 (same quantity as Rx written by LUIS MIGUEL Martinez) was sent.

## 2017-04-25 NOTE — ED ADULT NURSE NOTE - OBJECTIVE STATEMENT
Pt A+OX4 c/o R mid and lower back pain.  S/P drainage of pleural effusion with 2 sutures in placed to closed wound on upper R flank.  Denies CP/SOB/palpitations.  Hx A fib-Afib on monitor.  Pt crying and very emotional.  States she was recently fired from job and has been very ill.  Emotional support given.  CM placed.  EKG done.  Labs obtained and sent as ordered.  #20g SL L AC placed.  Med given as ordered

## 2017-04-25 NOTE — ED PROVIDER NOTE - PMH
Atrial fibrillation    Cardiomyopathy, hypertrophic    Chronic atrial fibrillation    Gastritis and duodenitis    Gastritis and duodenitis  Mild - dx via EGD 2017  HF (heart failure), diastolic  Grade II DD  HOCM (hypertrophic obstructive cardiomyopathy)  s/p septal ablation  Mitral valve insufficiency, unspecified etiology  Moderate  Pleural effusion  Right parapneumonic drained twice 3/2017 at [facility]  Pneumonia  RML & RLL at [facility] 3/2017  Pulmonary hypertension  Moderate

## 2017-04-25 NOTE — ED PROVIDER NOTE - OBJECTIVE STATEMENT
60 y/o female pmh cardiomyopathy, afib on coumadin, chf, gastritis, pleural effusion c/o R chest pain x 2days. Pt was dc'd from [facility] x1 week ago after being admitted for PNA and pleural effusion. Pt had decortication and mechanical pleurodesis with pleural biopsy. Pt was dc'd and ding well on Oxycodone for pain control. Pt admits to finishing her oxy x3-4 days ago and taking tramadol instead which has not been helping. Pt admits to severe pain at sight of chest tube. Pt went to GI today who pulled up pts results and saw that pleural biopsy was + for b-cell lymphoma. Pt became very scared and presented to ER. Pt denies sob, palpitations, diaphoresis, n/v/d, numbness, tingling, weakness, dizziness, syncope, fever or chills.

## 2017-04-25 NOTE — ED PROVIDER NOTE - PROGRESS NOTE DETAILS
LUIS MIGUEL Martinez- Spoke with CT surg PA who is comfortable with pt dc. pt has f/u with Dr. Solomon in 5 days. stable for dc

## 2017-04-25 NOTE — ED PROVIDER NOTE - PHYSICAL EXAMINATION
R lateral chest wall- 2 healing chest tube wounds w/ stitches in place, no erythema, no tenderness, no discharge.

## 2017-04-25 NOTE — ED CLERICAL - NS ED CARE COORDINATION INFORMATION
This patient is currently enrolled in an outpatient care management program (Transitional Care Management program) available through [employer]. This program can coordinate outpatient follow up and assist the patient in accessing a variety of outpatient resources.  If discharged from the ED, the patient will be contacted to see if any additional resources are needed. Please call the Nurse Clinical Call Center at [phone] with any questions or for assistance in discharge planning.

## 2017-04-25 NOTE — ED PROVIDER NOTE - ATTENDING CONTRIBUTION TO CARE
DR. PLEITEZ, ATTENDING MD-  I performed a face to face bedside interview with patient regarding history of present illness, review of symptoms and past medical history. I completed an independent physical exam.  I have discussed patient's plan of care with PA.   Documentation as above in the note.    My exam: nad, lungs clear

## 2017-04-26 LAB
CULTURE RESULTS: SIGNIFICANT CHANGE UP
SPECIMEN SOURCE: SIGNIFICANT CHANGE UP

## 2017-05-01 ENCOUNTER — APPOINTMENT (OUTPATIENT)
Dept: THORACIC SURGERY | Facility: CLINIC | Age: 62
End: 2017-05-01

## 2017-05-01 DIAGNOSIS — C85.10 UNSPECIFIED B-CELL LYMPHOMA, UNSPECIFIED SITE: ICD-10-CM

## 2017-05-01 DIAGNOSIS — G89.18 OTHER ACUTE POSTPROCEDURAL PAIN: ICD-10-CM

## 2017-05-03 LAB — FUNGUS SPEC QL CULT: SIGNIFICANT CHANGE UP

## 2017-05-16 LAB — ACID FAST STN SPEC: SIGNIFICANT CHANGE UP

## 2017-05-18 ENCOUNTER — RX RENEWAL (OUTPATIENT)
Age: 62
End: 2017-05-18

## 2017-06-01 ENCOUNTER — OUTPATIENT (OUTPATIENT)
Dept: OUTPATIENT SERVICES | Facility: HOSPITAL | Age: 62
LOS: 1 days | End: 2017-06-01
Payer: COMMERCIAL

## 2017-06-01 DIAGNOSIS — Z98.890 OTHER SPECIFIED POSTPROCEDURAL STATES: Chronic | ICD-10-CM

## 2017-06-01 DIAGNOSIS — J18.9 PNEUMONIA, UNSPECIFIED ORGANISM: ICD-10-CM

## 2017-06-01 DIAGNOSIS — Z98.891 HISTORY OF UTERINE SCAR FROM PREVIOUS SURGERY: Chronic | ICD-10-CM

## 2017-06-01 PROCEDURE — 71020: CPT | Mod: 26

## 2017-06-01 PROCEDURE — 71046 X-RAY EXAM CHEST 2 VIEWS: CPT

## 2017-06-03 ENCOUNTER — TRANSCRIPTION ENCOUNTER (OUTPATIENT)
Age: 62
End: 2017-06-03

## 2017-06-05 ENCOUNTER — APPOINTMENT (OUTPATIENT)
Dept: THORACIC SURGERY | Facility: CLINIC | Age: 62
End: 2017-06-05

## 2017-06-06 ENCOUNTER — OUTPATIENT (OUTPATIENT)
Dept: OUTPATIENT SERVICES | Facility: HOSPITAL | Age: 62
LOS: 1 days | End: 2017-06-06
Payer: COMMERCIAL

## 2017-06-06 DIAGNOSIS — Z98.890 OTHER SPECIFIED POSTPROCEDURAL STATES: Chronic | ICD-10-CM

## 2017-06-06 DIAGNOSIS — Z98.891 HISTORY OF UTERINE SCAR FROM PREVIOUS SURGERY: Chronic | ICD-10-CM

## 2017-06-06 PROCEDURE — A9552: CPT

## 2017-06-06 PROCEDURE — 78815 PET IMAGE W/CT SKULL-THIGH: CPT

## 2017-06-06 PROCEDURE — 78815 PET IMAGE W/CT SKULL-THIGH: CPT | Mod: 26

## 2017-06-12 ENCOUNTER — TRANSCRIPTION ENCOUNTER (OUTPATIENT)
Age: 62
End: 2017-06-12

## 2017-06-12 ENCOUNTER — RX RENEWAL (OUTPATIENT)
Age: 62
End: 2017-06-12

## 2017-06-18 ENCOUNTER — INPATIENT (INPATIENT)
Facility: HOSPITAL | Age: 62
LOS: 1 days | Discharge: HOME | End: 2017-06-20
Attending: INTERNAL MEDICINE | Admitting: INTERNAL MEDICINE
Payer: COMMERCIAL

## 2017-06-18 VITALS
TEMPERATURE: 98 F | DIASTOLIC BLOOD PRESSURE: 98 MMHG | OXYGEN SATURATION: 94 % | HEART RATE: 81 BPM | RESPIRATION RATE: 28 BRPM | SYSTOLIC BLOOD PRESSURE: 141 MMHG

## 2017-06-18 DIAGNOSIS — Z98.891 HISTORY OF UTERINE SCAR FROM PREVIOUS SURGERY: Chronic | ICD-10-CM

## 2017-06-18 DIAGNOSIS — E87.70 FLUID OVERLOAD, UNSPECIFIED: ICD-10-CM

## 2017-06-18 DIAGNOSIS — C34.90 MALIGNANT NEOPLASM OF UNSPECIFIED PART OF UNSPECIFIED BRONCHUS OR LUNG: ICD-10-CM

## 2017-06-18 DIAGNOSIS — I10 ESSENTIAL (PRIMARY) HYPERTENSION: ICD-10-CM

## 2017-06-18 DIAGNOSIS — Z29.9 ENCOUNTER FOR PROPHYLACTIC MEASURES, UNSPECIFIED: ICD-10-CM

## 2017-06-18 DIAGNOSIS — Z98.890 OTHER SPECIFIED POSTPROCEDURAL STATES: Chronic | ICD-10-CM

## 2017-06-18 DIAGNOSIS — I48.91 UNSPECIFIED ATRIAL FIBRILLATION: ICD-10-CM

## 2017-06-18 DIAGNOSIS — E78.5 HYPERLIPIDEMIA, UNSPECIFIED: ICD-10-CM

## 2017-06-18 DIAGNOSIS — K21.9 GASTRO-ESOPHAGEAL REFLUX DISEASE WITHOUT ESOPHAGITIS: ICD-10-CM

## 2017-06-18 DIAGNOSIS — I50.33 ACUTE ON CHRONIC DIASTOLIC (CONGESTIVE) HEART FAILURE: ICD-10-CM

## 2017-06-18 DIAGNOSIS — J90 PLEURAL EFFUSION, NOT ELSEWHERE CLASSIFIED: ICD-10-CM

## 2017-06-18 LAB
ALBUMIN SERPL ELPH-MCNC: 4.5 G/DL — SIGNIFICANT CHANGE UP (ref 3.3–5)
ALP SERPL-CCNC: 71 U/L — SIGNIFICANT CHANGE UP (ref 40–120)
ALT FLD-CCNC: 35 U/L — HIGH (ref 4–33)
APTT BLD: 29.8 SEC — SIGNIFICANT CHANGE UP (ref 27.5–37.4)
AST SERPL-CCNC: 38 U/L — HIGH (ref 4–32)
BASOPHILS # BLD AUTO: 0.06 K/UL — SIGNIFICANT CHANGE UP (ref 0–0.2)
BASOPHILS NFR BLD AUTO: 1.1 % — SIGNIFICANT CHANGE UP (ref 0–2)
BILIRUB SERPL-MCNC: 0.5 MG/DL — SIGNIFICANT CHANGE UP (ref 0.2–1.2)
BUN SERPL-MCNC: 20 MG/DL — SIGNIFICANT CHANGE UP (ref 7–23)
CALCIUM SERPL-MCNC: 9.3 MG/DL — SIGNIFICANT CHANGE UP (ref 8.4–10.5)
CHLORIDE SERPL-SCNC: 105 MMOL/L — SIGNIFICANT CHANGE UP (ref 98–107)
CK MB BLD-MCNC: 7.38 NG/ML — HIGH (ref 1–4.7)
CK MB BLD-MCNC: 9.59 NG/ML — HIGH (ref 1–4.7)
CK SERPL-CCNC: 212 U/L — HIGH (ref 25–170)
CK SERPL-CCNC: 268 U/L — HIGH (ref 25–170)
CO2 SERPL-SCNC: 19 MMOL/L — LOW (ref 22–31)
CREAT SERPL-MCNC: 0.85 MG/DL — SIGNIFICANT CHANGE UP (ref 0.5–1.3)
DIGOXIN SERPL-MCNC: 0.3 NG/ML — LOW (ref 0.8–2)
EOSINOPHIL # BLD AUTO: 0.21 K/UL — SIGNIFICANT CHANGE UP (ref 0–0.5)
EOSINOPHIL NFR BLD AUTO: 3.8 % — SIGNIFICANT CHANGE UP (ref 0–6)
GLUCOSE SERPL-MCNC: 102 MG/DL — HIGH (ref 70–99)
HCT VFR BLD CALC: 40.3 % — SIGNIFICANT CHANGE UP (ref 34.5–45)
HGB BLD-MCNC: 13.1 G/DL — SIGNIFICANT CHANGE UP (ref 11.5–15.5)
IMM GRANULOCYTES NFR BLD AUTO: 0.2 % — SIGNIFICANT CHANGE UP (ref 0–1.5)
INR BLD: 1.25 — HIGH (ref 0.88–1.17)
LYMPHOCYTES # BLD AUTO: 0.72 K/UL — LOW (ref 1–3.3)
LYMPHOCYTES # BLD AUTO: 13.1 % — SIGNIFICANT CHANGE UP (ref 13–44)
MCHC RBC-ENTMCNC: 31.2 PG — SIGNIFICANT CHANGE UP (ref 27–34)
MCHC RBC-ENTMCNC: 32.5 % — SIGNIFICANT CHANGE UP (ref 32–36)
MCV RBC AUTO: 96 FL — SIGNIFICANT CHANGE UP (ref 80–100)
MONOCYTES # BLD AUTO: 0.56 K/UL — SIGNIFICANT CHANGE UP (ref 0–0.9)
MONOCYTES NFR BLD AUTO: 10.2 % — SIGNIFICANT CHANGE UP (ref 2–14)
NEUTROPHILS # BLD AUTO: 3.94 K/UL — SIGNIFICANT CHANGE UP (ref 1.8–7.4)
NEUTROPHILS NFR BLD AUTO: 71.6 % — SIGNIFICANT CHANGE UP (ref 43–77)
NT-PROBNP SERPL-SCNC: 4044 PG/ML — SIGNIFICANT CHANGE UP
PLATELET # BLD AUTO: 232 K/UL — SIGNIFICANT CHANGE UP (ref 150–400)
PMV BLD: 9.9 FL — SIGNIFICANT CHANGE UP (ref 7–13)
POTASSIUM SERPL-MCNC: 4.1 MMOL/L — SIGNIFICANT CHANGE UP (ref 3.5–5.3)
POTASSIUM SERPL-SCNC: 4.1 MMOL/L — SIGNIFICANT CHANGE UP (ref 3.5–5.3)
PROT SERPL-MCNC: 6.9 G/DL — SIGNIFICANT CHANGE UP (ref 6–8.3)
PROTHROM AB SERPL-ACNC: 14.1 SEC — HIGH (ref 9.8–13.1)
RBC # BLD: 4.2 M/UL — SIGNIFICANT CHANGE UP (ref 3.8–5.2)
RBC # FLD: 16.3 % — HIGH (ref 10.3–14.5)
SODIUM SERPL-SCNC: 141 MMOL/L — SIGNIFICANT CHANGE UP (ref 135–145)
TROPONIN T SERPL-MCNC: < 0.06 NG/ML — SIGNIFICANT CHANGE UP (ref 0–0.06)
TROPONIN T SERPL-MCNC: < 0.06 NG/ML — SIGNIFICANT CHANGE UP (ref 0–0.06)
WBC # BLD: 5.5 K/UL — SIGNIFICANT CHANGE UP (ref 3.8–10.5)
WBC # FLD AUTO: 5.5 K/UL — SIGNIFICANT CHANGE UP (ref 3.8–10.5)

## 2017-06-18 PROCEDURE — 71010: CPT | Mod: 26

## 2017-06-18 PROCEDURE — 71275 CT ANGIOGRAPHY CHEST: CPT | Mod: 26

## 2017-06-18 RX ORDER — ASPIRIN/CALCIUM CARB/MAGNESIUM 324 MG
1 TABLET ORAL
Qty: 0 | Refills: 0 | COMMUNITY

## 2017-06-18 RX ORDER — FUROSEMIDE 40 MG
40 TABLET ORAL DAILY
Qty: 0 | Refills: 0 | Status: DISCONTINUED | OUTPATIENT
Start: 2017-06-19 | End: 2017-06-20

## 2017-06-18 RX ORDER — FUROSEMIDE 40 MG
40 TABLET ORAL EVERY 24 HOURS
Qty: 0 | Refills: 0 | Status: DISCONTINUED | OUTPATIENT
Start: 2017-06-18 | End: 2017-06-18

## 2017-06-18 RX ORDER — DIGOXIN 250 MCG
0.12 TABLET ORAL DAILY
Qty: 0 | Refills: 0 | Status: DISCONTINUED | OUTPATIENT
Start: 2017-06-18 | End: 2017-06-20

## 2017-06-18 RX ORDER — GABAPENTIN 400 MG/1
100 CAPSULE ORAL THREE TIMES A DAY
Qty: 0 | Refills: 0 | Status: DISCONTINUED | OUTPATIENT
Start: 2017-06-18 | End: 2017-06-20

## 2017-06-18 RX ORDER — IBUPROFEN 200 MG
400 TABLET ORAL EVERY 6 HOURS
Qty: 0 | Refills: 0 | Status: DISCONTINUED | OUTPATIENT
Start: 2017-06-18 | End: 2017-06-20

## 2017-06-18 RX ORDER — ASPIRIN/CALCIUM CARB/MAGNESIUM 324 MG
81 TABLET ORAL DAILY
Qty: 0 | Refills: 0 | Status: DISCONTINUED | OUTPATIENT
Start: 2017-06-18 | End: 2017-06-20

## 2017-06-18 RX ORDER — ATORVASTATIN CALCIUM 80 MG/1
40 TABLET, FILM COATED ORAL AT BEDTIME
Qty: 0 | Refills: 0 | Status: DISCONTINUED | OUTPATIENT
Start: 2017-06-18 | End: 2017-06-20

## 2017-06-18 RX ORDER — OXYCODONE HYDROCHLORIDE 5 MG/1
10 TABLET ORAL EVERY 4 HOURS
Qty: 0 | Refills: 0 | Status: DISCONTINUED | OUTPATIENT
Start: 2017-06-18 | End: 2017-06-19

## 2017-06-18 RX ORDER — WARFARIN SODIUM 2.5 MG/1
5 TABLET ORAL ONCE
Qty: 0 | Refills: 0 | Status: DISCONTINUED | OUTPATIENT
Start: 2017-06-18 | End: 2017-06-18

## 2017-06-18 RX ORDER — PANTOPRAZOLE SODIUM 20 MG/1
40 TABLET, DELAYED RELEASE ORAL
Qty: 0 | Refills: 0 | Status: DISCONTINUED | OUTPATIENT
Start: 2017-06-18 | End: 2017-06-20

## 2017-06-18 RX ORDER — OXYCODONE HYDROCHLORIDE 5 MG/1
10 TABLET ORAL ONCE
Qty: 0 | Refills: 0 | Status: DISCONTINUED | OUTPATIENT
Start: 2017-06-18 | End: 2017-06-18

## 2017-06-18 RX ORDER — FUROSEMIDE 40 MG
40 TABLET ORAL ONCE
Qty: 0 | Refills: 0 | Status: COMPLETED | OUTPATIENT
Start: 2017-06-18 | End: 2017-06-18

## 2017-06-18 RX ORDER — ENOXAPARIN SODIUM 100 MG/ML
70 INJECTION SUBCUTANEOUS EVERY 12 HOURS
Qty: 0 | Refills: 0 | Status: DISCONTINUED | OUTPATIENT
Start: 2017-06-18 | End: 2017-06-20

## 2017-06-18 RX ADMIN — Medication 40 MILLIGRAM(S): at 06:04

## 2017-06-18 RX ADMIN — OXYCODONE HYDROCHLORIDE 10 MILLIGRAM(S): 5 TABLET ORAL at 15:49

## 2017-06-18 RX ADMIN — ENOXAPARIN SODIUM 70 MILLIGRAM(S): 100 INJECTION SUBCUTANEOUS at 06:29

## 2017-06-18 RX ADMIN — PANTOPRAZOLE SODIUM 40 MILLIGRAM(S): 20 TABLET, DELAYED RELEASE ORAL at 17:50

## 2017-06-18 RX ADMIN — OXYCODONE HYDROCHLORIDE 10 MILLIGRAM(S): 5 TABLET ORAL at 19:37

## 2017-06-18 RX ADMIN — GABAPENTIN 100 MILLIGRAM(S): 400 CAPSULE ORAL at 14:53

## 2017-06-18 RX ADMIN — ATORVASTATIN CALCIUM 40 MILLIGRAM(S): 80 TABLET, FILM COATED ORAL at 22:07

## 2017-06-18 RX ADMIN — OXYCODONE HYDROCHLORIDE 10 MILLIGRAM(S): 5 TABLET ORAL at 10:21

## 2017-06-18 RX ADMIN — OXYCODONE HYDROCHLORIDE 10 MILLIGRAM(S): 5 TABLET ORAL at 15:19

## 2017-06-18 RX ADMIN — ENOXAPARIN SODIUM 70 MILLIGRAM(S): 100 INJECTION SUBCUTANEOUS at 17:50

## 2017-06-18 RX ADMIN — OXYCODONE HYDROCHLORIDE 10 MILLIGRAM(S): 5 TABLET ORAL at 11:14

## 2017-06-18 RX ADMIN — GABAPENTIN 100 MILLIGRAM(S): 400 CAPSULE ORAL at 22:07

## 2017-06-18 RX ADMIN — OXYCODONE HYDROCHLORIDE 10 MILLIGRAM(S): 5 TABLET ORAL at 20:20

## 2017-06-18 RX ADMIN — Medication 0.12 MILLIGRAM(S): at 10:21

## 2017-06-18 RX ADMIN — Medication 81 MILLIGRAM(S): at 14:52

## 2017-06-18 NOTE — ED PROVIDER NOTE - PMH
Atrial fibrillation    Cardiomyopathy, hypertrophic    Chronic atrial fibrillation    Gastritis and duodenitis    Gastritis and duodenitis  Mild - dx via EGD 2017  HF (heart failure), diastolic  Grade II DD  HOCM (hypertrophic obstructive cardiomyopathy)  s/p septal ablation  Mitral valve insufficiency, unspecified etiology  Moderate  Pleural effusion  Right parapneumonic drained twice 3/2017 at [facility]  Pneumonia  RML & RLL at [facility] 3/2017  Pulmonary hypertension  Moderate

## 2017-06-18 NOTE — ED PROVIDER NOTE - PROGRESS NOTE DETAILS
D/w pt's cardiologist (Dr. Keene), accepted for admission for SOB possibly 2/2 malignant effusion vs fluid overload. Will give 40mg lasix. tele pa signout given.

## 2017-06-18 NOTE — ED ADULT NURSE REASSESSMENT NOTE - NS ED NURSE REASSESS COMMENT FT1
pt. c/o pain on rt upper back, also states feeling SOB, MD Carp aware. Lasix was given as ordered. pt. prefers to dangle her legs, pt. repositioned and sat on the stretcher at this time. pt. admitted, awaits bed. will continue to monitor.

## 2017-06-18 NOTE — ED ADULT NURSE NOTE - FAMILY HISTORY
<<-----Click on this checkbox to enter Family History Family history of liver cancer     No pertinent family history in first degree relatives     Sibling  Still living? Unknown  Family history of hypertrophic cardiomyopathy, Age at diagnosis: Age Unknown

## 2017-06-18 NOTE — H&P ADULT - NSHPLABSRESULTS_GEN_ALL_CORE
EKG: Atrial fibrillation at 96 bpm, TWI I, AVL  CE x1: Trop negative,   CXR: Increased right pleural effusion with adjacent right basilar consolidative changes including possible pneumonia in the proper clinical context. Strand-like opacities in peripheral left mid to lower lung near cardiac apex compatible with subsegmental atelectatic change. No left pleural effusion. Clear remaining visualized lungs. No pneumothorax. Cardiomegaly. Faint aortic arch calcifications. Trachea midline. Generalized osteopenia again noted.   ProBNP: 4044  AST/ALT: 38/35    Echo, March 2017: Moderate mitral regurgitation. Severe left atrial enlargement. Mild concentric left ventricular hypertrophy. Normal left ventricular systolic function. EF 55%. Grade II diastolic dysfunction. RV systolic pressure is moderately increased (PASP 46mm Hg). There is moderate tricuspid regurgitation.

## 2017-06-18 NOTE — ED PROVIDER NOTE - OBJECTIVE STATEMENT
62F PMH HOCM s/p septal ablation, afib (on coumadin), diastolic HF, recently diagnosed with lung CA but has not yet started chemo p/w 3 days gradually worsening SOB, nonexertional nonradiating chest pain. No fever/cough/chills. Reports feelings very short of breath. No palpitations. Pt tried warm shower, albuterol inhalers at home no improvement. Also notes 2 days b/l LE edema (unusual for her). Woke up tonight with worse SOB with nausea and sweating.    Cards: Dr. Allen Keene  Onc: [facility]

## 2017-06-18 NOTE — H&P ADULT - ATTENDING COMMENTS
Patient seen and examined.    Allen Keene MD  [id number] [id number] [phone]

## 2017-06-18 NOTE — ED ADULT TRIAGE NOTE - CHIEF COMPLAINT QUOTE
pt c/o increasing SOB and R sided CP x 1 week, dx w/ lung CA a few weeks ago, not yet started on chemo.  Pt. tearful, tachypneic and unable to complete sentences in triage.  PMHx afib, mitral valve insufficiency, pulmonary hypertension, pleural effusion.

## 2017-06-18 NOTE — H&P ADULT - NEUROLOGICAL DETAILS
responds to verbal commands/cranial nerves intact/sensation intact/responds to pain/normal strength/alert and oriented x 3

## 2017-06-18 NOTE — H&P ADULT - NEGATIVE OPHTHALMOLOGIC SYMPTOMS
no pain R/no loss of vision R/no blurred vision R/no loss of vision L/no diplopia/no pain L/no photophobia

## 2017-06-18 NOTE — ED ADULT NURSE NOTE - OBJECTIVE STATEMENT
pt. came in c/o worsening SOB tonight. as per pt, she was in the hospital for pneumonia a month ago and since then has been having intermittent SOB but tonight it got worse. pt. states she was diagnosed with early stage of lung CA, not on chemo. pt. also c/o rt sided chest pain which she said as per her oncologist is related to the CA. pt. tachypneic, anxious and tearful, satting 90-91% on RA, hooked on 2L O2 via nc as per MD Mcgrath. noted (+) pitting edema on b/l feet started 2 days ago. pt. with hx of afib, HF. denies any palpitations/fever. pt. states she tried to use albuterol and warm shower but no help. pt. seen  by MD. labs sent. g20 saline lock inserted on rt ac with no ss of infiltration. x-ray done. awaits further eval.

## 2017-06-18 NOTE — H&P ADULT - PMH
AF (atrial fibrillation)  On Coumadin  CAP (community acquired pneumonia)  RML/RLL at [facility] 3/2017  Chronic diastolic (congestive) heart failure    GERD (gastroesophageal reflux disease)    GERD (gastroesophageal reflux disease)    HLD (hyperlipidemia)    HOCM (hypertrophic obstructive cardiomyopathy)  S/P septal ablation  HTN (hypertension)    Lung cancer    MR (mitral regurgitation)    Pleural effusion  Right parapneumonic drained twice 3/2017 at [facility]

## 2017-06-18 NOTE — H&P ADULT - HISTORY OF PRESENT ILLNESS
61 y/o female with a PMHx of atrial fibrillation on Coumadin, chronic grade II diastolic dysfunction with EF 55%, HOCM S/P septal ablation, recent admission for PNA complicated by newly diagnosed lung cancer (S/P right VATS, partial decortication, mechanical pleurodesis, and drainage of 1L of fluid by CT surgery in April 2017), moderate MR, HTN, HLD, and GERD presents to ED with worsening dyspnea on exertion for the past three days. 61 y/o female with a PMHx of atrial fibrillation on Coumadin, chronic grade II diastolic dysfunction with EF 55%, HOCM S/P septal ablation, recent admission for PNA complicated by newly diagnosed lung cancer (S/P right VATS, partial decortication, mechanical pleurodesis, and drainage of 1L of fluid by CT surgery in April 2017), moderate MR, HTN, HLD, and GERD presents to ED with worsening dyspnea on exertion for the past three days. Pt reports that she has noticed shortness of breath at rest, which is worsened on minimal exertion. Pt also admits to new bilateral lower extremity edema, which she has never experienced before, and orthopnea. Pt states that she woke up early this morning with shortness of breath, diaphoresis and nausea; she says that feeling actually woke her up from her sleep, which is why she presented to the hospital today. Pt says that she has not been assessed for need for home oxygen since she was diagnosed with the lung cancer. She takes Albuterol PRN for shortness of breath, with minimal to no relief. On a side note, pt has been prescribed Oxycodone for her chronic pain from the lung cancer. Pt has been experiencing worsening right breast pain and upper back pain, which are chronic symptoms that have worsened over the past couple of days. Pt denies fever, chills, recent travel, headache, dizziness, visual deficits, chest pain, palpitations, abdominal pain, V/D/C, hematochezia, melena, dysuria, hematuria, LOC, syncope. Upon arrival to ED, EKG: Atrial fibrillation at 96 bpm, TWI I, AVL. CE x1: Trop negative, . CXR: Increased right pleural effusion with adjacent right basilar consolidative changes including possible pneumonia in the proper clinical context. Strand-like opacities in peripheral left mid to lower lung near cardiac apex compatible with subsegmental atelectatic change. No left pleural effusion. Clear remaining visualized lungs. No pneumothorax. Cardiomegaly. Faint aortic arch calcifications. Trachea midline. Generalized osteopenia again noted. ProBNP: 4044. AST/ALT: 38/35. Pt admitted to telemetry.

## 2017-06-18 NOTE — H&P ADULT - NSHPSOCIALHISTORY_GEN_ALL_CORE
. Lives with son and daughter. Former 40 pack year smoker (quit 3 months ago when she was in the hospital for PNA and new diagnosis of lung cancer). Works as a .

## 2017-06-18 NOTE — ED ADULT NURSE NOTE - PMH
Atrial fibrillation    Cardiomyopathy, hypertrophic    Chronic atrial fibrillation    Gastritis and duodenitis    Gastritis and duodenitis  Mild - dx via EGD 2017  HF (heart failure), diastolic  Grade II DD  HOCM (hypertrophic obstructive cardiomyopathy)  s/p septal ablation  Mitral valve insufficiency, unspecified etiology  Moderate  Pleural effusion  Right parapneumonic drained twice 3/2017 at [facility]  Pneumonia  RML & RLL at [facility] 3/2017  Pulmonary hypertension  Moderate

## 2017-06-18 NOTE — H&P ADULT - PROBLEM SELECTOR PLAN 4
Consider oncology and pulm consults if clinically indicated  No chemo/radiation at this time  Oxycodone PRN for pain; pt refuses Morphine

## 2017-06-18 NOTE — ED PROVIDER NOTE - MEDICAL DECISION MAKING DETAILS
Minerva: Middle-age woman with recent diagnosis of lung cancer and pneumonia, also a history of atrial fib on warfarin, presents with slowly-progressive shortness of breath and leg swelling. No chest pain or cough. Appears in mild respiratory discomfort. Heart rate 100 to 110 bpm. 2+ bilateral symmetrical, non-erythematous, non-tender lower extremity edema. Lungs clear. ED bedside ultrasound notable for bilateral the lines. Concern for congestive heart failure. Consider also pneumonia, cancer-related pleural effusion, or, less likely, pulmonary embolism. Will check labs and x-ray and likely admission.

## 2017-06-18 NOTE — ED PROVIDER NOTE - PSH
H/O cardiac radiofrequency ablation    H/O cardiac radiofrequency ablation  for HOCM  History of   15 years ago

## 2017-06-18 NOTE — H&P ADULT - NEGATIVE GASTROINTESTINAL SYMPTOMS
no abdominal pain/no hematochezia/no melena/no constipation/no change in bowel habits/no diarrhea/no flatulence/no vomiting

## 2017-06-18 NOTE — H&P ADULT - ASSESSMENT
61 y/o female with a PMHx of atrial fibrillation on Coumadin, chronic grade II diastolic dysfunction with EF 55%, HOCM S/P septal ablation, recent admission for PNA complicated by newly diagnosed lung cancer (S/P right VATS, partial decortication, mechanical pleurodesis, and drainage of 1L of fluid by CT surgery in April 2017), moderate MR, HTN, HLD, and GERD presents to ED with dyspnea on exertion, PND, orthopnea and lower extremity edema, in the setting of acute on chronic diastolic heart failure, complicated by rapid atrial fibrillation and a right loculated pleural effusion.

## 2017-06-18 NOTE — H&P ADULT - PROBLEM SELECTOR PLAN 1
Admit to telemetry, serial cardiac enzymes, serial EKGs  Check CBC, CMP, TSH, FLP, HgA1C, BNP  Start Lasix 40mg IVP daily  Oxygen PRN  Strict I&Os, monitor daily weights, 1500 cc fluid restriction, sodium restriction  Echocardiogram ordered  PT eval ordered  F/U MD note

## 2017-06-18 NOTE — H&P ADULT - PROBLEM SELECTOR PLAN 8
Pt already therapeutically anticoagulated  Hold Coumadin for possible tap; on Lovenox for now to cover for AF

## 2017-06-18 NOTE — ED PROVIDER NOTE - ATTENDING CONTRIBUTION TO CARE
I performed a face-to-face evaluation of the patient and performed a history and physical examination. I agree with the history and physical examination.       Middle-age woman with recent diagnosis of lung cancer and pneumonia, also a history of atrial fib on warfarin, presents with slowly-progressive shortness of breath and leg swelling. No chest pain or cough. Appears in mild respiratory discomfort. Heart rate 100 to 110 bpm. 2+ bilateral symmetrical, non-erythematous, non-tender lower extremity edema. Lungs clear. ED bedside ultrasound notable for bilateral the lines. Concern for congestive heart failure. Consider also pneumonia, cancer-related pleural effusion, or, less likely, pulmonary embolism. Will check labs and x-ray and likely admission.

## 2017-06-18 NOTE — H&P ADULT - NEGATIVE NEUROLOGICAL SYMPTOMS
no focal seizures/no vertigo/no loss of consciousness/no hemiparesis/no syncope/no headache/no confusion/no weakness/no loss of sensation/no transient paralysis/no paresthesias/no difficulty walking/no generalized seizures/no tremors

## 2017-06-19 LAB
BUN SERPL-MCNC: 17 MG/DL — SIGNIFICANT CHANGE UP (ref 7–23)
CALCIUM SERPL-MCNC: 8.8 MG/DL — SIGNIFICANT CHANGE UP (ref 8.4–10.5)
CHLORIDE SERPL-SCNC: 103 MMOL/L — SIGNIFICANT CHANGE UP (ref 98–107)
CHOLEST SERPL-MCNC: 141 MG/DL — SIGNIFICANT CHANGE UP (ref 120–199)
CO2 SERPL-SCNC: 24 MMOL/L — SIGNIFICANT CHANGE UP (ref 22–31)
CREAT SERPL-MCNC: 0.85 MG/DL — SIGNIFICANT CHANGE UP (ref 0.5–1.3)
GLUCOSE SERPL-MCNC: 92 MG/DL — SIGNIFICANT CHANGE UP (ref 70–99)
HBA1C BLD-MCNC: 5.6 % — SIGNIFICANT CHANGE UP (ref 4–5.6)
HCT VFR BLD CALC: 38.1 % — SIGNIFICANT CHANGE UP (ref 34.5–45)
HDLC SERPL-MCNC: 51 MG/DL — SIGNIFICANT CHANGE UP (ref 45–65)
HGB BLD-MCNC: 12.9 G/DL — SIGNIFICANT CHANGE UP (ref 11.5–15.5)
INR BLD: 1.28 — HIGH (ref 0.88–1.17)
LIPID PNL WITH DIRECT LDL SERPL: 75 MG/DL — SIGNIFICANT CHANGE UP
MAGNESIUM SERPL-MCNC: 1.9 MG/DL — SIGNIFICANT CHANGE UP (ref 1.6–2.6)
MCHC RBC-ENTMCNC: 32.1 PG — SIGNIFICANT CHANGE UP (ref 27–34)
MCHC RBC-ENTMCNC: 33.9 % — SIGNIFICANT CHANGE UP (ref 32–36)
MCV RBC AUTO: 94.8 FL — SIGNIFICANT CHANGE UP (ref 80–100)
PLATELET # BLD AUTO: 228 K/UL — SIGNIFICANT CHANGE UP (ref 150–400)
PMV BLD: 10.5 FL — SIGNIFICANT CHANGE UP (ref 7–13)
POTASSIUM SERPL-MCNC: 3.7 MMOL/L — SIGNIFICANT CHANGE UP (ref 3.5–5.3)
POTASSIUM SERPL-SCNC: 3.7 MMOL/L — SIGNIFICANT CHANGE UP (ref 3.5–5.3)
PROTHROM AB SERPL-ACNC: 14.4 SEC — HIGH (ref 9.8–13.1)
RBC # BLD: 4.02 M/UL — SIGNIFICANT CHANGE UP (ref 3.8–5.2)
RBC # FLD: 16.3 % — HIGH (ref 10.3–14.5)
SODIUM SERPL-SCNC: 141 MMOL/L — SIGNIFICANT CHANGE UP (ref 135–145)
TRIGL SERPL-MCNC: 105 MG/DL — SIGNIFICANT CHANGE UP (ref 10–149)
TSH SERPL-MCNC: 7.92 UIU/ML — HIGH (ref 0.27–4.2)
WBC # BLD: 5.7 K/UL — SIGNIFICANT CHANGE UP (ref 3.8–10.5)
WBC # FLD AUTO: 5.7 K/UL — SIGNIFICANT CHANGE UP (ref 3.8–10.5)

## 2017-06-19 RX ORDER — OXYCODONE HYDROCHLORIDE 5 MG/1
5 TABLET ORAL EVERY 4 HOURS
Qty: 0 | Refills: 0 | Status: DISCONTINUED | OUTPATIENT
Start: 2017-06-19 | End: 2017-06-20

## 2017-06-19 RX ADMIN — ENOXAPARIN SODIUM 70 MILLIGRAM(S): 100 INJECTION SUBCUTANEOUS at 19:02

## 2017-06-19 RX ADMIN — OXYCODONE HYDROCHLORIDE 10 MILLIGRAM(S): 5 TABLET ORAL at 10:45

## 2017-06-19 RX ADMIN — OXYCODONE HYDROCHLORIDE 10 MILLIGRAM(S): 5 TABLET ORAL at 10:44

## 2017-06-19 RX ADMIN — GABAPENTIN 100 MILLIGRAM(S): 400 CAPSULE ORAL at 14:32

## 2017-06-19 RX ADMIN — OXYCODONE HYDROCHLORIDE 5 MILLIGRAM(S): 5 TABLET ORAL at 20:57

## 2017-06-19 RX ADMIN — OXYCODONE HYDROCHLORIDE 5 MILLIGRAM(S): 5 TABLET ORAL at 19:57

## 2017-06-19 RX ADMIN — PANTOPRAZOLE SODIUM 40 MILLIGRAM(S): 20 TABLET, DELAYED RELEASE ORAL at 06:05

## 2017-06-19 RX ADMIN — GABAPENTIN 100 MILLIGRAM(S): 400 CAPSULE ORAL at 05:58

## 2017-06-19 RX ADMIN — ENOXAPARIN SODIUM 70 MILLIGRAM(S): 100 INJECTION SUBCUTANEOUS at 05:58

## 2017-06-19 RX ADMIN — Medication 0.12 MILLIGRAM(S): at 05:58

## 2017-06-19 RX ADMIN — Medication 81 MILLIGRAM(S): at 12:45

## 2017-06-19 RX ADMIN — Medication 40 MILLIGRAM(S): at 05:58

## 2017-06-19 RX ADMIN — GABAPENTIN 100 MILLIGRAM(S): 400 CAPSULE ORAL at 21:14

## 2017-06-19 RX ADMIN — PANTOPRAZOLE SODIUM 40 MILLIGRAM(S): 20 TABLET, DELAYED RELEASE ORAL at 19:02

## 2017-06-19 RX ADMIN — ATORVASTATIN CALCIUM 40 MILLIGRAM(S): 80 TABLET, FILM COATED ORAL at 21:14

## 2017-06-19 NOTE — PHYSICAL THERAPY INITIAL EVALUATION ADULT - PERTINENT HX OF CURRENT PROBLEM, REHAB EVAL
63 y/o female with a PMHx of atrial fibrillation on Coumadin, chronic grade II diastolic dysfunction with EF 55%, HOCM S/P septal ablation, recent admission for PNA complicated by newly diagnosed lung cancer (S/P right VATS, partial decortication, mechanical pleurodesis, and drainage of 1L of fluid by CT surgery in April 2017), moderate MR, HTN, HLD, and GERD presents to ED with worsening dyspnea on exertion for the past three days.

## 2017-06-19 NOTE — CHART NOTE - NSCHARTNOTEFT_GEN_A_CORE
Status post Cath, Right Radial site without bleeding or hematoma.  Dressing is intact.  Positive Pulses, Capillary refill less than two seconds.  Will continue to monitor.                                                                                                                                                  VESNA Goldsmith, RPA-C [id number]

## 2017-06-19 NOTE — PROGRESS NOTE ADULT - SUBJECTIVE AND OBJECTIVE BOX
SUBJ:      MEDICATIONS  (STANDING):  enoxaparin Injectable 70milliGRAM(s) SubCutaneous every 12 hours  aspirin enteric coated 81milliGRAM(s) Oral daily  digoxin     Tablet 0.125milliGRAM(s) Oral daily  diltiazem    Tablet 60milliGRAM(s) Oral three times a day  gabapentin 100milliGRAM(s) Oral three times a day  atorvastatin 40milliGRAM(s) Oral at bedtime  pantoprazole    Tablet 40milliGRAM(s) Oral two times a day before meals  furosemide   Injectable 40milliGRAM(s) IV Push daily    MEDICATIONS  (PRN):  ibuprofen  Tablet 400milliGRAM(s) Oral every 6 hours PRN Mild pain  oxyCODONE IR 10milliGRAM(s) Oral every 4 hours PRN Moderate and severe pain            Vital Signs Last 24 Hrs  T(C): 36.4, Max: 36.9 (06-18 @ 18:10)  T(F): 97.6, Max: 98.5 (06-18 @ 18:10)  HR: 85 (70 - 104)  BP: 117/79 (105/78 - 124/79)  BP(mean): --  RR: 18 (17 - 26)  SpO2: 93% (93% - 98%)    REVIEW OF SYSTEMS:  CONSTITUTIONAL: No fever, weight loss, or fatigue  EYES: No eye pain, visual disturbances, or discharge  ENMT:  No difficulty hearing, tinnitus, vertigo; No sinus or throat pain  NECK: No pain or stiffness  RESPIRATORY: No cough, wheezing, chills or hemoptysis; No shortness of breath  CARDIOVASCULAR: No chest pain, palpitations, dizziness, or leg swelling  GASTROINTESTINAL: No abdominal or epigastric pain. No nausea, vomiting, or hematemesis; No diarrhea or constipation. No melena or hematochezia.  GENITOURINARY: No dysuria, frequency, hematuria, or incontinence  NEUROLOGICAL: No headaches, memory loss, loss of strength, numbness, or tremors  SKIN: No itching, burning, rashes, or lesions   LYMPH NODES: No enlarged glands  ENDOCRINE: No heat or cold intolerance; No hair loss  MUSCULOSKELETAL: No joint pain or swelling; No muscle, back, or extremity pain  PSYCHIATRIC: No depression, anxiety, mood swings, or difficulty sleeping  HEME/LYMPH: No easy bruising, or bleeding gums  ALLERY AND IMMUNOLOGIC: No hives or eczema    PHYSICAL EXAM:  · CONSTITUTIONAL:	Well-developed, well nourished    BMI-  · EYES:	EOMI; PERRL; no drainage or redness  · ENMT	No oral lesions; no gross abnormalities  · NECK:	No bruits; no thyromegaly or nodules  ·BACK:	No deformity or limitation of movement  ·RESPIRATORY:   airway patent; breath sounds equal; good air movement; respirations non-labored; clear to auscultation bilaterally; no chest wall tenderness; no intercostal retractions; no rales,rhonchi or wheeze  · CARDIOVASCULAR	regular rate and rhythm  no rub  no murmur  normal PMI  . GASTROINTESTINAL:  no distention; no masses palpable; bowel sounds normal; no rebound tenderness; no guarding; no rigidity; no organomegaly  · EXTREMITIES: No cyanosis, clubbing or edema  · VASCULAR: 	Equal and normal pulses (carotid, femoral, dorsalis pedis)  ·NEUROLOGICAL:   alert and oriented x 3; sensation intact; deep reflexes intact; cranial nerves intact; no spontaneous movement; superficial reflexes intact; normal strength  · SKIN:	No lesions; no rash  . LYMPH NODES:	No lymphadedenopathy  · MUSCULOSKELETAL:   No calf tenderness  no joint swelling	  TELEMETRY:    ECG:    TTE:    LABS:                        12.9   5.70  )-----------( 228      ( 19 Jun 2017 05:15 )             38.1     06-19    141  |  103  |  17  ----------------------------<  92  3.7   |  24  |  0.85    Ca    8.8      19 Jun 2017 05:15  Mg     1.9     06-19    TPro  6.9  /  Alb  4.5  /  TBili  0.5  /  DBili  x   /  AST  38<H>  /  ALT  35<H>  /  AlkPhos  71  06-18    CARDIAC MARKERS ( 18 Jun 2017 10:53 )  x     / < 0.06 ng/mL / 212 u/L / 7.38 ng/mL / x      CARDIAC MARKERS ( 18 Jun 2017 04:05 )  x     / < 0.06 ng/mL / 268 u/L / 9.59 ng/mL / x          PT/INR - ( 19 Jun 2017 05:15 )   PT: 14.4 SEC;   INR: 1.28          PTT - ( 18 Jun 2017 04:06 )  PTT:29.8 SEC    I&O's Summary    I & Os for current day (as of 19 Jun 2017 10:11)  =============================================  IN: 0 ml / OUT: 1000 ml / NET: -1000 ml    BNP  RADIOLOGY & ADDITIONAL STUDIES:    IMPRESSION AND PLAN:

## 2017-06-20 VITALS
TEMPERATURE: 98 F | HEART RATE: 58 BPM | OXYGEN SATURATION: 98 % | RESPIRATION RATE: 18 BRPM | SYSTOLIC BLOOD PRESSURE: 112 MMHG | DIASTOLIC BLOOD PRESSURE: 54 MMHG

## 2017-06-20 LAB
BUN SERPL-MCNC: 14 MG/DL — SIGNIFICANT CHANGE UP (ref 7–23)
CALCIUM SERPL-MCNC: 9.4 MG/DL — SIGNIFICANT CHANGE UP (ref 8.4–10.5)
CHLORIDE SERPL-SCNC: 99 MMOL/L — SIGNIFICANT CHANGE UP (ref 98–107)
CO2 SERPL-SCNC: 25 MMOL/L — SIGNIFICANT CHANGE UP (ref 22–31)
CREAT SERPL-MCNC: 0.79 MG/DL — SIGNIFICANT CHANGE UP (ref 0.5–1.3)
GLUCOSE SERPL-MCNC: 91 MG/DL — SIGNIFICANT CHANGE UP (ref 70–99)
HCT VFR BLD CALC: 41.7 % — SIGNIFICANT CHANGE UP (ref 34.5–45)
HGB BLD-MCNC: 13.7 G/DL — SIGNIFICANT CHANGE UP (ref 11.5–15.5)
MAGNESIUM SERPL-MCNC: 1.9 MG/DL — SIGNIFICANT CHANGE UP (ref 1.6–2.6)
MCHC RBC-ENTMCNC: 31.5 PG — SIGNIFICANT CHANGE UP (ref 27–34)
MCHC RBC-ENTMCNC: 32.9 % — SIGNIFICANT CHANGE UP (ref 32–36)
MCV RBC AUTO: 95.9 FL — SIGNIFICANT CHANGE UP (ref 80–100)
PLATELET # BLD AUTO: 249 K/UL — SIGNIFICANT CHANGE UP (ref 150–400)
PMV BLD: 10.6 FL — SIGNIFICANT CHANGE UP (ref 7–13)
POTASSIUM SERPL-MCNC: 3.5 MMOL/L — SIGNIFICANT CHANGE UP (ref 3.5–5.3)
POTASSIUM SERPL-SCNC: 3.5 MMOL/L — SIGNIFICANT CHANGE UP (ref 3.5–5.3)
RBC # BLD: 4.35 M/UL — SIGNIFICANT CHANGE UP (ref 3.8–5.2)
RBC # FLD: 16 % — HIGH (ref 10.3–14.5)
SODIUM SERPL-SCNC: 141 MMOL/L — SIGNIFICANT CHANGE UP (ref 135–145)
WBC # BLD: 4.9 K/UL — SIGNIFICANT CHANGE UP (ref 3.8–10.5)
WBC # FLD AUTO: 4.9 K/UL — SIGNIFICANT CHANGE UP (ref 3.8–10.5)

## 2017-06-20 RX ORDER — ALBUTEROL 90 UG/1
2 AEROSOL, METERED ORAL
Qty: 0 | Refills: 0 | COMMUNITY

## 2017-06-20 RX ORDER — FUROSEMIDE 40 MG
1 TABLET ORAL
Qty: 30 | Refills: 0
Start: 2017-06-20 | End: 2017-07-20

## 2017-06-20 RX ADMIN — OXYCODONE HYDROCHLORIDE 5 MILLIGRAM(S): 5 TABLET ORAL at 04:30

## 2017-06-20 RX ADMIN — PANTOPRAZOLE SODIUM 40 MILLIGRAM(S): 20 TABLET, DELAYED RELEASE ORAL at 05:40

## 2017-06-20 RX ADMIN — GABAPENTIN 100 MILLIGRAM(S): 400 CAPSULE ORAL at 05:40

## 2017-06-20 RX ADMIN — OXYCODONE HYDROCHLORIDE 5 MILLIGRAM(S): 5 TABLET ORAL at 00:18

## 2017-06-20 RX ADMIN — OXYCODONE HYDROCHLORIDE 5 MILLIGRAM(S): 5 TABLET ORAL at 12:24

## 2017-06-20 RX ADMIN — Medication 0.12 MILLIGRAM(S): at 05:40

## 2017-06-20 RX ADMIN — GABAPENTIN 100 MILLIGRAM(S): 400 CAPSULE ORAL at 13:48

## 2017-06-20 RX ADMIN — OXYCODONE HYDROCHLORIDE 5 MILLIGRAM(S): 5 TABLET ORAL at 05:30

## 2017-06-20 RX ADMIN — OXYCODONE HYDROCHLORIDE 5 MILLIGRAM(S): 5 TABLET ORAL at 11:24

## 2017-06-20 RX ADMIN — Medication 81 MILLIGRAM(S): at 11:24

## 2017-06-20 RX ADMIN — Medication 40 MILLIGRAM(S): at 05:40

## 2017-06-20 RX ADMIN — OXYCODONE HYDROCHLORIDE 5 MILLIGRAM(S): 5 TABLET ORAL at 01:18

## 2017-06-20 RX ADMIN — ENOXAPARIN SODIUM 70 MILLIGRAM(S): 100 INJECTION SUBCUTANEOUS at 05:41

## 2017-06-20 NOTE — CONSULT NOTE ADULT - ASSESSMENT
63 y/o female with a PMHx of atrial fibrillation on Coumadin, chronic grade II diastolic dysfunction with EF 55%, HOCM S/P septal ablation, recent admission for PNA complicated by newly diagnosed lung cancer (S/P right VATS, partial decortication, mechanical pleurodesis, and drainage of 1L of fluid by CT surgery in April 2017), moderate MR, HTN, HLD, and GERD presents to ED with dyspnea on exertion, PND, orthopnea and lower extremity edema, in the setting of acute on chronic diastolic heart failure, complicated by rapid atrial fibrillation and a right loculated pleural effusion.

## 2017-06-20 NOTE — DISCHARGE NOTE ADULT - HOSPITAL COURSE
61 y/o female with a PMHx of atrial fibrillation on Coumadin, chronic grade II diastolic dysfunction with EF 55%, HOCM S/P septal ablation, recent admission for PNA complicated by newly diagnosed lung cancer (S/P right VATS, partial decortication, mechanical pleurodesis, and drainage of 1L of fluid by CT surgery in April 2017), moderate MR, HTN, HLD, and GERD presents to ED with dyspnea on exertion, PND, orthopnea and lower extremity edema, in the setting of acute on chronic diastolic heart failure, complicated by rapid atrial fibrillation and a right loculated pleural effusion.     + Acute on chronic diastolic CHF exacerbation- on Lasix 40mg IVP daily  + Atrial fibrillation with RVR- on Cardizem and Digoxin for rate control, on Lovenox 70mg SQ BID for CVA prevention (Coumadin held for possible pleural tap)  +s/p CTPA:  Moderate-sized loculated right pleural effusion- No PE  Case discussed with attending and Pts Oncologist, Pt to be discharged Home and Plan for outpt lymph node Biopsy  Stable for Discharge 63 y/o female with a PMHx of atrial fibrillation on Coumadin, chronic grade II diastolic dysfunction with EF 55%, HOCM S/P septal ablation, recent admission for PNA complicated by newly diagnosed lung cancer (S/P right VATS, partial decortication, mechanical pleurodesis, and drainage of 1L of fluid by CT surgery in April 2017), moderate MR, HTN, HLD, and GERD presents to ED with dyspnea on exertion, PND, orthopnea and lower extremity edema, in the setting of acute on chronic diastolic heart failure, complicated by rapid atrial fibrillation and a right loculated pleural effusion.     + Acute on chronic diastolic CHF exacerbation- on Lasix 40mg IVP daily  + Atrial fibrillation with RVR- on Cardizem and Digoxin for rate control, on Lovenox 70mg SQ BID for CVA prevention   +s/p CTPA:  Moderate-sized loculated right pleural effusion- No PE  Case discussed with attending and Pts Oncologist, Pt to be discharged Home and Plan for outpt lymph node Biopsy, Ok to resume Coumadin  Stable for Discharge

## 2017-06-20 NOTE — DISCHARGE NOTE ADULT - PLAN OF CARE
Prevent further occurence Follow up with Your oncologist in 1 week will need outpt Lymph node Biopsy Compliance with Meds will need outpt Lymph node Biopsy, They instruct you on when to hold the Coumadin

## 2017-06-20 NOTE — DISCHARGE NOTE ADULT - MEDICATION SUMMARY - MEDICATIONS TO TAKE
I will START or STAY ON the medications listed below when I get home from the hospital:    Aspirin Enteric Coated 81 mg oral delayed release tablet  -- 1 tab(s) by mouth once a day  -- Indication: For CArdioprotective    oxyCODONE 5 mg oral tablet  -- 2 tab(s) by mouth every 4 hours as needed  -- Indication: For Pain    Motrin 400 mg oral tablet  -- 1 tab(s) by mouth every 6 hours, as needed  -- Indication: For Pain    diltiaZEM 60 mg oral tablet  -- 1 tab(s) by mouth 3 times a day  -- Indication: For A Fib    digoxin 125 mcg (0.125 mg) oral tablet  -- 1 tab(s) by mouth once a day  -- Indication: For A Fib    warfarin 5 mg oral tablet  -- 1 tab(s) by mouth once a day  -- Indication: For A Fib    gabapentin 100 mg oral capsule  -- 1 cap(s) by mouth 3 times a day  -- Indication: For Neuropathy    atorvastatin 40 mg oral tablet  -- 1 tab(s) by mouth once a day (at bedtime)  -- Indication: For Cholesterol    furosemide 20 mg oral tablet  -- 1 tab(s) by mouth once a day  -- Avoid prolonged or excessive exposure to direct and/or artificial sunlight while taking this medication.  It is very important that you take or use this exactly as directed.  Do not skip doses or discontinue unless directed by your doctor.  It may be advisable to drink a full glass orange juice or eat a banana daily while taking this medication.    -- Indication: For ChF    pantoprazole 40 mg oral delayed release tablet  -- 1 tab(s) by mouth every 12 hours  -- Indication: For GERD (gastroesophageal reflux disease)

## 2017-06-20 NOTE — CONSULT NOTE ADULT - SUBJECTIVE AND OBJECTIVE BOX
Patient is a 62y old  Female who presents with a chief complaint of Shortness of breath (2017 14:29)      HPI:  63 y/o female with a PMHx of atrial fibrillation on Coumadin, chronic grade II diastolic dysfunction with EF 55%, HOCM S/P septal ablation, recent admission for PNA complicated by newly diagnosed lung cancer (S/P right VATS, partial decortication, mechanical pleurodesis, and drainage of 1L of fluid by CT surgery in 2017), moderate MR, HTN, HLD, and GERD presents to ED with worsening dyspnea on exertion for the past three days. Pt reports that she has noticed shortness of breath at rest, which is worsened on minimal exertion. Pt also admits to new bilateral lower extremity edema, which she has never experienced before, and orthopnea. Pt states that she woke up early this morning with shortness of breath, diaphoresis and nausea; she says that feeling actually woke her up from her sleep, which is why she presented to the hospital today. Pt says that she has not been assessed for need for home oxygen since she was diagnosed with the lung cancer. She takes Albuterol PRN for shortness of breath, with minimal to no relief. On a side note, pt has been prescribed Oxycodone for her chronic pain from the lung cancer. Pt has been experiencing worsening right breast pain and upper back pain, which are chronic symptoms that have worsened over the past couple of days. Pt denies fever, chills, recent travel, headache, dizziness, visual deficits, chest pain, palpitations, abdominal pain, V/D/C, hematochezia, melena, dysuria, hematuria, LOC, syncope. Upon arrival to ED, EKG: Atrial fibrillation at 96 bpm, TWI I, AVL. CE x1: Trop negative, . CXR: Increased right pleural effusion with adjacent right basilar consolidative changes including possible pneumonia in the proper clinical context. Strand-like opacities in peripheral left mid to lower lung near cardiac apex compatible with subsegmental atelectatic change. No left pleural effusion. Clear remaining visualized lungs. No pneumothorax. Cardiomegaly. Faint aortic arch calcifications. Trachea midline. Generalized osteopenia again noted. ProBNP: 4044. AST/ALT: 38/35. Pt admitted to telemetry. (2017 10:41)      PAST MEDICAL & SURGICAL HISTORY:  GERD (gastroesophageal reflux disease)  HLD (hyperlipidemia)  HTN (hypertension)  CAP (community acquired pneumonia): RML/RLL at [facility] 3/2017  Lung cancer  MR (mitral regurgitation)  Chronic diastolic (congestive) heart failure  GERD (gastroesophageal reflux disease)  AF (atrial fibrillation): On Coumadin  Gastritis and duodenitis: Mild - dx via EGD   Pleural effusion: Right parapneumonic drained twice 3/2017 at [facility]  Pneumonia: RML &amp; RLL at [facility] 3/2017  Chronic atrial fibrillation  Pulmonary hypertension: Moderate  Mitral valve insufficiency, unspecified etiology: Moderate  HF (heart failure), diastolic: Grade II DD  HOCM (hypertrophic obstructive cardiomyopathy): S/P septal ablation  Chronic gastritis without bleeding, unspecified gastritis type  Gastritis and duodenitis  Cardiomyopathy, hypertrophic  PNA (pneumonia)  Atrial fibrillation  H/O cardiac radiofrequency ablation: for HOCM  History of : 15 years ago  H/O cardiac radiofrequency ablation      Review of Systems:   CONSTITUTIONAL: No fever, weight loss, or fatigue  EYES: No eye pain, visual disturbances, or discharge  ENMT:  No difficulty hearing, tinnitus, vertigo; No sinus or throat pain  NECK: No pain or stiffness  BREASTS: No pain, masses, or nipple discharge  RESPIRATORY: No cough, wheezing, chills or hemoptysis; No shortness of breath  CARDIOVASCULAR: No chest pain, palpitations, dizziness, or leg swelling  GASTROINTESTINAL: No abdominal or epigastric pain. No nausea, vomiting, or hematemesis; No diarrhea or constipation. No melena or hematochezia.  GENITOURINARY: No dysuria, frequency, hematuria, or incontinence  NEUROLOGICAL: No headaches, memory loss, loss of strength, numbness, or tremors  SKIN: No itching, burning, rashes, or lesions   LYMPH NODES: No enlarged glands  ENDOCRINE: No heat or cold intolerance; No hair loss  MUSCULOSKELETAL: No joint pain or swelling; No muscle, back, or extremity pain  PSYCHIATRIC: No depression, anxiety, mood swings, or difficulty sleeping  HEME/LYMPH: No easy bruising, or bleeding gums  ALLERY AND IMMUNOLOGIC: No hives or eczema    Allergies    cefdinir (Rash)  moxifloxacin (Rash)  penicillin (Rash)    Intolerances        Social History:     FAMILY HISTORY:  No pertinent family history in first degree relatives  Family history of liver cancer  Family history of GI malignancy  Family history of hypertrophic cardiomyopathy (Sibling)      MEDICATIONS  (STANDING):  enoxaparin Injectable 70milliGRAM(s) SubCutaneous every 12 hours  aspirin enteric coated 81milliGRAM(s) Oral daily  digoxin     Tablet 0.125milliGRAM(s) Oral daily  diltiazem    Tablet 60milliGRAM(s) Oral three times a day  gabapentin 100milliGRAM(s) Oral three times a day  atorvastatin 40milliGRAM(s) Oral at bedtime  pantoprazole    Tablet 40milliGRAM(s) Oral two times a day before meals  furosemide   Injectable 40milliGRAM(s) IV Push daily    MEDICATIONS  (PRN):  ibuprofen  Tablet 400milliGRAM(s) Oral every 6 hours PRN Mild pain  oxyCODONE IR 5milliGRAM(s) Oral every 4 hours PRN Moderate Pain (4 - 6)      Vital Signs Last 24 Hrs  T(C): 36.4, Max: 36.6 (06-19 @ 20:35)  HR: 58 (58 - 76)  BP: 112/54 (103/71 - 112/62)  RR: 18 (17 - 23)  SpO2: 98% (96% - 99%)  Wt(kg): --  CAPILLARY BLOOD GLUCOSE    I&O's Summary  I & Os for 24h ending 2017 07:00  =============================================  IN: 100 ml / OUT: 0 ml / NET: 100 ml    I & Os for current day (as of 2017 15:16)  =============================================  IN: 360 ml / OUT: 0 ml / NET: 360 ml      PHYSICAL EXAM:  GENERAL: NAD, well-developed  HEAD:  Atraumatic, Normocephalic  EYES: EOMI, PERRLA, conjunctiva and sclera clear  NECK: Supple, No JVD  CHEST/LUNG: Clear to auscultation bilaterally; No wheeze  HEART: Regular rate and rhythm; No murmurs, rubs, or gallops  ABDOMEN: Soft, Nontender, Nondistended; Bowel sounds present  EXTREMITIES:  2+ Peripheral Pulses, No clubbing, cyanosis, or edema  PSYCH: AAOx3  NEUROLOGY: non-focal  SKIN: No rashes or lesions    LABS:                        13.7   4.90  )-----------( 249      ( 2017 07:05 )             41.7     06-20    141  |  99  |  14  ----------------------------<  91  3.5   |  25  |  0.79    Ca    9.4      2017 07:05  Mg     1.9     06-20      PT/INR - ( 2017 05:15 )   PT: 14.4 SEC;   INR: 1.28                  CAPILLARY BLOOD GLUCOSE                RADIOLOGY & ADDITIONAL TESTS:    Imaging Personally Reviewed:    Consultant(s) Notes Reviewed:      Care Discussed with Consultants/Other Providers:    Thanks for consult. Will follow.

## 2017-06-20 NOTE — DIETITIAN INITIAL EVALUATION ADULT. - OTHER INFO
Nutrition Consult X Registered Dietitian. Pt 61 yo female appears alert, oriented. Per Pt her appetite "okay" now; No chew/swallow problem voiced; no nausea/vomiting/diarrhea reported @ present. Per Pt her height: 66" & her weight (PTA): 164#. Pt also stated she had lost 20# of her weight (from at the end of 2016 to the beginning of 2017). But for past 4-6 weeks her weight has been stable (at 164#). RDN offered PO supplement: Ensure Enlive, but Pt declined. No other food related concern voiced. RDN remains available, Pt made aware.

## 2017-06-20 NOTE — DIETITIAN INITIAL EVALUATION ADULT. - NS AS NUTRI INTERV MEALS SNACK
Diets modified for specific foods and ingredients/Other (specify)/1. Suggest: PO diet rx: Regular, DASH/TLC (cholesterol and sodium restricted); Fluid restriction per MD discretion;           2. Encourage & assist Pt with meals; Monitor PO diet tolerance;           3. Monitor labs, weights, hydration status;

## 2017-06-20 NOTE — DISCHARGE NOTE ADULT - NS AS DC VTE INSTRUCTION
Coumadin/Warfarin - Dietary Advice.../Coumadin/Warfarin - Compliance.../Coumadin/Warfarin - Follow-up monitoring.../Coumadin/Warfarin - Potential for adverse drug reactions and interactions

## 2017-06-20 NOTE — DISCHARGE NOTE ADULT - SECONDARY DIAGNOSIS.
AF (atrial fibrillation) GERD (gastroesophageal reflux disease) HLD (hyperlipidemia) HTN (hypertension) Lung cancer

## 2017-06-20 NOTE — CONSULT NOTE ADULT - PROBLEM SELECTOR RECOMMENDATION 4
Onc f/up. needs Cervical LN biopsy. Pt has approval as OP approval for Biopsy. She wants to get as an out pt.

## 2017-06-20 NOTE — PROGRESS NOTE ADULT - SUBJECTIVE AND OBJECTIVE BOX
SUBJ:63 y/o female with a PMHx of atrial fibrillation on Coumadin, chronic grade II diastolic dysfunction with EF 55%, HOCM S/P septal ablation,newly diagnosed lung cancer (S/P right VATS, partial decortication, mechanical pleurodesis, and drainage of 1L of fluid by CT surgery in April 2017),presents to ED with worsening dyspnea on exertion,CTPA-no pulmonary embolism,loculated pleural effusion noted,less dyspneac,no chest pain.      MEDICATIONS  (STANDING):  enoxaparin Injectable 70milliGRAM(s) SubCutaneous every 12 hours  aspirin enteric coated 81milliGRAM(s) Oral daily  digoxin     Tablet 0.125milliGRAM(s) Oral daily  diltiazem    Tablet 60milliGRAM(s) Oral three times a day  gabapentin 100milliGRAM(s) Oral three times a day  atorvastatin 40milliGRAM(s) Oral at bedtime  pantoprazole    Tablet 40milliGRAM(s) Oral two times a day before meals  furosemide   Injectable 40milliGRAM(s) IV Push daily    MEDICATIONS  (PRN):  ibuprofen  Tablet 400milliGRAM(s) Oral every 6 hours PRN Mild pain  oxyCODONE IR 5milliGRAM(s) Oral every 4 hours PRN Moderate Pain (4 - 6)    Vital Signs Last 24 Hrs  T(C): 36.6, Max: 36.6 (06-19 @ 10:19)  T(F): 97.9, Max: 97.9 (06-19 @ 20:35)  HR: 66 (62 - 85)  BP: 103/71 (94/57 - 116/77)  BP(mean): --  RR: 17 (17 - 23)  SpO2: 97% (96% - 98%)      PHYSICAL EXAM:  CONSTITUTIONAL:BMI- 26 .  EYES:Anicteric [x ]EOMI.  ENMT:No oral lesions.  NECK:[ ]Bruits [ ]Thyroid [x ]JVD.  RESPIRATORY:Decreased entry right base,[ ]Clear to auscultation[ ]Rales[x ]Rhonchi[ ]Wheeze.  CARDIOVASCULAR:Irregular irregular rhythm[ ]Rub[x ]Murmur-  2/6 systolic            [x ]Normal PMI.  GASTROINTESTINAL:[x ]BS[ ]Tenderness[ ]Reboumd[ ]Ridgitity[ ]Organomegaly.  EXTREMITIES:[ ]Clubbing[ ]Cyanosis[x ]Edema.  VASCULAR:[x ]Pulses intact and symmetrical.  NEUROLOGICAL:Alert &OrientetedX 3[x ]Normal strength[x ]NoFocal deficit.  SKIN:[ ]Lesions[ ]Rash.  MUSCULOSKEKETAL:[ ]Calf tenderness[ ]Joint abnormalities.    TELEMETRY:Atrial fibrillation 60-80's    ECG:Atrial fibrillation T wave abnormality, consider lateral ischemia    TTE:Study Date: 3/11/2017 Normal Left Ventricular Systolic Function,  (EF = 55%)Grade II diastolic dysfunction. RV systolic pressure is moderately increased (PASP 46mm Hg).    LABS:                        13.7   4.90  )-----------( 249      ( 20 Jun 2017 07:05 )             41.7     06-19    141  |  103  |  17  ----------------------------<  92  3.7   |  24  |  0.85    Ca    8.8      19 Jun 2017 05:15  Mg     1.9     06-19      CARDIAC MARKERS ( 18 Jun 2017 10:53 )  x     / < 0.06 ng/mL / 212 u/L / 7.38 ng/mL / x          PT/INR - ( 19 Jun 2017 05:15 )   PT: 14.4 SEC;   INR: 1.28       I&O's Summary    I & Os for current day (as of 20 Jun 2017 08:18)  =============================================  IN: 100 ml / OUT: 0 ml / NET: 100 ml    RADIOLOGY & ADDITIONAL STUDIES:CT ANGIO CHEST  Moderate-sized loculated right pleural effusion and marked soft tissue  thickening posteromedially compatible with lymphomatous involvement.No pulmonary embolism.    IMPRESSION AND PLAN:Acute on chronic diastolic (congestive) heart failure.on IV lasix Repeat TTE-pulmonary pressures.    AF (atrial fibrillation)-Permanent-CHADS2-3 on lovenox,NOAC's held fpr possible LN biopsy,pleural Tap.Rate control on digoxin,diltiazem.     Pleural effusion2/2 .Lung cancer.Oncology consult-Dr House,Thoracic evaluation for possible tap

## 2017-06-20 NOTE — DISCHARGE NOTE ADULT - MEDICATION SUMMARY - MEDICATIONS TO STOP TAKING
I will STOP taking the medications listed below when I get home from the hospital:    sucralfate 1 g oral tablet  -- 1 tab(s) by mouth every 8 hours

## 2017-06-20 NOTE — DISCHARGE NOTE ADULT - PATIENT PORTAL LINK FT
“You can access the [facility] Patient Portal, offered by [facility], by registering with the following website: [email or web address]”

## 2017-06-20 NOTE — DISCHARGE NOTE ADULT - CARE PROVIDER_API CALL
Bonifacio House), Hematology; Medical Oncology  [address] [place] [address]  Phone: [phone]  Fax: [phone]

## 2017-06-20 NOTE — DISCHARGE NOTE ADULT - CARE PLAN
Principal Discharge DX:	Pleural effusion on right  Secondary Diagnosis:	AF (atrial fibrillation)  Secondary Diagnosis:	GERD (gastroesophageal reflux disease)  Secondary Diagnosis:	HLD (hyperlipidemia)  Secondary Diagnosis:	HTN (hypertension)  Secondary Diagnosis:	Lung cancer Principal Discharge DX:	Pleural effusion on right  Goal:	Prevent further occurence  Instructions for follow-up, activity and diet:	Follow up with Your oncologist in 1 week  Secondary Diagnosis:	AF (atrial fibrillation)  Secondary Diagnosis:	GERD (gastroesophageal reflux disease)  Secondary Diagnosis:	HLD (hyperlipidemia)  Goal:	Compliance with Meds  Secondary Diagnosis:	HTN (hypertension)  Goal:	Compliance with Meds  Secondary Diagnosis:	Lung cancer  Instructions for follow-up, activity and diet:	will need outpt Lymph node Biopsy Principal Discharge DX:	Pleural effusion on right  Goal:	Prevent further occurence  Instructions for follow-up, activity and diet:	Follow up with Your oncologist in 1 week  Secondary Diagnosis:	AF (atrial fibrillation)  Secondary Diagnosis:	GERD (gastroesophageal reflux disease)  Secondary Diagnosis:	HLD (hyperlipidemia)  Goal:	Compliance with Meds  Secondary Diagnosis:	HTN (hypertension)  Goal:	Compliance with Meds  Secondary Diagnosis:	Lung cancer  Instructions for follow-up, activity and diet:	will need outpt Lymph node Biopsy, They instruct you on when to hold the Coumadin

## 2017-06-28 ENCOUNTER — RESULT REVIEW (OUTPATIENT)
Age: 62
End: 2017-06-28

## 2017-06-28 ENCOUNTER — OUTPATIENT (OUTPATIENT)
Dept: OUTPATIENT SERVICES | Facility: HOSPITAL | Age: 62
LOS: 1 days | End: 2017-06-28
Payer: COMMERCIAL

## 2017-06-28 DIAGNOSIS — Z98.891 HISTORY OF UTERINE SCAR FROM PREVIOUS SURGERY: Chronic | ICD-10-CM

## 2017-06-28 DIAGNOSIS — Z98.890 OTHER SPECIFIED POSTPROCEDURAL STATES: Chronic | ICD-10-CM

## 2017-06-28 PROCEDURE — 76942 ECHO GUIDE FOR BIOPSY: CPT

## 2017-06-28 PROCEDURE — 38505 NEEDLE BIOPSY LYMPH NODES: CPT

## 2017-06-28 PROCEDURE — 88173 CYTOPATH EVAL FNA REPORT: CPT

## 2017-06-28 PROCEDURE — 38505 NEEDLE BIOPSY LYMPH NODES: CPT | Mod: RT

## 2017-06-28 PROCEDURE — 88341 IMHCHEM/IMCYTCHM EA ADD ANTB: CPT

## 2017-06-28 PROCEDURE — 88305 TISSUE EXAM BY PATHOLOGIST: CPT

## 2017-06-28 PROCEDURE — 88307 TISSUE EXAM BY PATHOLOGIST: CPT

## 2017-06-28 PROCEDURE — 76942 ECHO GUIDE FOR BIOPSY: CPT | Mod: 26

## 2017-06-28 PROCEDURE — 88360 TUMOR IMMUNOHISTOCHEM/MANUAL: CPT

## 2017-07-03 LAB — SURGICAL PATHOLOGY STUDY: SIGNIFICANT CHANGE UP

## 2017-10-03 ENCOUNTER — OUTPATIENT (OUTPATIENT)
Dept: OUTPATIENT SERVICES | Facility: HOSPITAL | Age: 62
LOS: 1 days | End: 2017-10-03
Payer: SELF-PAY

## 2017-10-03 DIAGNOSIS — Z98.890 OTHER SPECIFIED POSTPROCEDURAL STATES: Chronic | ICD-10-CM

## 2017-10-03 DIAGNOSIS — Z98.891 HISTORY OF UTERINE SCAR FROM PREVIOUS SURGERY: Chronic | ICD-10-CM

## 2017-10-03 PROCEDURE — A9552: CPT

## 2017-10-03 PROCEDURE — 78815 PET IMAGE W/CT SKULL-THIGH: CPT | Mod: 26

## 2017-10-03 PROCEDURE — 78815 PET IMAGE W/CT SKULL-THIGH: CPT

## 2017-10-23 ENCOUNTER — TRANSCRIPTION ENCOUNTER (OUTPATIENT)
Age: 62
End: 2017-10-23

## 2017-11-11 PROBLEM — I48.91 UNSPECIFIED ATRIAL FIBRILLATION: Chronic | Status: INACTIVE | Noted: 2017-03-10 | Resolved: 2017-06-18

## 2018-02-13 ENCOUNTER — TRANSCRIPTION ENCOUNTER (OUTPATIENT)
Age: 63
End: 2018-02-13

## 2018-02-13 RX ORDER — AZITHROMYCIN 500 MG/1
0 TABLET, FILM COATED ORAL
Qty: 0 | Refills: 0 | DISCHARGE
Start: 2018-02-13

## 2018-02-14 ENCOUNTER — INPATIENT (INPATIENT)
Facility: HOSPITAL | Age: 63
LOS: 2 days | Discharge: HOME | End: 2018-02-17
Attending: INTERNAL MEDICINE | Admitting: INTERNAL MEDICINE
Payer: COMMERCIAL

## 2018-02-14 VITALS
TEMPERATURE: 98 F | DIASTOLIC BLOOD PRESSURE: 97 MMHG | HEART RATE: 144 BPM | OXYGEN SATURATION: 100 % | SYSTOLIC BLOOD PRESSURE: 121 MMHG | RESPIRATION RATE: 18 BRPM

## 2018-02-14 DIAGNOSIS — I48.91 UNSPECIFIED ATRIAL FIBRILLATION: ICD-10-CM

## 2018-02-14 DIAGNOSIS — Z98.890 OTHER SPECIFIED POSTPROCEDURAL STATES: Chronic | ICD-10-CM

## 2018-02-14 DIAGNOSIS — Z98.891 HISTORY OF UTERINE SCAR FROM PREVIOUS SURGERY: Chronic | ICD-10-CM

## 2018-02-14 LAB
ALBUMIN SERPL ELPH-MCNC: 4.4 G/DL — SIGNIFICANT CHANGE UP (ref 3.3–5)
ALP SERPL-CCNC: 88 U/L — SIGNIFICANT CHANGE UP (ref 40–120)
ALT FLD-CCNC: 42 U/L — HIGH (ref 4–33)
APTT BLD: 31.2 SEC — SIGNIFICANT CHANGE UP (ref 27.5–37.4)
AST SERPL-CCNC: 43 U/L — HIGH (ref 4–32)
BASOPHILS # BLD AUTO: 0.05 K/UL — SIGNIFICANT CHANGE UP (ref 0–0.2)
BASOPHILS NFR BLD AUTO: 1.1 % — SIGNIFICANT CHANGE UP (ref 0–2)
BASOPHILS NFR SPEC: 0.9 % — SIGNIFICANT CHANGE UP (ref 0–2)
BILIRUB SERPL-MCNC: 1.1 MG/DL — SIGNIFICANT CHANGE UP (ref 0.2–1.2)
BUN SERPL-MCNC: 16 MG/DL — SIGNIFICANT CHANGE UP (ref 7–23)
CALCIUM SERPL-MCNC: 8.8 MG/DL — SIGNIFICANT CHANGE UP (ref 8.4–10.5)
CHLORIDE SERPL-SCNC: 102 MMOL/L — SIGNIFICANT CHANGE UP (ref 98–107)
CK MB BLD-MCNC: 3.5 — HIGH (ref 0–2.5)
CK MB BLD-MCNC: 5.77 NG/ML — HIGH (ref 1–4.7)
CK SERPL-CCNC: 164 U/L — SIGNIFICANT CHANGE UP (ref 25–170)
CO2 SERPL-SCNC: 17 MMOL/L — LOW (ref 22–31)
CREAT SERPL-MCNC: 0.97 MG/DL — SIGNIFICANT CHANGE UP (ref 0.5–1.3)
EOSINOPHIL # BLD AUTO: 0.14 K/UL — SIGNIFICANT CHANGE UP (ref 0–0.5)
EOSINOPHIL NFR BLD AUTO: 3.1 % — SIGNIFICANT CHANGE UP (ref 0–6)
EOSINOPHIL NFR FLD: 7 % — HIGH (ref 0–6)
GIANT PLATELETS BLD QL SMEAR: PRESENT — SIGNIFICANT CHANGE UP
GLUCOSE SERPL-MCNC: 128 MG/DL — HIGH (ref 70–99)
HCT VFR BLD CALC: 36.8 % — SIGNIFICANT CHANGE UP (ref 34.5–45)
HGB BLD-MCNC: 12.1 G/DL — SIGNIFICANT CHANGE UP (ref 11.5–15.5)
IMM GRANULOCYTES # BLD AUTO: 0.37 # — SIGNIFICANT CHANGE UP
IMM GRANULOCYTES NFR BLD AUTO: 8.3 % — HIGH (ref 0–1.5)
INR BLD: 1.82 — HIGH (ref 0.88–1.17)
LYMPHOCYTES # BLD AUTO: 0.71 K/UL — LOW (ref 1–3.3)
LYMPHOCYTES # BLD AUTO: 15.9 % — SIGNIFICANT CHANGE UP (ref 13–44)
LYMPHOCYTES NFR SPEC AUTO: 14 % — SIGNIFICANT CHANGE UP (ref 13–44)
MCHC RBC-ENTMCNC: 32.8 PG — SIGNIFICANT CHANGE UP (ref 27–34)
MCHC RBC-ENTMCNC: 32.9 % — SIGNIFICANT CHANGE UP (ref 32–36)
MCV RBC AUTO: 99.7 FL — SIGNIFICANT CHANGE UP (ref 80–100)
METAMYELOCYTES # FLD: 0.9 % — SIGNIFICANT CHANGE UP (ref 0–1)
MONOCYTES # BLD AUTO: 0.43 K/UL — SIGNIFICANT CHANGE UP (ref 0–0.9)
MONOCYTES NFR BLD AUTO: 9.6 % — SIGNIFICANT CHANGE UP (ref 2–14)
MONOCYTES NFR BLD: 2.6 % — SIGNIFICANT CHANGE UP (ref 2–9)
MORPHOLOGY BLD-IMP: NORMAL — SIGNIFICANT CHANGE UP
NEUTROPHIL AB SER-ACNC: 63.2 % — SIGNIFICANT CHANGE UP (ref 43–77)
NEUTROPHILS # BLD AUTO: 2.76 K/UL — SIGNIFICANT CHANGE UP (ref 1.8–7.4)
NEUTROPHILS NFR BLD AUTO: 62 % — SIGNIFICANT CHANGE UP (ref 43–77)
NEUTS BAND # BLD: 6.1 % — HIGH (ref 0–6)
NRBC # FLD: 0 — SIGNIFICANT CHANGE UP
PLATELET # BLD AUTO: 178 K/UL — SIGNIFICANT CHANGE UP (ref 150–400)
PLATELET COUNT - ESTIMATE: NORMAL — SIGNIFICANT CHANGE UP
PMV BLD: 10.6 FL — SIGNIFICANT CHANGE UP (ref 7–13)
POTASSIUM SERPL-MCNC: 4 MMOL/L — SIGNIFICANT CHANGE UP (ref 3.5–5.3)
POTASSIUM SERPL-SCNC: 4 MMOL/L — SIGNIFICANT CHANGE UP (ref 3.5–5.3)
PROT SERPL-MCNC: 7.1 G/DL — SIGNIFICANT CHANGE UP (ref 6–8.3)
PROTHROM AB SERPL-ACNC: 20.4 SEC — HIGH (ref 9.8–13.1)
RBC # BLD: 3.69 M/UL — LOW (ref 3.8–5.2)
RBC # FLD: 12.2 % — SIGNIFICANT CHANGE UP (ref 10.3–14.5)
SODIUM SERPL-SCNC: 140 MMOL/L — SIGNIFICANT CHANGE UP (ref 135–145)
TROPONIN T SERPL-MCNC: < 0.06 NG/ML — SIGNIFICANT CHANGE UP (ref 0–0.06)
VARIANT LYMPHS # BLD: 5.3 % — SIGNIFICANT CHANGE UP
WBC # BLD: 4.46 K/UL — SIGNIFICANT CHANGE UP (ref 3.8–10.5)
WBC # FLD AUTO: 4.46 K/UL — SIGNIFICANT CHANGE UP (ref 3.8–10.5)

## 2018-02-14 PROCEDURE — 71046 X-RAY EXAM CHEST 2 VIEWS: CPT | Mod: 26

## 2018-02-14 RX ORDER — SODIUM CHLORIDE 9 MG/ML
1000 INJECTION INTRAMUSCULAR; INTRAVENOUS; SUBCUTANEOUS ONCE
Qty: 0 | Refills: 0 | Status: COMPLETED | OUTPATIENT
Start: 2018-02-14 | End: 2018-02-14

## 2018-02-14 RX ORDER — ASPIRIN/CALCIUM CARB/MAGNESIUM 324 MG
162 TABLET ORAL ONCE
Qty: 0 | Refills: 0 | Status: COMPLETED | OUTPATIENT
Start: 2018-02-14 | End: 2018-02-14

## 2018-02-14 RX ADMIN — Medication 162 MILLIGRAM(S): at 22:19

## 2018-02-14 RX ADMIN — SODIUM CHLORIDE 1000 MILLILITER(S): 9 INJECTION INTRAMUSCULAR; INTRAVENOUS; SUBCUTANEOUS at 21:00

## 2018-02-14 NOTE — ED PROVIDER NOTE - CARE PLAN
Principal Discharge DX:	Afib Principal Discharge DX:	Atrial fibrillation with RVR  Secondary Diagnosis:	ACS (acute coronary syndrome)

## 2018-02-14 NOTE — ED PROVIDER NOTE - OBJECTIVE STATEMENT
63 y/o female with a PMHx a. fib on Coumadin, HOCM S/P septal ablation, Lung CA, moderate MR, HTN, HLD, and GERD  p/w sob. SOB x 1 week, saw urgicenter yesterday and started on abx for pna despite neg xray, persistent symptoms of SOB + chest tightness. No lightheadedness. No known fevers or chills. Mild cough.

## 2018-02-14 NOTE — ED PROVIDER NOTE - PMH
AF (atrial fibrillation)  On Coumadin  CAP (community acquired pneumonia)  RML/RLL at [facility] 3/2017  Chronic diastolic (congestive) heart failure    GERD (gastroesophageal reflux disease)    GERD (gastroesophageal reflux disease)    HLD (hyperlipidemia)    HOCM (hypertrophic obstructive cardiomyopathy)  S/P septal ablation  HTN (hypertension)    Lung cancer    MR (mitral regurgitation)    Pleural effusion  Right parapneumonic drained twice 3/2017 at [facility]

## 2018-02-14 NOTE — ED PROVIDER NOTE - CRITICAL CARE PROVIDED
consult w/ pt's family directly relating to pts condition/documentation/additional history taking/direct patient care (not related to procedure)/interpretation of diagnostic studies/consultation with other physicians

## 2018-02-14 NOTE — ED ADULT NURSE NOTE - OBJECTIVE STATEMENT
pt. received in Tr-A , A&Ox4 c/o chest pressure since this morning. Pt. arrives rapid a-fib on EKG , Pt. immediately placed on monitor , rapid afib in the 140's . Pt. appears to be uncomfortable , states she is on coumadin for hx. a-fib. Pt. has a hx. lung ca currently in remission. Iv placed on right wrist 20 gauge , labs drawn and sent. Pt. given 20 IV cardizem , Pt. Vitals as noted, and in no acute distress. Will continue to monitor while in the ED.

## 2018-02-14 NOTE — ED ADULT TRIAGE NOTE - CHIEF COMPLAINT QUOTE
Pt. with c/o chest pain and sob for a few days.  Pt. with hx of afib diagnosed last year.  Pt. was on chemo; last treatment in [facility] for lung CA

## 2018-02-14 NOTE — ED PROVIDER NOTE - ATTENDING CONTRIBUTION TO CARE
62F p/w chest pain and SOB x 1 week, significantly worsening today so came in.  Seen at  yesterday and started on Abx for presumed pna.  No fever.  Pt feeling unwell at present, still having lower SSCP, pressure like as well as SOB.  Pt with Afib and RVR as well as lateral ST depression concerning for rate related ACS.  Rx rate control, check labs, XR, rx ASA and admit to tele.  Good control with diltiazem.  VS:  tachycardia, BP adequate    GEN - mild distress CP; A+O x3   HEAD - NC/AT     ENT - PEERL, EOMI, mucous membranes  moist , no discharge      NECK: Neck supple, non-tender without lymphadenopathy, no masses, no JVD  PULM - CTA b/l,  symmetric breath sounds  COR -  normal heart sounds    ABD - , ND, NT, soft, no guarding, no rebound, no masses    BACK - no CVA tenderness, nontender spine     EXTREMS - no edema, no deformity, warm and well perfused    SKIN - no rash or bruising      NEUROLOGIC - alert, CN 2-12 intact, sensation nl, motor 5/5 RUE/LUE/RLE/LLE.

## 2018-02-15 DIAGNOSIS — I48.91 UNSPECIFIED ATRIAL FIBRILLATION: ICD-10-CM

## 2018-02-15 DIAGNOSIS — I50.32 CHRONIC DIASTOLIC (CONGESTIVE) HEART FAILURE: ICD-10-CM

## 2018-02-15 DIAGNOSIS — Z29.9 ENCOUNTER FOR PROPHYLACTIC MEASURES, UNSPECIFIED: ICD-10-CM

## 2018-02-15 DIAGNOSIS — E78.5 HYPERLIPIDEMIA, UNSPECIFIED: ICD-10-CM

## 2018-02-15 DIAGNOSIS — I10 ESSENTIAL (PRIMARY) HYPERTENSION: ICD-10-CM

## 2018-02-15 LAB
APTT BLD: 30.4 SEC — SIGNIFICANT CHANGE UP (ref 27.5–37.4)
B PERT DNA SPEC QL NAA+PROBE: SIGNIFICANT CHANGE UP
BUN SERPL-MCNC: 14 MG/DL — SIGNIFICANT CHANGE UP (ref 7–23)
C PNEUM DNA SPEC QL NAA+PROBE: NOT DETECTED — SIGNIFICANT CHANGE UP
CALCIUM SERPL-MCNC: 8.4 MG/DL — SIGNIFICANT CHANGE UP (ref 8.4–10.5)
CHLORIDE SERPL-SCNC: 105 MMOL/L — SIGNIFICANT CHANGE UP (ref 98–107)
CHOLEST SERPL-MCNC: 141 MG/DL — SIGNIFICANT CHANGE UP (ref 120–199)
CK MB BLD-MCNC: 4.54 NG/ML — SIGNIFICANT CHANGE UP (ref 1–4.7)
CK SERPL-CCNC: 121 U/L — SIGNIFICANT CHANGE UP (ref 25–170)
CO2 SERPL-SCNC: 22 MMOL/L — SIGNIFICANT CHANGE UP (ref 22–31)
CREAT SERPL-MCNC: 0.91 MG/DL — SIGNIFICANT CHANGE UP (ref 0.5–1.3)
FLUAV H1 2009 PAND RNA SPEC QL NAA+PROBE: NOT DETECTED — SIGNIFICANT CHANGE UP
FLUAV H1 RNA SPEC QL NAA+PROBE: NOT DETECTED — SIGNIFICANT CHANGE UP
FLUAV H3 RNA SPEC QL NAA+PROBE: NOT DETECTED — SIGNIFICANT CHANGE UP
FLUAV SUBTYP SPEC NAA+PROBE: SIGNIFICANT CHANGE UP
FLUBV RNA SPEC QL NAA+PROBE: NOT DETECTED — SIGNIFICANT CHANGE UP
GLUCOSE SERPL-MCNC: 99 MG/DL — SIGNIFICANT CHANGE UP (ref 70–99)
HADV DNA SPEC QL NAA+PROBE: NOT DETECTED — SIGNIFICANT CHANGE UP
HAV IGM SER-ACNC: NONREACTIVE — SIGNIFICANT CHANGE UP
HBA1C BLD-MCNC: 5.4 % — SIGNIFICANT CHANGE UP (ref 4–5.6)
HBV CORE IGM SER-ACNC: NONREACTIVE — SIGNIFICANT CHANGE UP
HBV SURFACE AG SER-ACNC: NONREACTIVE — SIGNIFICANT CHANGE UP
HCOV 229E RNA SPEC QL NAA+PROBE: NOT DETECTED — SIGNIFICANT CHANGE UP
HCOV HKU1 RNA SPEC QL NAA+PROBE: NOT DETECTED — SIGNIFICANT CHANGE UP
HCOV NL63 RNA SPEC QL NAA+PROBE: NOT DETECTED — SIGNIFICANT CHANGE UP
HCOV OC43 RNA SPEC QL NAA+PROBE: NOT DETECTED — SIGNIFICANT CHANGE UP
HCT VFR BLD CALC: 35.1 % — SIGNIFICANT CHANGE UP (ref 34.5–45)
HCV AB S/CO SERPL IA: 0.09 S/CO — SIGNIFICANT CHANGE UP
HCV AB SERPL-IMP: SIGNIFICANT CHANGE UP
HDLC SERPL-MCNC: 45 MG/DL — SIGNIFICANT CHANGE UP (ref 45–65)
HGB BLD-MCNC: 12.1 G/DL — SIGNIFICANT CHANGE UP (ref 11.5–15.5)
HMPV RNA SPEC QL NAA+PROBE: NOT DETECTED — SIGNIFICANT CHANGE UP
HPIV1 RNA SPEC QL NAA+PROBE: NOT DETECTED — SIGNIFICANT CHANGE UP
HPIV2 RNA SPEC QL NAA+PROBE: NOT DETECTED — SIGNIFICANT CHANGE UP
HPIV3 RNA SPEC QL NAA+PROBE: NOT DETECTED — SIGNIFICANT CHANGE UP
HPIV4 RNA SPEC QL NAA+PROBE: NOT DETECTED — SIGNIFICANT CHANGE UP
INR BLD: 1.69 — HIGH (ref 0.88–1.17)
LIPID PNL WITH DIRECT LDL SERPL: 90 MG/DL — SIGNIFICANT CHANGE UP
M PNEUMO DNA SPEC QL NAA+PROBE: NOT DETECTED — SIGNIFICANT CHANGE UP
MAGNESIUM SERPL-MCNC: 1.9 MG/DL — SIGNIFICANT CHANGE UP (ref 1.6–2.6)
MCHC RBC-ENTMCNC: 34.5 % — SIGNIFICANT CHANGE UP (ref 32–36)
MCHC RBC-ENTMCNC: 34.8 PG — HIGH (ref 27–34)
MCV RBC AUTO: 100.9 FL — HIGH (ref 80–100)
NRBC # FLD: 0 — SIGNIFICANT CHANGE UP
NT-PROBNP SERPL-SCNC: 3941 PG/ML — SIGNIFICANT CHANGE UP
PLATELET # BLD AUTO: 157 K/UL — SIGNIFICANT CHANGE UP (ref 150–400)
PMV BLD: 10.6 FL — SIGNIFICANT CHANGE UP (ref 7–13)
POTASSIUM SERPL-MCNC: 3.6 MMOL/L — SIGNIFICANT CHANGE UP (ref 3.5–5.3)
POTASSIUM SERPL-SCNC: 3.6 MMOL/L — SIGNIFICANT CHANGE UP (ref 3.5–5.3)
PROTHROM AB SERPL-ACNC: 19.7 SEC — HIGH (ref 9.8–13.1)
RBC # BLD: 3.48 M/UL — LOW (ref 3.8–5.2)
RBC # FLD: 12.3 % — SIGNIFICANT CHANGE UP (ref 10.3–14.5)
RSV RNA SPEC QL NAA+PROBE: NOT DETECTED — SIGNIFICANT CHANGE UP
RV+EV RNA SPEC QL NAA+PROBE: NOT DETECTED — SIGNIFICANT CHANGE UP
SODIUM SERPL-SCNC: 141 MMOL/L — SIGNIFICANT CHANGE UP (ref 135–145)
T3 SERPL-MCNC: 78.9 NG/DL — LOW (ref 80–200)
T4 FREE SERPL-MCNC: 1.36 NG/DL — SIGNIFICANT CHANGE UP (ref 0.9–1.8)
TRIGL SERPL-MCNC: 81 MG/DL — SIGNIFICANT CHANGE UP (ref 10–149)
TROPONIN T SERPL-MCNC: < 0.06 NG/ML — SIGNIFICANT CHANGE UP (ref 0–0.06)
TSH SERPL-MCNC: 4.6 UIU/ML — HIGH (ref 0.27–4.2)
WBC # BLD: 3.59 K/UL — LOW (ref 3.8–10.5)
WBC # FLD AUTO: 3.59 K/UL — LOW (ref 3.8–10.5)

## 2018-02-15 PROCEDURE — 99223 1ST HOSP IP/OBS HIGH 75: CPT

## 2018-02-15 PROCEDURE — 12345: CPT | Mod: NC

## 2018-02-15 RX ORDER — GABAPENTIN 400 MG/1
1 CAPSULE ORAL
Qty: 0 | Refills: 0 | COMMUNITY

## 2018-02-15 RX ORDER — GABAPENTIN 400 MG/1
100 CAPSULE ORAL THREE TIMES A DAY
Qty: 0 | Refills: 0 | Status: DISCONTINUED | OUTPATIENT
Start: 2018-02-15 | End: 2018-02-17

## 2018-02-15 RX ORDER — OXYCODONE HYDROCHLORIDE 5 MG/1
5 TABLET ORAL DAILY
Qty: 0 | Refills: 0 | Status: DISCONTINUED | OUTPATIENT
Start: 2018-02-15 | End: 2018-02-17

## 2018-02-15 RX ORDER — FUROSEMIDE 40 MG
20 TABLET ORAL DAILY
Qty: 0 | Refills: 0 | Status: DISCONTINUED | OUTPATIENT
Start: 2018-02-15 | End: 2018-02-15

## 2018-02-15 RX ORDER — ASPIRIN/CALCIUM CARB/MAGNESIUM 324 MG
81 TABLET ORAL DAILY
Qty: 0 | Refills: 0 | Status: DISCONTINUED | OUTPATIENT
Start: 2018-02-15 | End: 2018-02-17

## 2018-02-15 RX ORDER — FUROSEMIDE 40 MG
20 TABLET ORAL DAILY
Qty: 0 | Refills: 0 | Status: DISCONTINUED | OUTPATIENT
Start: 2018-02-15 | End: 2018-02-17

## 2018-02-15 RX ORDER — WARFARIN SODIUM 2.5 MG/1
5 TABLET ORAL ONCE
Qty: 0 | Refills: 0 | Status: COMPLETED | OUTPATIENT
Start: 2018-02-15 | End: 2018-02-15

## 2018-02-15 RX ADMIN — OXYCODONE HYDROCHLORIDE 5 MILLIGRAM(S): 5 TABLET ORAL at 05:28

## 2018-02-15 RX ADMIN — Medication 20 MILLIGRAM(S): at 17:04

## 2018-02-15 RX ADMIN — GABAPENTIN 100 MILLIGRAM(S): 400 CAPSULE ORAL at 05:28

## 2018-02-15 RX ADMIN — WARFARIN SODIUM 5 MILLIGRAM(S): 2.5 TABLET ORAL at 17:00

## 2018-02-15 RX ADMIN — GABAPENTIN 100 MILLIGRAM(S): 400 CAPSULE ORAL at 21:17

## 2018-02-15 RX ADMIN — Medication 81 MILLIGRAM(S): at 12:04

## 2018-02-15 RX ADMIN — GABAPENTIN 100 MILLIGRAM(S): 400 CAPSULE ORAL at 13:50

## 2018-02-15 RX ADMIN — Medication 20 MILLIGRAM(S): at 05:31

## 2018-02-15 NOTE — H&P ADULT - PROBLEM SELECTOR PLAN 2
Currently not on any medications  Lipid profile in am, low cholesterol diet recommended Monitor on telemetry   Low sodium diet, daily weights, monitor I's and O's, fluid restrictions 1000cc/day  Started on Lasix IV 20mg QD   TTE ordered to evaluate LVEF  Will need to call Dr. Keene in am for Cardiology consult

## 2018-02-15 NOTE — H&P ADULT - FAMILY HISTORY
Sibling  Still living? Unknown  Family history of hypertrophic cardiomyopathy, Age at diagnosis: Age Unknown

## 2018-02-15 NOTE — H&P ADULT - PMH
AF (atrial fibrillation)  On Coumadin  CAP (community acquired pneumonia)  RML/RLL at [facility] 3/2017  Chronic diastolic (congestive) heart failure    GERD (gastroesophageal reflux disease)    HLD (hyperlipidemia)    HOCM (hypertrophic obstructive cardiomyopathy)  S/P septal ablation  HTN (hypertension)    Lung cancer  s/p chemotherapy  MR (mitral regurgitation)    Pleural effusion  Right parapneumonic drained twice 3/2017 at [facility]

## 2018-02-15 NOTE — H&P ADULT - PROBLEM SELECTOR PLAN 3
Continue with antihypertensive medications   DASH diet recommended Currently not on any medications  Lipid profile in am, low cholesterol diet recommended

## 2018-02-15 NOTE — H&P ADULT - NEGATIVE OPHTHALMOLOGIC SYMPTOMS
no lacrimation L/no lacrimation R/no discharge L/no blurred vision R/no discharge R/no blurred vision L/no diplopia/no photophobia

## 2018-02-15 NOTE — H&P ADULT - NEGATIVE NEUROLOGICAL SYMPTOMS
no tremors/no loss of sensation/no headache/no vertigo/no difficulty walking/no loss of consciousness/no confusion/no hemiparesis/no focal seizures/no syncope/no transient paralysis/no weakness/no paresthesias/no generalized seizures

## 2018-02-15 NOTE — H&P ADULT - NEGATIVE MUSCULOSKELETAL SYMPTOMS
no neck pain/no joint swelling/no muscle weakness/no arthritis/no muscle cramps/no myalgia/no stiffness/no arthralgia

## 2018-02-15 NOTE — H&P ADULT - HISTORY OF PRESENT ILLNESS
63 y/o F with PMH of Afib(on Coumadin), CHF(with EF of 55%), GERD, HLD, HOCM(s/p septal ablation), Lung cancer(s/p chemo), MR and TR, HTN presented with the complaint of WALKER and SOB. As per the patient she had chronic SOB but for the past 3 days her SOB has worsened. Patient stated that climbing a flight of stairs she would feel winded and would have to stop multiple times to catch her breath. Patient stated that she is able to walk on a flat surface without any problem(as she is on her feet the whole day due to her work). Patient also stated that she had chronic dry cough which has also worsened for the past 3 days. Patient stated that she normally sleeps with 1 pillow at night, but for the past 3 days she has been sleeping with 3 pillows and endorsed PND and orthopnea. Patient also endorsed of chronic midsternal chest pain that started in March of 2017. Patient stated that the chest pain is intermittent, characterized as a pressure like sensation, without any aggravating factors, self alleviating, with radiation of the pain to the back, with a severity of 6/10. Patient stated that for the past 3 days her chest pain has gotten worse than her baseline and stated that "the chest pain is worse 2/2 to her sickness". Patient was seen at urgent care center and was started on antibiotics for possible PNA. Patient denied any fevers, chills, body aches, runny nose, sore throat, N/V/D/C, abdominal pain, melena, dysuria, hematochezia, recent travel, sick contact, pleuritic or positional chest pain.     On ED admission EKG revealed Atrial fibrillation with RVR at a rate of 105 with TWI in lead V6, I, AVL and QTc of 420, CE x 1: Negative, Gluc: 128, AST: 43, ALT: 42. Prelim CXR: No focal consolidation. Patient was given IV Cardizem 20mg and PO Cardizem 30mg in the ED. When examined patient is resting in the stretcher and endorsed a 2/10 chest pressure, denied any other complaints.

## 2018-02-15 NOTE — H&P ADULT - PROBLEM SELECTOR PLAN 1
[id number] score of 3 and is on Coumadin for anticoagulation. Patient received IV and PO Cardizem in the ED with better rate control   Will monitor on telemetry, serial EKG and Patrice prn for episodes of chest pain/palpitations  HgbA1C, TSH, lipid profile, CBC, CMP in am   TTE ordered to evaluate LVEF  Will need to call Dr. Keene in am for Cardiology consult   Continue with Cardizem 60mg QID for rate control  Will check INR in am and dose coumadin accordingly(patient took her Coumadin prior to coming to the ER)  RVP ordered

## 2018-02-15 NOTE — PROGRESS NOTE ADULT - PROBLEM SELECTOR PLAN 5
Patient diagnosed early last year with B -Cell lymphoma. Per pt she is s/p 4 cycles of chemo that was completed in OCt or Nov 2017. She states she had a PET scan around that time that did not show residual disease. She states that she is scheduled for another PET scan soon. CT surgery notes in [employer]scripts. Oncologist Dr. Walls. Patient diagnosed early last year with B -Cell lymphoma. Per pt she is s/p 4 cycles of chemo that was completed in OCt or Nov 2017.  CT surgery notes in [employer]scripts. Oncologist Dr. Walls.    Addendum: Was able to get in contact w/ Dr. Walls today he states pt s/p 4 cycles of chemotherapy  (rituxan) that ended in 10/17. She had a PET scan around that time that showed minimal disease. Pt was suppose to have a repeat scan for restaging for 2/1.

## 2018-02-15 NOTE — H&P ADULT - NSHPSOCIALHISTORY_GEN_ALL_CORE
Single, lives with children, works as a scenic   Quit smoking in March 2017 and smoked 1 pack a day, drinks wine occasionally(2x a week)

## 2018-02-15 NOTE — H&P ADULT - ASSESSMENT
61 y/o F with PMH of Afib(on Coumadin), CHF(with EF of 55%), GERD, HLD, HOCM(s/p septal ablation), Lung cancer(s/p chemo), MR and TR, HTN presented with the complaint of WALKER and SOB. Found to be in atrial fibrillation with RVR    +Atrial fibrillation with RVR-s/p IV and PO Cardizem in the ED, on coumadin for anticoagulation 61 y/o F with PMH of Afib(on Coumadin), CHF(with EF of 55%), GERD, HLD, HOCM(s/p septal ablation), Lung cancer(s/p chemo), MR and TR, HTN presented with the complaint of WALKER and SOB. Found to be in atrial fibrillation with RVR    +Atrial fibrillation with RVR-s/p IV and PO Cardizem in the ED, on coumadin  +Acute on chronic diastolic CHF-On IV Lasix 20mg QD

## 2018-02-15 NOTE — H&P ADULT - NEGATIVE GASTROINTESTINAL SYMPTOMS
no diarrhea/no change in bowel habits/no constipation/no vomiting/no abdominal pain/no melena/no hematochezia/no nausea

## 2018-02-15 NOTE — H&P ADULT - RS GEN PE MLT RESP DETAILS PC
no chest wall tenderness/no intercostal retractions/respirations non-labored/normal/no rhonchi/airway patent/no wheezes/rales

## 2018-02-15 NOTE — H&P ADULT - PROBLEM SELECTOR PLAN 4
Monitor on telemetry   Low sodium diet, daily weights, monitor I's and O's, fluid restrictions 1000cc/day  Started on Lasix IV 20mg QD   TTE ordered to evaluate LVEF  Will need to call Dr. Keene in am for Cardiology consult Continue with antihypertensive medications   DASH diet recommended

## 2018-02-15 NOTE — H&P ADULT - ATTENDING COMMENTS
Pt presently feels somewhat better, but still with dyspnea on minimal exertion, and chest pressure.  HR now in 80's. Lungs with mild bibasilar rales.  1+ LE edema.    CE neg x 2  Will consult cardiology (Dr Keene away from office, will call covering doctor)  continue diuresis with IV lasix  monitor on tele.  Rate control with cardizem

## 2018-02-15 NOTE — H&P ADULT - GASTROINTESTINAL DETAILS
no rebound tenderness/no rigidity/normal/soft/no guarding/nontender/no distention/bowel sounds normal

## 2018-02-15 NOTE — H&P ADULT - NSHPLABSRESULTS_GEN_ALL_CORE
12.1   4.46  )-----------( 178      ( 14 Feb 2018 20:50 )             36.8     02-14    140  |  102  |  16  ----------------------------<  128<H>  4.0   |  17<L>  |  0.97    Ca    8.8      14 Feb 2018 20:50    TPro  7.1  /  Alb  4.4  /  TBili  1.1  /  DBili  x   /  AST  43<H>  /  ALT  42<H>  /  AlkPhos  88  02-14    CARDIAC MARKERS ( 14 Feb 2018 20:50 )  x     / < 0.06 ng/mL / 164 u/L / 5.77 ng/mL / x        Prelim CXR: No focal consolidation.    EKG: Atrial fibrillation with RVR at a rate of 105 with TWI in lead V6, I, AVL and QTc of 420

## 2018-02-16 LAB
APTT BLD: 29.7 SEC — SIGNIFICANT CHANGE UP (ref 27.5–37.4)
BUN SERPL-MCNC: 11 MG/DL — SIGNIFICANT CHANGE UP (ref 7–23)
CALCIUM SERPL-MCNC: 8.4 MG/DL — SIGNIFICANT CHANGE UP (ref 8.4–10.5)
CHLORIDE SERPL-SCNC: 103 MMOL/L — SIGNIFICANT CHANGE UP (ref 98–107)
CO2 SERPL-SCNC: 24 MMOL/L — SIGNIFICANT CHANGE UP (ref 22–31)
CREAT SERPL-MCNC: 0.76 MG/DL — SIGNIFICANT CHANGE UP (ref 0.5–1.3)
GLUCOSE SERPL-MCNC: 94 MG/DL — SIGNIFICANT CHANGE UP (ref 70–99)
HCT VFR BLD CALC: 36 % — SIGNIFICANT CHANGE UP (ref 34.5–45)
HGB BLD-MCNC: 12.6 G/DL — SIGNIFICANT CHANGE UP (ref 11.5–15.5)
INR BLD: 1.69 — HIGH (ref 0.88–1.17)
MCHC RBC-ENTMCNC: 35 % — SIGNIFICANT CHANGE UP (ref 32–36)
MCHC RBC-ENTMCNC: 35 PG — HIGH (ref 27–34)
MCV RBC AUTO: 100 FL — SIGNIFICANT CHANGE UP (ref 80–100)
NRBC # FLD: 0 — SIGNIFICANT CHANGE UP
PLATELET # BLD AUTO: 165 K/UL — SIGNIFICANT CHANGE UP (ref 150–400)
PMV BLD: 10.4 FL — SIGNIFICANT CHANGE UP (ref 7–13)
POTASSIUM SERPL-MCNC: 3.4 MMOL/L — LOW (ref 3.5–5.3)
POTASSIUM SERPL-SCNC: 3.4 MMOL/L — LOW (ref 3.5–5.3)
PROTHROM AB SERPL-ACNC: 19 SEC — HIGH (ref 9.8–13.1)
RBC # BLD: 3.6 M/UL — LOW (ref 3.8–5.2)
RBC # FLD: 12.1 % — SIGNIFICANT CHANGE UP (ref 10.3–14.5)
SODIUM SERPL-SCNC: 141 MMOL/L — SIGNIFICANT CHANGE UP (ref 135–145)
WBC # BLD: 2.63 K/UL — LOW (ref 3.8–10.5)
WBC # FLD AUTO: 2.63 K/UL — LOW (ref 3.8–10.5)

## 2018-02-16 PROCEDURE — 93306 TTE W/DOPPLER COMPLETE: CPT | Mod: 26

## 2018-02-16 PROCEDURE — 99239 HOSP IP/OBS DSCHRG MGMT >30: CPT

## 2018-02-16 RX ORDER — POTASSIUM CHLORIDE 20 MEQ
40 PACKET (EA) ORAL ONCE
Qty: 0 | Refills: 0 | Status: COMPLETED | OUTPATIENT
Start: 2018-02-16 | End: 2018-02-16

## 2018-02-16 RX ORDER — DILTIAZEM HCL 120 MG
240 CAPSULE, EXT RELEASE 24 HR ORAL DAILY
Qty: 0 | Refills: 0 | Status: DISCONTINUED | OUTPATIENT
Start: 2018-02-16 | End: 2018-02-17

## 2018-02-16 RX ORDER — WARFARIN SODIUM 2.5 MG/1
5 TABLET ORAL ONCE
Qty: 0 | Refills: 0 | Status: DISCONTINUED | OUTPATIENT
Start: 2018-02-16 | End: 2018-02-16

## 2018-02-16 RX ORDER — WARFARIN SODIUM 2.5 MG/1
6 TABLET ORAL ONCE
Qty: 0 | Refills: 0 | Status: COMPLETED | OUTPATIENT
Start: 2018-02-16 | End: 2018-02-16

## 2018-02-16 RX ADMIN — OXYCODONE HYDROCHLORIDE 5 MILLIGRAM(S): 5 TABLET ORAL at 12:17

## 2018-02-16 RX ADMIN — Medication 240 MILLIGRAM(S): at 14:35

## 2018-02-16 RX ADMIN — Medication 20 MILLIGRAM(S): at 05:54

## 2018-02-16 RX ADMIN — WARFARIN SODIUM 6 MILLIGRAM(S): 2.5 TABLET ORAL at 17:56

## 2018-02-16 RX ADMIN — GABAPENTIN 100 MILLIGRAM(S): 400 CAPSULE ORAL at 20:59

## 2018-02-16 RX ADMIN — Medication 81 MILLIGRAM(S): at 12:17

## 2018-02-16 RX ADMIN — GABAPENTIN 100 MILLIGRAM(S): 400 CAPSULE ORAL at 05:54

## 2018-02-16 RX ADMIN — GABAPENTIN 100 MILLIGRAM(S): 400 CAPSULE ORAL at 14:34

## 2018-02-16 RX ADMIN — Medication 40 MILLIEQUIVALENT(S): at 12:17

## 2018-02-16 NOTE — PROGRESS NOTE ADULT - PROBLEM SELECTOR PLAN 5
Patient diagnosed early last year with B -Cell lymphoma. Per pt she is s/p 4 cycles of chemo that was completed in OCt or Nov 2017.  CT surgery notes in [employer]scripts. Oncologist Dr. Walls.    Addendum: Was able to get in contact w/ Dr. Walls today he states pt s/p 4 cycles of chemotherapy  (rituxan) that ended in 10/17. She had a PET scan around that time that showed minimal disease. Pt was suppose to have a repeat scan for restaging for 2/1 will need outpt f/u with Onc

## 2018-02-16 NOTE — CONSULT NOTE ADULT - SUBJECTIVE AND OBJECTIVE BOX
CHIEF COMPLAINT:Dyspnea    HPI:-63 y/o F with PMH of Atrial Fibrillation(on Coumadin), HFpEF-(with EF of 55%), GERD, HLD, HOCM(s/p septal ablation), Lung cancer(s/p chemo), MR and TR, HTN presented with the complaint of WALKER and SOB. As per the patient she had chronic SOB but for the past 3 days her SOB has worsened. Patient stated that climbing a flight of stairs she would feel winded and would have to stop multiple times to catch her breath. Patient stated that she is able to walk on a flat surface without any problem(as she is on her feet the whole day due to her work). Patient also stated that she had chronic dry cough which has also worsened for the past 3 days. Patient stated that she normally sleeps with 1 pillow at night, but for the past 3 days she has been sleeping with 3 pillows and endorsed PND and orthopnea. Patient also endorsed of chronic midsternal chest pain that started in 2017. Patient stated that the chest pain is intermittent, characterized as a pressure like sensation, without any aggravating factors, self alleviating, with radiation of the pain to the back, with a severity of 6/10. Patient stated that for the past 3 days her chest pain has gotten worse than her baseline and stated that "the chest pain is worse 2/2 to her sickness". Patient was seen at urgent care center and was started on antibiotics for possible PNA. Patient denied any fevers, chills, body aches, runny nose, sore throat, N/V/D/C, abdominal pain, melena, dysuria, hematochezia, recent travel, sick contact, pleuritic or positional chest pain.     On ED admission EKG revealed Atrial fibrillation with RVR at a rate of 105 with TWI in lead V6, I, AVL and QTc of 420, CE x 1: Negative, Gluc: 128, AST: 43, ALT: 42. Prelim CXR: No focal consolidation. Patient was given IV Cardizem 20mg and PO Cardizem 30mg in the ED. When examined patient is resting in the stretcher and endorsed a 2/10 chest pressure, denied any other complaints.Her dyspnea has improved since admission-no further chest pain,remains in atrial fibrillation-rate is controlled.    PAST MEDICAL & SURGICAL HISTORY:  GERD (gastroesophageal reflux disease)  HLD (hyperlipidemia)  HTN (hypertension)  CAP (community acquired pneumonia): RML/RLL at [facility] 3/2017  Lung cancer: s/p chemotherapy  MR (mitral regurgitation)  Chronic diastolic (congestive) heart failure  AF (atrial fibrillation): On Coumadin  Pleural effusion: Right parapneumonic drained twice 3/2017 at [facility]  HOCM (hypertrophic obstructive cardiomyopathy): S/P septal ablation  H/O cardiac radiofrequency ablation: for HOCM  History of : 15 years ago      MEDICATIONS  (STANDING):  aspirin enteric coated 81 milliGRAM(s) Oral daily  diltiazem    Tablet 60 milliGRAM(s) Oral four times a day  furosemide    Tablet 20 milliGRAM(s) Oral daily  gabapentin 100 milliGRAM(s) Oral three times a day    MEDICATIONS  (PRN):  oxyCODONE    IR 5 milliGRAM(s) Oral daily PRN Severe Pain (7 - 10)      FAMILY HISTORY:  Family history of hypertrophic cardiomyopathy (Sibling)    No family history of premature coronary artery disease or sudden cardiac death    SOCIAL HISTORY:  Smoking-Former smoker  Alcohol-Denies  Ilicit Drug use-Denies    REVIEW OF SYSTEMS:  Constitutional: [ ] fever, [ ]weight loss, [x ]fatigue Activity [ ] Bedbound,[x ] Ambulates [x ] Unassisted[ ] Cane/Walker [ ] Assistence.  Eyes: [ ] visual changes  Respiratory: [x ]shortness of breath;  [x ] cough, [ ]wheezing, [ ]chills, [ ]hemoptysis  Cardiovascular: [x ] chest pain, [x ]palpitations, [ ]dizziness,  [ ]leg swelling[ ]orthopnea [ ]PND  Gastrointestinal: [ ] abdominal pain, [ ]nausea, [ ]vomiting,  [ ]diarrhea,[ ]constipation  Genitourinary: [ ] dysuria, [ ] hematuria  Neurologic: [ ] headaches [ ] tremors[ ] weakness  Skin: [ ] itching, [ ]burning, [ ] rashes  Endocrine: [ ] heat or cold intolerance  Musculoskeletal: [ ] joint pain or swelling; [ ] muscle, back, or extremity pain  Psychiatric: [ ] depression, [ ]anxiety, [ ]mood swings, or [ ]difficulty sleeping  Hematologic: [ ] easy bruising, [ ] bleeding gums       [ x] All others negative	  [ ] Unable to obtain    Vital Signs Last 24 Hrs  T(C): 36.6 (2018 05:28), Max: 36.7 (15 Feb 2018 15:39)  T(F): 97.8 (2018 05:28), Max: 98 (15 Feb 2018 15:39)  HR: 86 (2018 05:30) (68 - 91)  BP: 112/68 (2018 05:30) (99/64 - 127/87)  RR: 918 (2018 05:28) (17 - 918)  SpO2: 97% (15 Feb 2018 23:44) (94% - 99%)  I&O's Summary    15 Feb 2018 07:01  -  2018 07:00  --------------------------------------------------------  IN: 0 mL / OUT: 600 mL / NET: -600 mL        PHYSICAL EXAM:  General: No acute distress BMI-30  HEENT: EOMI, PERRL[ ] Icteric  Neck: Supple, No JVD  Lungs: Equal air entry bilaterally; [ ] Rales [ ] Rhonchi [ ] Wheezing  Heart: Irregular irregular rhythm;[x ] Murmurs-  2 /6 [x ] Systolic [ ] Diastolic [ ] Radiation,No rubs, or gallops  Abdomen: Nontender, bowel sounds present  Extremities: No clubbing, cyanosis, or edema[ ] Calf tenderness  Nervous system:  Alert & Oriented X3, no focal deficits  Psychiatric: Normal affect  Skin: No rashes or lesions      LABS:      141  |  103  |  11  ----------------------------<  94  3.4<L>   |  24  |  0.76    Ca    8.4      2018 06:20  Mg     1.9     02-15    TPro  7.1  /  Alb  4.4  /  TBili  1.1  /  DBili  x   /  AST  43<H>  /  ALT  42<H>  /  AlkPhos  88  02-14    Creatinine Trend: 0.76<--, 0.91<--, 0.97<--                        12.6   2.63  )-----------( 165      ( 2018 06:20 )             36.0     PT/INR - ( 2018 06:20 )   PT: 19.0 SEC;   INR: 1.69          PTT - ( 2018 06:20 )  PTT:29.7 SEC    Lipid Panel:   Cardiac Enzymes: CARDIAC MARKERS ( 15 Feb 2018 04:10 )  x     / < 0.06 ng/mL / 121 u/L / 4.54 ng/mL / x      CARDIAC MARKERS ( 2018 20:50 )  x     / < 0.06 ng/mL / 164 u/L / 5.77 ng/mL / x          Serum Pro-Brain Natriuretic Peptide: 3941 pg/mL (-18 @ 20:50)    02-15 [id number] 5.4      RADIOLOGY: XR CHEST PA LAT 2V  IMPRESSION: Cardiomegaly with clear lungs.    ECG [my interpretation]:-Atrial fibrillation with rapid ventricular response at 150 BPM,ST & T wave abnormality, consider lateral ischemia    ECHO: Study Date: 3/11/2017  Normal Left Ventricular Systolic Function, (EF = 55%)  Grade II diastolic dysfunction  RV systolic pressure is moderately increased (PASP 46mm Hg).  Severe left atrial enlargement.  Moderate mitral regurgitation.

## 2018-02-16 NOTE — CHART NOTE - NSCHARTNOTEFT_GEN_A_CORE
D/W Dr. Keene.  He would like to monitor the patient overnight as the patient was switched to Caridizem LA 240mg QD instead of 60mg PO Q6.  At home she was taking it non-compliantly 60mg PO BID, and here was not getting the short acting because of BP parameters, which were changed.  D/W attending.

## 2018-02-16 NOTE — CONSULT NOTE ADULT - ASSESSMENT
· Assessment		  63 y/o F with PMH of Afib(on Coumadin), CHF(with EF of 55%), GERD, HLD, HOCM(s/p septal ablation), Lung cancer(s/p chemo), MR and TR, HTN presented with the complaint of WALKER and SOB. Found to be in atrial fibrillation with RVR    Problem/Plan - 1:  ·  Problem: Atrial fibrillation with RVR.  Plan: [id number] score of 3 and is on Coumadin for anticoagulation,Goal INR-2-2.5.  Rate currently controlled on Cardizem.      .Problem/Plan - 2:  ·  Problem: Chronic diastolic (congestive) heart failure.  Plan: Monitor on telemetry.  No evidence for Decompensated HF.       Problem/Plan - 3:  ·  Problem: Chest Pain-ACS  Plan: No evidence for recent cardiac injury.  Prior TTE-EF55%,GII Diastolic Dysfunction.  To repeat TTE-LVEF/WMA      At present stable on current medications,no further cardiac work-up considered.

## 2018-02-16 NOTE — CONSULT NOTE ADULT - ATTENDING COMMENTS
Thank you for the courtesy of the consultation,I would be available for any further discussion if needed.  Allen Keene MD,FACC.  [address] [place], [place] [id number] [phone]

## 2018-02-17 ENCOUNTER — TRANSCRIPTION ENCOUNTER (OUTPATIENT)
Age: 63
End: 2018-02-17

## 2018-02-17 VITALS
DIASTOLIC BLOOD PRESSURE: 61 MMHG | SYSTOLIC BLOOD PRESSURE: 111 MMHG | HEART RATE: 74 BPM | TEMPERATURE: 97 F | RESPIRATION RATE: 18 BRPM | OXYGEN SATURATION: 98 %

## 2018-02-17 LAB
BUN SERPL-MCNC: 10 MG/DL — SIGNIFICANT CHANGE UP (ref 7–23)
CALCIUM SERPL-MCNC: 8.6 MG/DL — SIGNIFICANT CHANGE UP (ref 8.4–10.5)
CHLORIDE SERPL-SCNC: 100 MMOL/L — SIGNIFICANT CHANGE UP (ref 98–107)
CO2 SERPL-SCNC: 23 MMOL/L — SIGNIFICANT CHANGE UP (ref 22–31)
CREAT SERPL-MCNC: 0.89 MG/DL — SIGNIFICANT CHANGE UP (ref 0.5–1.3)
GLUCOSE SERPL-MCNC: 89 MG/DL — SIGNIFICANT CHANGE UP (ref 70–99)
HCT VFR BLD CALC: 36.2 % — SIGNIFICANT CHANGE UP (ref 34.5–45)
HGB BLD-MCNC: 12.3 G/DL — SIGNIFICANT CHANGE UP (ref 11.5–15.5)
INR BLD: 1.69 — HIGH (ref 0.88–1.17)
MCHC RBC-ENTMCNC: 34 % — SIGNIFICANT CHANGE UP (ref 32–36)
MCHC RBC-ENTMCNC: 34 PG — SIGNIFICANT CHANGE UP (ref 27–34)
MCV RBC AUTO: 100 FL — SIGNIFICANT CHANGE UP (ref 80–100)
NRBC # FLD: 0 — SIGNIFICANT CHANGE UP
PLATELET # BLD AUTO: 171 K/UL — SIGNIFICANT CHANGE UP (ref 150–400)
PMV BLD: 10.4 FL — SIGNIFICANT CHANGE UP (ref 7–13)
POTASSIUM SERPL-MCNC: 3.7 MMOL/L — SIGNIFICANT CHANGE UP (ref 3.5–5.3)
POTASSIUM SERPL-SCNC: 3.7 MMOL/L — SIGNIFICANT CHANGE UP (ref 3.5–5.3)
PROTHROM AB SERPL-ACNC: 19.6 SEC — HIGH (ref 9.8–13.1)
RBC # BLD: 3.62 M/UL — LOW (ref 3.8–5.2)
RBC # FLD: 12.1 % — SIGNIFICANT CHANGE UP (ref 10.3–14.5)
SODIUM SERPL-SCNC: 137 MMOL/L — SIGNIFICANT CHANGE UP (ref 135–145)
WBC # BLD: 1.92 K/UL — LOW (ref 3.8–10.5)
WBC # FLD AUTO: 1.92 K/UL — LOW (ref 3.8–10.5)

## 2018-02-17 PROCEDURE — 99239 HOSP IP/OBS DSCHRG MGMT >30: CPT

## 2018-02-17 RX ORDER — WARFARIN SODIUM 2.5 MG/1
1 TABLET ORAL
Qty: 0 | Refills: 0 | COMMUNITY

## 2018-02-17 RX ORDER — DILTIAZEM HCL 120 MG
1 CAPSULE, EXT RELEASE 24 HR ORAL
Qty: 0 | Refills: 0 | COMMUNITY

## 2018-02-17 RX ORDER — GABAPENTIN 400 MG/1
1 CAPSULE ORAL
Qty: 0 | Refills: 0 | DISCHARGE
Start: 2018-02-17

## 2018-02-17 RX ORDER — ASPIRIN/CALCIUM CARB/MAGNESIUM 324 MG
1 TABLET ORAL
Qty: 0 | Refills: 0 | COMMUNITY

## 2018-02-17 RX ORDER — DILTIAZEM HCL 120 MG
1 CAPSULE, EXT RELEASE 24 HR ORAL
Qty: 30 | Refills: 0
Start: 2018-02-17 | End: 2018-03-18

## 2018-02-17 RX ORDER — FUROSEMIDE 40 MG
1 TABLET ORAL
Qty: 0 | Refills: 0 | COMMUNITY

## 2018-02-17 RX ORDER — FUROSEMIDE 40 MG
1 TABLET ORAL
Qty: 0 | Refills: 0 | DISCHARGE
Start: 2018-02-17

## 2018-02-17 RX ORDER — ASPIRIN/CALCIUM CARB/MAGNESIUM 324 MG
1 TABLET ORAL
Qty: 0 | Refills: 0 | DISCHARGE
Start: 2018-02-17

## 2018-02-17 RX ORDER — GABAPENTIN 400 MG/1
1 CAPSULE ORAL
Qty: 0 | Refills: 0 | COMMUNITY

## 2018-02-17 RX ADMIN — Medication 20 MILLIGRAM(S): at 06:03

## 2018-02-17 RX ADMIN — GABAPENTIN 100 MILLIGRAM(S): 400 CAPSULE ORAL at 13:39

## 2018-02-17 RX ADMIN — GABAPENTIN 100 MILLIGRAM(S): 400 CAPSULE ORAL at 06:03

## 2018-02-17 RX ADMIN — Medication 240 MILLIGRAM(S): at 06:03

## 2018-02-17 RX ADMIN — Medication 81 MILLIGRAM(S): at 11:53

## 2018-02-17 NOTE — PROGRESS NOTE ADULT - PROBLEM SELECTOR PLAN 3
Currently not on any medications. LDL 90

## 2018-02-17 NOTE — PROGRESS NOTE ADULT - PROBLEM SELECTOR PLAN 6
Continue with antihypertensive medications   DASH diet recommended

## 2018-02-17 NOTE — PROGRESS NOTE ADULT - PROBLEM SELECTOR PLAN 4
Patient reports having chronic CP since having pleurodesis, partial decortication, and pleural bx early last year 4/2017. She states pain is intermittent, sharp, not associated w/ exertion. She currently dose not have CP but did report CP on admission. CE neg x2. EKG w/o acute ischemic changes. CXR w/ clear lungs   -c/w home dose analgesic oxycodone PRN for now   -would obtain TTE  -monitor for any clinical changes

## 2018-02-17 NOTE — PROGRESS NOTE ADULT - ATTENDING COMMENTS
Afib rvr now rate controlled. D/C home with f/u with Dr. Keene next week for HR check and INR. Time on d/c 35 minutes.
will need to f/u with PCP INR check on monday and also  repeat imaging. change ca channel blocker to 240. discharge 32 min

## 2018-02-17 NOTE — DISCHARGE NOTE ADULT - CARE PROVIDER_API CALL
Allen Keene (SEPIDEH), Cardiology  [address] [id number]  Phone: [phone]  Fax: [phone]

## 2018-02-17 NOTE — PROGRESS NOTE ADULT - PROBLEM SELECTOR PLAN 1
[id number] score of 3 and is on Coumadin for anticoagulation. Patient s/p IV and PO Cardizem in the ED with better rate control. Unclear trigger. Pt had cough that she states is resolving. Otherwise she denies any other infectious symptoms. CXR w/o acute pathology. CE neg x2. Admission EKG w/o acute ischemic changes. She reports compliance with her medications. Suspect SOB was in the setting of her afib w/ rvr  -f/u TTE   -Continue with Cardizem 60mg QID for rate control consider changing to 240 CD after discussion with Dr. Keene  -INR subtherapuetic today at 1.69, will dose home dose of 6mg today
[id number] score of 3 and is on Coumadin for anticoagulation. Patient s/p IV and PO Cardizem in the ED with better rate control. Unclear trigger. Pt had cough that she states is resolving. Otherwise she denies any other infectious symptoms. CXR w/o acute pathology. CE neg x2. Admission EKG w/o acute ischemic changes. She reports compliance with her medications. Suspect SOB was in the setting of her afib w/ rvr  -c/w telemetry  -f/u TTE   -Continue with Cardizem 60mg QID for rate control  -INR subtherapuetic today at 1.69, will dose home dose of 5mg today
[id number] score of 3 and is on Coumadin for anticoagulation. Patient s/p IV and PO Cardizem in the ED with better rate control. Unclear trigger. Pt had cough that she states is resolving. Otherwise she denies any other infectious symptoms. CXR w/o acute pathology. CE neg x2. Admission EKG w/o acute ischemic changes. She reports compliance with her medications. Suspect SOB was in the setting of her afib w/ rvr  -f/u TTE   -Continue with Cardizem 60mg QID for rate control consider changing to 240 CD after discussion with Dr. Keene  -INR subtherapuetic today at 1.69, will dose home dose of 6mg today

## 2018-02-17 NOTE — PROGRESS NOTE ADULT - PROBLEM SELECTOR PLAN 2
TTE from 3/2017 w/ EF 55% and grade II diastolic dysfunction  Patient did report SOB over the past couple of days prior to admission, however, she denies increasing LE swelling and states she has been at her baseline in that regard. Today her SOB is more improved and near baseline, lungs clear on exam and no significant LE edema. Suspect SOB was in setting of afib w/ RVR. Pt does not appear to be in decompensated HF at this time.  -Low sodium diet, daily weights, monitor I's and O's,   -would switch Lasix IV 20mg QD back to her home dose of 20mg qd   -would order TTE, given presentation of afib w/ rvr and pt reports completing chemo in Oct/Sept 2017
TTE from 3/2017 w/ EF 55% and grade II diastolic dysfunction  Patient did report SOB over the past couple of days prior to admission, however, she denies increasing LE swelling to this writer and states she has been at her baseline in that regard. Today her SOB is more improved and near baseline, lungs clear on exam and no significant LE edema. Suspect SOB was in setting of afib w/ RVR. Pt does not appear to be in decompensated HF at this time.  -Low sodium diet, daily weights, monitor I's and O's,   -would switch Lasix IV 20mg QD back to her home dose of 20mg qd   -would order TTE, given presentation of afib w/ rvr and pt reports completing chemo in Oct/Sept 2017  -will consider cards consult pending
TTE from 3/2017 w/ EF 55% and grade II diastolic dysfunction  Patient did report SOB over the past couple of days prior to admission, however, she denies increasing LE swelling and states she has been at her baseline in that regard. Today her SOB is more improved and near baseline, lungs clear on exam and no significant LE edema. Suspect SOB was in setting of afib w/ RVR. Pt does not appear to be in decompensated HF at this time.  -Low sodium diet, daily weights, monitor I's and O's,   -would switch Lasix IV 20mg QD back to her home dose of 20mg qd   -would order TTE, given presentation of afib w/ rvr and pt reports completing chemo in Oct/Sept 2017

## 2018-02-17 NOTE — DISCHARGE NOTE ADULT - MEDICATION SUMMARY - MEDICATIONS TO TAKE
I will START or STAY ON the medications listed below when I get home from the hospital:    oxyCODONE 5 mg oral tablet  -- 1 tab(s) by mouth once a day  -- Indication: For pain    aspirin 81 mg oral delayed release tablet  -- 1 tab(s) by mouth once a day  -- Indication: For preventative    dilTIAZem 240 mg/24 hours oral capsule, extended release  -- 1 cap(s) by mouth once a day  -- Indication: For Atrial fibrillation with RVR    warfarin 5 mg oral tablet  -- Take 1.5 tabs tonight, then 1 tab tomorrow night, then 1.5 tabs on Sunday night.  Check INR Mon  -- Indication: For Atrial fibrillation    gabapentin 100 mg oral capsule  -- 1 cap(s) by mouth 3 times a day  -- Indication: For pain    ProAir HFA 90 mcg/inh inhalation aerosol  -- 2 puff(s) inhaled 4 times a day, As Needed  -- Indication: For bronnchospasm    albuterol 2.5 mg/3 mL (0.083%) inhalation solution  -- 3 milliliter(s) inhaled every 4 to 6 hours, As Needed  -- Indication: For bronnchospasm    furosemide 20 mg oral tablet  -- 1 tab(s) by mouth once a day  -- Indication: For Chronic diastolic (congestive) heart failure

## 2018-02-17 NOTE — DISCHARGE NOTE ADULT - CARE PLAN
Principal Discharge DX:	Atrial fibrillation with RVR  Goal:	rate control and anticoagulation  Assessment and plan of treatment:	continue current meds   Check PT/INR on Monday  Secondary Diagnosis:	Chronic diastolic (congestive) heart failure  Assessment and plan of treatment:	low salt diet  continue current meds  F/U with your cardioliologist in 1 week  Secondary Diagnosis:	Essential hypertension  Assessment and plan of treatment:	low salt diet  continue current meds  Secondary Diagnosis:	HLD (hyperlipidemia)  Assessment and plan of treatment:	low chol diet  continue current meds

## 2018-02-17 NOTE — DISCHARGE NOTE ADULT - PATIENT PORTAL LINK FT
You can access the [facility] Patient Portal, offered by [facility], by registering with the following website: [email or web address]

## 2018-02-17 NOTE — DISCHARGE NOTE ADULT - HOSPITAL COURSE
63 y/o Female, with a PmHx of Afib (on Coumadin), CHF (with EF of 55%), GERD, HLD, HOCM (s/p septal ablation), Lung cancer (s/p chemo), MR and TR, HTN, presented with the complaint of WALKER and SOB. Found to be in atrial fibrillation with RVR.  The following was the hospital course:  2/14 Med - pt presently feels somewhat better, but still with dyspnea on minimal exertion, and chest pressure, HR now in 80's, Lungs with mild bibasilar rales, 1+ edema. CE x 2 neg. Will consult cardiology Dr. Keene but away from office, will call covering doctor, continue diuresis with IV lasix, monitor on tele. Rate control with cardizem  2/15 Med: Coumadin dosed, TTE, Tele, consider cards  cards  2/16 Medicine: Continue with Cardizem 60mg QID for rate control consider changing to 240 CD after discussion with Dr. Keene. INR subtherapuetic today at 1.69, will dose home dose of 6mg today. switch Lasix IV 20mg QD back to her home dose of 20mg qd 63 y/o Female, with a PmHx of Afib (on Coumadin), CHF (with EF of 55%), GERD, HLD, HOCM (s/p septal ablation), Lung cancer (s/p chemo), MR and TR, HTN, presented with the complaint of WALKER and SOB. Found to be in atrial fibrillation with RVR.  The following was the hospital course:  2/14 Med - pt presently feels somewhat better, but still with dyspnea on minimal exertion, and chest pressure, HR now in 80's, Lungs with mild bibasilar rales, 1+ edema. CE x 2 neg. Will consult cardiology Dr. Keene but away from office, will call covering doctor, continue diuresis with IV lasix, monitor on tele. Rate control with cardizem  2/15 Med: Coumadin dosed, TTE, Tele, consider cards  cards  2/16 Medicine: Continue with Cardizem 60mg QID for rate control consider changing to 240 CD after discussion with Dr. Keene. INR subtherapuetic today at 1.69, will dose home dose of 6mg today. switch Lasix IV 20mg Qday back to her home dose of 20mg qd

## 2018-02-17 NOTE — DISCHARGE NOTE ADULT - PLAN OF CARE
rate control and anticoagulation continue current meds   Check PT/INR on Monday low salt diet  continue current meds  F/U with your cardioliologist in 1 week low salt diet  continue current meds low chol diet  continue current meds

## 2018-02-17 NOTE — PROGRESS NOTE ADULT - ASSESSMENT
61 y/o F with PMH of Afib(on Coumadin), CHF(with EF of 55%  and Grade II diastolic dysfunction 3/ 2017), GERD, HLD, HOCM(s/p septal ablation), Lung cancer(s/p chemo in oct/sept 2017), MR and TR, HTN presented with the complaint of WALKER and SOB, found to be in atrial fibrillation with RVR

## 2018-02-17 NOTE — PROGRESS NOTE ADULT - PROBLEM SELECTOR PROBLEM 2
Chronic diastolic (congestive) heart failure

## 2018-02-17 NOTE — DISCHARGE NOTE ADULT - MEDICATION SUMMARY - MEDICATIONS TO STOP TAKING
I will STOP taking the medications listed below when I get home from the hospital:    diltiaZEM 60 mg oral tablet  -- 1 tab(s) by mouth 2 times a day    Cardizem 60 mg oral tablet  -- 1 tab(s) by mouth 4 times a day

## 2018-02-17 NOTE — DISCHARGE NOTE ADULT - OTHER SIGNIFICANT FINDINGS
EKG: Atrial fibrillation w/ RVR @ 105 with TWI in lead V6, I, AVL and QTc of 420  CE x 2 neg             WBC: 3.59                TSH: 4.60                  BNP: 3,941  RVP neg                AST/ALT: 43/42                     Glucose: 128  2/14 CXR - cardiomegaly with clear lungs

## 2018-02-17 NOTE — PROGRESS NOTE ADULT - SUBJECTIVE AND OBJECTIVE BOX
Patient is a 62y old  Female who presents with a chief complaint of SOB and WALKER (15 Feb 2018 00:58)      SUBJECTIVE / OVERNIGHT EVENTS: Patient feels that her SOB has improved.     Review of Systems:     MEDICATIONS  (STANDING):  aspirin enteric coated 81 milliGRAM(s) Oral daily  diltiazem    Tablet 60 milliGRAM(s) Oral four times a day  furosemide   Injectable 20 milliGRAM(s) IV Push daily  gabapentin 100 milliGRAM(s) Oral three times a day  warfarin 5 milliGRAM(s) Oral once    MEDICATIONS  (PRN):  oxyCODONE    IR 5 milliGRAM(s) Oral daily PRN Severe Pain (7 - 10)      PHYSICAL EXAM:  Vital Signs Last 24 Hrs  T(C): 36.6 (15 Feb 2018 10:55), Max: 36.8 (15 Feb 2018 00:59)  T(F): 97.9 (15 Feb 2018 10:55), Max: 98.2 (15 Feb 2018 00:59)  HR: 83 (15 Feb 2018 10:55) (77 - 144)  BP: 113/59 (15 Feb 2018 10:55) (98/65 - 127/101)  RR: 19 (15 Feb 2018 10:55) (18 - 27)  SpO2: 97% (15 Feb 2018 10:55) (93% - 100%)  I&O's Summary    GENERAL: NAD,  HEAD:  Atraumatic, Normocephalic  EYES: EOMI, conjunctiva and sclera clear  NECK: Supple, No JVD  CHEST/LUNG: Clear to auscultation bilaterally; No wheeze  HEART: Regular rate and rhythm; No murmurs, rubs, or gallops  ABDOMEN: Soft, Nontender, Nondistended; Bowel sounds present  EXTREMITIES:  2+ Peripheral Pulses, No clubbing, cyanosis, or edema  MSK: L hand w/ hand splint for carpel tunnel   PSYCH: AAOx3  NEUROLOGY: non-focal  SKIN: No rashes or lesions    LABS:  CAPILLARY BLOOD GLUCOSE                              12.1   3.59  )-----------( 157      ( 15 Feb 2018 04:10 )             35.1     02-15    141  |  105  |  14  ----------------------------<  99  3.6   |  22  |  0.91    Ca    8.4      15 Feb 2018 04:10  Mg     1.9     02-15    TPro  7.1  /  Alb  4.4  /  TBili  1.1  /  DBili  x   /  AST  43<H>  /  ALT  42<H>  /  AlkPhos  88  02-14    PT/INR - ( 15 Feb 2018 04:10 )   PT: 19.7 SEC;   INR: 1.69          PTT - ( 15 Feb 2018 04:10 )  PTT:30.4 SEC  CARDIAC MARKERS ( 15 Feb 2018 04:10 )  x     / < 0.06 ng/mL / 121 u/L / 4.54 ng/mL / x      CARDIAC MARKERS ( 14 Feb 2018 20:50 )  x     / < 0.06 ng/mL / 164 u/L / 5.77 ng/mL / x              RADIOLOGY & ADDITIONAL TESTS:    Imaging Personally Reviewed:    Consultant(s) Notes Reviewed:      Care Discussed with Consultants/Other Providers:
Patient is a 62y old  Female who presents with a chief complaint of SOB and WALKER (15 Feb 2018 00:58)      SUBJECTIVE / OVERNIGHT EVENTS: Pt states SOB improved Tele HR primarily in 70's     MEDICATIONS  (STANDING):  aspirin enteric coated 81 milliGRAM(s) Oral daily  diltiazem    Tablet 60 milliGRAM(s) Oral four times a day  furosemide    Tablet 20 milliGRAM(s) Oral daily  gabapentin 100 milliGRAM(s) Oral three times a day  warfarin 6 milliGRAM(s) Oral once    MEDICATIONS  (PRN):  oxyCODONE    IR 5 milliGRAM(s) Oral daily PRN Severe Pain (7 - 10)        CAPILLARY BLOOD GLUCOSE        I&O's Summary    15 Feb 2018 07:01  -  16 Feb 2018 07:00  --------------------------------------------------------  IN: 0 mL / OUT: 600 mL / NET: -600 mL        T(C): 36.4 (02-16-18 @ 09:45), Max: 36.7 (02-15-18 @ 15:39)  HR: 90 (02-16-18 @ 11:54) (68 - 96)  BP: 110/81 (02-16-18 @ 11:54) (99/64 - 127/87)  RR: 18 (02-16-18 @ 11:54) (17 - 918)  SpO2: 96% (02-16-18 @ 11:54) (94% - 99%)    PHYSICAL EXAM:  GENERAL: NAD, well-developed  HEAD:  Atraumatic, Normocephalic  EYES: EOMI, PERRLA, conjunctiva and sclera clear  NECK: Supple, No JVD  CHEST/LUNG: Clear to auscultation bilaterally; No wheeze  HEART: irregular irregular  ABDOMEN: Soft, Nontender, Nondistended; Bowel sounds present  EXTREMITIES:  2+ Peripheral Pulses, No clubbing, cyanosis, or edema  PSYCH: AAOx3  NEUROLOGY: non-focal  SKIN: No rashes or lesions    LABS:                        12.6   2.63  )-----------( 165      ( 16 Feb 2018 06:20 )             36.0     02-16    141  |  103  |  11  ----------------------------<  94  3.4<L>   |  24  |  0.76    Ca    8.4      16 Feb 2018 06:20  Mg     1.9     02-15    TPro  7.1  /  Alb  4.4  /  TBili  1.1  /  DBili  x   /  AST  43<H>  /  ALT  42<H>  /  AlkPhos  88  02-14    PT/INR - ( 16 Feb 2018 06:20 )   PT: 19.0 SEC;   INR: 1.69          PTT - ( 16 Feb 2018 06:20 )  PTT:29.7 SEC  CARDIAC MARKERS ( 15 Feb 2018 04:10 )  x     / < 0.06 ng/mL / 121 u/L / 4.54 ng/mL / x      CARDIAC MARKERS ( 14 Feb 2018 20:50 )  x     / < 0.06 ng/mL / 164 u/L / 5.77 ng/mL / x                RADIOLOGY & ADDITIONAL TESTS:    Imaging Personally Reviewed:    Consultant(s) Notes Reviewed:      Care Discussed with Consultants/Other Providers:
Patient is a 62y old  Female who presents with a chief complaint of SOB and WALKER (17 Feb 2018 10:16)        SUBJECTIVE / OVERNIGHT EVENTS: Feeling much better overall. No dyspnea, no chest pain, no palpitations.      MEDICATIONS  (STANDING):  aspirin enteric coated 81 milliGRAM(s) Oral daily  diltiazem    milliGRAM(s) Oral daily  furosemide    Tablet 20 milliGRAM(s) Oral daily  gabapentin 100 milliGRAM(s) Oral three times a day    MEDICATIONS  (PRN):  oxyCODONE    IR 5 milliGRAM(s) Oral daily PRN Severe Pain (7 - 10)      Vital Signs Last 24 Hrs  T(C): 36.4 (17 Feb 2018 11:35), Max: 36.9 (17 Feb 2018 09:35)  T(F): 97.6 (17 Feb 2018 11:35), Max: 98.4 (17 Feb 2018 09:35)  HR: 63 (17 Feb 2018 11:35) (63 - 104)  BP: 97/72 (17 Feb 2018 11:35) (97/72 - 113/76)  BP(mean): --  RR: 18 (17 Feb 2018 11:35) (18 - 18)  SpO2: 100% (17 Feb 2018 11:35) (95% - 100%)  CAPILLARY BLOOD GLUCOSE        I&O's Summary        PHYSICAL EXAM  GENERAL: NAD, well-developed  HEAD:  Atraumatic, Normocephalic  EYES: EOMI, PERRLA, conjunctiva and sclera clear  NECK: Supple, No JVD  CHEST/LUNG: Clear to auscultation bilaterally; No wheeze  HEART: irregularly irregular; No murmurs, rubs, or gallops  ABDOMEN: Soft, Nontender, Nondistended; Bowel sounds present  EXTREMITIES:  2+ Peripheral Pulses, No clubbing, cyanosis, or edema  PSYCH: AAOx3  SKIN: No rashes or lesions    LABS:                        12.3   1.92  )-----------( 171      ( 17 Feb 2018 07:05 )             36.2     02-17    137  |  100  |  10  ----------------------------<  89  3.7   |  23  |  0.89    Ca    8.6      17 Feb 2018 07:05      PT/INR - ( 17 Feb 2018 07:05 )   PT: 19.6 SEC;   INR: 1.69          PTT - ( 16 Feb 2018 06:20 )  PTT:29.7 SEC            RADIOLOGY & ADDITIONAL TESTS:    Imaging Personally Reviewed:  Consultant(s) Notes Reviewed:    Care Discussed with Consultants/Other Providers:

## 2018-02-27 ENCOUNTER — OUTPATIENT (OUTPATIENT)
Dept: OUTPATIENT SERVICES | Facility: HOSPITAL | Age: 63
LOS: 1 days | End: 2018-02-27
Payer: COMMERCIAL

## 2018-02-27 DIAGNOSIS — Z98.891 HISTORY OF UTERINE SCAR FROM PREVIOUS SURGERY: Chronic | ICD-10-CM

## 2018-02-27 DIAGNOSIS — Z98.890 OTHER SPECIFIED POSTPROCEDURAL STATES: Chronic | ICD-10-CM

## 2018-02-27 PROCEDURE — A9552: CPT

## 2018-02-27 PROCEDURE — 82962 GLUCOSE BLOOD TEST: CPT

## 2018-02-27 PROCEDURE — 78815 PET IMAGE W/CT SKULL-THIGH: CPT

## 2018-02-27 PROCEDURE — 78815 PET IMAGE W/CT SKULL-THIGH: CPT | Mod: 26

## 2018-05-16 NOTE — TRANSFER ACCEPTANCE NOTE - PROBLEM SELECTOR PROBLEM 1
Mary Mcgrath is a 75 y.o. female.   5/11/2018  Wt Readings from Last 1 Encounters:   05/16/18 60 kg (132 lb 3.2 oz)   She was last seen in February 2018, weight 131 then, 132 now.    She returns for follow-up of hypertension, hyperlipidemia, hypothyroidism    History of Present Illness   She has hypertension treated with losartan 50 mg daily, spironolactone 25 mg daily and metoprolol succinate 50 mg daily area blood pressures at home are good.    She has hypothyroidism treated with levothyroxin 50 µg daily.    She has hyperlipidemia treated with atorvastatin 10 mg daily.  No medication problems described.    She has osteopenia treated with Caltrate D twice a day and supplemental vitamin D 1000 international units daily.    She has bronchiectasis and is followed by pulmonary specialist.    She has allergy symptoms and has problems when she goes out.    She has anxiety treated with BuSpar 10 mg  The following portions of the patient's history were reviewed and updated as appropriate: allergies, current medications, past family history, past medical history, past social history, past surgical history and problem list.    Review of Systems   Constitutional: Negative.    HENT: Positive for postnasal drip and rhinorrhea.    Eyes: Negative.    Respiratory: Negative.    Cardiovascular: Negative.    Endocrine: Negative.    Genitourinary: Negative.    Skin: Negative.    Neurological: Negative.    Hematological: Negative.    Psychiatric/Behavioral: Negative.        Objective   Physical Exam   Constitutional: She appears well-developed and well-nourished.   HENT:   Head: Normocephalic and atraumatic.   Cardiovascular: Normal rate and regular rhythm.  Exam reveals no gallop and no friction rub.    No murmur heard.  Pulmonary/Chest: Effort normal and breath sounds normal. No respiratory distress. She has no wheezes. She has no rales.   Abdominal: Soft. Bowel sounds are normal. She exhibits no distension and no  mass. There is no tenderness.   Neurological: She is alert.   Skin: Skin is warm.   Psychiatric: Her behavior is normal.   Vitals reviewed.      Assessment/Plan   Karly was seen today for hypertension, hyperlipidemia and hypothyroidism.    Diagnoses and all orders for this visit:    Essential hypertension  Comments:  Continue losartan 50 mg daily, metoprolol succinate 50 mg daily, spironolactone 25 mg daily, potassium chloride 10 mg, 2 each day  Orders:  -     Comprehensive Metabolic Panel  -     Urinalysis With Microscopic - Urine, Clean Catch    Hyperlipidemia, unspecified hyperlipidemia type  Comments:  Continue atorvastatin 10 mg daily, we will check chemistries and lipids  Orders:  -     Lipid Panel    Acquired hypothyroidism  Comments:  Continue levothyroxin 50 µg daily, we will check thyroid levels  Orders:  -     TSH  -     T4, Free    Anxiety  Comments:  Continue BuSpar 10 mg    Bronchiectasis without complication  Comments:  Followed by pulmonary specialist    Health care maintenance  Comments:  Consider hepatitis A vaccine in view of the current epidemic        Schedule office follow-up about 6 months, return sooner if needed                       EMR Dragon/Transcription disclaimer:      Much of this encounter note is an electronic transcription/translation of spoken language to printed text. The electronic translation of spoken language may permit erroneous, or at times, nonsensical words or phrases to be inadvertently transcribed; Although I have reviewed the note for such errors, some may still exist.    Pleural effusion

## 2018-07-16 PROBLEM — I27.2 OTHER SECONDARY PULMONARY HYPERTENSION: Chronic | Status: INACTIVE | Noted: 2017-04-02 | Resolved: 2017-06-18

## 2018-07-16 PROBLEM — I50.30 UNSPECIFIED DIASTOLIC (CONGESTIVE) HEART FAILURE: Chronic | Status: INACTIVE | Noted: 2017-04-02 | Resolved: 2017-06-18

## 2018-07-16 PROBLEM — K21.9 GASTRO-ESOPHAGEAL REFLUX DISEASE WITHOUT ESOPHAGITIS: Chronic | Status: INACTIVE | Noted: 2017-06-18 | Resolved: 2018-02-15

## 2018-07-16 PROBLEM — K29.90 GASTRODUODENITIS, UNSPECIFIED, WITHOUT BLEEDING: Chronic | Status: INACTIVE | Noted: 2017-03-10 | Resolved: 2017-06-18

## 2018-07-16 PROBLEM — K29.90 GASTRODUODENITIS, UNSPECIFIED, WITHOUT BLEEDING: Chronic | Status: INACTIVE | Noted: 2017-04-02 | Resolved: 2017-06-18

## 2018-07-16 PROBLEM — I42.2 OTHER HYPERTROPHIC CARDIOMYOPATHY: Chronic | Status: INACTIVE | Noted: 2017-03-10 | Resolved: 2017-06-18

## 2018-07-16 PROBLEM — I48.2 CHRONIC ATRIAL FIBRILLATION: Chronic | Status: INACTIVE | Noted: 2017-04-02 | Resolved: 2017-06-18

## 2018-07-16 PROBLEM — I34.0 NONRHEUMATIC MITRAL (VALVE) INSUFFICIENCY: Chronic | Status: INACTIVE | Noted: 2017-04-02 | Resolved: 2017-06-18

## 2018-07-16 PROBLEM — J18.9 PNEUMONIA, UNSPECIFIED ORGANISM: Chronic | Status: INACTIVE | Noted: 2017-04-02 | Resolved: 2017-06-18

## 2018-09-29 ENCOUNTER — TRANSCRIPTION ENCOUNTER (OUTPATIENT)
Age: 63
End: 2018-09-29

## 2018-11-17 ENCOUNTER — INPATIENT (INPATIENT)
Facility: HOSPITAL | Age: 63
LOS: 2 days | Discharge: HOME | End: 2018-11-20
Attending: INTERNAL MEDICINE | Admitting: INTERNAL MEDICINE
Payer: COMMERCIAL

## 2018-11-17 ENCOUNTER — TRANSCRIPTION ENCOUNTER (OUTPATIENT)
Age: 63
End: 2018-11-17

## 2018-11-17 VITALS
DIASTOLIC BLOOD PRESSURE: 87 MMHG | TEMPERATURE: 98 F | HEART RATE: 109 BPM | SYSTOLIC BLOOD PRESSURE: 118 MMHG | RESPIRATION RATE: 18 BRPM | OXYGEN SATURATION: 98 %

## 2018-11-17 DIAGNOSIS — I50.32 CHRONIC DIASTOLIC (CONGESTIVE) HEART FAILURE: ICD-10-CM

## 2018-11-17 DIAGNOSIS — I10 ESSENTIAL (PRIMARY) HYPERTENSION: ICD-10-CM

## 2018-11-17 DIAGNOSIS — I48.91 UNSPECIFIED ATRIAL FIBRILLATION: ICD-10-CM

## 2018-11-17 DIAGNOSIS — C34.90 MALIGNANT NEOPLASM OF UNSPECIFIED PART OF UNSPECIFIED BRONCHUS OR LUNG: ICD-10-CM

## 2018-11-17 DIAGNOSIS — Z98.890 OTHER SPECIFIED POSTPROCEDURAL STATES: Chronic | ICD-10-CM

## 2018-11-17 DIAGNOSIS — Z29.9 ENCOUNTER FOR PROPHYLACTIC MEASURES, UNSPECIFIED: ICD-10-CM

## 2018-11-17 DIAGNOSIS — Z98.891 HISTORY OF UTERINE SCAR FROM PREVIOUS SURGERY: Chronic | ICD-10-CM

## 2018-11-17 PROBLEM — J18.9 PNEUMONIA, UNSPECIFIED ORGANISM: Chronic | Status: ACTIVE | Noted: 2017-06-18

## 2018-11-17 LAB
ALBUMIN SERPL ELPH-MCNC: 4.8 G/DL — SIGNIFICANT CHANGE UP (ref 3.3–5)
ALP SERPL-CCNC: 83 U/L — SIGNIFICANT CHANGE UP (ref 40–120)
ALT FLD-CCNC: 25 U/L — SIGNIFICANT CHANGE UP (ref 4–33)
ANISOCYTOSIS BLD QL: SLIGHT — SIGNIFICANT CHANGE UP
APPEARANCE UR: CLEAR — SIGNIFICANT CHANGE UP
APTT BLD: 28.5 SEC — SIGNIFICANT CHANGE UP (ref 27.5–36.3)
AST SERPL-CCNC: 56 U/L — HIGH (ref 4–32)
B PERT DNA SPEC QL NAA+PROBE: NOT DETECTED — SIGNIFICANT CHANGE UP
BASE EXCESS BLDV CALC-SCNC: 2.1 MMOL/L — SIGNIFICANT CHANGE UP
BASOPHILS # BLD AUTO: 0.06 K/UL — SIGNIFICANT CHANGE UP (ref 0–0.2)
BASOPHILS NFR BLD AUTO: 0.8 % — SIGNIFICANT CHANGE UP (ref 0–2)
BASOPHILS NFR SPEC: 0 % — SIGNIFICANT CHANGE UP (ref 0–2)
BILIRUB SERPL-MCNC: 1.7 MG/DL — HIGH (ref 0.2–1.2)
BILIRUB UR-MCNC: NEGATIVE — SIGNIFICANT CHANGE UP
BLASTS # FLD: 0 % — SIGNIFICANT CHANGE UP (ref 0–0)
BLOOD GAS VENOUS - CREATININE: 0.81 MG/DL — SIGNIFICANT CHANGE UP (ref 0.5–1.3)
BLOOD UR QL VISUAL: NEGATIVE — SIGNIFICANT CHANGE UP
BUN SERPL-MCNC: 15 MG/DL — SIGNIFICANT CHANGE UP (ref 7–23)
C PNEUM DNA SPEC QL NAA+PROBE: NOT DETECTED — SIGNIFICANT CHANGE UP
CALCIUM SERPL-MCNC: 9.7 MG/DL — SIGNIFICANT CHANGE UP (ref 8.4–10.5)
CHLORIDE BLDV-SCNC: 106 MMOL/L — SIGNIFICANT CHANGE UP (ref 96–108)
CHLORIDE SERPL-SCNC: 99 MMOL/L — SIGNIFICANT CHANGE UP (ref 98–107)
CK SERPL-CCNC: 155 U/L — SIGNIFICANT CHANGE UP (ref 25–170)
CO2 SERPL-SCNC: 22 MMOL/L — SIGNIFICANT CHANGE UP (ref 22–31)
COLOR SPEC: SIGNIFICANT CHANGE UP
CREAT SERPL-MCNC: 0.89 MG/DL — SIGNIFICANT CHANGE UP (ref 0.5–1.3)
DACRYOCYTES BLD QL SMEAR: SLIGHT — SIGNIFICANT CHANGE UP
EOSINOPHIL # BLD AUTO: 0.08 K/UL — SIGNIFICANT CHANGE UP (ref 0–0.5)
EOSINOPHIL NFR BLD AUTO: 1.1 % — SIGNIFICANT CHANGE UP (ref 0–6)
EOSINOPHIL NFR FLD: 0.9 % — SIGNIFICANT CHANGE UP (ref 0–6)
FLUAV H1 2009 PAND RNA SPEC QL NAA+PROBE: NOT DETECTED — SIGNIFICANT CHANGE UP
FLUAV H1 RNA SPEC QL NAA+PROBE: NOT DETECTED — SIGNIFICANT CHANGE UP
FLUAV H3 RNA SPEC QL NAA+PROBE: NOT DETECTED — SIGNIFICANT CHANGE UP
FLUAV SUBTYP SPEC NAA+PROBE: SIGNIFICANT CHANGE UP
FLUBV RNA SPEC QL NAA+PROBE: NOT DETECTED — SIGNIFICANT CHANGE UP
GAS PNL BLDV: 134 MMOL/L — LOW (ref 136–146)
GIANT PLATELETS BLD QL SMEAR: PRESENT — SIGNIFICANT CHANGE UP
GLUCOSE BLDV-MCNC: 104 — HIGH (ref 70–99)
GLUCOSE SERPL-MCNC: 103 MG/DL — HIGH (ref 70–99)
GLUCOSE UR-MCNC: NEGATIVE — SIGNIFICANT CHANGE UP
HADV DNA SPEC QL NAA+PROBE: NOT DETECTED — SIGNIFICANT CHANGE UP
HCO3 BLDV-SCNC: 25 MMOL/L — SIGNIFICANT CHANGE UP (ref 20–27)
HCOV PNL SPEC NAA+PROBE: SIGNIFICANT CHANGE UP
HCT VFR BLD CALC: 40.7 % — SIGNIFICANT CHANGE UP (ref 34.5–45)
HCT VFR BLDV CALC: 40.6 % — SIGNIFICANT CHANGE UP (ref 34.5–45)
HGB BLD-MCNC: 13.7 G/DL — SIGNIFICANT CHANGE UP (ref 11.5–15.5)
HGB BLDV-MCNC: 13.2 G/DL — SIGNIFICANT CHANGE UP (ref 11.5–15.5)
HMPV RNA SPEC QL NAA+PROBE: NOT DETECTED — SIGNIFICANT CHANGE UP
HPIV1 RNA SPEC QL NAA+PROBE: NOT DETECTED — SIGNIFICANT CHANGE UP
HPIV2 RNA SPEC QL NAA+PROBE: NOT DETECTED — SIGNIFICANT CHANGE UP
HPIV3 RNA SPEC QL NAA+PROBE: NOT DETECTED — SIGNIFICANT CHANGE UP
HPIV4 RNA SPEC QL NAA+PROBE: NOT DETECTED — SIGNIFICANT CHANGE UP
IMM GRANULOCYTES # BLD AUTO: 0.34 # — SIGNIFICANT CHANGE UP
IMM GRANULOCYTES NFR BLD AUTO: 4.5 % — HIGH (ref 0–1.5)
INR BLD: 1.59 — HIGH (ref 0.88–1.17)
KETONES UR-MCNC: NEGATIVE — SIGNIFICANT CHANGE UP
LACTATE BLDV-MCNC: 3.9 MMOL/L — HIGH (ref 0.5–2)
LEUKOCYTE ESTERASE UR-ACNC: NEGATIVE — SIGNIFICANT CHANGE UP
LIDOCAIN IGE QN: 18.6 U/L — SIGNIFICANT CHANGE UP (ref 7–60)
LYMPHOCYTES # BLD AUTO: 0.49 K/UL — LOW (ref 1–3.3)
LYMPHOCYTES # BLD AUTO: 6.5 % — LOW (ref 13–44)
LYMPHOCYTES NFR SPEC AUTO: 4.4 % — LOW (ref 13–44)
MACROCYTES BLD QL: SLIGHT — SIGNIFICANT CHANGE UP
MAGNESIUM SERPL-MCNC: 2.2 MG/DL — SIGNIFICANT CHANGE UP (ref 1.6–2.6)
MCHC RBC-ENTMCNC: 33.7 % — SIGNIFICANT CHANGE UP (ref 32–36)
MCHC RBC-ENTMCNC: 36.1 PG — HIGH (ref 27–34)
MCV RBC AUTO: 107.1 FL — HIGH (ref 80–100)
METAMYELOCYTES # FLD: 0 % — SIGNIFICANT CHANGE UP (ref 0–1)
MONOCYTES # BLD AUTO: 0.6 K/UL — SIGNIFICANT CHANGE UP (ref 0–0.9)
MONOCYTES NFR BLD AUTO: 8 % — SIGNIFICANT CHANGE UP (ref 2–14)
MONOCYTES NFR BLD: 7.9 % — SIGNIFICANT CHANGE UP (ref 2–9)
MYELOCYTES NFR BLD: 0 % — SIGNIFICANT CHANGE UP (ref 0–0)
NEUTROPHIL AB SER-ACNC: 82.4 % — HIGH (ref 43–77)
NEUTROPHILS # BLD AUTO: 5.96 K/UL — SIGNIFICANT CHANGE UP (ref 1.8–7.4)
NEUTROPHILS NFR BLD AUTO: 79.1 % — HIGH (ref 43–77)
NEUTS BAND # BLD: 0 % — SIGNIFICANT CHANGE UP (ref 0–6)
NITRITE UR-MCNC: NEGATIVE — SIGNIFICANT CHANGE UP
NRBC # BLD: 0.9 /100WBC — SIGNIFICANT CHANGE UP
NRBC # FLD: 0 — SIGNIFICANT CHANGE UP
NT-PROBNP SERPL-SCNC: 8533 PG/ML — SIGNIFICANT CHANGE UP
OTHER - HEMATOLOGY %: 0 — SIGNIFICANT CHANGE UP
OVALOCYTES BLD QL SMEAR: SLIGHT — SIGNIFICANT CHANGE UP
PCO2 BLDV: 45 MMHG — SIGNIFICANT CHANGE UP (ref 41–51)
PH BLDV: 7.39 PH — SIGNIFICANT CHANGE UP (ref 7.32–7.43)
PH UR: 6.5 — SIGNIFICANT CHANGE UP (ref 5–8)
PHOSPHATE SERPL-MCNC: 3.3 MG/DL — SIGNIFICANT CHANGE UP (ref 2.5–4.5)
PLATELET # BLD AUTO: 206 K/UL — SIGNIFICANT CHANGE UP (ref 150–400)
PLATELET COUNT - ESTIMATE: NORMAL — SIGNIFICANT CHANGE UP
PMV BLD: 10.7 FL — SIGNIFICANT CHANGE UP (ref 7–13)
PO2 BLDV: 26 MMHG — LOW (ref 35–40)
POLYCHROMASIA BLD QL SMEAR: SLIGHT — SIGNIFICANT CHANGE UP
POTASSIUM BLDV-SCNC: 3.6 MMOL/L — SIGNIFICANT CHANGE UP (ref 3.4–4.5)
POTASSIUM SERPL-MCNC: 5 MMOL/L — SIGNIFICANT CHANGE UP (ref 3.5–5.3)
POTASSIUM SERPL-SCNC: 5 MMOL/L — SIGNIFICANT CHANGE UP (ref 3.5–5.3)
PROMYELOCYTES # FLD: 0 % — SIGNIFICANT CHANGE UP (ref 0–0)
PROT SERPL-MCNC: 8.1 G/DL — SIGNIFICANT CHANGE UP (ref 6–8.3)
PROT UR-MCNC: 10 — SIGNIFICANT CHANGE UP
PROTHROM AB SERPL-ACNC: 17.9 SEC — HIGH (ref 9.8–13.1)
RBC # BLD: 3.8 M/UL — SIGNIFICANT CHANGE UP (ref 3.8–5.2)
RBC # FLD: 13.5 % — SIGNIFICANT CHANGE UP (ref 10.3–14.5)
RSV RNA SPEC QL NAA+PROBE: NOT DETECTED — SIGNIFICANT CHANGE UP
RV+EV RNA SPEC QL NAA+PROBE: NOT DETECTED — SIGNIFICANT CHANGE UP
SAO2 % BLDV: 35.8 % — LOW (ref 60–85)
SODIUM SERPL-SCNC: 138 MMOL/L — SIGNIFICANT CHANGE UP (ref 135–145)
SP GR SPEC: 1.01 — SIGNIFICANT CHANGE UP (ref 1–1.04)
TROPONIN T, HIGH SENSITIVITY: 24 NG/L — SIGNIFICANT CHANGE UP (ref ?–14)
UROBILINOGEN FLD QL: NORMAL — SIGNIFICANT CHANGE UP
VARIANT LYMPHS # BLD: 4.4 % — SIGNIFICANT CHANGE UP
WBC # BLD: 7.53 K/UL — SIGNIFICANT CHANGE UP (ref 3.8–10.5)
WBC # FLD AUTO: 7.53 K/UL — SIGNIFICANT CHANGE UP (ref 3.8–10.5)

## 2018-11-17 PROCEDURE — 71045 X-RAY EXAM CHEST 1 VIEW: CPT | Mod: 26

## 2018-11-17 PROCEDURE — 71250 CT THORAX DX C-: CPT | Mod: 26

## 2018-11-17 RX ORDER — FUROSEMIDE 40 MG
20 TABLET ORAL
Qty: 0 | Refills: 0 | Status: DISCONTINUED | OUTPATIENT
Start: 2018-11-17 | End: 2018-11-20

## 2018-11-17 RX ORDER — HEPARIN SODIUM 5000 [USP'U]/ML
6500 INJECTION INTRAVENOUS; SUBCUTANEOUS ONCE
Qty: 0 | Refills: 0 | Status: COMPLETED | OUTPATIENT
Start: 2018-11-17 | End: 2018-11-17

## 2018-11-17 RX ORDER — DILTIAZEM HCL 120 MG
5 CAPSULE, EXT RELEASE 24 HR ORAL
Qty: 125 | Refills: 0 | Status: DISCONTINUED | OUTPATIENT
Start: 2018-11-17 | End: 2018-11-18

## 2018-11-17 RX ORDER — HEPARIN SODIUM 5000 [USP'U]/ML
6500 INJECTION INTRAVENOUS; SUBCUTANEOUS EVERY 6 HOURS
Qty: 0 | Refills: 0 | Status: DISCONTINUED | OUTPATIENT
Start: 2018-11-17 | End: 2018-11-19

## 2018-11-17 RX ORDER — WARFARIN SODIUM 2.5 MG/1
5 TABLET ORAL ONCE
Qty: 0 | Refills: 0 | Status: COMPLETED | OUTPATIENT
Start: 2018-11-17 | End: 2018-11-17

## 2018-11-17 RX ORDER — DILTIAZEM HCL 120 MG
240 CAPSULE, EXT RELEASE 24 HR ORAL DAILY
Qty: 0 | Refills: 0 | Status: DISCONTINUED | OUTPATIENT
Start: 2018-11-17 | End: 2018-11-20

## 2018-11-17 RX ORDER — ACETAMINOPHEN 500 MG
650 TABLET ORAL EVERY 6 HOURS
Qty: 0 | Refills: 0 | Status: DISCONTINUED | OUTPATIENT
Start: 2018-11-17 | End: 2018-11-20

## 2018-11-17 RX ORDER — AZTREONAM 2 G
1000 VIAL (EA) INJECTION ONCE
Qty: 0 | Refills: 0 | Status: COMPLETED | OUTPATIENT
Start: 2018-11-17 | End: 2018-11-17

## 2018-11-17 RX ORDER — DILTIAZEM HCL 120 MG
5 CAPSULE, EXT RELEASE 24 HR ORAL
Qty: 125 | Refills: 0 | Status: DISCONTINUED | OUTPATIENT
Start: 2018-11-17 | End: 2018-11-17

## 2018-11-17 RX ORDER — ACETAMINOPHEN 500 MG
975 TABLET ORAL ONCE
Qty: 0 | Refills: 0 | Status: COMPLETED | OUTPATIENT
Start: 2018-11-17 | End: 2018-11-17

## 2018-11-17 RX ORDER — ALBUTEROL 90 UG/1
2.5 AEROSOL, METERED ORAL EVERY 6 HOURS
Qty: 0 | Refills: 0 | Status: DISCONTINUED | OUTPATIENT
Start: 2018-11-17 | End: 2018-11-20

## 2018-11-17 RX ORDER — MAGNESIUM SULFATE 500 MG/ML
2 VIAL (ML) INJECTION ONCE
Qty: 0 | Refills: 0 | Status: COMPLETED | OUTPATIENT
Start: 2018-11-17 | End: 2018-11-17

## 2018-11-17 RX ORDER — AZITHROMYCIN 500 MG/1
500 TABLET, FILM COATED ORAL ONCE
Qty: 0 | Refills: 0 | Status: COMPLETED | OUTPATIENT
Start: 2018-11-17 | End: 2018-11-17

## 2018-11-17 RX ORDER — HEPARIN SODIUM 5000 [USP'U]/ML
3000 INJECTION INTRAVENOUS; SUBCUTANEOUS EVERY 6 HOURS
Qty: 0 | Refills: 0 | Status: DISCONTINUED | OUTPATIENT
Start: 2018-11-17 | End: 2018-11-19

## 2018-11-17 RX ORDER — ASPIRIN/CALCIUM CARB/MAGNESIUM 324 MG
81 TABLET ORAL DAILY
Qty: 0 | Refills: 0 | Status: DISCONTINUED | OUTPATIENT
Start: 2018-11-17 | End: 2018-11-20

## 2018-11-17 RX ORDER — HEPARIN SODIUM 5000 [USP'U]/ML
INJECTION INTRAVENOUS; SUBCUTANEOUS
Qty: 25000 | Refills: 0 | Status: DISCONTINUED | OUTPATIENT
Start: 2018-11-17 | End: 2018-11-18

## 2018-11-17 RX ADMIN — HEPARIN SODIUM 1500 UNIT(S)/HR: 5000 INJECTION INTRAVENOUS; SUBCUTANEOUS at 18:05

## 2018-11-17 RX ADMIN — Medication 81 MILLIGRAM(S): at 18:06

## 2018-11-17 RX ADMIN — Medication 50 GRAM(S): at 14:43

## 2018-11-17 RX ADMIN — AZITHROMYCIN 250 MILLIGRAM(S): 500 TABLET, FILM COATED ORAL at 16:22

## 2018-11-17 RX ADMIN — Medication 5 MG/HR: at 18:45

## 2018-11-17 RX ADMIN — Medication 240 MILLIGRAM(S): at 18:06

## 2018-11-17 RX ADMIN — Medication 975 MILLIGRAM(S): at 14:50

## 2018-11-17 RX ADMIN — Medication 20 MILLIGRAM(S): at 18:06

## 2018-11-17 RX ADMIN — Medication 1000 MILLIGRAM(S): at 16:15

## 2018-11-17 RX ADMIN — Medication 50 MILLIGRAM(S): at 15:29

## 2018-11-17 RX ADMIN — Medication 975 MILLIGRAM(S): at 15:28

## 2018-11-17 RX ADMIN — HEPARIN SODIUM 5000 UNIT(S): 5000 INJECTION INTRAVENOUS; SUBCUTANEOUS at 18:05

## 2018-11-17 RX ADMIN — WARFARIN SODIUM 5 MILLIGRAM(S): 2.5 TABLET ORAL at 18:06

## 2018-11-17 RX ADMIN — Medication 2 GRAM(S): at 15:28

## 2018-11-17 NOTE — ED PROVIDER NOTE - CARE PLAN
Principal Discharge DX:	Atrial fibrillation with RVR  Secondary Diagnosis:	SOB (shortness of breath)

## 2018-11-17 NOTE — ED PROVIDER NOTE - PMH
AF (atrial fibrillation)  On Coumadin  CAP (community acquired pneumonia)  RML/RLL at [facility] 3/2017  Chronic diastolic (congestive) heart failure    GERD (gastroesophageal reflux disease)    HLD (hyperlipidemia)    HOCM (hypertrophic obstructive cardiomyopathy)  S/P septal ablation  HTN (hypertension)    Lung cancer  s/p chemotherapy  MR (mitral regurgitation)    Pleural effusion  Right parapneumonic drained twice 3/2017 at [facility]

## 2018-11-17 NOTE — H&P ADULT - PROBLEM SELECTOR PLAN 1
ekg/telemetry, ce x 1 neg, mg, tsh, recent echo done, subtherapeutic inr, started on heparin gtt with bridge with coumadin (INR goal of 2-3), rate control, cont cardizem  po, started on a cardizem gtt for better rate control

## 2018-11-17 NOTE — ED ADULT NURSE NOTE - CHIEF COMPLAINT QUOTE
Patient c/o a stabbing back pain for a few days getting worse today, also feeling sob. Went to the [facility] today and sent in for an abnormal EKG and elevated HR.  Dull ache of chest pain.

## 2018-11-17 NOTE — H&P ADULT - NEGATIVE OPHTHALMOLOGIC SYMPTOMS
no blurred vision L/no blurred vision R/no loss of vision R/no diplopia/no photophobia/no loss of vision L

## 2018-11-17 NOTE — ED PROVIDER NOTE - ATTENDING CONTRIBUTION TO CARE
63F h/o afib on coumadin, hocm, CHF, lung cancer s/p chemo in remission presents from home for cough, sputum, shortness of breath, orthopnea, intermittent chest pain, exertional chest pressure. Patient also with chronic lower back pain and chronic leg pain that is unchanged.     Patient denies headache, vision changes, focal weakness/numbness, neck pain, fever, cough, chest pain, shortness of breath, back pain, abd pain, nausea, vomiting, diarrhea, constipation, blood in stool, dysuria, rash, trauma, falls. Patient is well appearing, conversant, cooperative, smiling, head atraumatic, neck supple with full range of motion, oropharynx clear, lungs with bibasilar crackles, speaking full sentences, heart irregularly irregular with tachycardia to 160s, distal pulses equal in all 4 extremities, abdomen soft nontender nondistended with no masses, positive leg edema, no calf tenderness, nonfocal neurologic exam.     Patient found to be in afib RVR. Cardiac monitor. IVF. Diltiazem. Magnesium. CXR. Troponin. Labs. Reassess.

## 2018-11-17 NOTE — ED PROVIDER NOTE - OBJECTIVE STATEMENT
64 y/o F with PMH of Afib(on Coumadin), CHF(with EF of 55%), GERD, HLD, HOCM(s/p septal ablation), Lung cancer(s/p chemo), MR and TR, HTN presents from urgent care for eval of cp, back pain, sob 64 y/o F with PMH of Afib(on Coumadin), CHF(with EF of 55%), GERD, HLD, HOCM(s/p septal ablation), Lung cancer(s/p chemo), MR and TR, HTN, chronic back pain presents from urgent care for eval of sob. has had sob, orthopnea w/ productive cough for a few days. today also feeling mild cp, worse w/ coughing but also with exertion. has chronic sharp midscapular pain for years which pt currently has for years, just more severe today. pt states symptoms similar to prior episodes pna. ROS negative for: fever, Nausea, vomiting, diarrhea, abdominal pain,

## 2018-11-17 NOTE — PATIENT PROFILE ADULT - VISION (WITH CORRECTIVE LENSES IF THE PATIENT USUALLY WEARS THEM):
Partially impaired: cannot see medication labels or newsprint, but can see obstacles in path, and the surrounding layout; can count fingers at arm's length/redaing  glasseses

## 2018-11-17 NOTE — ED ADULT TRIAGE NOTE - CHIEF COMPLAINT QUOTE
Patient c/o a stabbing back pain for a few days getting worse today, also feeling sob. Went to the [facility] today and sent in for an abnormal EKG and elevated HR.  Dull ache of chest pain.

## 2018-11-17 NOTE — H&P ADULT - PMH
AF (atrial fibrillation)  On Coumadin  CAP (community acquired pneumonia)  RML/RLL at [facility] 3/2017  Chronic diastolic (congestive) heart failure    GERD (gastroesophageal reflux disease)    HLD (hyperlipidemia)    HOCM (hypertrophic obstructive cardiomyopathy)  S/P septal ablation  HTN (hypertension)    Lung cancer  s/p chemotherapy  Pleural effusion  Right parapneumonic drained twice 3/2017 at [facility]

## 2018-11-17 NOTE — ED PROVIDER NOTE - CONSTITUTIONAL, MLM
Patient recd this morning A/Ox3, hypertensive, Dr. Barrientos at bedside and aware of BP, denies any sort of pain, will continue to monitor. normal... Well appearing, well nourished, awake, alert, oriented to person, place, time/situation and in no apparent distress.

## 2018-11-17 NOTE — H&P ADULT - HISTORY OF PRESENT ILLNESS
64 y/o female, with a PmHx of Afib (on coumadin), CHF (EF 55%), Gerd, HLD, HOCM (s/p septal ablation), Lung Ca (s/p chemo), HTN, Chronic back, pain, presented to the [facility] ED c/o chest discomfort and SOB. Pt states for the past year she has been having intermittent sob but for the past few days she states it was getting worse. For the past few days she states she has been feeling very sob trying to walk [place] from the subway and yesterday at work she started to feel very sluggish and had to constantly stop to try and catch her breath. Today, when she woke up from sleep (she states she slept about 12 hours last night into this morning because she wasn't feeling well) she started to have a sharp, non-radiating pain in the lower part of the center of chest. She states at first the pain was sharp and then there was a pressure like sensation so she went to an Urgent Care center and then was sent to the ED for a further evaluation. While in the ED, she was found to be in Afib w/RVR and the ED thought she might have had pneumonia as was given aztreonam and azithromycin IV x 1. She had recently finished a course of steroids 1 week ago (was being treated by Dr. Beltran). Currently she states she has no chest pain. Earlier in the week she did have an episode of emesis, but she denies any fever, chills, HA, dizziness, blurred vision, abd pain, diaphoresis, palpitations, recent travel. Pt is now being admitted to telemetry for chest pain r/o acs, afib w/rvr with a subtherapeutic INR and r/o acs.    => Pt admits to have a frequent occurance of short term memory loss

## 2018-11-17 NOTE — H&P ADULT - PROBLEM SELECTOR PLAN 2
started on Lasix 20mg iv bid, daily wts, fluid restriction, strict I & O's, probnp, CT chest w/o cont

## 2018-11-17 NOTE — ED PROVIDER NOTE - MEDICAL DECISION MAKING DETAILS
-initial impression: symptoms likely due to LLL pna vs chf exacerbation. will check trop, bnp. neurovascularly intact,   -initial plan: labs, EKG, UA, CXR, symptomatic treatment w/ analgesics, -reassess, -consider admission if not improved or if work up reveals any acute pathology

## 2018-11-17 NOTE — H&P ADULT - ASSESSMENT
62 y/o female, with a PmHx of Afib (on coumadin), CHF (EF 55%), Gerd, HLD, HOCM (s/p septal ablation), Lung Ca (s/p chemo), HTN, Chronic back, pain, presented to the [facility] ED c/o chest discomfort and SOB. Pt is being admitted to telemetry for Afib w/RVR with a subtherapeutic INR, Chest pain r/o acs and acute on chronic diastolic heart failure.

## 2018-11-17 NOTE — H&P ADULT - RS GEN PE MLT RESP DETAILS PC
breath sounds equal/good air movement/clear to auscultation bilaterally/airway patent/respirations non-labored/no chest wall tenderness

## 2018-11-17 NOTE — H&P ADULT - NSHPSOCIALHISTORY_GEN_ALL_CORE
Marital Status: , lives with her children    Occupation: [employer] for [email or web address]    Tobacco Use: d/c smoking 1 year ago; used to smoke 1pk/day since age 22    ETOH Use: 1-2 glasses of wine per night    Flu Vaccine:   neg                               Pneumonia Vaccine: neg

## 2018-11-17 NOTE — H&P ADULT - FAMILY HISTORY
Sibling  Still living? No  Family history of ulcerative colitis, Age at diagnosis: Age Unknown  Family history of psoriasis, Age at diagnosis: Age Unknown     Mother  Still living? No  Family history of liver cancer, Age at diagnosis: Age Unknown

## 2018-11-17 NOTE — ED ADULT NURSE NOTE - OBJECTIVE STATEMENT
pt alert,oriented x3. reports increasing back pains,ch pains,cough,diff breathing. reports diff breathing upon exertion md to eval,iv access,labs sent

## 2018-11-17 NOTE — ED CLERICAL - NS ED CLERK NOTE PRE-ARRIVAL INFORMATION; ADDITIONAL PRE-ARRIVAL INFORMATION
pt with history of lung cancer treated with chemo also history of afib on anticoagulation pt with rapid afib and cough today

## 2018-11-18 DIAGNOSIS — I10 ESSENTIAL (PRIMARY) HYPERTENSION: ICD-10-CM

## 2018-11-18 DIAGNOSIS — Z29.9 ENCOUNTER FOR PROPHYLACTIC MEASURES, UNSPECIFIED: ICD-10-CM

## 2018-11-18 DIAGNOSIS — R06.02 SHORTNESS OF BREATH: ICD-10-CM

## 2018-11-18 DIAGNOSIS — I50.9 HEART FAILURE, UNSPECIFIED: ICD-10-CM

## 2018-11-18 DIAGNOSIS — I48.2 CHRONIC ATRIAL FIBRILLATION: ICD-10-CM

## 2018-11-18 PROBLEM — I34.0 NONRHEUMATIC MITRAL (VALVE) INSUFFICIENCY: Chronic | Status: INACTIVE | Noted: 2017-06-18 | Resolved: 2018-11-17

## 2018-11-18 LAB
APTT BLD: 192.4 SEC — CRITICAL HIGH (ref 27.5–36.3)
APTT BLD: 71.4 SEC — HIGH (ref 27.5–36.3)
APTT BLD: 74.7 SEC — HIGH (ref 27.5–36.3)
BUN SERPL-MCNC: 13 MG/DL — SIGNIFICANT CHANGE UP (ref 7–23)
CALCIUM SERPL-MCNC: 8.6 MG/DL — SIGNIFICANT CHANGE UP (ref 8.4–10.5)
CHLORIDE SERPL-SCNC: 97 MMOL/L — LOW (ref 98–107)
CO2 SERPL-SCNC: 24 MMOL/L — SIGNIFICANT CHANGE UP (ref 22–31)
CREAT SERPL-MCNC: 0.88 MG/DL — SIGNIFICANT CHANGE UP (ref 0.5–1.3)
GLUCOSE SERPL-MCNC: 96 MG/DL — SIGNIFICANT CHANGE UP (ref 70–99)
HCT VFR BLD CALC: 32.3 % — LOW (ref 34.5–45)
HCT VFR BLD CALC: 34.7 % — SIGNIFICANT CHANGE UP (ref 34.5–45)
HGB BLD-MCNC: 11.1 G/DL — LOW (ref 11.5–15.5)
HGB BLD-MCNC: 12 G/DL — SIGNIFICANT CHANGE UP (ref 11.5–15.5)
INR BLD: 1.69 — HIGH (ref 0.88–1.17)
MCHC RBC-ENTMCNC: 34.4 % — SIGNIFICANT CHANGE UP (ref 32–36)
MCHC RBC-ENTMCNC: 34.6 % — SIGNIFICANT CHANGE UP (ref 32–36)
MCHC RBC-ENTMCNC: 36 PG — HIGH (ref 27–34)
MCHC RBC-ENTMCNC: 36.4 PG — HIGH (ref 27–34)
MCV RBC AUTO: 104.9 FL — HIGH (ref 80–100)
MCV RBC AUTO: 105.2 FL — HIGH (ref 80–100)
NRBC # FLD: 0 — SIGNIFICANT CHANGE UP
NRBC # FLD: 0 — SIGNIFICANT CHANGE UP
PLATELET # BLD AUTO: 155 K/UL — SIGNIFICANT CHANGE UP (ref 150–400)
PLATELET # BLD AUTO: 161 K/UL — SIGNIFICANT CHANGE UP (ref 150–400)
PMV BLD: 10.1 FL — SIGNIFICANT CHANGE UP (ref 7–13)
PMV BLD: 10.2 FL — SIGNIFICANT CHANGE UP (ref 7–13)
POTASSIUM SERPL-MCNC: 3.3 MMOL/L — LOW (ref 3.5–5.3)
POTASSIUM SERPL-SCNC: 3.3 MMOL/L — LOW (ref 3.5–5.3)
PROTHROM AB SERPL-ACNC: 19.1 SEC — HIGH (ref 9.8–13.1)
RBC # BLD: 3.08 M/UL — LOW (ref 3.8–5.2)
RBC # BLD: 3.3 M/UL — LOW (ref 3.8–5.2)
RBC # FLD: 13.4 % — SIGNIFICANT CHANGE UP (ref 10.3–14.5)
RBC # FLD: 13.8 % — SIGNIFICANT CHANGE UP (ref 10.3–14.5)
SODIUM SERPL-SCNC: 136 MMOL/L — SIGNIFICANT CHANGE UP (ref 135–145)
SPECIMEN SOURCE: SIGNIFICANT CHANGE UP
SPECIMEN SOURCE: SIGNIFICANT CHANGE UP
WBC # BLD: 3.64 K/UL — LOW (ref 3.8–10.5)
WBC # BLD: 4.3 K/UL — SIGNIFICANT CHANGE UP (ref 3.8–10.5)
WBC # FLD AUTO: 3.64 K/UL — LOW (ref 3.8–10.5)
WBC # FLD AUTO: 4.3 K/UL — SIGNIFICANT CHANGE UP (ref 3.8–10.5)

## 2018-11-18 RX ORDER — WARFARIN SODIUM 2.5 MG/1
6 TABLET ORAL ONCE
Qty: 0 | Refills: 0 | Status: COMPLETED | OUTPATIENT
Start: 2018-11-18 | End: 2018-11-18

## 2018-11-18 RX ORDER — POTASSIUM CHLORIDE 20 MEQ
40 PACKET (EA) ORAL ONCE
Qty: 0 | Refills: 0 | Status: COMPLETED | OUTPATIENT
Start: 2018-11-18 | End: 2018-11-18

## 2018-11-18 RX ORDER — BUDESONIDE AND FORMOTEROL FUMARATE DIHYDRATE 160; 4.5 UG/1; UG/1
2 AEROSOL RESPIRATORY (INHALATION)
Qty: 0 | Refills: 0 | Status: DISCONTINUED | OUTPATIENT
Start: 2018-11-18 | End: 2018-11-20

## 2018-11-18 RX ORDER — HEPARIN SODIUM 5000 [USP'U]/ML
1200 INJECTION INTRAVENOUS; SUBCUTANEOUS
Qty: 25000 | Refills: 0 | Status: DISCONTINUED | OUTPATIENT
Start: 2018-11-18 | End: 2018-11-19

## 2018-11-18 RX ADMIN — HEPARIN SODIUM 0 UNIT(S)/HR: 5000 INJECTION INTRAVENOUS; SUBCUTANEOUS at 01:00

## 2018-11-18 RX ADMIN — HEPARIN SODIUM 1200 UNIT(S)/HR: 5000 INJECTION INTRAVENOUS; SUBCUTANEOUS at 08:55

## 2018-11-18 RX ADMIN — WARFARIN SODIUM 6 MILLIGRAM(S): 2.5 TABLET ORAL at 17:50

## 2018-11-18 RX ADMIN — Medication 40 MILLIEQUIVALENT(S): at 09:33

## 2018-11-18 RX ADMIN — Medication 200 MILLIGRAM(S): at 21:21

## 2018-11-18 RX ADMIN — HEPARIN SODIUM 1200 UNIT(S)/HR: 5000 INJECTION INTRAVENOUS; SUBCUTANEOUS at 02:34

## 2018-11-18 RX ADMIN — Medication 81 MILLIGRAM(S): at 11:34

## 2018-11-18 RX ADMIN — Medication 20 MILLIGRAM(S): at 17:50

## 2018-11-18 RX ADMIN — BUDESONIDE AND FORMOTEROL FUMARATE DIHYDRATE 2 PUFF(S): 160; 4.5 AEROSOL RESPIRATORY (INHALATION) at 20:53

## 2018-11-18 RX ADMIN — HEPARIN SODIUM 1200 UNIT(S)/HR: 5000 INJECTION INTRAVENOUS; SUBCUTANEOUS at 17:20

## 2018-11-18 RX ADMIN — Medication 240 MILLIGRAM(S): at 11:34

## 2018-11-18 RX ADMIN — Medication 5 MG/HR: at 08:57

## 2018-11-18 NOTE — CONSULT NOTE ADULT - SUBJECTIVE AND OBJECTIVE BOX
Patient is a 63y old  Female who presents with a chief complaint of Chest Pain/SOB (2018 10:32)      HPI:  62 y/o female, with a PmHx of Afib (on coumadin), CHF (EF 55%), Gerd, HLD, HOCM (s/p septal ablation), Lung Ca (s/p chemo), HTN, Chronic back, pain, presented to the [facility] ED c/o chest discomfort and SOB. Pt states for the past year she has been having intermittent sob but for the past few days she states it was getting worse. For the past few days she states she has been feeling very sob trying to walk [place] from [place] subway and yesterday at work she started to feel very sluggish and had to constantly stop to try and catch her breath. Today, when she woke up from sleep (she states she slept about 12 hours last night into this morning because she wasn't feeling well) she started to have a sharp, non-radiating pain in the lower part of the center of chest. She states at first the pain was sharp and then there was a pressure like sensation so she went to an Urgent Care center and then was sent to the ED for a further evaluation. While in the ED, she was found to be in Afib w/RVR and the ED thought she might have had pneumonia as was given aztreonam and azithromycin IV x 1. She had recently finished a course of steroids 1 week ago (was being treated by Dr. Beltran). Currently she states she has no chest pain. Earlier in the week she did have an episode of emesis, but she denies any fever, chills, HA, dizziness, blurred vision, abd pain, diaphoresis, palpitations, recent travel. Pt is now being admitted to telemetry for chest pain r/o acs, afib w/rvr with a subtherapeutic INR and r/o acs.    => Pt admits to have a frequent occurance of short term memory loss (2018 17:35)    PT FOLLOWS WITH DR AVENDAÑO: sHE SAYS SHE WAS RECENTLY GIVEN INHALERS SYMBICORT AS WELL AS ALBUTEROL PRN which did not help her: She deniEs having COPD or ASTHMA.       ?FOLLOWING PRESENT  [ X] Hx of PE/DVT, [X] Hx COPD, [ X] Hx of Asthma, [X ] Hx of Hospitalization, [ X]  Hx of BiPAP/CPAP use, X[ ] Hx of SABRINA    Allergies    cefdinir (Rash)  moxifloxacin (Rash)  penicillin (Rash)    Intolerances        PAST MEDICAL & SURGICAL HISTORY:  GERD (gastroesophageal reflux disease)  HLD (hyperlipidemia)  HTN (hypertension)  CAP (community acquired pneumonia): RML/RLL at [facility] 3/2017  Lung cancer: s/p chemotherapy  Chronic diastolic (congestive) heart failure  AF (atrial fibrillation): On Coumadin  Pleural effusion: Right parapneumonic drained twice 3/2017 at [facility]  HOCM (hypertrophic obstructive cardiomyopathy): S/P septal ablation  H/O cardiac radiofrequency ablation: for HOCM - 16 years ago  History of : 15 years ago      FAMILY HISTORY:  Family history of psoriasis (Sibling): Brother  Family history of ulcerative colitis (Sibling): Brother  Family history of liver cancer (Mother): Mother      Social History: [  40 PK YEARS] TOBACCO                  [ X ] ETOH                                 [  X] IVDA/DRUGS    REVIEW OF SYSTEMS      General:	x    Skin/Breast:x  	  Ophthalmologic:x  	  ENMT:	x    Respiratory and Thorax: WALKER, SOB   	  Cardiovascular:	x    Gastrointestinal:	x    Genitourinary:	x    Musculoskeletal:	x    Neurological:	xx    Psychiatric:	x    Hematology/Lymphatics:	x    Endocrine:	x    Allergic/Immunologic:	x    MEDICATIONS  (STANDING):  aspirin enteric coated 81 milliGRAM(s) Oral daily  diltiazem    milliGRAM(s) Oral daily  furosemide   Injectable 20 milliGRAM(s) IV Push two times a day  heparin  Infusion. 1200 Unit(s)/Hr (12 mL/Hr) IV Continuous <Continuous>    MEDICATIONS  (PRN):  acetaminophen   Tablet .. 650 milliGRAM(s) Oral every 6 hours PRN Temp greater or equal to 38C (100.4F), Mild Pain (1 - 3), Moderate Pain (4 - 6)  ALBUTerol    0.083% 2.5 milliGRAM(s) Nebulizer every 6 hours PRN Shortness of Breath and/or Wheezing  heparin  Injectable 6500 Unit(s) IV Push every 6 hours PRN For aPTT less than 40  heparin  Injectable 3000 Unit(s) IV Push every 6 hours PRN For aPTT between 40 - 57       Vital Signs Last 24 Hrs  T(C): 36.6 (2018 05:43), Max: 39 (2018 14:37)  T(F): 97.9 (2018 05:43), Max: 102.2 (2018 14:37)  HR: 67 (2018 11:28) (64 - 166)  BP: 114/76 (2018 11:28) (98/70 - 129/81)  BP(mean): --  RR: 18 (2018 11:28) (16 - 25)  SpO2: 96% (2018 11:28) (95% - 100%)        I&O's Summary    2018 07:01  -  2018 07:00  --------------------------------------------------------  IN: 273 mL / OUT: 450 mL / NET: -177 mL    2018 07:01  -  2018 12:06  --------------------------------------------------------  IN: 300 mL / OUT: 0 mL / NET: 300 mL        Physical Exam:   GENERAL: NAD, well-groomed, well-developed  HEENT: HEIKE/   Atraumatic, Normocephalic  ENMT: No tonsillar erythema, exudates, or enlargement; Moist mucous membranes, Good dentition, No lesions  NECK: Supple, No JVD, Normal thyroid  CHEST/LUNG: Clear to auscultation bilaterally; No rales, rhonchi, wheezing, or rubs  CVS: Regular rate and rhythm; No murmurs, rubs, or gallops  GI: : Soft, Nontender, Nondistended; Bowel sounds present  NERVOUS SYSTEM:  Alert & Oriented X3  EXTREMITIES:  2+ Peripheral Pulses, No clubbing, cyanosis, or edema  LYMPH: No lymphadenopathy noted  SKIN: No rashes or lesions  ENDOCRINOLOGY: No Thyromegaly  PSYCH: Appropriate    Labs:  2.1<45<4>>26<<7.395>>2.1<<3><<4><<5<<269>>  CARDIAC MARKERS ( 2018 14:15 )  x     / x     / 155 u/L / x     / x                                12.0   3.64  )-----------( 161      ( 2018 07:59 )             34.7                         11.1   4.30  )-----------( 155      ( 2018 00:15 )             32.3                         13.7   7.53  )-----------( 206      ( 2018 14:30 )             40.7         136  |  97<L>  |  13  ----------------------------<  96  3.3<L>   |  24  |  0.88      138  |  99  |  15  ----------------------------<  103<H>  5.0   |  22  |  0.89    Ca    8.6      2018 07:59  Ca    9.7      2018 14:15  Phos  3.3       Mg     2.2         TPro  8.1  /  Alb  4.8  /  TBili  1.7<H>  /  DBili  x   /  AST  56<H>  /  ALT  25  /  AlkPhos  83      CAPILLARY BLOOD GLUCOSE        LIVER FUNCTIONS - ( 2018 14:15 )  Alb: 4.8 g/dL / Pro: 8.1 g/dL / ALK PHOS: 83 u/L / ALT: 25 u/L / AST: 56 u/L / GGT: x           PT/INR - ( 2018 07:59 )   PT: 19.1 SEC;   INR: 1.69          PTT - ( 2018 07:59 )  PTT:71.4 SEC  Urinalysis Basic - ( 2018 19:20 )    Color: LIGHT YELLOW / Appearance: CLEAR / S.012 / pH: 6.5  Gluc: NEGATIVE / Ketone: NEGATIVE  / Bili: NEGATIVE / Urobili: NORMAL   Blood: NEGATIVE / Protein: 10 / Nitrite: NEGATIVE   Leuk Esterase: NEGATIVE / RBC: x / WBC x   Sq Epi: x / Non Sq Epi: x / Bacteria: x      D DImer  Serum Pro-Brain Natriuretic Peptide: 8533 pg/mL ( @ 14:15)      Studies  Chest X-RAY  CT SCAN Chest   CT Abdomen< from: CT Chest No Cont (17.18 @ 18:14) >  LUNGS AND LARGE AIRWAYS: Patent central airways. Focal right lower lobe   groundglass and nodular opacity.  PLEURA: Partially loculated small right pleural effusion.  VESSELS: Thoracic aortic and coronary artery atherosclerosis.  HEART: Cardiomegaly. No pericardial effusion.  MEDIASTINUM AND BRANDON: No lymphadenopathy.  CHEST WALL AND LOWER NECK: Within normal limits.  VISUALIZED UPPER ABDOMEN: Within normal limits.  BONES: Multilevel degenerative disease.    IMPRESSION:   Focal right lower lobe groundglass and nodular opacity is new from the   prior study may be infectious/inflammatory however 3 month follow-up CT   is needed for complete evaluation.    Partially loculated right pleural effusion is decreased in size compared   to the prior study.              DIMITRIS LOPEZ M.D., RADIOLOGY RESIDENT  This document has been electronically signed.  CHARLIE HA M.D., ATTENDING RADIOLOGIST  This document has been electronically signed. 2018 10:41AM        < end of copied text >    Venous Dopplers: LE:   Others

## 2018-11-18 NOTE — CONSULT NOTE ADULT - PROBLEM SELECTOR RECOMMENDATION 9
The pt improved with diltiazem infusion,tapering off. continue po diltiazem . The pt improved with diltiazem infusion, tapering off. continue po diltiazem .

## 2018-11-18 NOTE — CONSULT NOTE ADULT - SUBJECTIVE AND OBJECTIVE BOX
CHIEF COMPLAINT:    HPI:HPI:  64 y/o female, with a PmHx of Afib (on coumadin), CHF (EF 55%), Gerd, HLD, HOCM (s/p septal ablation), Lung Ca (s/p chemo), HTN, Chronic back, pain, presented to the [facility] ED c/o chest discomfort and SOB . Pt states for the past year she has been having intermittent sob but for the past few days she states it was getting worse. For the past few days she states she has been feeling very sob trying to walk up the stairs .she started to have a sharp, non-radiating pain in the lower part of the center of chest. She states at first the pain was sharp and then there was a pressure like sensation so she went to an Urgent Care center and then was sent to the ED for a further evaluation.     While in the ED, she was found to be in Afib w/RVR and the ED thought she might have had pneumonia as was given aztreonam and azithromycin IV x 1. She had recently finished a course of steroids 1 week ago (was being treated by Dr. Beltran). Currently she states she has no chest pain. Earlier in the week she did have an episode of emesis, but she denies any fever, chills, HA, dizziness, blurred vision, abd pain, diaphoresis, palpitations, recent travel. Pt is now being admitted to telemetry for chest pain r/o acs, afib w/rvr with a subtherapeutic INR and r/o acs.    => Pt admits to have a frequent occurance of short term memory loss (2018 17:35)      PAST MEDICAL & SURGICAL HISTORY:  GERD (gastroesophageal reflux disease)  HLD (hyperlipidemia)  HTN (hypertension)  CAP (community acquired pneumonia): RML/RLL at [facility] 3/2017  Lung cancer: s/p chemotherapy  Chronic diastolic (congestive) heart failure  AF (atrial fibrillation): On Coumadin  Pleural effusion: Right parapneumonic drained twice 3/2017 at [facility]  HOCM (hypertrophic obstructive cardiomyopathy): S/P septal ablation  H/O cardiac radiofrequency ablation: for HOCM - 16 years ago  History of : 15 years ago      MEDICATIONS  (STANDING):  aspirin enteric coated 81 milliGRAM(s) Oral daily  diltiazem    milliGRAM(s) Oral daily  furosemide   Injectable 20 milliGRAM(s) IV Push two times a day  heparin  Infusion. 1200 Unit(s)/Hr (12 mL/Hr) IV Continuous <Continuous>  Diltiazem infusuion discontinued     MEDICATIONS  (PRN):  acetaminophen   Tablet .. 650 milliGRAM(s) Oral every 6 hours PRN Temp greater or equal to 38C (100.4F), Mild Pain (1 - 3), Moderate Pain (4 - 6)  ALBUTerol    0.083% 2.5 milliGRAM(s) Nebulizer every 6 hours PRN Shortness of Breath and/or Wheezing  heparin  Injectable 6500 Unit(s) IV Push every 6 hours PRN For aPTT less than 40  heparin  Injectable 3000 Unit(s) IV Push every 6 hours PRN For aPTT between 40 - 57      FAMILY HISTORY:  Family history of psoriasis (Sibling): Brother  Family history of ulcerative colitis (Sibling): Brother  Family history of liver cancer (Mother): Mother    No family history of premature coronary artery disease or sudden cardiac death    SOCIAL HISTORY:  Smoking-  Alcohol-  Ilicit Drug use-    REVIEW OF SYSTEMS:  Constitutional: [ ] fever, [ ]weight loss, [ ]fatigue Activity [ ] Bedbound,[ ] Ambulates [ ] Unassisted[ ] Cane/Walker [ ] Assistence.  Eyes: [ ] visual changes  Respiratory: [ ]shortness of breath;  [ ] cough, [ ]wheezing, [ ]chills, [ ]hemoptysis  Cardiovascular: [ ] chest pain, [ ]palpitations, [ ]dizziness,  [ ]leg swelling[ ]orthopnea [ ]PND  Gastrointestinal: [ ] abdominal pain, [ ]nausea, [ ]vomiting,  [ ]diarrhea,[ ]constipation  Genitourinary: [ ] dysuria, [ ] hematuria  Neurologic: [ ] headaches [ ] tremors[ ] weakness  Skin: [ ] itching, [ ]burning, [ ] rashes  Endocrine: [ ] heat or cold intolerance  Musculoskeletal: [ ] joint pain or swelling; [ ] muscle, back, or extremity pain  Psychiatric: [ ] depression, [ ]anxiety, [ ]mood swings, or [ ]difficulty sleeping  Hematologic: [ ] easy bruising, [ ] bleeding gums       [ x] All others negative	  [ ] Unable to obtain    Vital Signs Last 24 Hrs  T(C): 36.6 (2018 05:43), Max: 39 (2018 14:37)  T(F): 97.9 (2018 05:43), Max: 102.2 (2018 14:37)  HR: 74 (2018 08:58) (64 - 166)  BP: 102/61 (2018 08:58) (98/70 - 129/81)  BP(mean): --  RR: 18 (2018 08:58) (16 - 25)  SpO2: 96% (2018 08:58) (95% - 100%)  I&O's Summary    2018 07:01  -  2018 07:00  --------------------------------------------------------  IN: 273 mL / OUT: 450 mL / NET: -177 mL    2018 07:01  -  2018 10:32  --------------------------------------------------------  IN: 300 mL / OUT: 0 mL / NET: 300 mL        PHYSICAL EXAM:  General: No acute distress BMI-  HEENT: EOMI, PERRL[ ] Icteric  Neck: Supple, No JVD  Lungs: Equal air entry bilaterally; [ ] Rales [ ] Rhonchi [ ] Wheezing  Heart: Regular rate and rhythm;[ ] Murmurs-   /6 [ ] Systolic [ ] Diastolic [ ] Radiation,No rubs, or gallops  Abdomen: Nontender, bowel sounds present  Extremities: No clubbing, cyanosis, or edema[ ] Calf tenderness  Nervous system:  Alert & Oriented X3, no focal deficits  Psychiatric: Normal affect  Skin: No rashes or lesions      LABS:      136  |  97<L>  |  13  ----------------------------<  96  3.3<L>   |  24  |  0.88    Ca    8.6      2018 07:59  Phos  3.3     -  Mg     2.2     -    TPro  8.1  /  Alb  4.8  /  TBili  1.7<H>  /  DBili  x   /  AST  56<H>  /  ALT  25  /  AlkPhos  83      Creatinine Trend: 0.88<--, 0.89<--                        12.0   3.64  )-----------( 161      ( 2018 07:59 )             34.7     PT/INR - ( 2018 07:59 )   PT: 19.1 SEC;   INR: 1.69          PTT - ( 2018 07:59 )  PTT:71.4 SEC    Lipid Panel:   Cardiac Enzymes: CARDIAC MARKERS ( 2018 14:15 )  x     / x     / 155 u/L / x     / x          Serum Pro-Brain Natriuretic Peptide: 8533 pg/mL (18 @ 14:15)        RADIOLOGY: < from: Xray Chest 1 View- PORTABLE-Urgent (18 @ 14:40) >  IMPRESSION:   Right basilar atelectasis.    	    ECG [my interpretation]:12 Lead ECG (18 @ 13:14) >   Atrial fibrillation with rapid ventricular response  ST & T wave abnormality, consider lateral ischemia  Abnormal ECG          TELEMETRY:    2018 22:20:14  AFIB @75    ECHO:< from: Transthoracic Echocardiogram (18 @ 13:00) >  CONCLUSIONS:  1. Mitral annular calcification, otherwise normal mitral  valve. Mild-moderate mitral regurgitation.  2. Severely dilated left atrium.  LA volume index = 69  cc/m2.  3. Endocardium not well visualized; grossly normal overall  left ventricular systolic function.  A focal area of  dyskinesis is seen in the mid-interventricular septum.  Cannot exclude a left ventricular diverticulum.  4. The right ventricle is not well visualized; grossly  normal right ventricular systolic function.  5. Estimated right ventricular systolic pressure equals 54  mm Hg, assuming right atrial pressure equals 10 mm Hg,  consistent with moderate pulmonary hypertension. CHIEF COMPLAINT:    HPI:HPI:  64 y/o female, with a PmHx of Afib (on coumadin), CHF (EF 55%), Gerd, HLD, HOCM (s/p septal ablation), Lung Ca (s/p chemo), HTN, Chronic back, pain, presented to the [facility] ED c/o chest discomfort and SOB . Pt states for the past year she has been having intermittent sob but for the past few days she states it was getting worse. For the past few days she states she has been feeling very sob trying to walk up the stairs .she started to have a sharp, non-radiating pain in the lower part of the center of chest. She states at first the pain was sharp and then there was a pressure like sensation so she went to an Urgent Care center and then was sent to the ED for a further evaluation.     While in the ED, she was found to be in Afib w/RVR and the ED thought she might have had pneumonia as was given aztreonam and azithromycin IV x 1. Currently she states she has no chest pain. Earlier in the week she did have an episode of emesis, but she denies any fever, chills, HA, dizziness, blurred vision, abd pain, diaphoresis, palpitations, recent travel. Pt is now being admitted to telemetry for chest pain r/o acs, afib w/rvr with a subtherapeutic INR and r/o acs.    PAST MEDICAL & SURGICAL HISTORY:  GERD (gastroesophageal reflux disease)  HLD (hyperlipidemia)  HTN (hypertension)  CAP (community acquired pneumonia): RML/RLL at [facility] 3/2017  Lung cancer: s/p chemotherapy  Chronic diastolic (congestive) heart failure  AF (atrial fibrillation): On Coumadin  Pleural effusion: Right parapneumonic drained twice 3/2017 at [facility]  HOCM (hypertrophic obstructive cardiomyopathy): S/P septal ablation  H/O cardiac radiofrequency ablation: for HOCM - 16 years ago  History of : 15 years ago      MEDICATIONS  (STANDING):  aspirin enteric coated 81 milliGRAM(s) Oral daily  diltiazem    milliGRAM(s) Oral daily  furosemide   Injectable 20 milliGRAM(s) IV Push two times a day  heparin  Infusion. 1200 Unit(s)/Hr (12 mL/Hr) IV Continuous <Continuous>  Diltiazem infusuion discontinued     MEDICATIONS  (PRN):  acetaminophen   Tablet .. 650 milliGRAM(s) Oral every 6 hours PRN Temp greater or equal to 38C (100.4F), Mild Pain (1 - 3), Moderate Pain (4 - 6)  ALBUTerol    0.083% 2.5 milliGRAM(s) Nebulizer every 6 hours PRN Shortness of Breath and/or Wheezing  heparin  Injectable 6500 Unit(s) IV Push every 6 hours PRN For aPTT less than 40  heparin  Injectable 3000 Unit(s) IV Push every 6 hours PRN For aPTT between 40 - 57      FAMILY HISTORY:  Family history of psoriasis (Sibling): Brother  Family history of ulcerative colitis (Sibling): Brother  Family history of liver cancer (Mother): Mother    No family history of premature coronary artery disease or sudden cardiac death    SOCIAL HISTORY:  Smoking- 40 years smoking   Alcohol-socially   Ilicit Drug use-none    REVIEW OF SYSTEMS:  Constitutional: [ ] fever, [ ]weight loss, [ ]fatigue Activity [ ] Bedbound,[ ] Ambulates [ ] Unassisted[ ] Cane/Walker [ ] Assistence.  Eyes: [ ] visual changes  Respiratory: [ x]shortness of breath;  [ ] cough, [ ]wheezing, [ ]chills, [ ]hemoptysis  Cardiovascular: [ ] chest pain, [ ]palpitations, [ ]dizziness,  [ ]leg swelling[ ]orthopnea [ ]PND  Gastrointestinal: [ ] abdominal pain, [ ]nausea, [ ]vomiting,  [ ]diarrhea,[ ]constipation  Genitourinary: [ ] dysuria, [ ] hematuria  Neurologic: [ ] headaches [ ] tremors[ ] weakness  Skin: [ ] itching, [ ]burning, [ ] rashes  Endocrine: [ ] heat or cold intolerance  Musculoskeletal: [ ] joint pain or swelling; [ ] muscle, back, or extremity pain  Psychiatric: [ ] depression, [ ]anxiety, [ ]mood swings, or [ ]difficulty sleeping  Hematologic: [ ] easy bruising, [ ] bleeding gums       [ x] All others negative	  [ ] Unable to obtain    Vital Signs Last 24 Hrs  T(C): 36.6 (2018 05:43), Max: 39 (2018 14:37)  T(F): 97.9 (2018 05:43), Max: 102.2 (2018 14:37)  HR: 74 (2018 08:58) (64 - 166)  BP: 102/61 (2018 08:58) (98/70 - 129/81)  BP(mean): --  RR: 18 (2018 08:58) (16 - 25)  SpO2: 96% (2018 08:58) (95% - 100%)  I&O's Summary    2018 07:  -  2018 07:00  --------------------------------------------------------  IN: 273 mL / OUT: 450 mL / NET: -177 mL    2018 07:  -  2018 10:32  --------------------------------------------------------  IN: 300 mL / OUT: 0 mL / NET: 300 mL        PHYSICAL EXAM:  General: No acute distress BMI-  HEENT: EOMI, PERRL[ ] Icteric  Neck: Supple, No JVD  Lungs: Equal air entry bilaterally; [ ] Rales [ ] Rhonchi [ ] Wheezing  Heart: Regular rate and rhythm;[x ] Murmurs-2  /6 [ ] Systolic [ ] Diastolic [ ] Radiation,No rubs, or gallops  Abdomen: Nontender, bowel sounds present  Extremities: No clubbing, cyanosis, or edema[ x] Calf tenderness  Nervous system:  Alert & Oriented X3, no focal deficits  Psychiatric: Normal affect  Skin: No rashes or lesions      LABS:      136  |  97<L>  |  13  ----------------------------<  96  3.3<L>   |  24  |  0.88    Ca    8.6      2018 07:59  Phos  3.3       Mg     2.2         TPro  8.1  /  Alb  4.8  /  TBili  1.7<H>  /  DBili  x   /  AST  56<H>  /  ALT  25  /  AlkPhos  83      Creatinine Trend: 0.88<--, 0.89<--                        12.0   3.64  )-----------( 161      ( 2018 07:59 )             34.7     PT/INR - ( 2018 07:59 )   PT: 19.1 SEC;   INR: 1.69          PTT - ( 2018 07:59 )  PTT:71.4 SEC    Lipid Panel:   Cardiac Enzymes: CARDIAC MARKERS ( 2018 14:15 )  x     / x     / 155 u/L / x     / x          Serum Pro-Brain Natriuretic Peptide: 8533 pg/mL (18 @ 14:15)        RADIOLOGY: < from: Xray Chest 1 View- PORTABLE-Urgent (18 @ 14:40) >  IMPRESSION:   Right basilar atelectasis.    	    ECG [my interpretation]:12 Lead ECG (18 @ 13:14) >   Atrial fibrillation with rapid ventricular response  ST & T wave abnormality, consider lateral ischemia  Abnormal ECG          TELEMETRY:    2018 22:20:14  AFIB @75    ECHO:< from: Transthoracic Echocardiogram (18 @ 13:00) >  CONCLUSIONS:  1. Mitral annular calcification, otherwise normal mitral  valve. Mild-moderate mitral regurgitation.  2. Severely dilated left atrium.  LA volume index = 69  cc/m2.  3. Endocardium not well visualized; grossly normal overall  left ventricular systolic function.  A focal area of  dyskinesis is seen in the mid-interventricular septum.  Cannot exclude a left ventricular diverticulum.  4. The right ventricle is not well visualized; grossly  normal right ventricular systolic function.  5. Estimated right ventricular systolic pressure equals 54  mm Hg, assuming right atrial pressure equals 10 mm Hg,  consistent with moderate pulmonary hypertension.

## 2018-11-18 NOTE — PROVIDER CONTACT NOTE (CRITICAL VALUE NOTIFICATION) - NS PROVIDER READ BACK TO LAB
Neurofibromatosis (NF) Clinic  [facility], 9th Floor - [facility]  [address] [place] [address]  Scheduling/Appointments: [phone]  Fax: [phone]    Numbers to call:   Monday - Friday, 8:00 am - 5:00 pm:    Non-urgent or same-day call-back concerns: [facility] Nurse Triage - Voicemail: [phone]    Urgent concerns: NF Care Coordinator - Darya House RN - Pager: [phone]    Scheduling/Appointments: [phone]  Nights and weekends:   Call [phone] and ask the  to page the 'Pediatric Heme/Onc fellow on call' if you have an urgent concern that can't wait until the clinic opens.  Genetic Counselors:  Alycia Valencia: [phone]   Blanca Rollemark: [phone]    Draya House RN, MS  NF Care Coordinator  Pager: [phone]  E-mail: [email or web address]    --------------------------------------------------------------------------------------------------------------------------------    It was a pleasure to see you in our Neurofibromatosis clinic today.    Here's our recommendations for follow-up care:    Referrals:  [facility]    Other Instructions:  Cervical spine MRI - within the month.    Return to Clinic:  After MRI to review results.     yes

## 2018-11-18 NOTE — CONSULT NOTE ADULT - PROBLEM SELECTOR RECOMMENDATION 9
I think her sob IS REALTED TO a FIBRIALLTION: ct CHEST REVIEWED: hAS ONLY SUBTLE ggo IN THE RIGTH LOWER LOBE: dOESNTO LOOK TO BE A WHOPPING PNEUMONIA: SHE IS ON ANTIBIOTICS : Would cont for now : complete 67 days of antibiotics: However since she had lung cancer, THIS must be followed  up as an outpt to ensure its resolution:

## 2018-11-18 NOTE — PROGRESS NOTE ADULT - SUBJECTIVE AND OBJECTIVE BOX
PRESENTING CC:Dyspnea    SUBJ: 62 y/o female, with a PmHx of Atrial Fib (on coumadin), HFpEF (EF 55%), Gerd, HLD, HOCM (s/p septal ablation), Lung Ca (s/p chemo), HTN, Chronic back, pain, presented to the [facility] ED c/o chest discomfort and SOB.Noted AF with RVR-      PMH -reviewed admission note, no change since admission  Heart failure: acute [ ] chronic [x ] acute or chronic [ ] diastolic [x ] systolic [ ] combined systolic and diastolic[ ]  RONY: ATN[ ] renal medullary necrosis [ ] CKD I [ ]CKDII [ ]CKD III [ ]CKD IV [ ]CKD V [ ]Other pathological lesions [ ]    MEDICATIONS  (STANDING):  aspirin enteric coated 81 milliGRAM(s) Oral daily  diltiazem    milliGRAM(s) Oral daily  diltiazem Infusion 5 mG/Hr (5 mL/Hr) IV Continuous <Continuous>  furosemide   Injectable 20 milliGRAM(s) IV Push two times a day  heparin  Infusion. 1200 Unit(s)/Hr (12 mL/Hr) IV Continuous <Continuous>    MEDICATIONS  (PRN):  acetaminophen   Tablet .. 650 milliGRAM(s) Oral every 6 hours PRN Temp greater or equal to 38C (100.4F), Mild Pain (1 - 3), Moderate Pain (4 - 6)  ALBUTerol    0.083% 2.5 milliGRAM(s) Nebulizer every 6 hours PRN Shortness of Breath and/or Wheezing  heparin  Injectable 6500 Unit(s) IV Push every 6 hours PRN For aPTT less than 40  heparin  Injectable 3000 Unit(s) IV Push every 6 hours PRN For aPTT between 40 - 57          FAMILY HISTORY:  Family history of psoriasis (Sibling): Brother  Family history of ulcerative colitis (Sibling): Brother  Family history of liver cancer (Mother): Mother  No family history of premature coronary artery disease or sudden cardiac death      REVIEW OF SYSTEMS:  Constitutional: [x ] fever, [ ]weight loss,  [x ]fatigue  Eyes: [ ] visual changes  Respiratory: [x]shortness of breath;  [x ] cough, [ ]wheezing, [ ]chills, [ ]hemoptysis  Cardiovascular: [x ] chest pain, [ ]palpitations, [ ]dizziness,  [ ]leg swelling[ ]orthopnea[ ]PND  Gastrointestinal: [ ] abdominal pain, [ ]nausea, [ ]vomiting,  [ ]diarrhea   Genitourinary: [ ] dysuria, [ ] hematuria  Neurologic: [ ] headaches [ ] tremors[ ]weakness  Skin: [ ] itching, [ ]burning, [ ] rashes  Endocrine: [ ] heat or cold intolerance  Musculoskeletal: [ ] joint pain or swelling; [ ] muscle, back, or extremity pain  Psychiatric: [ ] depression, [ ]anxiety, [ ]mood swings, or [ ]difficulty sleeping  Hematologic: [ ] easy bruising, [ ] bleeding gums    [x] All remaining systems negative except as per above.   [ ]Unable to obtain.    Vital Signs Last 24 Hrs  T(C): 36.6 (18 Nov 2018 05:43), Max: 39 (17 Nov 2018 14:37)  T(F): 97.9 (18 Nov 2018 05:43), Max: 102.2 (17 Nov 2018 14:37)  HR: 69 (18 Nov 2018 05:43) (64 - 166)  BP: 98/70 (18 Nov 2018 05:43) (98/70 - 129/81)  RR: 17 (18 Nov 2018 05:43) (16 - 25)  SpO2: 95% (18 Nov 2018 05:43) (95% - 100%)  I&O's Summary    17 Nov 2018 07:01  -  18 Nov 2018 07:00  --------------------------------------------------------  IN: 273 mL / OUT: 450 mL / NET: -177 mL        PHYSICAL EXAM:  General: No acute distress BMI-29  HEENT: EOMI, PERRL  Neck: Supple, [ ] JVD  Lungs: Fair air entry bilaterally; [ ] rales [ ] wheezing [x ] rhonchi  Heart: Irregular rate and rhythm; [x ] murmur   2/6 [x ] systolic [ ] diastolic [ ] radiation[ ] rubs [ ]  gallops  Abdomen: Nontender, bowel sounds present  Extremities: No clubbing, cyanosis, [x ] edema  Nervous system:  Alert & Oriented X3, no focal deficits  Psychiatric: Normal affect  Skin: No rashes or lesions    LABS:  11-18    136  |  97<L>  |  13  ----------------------------<  96  3.3<L>   |  24  |  0.88    Ca    8.6      18 Nov 2018 07:59  Phos  3.3     11-17  Mg     2.2     11-17    TPro  8.1  /  Alb  4.8  /  TBili  1.7<H>  /  DBili  x   /  AST  56<H>  /  ALT  25  /  AlkPhos  83  11-17    Creatinine Trend: 0.88<--, 0.89<--                        12.0   3.64  )-----------( 161      ( 18 Nov 2018 07:59 )             34.7     PT/INR - ( 18 Nov 2018 07:59 )   PT: 19.1 SEC;   INR: 1.69     PTT - ( 18 Nov 2018 07:59 )  PTT:71.4 SEC     Serum Pro-Brain Natriuretic Peptide: 8533 pg/mL (11-17 @ 14:15)    Cardiac Enzymes: CARDIAC MARKERS ( 17 Nov 2018 14:15 )  x     / x     / 155 u/L / x     / x          RADIOLOGY:    ECG [my interpretation]:Atrial fibrillation with rapid ventricular response  ST & T wave abnormality, consider lateral ischemia      TELEMETRY:    ECHO:Study Date: 2/16/2018   Grossly normal overall left ventricular systolic function.  A focal area of dyskinesis is seen in the mid-interventricular septum.  Cannot exclude a left ventricular diverticulum.  Mild-moderate mitral regurgitation.  Severely dilated left atrium.   Moderate pulmonary hypertension.      IMPRESSION AND PLAN:    62 y/o female, with a PmHx of Afib (on coumadin), CHF (EF 55%), Gerd, HLD, HOCM (s/p septal ablation), Lung Ca (s/p chemo), HTN, Chronic back, pain, presented to the [facility] ED c/o chest discomfort and SOB. Pt is being admitted to telemetry for Afib w/RVR with a subtherapeutic INR, Chest pain r/o acs and acute on chronic diastolic heart failure.      Problem/Plan - 1:  ·  Problem: Atrial Fibrillation with RVR.CHADS2-4  Plan:Rate control on Diltiazem gtt-now controlled.  Taper off Diltiazem gtt  Resume PO Diltiazem-Heparin gtt bridge to goal INR-2-2.5      Problem/Plan - 2:  ·  Problem: Chronic diastolic (congestive) heart failure.  Plan: started on Lasix 20mg iv bid, daily wts, fluid restriction, strict I & O's,     Problem/Plan - 3:  ·  Problem: HTN (hypertension).  Plan: monitor bp, cont meds, adjust as needed.     Problem/Plan - 4:  ·  Problem: Lung cancer.  Plan: Pulmonary consult-Dr Beltran  CT chest w/o cont to evaluate sob.     Problem/Plan - 5:  ·  Problem: Need for prophylactic measure.  Plan: on heparin gtt to coumadin, not needed.

## 2018-11-18 NOTE — CONSULT NOTE ADULT - ASSESSMENT
IMPRESSION AND PLAN:    62 y/o female, with a PmHx of Afib (on coumadin), CHF (EF 55%), Gerd, HLD, HOCM (s/p septal ablation), Lung Ca (s/p chemo), HTN, Chronic back, pain, presented to the [facility] ED c/o chest discomfort and SOB. Pt is being admitted to telemetry for Afib w/RVR with a subtherapeutic INR, Chest pain r/o acs and acute on chronic diastolic heart failure.       Problem/Plan - 1:  ·  Problem: Atrial Fibrillation with RVR.CHADS2-4  Plan:Rate control on Diltiazem gtt-now controlled.  Taper off Diltiazem gtt  Resume PO Diltiazem-Heparin gtt bridge to goal INR-2-2.5       Problem/Plan - 2:  ·  Problem: Chronic diastolic (congestive) heart failure.  Plan: started on Lasix 20mg iv bid, daily wts, fluid restriction, strict I & O's,      Problem/Plan - 3:  ·  Problem: HTN (hypertension).  Plan: monitor bp, cont meds, adjust as needed.      Problem/Plan - 4:  ·  Problem: Lung cancer.  Plan: Pulmonary consult-Dr Beltran  CT chest w/o cont to evaluate sob.      Problem/Plan - 5:  ·  Problem: Need for prophylactic measure.  Plan: on heparin gtt to coumadin, not needed.

## 2018-11-18 NOTE — CONSULT NOTE ADULT - ASSESSMENT
64 y/o female, with a PmHx of Afib (on coumadin), CHF (EF 55%), Gerd, HLD, HOCM (s/p septal ablation), Lung Ca (s/p chemo), HTN, Chronic back, pain, presented to the [facility] ED c/o chest discomfort and SOB. Pt is being admitted to telemetry for Afib w/RVR with a subtherapeutic INR, Chest pain r/o acs and acute on chronic diastolic heart failure.

## 2018-11-19 LAB
APTT BLD: 99.3 SEC — HIGH (ref 27.5–36.3)
BACTERIA UR CULT: SIGNIFICANT CHANGE UP
BUN SERPL-MCNC: 13 MG/DL — SIGNIFICANT CHANGE UP (ref 7–23)
CALCIUM SERPL-MCNC: 9.4 MG/DL — SIGNIFICANT CHANGE UP (ref 8.4–10.5)
CHLORIDE SERPL-SCNC: 101 MMOL/L — SIGNIFICANT CHANGE UP (ref 98–107)
CO2 SERPL-SCNC: 26 MMOL/L — SIGNIFICANT CHANGE UP (ref 22–31)
CREAT SERPL-MCNC: 0.94 MG/DL — SIGNIFICANT CHANGE UP (ref 0.5–1.3)
GLUCOSE SERPL-MCNC: 92 MG/DL — SIGNIFICANT CHANGE UP (ref 70–99)
HCT VFR BLD CALC: 36.9 % — SIGNIFICANT CHANGE UP (ref 34.5–45)
HGB BLD-MCNC: 12.3 G/DL — SIGNIFICANT CHANGE UP (ref 11.5–15.5)
INR BLD: 1.62 — HIGH (ref 0.88–1.17)
MAGNESIUM SERPL-MCNC: 2.3 MG/DL — SIGNIFICANT CHANGE UP (ref 1.6–2.6)
MCHC RBC-ENTMCNC: 33.3 % — SIGNIFICANT CHANGE UP (ref 32–36)
MCHC RBC-ENTMCNC: 35.4 PG — HIGH (ref 27–34)
MCV RBC AUTO: 106.3 FL — HIGH (ref 80–100)
NRBC # FLD: 0 — SIGNIFICANT CHANGE UP
NT-PROBNP SERPL-SCNC: 2168 PG/ML — SIGNIFICANT CHANGE UP
PLATELET # BLD AUTO: 171 K/UL — SIGNIFICANT CHANGE UP (ref 150–400)
PMV BLD: 10.7 FL — SIGNIFICANT CHANGE UP (ref 7–13)
POTASSIUM SERPL-MCNC: 3.9 MMOL/L — SIGNIFICANT CHANGE UP (ref 3.5–5.3)
POTASSIUM SERPL-SCNC: 3.9 MMOL/L — SIGNIFICANT CHANGE UP (ref 3.5–5.3)
PROTHROM AB SERPL-ACNC: 18.2 SEC — HIGH (ref 9.8–13.1)
RBC # BLD: 3.47 M/UL — LOW (ref 3.8–5.2)
RBC # FLD: 13.3 % — SIGNIFICANT CHANGE UP (ref 10.3–14.5)
SODIUM SERPL-SCNC: 138 MMOL/L — SIGNIFICANT CHANGE UP (ref 135–145)
SPECIMEN SOURCE: SIGNIFICANT CHANGE UP
WBC # BLD: 3.45 K/UL — LOW (ref 3.8–10.5)
WBC # FLD AUTO: 3.45 K/UL — LOW (ref 3.8–10.5)

## 2018-11-19 PROCEDURE — 93458 L HRT ARTERY/VENTRICLE ANGIO: CPT | Mod: 26

## 2018-11-19 PROCEDURE — 99152 MOD SED SAME PHYS/QHP 5/>YRS: CPT

## 2018-11-19 RX ORDER — WARFARIN SODIUM 2.5 MG/1
6 TABLET ORAL ONCE
Qty: 0 | Refills: 0 | Status: COMPLETED | OUTPATIENT
Start: 2018-11-19 | End: 2018-11-19

## 2018-11-19 RX ADMIN — BUDESONIDE AND FORMOTEROL FUMARATE DIHYDRATE 2 PUFF(S): 160; 4.5 AEROSOL RESPIRATORY (INHALATION) at 20:13

## 2018-11-19 RX ADMIN — Medication 81 MILLIGRAM(S): at 13:07

## 2018-11-19 RX ADMIN — Medication 20 MILLIGRAM(S): at 05:57

## 2018-11-19 RX ADMIN — Medication 20 MILLIGRAM(S): at 19:34

## 2018-11-19 RX ADMIN — Medication 200 MILLIGRAM(S): at 05:57

## 2018-11-19 RX ADMIN — Medication 240 MILLIGRAM(S): at 05:57

## 2018-11-19 RX ADMIN — HEPARIN SODIUM 1200 UNIT(S)/HR: 5000 INJECTION INTRAVENOUS; SUBCUTANEOUS at 09:58

## 2018-11-19 RX ADMIN — WARFARIN SODIUM 6 MILLIGRAM(S): 2.5 TABLET ORAL at 20:13

## 2018-11-19 NOTE — PROGRESS NOTE ADULT - SUBJECTIVE AND OBJECTIVE BOX
Patient is a 63y old  Female who presents with a chief complaint of Chest Pain/SOB (2018 10:20)      Any change in ROS: Still feels SOB     MEDICATIONS  (STANDING):  aspirin enteric coated 81 milliGRAM(s) Oral daily  buDESOnide 160 MICROgram(s)/formoterol 4.5 MICROgram(s) Inhaler 2 Puff(s) Inhalation two times a day  diltiazem    milliGRAM(s) Oral daily  furosemide   Injectable 20 milliGRAM(s) IV Push two times a day  heparin  Infusion. 1200 Unit(s)/Hr (12 mL/Hr) IV Continuous <Continuous>    MEDICATIONS  (PRN):  acetaminophen   Tablet .. 650 milliGRAM(s) Oral every 6 hours PRN Temp greater or equal to 38C (100.4F), Mild Pain (1 - 3), Moderate Pain (4 - 6)  ALBUTerol    0.083% 2.5 milliGRAM(s) Nebulizer every 6 hours PRN Shortness of Breath and/or Wheezing  guaiFENesin   Syrup  (Sugar-Free) 200 milliGRAM(s) Oral every 6 hours PRN Cough  heparin  Injectable 6500 Unit(s) IV Push every 6 hours PRN For aPTT less than 40  heparin  Injectable 3000 Unit(s) IV Push every 6 hours PRN For aPTT between 40 - 57    Vital Signs Last 24 Hrs  T(C): 36.4 (2018 07:32), Max: 37.1 (2018 17:49)  T(F): 97.6 (2018 07:32), Max: 98.7 (2018 17:49)  HR: 68 (2018 07:32) (65 - 79)  BP: 101/65 (2018 07:32) (95/68 - 106/71)  BP(mean): --  RR: 22 (2018 07:32) (16 - 22)  SpO2: 95% (2018 07:32) (95% - 98%)    I&O's Summary    2018 07:01  -  2018 07:00  --------------------------------------------------------  IN: 1300 mL / OUT: 2000 mL / NET: -700 mL          Physical Exam:   GENERAL: NAD, well-groomed, well-developed  HEENT: HEIKE/   Atraumatic, Normocephalic  ENMT: No tonsillar erythema, exudates, or enlargement; Moist mucous membranes, Good dentition, No lesions  NECK: Supple, No JVD, Normal thyroid  CHEST/LUNG: Clear to auscultaion, ; No rales, rhonchi, wheezing, or rubs  CVS: Regular rate and rhythm; No murmurs, rubs, or gallops  GI: : Soft, Nontender, Nondistended; Bowel sounds present  NERVOUS SYSTEM:  Alert & Oriented X3  EXTREMITIES:  2+ Peripheral Pulses, No clubbing, cyanosis, or edema  LYMPH: No lymphadenopathy noted  SKIN: No rashes or lesions  ENDOCRINOLOGY: No Thyromegaly  PSYCH: Appropriate    Labs:  25  CARDIAC MARKERS ( 2018 14:15 )  x     / x     / 155 u/L / x     / x                                12.3   3.45  )-----------( 171      ( 2018 06:00 )             36.9                         12.0   3.64  )-----------( 161      ( 2018 07:59 )             34.7                         11.1   4.30  )-----------( 155      ( 2018 00:15 )             32.3                         13.7   7.53  )-----------( 206      ( 2018 14:30 )             40.7         138  |  101  |  13  ----------------------------<  92  3.9   |  26  |  0.94      136  |  97<L>  |  13  ----------------------------<  96  3.3<L>   |  24  |  0.88      138  |  99  |  15  ----------------------------<  103<H>  5.0   |  22  |  0.89    Ca    9.4      2018 06:00  Ca    8.6      2018 07:59  Ca    9.7      2018 14:15  Phos  3.3       Mg     2.3       Mg     2.2         TPro  8.1  /  Alb  4.8  /  TBili  1.7<H>  /  DBili  x   /  AST  56<H>  /  ALT  25  /  AlkPhos  83      CAPILLARY BLOOD GLUCOSE          LIVER FUNCTIONS - ( 2018 14:15 )  Alb: 4.8 g/dL / Pro: 8.1 g/dL / ALK PHOS: 83 u/L / ALT: 25 u/L / AST: 56 u/L / GGT: x           PT/INR - ( 2018 07:59 )   PT: 19.1 SEC;   INR: 1.69          PTT - ( 2018 06:00 )  PTT:99.3 SEC  Urinalysis Basic - ( 2018 19:20 )    Color: LIGHT YELLOW / Appearance: CLEAR / S.012 / pH: 6.5  Gluc: NEGATIVE / Ketone: NEGATIVE  / Bili: NEGATIVE / Urobili: NORMAL   Blood: NEGATIVE / Protein: 10 / Nitrite: NEGATIVE   Leuk Esterase: NEGATIVE / RBC: x / WBC x   Sq Epi: x / Non Sq Epi: x / Bacteria: x      Serum Pro-Brain Natriuretic Peptide: 2168 pg/mL ( @ 06:00)  Serum Pro-Brain Natriuretic Peptide: 8533 pg/mL ( @ 14:15)        RECENT CULTURES:   @ 20:15 URINE MIDSTREAM                   @ 14:47 BLOOD PERIPHERAL         < from: CT Chest No Cont (18 @ 18:14) >  CHEST:     LUNGS AND LARGE AIRWAYS: Patent central airways. Focal right lower lobe   groundglass and nodular opacity.  PLEURA: Partially loculated small right pleural effusion.  VESSELS: Thoracic aortic and coronary artery atherosclerosis.  HEART: Cardiomegaly. No pericardial effusion.  MEDIASTINUM AND BRANDON: No lymphadenopathy.  CHEST WALL AND LOWER NECK: Within normal limits.  VISUALIZED UPPER ABDOMEN: Within normal limits.  BONES: Multilevel degenerative disease.    IMPRESSION:   Focal right lower lobe groundglass and nodular opacity is new from the   prior study may be infectious/inflammatory however 3 month follow-up CT   is needed for complete evaluation.    Partially loculated right pleural effusion is decreased in size compared   to the prior study.              DIMITRIS LOPEZ M.D., RADIOLOGY RESIDENT  This document has been electronically signed.  CHARLIE HA M.D., ATTENDING RADIOLOGIST  This document has been electronically signed. 2018 10:41AM        < end of copied text >           NO ORGANISMS ISOLATED  NO ORGANISMS ISOLATED AT 24 HOURS        RESPIRATORY CULTURES:          Studies  Chest X-RAY  CT SCAN Chest   Venous Dopplers: LE:   CT Abdomen  Others    < from: CT Chest No Cont (18 @ 18:14) >    PROCEDURE:   CT of the Chest was performed without intravenous contrast.  Sagittal and coronal reformatswere performed.  Axial MIP reformats are performed.    FINDINGS:    CHEST:     LUNGS AND LARGE AIRWAYS: Patent central airways. Focal right lower lobe   groundglass and nodular opacity.  PLEURA: Partially loculated small right pleural effusion.  VESSELS: Thoracic aortic and coronary artery atherosclerosis.  HEART: Cardiomegaly. No pericardial effusion.  MEDIASTINUM AND BRANDON: No lymphadenopathy.  CHEST WALL AND LOWER NECK: Within normal limits.  VISUALIZED UPPER ABDOMEN: Within normal limits.  BONES: Multilevel degenerative disease.    IMPRESSION:   Focal right lower lobe groundglass and nodular opacity is new from the   prior study may be infectious/inflammatory however 3 month follow-up CT   is needed for complete evaluation.    Partially loculated right pleural effusion is decreased in size compared   to the prior study.              DIMITRIS LOPEZ M.D., RADIOLOGY RESIDENT  This document has been electronically signed.  CHARLIE HA M.D., ATTENDING RADIOLOGIST  This document has been electronically signed. 2018 10:41AM    < end of copied text >        < from: NM PET/CT Onc FDG Skull to Thigh, Subsq (18 @ 11:05) >    There is cardiomegaly. 4.2 cm aneurysm of ascending aorta. Mild   atherosclerotic calcifications are seen within the thoracic aorta.No   pericardial effusion.    The liver, gallbladder, pancreas, spleen, adrenal glands, and right   kidney are normal in appearance. There is again scarring of the left   kidney.    No ascites or lymphadenopathy in abdomen or pelvis.     Evaluation of the bowel demonstrates no abnormalities.     Moderate calcified plaqueabdominal aorta and pelvic arteries.    Uterus and ovaries unremarkable.    Evaluation of the osseous structures demonstrates no metastatic lesions.   There is no longer increasedactivity throughout the bones in pattern   suggesting marrow stimulating medication. Mild degenerative changes of   the thoracolumbar spine.    Impression:   1. Since 10/3/2017, there is still no abnormal activity to suggest   recurrent lymphoma.    2. Continued decrease in size of lymphadenopathy in the neck and chest   with near resolution of pleural-based masses right hemithorax and right   pleural effusion.            "Thank you for the opportunity to participate in the care of this   patient."    MANISHA MILAN M.D., RADIOLOGY RESIDENT  This document has been electronically signed.  LUIS MIGUEL CEJA M.D., ATTENDING RADIOLOGIST  This document has been electronically signed. Mar  2 2018  3:44PM        < end of copied text >

## 2018-11-19 NOTE — CONSULT NOTE ADULT - SUBJECTIVE AND OBJECTIVE BOX
HPI:  64 y/o female, with a PmHx of Afib (on coumadin), CHF (EF 55%), Gerd, HLD, HOCM (s/p septal ablation), Lung Ca (s/p chemo), HTN, Chronic back, pain, presented to the [facility] ED c/o chest discomfort and SOB. Pt states for the past year she has been having intermittent sob but for the past few days she states it was getting worse. For the past few days she states she has been feeling very sob trying to walk [place] subway and [place] at work she started to feel very sluggish and had to constantly stop to try and catch her breath. Today, when she woke up from sleep (she states she slept about 12 hours last night into this morning because she wasn't feeling well) she started to have a sharp, non-radiating pain in the lower part of the center of chest. She states at first the pain was sharp and then there was a pressure like sensation so she went to an Urgent Care center and then was sent to the ED for a further evaluation. While in the ED, she was found to be in Afib w/RVR and the ED thought she might have had pneumonia as was given aztreonam and azithromycin IV x 1. She had recently finished a course of steroids 1 week ago (was being treated by Dr. Beltran). Currently she states she has no chest pain. Earlier in the week she did have an episode of emesis, but she denies any fever, chills, HA, dizziness, blurred vision, abd pain, diaphoresis, palpitations, recent travel. Pt is now being admitted to telemetry for chest pain r/o acs, afib w/rvr with a subtherapeutic INR and r/o acs.    => Pt admits to have a frequent occurance of short term memory loss (2018 17:35)      PAST MEDICAL & SURGICAL HISTORY:  GERD (gastroesophageal reflux disease)  HLD (hyperlipidemia)  HTN (hypertension)  CAP (community acquired pneumonia): RML/RLL at [facility] 3/2017  Lung cancer: s/p chemotherapy  Chronic diastolic (congestive) heart failure  AF (atrial fibrillation): On Coumadin  Pleural effusion: Right parapneumonic drained twice 3/2017 at [facility]  HOCM (hypertrophic obstructive cardiomyopathy): S/P septal ablation  H/O cardiac radiofrequency ablation: for HOCM - 16 years ago  History of : 15 years ago      Allergies  cefdinir (Rash)  moxifloxacin (Rash)  penicillin (Rash)  ID note-unclear allergy history. Says she develops diarrhea and nausea to penicillin, and avoids all abx related to penicillins      ANTIMICROBIALS:      OTHER MEDS: MEDICATIONS  (STANDING):  acetaminophen   Tablet .. 650 every 6 hours PRN  ALBUTerol    0.083% 2.5 every 6 hours PRN  aspirin enteric coated 81 daily  buDESOnide 160 MICROgram(s)/formoterol 4.5 MICROgram(s) Inhaler 2 two times a day  diltiazem    daily  furosemide   Injectable 20 two times a day  guaiFENesin   Syrup  (Sugar-Free) 200 every 6 hours PRN  heparin  Infusion. 1200 <Continuous>  heparin  Injectable 6500 every 6 hours PRN  heparin  Injectable 3000 every 6 hours PRN      SOCIAL HISTORY:  [ ] etoh [ ] tobacco [x ] former smoker [ ] IVDU    FAMILY HISTORY:  Family history of psoriasis (Sibling): Brother  Family history of ulcerative colitis (Sibling): Brother  Family history of liver cancer (Mother): Mother      REVIEW OF SYSTEMS  [  ] ROS unobtainable because:    [ x ] All other systems negative except as noted below:	    Constitutional:  [ ] fever [ ] chills  [ ] weight loss  [ ] weakness  Skin:  [ ] rash [ ] phlebitis	  Eyes: [ ] icterus [ ] pain  [ ] discharge	  ENMT: [ ] sore throat  [ ] thrush [ ] ulcers [ ] exudates  Respiratory: [x ] dyspnea [ ] hemoptysis [ x] cough [ ] sputum	  Cardiovascular:  [ ] chest pain [ ] palpitations [ ] edema	  Gastrointestinal:  [ ] nausea [ ] vomiting [ ] diarrhea [ ] constipation [ ] pain	  Genitourinary:  [ ] dysuria [ ] frequency [ ] hematuria [ ] discharge [ ] flank pain  [ ] incontinence  Musculoskeletal:  [ ] myalgias [ ] arthralgias [ ] arthritis  [ ] back pain  Neurological:  [ ] headache [ ] seizures  [ ] confusion/altered mental status  Psychiatric:  [ ] anxiety [ ] depression	  Hematology/Lymphatics:  [ ] lymphadenopathy  Endocrine:  [ ] adrenal [ ] thyroid  Allergic/Immunologic:	 [ ] transplant [ ] seasonal    Vital Signs Last 24 Hrs  T(F): 97.6 (18 @ 07:32), Max: 102.2 (18 @ 14:37)    Vital Signs Last 24 Hrs  HR: 68 (18 @ 07:32) (65 - 79)  BP: 101/65 (18 @ 07:32) (95/68 - 106/71)  RR: 22 (18 @ 07:32)  SpO2: 95% (18 @ 07:32) (95% - 98%)  Wt(kg): --    PHYSICAL EXAM:  General: non-toxic  HEAD/EYES: anicteric, PERRL  ENT:  supple  Cardiovascular:   S1, S2  Respiratory:  clear bilaterally  GI:  soft, non-tender, normal bowel sounds  :  no CVA tenderness   Musculoskeletal:  no synovitis  Neurologic:  grossly non-focal  Skin:  no rash  Lymph: no lymphadenopathy  Psychiatric:  appropriate affect  Vascular:  no phlebitis                                12.3   3.45  )-----------( 171      ( 2018 06:00 )             36.9           138  |  101  |  13  ----------------------------<  92  3.9   |  26  |  0.94    Ca    9.4      2018 06:00  Mg     2.3             Urinalysis Basic - ( 2018 19:20 )    Color: LIGHT YELLOW / Appearance: CLEAR / S.012 / pH: 6.5  Gluc: NEGATIVE / Ketone: NEGATIVE  / Bili: NEGATIVE / Urobili: NORMAL   Blood: NEGATIVE / Protein: 10 / Nitrite: NEGATIVE   Leuk Esterase: NEGATIVE / RBC: x / WBC x   Sq Epi: x / Non Sq Epi: x / Bacteria: x        MICROBIOLOGY:  URINE MIDSTREAM  18 --  --  --      BLOOD PERIPHERAL  18 --  --  --              v            RADIOLOGY: < from: CT Chest No Cont (18 @ 18:14) >    IMPRESSION:   Focal right lower lobe ground glass and nodular opacity is new from the   prior study may be infectious/inflammatory however 3 month follow-up CT   is needed for complete evaluation.    Partially loculated right pleural effusion is decreased in size compared   to the prior study.    < end of copied text >    < from: Xray Chest 1 View- PORTABLE-Urgent (18 @ 14:40) >  FINDINGS:  Right basilar atelectasis. No pleural effusion or pneumothorax. Cardiac   silhouette is enlarged on this AP projection. Bilateral shoulder   osteoarthrosis.    IMPRESSION:   Right basilar atelectasis.    < end of copied text > HPI:  64 y/o female, with a PmHx of Afib (on coumadin), CHF (EF 55%), Gerd, HLD, HOCM (s/p septal ablation), Lung Ca (s/p chemo), HTN, Chronic back, pain, presented to the [facility] ED c/o chest discomfort and SOB. Pt states for the past year she has been having intermittent sob but for the past few days she states it was getting worse. For the past few days she states she has been feeling very sob trying to walk [place] stairs from the subway and yesterday at work she started to feel very sluggish and had to constantly stop to try and catch her breath. Today, when she woke up from sleep (she states she slept about 12 hours last night into this morning because she wasn't feeling well) she started to have a sharp, non-radiating pain in the lower part of the center of chest. She states at first the pain was sharp and then there was a pressure like sensation so she went to an Urgent Care center and then was sent to the ED for a further evaluation. While in the ED, she was found to be in Afib w/RVR and the ED thought she might have had pneumonia as was given aztreonam and azithromycin IV x 1. She had recently finished a course of steroids 1 week ago (was being treated by Dr. Beltran). Currently she states she has no chest pain. Earlier in the week she did have an episode of emesis, but she denies any fever, chills, HA, dizziness, blurred vision, abd pain, diaphoresis, palpitations, recent travel. Pt is now being admitted to telemetry for chest pain r/o acs, afib w/rvr with a subtherapeutic INR and r/o acs.  Above reviewed:   Pt was admitted with A fib with RVR and ACS. S/p cath yesterday.  In ED had temp to 102 F, no leukocytosis. CXR with right basilar atelectasis, s/p azithromycin x 1 and has been off Abx since then.  Currently feeling well, chest pain resolved and shortness of breath is improved. She has remained afebrile. She has chronic dry cough which was hacking and worse on admission and is now back to baseline. Pt did no have chills or rigors, no sore throat, + sick contacts at work where everyone was coughing.         PAST MEDICAL & SURGICAL HISTORY:  GERD (gastroesophageal reflux disease)  HLD (hyperlipidemia)  HTN (hypertension)  CAP (community acquired pneumonia): RML/RLL at [facility] 3/2017  Lung cancer: s/p chemotherapy  Chronic diastolic (congestive) heart failure  AF (atrial fibrillation): On Coumadin  Pleural effusion: Right parapneumonic drained twice 3/2017 at [facility]  HOCM (hypertrophic obstructive cardiomyopathy): S/P septal ablation  H/O cardiac radiofrequency ablation: for HOCM - 16 years ago  History of : 15 years ago      Allergies  cefdinir (Rash)  moxifloxacin (Rash)  penicillin (Rash)  ID note-unclear allergy history. Says she develops diarrhea and nausea to penicillin, and avoids all abx related to penicillins      ANTIMICROBIALS:      OTHER MEDS: MEDICATIONS  (STANDING):  acetaminophen   Tablet .. 650 every 6 hours PRN  ALBUTerol    0.083% 2.5 every 6 hours PRN  aspirin enteric coated 81 daily  buDESOnide 160 MICROgram(s)/formoterol 4.5 MICROgram(s) Inhaler 2 two times a day  diltiazem    daily  furosemide   Injectable 20 two times a day  guaiFENesin   Syrup  (Sugar-Free) 200 every 6 hours PRN  heparin  Infusion. 1200 <Continuous>  heparin  Injectable 6500 every 6 hours PRN  heparin  Injectable 3000 every 6 hours PRN      SOCIAL HISTORY:  [ ] etoh [ ] tobacco [x ] former smoker [ ] IVDU    FAMILY HISTORY:  Family history of psoriasis (Sibling): Brother  Family history of ulcerative colitis (Sibling): Brother  Family history of liver cancer (Mother): Mother      REVIEW OF SYSTEMS  [  ] ROS unobtainable because:    [ x ] All other systems negative except as noted below:	    Constitutional:  [ ] fever [ ] chills  [ ] weight loss  [ ] weakness  Skin:  [ ] rash [ ] phlebitis	  Eyes: [ ] icterus [ ] pain  [ ] discharge	  ENMT: [ ] sore throat  [ ] thrush [ ] ulcers [ ] exudates  Respiratory: [x ] dyspnea [ ] hemoptysis [ x] cough [ ] sputum	  Cardiovascular:  [ ] chest pain [ ] palpitations [ ] edema	  Gastrointestinal:  [ ] nausea [ ] vomiting [ ] diarrhea [ ] constipation [ ] pain	  Genitourinary:  [ ] dysuria [ ] frequency [ ] hematuria [ ] discharge [ ] flank pain  [ ] incontinence  Musculoskeletal:  [ ] myalgias [ ] arthralgias [ ] arthritis  [ ] back pain  Neurological:  [ ] headache [ ] seizures  [ ] confusion/altered mental status  Psychiatric:  [ ] anxiety [ ] depression	  Endocrine:  [ ] adrenal [ ] thyroid  Allergic/Immunologic:	 [ ] transplant [ ] seasonal      Vital Signs Last 24 Hrs  T(F): 97.6 (18 @ 07:32), Max: 102.2 (18 @ 14:37)      Vital Signs Last 24 Hrs  HR: 68 (18 @ 07:32) (65 - 79)  BP: 101/65 (18 @ 07:32) (95/68 - 106/71)  RR: 22 (18 @ 07:32)  SpO2: 95% (18 @ 07:32) (95% - 98%)        PHYSICAL EXAM:  General: non-toxic  HEAD/EYES: anicteric, PERRL  ENT:  supple  Cardiovascular:   S1, S2 normal, irregular.   Respiratory:  clear bilaterally  GI:  soft, non-tender, normal bowel sounds  :  no CVA tenderness   Musculoskeletal:  no synovitis  Neurologic:  grossly non-focal  Skin:  no rash  Extremities: No edema, venous stasis changes rt lower extremity.    Psychiatric:  appropriate affect  Vascular:  no phlebitis                            12.3   3.45  )-----------( 171      ( 2018 06:00 )             36.9           138  |  101  |  13  ----------------------------<  92  3.9   |  26  |  0.94    Ca    9.4      2018 06:00  Mg     2.3           Urinalysis Basic - ( 2018 19:20 )      Color: LIGHT YELLOW / Appearance: CLEAR / S.012 / pH: 6.5  Gluc: NEGATIVE / Ketone: NEGATIVE  / Bili: NEGATIVE / Urobili: NORMAL   Blood: NEGATIVE / Protein: 10 / Nitrite: NEGATIVE   Leuk Esterase: NEGATIVE / RBC: x / WBC x   Sq Epi: x / Non Sq Epi: x / Bacteria: x        MICROBIOLOGY:  URINE MIDSTREAM  18 --  --  --      BLOOD PERIPHERAL  18 --  --  --      RADIOLOGY: Imaging reviewed personally.   < from: CT Chest No Cont (18 @ 18:14) >    IMPRESSION:   Focal right lower lobe ground glass and nodular opacity is new from the   prior study may be infectious/inflammatory however 3 month follow-up CT   is needed for complete evaluation.    Partially loculated right pleural effusion is decreased in size compared   to the prior study.        < from: Xray Chest 1 View- PORTABLE-Urgent (18 @ 14:40) >  FINDINGS:  Right basilar atelectasis. No pleural effusion or pneumothorax. Cardiac   silhouette is enlarged on this AP projection. Bilateral shoulder   osteoarthrosis.    IMPRESSION:   Right basilar atelectasis.

## 2018-11-19 NOTE — PROGRESS NOTE ADULT - SUBJECTIVE AND OBJECTIVE BOX
PRESENTING CC: coughing since yesterday ,no chest pain .    SUBJ:   HPI:HPI:  62 y/o female, with a PmHx of Afib (on coumadin), CHF (EF 55%), Gerd, HLD, HOCM (s/p septal ablation), Lung Ca (s/p chemo), HTN, Chronic back, pain, presented to the [facility] ED c/o chest discomfort and SOB . Pt states for the past year she has been having intermittent sob but for the past few days she states it was getting worse. For the past few days she states she has been feeling very sob trying to walk up the stairs .she started to have a sharp, non-radiating pain in the lower part of the center of chest. She states at first the pain was sharp and then there was a pressure like sensation so she went to an Urgent Care center and then was sent to the ED for a further evaluation.     While in the ED, she was found to be in Afib w/RVR and the ED thought she might have had pneumonia as was given aztreonam and azithromycin IV x 1. Currently she states she has no chest pain. Earlier in the week she did have an episode of emesis, but she denies any fever, chills, HA, dizziness, blurred vision, abd pain, diaphoresis, palpitations, recent travel. Pt is now being monitored to telemetry for chest pain,, afib w/rvr with a subtherapeutic INR and r/o acs.    PMH -reviewed admission note, no change since admission  Heart failure: acute [ ] chronic [ ] acute or chronic [ ] diastolic [ ] systolic [ ] combined systolic and diastolic[ ]  RONY: ATN[ ] renal medullary necrosis [ ] CKD I [ ]CKDII [ ]CKD III [ ]CKD IV [ ]CKD V [ ]Other pathological lesions [ ]    MEDICATIONS  (STANDING):  aspirin enteric coated 81 milliGRAM(s) Oral daily  buDESOnide 160 MICROgram(s)/formoterol 4.5 MICROgram(s) Inhaler 2 Puff(s) Inhalation two times a day  diltiazem    milliGRAM(s) Oral daily  furosemide   Injectable 20 milliGRAM(s) IV Push two times a day  heparin  Infusion. 1200 Unit(s)/Hr (12 mL/Hr) IV Continuous <Continuous>    MEDICATIONS  (PRN):  acetaminophen   Tablet .. 650 milliGRAM(s) Oral every 6 hours PRN Temp greater or equal to 38C (100.4F), Mild Pain (1 - 3), Moderate Pain (4 - 6)  ALBUTerol    0.083% 2.5 milliGRAM(s) Nebulizer every 6 hours PRN Shortness of Breath and/or Wheezing  guaiFENesin   Syrup  (Sugar-Free) 200 milliGRAM(s) Oral every 6 hours PRN Cough  heparin  Injectable 6500 Unit(s) IV Push every 6 hours PRN For aPTT less than 40  heparin  Injectable 3000 Unit(s) IV Push every 6 hours PRN For aPTT between 40 - 57    FAMILY HISTORY:  Family history of psoriasis (Sibling): Brother  Family history of ulcerative colitis (Sibling): Brother  Family history of liver cancer (Mother): Mother    No family history of premature coronary artery disease or sudden cardiac death      REVIEW OF SYSTEMS:  Constitutional: [ ] fever, [ ]weight loss,  [ ]fatigue  Eyes: [ ] visual changes  Respiratory: [ ]shortness of breath;  [ ] cough, [ ]wheezing, [ ]chills, [ ]hemoptysis  Cardiovascular: [ ] chest pain, [ ]palpitations, [ ]dizziness,  [ ]leg swelling[ ]orthopnea[ ]PND  Gastrointestinal: [ ] abdominal pain, [ ]nausea, [ ]vomiting,  [ ]diarrhea   Genitourinary: [ ] dysuria, [ ] hematuria  Neurologic: [ ] headaches [ ] tremors[ ]weakness  Skin: [ ] itching, [ ]burning, [ ] rashes  Endocrine: [ ] heat or cold intolerance  Musculoskeletal: [ ] joint pain or swelling; [ ] muscle, back, or extremity pain  Psychiatric: [ ] depression, [ ]anxiety, [ ]mood swings, or [ ]difficulty sleeping  Hematologic: [ ] easy bruising, [ ] bleeding gums    [x] All remaining systems negative except as per above.   [ ]Unable to obtain.    Vital Signs Last 24 Hrs  T(C): 36.4 (19 Nov 2018 07:32), Max: 37.1 (18 Nov 2018 17:49)  T(F): 97.6 (19 Nov 2018 07:32), Max: 98.7 (18 Nov 2018 17:49)  HR: 68 (19 Nov 2018 07:32) (65 - 79)  BP: 101/65 (19 Nov 2018 07:32) (95/68 - 114/76)  BP(mean): --  RR: 22 (19 Nov 2018 07:32) (16 - 22)  SpO2: 95% (19 Nov 2018 07:32) (95% - 98%)  I&O's Summary    18 Nov 2018 07:01  -  19 Nov 2018 07:00  --------------------------------------------------------  IN: 1300 mL / OUT: 2000 mL / NET: -700 mL        PHYSICAL EXAM:  General: No acute distress BMI-  HEENT: EOMI, PERRL  Neck: Supple, [ ] JVD  Lungs: Equal air entry bilaterally; [ ] rales [ ] wheezing [ ] rhonchi  Heart: Regular rate and rhythm; [ ] murmur   /6 [ ] systolic [ ] diastolic [ ] radiation[ ] rubs [ ]  gallops  Abdomen: Nontender, bowel sounds present  Extremities: No clubbing, cyanosis, [ ] edema  Nervous system:  Alert & Oriented X3, no focal deficits  Psychiatric: Normal affect  Skin: No rashes or lesions    LABS:  11-19    138  |  101  |  13  ----------------------------<  92  3.9   |  26  |  0.94    Ca    9.4      19 Nov 2018 06:00  Phos  3.3     11-17  Mg     2.3     11-19    TPro  8.1  /  Alb  4.8  /  TBili  1.7<H>  /  DBili  x   /  AST  56<H>  /  ALT  25  /  AlkPhos  83  11-17    Creatinine Trend: 0.94<--, 0.88<--, 0.89<--                        12.3   3.45  )-----------( 171      ( 19 Nov 2018 06:00 )             36.9     PT/INR - ( 18 Nov 2018 07:59 )   PT: 19.1 SEC;   INR: 1.69          PTT - ( 19 Nov 2018 06:00 )  PTT:99.3 SEC  Lipid Panel: Serum Pro-Brain Natriuretic Peptide: 2168 pg/mL (11-19 @ 06:00)    Cardiac Enzymes: CARDIAC MARKERS ( 17 Nov 2018 14:15 )  x     / x     / 155 u/L / x     / x          Serum Pro-Brain Natriuretic Peptide: 2168 pg/mL (11-19-18 @ 06:00)  Serum Pro-Brain Natriuretic Peptide: 8533 pg/mL (11-17-18 @ 14:15)    ECG [my interpretation]:12 Lead ECG (11.17.18 @ 13:14)   ----------------------------------------------------------------------   Atrial fibrillation with rapid ventricular response  ST & T wave abnormality, consider lateral ischemia  Abnormal ECG        RADIOLOGY:    : Xray Chest 1 View- PORTABLE-Urgent (11.17.18 @ 14:40)   ---------------------------------------------------------------------  IMPRESSION:   Right basilar atelectasis.      11/17/2018 22:20:14  AFIB @75    ECHO:< from: Transthoracic Echocardiogram (02.16.18 @ 13:00)   -------------------------------------------------------------------------------  CONCLUSIONS:  1. Mitral annular calcification, otherwise normal mitral  valve. Mild-moderate mitral regurgitation.  2. Severely dilated left atrium.  LA volume index = 69  cc/m2.  3. Endocardium not well visualized; grossly normal overall  left ventricular systolic function.  A focal area of  dyskinesis is seen in the mid-interventricular septum.  Cannot exclude a left ventricular diverticulum.  4. The right ventricle is not well visualized; grossly  normal right ventricular systolic function.  5. Estimated right ventricular systolic pressure equals 54  mm Hg, assuming right atrial pressure equals 10 mm Hg,  consistent with moderate pulmonary hypertension.       CT Chest No Cont (11.17.18 @ 18:14)   ---------------------------------------------------------  FINDINGS:    CHEST:     LUNGS AND LARGE AIRWAYS: Patent central airways. Focal right lower lobe   groundglass and nodular opacity.  PLEURA: Partially loculated small right pleural effusion.  VESSELS: Thoracic aortic and coronary artery atherosclerosis.  HEART: Cardiomegaly. No pericardial effusion.  MEDIASTINUM AND BRANDON: No lymphadenopathy.  CHEST WALL AND LOWER NECK: Within normal limits.  VISUALIZED UPPER ABDOMEN: Within normal limits.  BONES: Multilevel degenerative disease.    IMPRESSION:   Focal right lower lobe groundglass and nodular opacity is new from the   prior study may be infectious/inflammatory however 3 month follow-up CT   is needed for complete evaluation.    Partially loculated right pleural effusion is decreased in size compared   to the prior study.      Assessment and Recommendation:    Problem/Recommendation - 1:  Problem: Chronic atrial fibrillation. Recommendation:  .Resume PO Diltiazem-Heparin gtt bridge to goal INR-2-2.5  Continue telemetry-episodes bradycardia    Problem/Recommendation - 2:  ·  Problem: Other congestive heart failure.  Recommendation: lasix 20 mg iv bid   echo   Daily input output monitoring  fluid restriction ,daily weights.      Problem/Recommendation - 3:  ·  Problem: Essential hypertension.  Recommendation: Monitoring   Dash diet   po diltiazem continue.      Problem/Recommendation - 4:  ·  Problem: Lung cancer.  Appreciated  pulmonary consult  .  CT of chest without contrast reviewed .  follow up as out patient ..        Problem/Recommendation - 5:  ·  Problem: Need for prophylactic measure. Recommendation: not needed   on heparin drip bridging.

## 2018-11-19 NOTE — CHART NOTE - NSCHARTNOTEFT_GEN_A_CORE
Status post Cath, left Radial site without bleeding or hematoma.  Dressing is intact.  Positive Pulses, Capillary refill less than two seconds.  Will continue to monitor.                                                                                                                                                  VESNA Goldsmith, RPA-C [id number]

## 2018-11-19 NOTE — PROGRESS NOTE ADULT - PROBLEM SELECTOR PLAN 5
she has new opacity : small in the right lower lobe: This must be followed as she has high chances of recurrence of lung cancer:

## 2018-11-19 NOTE — CONSULT NOTE ADULT - ATTENDING COMMENTS
Fernando Alcala  Pager: [phone]. If no response or past 5 pm call [phone].
do dopplers bl le

## 2018-11-19 NOTE — CONSULT NOTE ADULT - ASSESSMENT
62 y/o female, with a PMH of Afib (on coumadin), CHF (EF 55%), GERD, HLD, HOCM (s/p septal ablation), Lung Ca (s/p chemo), HTN, Chronic back, pain, presented to the [facility] ED c/o chest discomfort and SOB. Was admitted with A fib with RVR and ACS. Going for cath today  In ED had temp to 102 F, no leukocytosis. CXR with right basilar atelactasis. CT chest with new focal RLL GGO/nodular opacity, partially loculated right pleural effusion  s/p azithromycin x 1 and has been off Abx since then  Currently feeling well, chest pain and shortness of breath are better controlled. Has remained afebrile. RVP (-), Blood cx NGTD    Recommend:  Continue to monitor off abx  Needs f/u CT chest in 3 months 64 y/o female, with a PMH of Afib (on coumadin), CHF (EF 55%), GERD, HLD, HOCM (s/p septal ablation), Lung Ca (s/p chemo), HTN, Chronic back, pain, presented to the [facility] ED c/o chest discomfort and SOB. Was admitted with A fib with RVR and ACS. Going for cath today  In ED had temp to 102 F, no leukocytosis. CXR with right basilar atelactasis. CT chest with new focal RLL GGO/nodular opacity, partially loculated right pleural effusion  s/p azithromycin x 1 and has been off Abx since then  Currently feeling well, chest pain and shortness of breath are better controlled. Has remained afebrile. RVP (-), Blood cx NGTD    Recommend:  Continue to monitor off abx  Needs f/u CT chest in 3 months  Discussed with [id number] 62 y/o female, with a PMH of Afib (on coumadin), CHF (EF 55%), GERD, HLD, HOCM (s/p septal ablation), Lung Ca (s/p chemo), HTN, Chronic back, pain, presented to the [facility] ED c/o chest discomfort and SOB. Was admitted with A fib with RVR and ACS.  In ED had temp to 102 F, no leukocytosis. CXR with right basilar atelactasis. CT chest with new focal RLL GGO/nodular opacity, partially loculated right pleural effusion  s/p azithromycin x 1 and has been off Abx since then  Currently feeling well, chest pain and shortness of breath are better controlled. Has remained afebrile. RVP (-), Blood cx NGTD.   Overall fever, abnormal CT chest, SOB.   Clinically feels well.       Recommend:  Continue to monitor off abx  Needs f/u CT chest in 3 months  Discussed with [id number]

## 2018-11-19 NOTE — PROGRESS NOTE ADULT - ASSESSMENT
64 y/o female, with a PmHx of Afib (on coumadin), CHF (EF 55%), Gerd, HLD, HOCM (s/p septal ablation), Lung Ca (s/p chemo), HTN, Chronic back, pain, presented to the [facility] ED c/o chest discomfort and SOB. Pt is being admitted to telemetry for Afib w/RVR with a subtherapeutic INR, Chest pain r/o acs and acute on chronic diastolic heart failure.

## 2018-11-19 NOTE — PROGRESS NOTE ADULT - SUBJECTIVE AND OBJECTIVE BOX
PRESENTING CC:Chest pain    SUBJ: 64 y/o female, with a PmHx of Atrial Fib (on coumadin), HFpEF (EF 55%), Gerd, HLD, HOCM (s/p septal ablation), Lung Ca (s/p chemo), HTN, Chronic back, pain, presented to the [facility] ED c/o chest discomfort and SOB.Noted AF with RVR-off Diltiazem gtt rate better controlled-Pulmonary Consult noted.Overnight remains in AF-rates isauro during  sleep.      PMH -reviewed admission note, no change since admission  Heart failure: acute [ ] chronic [x] acute or chronic [ ] diastolic [x ] systolic [ ] combined systolic and diastolic[ ]  RONY: ATN[ ] renal medullary necrosis [ ] CKD I [ ]CKDII [ ]CKD III [ ]CKD IV [ ]CKD V [ ]Other pathological lesions [ ]    MEDICATIONS  (STANDING):  aspirin enteric coated 81 milliGRAM(s) Oral daily  buDESOnide 160 MICROgram(s)/formoterol 4.5 MICROgram(s) Inhaler 2 Puff(s) Inhalation two times a day  diltiazem    milliGRAM(s) Oral daily  furosemide   Injectable 20 milliGRAM(s) IV Push two times a day  heparin  Infusion. 1200 Unit(s)/Hr (12 mL/Hr) IV Continuous <Continuous>    MEDICATIONS  (PRN):  acetaminophen   Tablet .. 650 milliGRAM(s) Oral every 6 hours PRN Temp greater or equal to 38C (100.4F), Mild Pain (1 - 3), Moderate Pain (4 - 6)  ALBUTerol    0.083% 2.5 milliGRAM(s) Nebulizer every 6 hours PRN Shortness of Breath and/or Wheezing  guaiFENesin   Syrup  (Sugar-Free) 200 milliGRAM(s) Oral every 6 hours PRN Cough  heparin  Injectable 6500 Unit(s) IV Push every 6 hours PRN For aPTT less than 40  heparin  Injectable 3000 Unit(s) IV Push every 6 hours PRN For aPTT between 40 - 57          FAMILY HISTORY:  Family history of psoriasis (Sibling): Brother  Family history of ulcerative colitis (Sibling): Brother  Family history of liver cancer (Mother): Mother  No family history of premature coronary artery disease or sudden cardiac death      REVIEW OF SYSTEMS:  Constitutional: [ ] fever, [ ]weight loss,  [x ]fatigue  Eyes: [ ] visual changes  Respiratory: [x ]shortness of breath;  [x ] cough, [ ]wheezing, [ ]chills, [ ]hemoptysis  Cardiovascular: [x ] chest pain, [ ]palpitations, [ ]dizziness,  [ ]leg swelling[ ]orthopnea[ ]PND  Gastrointestinal: [ ] abdominal pain, [ ]nausea, [ ]vomiting,  [ ]diarrhea   Genitourinary: [ ] dysuria, [ ] hematuria  Neurologic: [ ] headaches [ ] tremors[ ]weakness  Skin: [ ] itching, [ ]burning, [ ] rashes  Endocrine: [ ] heat or cold intolerance  Musculoskeletal: [ ] joint pain or swelling; [ ] muscle, back, or extremity pain  Psychiatric: [ ] depression, [ ]anxiety, [ ]mood swings, or [ ]difficulty sleeping  Hematologic: [ ] easy bruising, [ ] bleeding gums    [x] All remaining systems negative except as per above.   [ ]Unable to obtain.    Vital Signs Last 24 Hrs  T(C): 36 (19 Nov 2018 05:49), Max: 37.1 (18 Nov 2018 17:49)  T(F): 96.8 (19 Nov 2018 05:49), Max: 98.7 (18 Nov 2018 17:49)  HR: 68 (19 Nov 2018 06:43) (65 - 79)  BP: 104/64 (19 Nov 2018 05:53) (95/68 - 114/76)  RR: 16 (19 Nov 2018 05:49) (16 - 18)  SpO2: 95% (19 Nov 2018 05:49) (95% - 98%)  I&O's Summary    18 Nov 2018 07:01  -  19 Nov 2018 07:00  --------------------------------------------------------  IN: 1300 mL / OUT: 2000 mL / NET: -700 mL        PHYSICAL EXAM:  General: No acute distress BMI-29  HEENT: EOMI, PERRL  Neck: Supple, [ ] JVD  Lungs: Fair air entry bilaterally; [ ] rales [ ] wheezing [x ] rhonchi  Heart: Irregular rate and rhythm; [x ] murmur  2 /6 [ ] systolic [ ] diastolic [ ] radiation[ ] rubs [ ]  gallops  Abdomen: Nontender, bowel sounds present  Extremities: No clubbing, cyanosis, [x ] edema  Nervous system:  Alert & Oriented X3, no focal deficits  Psychiatric: Normal affect  Skin: No rashes or lesions    LABS:  11-18    136  |  97<L>  |  13  ----------------------------<  96  3.3<L>   |  24  |  0.88    Ca    8.6      18 Nov 2018 07:59  Phos  3.3     11-17  Mg     2.2     11-17    TPro  8.1  /  Alb  4.8  /  TBili  1.7<H>  /  DBili  x   /  AST  56<H>  /  ALT  25  /  AlkPhos  83  11-17    Creatinine Trend: 0.88<--, 0.89<--                        12.0   3.64  )-----------( 161      ( 18 Nov 2018 07:59 )             34.7     PT/INR - ( 18 Nov 2018 07:59 )   PT: 19.1 SEC;   INR: 1.69          PTT - ( 18 Nov 2018 14:40 )  PTT:74.7 SEC  Lipid Panel:   Cardiac Enzymes: CARDIAC MARKERS ( 17 Nov 2018 14:15 )  x     / x     / 155 u/L / x     / x          Serum Pro-Brain Natriuretic Peptide: 8533 pg/mL (11-17-18 @ 14:15)        RADIOLOGY: CT CHEST  IMPRESSION:   Focal right lower lobe groundglass and nodular opacity is new from the  prior study may be infectious/inflammatory however 3 month follow-up CT  is needed for complete evaluation.  Partially loculated right pleural effusion is decreased in size compared  to the prior study.      TELEMETRY:Atrial fibrillation     ECHO:Study Date: 2/16/2018  Grossly normal overall left ventricular systolic function.    A focal area of dyskinesis is seen in the mid-interventricular septum.  Cannot exclude a left ventricular diverticulum.  Mild-moderate mitral regurgitation.  Severely dilated left atrium.  Moderate pulmonary hypertension.        IMPRESSION AND PLAN:    64 y/o female, with a PmHx of Afib (on coumadin), CHF (EF 55%), Gerd, HLD, HOCM (s/p septal ablation), Lung Ca (s/p chemo), HTN, Chronic back, pain, presented to the [facility] ED c/o chest discomfort and SOB. Pt is being admitted to telemetry for Afib w/RVR with a subtherapeutic INR, Chest pain r/o acs and acute on chronic diastolic heart failure.      Problem/Plan - 1:  ·  Problem: Atrial Fibrillation with RVR.CHADS2-4  Plan:Rate control off Diltiazem gtt-now controlled.  Resume PO Diltiazem-Heparin gtt bridge to goal INR-2-2.5  Continue telemetry-episodes bradycardia      Problem/Plan - 2:  ·  Problem: Chronic diastolic (congestive) heart failure.  Plan: started on Lasix 20mg iv bid,   Ischemic [id number] cath today    Problem/Plan - 3:·  Problem: HTN (hypertension).  Plan: monitor bp, cont meds, adjust as needed.     Problem/Plan - 4:  ·  Problem: Lung cancer.  Plan: Pulmonary consult-Dr Beltran-noted  CT chest w/o cont no obvious PNA    Problem/Plan - 5:  ·  Problem: Need for prophylactic measure.  Plan: on heparin gtt to coumadin, not needed.

## 2018-11-20 ENCOUNTER — TRANSCRIPTION ENCOUNTER (OUTPATIENT)
Age: 63
End: 2018-11-20

## 2018-11-20 VITALS
OXYGEN SATURATION: 99 % | RESPIRATION RATE: 18 BRPM | SYSTOLIC BLOOD PRESSURE: 100 MMHG | DIASTOLIC BLOOD PRESSURE: 79 MMHG | TEMPERATURE: 98 F | HEART RATE: 67 BPM

## 2018-11-20 LAB
APTT BLD: 28.5 SEC — SIGNIFICANT CHANGE UP (ref 27.5–36.3)
BASOPHILS # BLD AUTO: 0.04 K/UL — SIGNIFICANT CHANGE UP (ref 0–0.2)
BASOPHILS NFR BLD AUTO: 0.9 % — SIGNIFICANT CHANGE UP (ref 0–2)
BUN SERPL-MCNC: 15 MG/DL — SIGNIFICANT CHANGE UP (ref 7–23)
CALCIUM SERPL-MCNC: 8.9 MG/DL — SIGNIFICANT CHANGE UP (ref 8.4–10.5)
CHLORIDE SERPL-SCNC: 101 MMOL/L — SIGNIFICANT CHANGE UP (ref 98–107)
CO2 SERPL-SCNC: 22 MMOL/L — SIGNIFICANT CHANGE UP (ref 22–31)
CREAT SERPL-MCNC: 0.97 MG/DL — SIGNIFICANT CHANGE UP (ref 0.5–1.3)
EOSINOPHIL # BLD AUTO: 0.14 K/UL — SIGNIFICANT CHANGE UP (ref 0–0.5)
EOSINOPHIL NFR BLD AUTO: 3.2 % — SIGNIFICANT CHANGE UP (ref 0–6)
GLUCOSE SERPL-MCNC: 121 MG/DL — HIGH (ref 70–99)
HCT VFR BLD CALC: 37.4 % — SIGNIFICANT CHANGE UP (ref 34.5–45)
HGB BLD-MCNC: 12.9 G/DL — SIGNIFICANT CHANGE UP (ref 11.5–15.5)
IMM GRANULOCYTES # BLD AUTO: 0.06 # — SIGNIFICANT CHANGE UP
IMM GRANULOCYTES NFR BLD AUTO: 1.4 % — SIGNIFICANT CHANGE UP (ref 0–1.5)
INR BLD: 1.62 — HIGH (ref 0.88–1.17)
LYMPHOCYTES # BLD AUTO: 0.3 K/UL — LOW (ref 1–3.3)
LYMPHOCYTES # BLD AUTO: 6.9 % — LOW (ref 13–44)
MAGNESIUM SERPL-MCNC: 2.2 MG/DL — SIGNIFICANT CHANGE UP (ref 1.6–2.6)
MCHC RBC-ENTMCNC: 34.5 % — SIGNIFICANT CHANGE UP (ref 32–36)
MCHC RBC-ENTMCNC: 35.5 PG — HIGH (ref 27–34)
MCV RBC AUTO: 103 FL — HIGH (ref 80–100)
MONOCYTES # BLD AUTO: 0.39 K/UL — SIGNIFICANT CHANGE UP (ref 0–0.9)
MONOCYTES NFR BLD AUTO: 8.9 % — SIGNIFICANT CHANGE UP (ref 2–14)
NEUTROPHILS # BLD AUTO: 3.44 K/UL — SIGNIFICANT CHANGE UP (ref 1.8–7.4)
NEUTROPHILS NFR BLD AUTO: 78.7 % — HIGH (ref 43–77)
NRBC # FLD: 0 — SIGNIFICANT CHANGE UP
PLATELET # BLD AUTO: 186 K/UL — SIGNIFICANT CHANGE UP (ref 150–400)
PMV BLD: 10.4 FL — SIGNIFICANT CHANGE UP (ref 7–13)
POTASSIUM SERPL-MCNC: 3.4 MMOL/L — LOW (ref 3.5–5.3)
POTASSIUM SERPL-SCNC: 3.4 MMOL/L — LOW (ref 3.5–5.3)
PROTHROM AB SERPL-ACNC: 18.2 SEC — HIGH (ref 9.8–13.1)
RBC # BLD: 3.63 M/UL — LOW (ref 3.8–5.2)
RBC # FLD: 13.4 % — SIGNIFICANT CHANGE UP (ref 10.3–14.5)
SODIUM SERPL-SCNC: 140 MMOL/L — SIGNIFICANT CHANGE UP (ref 135–145)
WBC # BLD: 4.37 K/UL — SIGNIFICANT CHANGE UP (ref 3.8–10.5)
WBC # FLD AUTO: 4.37 K/UL — SIGNIFICANT CHANGE UP (ref 3.8–10.5)

## 2018-11-20 PROCEDURE — 99222 1ST HOSP IP/OBS MODERATE 55: CPT | Mod: GC

## 2018-11-20 PROCEDURE — 93970 EXTREMITY STUDY: CPT | Mod: 26

## 2018-11-20 RX ORDER — WARFARIN SODIUM 2.5 MG/1
1 TABLET ORAL
Qty: 0 | Refills: 0 | COMMUNITY

## 2018-11-20 RX ORDER — WARFARIN SODIUM 2.5 MG/1
6 TABLET ORAL ONCE
Qty: 0 | Refills: 0 | Status: COMPLETED | OUTPATIENT
Start: 2018-11-20 | End: 2018-11-20

## 2018-11-20 RX ORDER — POTASSIUM CHLORIDE 20 MEQ
40 PACKET (EA) ORAL EVERY 4 HOURS
Qty: 0 | Refills: 0 | Status: COMPLETED | OUTPATIENT
Start: 2018-11-20 | End: 2018-11-20

## 2018-11-20 RX ADMIN — Medication 650 MILLIGRAM(S): at 07:01

## 2018-11-20 RX ADMIN — Medication 240 MILLIGRAM(S): at 10:33

## 2018-11-20 RX ADMIN — Medication 40 MILLIEQUIVALENT(S): at 10:33

## 2018-11-20 RX ADMIN — Medication 650 MILLIGRAM(S): at 06:01

## 2018-11-20 RX ADMIN — Medication 20 MILLIGRAM(S): at 10:33

## 2018-11-20 RX ADMIN — WARFARIN SODIUM 6 MILLIGRAM(S): 2.5 TABLET ORAL at 18:48

## 2018-11-20 RX ADMIN — BUDESONIDE AND FORMOTEROL FUMARATE DIHYDRATE 2 PUFF(S): 160; 4.5 AEROSOL RESPIRATORY (INHALATION) at 08:22

## 2018-11-20 RX ADMIN — Medication 40 MILLIEQUIVALENT(S): at 13:49

## 2018-11-20 RX ADMIN — Medication 81 MILLIGRAM(S): at 13:49

## 2018-11-20 NOTE — DISCHARGE NOTE ADULT - CARE PROVIDER_API CALL
Allen Keene), Medicine  Dept Director  [address] [place] [address]  Phone: [phone]  Fax: [phone]    Parviz Beltran), Critical Care Medicine; Internal Medicine; Pulmonary Disease  [id number] [place] [address]  Phone: [phone]  Fax: [phone]

## 2018-11-20 NOTE — PROGRESS NOTE ADULT - ASSESSMENT
64 y/o female, with a PmHx of Afib (on coumadin), CHF (EF 55%), Gerd, HLD, HOCM (s/p septal ablation), Lung Ca (s/p chemo), HTN, Chronic back, pain, presented to the [facility] ED c/o chest discomfort and SOB. Pt is being admitted to telemetry for Afib w/RVR with a subtherapeutic INR, Chest pain r/o acs and acute on chronic diastolic heart failure.

## 2018-11-20 NOTE — DISCHARGE NOTE ADULT - OTHER SIGNIFICANT FINDINGS
EKG: Afib @ 144, ST dep III, V6, T inv I, AVL  hsTrop: 24                 Glucose: 103               Tot Bili: 1.7                BNP: 8,533  RVP neg    11/17 CXR - right basilar atelectasis    CT chest: Focal right lower lobe groundglass and nodular opacity is new from the prior study may be infectious/inflammatory however 3 month follow-up CT is needed for complete evaluation.  Partially loculated right pleural effusion is decreased in size compared to the prior study.    11/19/18 Cardiac cath - NCA. LRA access. No need to restart Heparin qtt post cardiac cath, continue Coumadin, as per Dr. Keene    BCX neg X 48 hrs   Urine Cx - neg    Discharge INR: 1.62    LE Doppler:   No evidence of bilateral lower extremity deep venous thrombosis.    2/6/18 Echo -   1. Mitral annular calcification, otherwise normal mitral valve. Mild-moderate mitral regurgitation.  2. Severely dilated left atrium.  LA volume index = 69 cc/m2.  3. Endocardium not well visualized; grossly normal overall left ventricular systolic function.  A focal area of dyskinesis is seen in the mid-interventricular septum. Cannot exclude a left ventricular diverticulum.  4. The right ventricle is not well visualized; grossly normal right ventricular systolic function.  5. Estimated right ventricular systolic pressure equals 54 mm Hg, assuming right atrial pressure equals 10 mm Hg, consistent with moderate pulmonary hypertension.

## 2018-11-20 NOTE — DISCHARGE NOTE ADULT - MEDICATION SUMMARY - MEDICATIONS TO TAKE
I will START or STAY ON the medications listed below when I get home from the hospital:    aspirin 81 mg oral delayed release tablet  -- 1 tab(s) by mouth once a day  -- Indication: For Afib    dilTIAZem 240 mg/24 hours oral capsule, extended release  -- 1 cap(s) by mouth once a day  -- Indication: For Afib    warfarin 5 mg oral tablet  -- take one tab daily  -- Indication: For Afib     ProAir HFA 90 mcg/inh inhalation aerosol  -- 2 puff(s) inhaled 4 times a day, As Needed  -- Indication: For broncodilator     albuterol 2.5 mg/3 mL (0.083%) inhalation solution  -- 3 milliliter(s) inhaled every 4 to 6 hours, As Needed  -- Indication: For bronchodilator     furosemide 20 mg oral tablet  -- 1 tab(s) by mouth once a day  -- Indication: For Chf

## 2018-11-20 NOTE — DISCHARGE NOTE ADULT - NS AS ACTIVITY OBS
Walking-Outdoors allowed/Showering allowed/Walking-Indoors allowed/Stairs allowed/Return to Work/School allowed

## 2018-11-20 NOTE — PROGRESS NOTE ADULT - SUBJECTIVE AND OBJECTIVE BOX
Patient is a 63y old  Female who presents with a chief complaint of Chest pain and shortness of breath (20 Nov 2018 12:11)      Any change in ROS: I feel better:     MEDICATIONS  (STANDING):  aspirin enteric coated 81 milliGRAM(s) Oral daily  buDESOnide 160 MICROgram(s)/formoterol 4.5 MICROgram(s) Inhaler 2 Puff(s) Inhalation two times a day  diltiazem    milliGRAM(s) Oral daily  furosemide   Injectable 20 milliGRAM(s) IV Push two times a day  warfarin 6 milliGRAM(s) Oral once    MEDICATIONS  (PRN):  acetaminophen   Tablet .. 650 milliGRAM(s) Oral every 6 hours PRN Temp greater or equal to 38C (100.4F), Mild Pain (1 - 3), Moderate Pain (4 - 6)  ALBUTerol    0.083% 2.5 milliGRAM(s) Nebulizer every 6 hours PRN Shortness of Breath and/or Wheezing  guaiFENesin   Syrup  (Sugar-Free) 200 milliGRAM(s) Oral every 6 hours PRN Cough    Vital Signs Last 24 Hrs  T(C): 36.8 (20 Nov 2018 13:47), Max: 36.8 (20 Nov 2018 13:47)  T(F): 98.3 (20 Nov 2018 13:47), Max: 98.3 (20 Nov 2018 13:47)  HR: 67 (20 Nov 2018 13:47) (67 - 78)  BP: 100/79 (20 Nov 2018 13:47) (90/64 - 114/71)  BP(mean): --  RR: 18 (20 Nov 2018 13:47) (16 - 18)  SpO2: 99% (20 Nov 2018 13:47) (95% - 99%)    I&O's Summary    19 Nov 2018 07:01  -  20 Nov 2018 07:00  --------------------------------------------------------  IN: 400 mL / OUT: 700 mL / NET: -300 mL          Physical Exam:   GENERAL: NAD, well-groomed, well-developed  HEENT: HEIKE/   Atraumatic, Normocephalic  ENMT: No tonsillar erythema, exudates, or enlargement; Moist mucous membranes, Good dentition, No lesions  NECK: Supple, No JVD, Normal thyroid  CHEST/LUNG: Clear to auscultaion, ; No rales, rhonchi, wheezing, or rubs  CVS: Regular rate and rhythm; No murmurs, rubs, or gallops  GI: : Soft, Nontender, Nondistended; Bowel sounds present  NERVOUS SYSTEM:  Alert & Oriented X3  EXTREMITIES:  2+ Peripheral Pulses, No clubbing, cyanosis, or edema  LYMPH: No lymphadenopathy noted  SKIN: No rashes or lesions  ENDOCRINOLOGY: No Thyromegaly  PSYCH: Appropriate    Labs:  25                            12.9   4.37  )-----------( 186      ( 20 Nov 2018 05:00 )             37.4                         12.3   3.45  )-----------( 171      ( 19 Nov 2018 06:00 )             36.9                         12.0   3.64  )-----------( 161      ( 18 Nov 2018 07:59 )             34.7                         11.1   4.30  )-----------( 155      ( 18 Nov 2018 00:15 )             32.3                         13.7   7.53  )-----------( 206      ( 17 Nov 2018 14:30 )             40.7     11-20    140  |  101  |  15  ----------------------------<  121<H>  3.4<L>   |  22  |  0.97  11-19    138  |  101  |  13  ----------------------------<  92  3.9   |  26  |  0.94  11-18    136  |  97<L>  |  13  ----------------------------<  96  3.3<L>   |  24  |  0.88  11-17    138  |  99  |  15  ----------------------------<  103<H>  5.0   |  22  |  0.89    Ca    8.9      20 Nov 2018 05:20  Ca    9.4      19 Nov 2018 06:00  Mg     2.2     11-20  Mg     2.3     11-19    TPro  8.1  /  Alb  4.8  /  TBili  1.7<H>  /  DBili  x   /  AST  56<H>  /  ALT  25  /  AlkPhos  83  11-17    CAPILLARY BLOOD GLUCOSE            PT/INR - ( 20 Nov 2018 05:20 )   PT: 18.2 SEC;   INR: 1.62          PTT - ( 20 Nov 2018 05:20 )  PTT:28.5 SEC    Serum Pro-Brain Natriuretic Peptide: 2168 pg/mL (11-19 @ 06:00)  Serum Pro-Brain Natriuretic Peptide: 8533 pg/mL (11-17 @ 14:15)        RECENT CULTURES:  11-17 @ 20:15 URINE MIDSTREAM                  11-17 @ 14:47 BLOOD PERIPHERAL       < from: US Duplex Venous Lower Ext Complete, Bilateral (11.20.18 @ 09:13) >  EXAM:  US DPLX LWR EXT VEINS COMPL BI        PROCEDURE DATE:  Nov 20 2018         INTERPRETATION:  CLINICAL INFORMATION: 63-year-old female with shortness   of breath and bilateral lower extremity swelling.    COMPARISON: None available.    TECHNIQUE: Duplex sonography of the BILATERAL LOWER extremities with   color and spectral Doppler, with and without compression.      FINDINGS:    There is normal compressibility of the bilateral common femoral, femoral   and popliteal veins. No calf vein thrombosis is detected.    Doppler examination shows normal spontaneous and phasic flow.    IMPRESSION:     No evidence of bilateral lower extremity deep venous thrombosis.                      FREDY PIERRE M.D., ATTENDING RADIOLOGIST  This document has been electronically signed. Nov 20 2018  9:37AM        < end of copied text >         < from: CT Chest No Cont (11.17.18 @ 18:14) >  PROCEDURE DATE:  Nov 17 2018         INTERPRETATION:  CLINICAL INFORMATION: Shortness of breath.    COMPARISON: None.    PROCEDURE:   CT of the Chest was performed without intravenous contrast.  Sagittal and coronal reformatswere performed.  Axial MIP reformats are performed.    FINDINGS:    CHEST:     LUNGS AND LARGE AIRWAYS: Patent central airways. Focal right lower lobe   groundglass and nodular opacity.  PLEURA: Partially loculated small right pleural effusion.  VESSELS: Thoracic aortic and coronary artery atherosclerosis.  HEART: Cardiomegaly. No pericardial effusion.  MEDIASTINUM AND BRANDON: No lymphadenopathy.  CHEST WALL AND LOWER NECK: Within normal limits.  VISUALIZED UPPER ABDOMEN: Within normal limits.  BONES: Multilevel degenerative disease.    IMPRESSION:   Focal right lower lobe groundglass and nodular opacity is new from the   prior study may be infectious/inflammatory however 3 month follow-up CT   is needed for complete evaluation.    Partially loculated right pleural effusion is decreased in size compared   to the prior study.              DIMITRIS LOPEZ M.D., RADIOLOGY RESIDENT  This document has been electronically signed.  CHARLIE HA M.D., ATTENDING RADIOLOGIST  This document has been electronically signed. Nov 18 2018 10:41AM    < end of copied text >      NO ORGANISMS ISOLATED  NO ORGANISMS ISOLATED AT 48 HRS.        RESPIRATORY CULTURES:          Studies  Chest X-RAY  CT SCAN Chest   Venous Dopplers: LE:   CT Abdomen  Others

## 2018-11-20 NOTE — PROGRESS NOTE ADULT - SUBJECTIVE AND OBJECTIVE BOX
PRESENTING CC:Chest pain    SUBJ: 62 y/o female, with a PmHx of Atrial Fib (on coumadin), HFpEF (EF 55%), Gerd, HLD, HOCM (s/p septal ablation), Lung Ca (s/p chemo), HTN, Chronic back, pain, presented to the [facility] ED c/o chest discomfort and SOB.Noted AF with RVR-off Diltiazem gtt rate better controlled-Pulmonary Consult noted.Overnight remains in AF-rates isauro during  sleep.Had cath-non obstructive CAD-feels better no chest pain.      PMH -reviewed admission note, no change since admission  Heart failure: acute [ ] chronic [x ] acute or chronic [ ] diastolic [x ] systolic [ ] combined systolic and diastolic[ ]  RONY: ATN[ ] renal medullary necrosis [ ] CKD I [ ]CKDII [ ]CKD III [ ]CKD IV [ ]CKD V [ ]Other pathological lesions [ ]    MEDICATIONS  (STANDING):  aspirin enteric coated 81 milliGRAM(s) Oral daily  buDESOnide 160 MICROgram(s)/formoterol 4.5 MICROgram(s) Inhaler 2 Puff(s) Inhalation two times a day  diltiazem    milliGRAM(s) Oral daily  furosemide   Injectable 20 milliGRAM(s) IV Push two times a day    MEDICATIONS  (PRN):  acetaminophen   Tablet .. 650 milliGRAM(s) Oral every 6 hours PRN Temp greater or equal to 38C (100.4F), Mild Pain (1 - 3), Moderate Pain (4 - 6)  ALBUTerol    0.083% 2.5 milliGRAM(s) Nebulizer every 6 hours PRN Shortness of Breath and/or Wheezing  guaiFENesin   Syrup  (Sugar-Free) 200 milliGRAM(s) Oral every 6 hours PRN Cough          FAMILY HISTORY:  Family history of psoriasis (Sibling): Brother  Family history of ulcerative colitis (Sibling): Brother  Family history of liver cancer (Mother): Mother    No family history of premature coronary artery disease or sudden cardiac death      REVIEW OF SYSTEMS:  Constitutional: [ ] fever, [ ]weight loss,  [x ]fatigue  Eyes: [ ] visual changes  Respiratory: [x ]shortness of breath;  [x ] cough, [ ]wheezing, [ ]chills, [ ]hemoptysis  Cardiovascular: [ ] chest pain, [ ]palpitations, [ ]dizziness,  [ ]leg swelling[ ]orthopnea[ ]PND  Gastrointestinal: [ ] abdominal pain, [ ]nausea, [ ]vomiting,  [ ]diarrhea   Genitourinary: [ ] dysuria, [ ] hematuria  Neurologic: [ ] headaches [ ] tremors[ ]weakness  Skin: [ ] itching, [ ]burning, [ ] rashes  Endocrine: [ ] heat or cold intolerance  Musculoskeletal: [ ] joint pain or swelling; [ ] muscle, back, or extremity pain  Psychiatric: [ ] depression, [ ]anxiety, [ ]mood swings, or [ ]difficulty sleeping  Hematologic: [ ] easy bruising, [ ] bleeding gums    [x] All remaining systems negative except as per above.   [ ]Unable to obtain.    Vital Signs Last 24 Hrs  T(C): 36.4 (20 Nov 2018 05:58), Max: 36.7 (19 Nov 2018 19:27)  T(F): 97.6 (20 Nov 2018 05:58), Max: 98 (19 Nov 2018 19:27)  HR: 74 (20 Nov 2018 05:58) (68 - 78)  BP: 90/64 (20 Nov 2018 05:58) (90/64 - 111/70)  RR: 18 (20 Nov 2018 05:58) (16 - 22)  SpO2: 97% (20 Nov 2018 05:58) (95% - 97%)  I&O's Summary    18 Nov 2018 07:01  -  19 Nov 2018 07:00  --------------------------------------------------------  IN: 1300 mL / OUT: 2000 mL / NET: -700 mL    19 Nov 2018 07:01  -  20 Nov 2018 06:59  --------------------------------------------------------  IN: 400 mL / OUT: 700 mL / NET: -300 mL        PHYSICAL EXAM:  General: No acute distress BMI-29  HEENT: EOMI, PERRL  Neck: Supple, [ ] JVD  Lungs: Fair air entry bilaterally; [ ] rales [ ] wheezing [ ] rhonchi  Heart: Irregular rate and rhythm; [x ] murmur   2/6 [x ] systolic [ ] diastolic [ ] radiation[ ] rubs [ ]  gallops  Abdomen: Nontender, bowel sounds present  Extremities: No clubbing, cyanosis, [ ] edema  Nervous system:  Alert & Oriented X3, no focal deficits  Psychiatric: Normal affect  Skin: No rashes or lesions    LABS:  11-19    138  |  101  |  13  ----------------------------<  92  3.9   |  26  |  0.94    Ca    9.4      19 Nov 2018 06:00  Mg     2.3     11-19      Creatinine Trend: 0.94<--, 0.88<--, 0.89<--                        12.3   3.45  )-----------( 171      ( 19 Nov 2018 06:00 )             36.9     PT/INR - ( 19 Nov 2018 12:50 )   PT: 18.2 SEC;   INR: 1.62          PTT - ( 19 Nov 2018 06:00 )  PTT:99.3 SEC  Serum Pro-Brain Natriuretic Peptide: 2168 pg/mL (11-19-18 @ 06:00)      IMPRESSION AND PLAN:      62 y/o female, with a PmHx of Afib (on coumadin), CHF (EF 55%), Gerd, HLD, HOCM (s/p septal ablation), Lung Ca (s/p chemo), HTN, Chronic back, pain, presented to the [facility] ED c/o chest discomfort and SOB. Pt is being admitted to telemetry for Afib w/RVR with a subtherapeutic INR, Chest pain r/o acs and acute on chronic diastolic heart failure.      Problem/Plan - 1:  ·  Problem: Atrial Fibrillation with RVR.CHADS2-4  Plan:Rate control off Diltiazem gtt-now controlled.  Resume PO Diltiazem-  Continue telemetry-episodes bradycardia-asymptomatic.  Continue coumadin to goal INR 2-2.5.  discharge home-follow up in office for INR check on Monday.      Problem/Plan - 2:  ·  Problem: Chronic diastolic (congestive) heart failure.  Plan: started on Lasix 20mg iv bid,   Ischemic uizj-qv-Gviqsyh cath today    Problem/Plan - 3:·  Problem: HTN (hypertension).  Plan: monitor bp, cont meds, adjust as needed.     Problem/Plan - 4:  ·  Problem: Lung cancer.  Plan: Pulmonary consult-Dr Beltran-noted  CT chest w/o cont no obvious PNA  ID consult noted-keep off ABx    Problem/Plan - 5:  ·  Problem: Need for prophylactic measure.  Plan: on heparin gtt to coumadin, not needed.

## 2018-11-20 NOTE — PROGRESS NOTE ADULT - REASON FOR ADMISSION
Chest Pain/SOB

## 2018-11-20 NOTE — DISCHARGE NOTE ADULT - PLAN OF CARE
remain symptom and hospitalization free -continue with Lasix as prescribed  -stay on low salt diet   -monitor weight for signs of fluid retention BP <140/80 -continue with Diltiazem as prescribed   -continue with Lasix as prescribed appropriate follow-up CT chest with focal RLL groundglass and nodular opacity    Follow up CT scan recommended in 3 months  Follow up with pulmonologist Dr. Andujar normal sinus rhythm and prevention of clots -continue with WARFARIN 5MG daily  -continue with aspirin as prescribed  -INR GOAL 2-2.5  -Follow up with Dr. Keene this MONDAY NOVEMBER 26th

## 2018-11-20 NOTE — PROGRESS NOTE ADULT - ATTENDING COMMENTS
Brandi Sullivan MD  [address] [facility] [id number]  [facility] [phone]
Brandi Sullivan MD  [address] [facility]   [id number]  Cell [phone]
Patient was seen and examined,interim events noted,labs and radiology studies reviewed.  Allen Keene MD,FACC.  [address] [place] [address].  [facility].  [phone]
Patient was seen and examined,interim events noted,labs and radiology studies reviewed.  Allen Keene MD,FACC.  [address] [place] [address].  [id number].  [phone]
Patient was seen and examined,interim events noted,labs and radiology studies reviewed.  Allen Keene MD,FACC.  [address] [place] [address].  [facility].  [phone]
do dopplers bl le
do dopplers bl le  11/20: Negative dopplers!

## 2018-11-20 NOTE — PROGRESS NOTE ADULT - PROBLEM SELECTOR PLAN 5
she has new opacity : small in the right lower lobe: This must be followed as she has high chances of recurrence of lung cancer:  11/20: must follow as an outpatient with repeat ct scan chest

## 2018-11-20 NOTE — DISCHARGE NOTE ADULT - ADDITIONAL INSTRUCTIONS
Please follow up with Dr. Keene on Monday NOVEMBER 26th for INR check. Please call Dr. Andujar to arrange follow up appointment.   If you develop new or worsening symptoms, return to the hospital.

## 2018-11-20 NOTE — DISCHARGE NOTE ADULT - CARE PLAN
Principal Discharge DX:	Acute on chronic diastolic (congestive) heart failure  Goal:	remain symptom and hospitalization free  Assessment and plan of treatment:	-continue with Lasix as prescribed  -stay on low salt diet   -monitor weight for signs of fluid retention  Secondary Diagnosis:	Essential hypertension  Goal:	BP <140/80  Assessment and plan of treatment:	-continue with Diltiazem as prescribed   -continue with Lasix as prescribed  Secondary Diagnosis:	Lung cancer  Goal:	appropriate follow-up  Assessment and plan of treatment:	CT chest with focal RLL groundglass and nodular opacity    Follow up CT scan recommended in 3 months  Follow up with pulmonologist Dr. Andujar  Secondary Diagnosis:	AF (atrial fibrillation)  Goal:	normal sinus rhythm and prevention of clots  Assessment and plan of treatment:	-continue with WARFARIN 5MG daily  -continue with aspirin as prescribed  -INR GOAL 2-2.5  -Follow up with Dr. Yecenia crouch MONDAY NOVEMBER 26th

## 2018-11-20 NOTE — PROGRESS NOTE ADULT - SUBJECTIVE AND OBJECTIVE BOX
CC: no chest pain ,no SOB  62 y/o female, with a PmHx of Afib (on coumadin), CHF (EF 55%), Gerd, HLD, HOCM (s/p septal ablation), Lung Ca (s/p chemo), HTN, Chronic back, pain, presented to the [facility] ED c/o chest discomfort and SOB . Pt states for the past year she has been having intermittent sob but for the past few days she states it was getting worse. For the past few days she states she has been feeling very sob trying to walk up the stairs .she started to have a sharp, non-radiating pain in the lower part of the center of chest. She states at first the pain was sharp and then there was a pressure like sensation so she went to an Urgent Care center and then was sent to the ED for a further evaluation.     While in the ED, she was found to be in Afib w/RVR and the ED thought she might have had pneumonia as was given aztreonam and azithromycin IV x 1. Currently she states she has no chest pain. Earlier in the week she did have an episode of emesis, but she denies any fever, chills, HA, dizziness, blurred vision, abd pain, diaphoresis, palpitations, recent travel. Pt is now being monitored to telemetry for chest pain,, afib w/rvr with a subtherapeutic INR and r/o acs.    PMH -reviewed admission note, no change since admission  Heart failure: acute [ ] chronic [ ] acute or chronic [ ] diastolic [ ] systolic [ ] combined systolic and diastolic[ ]  RONY: ATN[ ] renal medullary necrosis [ ] CKD I [ ]CKDII [ ]CKD III [ ]CKD IV [ ]CKD V [ ]Other pathological lesions [ ]      MEDICATIONS  (STANDING):  aspirin enteric coated 81 milliGRAM(s) Oral daily  buDESOnide 160 MICROgram(s)/formoterol 4.5 MICROgram(s) Inhaler 2 Puff(s) Inhalation two times a day  diltiazem    milliGRAM(s) Oral daily  furosemide   Injectable 20 milliGRAM(s) IV Push two times a day  potassium chloride    Tablet ER 40 milliEquivalent(s) Oral every 4 hours    MEDICATIONS  (PRN):  acetaminophen   Tablet .. 650 milliGRAM(s) Oral every 6 hours PRN Temp greater or equal to 38C (100.4F), Mild Pain (1 - 3), Moderate Pain (4 - 6)  ALBUTerol    0.083% 2.5 milliGRAM(s) Nebulizer every 6 hours PRN Shortness of Breath and/or Wheezing  guaiFENesin   Syrup  (Sugar-Free) 200 milliGRAM(s) Oral every 6 hours PRN Cough          FAMILY HISTORY:  Family history of psoriasis (Sibling): Brother  Family history of ulcerative colitis (Sibling): Brother  Family history of liver cancer (Mother): Mother    No family history of premature coronary artery disease or sudden cardiac death      REVIEW OF SYSTEMS:  Constitutional: [ ] fever, [ ]weight loss,  [ ]fatigue  Eyes: [ ] visual changes  Respiratory: [ ]shortness of breath;  [ ] cough, [ ]wheezing, [ ]chills, [ ]hemoptysis  Cardiovascular: [ ] chest pain, [ ]palpitations, [ ]dizziness,  [ ]leg swelling[ ]orthopnea[ ]PND  Gastrointestinal: [ ] abdominal pain, [ ]nausea, [ ]vomiting,  [ ]diarrhea   Genitourinary: [ ] dysuria, [ ] hematuria  Neurologic: [ ] headaches [ ] tremors[ ]weakness  Skin: [ ] itching, [ ]burning, [ ] rashes  Endocrine: [ ] heat or cold intolerance  Musculoskeletal: [ ] joint pain or swelling; [ ] muscle, back, or extremity pain  Psychiatric: [ ] depression, [ ]anxiety, [ ]mood swings, or [ ]difficulty sleeping  Hematologic: [ ] easy bruising, [ ] bleeding gums    [x] All remaining systems negative except as per above.   [ ]Unable to obtain.    Vital Signs Last 24 Hrs  T(C): 36.4 (20 Nov 2018 05:58), Max: 36.7 (19 Nov 2018 19:27)  T(F): 97.6 (20 Nov 2018 05:58), Max: 98 (19 Nov 2018 19:27)  HR: 69 (20 Nov 2018 08:22) (69 - 78)  BP: 96/59 (20 Nov 2018 08:22) (90/64 - 111/70)  BP(mean): --  RR: 18 (20 Nov 2018 05:58) (16 - 18)  SpO2: 97% (20 Nov 2018 05:58) (95% - 97%)  I&O's Summary    19 Nov 2018 07:01  -  20 Nov 2018 07:00  --------------------------------------------------------  IN: 400 mL / OUT: 700 mL / NET: -300 mL        PHYSICAL EXAM:  General: No acute distress BMI-  HEENT: EOMI, PERRL  Neck: Supple, [ ] JVD  Lungs: Equal air entry bilaterally; [ ] rales [ ] wheezing [ ] rhonchi  Heart: Regular rate and rhythm; [ ] murmur   /6 [ ] systolic [ ] diastolic [ ] radiation[ ] rubs [ ]  gallops  Abdomen: Nontender, bowel sounds present  Extremities: No clubbing, cyanosis, [ ] edema  Nervous system:  Alert & Oriented X3, no focal deficits  Psychiatric: Normal affect  Skin: No rashes or lesions    LABS:  11-20    140  |  101  |  15  ----------------------------<  121<H>  3.4<L>   |  22  |  0.97    Ca    8.9      20 Nov 2018 05:20  Mg     2.2     11-20      Creatinine Trend: 0.97<--, 0.94<--, 0.88<--, 0.89<--                        12.9   4.37  )-----------( 186      ( 20 Nov 2018 05:00 )             37.4     PT/INR - ( 20 Nov 2018 05:20 )   PT: 18.2 SEC;   INR: 1.62          PTT - ( 20 Nov 2018 05:20 )  PTT:28.5 SEC  Lipid Panel:   Cardiac Enzymes:     Serum Pro-Brain Natriuretic Peptide: 2168 pg/mL (11-19-18 @ 06:00)    ECG [my interpretation]:12 Lead ECG (11.17.18 @ 13:14)   ----------------------------------------------------------------------   Atrial fibrillation with rapid ventricular response  ST & T wave abnormality, consider lateral ischemia  Abnormal ECG        RADIOLOGY:    : Xray Chest 1 View- PORTABLE-Urgent (11.17.18 @ 14:40)   ---------------------------------------------------------------------  IMPRESSION:   Right basilar atelectasis.      11/17/2018 22:20:14  AFIB @75    ECHO:< from: Transthoracic Echocardiogram (02.16.18 @ 13:00)   -------------------------------------------------------------------------------  CONCLUSIONS:  1. Mitral annular calcification, otherwise normal mitral  valve. Mild-moderate mitral regurgitation.  2. Severely dilated left atrium.  LA volume index = 69  cc/m2.  3. Endocardium not well visualized; grossly normal overall  left ventricular systolic function.  A focal area of  dyskinesis is seen in the mid-interventricular septum.  Cannot exclude a left ventricular diverticulum.  4. The right ventricle is not well visualized; grossly  normal right ventricular systolic function.  5. Estimated right ventricular systolic pressure equals 54  mm Hg, assuming right atrial pressure equals 10 mm Hg,  consistent with moderate pulmonary hypertension.       CT Chest No Cont (11.17.18 @ 18:14)   ---------------------------------------------------------  FINDINGS:    CHEST:     LUNGS AND LARGE AIRWAYS: Patent central airways. Focal right lower lobe   groundglass and nodular opacity.  PLEURA: Partially loculated small right pleural effusion.  VESSELS: Thoracic aortic and coronary artery atherosclerosis.  HEART: Cardiomegaly. No pericardial effusion.  MEDIASTINUM AND BRANDON: No lymphadenopathy.  CHEST WALL AND LOWER NECK: Within normal limits.  VISUALIZED UPPER ABDOMEN: Within normal limits.  BONES: Multilevel degenerative disease.    IMPRESSION:   Focal right lower lobe groundglass and nodular opacity is new from the   prior study may be infectious/inflammatory however 3 month follow-up CT   is needed for complete evaluation.    Partially loculated right pleural effusion is decreased in size compared   to the prior study.      CATH done 11/19/2018   official result pending     62 y/o female, with a PmHx of Afib (on coumadin), CHF (EF 55%), Gerd, HLD, HOCM (s/p septal ablation), Lung Ca (s/p chemo), HTN, Chronic back, pain, presented to the [facility] ED c/o chest discomfort and SOB. Pt is being admitted to telemetry for Afib w/RVR with a subtherapeutic INR, Chest pain r/o acs and acute on chronic diastolic heart failure.      Assessment and Recommendation:    Problem/Recommendation - 1:  Problem: Chronic atrial fibrillation. Recommendation:  rate controll off cardizem infusion .  .Resume PO Diltiazem-  and coumadin.Aoal inr 2 to 2.5  Continue telemetry-bradycardia   echo ,  Daily input output monitoring  fluid restriction ,daily weights.   cath done , official report pending ,     Problem/Recommendation - 3:  ·  Problem: Essential hypertension.  Recommendation: Monitoring   Dash diet   po diltiazem continue.      Problem/Recommendation - 4:  ·  Problem: Lung cancer.  Appreciated  pulmonary consult  .  CT of chest without contrast reviewed .  follow up as out patient .  ID consult reviewed and appreciated .       Problem/Recommendation - 5:  ·  Problem: Need for prophylactic measure. Recommendation: INR 1.62 CC: no chest pain ,no SOB  64 y/o female, with a PmHx of Afib (on coumadin), CHF (EF 55%), Gerd, HLD, HOCM (s/p septal ablation), Lung Ca (s/p chemo), HTN, Chronic back, pain, presented to the [facility] ED c/o chest discomfort and SOB . Pt states for the past year she has been having intermittent sob but for the past few days she states it was getting worse. For the past few days she states she has been feeling very sob trying to walk up the stairs .she started to have a sharp, non-radiating pain in the lower part of the center of chest. She states at first the pain was sharp and then there was a pressure like sensation so she went to an Urgent Care center and then was sent to the ED for a further evaluation.     While in the ED, she was found to be in Afib w/RVR and the ED thought she might have had pneumonia as was given aztreonam and azithromycin IV x 1. Currently she states she has no chest pain. Earlier in the week she did have an episode of emesis, but she denies any fever, chills, HA, dizziness, blurred vision, abd pain, diaphoresis, palpitations, recent travel. Pt is now being monitored to telemetry for chest pain,, afib w/rvr with a subtherapeutic INR and r/o acs.    Afib rate controlled , cath done no obstruction .DVT rulled out.ID consult appreciated .    PMH -reviewed admission note, no change since admission  Heart failure: acute [ ] chronic [ ] acute or chronic [ ] diastolic [ ] systolic [ ] combined systolic and diastolic[ ]  RONY: ATN[ ] renal medullary necrosis [ ] CKD I [ ]CKDII [ ]CKD III [ ]CKD IV [ ]CKD V [ ]Other pathological lesions [ ]      MEDICATIONS  (STANDING):  aspirin enteric coated 81 milliGRAM(s) Oral daily  buDESOnide 160 MICROgram(s)/formoterol 4.5 MICROgram(s) Inhaler 2 Puff(s) Inhalation two times a day  diltiazem    milliGRAM(s) Oral daily  furosemide   Injectable 20 milliGRAM(s) IV Push two times a day  potassium chloride    Tablet ER 40 milliEquivalent(s) Oral every 4 hours    MEDICATIONS  (PRN):  acetaminophen   Tablet .. 650 milliGRAM(s) Oral every 6 hours PRN Temp greater or equal to 38C (100.4F), Mild Pain (1 - 3), Moderate Pain (4 - 6)  ALBUTerol    0.083% 2.5 milliGRAM(s) Nebulizer every 6 hours PRN Shortness of Breath and/or Wheezing  guaiFENesin   Syrup  (Sugar-Free) 200 milliGRAM(s) Oral every 6 hours PRN Cough          FAMILY HISTORY:  Family history of psoriasis (Sibling): Brother  Family history of ulcerative colitis (Sibling): Brother  Family history of liver cancer (Mother): Mother    No family history of premature coronary artery disease or sudden cardiac death      REVIEW OF SYSTEMS:  Constitutional: [ ] fever, [ ]weight loss,  [ ]fatigue  Eyes: [ ] visual changes  Respiratory: [ ]shortness of breath;  [ ] cough, [ ]wheezing, [ ]chills, [ ]hemoptysis  Cardiovascular: [ ] chest pain, [ ]palpitations, [ ]dizziness,  [ ]leg swelling[ ]orthopnea[ ]PND  Gastrointestinal: [ ] abdominal pain, [ ]nausea, [ ]vomiting,  [ ]diarrhea   Genitourinary: [ ] dysuria, [ ] hematuria  Neurologic: [ ] headaches [ ] tremors[ ]weakness  Skin: [ ] itching, [ ]burning, [ ] rashes  Endocrine: [ ] heat or cold intolerance  Musculoskeletal: [ ] joint pain or swelling; [ ] muscle, back, or extremity pain  Psychiatric: [ ] depression, [ ]anxiety, [ ]mood swings, or [ ]difficulty sleeping  Hematologic: [ ] easy bruising, [ ] bleeding gums    [x] All remaining systems negative except as per above.   [ ]Unable to obtain.    Vital Signs Last 24 Hrs  T(C): 36.4 (20 Nov 2018 05:58), Max: 36.7 (19 Nov 2018 19:27)  T(F): 97.6 (20 Nov 2018 05:58), Max: 98 (19 Nov 2018 19:27)  HR: 69 (20 Nov 2018 08:22) (69 - 78)  BP: 96/59 (20 Nov 2018 08:22) (90/64 - 111/70)  BP(mean): --  RR: 18 (20 Nov 2018 05:58) (16 - 18)  SpO2: 97% (20 Nov 2018 05:58) (95% - 97%)  I&O's Summary    19 Nov 2018 07:01  -  20 Nov 2018 07:00  --------------------------------------------------------  IN: 400 mL / OUT: 700 mL / NET: -300 mL        PHYSICAL EXAM:  General: No acute distress BMI-  HEENT: EOMI, PERRL  Neck: Supple, [ ] JVD  Lungs: Equal air entry bilaterally; [ ] rales [ ] wheezing [ ] rhonchi  Heart: Regular rate and rhythm; [ ] murmur   /6 [ ] systolic [ ] diastolic [ ] radiation[ ] rubs [ ]  gallops  Abdomen: Nontender, bowel sounds present  Extremities: No clubbing, cyanosis, [ ] edema  Nervous system:  Alert & Oriented X3, no focal deficits  Psychiatric: Normal affect  Skin: No rashes or lesions    LABS:  11-20    140  |  101  |  15  ----------------------------<  121<H>  3.4<L>   |  22  |  0.97    Ca    8.9      20 Nov 2018 05:20  Mg     2.2     11-20      Creatinine Trend: 0.97<--, 0.94<--, 0.88<--, 0.89<--                        12.9   4.37  )-----------( 186      ( 20 Nov 2018 05:00 )             37.4     PT/INR - ( 20 Nov 2018 05:20 )   PT: 18.2 SEC;   INR: 1.62          PTT - ( 20 Nov 2018 05:20 )  PTT:28.5 SEC  Lipid Panel:   Cardiac Enzymes:     Serum Pro-Brain Natriuretic Peptide: 2168 pg/mL (11-19-18 @ 06:00)    ECG [my interpretation]:12 Lead ECG (11.17.18 @ 13:14)   ----------------------------------------------------------------------   Atrial fibrillation with rapid ventricular response  ST & T wave abnormality, consider lateral ischemia  Abnormal ECG        RADIOLOGY:    : Xray Chest 1 View- PORTABLE-Urgent (11.17.18 @ 14:40)   ---------------------------------------------------------------------  IMPRESSION:   Right basilar atelectasis.      11/17/2018 22:20:14  AFIB @75    ECHO:< from: Transthoracic Echocardiogram (02.16.18 @ 13:00)   -------------------------------------------------------------------------------  CONCLUSIONS:  1. Mitral annular calcification, otherwise normal mitral  valve. Mild-moderate mitral regurgitation.  2. Severely dilated left atrium.  LA volume index = 69  cc/m2.  3. Endocardium not well visualized; grossly normal overall  left ventricular systolic function.  A focal area of  dyskinesis is seen in the mid-interventricular septum.  Cannot exclude a left ventricular diverticulum.  4. The right ventricle is not well visualized; grossly  normal right ventricular systolic function.  5. Estimated right ventricular systolic pressure equals 54  mm Hg, assuming right atrial pressure equals 10 mm Hg,  consistent with moderate pulmonary hypertension.       CT Chest No Cont (11.17.18 @ 18:14)   ---------------------------------------------------------  FINDINGS:    CHEST:     LUNGS AND LARGE AIRWAYS: Patent central airways. Focal right lower lobe   groundglass and nodular opacity.  PLEURA: Partially loculated small right pleural effusion.  VESSELS: Thoracic aortic and coronary artery atherosclerosis.  HEART: Cardiomegaly. No pericardial effusion.  MEDIASTINUM AND BRANDON: No lymphadenopathy.  CHEST WALL AND LOWER NECK: Within normal limits.  VISUALIZED UPPER ABDOMEN: Within normal limits.  BONES: Multilevel degenerative disease.    IMPRESSION:   Focal right lower lobe groundglass and nodular opacity is new from the   prior study may be infectious/inflammatory however 3 month follow-up CT   is needed for complete evaluation.    Partially loculated right pleural effusion is decreased in size compared   to the prior study.      CATH done 11/19/2018   official result pending     64 y/o female, with a PmHx of Afib (on coumadin), CHF (EF 55%), Gerd, HLD, HOCM (s/p septal ablation), Lung Ca (s/p chemo), HTN, Chronic back, pain, presented to the [facility] ED c/o chest discomfort and SOB. Pt is being admitted to telemetry for Afib w/RVR with a subtherapeutic INR, Chest pain r/o acs and acute on chronic diastolic heart failure.      Assessment and Recommendation:    Problem/Recommendation - 1:  Problem: Chronic atrial fibrillation. Recommendation:  rate controll off cardizem infusion .  .Resume PO Diltiazem-  and coumadin.Aoal inr 2 to 2.5  Continue telemetry-bradycardia   echo ,  Daily input output monitoring  fluid restriction ,daily weights.   cath done , official report pending ,     Problem/Recommendation - 3:  ·  Problem: Essential hypertension.  Recommendation: Monitoring   Dash diet   po diltiazem continue.      Problem/Recommendation - 4:  ·  Problem: Lung cancer.  Appreciated  pulmonary consult  .  CT of chest without contrast reviewed .  follow up as out patient .  ID consult reviewed and appreciated .       Problem/Recommendation - 5:  ·  Problem: Need for prophylactic measure. Recommendation: INR 1.62      possible dc home today .

## 2018-11-20 NOTE — DISCHARGE NOTE ADULT - HOSPITAL COURSE
62 y/o female, with a PmHx of Afib (on coumadin), CHF (EF 55%), Gerd, HLD, HOCM (s/p septal ablation), Lung Ca (s/p chemo), HTN, Chronic back, pain, presented to the [facility] ED c/o chest discomfort and SOB. Patient was admitted to telemetry for Afib w/ RVR with a subtherapeutic INR, Chest pain r/o acs and acute on chronic diastolic heart failure. Patient was diuresed with IV Lasix. Patient was placed on Diltiazem gtt and transitioned back to Diltiazem PO for Afib. Patient was subtherapeutic on Coumadin and bridged to coumadin with heparin. On tele, patient with asymptomatic episodes of bradycardia and 4 beats of Vtac on 11/19. A cardiac cath was performed on 11/19- NCA. LRA access. LE dopplers negative for DVT bilaterally. Patient to continue Coumadin 5mg outpatient with goal INR 2-2.5.     On CT chest, a focal RLL groundglass and nodular opacity was found and 3 month follow-up outpatient CT scan is recommended. Pulmonologist Dr. Andujar reviewed the CT results and will follow up with the patient for further workup given history of lung cancer. Infectious Disease was consulted for new opacity on CT chest, however no antibiotics were required.     Patient discussed on 11/20 with Dr. Yecenia Villa- the patient is medically stable for discharge and has appropriate outpatient follow up.

## 2018-11-20 NOTE — DISCHARGE NOTE ADULT - PATIENT PORTAL LINK FT
You can access the [facility] Patient Portal, offered by [facility], by registering with the following website: [email or web address]

## 2018-11-22 LAB
BACTERIA BLD CULT: SIGNIFICANT CHANGE UP
BACTERIA BLD CULT: SIGNIFICANT CHANGE UP

## 2018-11-28 LAB — PROCALCITONIN SERPL-MCNC: 0.05 — SIGNIFICANT CHANGE UP

## 2019-05-10 NOTE — ED PROVIDER NOTE - SKIN NEGATIVE STATEMENT, MLM
PRE-SEDATION ASSESSMENT    CONSENT  Consent for procedure and sedation obtained: Yes    MEDICAL HISTORY  Significant medical/surgical history: No  Past Complications with Sedation/Anesthesia: No  Significant Family History: No  Smoking History: No  Alcohol/Drug abuse: No  Possible Pregnancy (LMP): No  Cardiac History: No  Respiratory History: No    PHYSICAL EXAM  History and Physical Reviewed: H&P completed today  Airway Risk History: No previous complications  Airway Anatomy : Class II  Heart : Normal  Lungs : Normal  LOC/Mental Status : Normal    OTHER FINDINGS  Reviewed current medications and allergies: Yes  Pertinent lab/diagnostic test reviewed: Yes    SEDATION RISK ASSESSMENT  Risk Status ASA: Class II - Normal patient with mild systemic disease  Plan for Sedation: Moderate Sedation  EKG Monitoring: Yes    NARRATIVE FINDINGS     
no abrasions, no jaundice, no lesions, no pruritis, and no rashes.

## 2019-05-28 ENCOUNTER — OUTPATIENT (OUTPATIENT)
Dept: OUTPATIENT SERVICES | Facility: HOSPITAL | Age: 64
LOS: 1 days | End: 2019-05-28
Payer: COMMERCIAL

## 2019-05-28 ENCOUNTER — APPOINTMENT (OUTPATIENT)
Dept: CT IMAGING | Facility: HOSPITAL | Age: 64
End: 2019-05-28
Payer: MEDICAID

## 2019-05-28 DIAGNOSIS — Z98.890 OTHER SPECIFIED POSTPROCEDURAL STATES: Chronic | ICD-10-CM

## 2019-05-28 DIAGNOSIS — Z98.891 HISTORY OF UTERINE SCAR FROM PREVIOUS SURGERY: Chronic | ICD-10-CM

## 2019-05-28 PROCEDURE — 71250 CT THORAX DX C-: CPT | Mod: 26

## 2019-05-28 PROCEDURE — 71250 CT THORAX DX C-: CPT

## 2019-07-02 NOTE — ED ADULT NURSE NOTE - ED STAT RN HANDOFF DETAILS
no pt.remained   stable.  on CM  with  sinus  tachycardia. not  in  distress pt.remained   stable.  on CM  with  sinus  tachycardia. not  in  distress.endorsed to rn ashleigh RN.not in distress

## 2019-09-01 ENCOUNTER — INPATIENT (INPATIENT)
Facility: HOSPITAL | Age: 64
LOS: 3 days | Discharge: HOME | End: 2019-09-05
Attending: INTERNAL MEDICINE | Admitting: INTERNAL MEDICINE
Payer: MEDICAID

## 2019-09-01 ENCOUNTER — OUTPATIENT (OUTPATIENT)
Dept: OUTPATIENT SERVICES | Facility: HOSPITAL | Age: 64
LOS: 1 days | End: 2019-09-01
Payer: MEDICAID

## 2019-09-01 ENCOUNTER — TRANSCRIPTION ENCOUNTER (OUTPATIENT)
Age: 64
End: 2019-09-01

## 2019-09-01 VITALS
RESPIRATION RATE: 16 BRPM | TEMPERATURE: 98 F | HEART RATE: 90 BPM | OXYGEN SATURATION: 99 % | DIASTOLIC BLOOD PRESSURE: 99 MMHG | SYSTOLIC BLOOD PRESSURE: 145 MMHG

## 2019-09-01 DIAGNOSIS — Z98.891 HISTORY OF UTERINE SCAR FROM PREVIOUS SURGERY: Chronic | ICD-10-CM

## 2019-09-01 DIAGNOSIS — I48.91 UNSPECIFIED ATRIAL FIBRILLATION: ICD-10-CM

## 2019-09-01 DIAGNOSIS — R07.9 CHEST PAIN, UNSPECIFIED: ICD-10-CM

## 2019-09-01 DIAGNOSIS — Z98.890 OTHER SPECIFIED POSTPROCEDURAL STATES: Chronic | ICD-10-CM

## 2019-09-01 DIAGNOSIS — J44.9 CHRONIC OBSTRUCTIVE PULMONARY DISEASE, UNSPECIFIED: ICD-10-CM

## 2019-09-01 DIAGNOSIS — C34.90 MALIGNANT NEOPLASM OF UNSPECIFIED PART OF UNSPECIFIED BRONCHUS OR LUNG: ICD-10-CM

## 2019-09-01 DIAGNOSIS — Z29.9 ENCOUNTER FOR PROPHYLACTIC MEASURES, UNSPECIFIED: ICD-10-CM

## 2019-09-01 LAB
ALBUMIN SERPL ELPH-MCNC: 5 G/DL — SIGNIFICANT CHANGE UP (ref 3.3–5)
ALP SERPL-CCNC: 91 U/L — SIGNIFICANT CHANGE UP (ref 40–120)
ALT FLD-CCNC: 25 U/L — SIGNIFICANT CHANGE UP (ref 4–33)
ANION GAP SERPL CALC-SCNC: 13 MMO/L — SIGNIFICANT CHANGE UP (ref 7–14)
APTT BLD: 46.1 SEC — HIGH (ref 27.5–36.3)
AST SERPL-CCNC: 31 U/L — SIGNIFICANT CHANGE UP (ref 4–32)
BASE EXCESS BLDV CALC-SCNC: 1.3 MMOL/L — SIGNIFICANT CHANGE UP
BASOPHILS # BLD AUTO: 0.07 K/UL — SIGNIFICANT CHANGE UP (ref 0–0.2)
BASOPHILS NFR BLD AUTO: 1.2 % — SIGNIFICANT CHANGE UP (ref 0–2)
BILIRUB SERPL-MCNC: 0.7 MG/DL — SIGNIFICANT CHANGE UP (ref 0.2–1.2)
BUN SERPL-MCNC: 15 MG/DL — SIGNIFICANT CHANGE UP (ref 7–23)
CALCIUM SERPL-MCNC: 9.9 MG/DL — SIGNIFICANT CHANGE UP (ref 8.4–10.5)
CHLORIDE SERPL-SCNC: 102 MMOL/L — SIGNIFICANT CHANGE UP (ref 98–107)
CO2 SERPL-SCNC: 26 MMOL/L — SIGNIFICANT CHANGE UP (ref 22–31)
CREAT SERPL-MCNC: 0.9 MG/DL — SIGNIFICANT CHANGE UP (ref 0.5–1.3)
DIGOXIN SERPL-MCNC: 2.5 NG/ML — HIGH (ref 0.8–2)
EOSINOPHIL # BLD AUTO: 0.15 K/UL — SIGNIFICANT CHANGE UP (ref 0–0.5)
EOSINOPHIL NFR BLD AUTO: 2.6 % — SIGNIFICANT CHANGE UP (ref 0–6)
GAS PNL BLDV: 139 MMOL/L — SIGNIFICANT CHANGE UP (ref 136–146)
GLUCOSE BLDV-MCNC: 89 MG/DL — SIGNIFICANT CHANGE UP (ref 70–99)
GLUCOSE SERPL-MCNC: 89 MG/DL — SIGNIFICANT CHANGE UP (ref 70–99)
HCO3 BLDV-SCNC: 24 MMOL/L — SIGNIFICANT CHANGE UP (ref 20–27)
HCT VFR BLD CALC: 42 % — SIGNIFICANT CHANGE UP (ref 34.5–45)
HCT VFR BLDV CALC: 45 % — SIGNIFICANT CHANGE UP (ref 34.5–45)
HGB BLD-MCNC: 14.3 G/DL — SIGNIFICANT CHANGE UP (ref 11.5–15.5)
HGB BLDV-MCNC: 14.7 G/DL — SIGNIFICANT CHANGE UP (ref 11.5–15.5)
IMM GRANULOCYTES NFR BLD AUTO: 0.3 % — SIGNIFICANT CHANGE UP (ref 0–1.5)
INR BLD: 5.83 — CRITICAL HIGH (ref 0.88–1.17)
LIDOCAIN IGE QN: 16.8 U/L — SIGNIFICANT CHANGE UP (ref 7–60)
LYMPHOCYTES # BLD AUTO: 0.71 K/UL — LOW (ref 1–3.3)
LYMPHOCYTES # BLD AUTO: 12.3 % — LOW (ref 13–44)
MCHC RBC-ENTMCNC: 34 % — SIGNIFICANT CHANGE UP (ref 32–36)
MCHC RBC-ENTMCNC: 34.9 PG — HIGH (ref 27–34)
MCV RBC AUTO: 102.4 FL — HIGH (ref 80–100)
MONOCYTES # BLD AUTO: 0.65 K/UL — SIGNIFICANT CHANGE UP (ref 0–0.9)
MONOCYTES NFR BLD AUTO: 11.2 % — SIGNIFICANT CHANGE UP (ref 2–14)
NEUTROPHILS # BLD AUTO: 4.19 K/UL — SIGNIFICANT CHANGE UP (ref 1.8–7.4)
NEUTROPHILS NFR BLD AUTO: 72.4 % — SIGNIFICANT CHANGE UP (ref 43–77)
NRBC # FLD: 0 K/UL — SIGNIFICANT CHANGE UP (ref 0–0)
PCO2 BLDV: 43 MMHG — SIGNIFICANT CHANGE UP (ref 41–51)
PH BLDV: 7.39 PH — SIGNIFICANT CHANGE UP (ref 7.32–7.43)
PLATELET # BLD AUTO: 202 K/UL — SIGNIFICANT CHANGE UP (ref 150–400)
PMV BLD: 10.1 FL — SIGNIFICANT CHANGE UP (ref 7–13)
PO2 BLDV: 30 MMHG — LOW (ref 35–40)
POTASSIUM BLDV-SCNC: 3.5 MMOL/L — SIGNIFICANT CHANGE UP (ref 3.4–4.5)
POTASSIUM SERPL-MCNC: 3.8 MMOL/L — SIGNIFICANT CHANGE UP (ref 3.5–5.3)
POTASSIUM SERPL-SCNC: 3.8 MMOL/L — SIGNIFICANT CHANGE UP (ref 3.5–5.3)
PROT SERPL-MCNC: 8.6 G/DL — HIGH (ref 6–8.3)
PROTHROM AB SERPL-ACNC: 68.3 SEC — HIGH (ref 9.8–13.1)
RBC # BLD: 4.1 M/UL — SIGNIFICANT CHANGE UP (ref 3.8–5.2)
RBC # FLD: 12.7 % — SIGNIFICANT CHANGE UP (ref 10.3–14.5)
SAO2 % BLDV: 53.1 % — LOW (ref 60–85)
SODIUM SERPL-SCNC: 141 MMOL/L — SIGNIFICANT CHANGE UP (ref 135–145)
TROPONIN T, HIGH SENSITIVITY: 30 NG/L — SIGNIFICANT CHANGE UP (ref ?–14)
TROPONIN T, HIGH SENSITIVITY: 41 NG/L — SIGNIFICANT CHANGE UP (ref ?–14)
WBC # BLD: 5.79 K/UL — SIGNIFICANT CHANGE UP (ref 3.8–10.5)
WBC # FLD AUTO: 5.79 K/UL — SIGNIFICANT CHANGE UP (ref 3.8–10.5)

## 2019-09-01 PROCEDURE — 74174 CTA ABD&PLVS W/CONTRAST: CPT | Mod: 26

## 2019-09-01 PROCEDURE — G9001: CPT

## 2019-09-01 PROCEDURE — 71046 X-RAY EXAM CHEST 2 VIEWS: CPT | Mod: 26

## 2019-09-01 PROCEDURE — 71275 CT ANGIOGRAPHY CHEST: CPT | Mod: 26

## 2019-09-01 RX ORDER — DILTIAZEM HCL 120 MG
60 CAPSULE, EXT RELEASE 24 HR ORAL
Refills: 0 | Status: DISCONTINUED | OUTPATIENT
Start: 2019-09-01 | End: 2019-09-05

## 2019-09-01 RX ORDER — ACETAMINOPHEN 500 MG
1000 TABLET ORAL ONCE
Refills: 0 | Status: COMPLETED | OUTPATIENT
Start: 2019-09-01 | End: 2019-09-01

## 2019-09-01 RX ORDER — ASPIRIN/CALCIUM CARB/MAGNESIUM 324 MG
324 TABLET ORAL ONCE
Refills: 0 | Status: COMPLETED | OUTPATIENT
Start: 2019-09-01 | End: 2019-09-01

## 2019-09-01 RX ORDER — FUROSEMIDE 40 MG
20 TABLET ORAL DAILY
Refills: 0 | Status: DISCONTINUED | OUTPATIENT
Start: 2019-09-01 | End: 2019-09-05

## 2019-09-01 RX ORDER — OXYCODONE HYDROCHLORIDE 5 MG/1
5 TABLET ORAL EVERY 4 HOURS
Refills: 0 | Status: DISCONTINUED | OUTPATIENT
Start: 2019-09-01 | End: 2019-09-05

## 2019-09-01 RX ORDER — TIOTROPIUM BROMIDE 18 UG/1
1 CAPSULE ORAL; RESPIRATORY (INHALATION) DAILY
Refills: 0 | Status: DISCONTINUED | OUTPATIENT
Start: 2019-09-01 | End: 2019-09-05

## 2019-09-01 RX ORDER — ACETAMINOPHEN 500 MG
650 TABLET ORAL EVERY 4 HOURS
Refills: 0 | Status: DISCONTINUED | OUTPATIENT
Start: 2019-09-01 | End: 2019-09-05

## 2019-09-01 RX ORDER — ONDANSETRON 8 MG/1
4 TABLET, FILM COATED ORAL ONCE
Refills: 0 | Status: COMPLETED | OUTPATIENT
Start: 2019-09-01 | End: 2019-09-01

## 2019-09-01 RX ORDER — OXYCODONE HYDROCHLORIDE 5 MG/1
10 TABLET ORAL EVERY 4 HOURS
Refills: 0 | Status: DISCONTINUED | OUTPATIENT
Start: 2019-09-01 | End: 2019-09-05

## 2019-09-01 RX ORDER — INFLUENZA VIRUS VACCINE 15; 15; 15; 15 UG/.5ML; UG/.5ML; UG/.5ML; UG/.5ML
0.5 SUSPENSION INTRAMUSCULAR ONCE
Refills: 0 | Status: COMPLETED | OUTPATIENT
Start: 2019-09-01 | End: 2019-09-01

## 2019-09-01 RX ORDER — MORPHINE SULFATE 50 MG/1
2 CAPSULE, EXTENDED RELEASE ORAL ONCE
Refills: 0 | Status: DISCONTINUED | OUTPATIENT
Start: 2019-09-01 | End: 2019-09-01

## 2019-09-01 RX ORDER — KETOROLAC TROMETHAMINE 30 MG/ML
15 SYRINGE (ML) INJECTION ONCE
Refills: 0 | Status: DISCONTINUED | OUTPATIENT
Start: 2019-09-01 | End: 2019-09-01

## 2019-09-01 RX ORDER — ASPIRIN/CALCIUM CARB/MAGNESIUM 324 MG
81 TABLET ORAL DAILY
Refills: 0 | Status: DISCONTINUED | OUTPATIENT
Start: 2019-09-02 | End: 2019-09-05

## 2019-09-01 RX ADMIN — Medication 15 MILLIGRAM(S): at 09:03

## 2019-09-01 RX ADMIN — Medication 324 MILLIGRAM(S): at 07:24

## 2019-09-01 RX ADMIN — OXYCODONE HYDROCHLORIDE 10 MILLIGRAM(S): 5 TABLET ORAL at 17:43

## 2019-09-01 RX ADMIN — Medication 15 MILLIGRAM(S): at 10:00

## 2019-09-01 RX ADMIN — Medication 60 MILLIGRAM(S): at 18:33

## 2019-09-01 RX ADMIN — Medication 1000 MILLIGRAM(S): at 21:56

## 2019-09-01 RX ADMIN — OXYCODONE HYDROCHLORIDE 10 MILLIGRAM(S): 5 TABLET ORAL at 14:19

## 2019-09-01 RX ADMIN — MORPHINE SULFATE 2 MILLIGRAM(S): 50 CAPSULE, EXTENDED RELEASE ORAL at 10:00

## 2019-09-01 RX ADMIN — Medication 60 MILLIGRAM(S): at 13:51

## 2019-09-01 RX ADMIN — Medication 60 MILLIGRAM(S): at 23:21

## 2019-09-01 RX ADMIN — ONDANSETRON 4 MILLIGRAM(S): 8 TABLET, FILM COATED ORAL at 15:44

## 2019-09-01 RX ADMIN — MORPHINE SULFATE 2 MILLIGRAM(S): 50 CAPSULE, EXTENDED RELEASE ORAL at 12:10

## 2019-09-01 RX ADMIN — Medication 400 MILLIGRAM(S): at 21:42

## 2019-09-01 NOTE — ED PROVIDER NOTE - PSH
H/O cardiac radiofrequency ablation  for HOCM - 16 years ago  History of   15 years ago family/patient

## 2019-09-01 NOTE — ED PROVIDER NOTE - PMH
AF (atrial fibrillation)  On Coumadin  CAP (community acquired pneumonia)  RML/RLL at [facility] 3/2017  Chronic diastolic (congestive) heart failure    GERD (gastroesophageal reflux disease)    HLD (hyperlipidemia)    HOCM (hypertrophic obstructive cardiomyopathy)  S/P septal ablation  HTN (hypertension)    Lung cancer  s/p chemotherapy  Pleural effusion  Right parapneumonic drained twice 3/2017 at [facility]

## 2019-09-01 NOTE — ED ADULT NURSE REASSESSMENT NOTE - NS ED NURSE REASSESS COMMENT FT1
Tele LUIS MIGUEL Salguero made aware of pt's chest pain, states he will be placing orders for pain medications soon. Pt stable and in NAD at this time, taken to [facility] at this time.

## 2019-09-01 NOTE — ED ADULT TRIAGE NOTE - CHIEF COMPLAINT QUOTE
Pt arrives to ED reporting she believes she inhaled chemicals Wed, Thurs and Friday at work.  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  Pt wore a protective suit with respirator rated for chemicals.  Pt does state at times she lifted the mask to speak to people.  Pt reports right sided CP.  Hx of Lung ca in remission for 1 year.  EKG in triage.

## 2019-09-01 NOTE — CONSULT NOTE ADULT - PROBLEM SELECTOR RECOMMENDATION 9
underneath the rigth breast radiating towards back , this edwin new pain form what she has chronically: dom is awaiting ct scan chest : Her INR was high on admission, so doubt PE

## 2019-09-01 NOTE — H&P ADULT - ASSESSMENT
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

## 2019-09-01 NOTE — ED ADULT NURSE REASSESSMENT NOTE - NS ED NURSE REASSESS COMMENT FT1
Zoë Anthony called to make aware of critical PT and INR values for patient. Tele LUIS MIGUEL Salguero made aware at this time, states "Pt will not be receiving coumadin today." No other interventions ordered at this time.

## 2019-09-01 NOTE — ED PROVIDER NOTE - PHYSICAL EXAMINATION
VITALS: reviewed  GEN: NAD  HEAD/EYES: NCAT, EOMI, anicteric sclerae, no conjunctival pallor  ENT: mucus membranes moist, oropharynx WNL, trachea midline  RESP: lungs CTA with equal breath sounds bilaterally, chest wall nontender and atraumatic  CV: heart with irreg rhythm S1, S2  : no CVAT  MSK: extremities atraumatic and nontender, no edema, no asymmetry  SKIN: warm, dry, no rash, no bruising, no cyanosis. color appropriate for ethnicity  NEURO: alert, mentating appropriately, no facial asymmetry.   PSYCH: Affect appropriate VITALS: reviewed  GEN: NAD  HEAD/EYES: NCAT, EOMI, anicteric sclerae, no conjunctival pallor  ENT: mucus membranes moist, oropharynx WNL, trachea midline  RESP: lungs CTA with equal breath sounds bilaterally, chest wall nontender and atraumatic  CV: heart with irreg rhythm S1, S2  : no CVAT  MSK: extremities atraumatic and nontender, no edema, no asymmetry  SKIN: warm, dry, no rash, no bruising, no cyanosis. color appropriate for ethnicity  NEURO: alert, mentating appropriately, no facial asymmetry.   PSYCH: labile affect

## 2019-09-01 NOTE — ED ADULT NURSE REASSESSMENT NOTE - NS ED NURSE REASSESS COMMENT FT1
Pt returned from CT, unable to receive due to pain at IV site, IV patent w/ positive blood return but pt states "It is too painful." Pt returned from CT, unable to receive due to pain at IV site, IV patent w/ positive blood return but pt states "It is too painful." 2nd IV placement in progress by lópez VELASQUEZ at this time.

## 2019-09-01 NOTE — ED PROVIDER NOTE - ATTENDING CONTRIBUTION TO CARE
Luanne Galarza M.D: 64F hx HOCM, AFib on coumadin/digoxin, CHF, HTN, HLD, p/w 5 days of substerna;/right sdied chest pressure 'feels like chest will explode' with some sob today which she attributes to inhaling chemical spray at work by accident. no diaphoresis no n/v neevr had pain like this in the past. pain is not pleuritic. unsure of last stress/echo. on exam pt appears ancious, with joey llung exam, irregularly irregualr cardiac exam, no lower extremity edema. EKG showing AFib, unchanged from prior. unclear eitology of pt's chest pain. pt with multiple cardiac risk factors, and being female could have atypical presentation for acs, and as such requires workup, including troponins and likely provocative testing in the hospital. no infectious symptoms to suggest pna, no pleuritic pain to suggest PE, no tearing pain/neuro features to suggest dissection. plan for basic labs, trops, cxr, and discussion with pt's caridologist dr mcdonough for further testing, likely requiring admission to the hospital.

## 2019-09-01 NOTE — ED ADULT NURSE REASSESSMENT NOTE - NS ED NURSE REASSESS COMMENT FT1
Pt resting comfortably in bed, continues to c/o chest pain at this time, VSS, pt stable, in NAD, will continue to monitor.

## 2019-09-01 NOTE — H&P ADULT - PROBLEM SELECTOR PLAN 2
CHADS_Vasc = 2  On coumadin for lifelong AC  INR 5.83 so will hold coumadin for now until improves  no evidence of bleeding at this time

## 2019-09-01 NOTE — H&P ADULT - NSHPREVIEWOFSYSTEMS_GEN_ALL_CORE
Decreased appetite. Denies fever, chills, nausea, vomiting, abd pain, dysphagia, headache, visual change, dizziness, syncope, fall, loc, head trauma, diarrhea, brbpr, melena, dysuria, hematuria, edema,  orthopnea, pnd.

## 2019-09-01 NOTE — ED PROVIDER NOTE - OBJECTIVE STATEMENT
63 yo F hx HOCM s/p septal ablation, Afib on coumadin, digoxin, and diltiazem, HFpEF, HTN, HLD, lung cancer in remission, presenting with 5 days of substernal chest pain described as non-radiating pressure like pain, "feels like my chest is going to explode." Patient was using chemical spray at work Wed-Fri, wearing mask/gown but feels like she may have inhaled some chemicals when removing mask intermittently to talk. Denies associated diaphoresis, nausa or vomiting. Pain was initially intermittent, but has been constant for the past day. Did not take aspirin today. No recent travel/surgeries, no active cancer diagnosis, no leg pain/swelling. Pain is non-pleuritic. No associate HA, dizziness or blurry vision. Last stress/echo " a long time ago."    Cardiologist Dr. ANTOINE Keene. 63 yo F PMhx HOCM s/p septal ablation, Afib on coumadin, digoxin, and diltiazem, HFpEF, HTN, HLD, lung cancer in remission, presenting with 5 days of substernal right sided chest pain described as non-radiating pressure like pain, "feels like my chest is going to explode." Patient was using chemical spray at work Wed-Fri, wearing tyvek body suits and organic vapor respirator but feels like she may have inhaled some chemicals when removing mask intermittently to talk. Complains of metallic taste in mouth.  Denies associated diaphoresis, nausea or vomiting. Pain was initially intermittent, but has been constant for the past day. Did not take aspirin today. No recent travel/surgeries, no active cancer diagnosis, no leg pain/swelling. Pain is non-pleuritic. No associated HA, dizziness or blurry vision. Last stress/echo " a long time ago."    Cardiologist Dr. ANTOINE Keene.

## 2019-09-01 NOTE — H&P ADULT - NSHPLABSRESULTS_GEN_ALL_CORE
14.3   5.79  )-----------( 202      ( 01 Sep 2019 07:25 )             42.0     09-01    141  |  102  |  15  ----------------------------<  89  3.8   |  26  |  0.90    Ca    9.9      01 Sep 2019 07:25    TPro  8.6<H>  /  Alb  5.0  /  TBili  0.7  /  DBili  x   /  AST  31  /  ALT  25  /  AlkPhos  91  09-01    EKG AFIB @ 92 c IRBBB and poor r-wave progression  CXR No emergent findings  INR 5.83  Trop 41

## 2019-09-01 NOTE — H&P ADULT - NSICDXPASTSURGICALHX_GEN_ALL_CORE_FT
PAST SURGICAL HISTORY:  H/O cardiac radiofrequency ablation for HOCM - 16 years ago    History of  15 years ago

## 2019-09-01 NOTE — H&P ADULT - HISTORY OF PRESENT ILLNESS
64 y.o female hx of HOCM s/p septal ablation, AFib on coumadin, HFpEF, Lung Ca s/p chemo (6 treatments) now in remission presents with chest pain, States that she has progressive chest pain starting on 8/28/2019. behind her right breast, pre, 7/10, radiation into back, associated with a metallic taste and occasional SOB, constant and progressively worsening. 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 Exposure was on 8/28 as well. IN past when she has experienced chest pain it was different feeling. SHe has been taking her medications even with pain, no relief with her medications. Decreased appetite. Denies fever, chills, nausea, vomiting, abd pain, dysphagia, headache, visual change, dizziness, syncope, fall, loc, head trauma, diarrhea, brbpr, melena, dysuria, hematuria, edema,  orthopnea, pnd.

## 2019-09-01 NOTE — H&P ADULT - NSICDXPASTMEDICALHX_GEN_ALL_CORE_FT
PAST MEDICAL HISTORY:  AF (atrial fibrillation) On Coumadin    CAP (community acquired pneumonia) RML/RLL at [facility] 3/2017    Chronic diastolic (congestive) heart failure     GERD (gastroesophageal reflux disease)     HOCM (hypertrophic obstructive cardiomyopathy) S/P septal ablation    Lung cancer s/p chemotherapy    Lung cancer s/p chemotherapy ~2017 now in remission    Pleural effusion Right parapneumonic drained twice 3/2017 at [facility]

## 2019-09-01 NOTE — ED ADULT NURSE NOTE - OBJECTIVE STATEMENT
pt is A&Ox3, ambulatory, able to make needs known and presents with C/O right sided chest pain and SOB from what PT relates to chemical exposure at her work. PT reports using a "new silver plating process" at work everyday since Wednesday and has developed worsening of symptoms. PT admits to lifting her respirator to talk while at work and believes that she may have "poisoned herself" by doing so. PT has HX of lung CA that is in remission x 1 year. At time of assessment PT is noted with a dry cough, unlabored breathing and O2 sat WNL.

## 2019-09-01 NOTE — H&P ADULT - PROBLEM SELECTOR PLAN 1
annalisa to tele  serial Ekg and CE  Will check CTA C/A/P  to r/o dissection  Pain is atypical had a Cath in december without disease   Pulm called to evaluate given history of Lung Ca

## 2019-09-01 NOTE — ED ADULT NURSE REASSESSMENT NOTE - NS ED NURSE REASSESS COMMENT FT1
Pt taken to CT at this time, before leaving pt states pain is beginning to return, requesing meds, Tele LUIS MIGUEL Salguero made aware states he will wait to see the CT and place orders for pain medications. Pt made aware. Pt stable and in NAD.

## 2019-09-01 NOTE — H&P ADULT - NSHPSOCIALHISTORY_GEN_ALL_CORE
Lives with children, , apartment  Work [employer]   Former smoker, 1ppd x 40 years quit 4 years ago  2 glasses of wine a day, CAGE - C yes A - no, G - yes, E - no  Has edible marijuana daily to help with pain, sleeping, and appetite.  No other illicit drug use.

## 2019-09-01 NOTE — CONSULT NOTE ADULT - SUBJECTIVE AND OBJECTIVE BOX
Patient is a 64y old  Female who presents with a chief complaint of Chest pain (01 Sep 2019 09:01)      HPI:  64 y.o female hx of HOCM s/p septal ablation, AFib on coumadin, HFpEF, Lung Ca s/p chemo (6 treatments) now in remission presents with chest pain, States that she has progressive chest pain starting on 2019. behind her right breast, pre, 7/10, radiation into back, associated with a metallic taste and occasional SOB, constant and progressively worsening. 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 Exposure was on  as well. IN past when she has experienced chest pain it was different feeling. SHe has been taking her medications even with pain, no relief with her medications. Decreased appetite. Denies fever, chills, nausea, vomiting, abd pain, dysphagia, headache, visual change, dizziness, syncope, fall, loc, head trauma, diarrhea, brbpr, melena, dysuria, hematuria, edema,  orthopnea, pnd. (01 Sep 2019 09:01)    she is an ex smoker: she deniess any OS : But has hx of lung cancer:   ?FOLLOWING PRESENT  [x ] Hx of PE/DVT, [x ] Hx COPD, [x ] Hx of Asthma, [y ] Hx of Hospitalization, [ x]  Hx of BiPAP/CPAP use, [x ] Hx of SABRINA    Allergies    cefdinir (Rash)  moxifloxacin (Rash)  penicillin (Rash)    Intolerances        PAST MEDICAL & SURGICAL HISTORY:  Lung cancer: s/p chemotherapy ~ now in remission  GERD (gastroesophageal reflux disease)  CAP (community acquired pneumonia): RML/RLL at [facility] 3/2017  Lung cancer: s/p chemotherapy  Chronic diastolic (congestive) heart failure  AF (atrial fibrillation): On Coumadin  Pleural effusion: Right parapneumonic drained twice 3/2017 at [facility]  HOCM (hypertrophic obstructive cardiomyopathy): S/P septal ablation  H/O cardiac radiofrequency ablation: for HOCM - 16 years ago  History of : 15 years ago      FAMILY HISTORY:  Family history of psoriasis (Sibling): Brother  Family history of ulcerative colitis (Sibling): Brother  Family history of liver cancer: Mother      Social History: [ chr snoker: ex now: 2 pk per day  ] TOBACCO                  [ x ] ETOH                                 [  oral ingestion of Marijuna] IVDA/DRUGS    REVIEW OF SYSTEMS      General:X	    Skin/Breast:X  	  Ophthalmologic:X  	  ENMT:	X    Respiratory and Thorax:X  	  Cardiovascular:	CHEST PAIN UNDERNEATH RIGHT BREAST    Gastrointestinal:	x    Genitourinary:	x    Musculoskeletal:	x    Neurological:	x    Psychiatric:	x    Hematology/Lymphatics:	x    Endocrine:	x    Allergic/Immunologic:	x    MEDICATIONS  (STANDING):  aspirin enteric coated 81 milliGRAM(s) Oral daily  diltiazem    Tablet 60 milliGRAM(s) Oral four times a day  furosemide    Tablet 20 milliGRAM(s) Oral daily    MEDICATIONS  (PRN):       Vital Signs Last 24 Hrs  T(C): 36.1 (01 Sep 2019 08:37), Max: 36.4 (01 Sep 2019 06:16)  T(F): 97 (01 Sep 2019 08:37), Max: 97.6 (01 Sep 2019 06:16)  HR: 59 (01 Sep 2019 09:51) (59 - 90)  BP: 132/79 (01 Sep 2019 09:51) (124/73 - 145/99)  BP(mean): --  RR: 20 (01 Sep 2019 09:51) (16 - 20)  SpO2: 98% (01 Sep 2019 09:51) (98% - 99%)        I&O's Summary      Physical Exam:   GENERAL: NAD, well-groomed, well-developed  HEENT: HEIKE/   Atraumatic, Normocephalic  ENMT: No tonsillar erythema, exudates, or enlargement; Moist mucous membranes, Good dentition, No lesions  NECK: Supple, No JVD, Normal thyroid  CHEST/LUNG: Clear to auscultation bilaterally; No rales, rhonchi, wheezing, or rubs  CVS: Regular rate and rhythm; No murmurs, rubs, or gallops  GI: : Soft, Nontender, Nondistended; Bowel sounds present  NERVOUS SYSTEM:  Alert & Oriented X3  EXTREMITIES:  2+ Peripheral Pulses, No clubbing, cyanosis, or edema  LYMPH: No lymphadenopathy noted  SKIN: No rashes or lesions  ENDOCRINOLOGY: No Thyromegaly  PSYCH: Appropriate    Labs:  1.3<43<4>>30<<7.395>>1.3<<3><<4><<5<<309>>                            14.3   5.79  )-----------( 202      ( 01 Sep 2019 07:25 )             42.0     09    141  |  102  |  15  ----------------------------<  89  3.8   |  26  |  0.90    Ca    9.9      01 Sep 2019 07:25    TPro  8.6<H>  /  Alb  5.0  /  TBili  0.7  /  DBili  x   /  AST  31  /  ALT  25  /  AlkPhos  91      CAPILLARY BLOOD GLUCOSE        LIVER FUNCTIONS - ( 01 Sep 2019 07:25 )  Alb: 5.0 g/dL / Pro: 8.6 g/dL / ALK PHOS: 91 u/L / ALT: 25 u/L / AST: 31 u/L / GGT: x           PT/INR - ( 01 Sep 2019 07:25 )   PT: 68.3 SEC;   INR: 5.83          PTT - ( 01 Sep 2019 07:25 )  PTT:46.1 SEC    D DImer      Studies  Chest X-RAY  CT SCAN Chest   CT Abdomen  Venous Dopplers: LE:   Others      < from: Xray Chest 2 Views PA/Lat (19 @ 07:21) >    ******PRELIMINARY REPORT******    ******PRELIMINARY REPORT******            EXAM:  XR CHEST PA LAT 2V        PROCEDURE DATE:  Sep  1 2019     ******PRELIMINARY REPORT******    ******PRELIMINARY REPORT******            INTERPRETATION:  no emergent findings            ******PRELIMINARY REPORT******    ******PRELIMINARY REPORT******          WILLA DAS M.D., RADIOLOGY RESIDENT    < end of copied text >    < from: CT Chest No Cont (19 @ 16:34) >  pulmonary granulomas. 4 mm right lower lobe perifissural nodule,   unchanged. Multiple triangularly shaped areas of pleural thickening in   the right major fissure, unchanged. Centrilobular emphysema. Linear   atelectasis or scarring in the lingula and right middle lobe..      Limited evaluation of the upper abdomen demonstrates left renal scarring.   Anterior focal bulge in the interpolar region of the left kidney which   may be secondary to adjacent scar.    Evaluation of the osseous structures demonstrates degenerative changes.    IMPRESSION:  Interval resolution of the previously seen right lower lobe part solid   nodule on CT of the chest dated 2018.    Bilateral mosaic lung attenuation likely represents of trapping.     Evidence of old granulomatous disease.    Mild centrilobular emphysema.     1 cm right thyroid nodule. Thyroid ultrasound is recommended.        < end of copied text >

## 2019-09-01 NOTE — ED PROVIDER NOTE - NS ED ROS FT
REVIEW OF SYSTEMS    General:	no fevers, no chills, patient reports 10 lb wt loss over the past 1 year  Skin: patient has scattered ecchmysoses on the upper limbs due to warfarin	  ENMT:  no sore throat, no congestion	  Respiratory and Thorax:  +SOB  Cardiovascular:  +CP, no palpitations  Gastrointestinal:  No Nausea, vomiting, constipation, diarrhea	  Genitourinary:  no urinary symptoms 	  Musculoskeletal:  long term pain between shoulder blades  Neurological:  no weakness, numbness, tingling

## 2019-09-01 NOTE — H&P ADULT - NSICDXFAMILYHX_GEN_ALL_CORE_FT
FAMILY HISTORY:  Family history of liver cancer, Mother    Sibling  Still living? No  Family history of psoriasis, Age at diagnosis: Age Unknown  Family history of ulcerative colitis, Age at diagnosis: Age Unknown

## 2019-09-01 NOTE — CONSULT NOTE ADULT - PROBLEM SELECTOR RECOMMENDATION 2
She has copd and is an ex smoker: she says she used all sorts of Inhalers which did nto help: She has not been wheezing here: Will start spiriva

## 2019-09-02 LAB
ANION GAP SERPL CALC-SCNC: 10 MMO/L — SIGNIFICANT CHANGE UP (ref 7–14)
BUN SERPL-MCNC: 16 MG/DL — SIGNIFICANT CHANGE UP (ref 7–23)
CALCIUM SERPL-MCNC: 8.7 MG/DL — SIGNIFICANT CHANGE UP (ref 8.4–10.5)
CHLORIDE SERPL-SCNC: 102 MMOL/L — SIGNIFICANT CHANGE UP (ref 98–107)
CHOLEST SERPL-MCNC: 209 MG/DL — HIGH (ref 120–199)
CO2 SERPL-SCNC: 24 MMOL/L — SIGNIFICANT CHANGE UP (ref 22–31)
CREAT SERPL-MCNC: 0.86 MG/DL — SIGNIFICANT CHANGE UP (ref 0.5–1.3)
GLUCOSE SERPL-MCNC: 83 MG/DL — SIGNIFICANT CHANGE UP (ref 70–99)
HBA1C BLD-MCNC: 5.3 % — SIGNIFICANT CHANGE UP (ref 4–5.6)
HCT VFR BLD CALC: 37.6 % — SIGNIFICANT CHANGE UP (ref 34.5–45)
HDLC SERPL-MCNC: 61 MG/DL — SIGNIFICANT CHANGE UP (ref 45–65)
HGB BLD-MCNC: 12.7 G/DL — SIGNIFICANT CHANGE UP (ref 11.5–15.5)
HIV 1+2 AB+HIV1 P24 AG SERPL QL IA: SIGNIFICANT CHANGE UP
INR BLD: 4.25 — HIGH (ref 0.88–1.17)
LIPID PNL WITH DIRECT LDL SERPL: 139 MG/DL — SIGNIFICANT CHANGE UP
MAGNESIUM SERPL-MCNC: 2.1 MG/DL — SIGNIFICANT CHANGE UP (ref 1.6–2.6)
MCHC RBC-ENTMCNC: 33.8 % — SIGNIFICANT CHANGE UP (ref 32–36)
MCHC RBC-ENTMCNC: 34.6 PG — HIGH (ref 27–34)
MCV RBC AUTO: 102.5 FL — HIGH (ref 80–100)
NRBC # FLD: 0 K/UL — SIGNIFICANT CHANGE UP (ref 0–0)
PHOSPHATE SERPL-MCNC: 3.4 MG/DL — SIGNIFICANT CHANGE UP (ref 2.5–4.5)
PLATELET # BLD AUTO: 190 K/UL — SIGNIFICANT CHANGE UP (ref 150–400)
PMV BLD: 10.2 FL — SIGNIFICANT CHANGE UP (ref 7–13)
POTASSIUM SERPL-MCNC: 3.5 MMOL/L — SIGNIFICANT CHANGE UP (ref 3.5–5.3)
POTASSIUM SERPL-SCNC: 3.5 MMOL/L — SIGNIFICANT CHANGE UP (ref 3.5–5.3)
PROTHROM AB SERPL-ACNC: 50.7 SEC — HIGH (ref 9.8–13.1)
RBC # BLD: 3.67 M/UL — LOW (ref 3.8–5.2)
RBC # FLD: 12.7 % — SIGNIFICANT CHANGE UP (ref 10.3–14.5)
SODIUM SERPL-SCNC: 136 MMOL/L — SIGNIFICANT CHANGE UP (ref 135–145)
TRIGL SERPL-MCNC: 84 MG/DL — SIGNIFICANT CHANGE UP (ref 10–149)
WBC # BLD: 4.07 K/UL — SIGNIFICANT CHANGE UP (ref 3.8–10.5)
WBC # FLD AUTO: 4.07 K/UL — SIGNIFICANT CHANGE UP (ref 3.8–10.5)

## 2019-09-02 RX ORDER — TRAMADOL HYDROCHLORIDE 50 MG/1
50 TABLET ORAL
Refills: 0 | Status: DISCONTINUED | OUTPATIENT
Start: 2019-09-02 | End: 2019-09-03

## 2019-09-02 RX ADMIN — TRAMADOL HYDROCHLORIDE 50 MILLIGRAM(S): 50 TABLET ORAL at 10:15

## 2019-09-02 RX ADMIN — Medication 60 MILLIGRAM(S): at 11:51

## 2019-09-02 RX ADMIN — Medication 20 MILLIGRAM(S): at 09:27

## 2019-09-02 RX ADMIN — Medication 60 MILLIGRAM(S): at 23:25

## 2019-09-02 RX ADMIN — TRAMADOL HYDROCHLORIDE 50 MILLIGRAM(S): 50 TABLET ORAL at 09:28

## 2019-09-02 RX ADMIN — TRAMADOL HYDROCHLORIDE 50 MILLIGRAM(S): 50 TABLET ORAL at 17:46

## 2019-09-02 RX ADMIN — TIOTROPIUM BROMIDE 1 CAPSULE(S): 18 CAPSULE ORAL; RESPIRATORY (INHALATION) at 09:27

## 2019-09-02 RX ADMIN — Medication 60 MILLIGRAM(S): at 17:46

## 2019-09-02 RX ADMIN — Medication 81 MILLIGRAM(S): at 11:51

## 2019-09-03 LAB
ANION GAP SERPL CALC-SCNC: 12 MMO/L — SIGNIFICANT CHANGE UP (ref 7–14)
BUN SERPL-MCNC: 14 MG/DL — SIGNIFICANT CHANGE UP (ref 7–23)
CALCIUM SERPL-MCNC: 8.8 MG/DL — SIGNIFICANT CHANGE UP (ref 8.4–10.5)
CHLORIDE SERPL-SCNC: 101 MMOL/L — SIGNIFICANT CHANGE UP (ref 98–107)
CO2 SERPL-SCNC: 26 MMOL/L — SIGNIFICANT CHANGE UP (ref 22–31)
CREAT SERPL-MCNC: 0.84 MG/DL — SIGNIFICANT CHANGE UP (ref 0.5–1.3)
GLUCOSE SERPL-MCNC: 95 MG/DL — SIGNIFICANT CHANGE UP (ref 70–99)
HCT VFR BLD CALC: 36.6 % — SIGNIFICANT CHANGE UP (ref 34.5–45)
HGB BLD-MCNC: 12.3 G/DL — SIGNIFICANT CHANGE UP (ref 11.5–15.5)
INR BLD: 3.62 — HIGH (ref 0.88–1.17)
MAGNESIUM SERPL-MCNC: 2.1 MG/DL — SIGNIFICANT CHANGE UP (ref 1.6–2.6)
MCHC RBC-ENTMCNC: 33.6 % — SIGNIFICANT CHANGE UP (ref 32–36)
MCHC RBC-ENTMCNC: 34.5 PG — HIGH (ref 27–34)
MCV RBC AUTO: 102.5 FL — HIGH (ref 80–100)
NRBC # FLD: 0 K/UL — SIGNIFICANT CHANGE UP (ref 0–0)
PHOSPHATE SERPL-MCNC: 2.7 MG/DL — SIGNIFICANT CHANGE UP (ref 2.5–4.5)
PLATELET # BLD AUTO: 194 K/UL — SIGNIFICANT CHANGE UP (ref 150–400)
PMV BLD: 10.3 FL — SIGNIFICANT CHANGE UP (ref 7–13)
POTASSIUM SERPL-MCNC: 3.6 MMOL/L — SIGNIFICANT CHANGE UP (ref 3.5–5.3)
POTASSIUM SERPL-SCNC: 3.6 MMOL/L — SIGNIFICANT CHANGE UP (ref 3.5–5.3)
PROTHROM AB SERPL-ACNC: 41.8 SEC — HIGH (ref 9.8–13.1)
RBC # BLD: 3.57 M/UL — LOW (ref 3.8–5.2)
RBC # FLD: 12.6 % — SIGNIFICANT CHANGE UP (ref 10.3–14.5)
SODIUM SERPL-SCNC: 139 MMOL/L — SIGNIFICANT CHANGE UP (ref 135–145)
WBC # BLD: 6.06 K/UL — SIGNIFICANT CHANGE UP (ref 3.8–10.5)
WBC # FLD AUTO: 6.06 K/UL — SIGNIFICANT CHANGE UP (ref 3.8–10.5)

## 2019-09-03 PROCEDURE — 99222 1ST HOSP IP/OBS MODERATE 55: CPT

## 2019-09-03 RX ORDER — GABAPENTIN 400 MG/1
300 CAPSULE ORAL AT BEDTIME
Refills: 0 | Status: DISCONTINUED | OUTPATIENT
Start: 2019-09-03 | End: 2019-09-05

## 2019-09-03 RX ORDER — VALACYCLOVIR 500 MG/1
1000 TABLET, FILM COATED ORAL THREE TIMES A DAY
Refills: 0 | Status: DISCONTINUED | OUTPATIENT
Start: 2019-09-03 | End: 2019-09-05

## 2019-09-03 RX ORDER — ACETAMINOPHEN 500 MG
650 TABLET ORAL EVERY 6 HOURS
Refills: 0 | Status: DISCONTINUED | OUTPATIENT
Start: 2019-09-03 | End: 2019-09-05

## 2019-09-03 RX ORDER — LIDOCAINE 4 G/100G
1 CREAM TOPICAL EVERY 24 HOURS
Refills: 0 | Status: DISCONTINUED | OUTPATIENT
Start: 2019-09-03 | End: 2019-09-05

## 2019-09-03 RX ADMIN — TIOTROPIUM BROMIDE 1 CAPSULE(S): 18 CAPSULE ORAL; RESPIRATORY (INHALATION) at 09:05

## 2019-09-03 RX ADMIN — Medication 20 MILLIGRAM(S): at 05:15

## 2019-09-03 RX ADMIN — TRAMADOL HYDROCHLORIDE 50 MILLIGRAM(S): 50 TABLET ORAL at 15:53

## 2019-09-03 RX ADMIN — OXYCODONE HYDROCHLORIDE 10 MILLIGRAM(S): 5 TABLET ORAL at 02:53

## 2019-09-03 RX ADMIN — TRAMADOL HYDROCHLORIDE 50 MILLIGRAM(S): 50 TABLET ORAL at 09:53

## 2019-09-03 RX ADMIN — TRAMADOL HYDROCHLORIDE 50 MILLIGRAM(S): 50 TABLET ORAL at 09:08

## 2019-09-03 RX ADMIN — Medication 81 MILLIGRAM(S): at 11:13

## 2019-09-03 RX ADMIN — Medication 60 MILLIGRAM(S): at 11:14

## 2019-09-03 RX ADMIN — TRAMADOL HYDROCHLORIDE 50 MILLIGRAM(S): 50 TABLET ORAL at 15:08

## 2019-09-03 RX ADMIN — Medication 60 MILLIGRAM(S): at 05:15

## 2019-09-03 RX ADMIN — LIDOCAINE 1 PATCH: 4 CREAM TOPICAL at 21:49

## 2019-09-03 RX ADMIN — Medication 60 MILLIGRAM(S): at 23:56

## 2019-09-03 RX ADMIN — GABAPENTIN 300 MILLIGRAM(S): 400 CAPSULE ORAL at 21:51

## 2019-09-03 RX ADMIN — Medication 60 MILLIGRAM(S): at 17:59

## 2019-09-03 RX ADMIN — VALACYCLOVIR 1000 MILLIGRAM(S): 500 TABLET, FILM COATED ORAL at 21:54

## 2019-09-03 NOTE — PROVIDER CONTACT NOTE (MEDICATION) - ASSESSMENT
a/ox4, denies any pain at this time,  V/S 107/57 and HR fluctuates between 50-70 when awake but flucuates between 45-50 when asleep/resting

## 2019-09-03 NOTE — PROGRESS NOTE ADULT - PROBLEM SELECTOR PLAN 1
ct chest noted: no dissection: pain control perp [facility] team  9/3: has rash on the right side mid back: the pain is Underneath the right breast , radiating to back side: ? herpetic rash

## 2019-09-04 DIAGNOSIS — Z71.89 OTHER SPECIFIED COUNSELING: ICD-10-CM

## 2019-09-04 LAB
ANION GAP SERPL CALC-SCNC: 15 MMO/L — HIGH (ref 7–14)
BASOPHILS # BLD AUTO: 0.02 K/UL — SIGNIFICANT CHANGE UP (ref 0–0.2)
BASOPHILS NFR BLD AUTO: 0.3 % — SIGNIFICANT CHANGE UP (ref 0–2)
BUN SERPL-MCNC: 15 MG/DL — SIGNIFICANT CHANGE UP (ref 7–23)
CALCIUM SERPL-MCNC: 9.4 MG/DL — SIGNIFICANT CHANGE UP (ref 8.4–10.5)
CHLORIDE SERPL-SCNC: 100 MMOL/L — SIGNIFICANT CHANGE UP (ref 98–107)
CO2 SERPL-SCNC: 23 MMOL/L — SIGNIFICANT CHANGE UP (ref 22–31)
CREAT SERPL-MCNC: 0.79 MG/DL — SIGNIFICANT CHANGE UP (ref 0.5–1.3)
EOSINOPHIL # BLD AUTO: 0.01 K/UL — SIGNIFICANT CHANGE UP (ref 0–0.5)
EOSINOPHIL NFR BLD AUTO: 0.1 % — SIGNIFICANT CHANGE UP (ref 0–6)
GLUCOSE SERPL-MCNC: 122 MG/DL — HIGH (ref 70–99)
HCT VFR BLD CALC: 39.3 % — SIGNIFICANT CHANGE UP (ref 34.5–45)
HGB BLD-MCNC: 13 G/DL — SIGNIFICANT CHANGE UP (ref 11.5–15.5)
IMM GRANULOCYTES NFR BLD AUTO: 0.4 % — SIGNIFICANT CHANGE UP (ref 0–1.5)
INR BLD: 2.45 — HIGH (ref 0.88–1.17)
LYMPHOCYTES # BLD AUTO: 0.57 K/UL — LOW (ref 1–3.3)
LYMPHOCYTES # BLD AUTO: 7.8 % — LOW (ref 13–44)
MAGNESIUM SERPL-MCNC: 2 MG/DL — SIGNIFICANT CHANGE UP (ref 1.6–2.6)
MCHC RBC-ENTMCNC: 33.1 % — SIGNIFICANT CHANGE UP (ref 32–36)
MCHC RBC-ENTMCNC: 34 PG — SIGNIFICANT CHANGE UP (ref 27–34)
MCV RBC AUTO: 102.9 FL — HIGH (ref 80–100)
MONOCYTES # BLD AUTO: 0.37 K/UL — SIGNIFICANT CHANGE UP (ref 0–0.9)
MONOCYTES NFR BLD AUTO: 5 % — SIGNIFICANT CHANGE UP (ref 2–14)
NEUTROPHILS # BLD AUTO: 6.33 K/UL — SIGNIFICANT CHANGE UP (ref 1.8–7.4)
NEUTROPHILS NFR BLD AUTO: 86.4 % — HIGH (ref 43–77)
NRBC # FLD: 0 K/UL — SIGNIFICANT CHANGE UP (ref 0–0)
PHOSPHATE SERPL-MCNC: 2.8 MG/DL — SIGNIFICANT CHANGE UP (ref 2.5–4.5)
PLATELET # BLD AUTO: 220 K/UL — SIGNIFICANT CHANGE UP (ref 150–400)
PMV BLD: 10.6 FL — SIGNIFICANT CHANGE UP (ref 7–13)
POTASSIUM SERPL-MCNC: 3.7 MMOL/L — SIGNIFICANT CHANGE UP (ref 3.5–5.3)
POTASSIUM SERPL-SCNC: 3.7 MMOL/L — SIGNIFICANT CHANGE UP (ref 3.5–5.3)
PROTHROM AB SERPL-ACNC: 28 SEC — HIGH (ref 9.8–13.1)
RBC # BLD: 3.82 M/UL — SIGNIFICANT CHANGE UP (ref 3.8–5.2)
RBC # FLD: 12.8 % — SIGNIFICANT CHANGE UP (ref 10.3–14.5)
SODIUM SERPL-SCNC: 138 MMOL/L — SIGNIFICANT CHANGE UP (ref 135–145)
WBC # BLD: 7.33 K/UL — SIGNIFICANT CHANGE UP (ref 3.8–10.5)
WBC # FLD AUTO: 7.33 K/UL — SIGNIFICANT CHANGE UP (ref 3.8–10.5)

## 2019-09-04 PROCEDURE — 93010 ELECTROCARDIOGRAM REPORT: CPT

## 2019-09-04 RX ORDER — ACETAMINOPHEN 500 MG
1000 TABLET ORAL ONCE
Refills: 0 | Status: COMPLETED | OUTPATIENT
Start: 2019-09-04 | End: 2019-09-04

## 2019-09-04 RX ORDER — WARFARIN SODIUM 2.5 MG/1
5 TABLET ORAL ONCE
Refills: 0 | Status: COMPLETED | OUTPATIENT
Start: 2019-09-04 | End: 2019-09-04

## 2019-09-04 RX ORDER — TRAMADOL HYDROCHLORIDE 50 MG/1
50 TABLET ORAL ONCE
Refills: 0 | Status: DISCONTINUED | OUTPATIENT
Start: 2019-09-04 | End: 2019-09-04

## 2019-09-04 RX ADMIN — Medication 400 MILLIGRAM(S): at 08:22

## 2019-09-04 RX ADMIN — WARFARIN SODIUM 5 MILLIGRAM(S): 2.5 TABLET ORAL at 18:10

## 2019-09-04 RX ADMIN — GABAPENTIN 300 MILLIGRAM(S): 400 CAPSULE ORAL at 21:05

## 2019-09-04 RX ADMIN — TRAMADOL HYDROCHLORIDE 50 MILLIGRAM(S): 50 TABLET ORAL at 15:44

## 2019-09-04 RX ADMIN — VALACYCLOVIR 1000 MILLIGRAM(S): 500 TABLET, FILM COATED ORAL at 15:45

## 2019-09-04 RX ADMIN — VALACYCLOVIR 1000 MILLIGRAM(S): 500 TABLET, FILM COATED ORAL at 05:52

## 2019-09-04 RX ADMIN — VALACYCLOVIR 1000 MILLIGRAM(S): 500 TABLET, FILM COATED ORAL at 21:05

## 2019-09-04 RX ADMIN — Medication 400 MILLIGRAM(S): at 20:47

## 2019-09-04 RX ADMIN — LIDOCAINE 1 PATCH: 4 CREAM TOPICAL at 09:54

## 2019-09-04 RX ADMIN — TRAMADOL HYDROCHLORIDE 50 MILLIGRAM(S): 50 TABLET ORAL at 16:14

## 2019-09-04 RX ADMIN — Medication 81 MILLIGRAM(S): at 15:45

## 2019-09-04 RX ADMIN — LIDOCAINE 1 PATCH: 4 CREAM TOPICAL at 08:22

## 2019-09-04 RX ADMIN — TRAMADOL HYDROCHLORIDE 50 MILLIGRAM(S): 50 TABLET ORAL at 03:41

## 2019-09-04 RX ADMIN — Medication 1000 MILLIGRAM(S): at 08:52

## 2019-09-04 RX ADMIN — LIDOCAINE 1 PATCH: 4 CREAM TOPICAL at 21:05

## 2019-09-04 RX ADMIN — Medication 1000 MILLIGRAM(S): at 21:17

## 2019-09-04 RX ADMIN — Medication 20 MILLIGRAM(S): at 05:52

## 2019-09-04 NOTE — PROGRESS NOTE ADULT - PROBLEM SELECTOR PLAN 1
ct chest noted: no dissection: pain control perp [facility] team  9/3: has rash on the right side mid back: the pain is Underneath the right breast , radiating to back side: ? herpetic rash  9/*4: now dx with herpese zoster: on anti viral nmedicatios: Seen be dermatology

## 2019-09-04 NOTE — CONSULT NOTE ADULT - ASSESSMENT
#Zoster, dermatomal on the right back extending to the chest. No evidence of dissemination  -Continue with Valtrex therapy   -Recommend Vaseline to the area as healing to prevent eschar formation     Patient to follow up at [facility] [address] [place] [address] [phone] when ready for discharge     Darcy Graham MD   Dermatology Resident PGY-3   [phone]

## 2019-09-04 NOTE — CONSULT NOTE ADULT - SUBJECTIVE AND OBJECTIVE BOX
HPI:  64 y.o female hx of HOCM s/p septal ablation, AFib on coumadin, HFpEF, Lung Ca s/p chemo (6 treatments) now in remission presents with chest pain    Dermatology is consulted for rash on the right back. Present x several days. Very painful. No itch. Denies fevers or chills. Has not had shingles in the past. No hx of Zoster Vaccination. No modifying factors. Constant.     PAST MEDICAL & SURGICAL HISTORY:  Lung cancer: s/p chemotherapy ~ now in remission  GERD (gastroesophageal reflux disease)  CAP (community acquired pneumonia): RML/RLL at [facility] 3/2017  Lung cancer: s/p chemotherapy  Chronic diastolic (congestive) heart failure  AF (atrial fibrillation): On Coumadin  Pleural effusion: Right parapneumonic drained twice 3/2017 at [facility]  HOCM (hypertrophic obstructive cardiomyopathy): S/P septal ablation  H/O cardiac radiofrequency ablation: for HOCM - 16 years ago  History of : 15 years ago      REVIEW OF SYSTEMS    General: no fevers/chills, no lethargy	    Skin/Breast: see HPI  	  Ophthalmologic: no eye pain or change in vision  	  ENMT: no dysphagia or change in hearing    Respiratory and Thorax: no SOB or cough  	  Cardiovascular: no palpitations or chest pain    Gastrointestinal: no abdominal pain or blood in stool     Genitourinary: no dysuria or frequency    Musculoskeletal: no joint pains    Neurological: no weakness, numbness , or tingling    MEDICATIONS  (STANDING):  aspirin enteric coated 81 milliGRAM(s) Oral daily  diltiazem    Tablet 60 milliGRAM(s) Oral four times a day  furosemide    Tablet 20 milliGRAM(s) Oral daily  gabapentin 300 milliGRAM(s) Oral at bedtime  influenza   Vaccine 0.5 milliLiter(s) IntraMuscular once  lidocaine   Patch 1 Patch Transdermal every 24 hours  predniSONE   Tablet 20 milliGRAM(s) Oral daily  tiotropium 18 MICROgram(s) Capsule 1 Capsule(s) Inhalation daily  valACYclovir 1000 milliGRAM(s) Oral three times a day    MEDICATIONS  (PRN):  acetaminophen   Tablet .. 650 milliGRAM(s) Oral every 4 hours PRN Mild Pain (1 - 3)  acetaminophen   Tablet .. 650 milliGRAM(s) Oral every 6 hours PRN Mild Pain (1 - 3)  oxyCODONE    IR 5 milliGRAM(s) Oral every 4 hours PRN Moderate Pain (4 - 6)  oxyCODONE    IR 10 milliGRAM(s) Oral every 4 hours PRN Severe Pain (7 - 10)      Allergies    cefdinir (Rash)  moxifloxacin (Rash)  penicillin (Rash)    Intolerances        SOCIAL HISTORY:    FAMILY HISTORY:  Family history of psoriasis (Sibling): Brother  Family history of ulcerative colitis (Sibling): Brother  Family history of liver cancer: Mother      Vital Signs Last 24 Hrs  T(C): 36.3 (04 Sep 2019 08:21), Max: 36.3 (03 Sep 2019 21:48)  T(F): 97.4 (04 Sep 2019 08:21), Max: 97.4 (03 Sep 2019 21:48)  HR: 69 (04 Sep 2019 08:21) (48 - 78)  BP: 108/66 (04 Sep 2019 08:21) (106/75 - 122/82)  BP(mean): --  RR: 16 (04 Sep 2019 08:21) (16 - 18)  SpO2: 94% (04 Sep 2019 08:21) (94% - 97%)    PHYSICAL EXAM:     The patient was alert and oriented X 3, well nourished, and in no  apparent distress.  OP showed no ulcerations  There was no visible lymphadenopathy.  Conjunctiva were non injected  There was no clubbing or edema of extremities.  The scalp, hair, face, eyebrows, lips, OP, neck, chest, back,   extremities X 4, nails were examined.  There was no hyperhidrosis or bromhidrosis.    Of note on skin exam:   the right mid back and right chest and chest wall with grouped vesicles on an erythematous base in a dermatomal distribution     LABS:                        12.3   6.06  )-----------( 194      ( 03 Sep 2019 06:34 )             36.6     09-03    139  |  101  |  14  ----------------------------<  95  3.6   |  26  |  0.84    Ca    8.8      03 Sep 2019 06:34  Phos  2.7     09-03  Mg     2.1     09-03      PT/INR - ( 03 Sep 2019 06:34 )   PT: 41.8 SEC;   INR: 3.62                RADIOLOGY & ADDITIONAL STUDIES:

## 2019-09-04 NOTE — CHART NOTE - NSCHARTNOTEFT_GEN_A_CORE
Notified by RN patient bradycardic to 30s-40s.   Patient seen and examined at bedside. She denies any chest pain, SOB, dizziness, and only complains of pain to her shingles area.   ECG demonstrated Atrial fibrilation 54 bpm.   Patient with hx of a fib and noted episode of bradycardia previously- digoxin on hold for now.   Will continue to monitor

## 2019-09-05 ENCOUNTER — TRANSCRIPTION ENCOUNTER (OUTPATIENT)
Age: 64
End: 2019-09-05

## 2019-09-05 VITALS
RESPIRATION RATE: 16 BRPM | DIASTOLIC BLOOD PRESSURE: 85 MMHG | TEMPERATURE: 97 F | SYSTOLIC BLOOD PRESSURE: 130 MMHG | OXYGEN SATURATION: 99 % | HEART RATE: 78 BPM

## 2019-09-05 LAB
ANION GAP SERPL CALC-SCNC: 14 MMO/L — SIGNIFICANT CHANGE UP (ref 7–14)
APTT BLD: 33 SEC — SIGNIFICANT CHANGE UP (ref 27.5–36.3)
BASOPHILS # BLD AUTO: 0.04 K/UL — SIGNIFICANT CHANGE UP (ref 0–0.2)
BASOPHILS NFR BLD AUTO: 0.6 % — SIGNIFICANT CHANGE UP (ref 0–2)
BUN SERPL-MCNC: 14 MG/DL — SIGNIFICANT CHANGE UP (ref 7–23)
CALCIUM SERPL-MCNC: 9 MG/DL — SIGNIFICANT CHANGE UP (ref 8.4–10.5)
CHLORIDE SERPL-SCNC: 102 MMOL/L — SIGNIFICANT CHANGE UP (ref 98–107)
CO2 SERPL-SCNC: 25 MMOL/L — SIGNIFICANT CHANGE UP (ref 22–31)
CREAT SERPL-MCNC: 0.78 MG/DL — SIGNIFICANT CHANGE UP (ref 0.5–1.3)
EOSINOPHIL # BLD AUTO: 0.18 K/UL — SIGNIFICANT CHANGE UP (ref 0–0.5)
EOSINOPHIL NFR BLD AUTO: 2.9 % — SIGNIFICANT CHANGE UP (ref 0–6)
GLUCOSE SERPL-MCNC: 81 MG/DL — SIGNIFICANT CHANGE UP (ref 70–99)
HCT VFR BLD CALC: 36.4 % — SIGNIFICANT CHANGE UP (ref 34.5–45)
HGB BLD-MCNC: 12.4 G/DL — SIGNIFICANT CHANGE UP (ref 11.5–15.5)
IMM GRANULOCYTES NFR BLD AUTO: 0.3 % — SIGNIFICANT CHANGE UP (ref 0–1.5)
INR BLD: 2.38 — HIGH (ref 0.88–1.17)
LYMPHOCYTES # BLD AUTO: 0.97 K/UL — LOW (ref 1–3.3)
LYMPHOCYTES # BLD AUTO: 15.7 % — SIGNIFICANT CHANGE UP (ref 13–44)
MCHC RBC-ENTMCNC: 34.1 % — SIGNIFICANT CHANGE UP (ref 32–36)
MCHC RBC-ENTMCNC: 34.8 PG — HIGH (ref 27–34)
MCV RBC AUTO: 102.2 FL — HIGH (ref 80–100)
MONOCYTES # BLD AUTO: 0.63 K/UL — SIGNIFICANT CHANGE UP (ref 0–0.9)
MONOCYTES NFR BLD AUTO: 10.2 % — SIGNIFICANT CHANGE UP (ref 2–14)
NEUTROPHILS # BLD AUTO: 4.33 K/UL — SIGNIFICANT CHANGE UP (ref 1.8–7.4)
NEUTROPHILS NFR BLD AUTO: 70.3 % — SIGNIFICANT CHANGE UP (ref 43–77)
NRBC # FLD: 0 K/UL — SIGNIFICANT CHANGE UP (ref 0–0)
PLATELET # BLD AUTO: 204 K/UL — SIGNIFICANT CHANGE UP (ref 150–400)
PMV BLD: 10.9 FL — SIGNIFICANT CHANGE UP (ref 7–13)
POTASSIUM SERPL-MCNC: 3.4 MMOL/L — LOW (ref 3.5–5.3)
POTASSIUM SERPL-SCNC: 3.4 MMOL/L — LOW (ref 3.5–5.3)
PROTHROM AB SERPL-ACNC: 27.1 SEC — HIGH (ref 9.8–13.1)
RBC # BLD: 3.56 M/UL — LOW (ref 3.8–5.2)
RBC # FLD: 12.7 % — SIGNIFICANT CHANGE UP (ref 10.3–14.5)
SODIUM SERPL-SCNC: 141 MMOL/L — SIGNIFICANT CHANGE UP (ref 135–145)
WBC # BLD: 6.17 K/UL — SIGNIFICANT CHANGE UP (ref 3.8–10.5)
WBC # FLD AUTO: 6.17 K/UL — SIGNIFICANT CHANGE UP (ref 3.8–10.5)

## 2019-09-05 PROCEDURE — 93306 TTE W/DOPPLER COMPLETE: CPT | Mod: 26

## 2019-09-05 RX ORDER — TRAMADOL HYDROCHLORIDE 50 MG/1
50 TABLET ORAL ONCE
Refills: 0 | Status: DISCONTINUED | OUTPATIENT
Start: 2019-09-05 | End: 2019-09-05

## 2019-09-05 RX ORDER — DILTIAZEM HCL 120 MG
1 CAPSULE, EXT RELEASE 24 HR ORAL
Qty: 0 | Refills: 0 | DISCHARGE

## 2019-09-05 RX ORDER — WARFARIN SODIUM 2.5 MG/1
5 TABLET ORAL ONCE
Refills: 0 | Status: COMPLETED | OUTPATIENT
Start: 2019-09-05 | End: 2019-09-05

## 2019-09-05 RX ORDER — GABAPENTIN 400 MG/1
1 CAPSULE ORAL
Qty: 5 | Refills: 0
Start: 2019-09-05 | End: 2019-09-09

## 2019-09-05 RX ORDER — DILTIAZEM HCL 120 MG
1 CAPSULE, EXT RELEASE 24 HR ORAL
Qty: 120 | Refills: 0
Start: 2019-09-05 | End: 2019-10-04

## 2019-09-05 RX ORDER — KETOROLAC TROMETHAMINE 30 MG/ML
15 SYRINGE (ML) INJECTION ONCE
Refills: 0 | Status: DISCONTINUED | OUTPATIENT
Start: 2019-09-05 | End: 2019-09-05

## 2019-09-05 RX ORDER — ACETAMINOPHEN 500 MG
650 TABLET ORAL ONCE
Refills: 0 | Status: COMPLETED | OUTPATIENT
Start: 2019-09-05 | End: 2019-09-05

## 2019-09-05 RX ORDER — ASPIRIN/CALCIUM CARB/MAGNESIUM 324 MG
1 TABLET ORAL
Qty: 30 | Refills: 0
Start: 2019-09-05 | End: 2019-10-04

## 2019-09-05 RX ORDER — DIGOXIN 250 MCG
1 TABLET ORAL
Qty: 0 | Refills: 0 | DISCHARGE

## 2019-09-05 RX ORDER — FUROSEMIDE 40 MG
1 TABLET ORAL
Qty: 30 | Refills: 0
Start: 2019-09-05 | End: 2019-10-04

## 2019-09-05 RX ORDER — POTASSIUM CHLORIDE 20 MEQ
40 PACKET (EA) ORAL ONCE
Refills: 0 | Status: COMPLETED | OUTPATIENT
Start: 2019-09-05 | End: 2019-09-05

## 2019-09-05 RX ADMIN — TRAMADOL HYDROCHLORIDE 50 MILLIGRAM(S): 50 TABLET ORAL at 10:26

## 2019-09-05 RX ADMIN — VALACYCLOVIR 1000 MILLIGRAM(S): 500 TABLET, FILM COATED ORAL at 14:02

## 2019-09-05 RX ADMIN — VALACYCLOVIR 1000 MILLIGRAM(S): 500 TABLET, FILM COATED ORAL at 05:01

## 2019-09-05 RX ADMIN — Medication 60 MILLIGRAM(S): at 00:31

## 2019-09-05 RX ADMIN — Medication 20 MILLIGRAM(S): at 05:01

## 2019-09-05 RX ADMIN — Medication 260 MILLIGRAM(S): at 05:01

## 2019-09-05 RX ADMIN — Medication 60 MILLIGRAM(S): at 18:41

## 2019-09-05 RX ADMIN — LIDOCAINE 1 PATCH: 4 CREAM TOPICAL at 09:34

## 2019-09-05 RX ADMIN — TRAMADOL HYDROCHLORIDE 50 MILLIGRAM(S): 50 TABLET ORAL at 10:56

## 2019-09-05 RX ADMIN — Medication 60 MILLIGRAM(S): at 12:02

## 2019-09-05 RX ADMIN — LIDOCAINE 1 PATCH: 4 CREAM TOPICAL at 07:00

## 2019-09-05 RX ADMIN — WARFARIN SODIUM 5 MILLIGRAM(S): 2.5 TABLET ORAL at 18:41

## 2019-09-05 RX ADMIN — TRAMADOL HYDROCHLORIDE 50 MILLIGRAM(S): 50 TABLET ORAL at 19:24

## 2019-09-05 RX ADMIN — Medication 650 MILLIGRAM(S): at 05:31

## 2019-09-05 RX ADMIN — Medication 40 MILLIEQUIVALENT(S): at 18:40

## 2019-09-05 RX ADMIN — Medication 81 MILLIGRAM(S): at 14:00

## 2019-09-05 NOTE — PROGRESS NOTE ADULT - ATTENDING COMMENTS
Patient was seen and examined,interim events noted,labs and radiology studies reviewed.  Allen Keene MD,FACC.  [address] [place] [address].  [facility].  [phone]
Patient was seen and examined,interim events noted,labs and radiology studies reviewed.  Allen Keene MD,FACC.  [address] [place] [address].  [facility].  [phone]
Pulmonary wise she seems stable!
Pulmonary wise she seems stable!  9/3: SEESM TO BE DOING OK ? DEVELOPING HERPETIC RASH: ANNA np
Pulmonary wise she seems stable!  9/3: SEESM TO BE DOING OK ? DEVELOPING HERPETIC RASH: ANNA np  9/4: Now dx with Herpes: cont pain control and valtrex:
Pulmonary wise she seems stable!  9/3: SEESM TO BE DOING OK ? DEVELOPING HERPETIC RASH: ANNA np  9/4: Now dx with Herpes: cont pain control and valtrex:  9/5:? deep trinidad

## 2019-09-05 NOTE — PROVIDER CONTACT NOTE (OTHER) - ACTION/TREATMENT ORDERED:
Per provider, monitor the patient HR and callback if any changes or additional pauses
EKG and reviewing cardiac meds
EKG ordered
PA made aware  continue to monitor
consulting with cardiology
continue to monitor

## 2019-09-05 NOTE — DISCHARGE NOTE NURSING/CASE MANAGEMENT/SOCIAL WORK - NSDCPEEMAIL_GEN_ALL_CORE
[facility] email [email or web address]

## 2019-09-05 NOTE — PROGRESS NOTE ADULT - ASSESSMENT
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      Problem/Plan - 1:  ·  Problem: Chest pain, unspecified type.  Plan: admit to tele  serial Ekg and CE-no evidence for cardiac injury  Will check CTA C/A/P  to r/o dissection  Pain is atypical had a Cath in December non obstructive CAD.  Pulm called to evaluate given history of Lung Ca.   -Analgesics  -Skin rash - Zoster-Started on Valtrex,Dermatology consult noted.  Possible discharge tomorrow    Problem/Plan - 2:  ·  Problem: Atrial fibrillation, unspecified type.  Plan: CHADS_Vasc = 2  On coumadin for lifelong AC  INR 2.45 - hold coumadin for now   no evidence of bleeding at this time.     Problem/Plan - 3:  ·  Problem: Lung cancer.  Plan: Continue outpatient follow up with oncology.     Problem/Plan - 4:  ·  Problem: Prophylactic measure.  Plan: No further DVT prevention needed.
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      Problem/Plan - 1:  ·  Problem: Chest pain, unspecified type.  Plan: admit to tele  serial Ekg and CE-no evidence for cardiac injury  Will check CTA C/A/P  to r/o dissection  Pain is atypical had a Cath in December non obstructive CAD.  Pulm called to evaluate given history of Lung Ca.   -Analgesics  -Skin rash suspicious for Zoster-Start Vencyclovir Derm consult    Problem/Plan - 2:  ·  Problem: Atrial fibrillation, unspecified type.  Plan: CHADS_Vasc = 2  On coumadin for lifelong AC  INR  3.62 - hold coumadin for now   no evidence of bleeding at this time.     Problem/Plan - 3:  ·  Problem: Lung cancer.  Plan: Continue outpatient follow up with oncology.     Problem/Plan - 4:  ·  Problem: Prophylactic measure.  Plan: No further DVT prevention needed.
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

## 2019-09-05 NOTE — DISCHARGE NOTE NURSING/CASE MANAGEMENT/SOCIAL WORK - PATIENT PORTAL LINK FT
You can access the FollowMyHealth Patient Portal offered by [facility] by registering at the following website: [email or web address]. By joining [facility] FollowMyHealth portal, you will also be able to view your health information using other applications (apps) compatible with our system.

## 2019-09-05 NOTE — PROGRESS NOTE ADULT - PROBLEM SELECTOR PLAN 3
HR controlled: on AC: INR supratheraeptutic:
HR controlled: on AC: INR supratheraeptutic:  9/3: mantian INR between 2-3
HR controlled: on AC: INR supratheraeptutic:  9/3: mantian INR between 2-3  9/4: cont AC: hr control per cards
HR controlled: on AC: INR supratheraeptutic:  9/3: mantian INR between 2-3  9/4: cont AC: hr control per cards  9/5: INR is therapeutic

## 2019-09-05 NOTE — DISCHARGE NOTE PROVIDER - NSDCCPCAREPLAN_GEN_ALL_CORE_FT
PRINCIPAL DISCHARGE DIAGNOSIS  Diagnosis: Chest pain  Assessment and Plan of Treatment: No evidence of cardiac etiology of chest pain was found. Advised to follow up outpatient with Dr. Keene for outpatient follow up. No need for any further inpatient workup.      SECONDARY DISCHARGE DIAGNOSES  Diagnosis: Bradycardia  Assessment and Plan of Treatment: Digoxin was held due to episodes of bradycardia. You are advised to stop taking digoxin for the time being and follow up outpatient with Dr. Keene in 1-2 weeks for outpatient follow up.    Diagnosis: Herpes zoster  Assessment and Plan of Treatment: You were started on valtrex for herpes zoster found on back. You are advised to continue taking antiviral medication (valtrex) until 9/10.

## 2019-09-05 NOTE — PROGRESS NOTE ADULT - PROBLEM SELECTOR PLAN 2
stable: on spiriva: No wheezing  9/3: stbale , no wheezing
stable: on spiriva: No wheezing  9/3: stbale , no wheezing  9/4: no wheezing
stable: on spiriva: No wheezing  9/3: stbale , no wheezing  9/4: no wheezing  9/5: no wheezing ? why on prednisone ? DC
stable: on spiriva: No wheezing

## 2019-09-05 NOTE — DISCHARGE NOTE NURSING/CASE MANAGEMENT/SOCIAL WORK - NSDCPEWEB_GEN_ALL_CORE
[facility] for Tobacco Control website --- [email or web address] website --- [email or web address]

## 2019-09-05 NOTE — PROGRESS NOTE ADULT - PROBLEM SELECTOR PLAN 4
the previolsy seen RLL opacites have disappeared: some ateelctasis: otherwise she seems to be doingok
the previolsy seen RLL opacites have disappeared: some ateelctasis: otherwise she seems to be doingok  9/3: per primary team

## 2019-09-05 NOTE — DISCHARGE NOTE NURSING/CASE MANAGEMENT/SOCIAL WORK - NSDCPNDISPN_GEN_ALL_CORE
Activities of daily living, including home environment that might     exacerbate pain or reduce effectiveness of the pain management plan of care as well as strategies to address these issues/Opioids not applicable/not prescribed/Education provided on the pain management plan of care

## 2019-09-05 NOTE — PROGRESS NOTE ADULT - PROBLEM SELECTOR PLAN 1
ct chest noted: no dissection: pain control perp riay team  9/3: has rash on the right side mid back: the pain is Underneath the right breast , radiating to back side: ? herpetic rash  9/*4: now dx with herpese zoster: on anti viral nmedicatios: Seen be dermatology  9/5: pain control

## 2019-09-05 NOTE — DISCHARGE NOTE PROVIDER - CARE PROVIDER_API CALL
Allen Keene)  Cardiology  [address] [id number]  Phone: [phone]  Fax: [phone]  Follow Up Time: 1 week

## 2019-09-05 NOTE — PROGRESS NOTE ADULT - SUBJECTIVE AND OBJECTIVE BOX
PRESENTING CC:Chest pain    SUBJ: 64 y.o female hx of HOCM s/p septal ablation, AFib on coumadin, HFpEF, Lung Ca s/p chemo (6 treatments) now in remission presents with chest pain, States that she has progressive chest pain starting on 8/28/2019. behind her right breast, pre, 7/10, radiation into back, associated with a metallic taste and occasional SOB, constant and progressively worsening. Prior to pain started she believes she inhaled chemicals at work at a [employer] ;No dyspnea still has right chest pain.      PMH -reviewed admission note, no change since admission  Heart failure: acute [ ] chronic [ ] acute or chronic [ ] diastolic [ ] systolic [ ] combined systolic and diastolic[ ]  RONY: ATN[ ] renal medullary necrosis [ ] CKD I [ ]CKDII [ ]CKD III [ ]CKD IV [ ]CKD V [ ]Other pathological lesions [ ]    MEDICATIONS  (STANDING):  aspirin enteric coated 81 milliGRAM(s) Oral daily  diltiazem    Tablet 60 milliGRAM(s) Oral four times a day  furosemide    Tablet 20 milliGRAM(s) Oral daily  influenza   Vaccine 0.5 milliLiter(s) IntraMuscular once  tiotropium 18 MICROgram(s) Capsule 1 Capsule(s) Inhalation daily    MEDICATIONS  (PRN):  acetaminophen   Tablet .. 650 milliGRAM(s) Oral every 4 hours PRN Mild Pain (1 - 3)  oxyCODONE    IR 5 milliGRAM(s) Oral every 4 hours PRN Moderate Pain (4 - 6)  oxyCODONE    IR 10 milliGRAM(s) Oral every 4 hours PRN Severe Pain (7 - 10)          FAMILY HISTORY:  Family history of psoriasis (Sibling): Brother  Family history of ulcerative colitis (Sibling): Brother  Family history of liver cancer: Mother    No family history of premature coronary artery disease or sudden cardiac death      REVIEW OF SYSTEMS:  Constitutional: [ ] fever, [ ]weight loss,  [ ]fatigue  Eyes: [ ] visual changes  Respiratory: [ ]shortness of breath;  [ ] cough, [ ]wheezing, [ ]chills, [ ]hemoptysis  Cardiovascular: [x ] chest pain, [ ]palpitations, [ ]dizziness,  [ ]leg swelling[ ]orthopnea[ ]PND  Gastrointestinal: [ ] abdominal pain, [ ]nausea, [ ]vomiting,  [ ]diarrhea   Genitourinary: [ ] dysuria, [ ] hematuria  Neurologic: [ ] headaches [ ] tremors[ ]weakness  Skin: [ ] itching, [ ]burning, [ ] rashes  Endocrine: [ ] heat or cold intolerance  Musculoskeletal: [ ] joint pain or swelling; [ ] muscle, back, or extremity pain  Psychiatric: [ ] depression, [ ]anxiety, [ ]mood swings, or [ ]difficulty sleeping  Hematologic: [ ] easy bruising, [ ] bleeding gums    [x] All remaining systems negative except as per above.   [ ]Unable to obtain.    Vital Signs Last 24 Hrs  T(C): 36.6 (02 Sep 2019 01:27), Max: 36.6 (01 Sep 2019 23:22)  T(F): 97.8 (02 Sep 2019 01:27), Max: 97.8 (01 Sep 2019 23:22)  HR: 60 (02 Sep 2019 01:27) (59 - 99)  BP: 99/65 (02 Sep 2019 01:27) (99/65 - 150/94)  RR: 18 (02 Sep 2019 01:27) (17 - 20)  SpO2: 96% (02 Sep 2019 01:27) (95% - 98%)  I&O's Summary      PHYSICAL EXAM:  General: No acute distress BMI-  HEENT: EOMI, PERRL  Neck: Supple, [ ] JVD  Lungs: Equal air entry bilaterally; [ ] rales [- ] wheezing [ ] rhonchi  Heart: Irregular rate and rhythm; [x ] murmur  2 /6 [x ] systolic [ ] diastolic [ ] radiation[ ] rubs [ ]  gallops  Abdomen: Nontender, bowel sounds present  Extremities: No clubbing, cyanosis, [ ] edema  Nervous system:  Alert & Oriented X3, no focal deficits  Psychiatric: Normal affect  Skin: No rashes or lesions    LABS:  09-02    136  |  102  |  16  ----------------------------<  83  3.5   |  24  |  0.86    Ca    8.7      02 Sep 2019 07:09  Phos  3.4     09-02  Mg     2.1     09-02    TPro  8.6<H>  /  Alb  5.0  /  TBili  0.7  /  DBili  x   /  AST  31  /  ALT  25  /  AlkPhos  91  09-01    Creatinine Trend: 0.86<--, 0.90<--                        12.7   4.07  )-----------( 190      ( 02 Sep 2019 07:09 )             37.6     PT/INR - ( 02 Sep 2019 07:09 )   PT: 50.7 SEC;   INR: 4.25          PTT - ( 01 Sep 2019 07:25 )  PTT:46.1 SEC      IMPRESSION AND PLAN:      · Assessment		  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      Problem/Plan - 1:  ·  Problem: Chest pain, unspecified type.  Plan: admit to tele  serial Ekg and CE-no evidence for cardiac injury  Will check CTA C/A/P  to r/o dissection  Pain is atypical had a Cath in December non obstructive CAD.  Pulm called to evaluate given history of Lung Ca.   CT Chest  -Analgesics    Problem/Plan - 2:  ·  Problem: Atrial fibrillation, unspecified type.  Plan: CHADS_Vasc = 2  On coumadin for lifelong AC  INR 4.25 so will hold coumadin for now until improves  no evidence of bleeding at this time.     Problem/Plan - 3:  ·  Problem: Lung cancer.  Plan: Continue outpatient follow up with oncology.     Problem/Plan - 4:  ·  Problem: Prophylactic measure.  Plan: No further DVT prevention needed.
Patient is a 64y old  Female who presents with a chief complaint of Chest pain (02 Sep 2019 12:03)      Any change in ROS: pt is doing ok but still complains of pain     MEDICATIONS  (STANDING):  aspirin enteric coated 81 milliGRAM(s) Oral daily  diltiazem    Tablet 60 milliGRAM(s) Oral four times a day  furosemide    Tablet 20 milliGRAM(s) Oral daily  influenza   Vaccine 0.5 milliLiter(s) IntraMuscular once  predniSONE   Tablet 20 milliGRAM(s) Oral daily  tiotropium 18 MICROgram(s) Capsule 1 Capsule(s) Inhalation daily    MEDICATIONS  (PRN):  acetaminophen   Tablet .. 650 milliGRAM(s) Oral every 4 hours PRN Mild Pain (1 - 3)  oxyCODONE    IR 5 milliGRAM(s) Oral every 4 hours PRN Moderate Pain (4 - 6)  oxyCODONE    IR 10 milliGRAM(s) Oral every 4 hours PRN Severe Pain (7 - 10)  traMADol 50 milliGRAM(s) Oral two times a day PRN Moderate Pain (4 - 6)    Vital Signs Last 24 Hrs  T(C): 36.5 (03 Sep 2019 09:03), Max: 36.8 (02 Sep 2019 17:44)  T(F): 97.7 (03 Sep 2019 09:03), Max: 98.2 (02 Sep 2019 17:44)  HR: 81 (03 Sep 2019 09:03) (57 - 83)  BP: 112/70 (03 Sep 2019 09:03) (98/66 - 115/72)  BP(mean): --  RR: 18 (03 Sep 2019 09:03) (18 - 18)  SpO2: 95% (03 Sep 2019 09:03) (90% - 99%)    I&O's Summary        Physical Exam:   GENERAL: NAD, well-groomed, well-developed  HEENT: HEIKE/   Atraumatic, Normocephalic  ENMT: No tonsillar erythema, exudates, or enlargement; Moist mucous membranes, Good dentition, No lesions  NECK: Supple, No JVD, Normal thyroid  CHEST/LUNG: Clear to auscultaion, ; No rales, rhonchi, wheezing, or rubs  CVS: Regular rate and rhythm; No murmurs, rubs, or gallops  GI: : Soft, Nontender, Nondistended; Bowel sounds present  NERVOUS SYSTEM:  Alert & Oriented X3  EXTREMITIES:  2+ Peripheral Pulses, No clubbing, cyanosis, or edema  LYMPH: No lymphadenopathy noted  SKIN: rash on mid rigth sided back   ENDOCRINOLOGY: No Thyromegaly  PSYCH: Appropriate    Labs:  24                            12.3   6.06  )-----------( 194      ( 03 Sep 2019 06:34 )             36.6                         12.7   4.07  )-----------( 190      ( 02 Sep 2019 07:09 )             37.6                         14.3   5.79  )-----------( 202      ( 01 Sep 2019 07:25 )             42.0     09-03    139  |  101  |  14  ----------------------------<  95  3.6   |  26  |  0.84  09-02    136  |  102  |  16  ----------------------------<  83  3.5   |  24  |  0.86  09-01    141  |  102  |  15  ----------------------------<  89  3.8   |  26  |  0.90    Ca    8.8      03 Sep 2019 06:34  Ca    8.7      02 Sep 2019 07:09  Phos  2.7     09-03  Phos  3.4     09-02  Mg     2.1     09-03  Mg     2.1     09-02    TPro  8.6<H>  /  Alb  5.0  /  TBili  0.7  /  DBili  x   /  AST  31  /  ALT  25  /  AlkPhos  91  09-01    CAPILLARY BLOOD GLUCOSE            PT/INR - ( 03 Sep 2019 06:34 )   PT: 41.8 SEC;   INR: 3.62          < from: CT Angio Chest w/ IV Cont (09.01.19 @ 13:39) >  S: Within normal limits.  KIDNEYS/URETERS: Nonspecific bilateral perinephric fat stranding.   Scarring in the left upper and lower poles. No nephrolithiasis or   ureteral calculi.    BLADDER: Within normal limits.  REPRODUCTIVE ORGANS: Uterus and adnexa within normal limits.    BOWEL: No bowel obstruction. Appendix is normal.  PERITONEUM: No ascites.  VESSELS: Atherosclerotic changes.  RETROPERITONEUM/LYMPH NODES: No lymphadenopathy.    ABDOMINAL WALL: Within normal limits.  BONES: Degenerative changes.    IMPRESSION:     No aortic dissection is noted.                    JOSE MALLOY MD,RADIOLOGY FELLOW  This document has been electronically signed.  DEBORAH SANTOS M.D., ATTENDING RADIOLOGIST  This document has been electronically signed. Sep  1 2019 2:13PM    < end of copied text >            RECENT CULTURES:        RESPIRATORY CULTURES:          Studies  Chest X-RAY  CT SCAN Chest   Venous Dopplers: LE:   CT Abdomen  Others
Patient is a 64y old  Female who presents with a chief complaint of Chest pain (04 Sep 2019 09:08)      Any change in ROS:   doingok ?; still with pain and now in isolation  MEDICATIONS  (STANDING):  aspirin enteric coated 81 milliGRAM(s) Oral daily  diltiazem    Tablet 60 milliGRAM(s) Oral four times a day  furosemide    Tablet 20 milliGRAM(s) Oral daily  gabapentin 300 milliGRAM(s) Oral at bedtime  influenza   Vaccine 0.5 milliLiter(s) IntraMuscular once  lidocaine   Patch 1 Patch Transdermal every 24 hours  predniSONE   Tablet 20 milliGRAM(s) Oral daily  tiotropium 18 MICROgram(s) Capsule 1 Capsule(s) Inhalation daily  valACYclovir 1000 milliGRAM(s) Oral three times a day    MEDICATIONS  (PRN):  acetaminophen   Tablet .. 650 milliGRAM(s) Oral every 4 hours PRN Mild Pain (1 - 3)  acetaminophen   Tablet .. 650 milliGRAM(s) Oral every 6 hours PRN Mild Pain (1 - 3)  oxyCODONE    IR 5 milliGRAM(s) Oral every 4 hours PRN Moderate Pain (4 - 6)  oxyCODONE    IR 10 milliGRAM(s) Oral every 4 hours PRN Severe Pain (7 - 10)    Vital Signs Last 24 Hrs  T(C): 36.3 (04 Sep 2019 08:21), Max: 36.3 (03 Sep 2019 21:48)  T(F): 97.4 (04 Sep 2019 08:21), Max: 97.4 (03 Sep 2019 21:48)  HR: 69 (04 Sep 2019 08:21) (48 - 78)  BP: 108/66 (04 Sep 2019 08:21) (106/75 - 122/82)  BP(mean): --  RR: 16 (04 Sep 2019 08:21) (16 - 18)  SpO2: 94% (04 Sep 2019 08:21) (94% - 97%)    I&O's Summary        Physical Exam:   GENERAL: NAD, well-groomed, well-developed  HEENT: HEIKE/   Atraumatic, Normocephalic  ENMT: No tonsillar erythema, exudates, or enlargement; Moist mucous membranes, Good dentition, No lesions  NECK: Supple, No JVD, Normal thyroid  CHEST/LUNG: Clear to auscultaion, ; No rales, rhonchi, wheezing, or rubs  CVS: Regular rate and rhythm; No murmurs, rubs, or gallops  GI: : Soft, Nontender, Nondistended; Bowel sounds present  NERVOUS SYSTEM:  Alert & Oriented X3  EXTREMITIES:  2+ Peripheral Pulses, No clubbing, cyanosis, or edema  LYMPH: No lymphadenopathy noted  SKIN: rash++  ENDOCRINOLOGY: No Thyromegaly  PSYCH: Appropriate    Labs:  24                            12.3   6.06  )-----------( 194      ( 03 Sep 2019 06:34 )             36.6                         12.7   4.07  )-----------( 190      ( 02 Sep 2019 07:09 )             37.6                         14.3   5.79  )-----------( 202      ( 01 Sep 2019 07:25 )             42.0     09-03    139  |  101  |  14  ----------------------------<  95  3.6   |  26  |  0.84  09-02    136  |  102  |  16  ----------------------------<  83  3.5   |  24  |  0.86  09-01    141  |  102  |  15  ----------------------------<  89  3.8   |  26  |  0.90    Ca    8.8      03 Sep 2019 06:34  Phos  2.7     09-03  Mg     2.1     09-03    TPro  8.6<H>  /  Alb  5.0  /  TBili  0.7  /  DBili  x   /  AST  31  /  ALT  25  /  AlkPhos  91  09-01    CAPILLARY BLOOD GLUCOSE            PT/INR - ( 03 Sep 2019 06:34 )   PT: 41.8 SEC;   INR: 3.62            < from: CT Angio Chest w/ IV Cont (09.01.19 @ 13:39) >  ABDOMEN AND PELVIS:    LIVER: Within normal limits.  BILE DUCTS: Normal caliber.  GALLBLADDER: Within normal limits.  SPLEEN: Within normal limits.  PANCREAS: Within normal limits.  ADRENALS: Within normal limits.  KIDNEYS/URETERS: Nonspecific bilateral perinephric fat stranding.   Scarring in the left upper and lower poles. No nephrolithiasis or   ureteral calculi.    BLADDER: Within normal limits.  REPRODUCTIVE ORGANS: Uterus and adnexa within normal limits.    BOWEL: No bowel obstruction. Appendix is normal.  PERITONEUM: No ascites.  VESSELS: Atherosclerotic changes.  RETROPERITONEUM/LYMPH NODES: No lymphadenopathy.    ABDOMINAL WALL: Within normal limits.  BONES: Degenerative changes.    IMPRESSION:     No aortic dissection is noted.                    JOSE MALLOY MD,RADIOLOGY FELLOW  This document has been electronically signed.  DEBORAH SANTOS M.D., ATTENDING RADIOLOGIST  This document has been electronically signed. Sep  1 2019 2:13PM                < end of copied text >          RECENT CULTURES:        RESPIRATORY CULTURES:          Studies  Chest X-RAY  CT SCAN Chest   Venous Dopplers: LE:   CT Abdomen  Others
Patient is a 64y old  Female who presents with a chief complaint of Chest pain (04 Sep 2019 15:45)      Any change in ROS: pain is still present:     MEDICATIONS  (STANDING):  aspirin enteric coated 81 milliGRAM(s) Oral daily  diltiazem    Tablet 60 milliGRAM(s) Oral four times a day  furosemide    Tablet 20 milliGRAM(s) Oral daily  gabapentin 300 milliGRAM(s) Oral at bedtime  influenza   Vaccine 0.5 milliLiter(s) IntraMuscular once  lidocaine   Patch 1 Patch Transdermal every 24 hours  predniSONE   Tablet 20 milliGRAM(s) Oral daily  tiotropium 18 MICROgram(s) Capsule 1 Capsule(s) Inhalation daily  valACYclovir 1000 milliGRAM(s) Oral three times a day    MEDICATIONS  (PRN):  acetaminophen   Tablet .. 650 milliGRAM(s) Oral every 4 hours PRN Mild Pain (1 - 3)  acetaminophen   Tablet .. 650 milliGRAM(s) Oral every 6 hours PRN Mild Pain (1 - 3)  oxyCODONE    IR 5 milliGRAM(s) Oral every 4 hours PRN Moderate Pain (4 - 6)  oxyCODONE    IR 10 milliGRAM(s) Oral every 4 hours PRN Severe Pain (7 - 10)    Vital Signs Last 24 Hrs  T(C): 36.3 (05 Sep 2019 04:55), Max: 36.8 (05 Sep 2019 00:30)  T(F): 97.4 (05 Sep 2019 04:55), Max: 98.3 (05 Sep 2019 00:30)  HR: 47 (05 Sep 2019 04:55) (42 - 82)  BP: 106/69 (05 Sep 2019 04:55) (106/69 - 129/83)  BP(mean): --  RR: 16 (05 Sep 2019 04:55) (16 - 18)  SpO2: 95% (05 Sep 2019 04:55) (95% - 99%)    I&O's Summary    04 Sep 2019 07:01  -  05 Sep 2019 07:00  --------------------------------------------------------  IN: 480 mL / OUT: 200 mL / NET: 280 mL          Physical Exam:   GENERAL: NAD, well-groomed, well-developed  HEENT: HEIKE/   Atraumatic, Normocephalic  ENMT: No tonsillar erythema, exudates, or enlargement; Moist mucous membranes, Good dentition, No lesions  NECK: Supple, No JVD, Normal thyroid  CHEST/LUNG: Clear to auscultaion, ; No rales, rhonchi, wheezing, or rubs  CVS: Regular rate and rhythm; No murmurs, rubs, or gallops  GI: : Soft, Nontender, Nondistended; Bowel sounds present  NERVOUS SYSTEM:  Alert & Oriented X3  EXTREMITIES:  2+ Peripheral Pulses, No clubbing, cyanosis, or edema  LYMPH: No lymphadenopathy noted  SKIN: rash  ENDOCRINOLOGY: No Thyromegaly  PSYCH: Appropriate    Labs:  24                            12.4   6.17  )-----------( 204      ( 05 Sep 2019 05:14 )             36.4                         13.0   7.33  )-----------( 220      ( 04 Sep 2019 14:57 )             39.3                         12.3   6.06  )-----------( 194      ( 03 Sep 2019 06:34 )             36.6                         12.7   4.07  )-----------( 190      ( 02 Sep 2019 07:09 )             37.6     09-05    141  |  102  |  14  ----------------------------<  81  3.4<L>   |  25  |  0.78  09-04    138  |  100  |  15  ----------------------------<  122<H>  3.7   |  23  |  0.79  09-03    139  |  101  |  14  ----------------------------<  95  3.6   |  26  |  0.84  09-02    136  |  102  |  16  ----------------------------<  83  3.5   |  24  |  0.86    Ca    9.0      05 Sep 2019 05:14  Ca    9.4      04 Sep 2019 14:57  Phos  2.8     09-04  Mg     2.0     09-04      CAPILLARY BLOOD GLUCOSE            PT/INR - ( 05 Sep 2019 05:14 )   PT: 27.1 SEC;   INR: 2.38          PTT - ( 05 Sep 2019 05:14 )  PTT:33.0 SEC          RECENT CULTURES:        RESPIRATORY CULTURES:          Studies  Chest X-RAY  CT SCAN Chest   Venous Dopplers: LE:   CT Abdomen  Others
Patient is a 64y old  Female who presents with a chief complaint of Chest pain -MI ruled out noted rash right back        MEDICATIONS  (STANDING):  aspirin enteric coated 81 milliGRAM(s) Oral daily  diltiazem    Tablet 60 milliGRAM(s) Oral four times a day  furosemide    Tablet 20 milliGRAM(s) Oral daily  influenza   Vaccine 0.5 milliLiter(s) IntraMuscular once  predniSONE   Tablet 20 milliGRAM(s) Oral daily  tiotropium 18 MICROgram(s) Capsule 1 Capsule(s) Inhalation daily    MEDICATIONS  (PRN):  acetaminophen   Tablet .. 650 milliGRAM(s) Oral every 4 hours PRN Mild Pain (1 - 3)  oxyCODONE    IR 5 milliGRAM(s) Oral every 4 hours PRN Moderate Pain (4 - 6)  oxyCODONE    IR 10 milliGRAM(s) Oral every 4 hours PRN Severe Pain (7 - 10)  traMADol 50 milliGRAM(s) Oral two times a day PRN Moderate Pain (4 - 6)    Vital Signs Last 24 Hrs  T(C): 36.5 (03 Sep 2019 09:03), Max: 36.8 (02 Sep 2019 17:44)  T(F): 97.7 (03 Sep 2019 09:03), Max: 98.2 (02 Sep 2019 17:44)  HR: 81 (03 Sep 2019 09:03) (57 - 83)  BP: 112/70 (03 Sep 2019 09:03) (98/66 - 115/72)  RR: 18 (03 Sep 2019 09:03) (18 - 18)  SpO2: 95% (03 Sep 2019 09:03) (90% - 99%)          Physical Exam:   GENERAL: NAD, well-groomed, well-developed  HEENT: HEIKE/   Atraumatic, Normocephalic  ENMT: No tonsillar erythema, exudates, or enlargement; Moist mucous membranes, Good dentition, No lesions  NECK: Supple, No JVD, Normal thyroid  CHEST/LUNG: Clear to auscultaion, ; No rales, rhonchi, wheezing, or rubs  CVS: Regular rate and rhythm; No murmurs, rubs, or gallops  GI: : Soft, Nontender, Nondistended; Bowel sounds present  NERVOUS SYSTEM:  Alert & Oriented X3  EXTREMITIES:  2+ Peripheral Pulses, No clubbing, cyanosis, or edema  LYMPH: No lymphadenopathy noted  SKIN: rash on mid rigth sided back   ENDOCRINOLOGY: No Thyromegaly  PSYCH: Appropriate    Labs:  24                            12.3   6.06  )-----------( 194      ( 03 Sep 2019 06:34 )             36.6                         12.7   4.07  )-----------( 190      ( 02 Sep 2019 07:09 )             37.6                         14.3   5.79  )-----------( 202      ( 01 Sep 2019 07:25 )             42.0     09-03    139  |  101  |  14  ----------------------------<  95  3.6   |  26  |  0.84  09-02    136  |  102  |  16  ----------------------------<  83  3.5   |  24  |  0.86  09-01    141  |  102  |  15  ----------------------------<  89  3.8   |  26  |  0.90    Ca    8.8      03 Sep 2019 06:34  Ca    8.7      02 Sep 2019 07:09  Phos  2.7     09-03  Phos  3.4     09-02  Mg     2.1     09-03  Mg     2.1     09-02    TPro  8.6<H>  /  Alb  5.0  /  TBili  0.7  /  DBili  x   /  AST  31  /  ALT  25  /  AlkPhos  91  09-01    CAPILLARY BLOOD GLUCOSE            PT/INR - ( 03 Sep 2019 06:34 )   PT: 41.8 SEC;   INR: 3.62
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-noted rash right back seen by Dermatology-Zoster on Valtrex.Still has pain        MEDICATIONS  (STANDING):  aspirin enteric coated 81 milliGRAM(s) Oral daily  diltiazem    Tablet 60 milliGRAM(s) Oral four times a day  furosemide    Tablet 20 milliGRAM(s) Oral daily  gabapentin 300 milliGRAM(s) Oral at bedtime  influenza   Vaccine 0.5 milliLiter(s) IntraMuscular once  lidocaine   Patch 1 Patch Transdermal every 24 hours  predniSONE   Tablet 20 milliGRAM(s) Oral daily  tiotropium 18 MICROgram(s) Capsule 1 Capsule(s) Inhalation daily  valACYclovir 1000 milliGRAM(s) Oral three times a day    MEDICATIONS  (PRN):  acetaminophen   Tablet .. 650 milliGRAM(s) Oral every 4 hours PRN Mild Pain (1 - 3)  acetaminophen   Tablet .. 650 milliGRAM(s) Oral every 6 hours PRN Mild Pain (1 - 3)  oxyCODONE    IR 5 milliGRAM(s) Oral every 4 hours PRN Moderate Pain (4 - 6)  oxyCODONE    IR 10 milliGRAM(s) Oral every 4 hours PRN Severe Pain (7 - 10)    Vital Signs Last 24 Hrs  T(C): 36.3 (04 Sep 2019 08:21), Max: 36.3 (03 Sep 2019 21:48)  T(F): 97.4 (04 Sep 2019 08:21), Max: 97.4 (03 Sep 2019 21:48)  HR: 69 (04 Sep 2019 08:21) (48 - 78)  BP: 108/66 (04 Sep 2019 08:21) (106/75 - 122/82)  RR: 16 (04 Sep 2019 08:21) (16 - 18)  SpO2: 94% (04 Sep 2019 08:21) (94% - 97%)          Physical Exam:   GENERAL: NAD, well-groomed, well-developed  HEENT: HEIKE/   Atraumatic, Normocephalic  ENMT: No tonsillar erythema, exudates, or enlargement; Moist mucous membranes, Good dentition, No lesions  NECK: Supple, No JVD, Normal thyroid  CHEST/LUNG: Clear to auscultaion, ; No rales, rhonchi, wheezing, or rubs  CVS: Regular rate and rhythm; No murmurs, rubs, or gallops  GI: : Soft, Nontender, Nondistended; Bowel sounds present  NERVOUS SYSTEM:  Alert & Oriented X3  EXTREMITIES:  2+ Peripheral Pulses, No clubbing, cyanosis, or edema  LYMPH: No lymphadenopathy noted  SKIN: rash++  ENDOCRINOLOGY: No Thyromegaly  PSYCH: Appropriate    Labs:  24                            12.3   6.06  )-----------( 194      ( 03 Sep 2019 06:34 )             36.6                         12.7   4.07  )-----------( 190      ( 02 Sep 2019 07:09 )             37.6                         14.3   5.79  )-----------( 202      ( 01 Sep 2019 07:25 )             42.0     09-03    139  |  101  |  14  ----------------------------<  95  3.6   |  26  |  0.84  09-02    136  |  102  |  16  ----------------------------<  83  3.5   |  24  |  0.86  09-01    141  |  102  |  15  ----------------------------<  89  3.8   |  26  |  0.90    Ca    8.8      03 Sep 2019 06:34  Phos  2.7     09-03  Mg     2.1     09-03    TPro  8.6<H>  /  Alb  5.0  /  TBili  0.7  /  DBili  x   /  AST  31  /  ALT  25  /  AlkPhos  91  09-01    Prothrombin Time and INR, Plasma in AM (09.04.19 @ 14:57)    Prothrombin Time, Plasma: 28.0 SEC    INR: 2.45
Patient is a 64y old  Female who presents with a chief complaint of Chest pain (02 Sep 2019 09:06)      Any change in ROS: SHE WAS ON TYLER COMFORTABLY BUT AS SOON AS SHE SAW ME STARTED COMPLAINING OF TERRIBLE PAIN: SHE SAYS HER BREATHING IS ok :     MEDICATIONS  (STANDING):  aspirin enteric coated 81 milliGRAM(s) Oral daily  diltiazem    Tablet 60 milliGRAM(s) Oral four times a day  furosemide    Tablet 20 milliGRAM(s) Oral daily  influenza   Vaccine 0.5 milliLiter(s) IntraMuscular once  predniSONE   Tablet 20 milliGRAM(s) Oral daily  tiotropium 18 MICROgram(s) Capsule 1 Capsule(s) Inhalation daily    MEDICATIONS  (PRN):  acetaminophen   Tablet .. 650 milliGRAM(s) Oral every 4 hours PRN Mild Pain (1 - 3)  oxyCODONE    IR 5 milliGRAM(s) Oral every 4 hours PRN Moderate Pain (4 - 6)  oxyCODONE    IR 10 milliGRAM(s) Oral every 4 hours PRN Severe Pain (7 - 10)  traMADol 50 milliGRAM(s) Oral two times a day PRN Moderate Pain (4 - 6)    Vital Signs Last 24 Hrs  T(C): 36.6 (02 Sep 2019 09:00), Max: 36.6 (01 Sep 2019 23:22)  T(F): 97.8 (02 Sep 2019 09:00), Max: 97.8 (01 Sep 2019 23:22)  HR: 62 (02 Sep 2019 09:00) (60 - 99)  BP: 103/66 (02 Sep 2019 09:00) (99/65 - 150/94)  BP(mean): --  RR: 17 (02 Sep 2019 09:00) (17 - 18)  SpO2: 97% (02 Sep 2019 09:00) (95% - 97%)    I&O's Summary        Physical Exam:   GENERAL: NAD, well-groomed, well-developed  HEENT: HEIKE/   Atraumatic, Normocephalic  ENMT: No tonsillar erythema, exudates, or enlargement; Moist mucous membranes, Good dentition, No lesions  NECK: Supple, No JVD, Normal thyroid  CHEST/LUNG: Clear to auscultaion, ; No rales, rhonchi, wheezing, or rubs  CVS: Regular rate and rhythm; No murmurs, rubs, or gallops  GI: : Soft, Nontender, Nondistended; Bowel sounds present  NERVOUS SYSTEM:  Alert & Oriented X3  EXTREMITIES:  2+ Peripheral Pulses, No clubbing, cyanosis, or edema  LYMPH: No lymphadenopathy noted  SKIN: No rashes or lesions  ENDOCRINOLOGY: No Thyromegaly  PSYCH: Appropriate    Labs:  24                            12.7   4.07  )-----------( 190      ( 02 Sep 2019 07:09 )             37.6                         14.3   5.79  )-----------( 202      ( 01 Sep 2019 07:25 )             42.0     09-02    136  |  102  |  16  ----------------------------<  83  3.5   |  24  |  0.86  09-01    141  |  102  |  15  ----------------------------<  89  3.8   |  26  |  0.90    Ca    8.7      02 Sep 2019 07:09  Ca    9.9      01 Sep 2019 07:25  Phos  3.4     09-02  Mg     2.1     09-02    TPro  8.6<H>  /  Alb  5.0  /  TBili  0.7  /  DBili  x   /  AST  31  /  ALT  25  /  AlkPhos  91  09-01    CAPILLARY BLOOD GLUCOSE          LIVER FUNCTIONS - ( 01 Sep 2019 07:25 )  Alb: 5.0 g/dL / Pro: 8.6 g/dL / ALK PHOS: 91 u/L / ALT: 25 u/L / AST: 31 u/L / GGT: x           PT/INR - ( 02 Sep 2019 07:09 )   PT: 50.7 SEC;   INR: 4.25          PTT - ( 01 Sep 2019 07:25 )  PTT:46.1 SEC    < from: CT Angio Chest w/ IV Cont (09.01.19 @ 13:39) >      PROCEDURE DATE:  Sep  1 2019         INTERPRETATION:  CLINICAL INFORMATION: Chest pain radiating to back.   Evaluate for dissection B-cell lymphoma.    COMPARISON: CT chest 5/20/2019.    PROCEDURE:   CT Angiography of the Chest, Abdomen and Pelvis.   Precontrast imaging was performed through the chest followed by arterial   phase imaging of the chest, abdomen and pelvis.  Intravenous contrast: 90 ml Omnipaque 350. 10 ml discarded.  Oral contrast: None.  Sagittal and coronal reformats were performed as well as 3D (MIP)   reconstructions.    FINDINGS:    CHEST:     LUNGS AND LARGE AIRWAYS: Patent central airways. No pulmonary nodules.   Subsegmental atelectasis in the lingula and left lower lobe. The   previously seen groundglass and nodular opacities in the right lower lobe   are no longer visualized.  PLEURA: No pleural effusion.  VESSELS: No evidence of aortic dissection. The main pulmonary artery   measures up to 3.5 cm.  HEART: Heart size is enlarged. Fatty infiltration is noted involving the   left ventricular myocardium suggestive of prior infarct. No pericardial   effusion. Mild coronary artery opacification.  MEDIASTINUM AND BRANDON: No lymphadenopathy.  CHEST WALL AND LOWER NECK: Within normal limits.    ABDOMEN AND PELVIS:    LIVER: Within normal limits.  BILE DUCTS: Normal caliber.  GALLBLADDER: Within normal limits.  SPLEEN: Within normal limits.  PANCREAS: Within normal limits.  ADRENALS: Within normal limits.  KIDNEYS/URETERS: Nonspecific bilateral perinephric fat stranding.   Scarring in the left upper and lower poles. No nephrolithiasis or   ureteral calculi.    BLADDER: Within normal limits.  REPRODUCTIVE ORGANS: Uterus and adnexa within normal limits.    BOWEL: No bowel obstruction. Appendix is normal.  PERITONEUM: No ascites.  VESSELS: Atherosclerotic changes.  RETROPERITONEUM/LYMPH NODES: No lymphadenopathy.    ABDOMINAL WALL: Within normal limits.  BONES: Degenerative changes.    IMPRESSION:     No aortic dissection is noted.                    JOSE MALLOY MD,RADIOLOGY FELLOW  This document has been electronically signed.  DEBORAH SANTOS M.D., ATTENDING RADIOLOGIST  This document has been electronically signed. Sep  1 2019 2:13PM        < end of copied text >        RECENT CULTURES:        RESPIRATORY CULTURES:          Studies  Chest X-RAY  CT SCAN Chest   Venous Dopplers: LE:   CT Abdomen  Others

## 2019-09-05 NOTE — DISCHARGE NOTE NURSING/CASE MANAGEMENT/SOCIAL WORK - NSDCPNINST_GEN_ALL_CORE
patient vital signs were within normal limits. D/C tele and IV as per orders and Pt. is discharged to home as per orders. patient vital signs were within normal limits. echo done today. safety maintained. D/C tele and IV as per orders and Pt. is discharged to home as per orders.

## 2019-09-05 NOTE — DISCHARGE NOTE PROVIDER - HOSPITAL COURSE
64 y.o female hx of HOCM s/p septal ablation, AFib on coumadin, HFpEF, Lung Ca s/p chemo (6 treatments) now in remission presents with chest pain, States that she has progressive chest pain starting on 8/28/2019. behind her right breast, pre, 7/10, radiation into back, associated with a metallic taste and occasional SOB, constant and progressively worsening. Prior to pain started she believes she inhaled chemicals at work at a [employer] ; wears a tyvek body suit. 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 Exposure was on 8/28 as well. IN past when she has experienced chest pain it was different feeling. SHe has been taking her medications even with pain, no relief with her medications. Decreased appetite. Denies fever, chills, nausea, vomiting, abd pain, dysphagia, headache, visual change, dizziness, syncope, fall, loc, head trauma, diarrhea, brbpr, melena, dysuria, hematuria, edema, orthopnea, pnd.         Problem/Plan - 1:     Chest pain, unspecified type.  Plan: admit to tele    serial Ekg and CE    Will check CTA C/A/P  to r/o dissection    Pain is atypical had a Cath in december without disease     Pulm called to evaluate given history of Lung Ca.         Problem/Plan - 2:    ·  Problem: Atrial fibrillation, unspecified type.  Plan: CHADS_Vasc = 2    On coumadin for lifelong AC    INR 5.83 so will hold coumadin for now until improves    no evidence of bleeding at this time.         Problem/Plan - 3:      Zoster, dermatomal on the right back extending to the chest. No evidence of dissemination    -Valtrex x7 days. Day 2 of tx. Will need 5 more days of antivirals.    -Recommend Vaseline to the area as healing to prevent eschar formation        Problem/Plan- 4:    Problem: Bradycardia     Stop taking digoxin until seen by cardiology outpatient.         Problem/Plan - 5:    ·  Problem: Lung cancer.  Plan: Continue outpatient follow up with oncology.    Pulmonology consulted-->no interventions at this time        Case reviewed with attending who cleared for discharge. All medications sent to pharmacy including pain medication for herpes zoster.

## 2019-09-06 RX ORDER — TRAMADOL HYDROCHLORIDE 50 MG/1
1 TABLET ORAL
Qty: 6 | Refills: 0
Start: 2019-09-06 | End: 2019-09-08

## 2019-09-06 RX ORDER — VALACYCLOVIR 500 MG/1
1 TABLET, FILM COATED ORAL
Qty: 15 | Refills: 0
Start: 2019-09-06 | End: 2019-09-10

## 2019-12-12 NOTE — DISCHARGE NOTE ADULT - MEDICATION SUMMARY - MEDICATIONS TO STOP TAKING
ED Nurse Note:



Pt requested food. ERMD made aware and approved pt to eat. I will STOP taking the medications listed below when I get home from the hospital:    PriLOSEC 20 mg oral delayed release capsule  -- 1 cap(s) by mouth once a day

## 2019-12-28 NOTE — ED ADULT TRIAGE NOTE - STATUS:
Applied
*GEN: NAD; well appearing; A+O x3   *HEAD: NC/AT   *EYES/NOSE: PERRL & EOMI b/l +nasal congestion  *THROAT: airway patent, moist mucous membranes, no exudate +red  *NECK: Neck supple, no masses  *PULMONARY: CTA b/l, symmetric breath sounds.   *CARDIAC: s1s2, regular rhythm, no Murmur  *ABDOMEN:  ND, NT, soft, no guarding, no rebound, no masses   *BACK: no CVA tenderness, Normal  spine   *EXTREMITIES: symmetric pulses, 2+ dp & radial pulses, capillary refill < 2 seconds, no cyanosis, no edema   *SKIN: no rash or bruising   *NEUROLOGIC: alert, CN 2-12 intact, moves all 4 extremities, full active & passive ROM in all extremities, normal baseline gait  *PSYCH: insight and judgment nl, memory nl, affect nl, thought nl

## 2020-01-12 ENCOUNTER — EMERGENCY (EMERGENCY)
Facility: HOSPITAL | Age: 65
LOS: 1 days | Discharge: HOME | End: 2020-01-12
Attending: PERSONAL EMERGENCY RESPONSE ATTENDANT | Admitting: PERSONAL EMERGENCY RESPONSE ATTENDANT
Payer: MEDICAID

## 2020-01-12 VITALS
OXYGEN SATURATION: 100 % | HEART RATE: 74 BPM | TEMPERATURE: 98 F | DIASTOLIC BLOOD PRESSURE: 95 MMHG | RESPIRATION RATE: 16 BRPM | SYSTOLIC BLOOD PRESSURE: 139 MMHG

## 2020-01-12 VITALS
OXYGEN SATURATION: 100 % | SYSTOLIC BLOOD PRESSURE: 117 MMHG | HEART RATE: 80 BPM | RESPIRATION RATE: 16 BRPM | DIASTOLIC BLOOD PRESSURE: 83 MMHG

## 2020-01-12 DIAGNOSIS — Z98.891 HISTORY OF UTERINE SCAR FROM PREVIOUS SURGERY: Chronic | ICD-10-CM

## 2020-01-12 DIAGNOSIS — Z98.890 OTHER SPECIFIED POSTPROCEDURAL STATES: Chronic | ICD-10-CM

## 2020-01-12 LAB
ANION GAP SERPL CALC-SCNC: 17 MMO/L — HIGH (ref 7–14)
BUN SERPL-MCNC: 13 MG/DL — SIGNIFICANT CHANGE UP (ref 7–23)
CALCIUM SERPL-MCNC: 9.4 MG/DL — SIGNIFICANT CHANGE UP (ref 8.4–10.5)
CHLORIDE SERPL-SCNC: 99 MMOL/L — SIGNIFICANT CHANGE UP (ref 98–107)
CO2 SERPL-SCNC: 22 MMOL/L — SIGNIFICANT CHANGE UP (ref 22–31)
CREAT SERPL-MCNC: 0.97 MG/DL — SIGNIFICANT CHANGE UP (ref 0.5–1.3)
GLUCOSE SERPL-MCNC: 110 MG/DL — HIGH (ref 70–99)
HCT VFR BLD CALC: 37.3 % — SIGNIFICANT CHANGE UP (ref 34.5–45)
HGB BLD-MCNC: 12.4 G/DL — SIGNIFICANT CHANGE UP (ref 11.5–15.5)
MCHC RBC-ENTMCNC: 33.2 % — SIGNIFICANT CHANGE UP (ref 32–36)
MCHC RBC-ENTMCNC: 34.3 PG — HIGH (ref 27–34)
MCV RBC AUTO: 103 FL — HIGH (ref 80–100)
NRBC # FLD: 0 K/UL — SIGNIFICANT CHANGE UP (ref 0–0)
PLATELET # BLD AUTO: 200 K/UL — SIGNIFICANT CHANGE UP (ref 150–400)
PMV BLD: 10.2 FL — SIGNIFICANT CHANGE UP (ref 7–13)
POTASSIUM SERPL-MCNC: 3.8 MMOL/L — SIGNIFICANT CHANGE UP (ref 3.5–5.3)
POTASSIUM SERPL-SCNC: 3.8 MMOL/L — SIGNIFICANT CHANGE UP (ref 3.5–5.3)
RBC # BLD: 3.62 M/UL — LOW (ref 3.8–5.2)
RBC # FLD: 13.2 % — SIGNIFICANT CHANGE UP (ref 10.3–14.5)
SODIUM SERPL-SCNC: 138 MMOL/L — SIGNIFICANT CHANGE UP (ref 135–145)
WBC # BLD: 11.14 K/UL — HIGH (ref 3.8–10.5)
WBC # FLD AUTO: 11.14 K/UL — HIGH (ref 3.8–10.5)

## 2020-01-12 PROCEDURE — 99284 EMERGENCY DEPT VISIT MOD MDM: CPT

## 2020-01-12 PROCEDURE — 70487 CT MAXILLOFACIAL W/DYE: CPT | Mod: 26

## 2020-01-12 NOTE — ED PROVIDER NOTE - OBJECTIVE STATEMENT
64F h/o lung cancer s/p chemo (now in remission), shingles, p/w L jaw swelling since yesterday. seen at urgent care yesterday and was given clindamycin and told to come to the ED but pt decided to wait. reports swelling got much worse today. +chills, no fever

## 2020-01-12 NOTE — ED PROVIDER NOTE - NSFOLLOWUPINSTRUCTIONS_ED_ALL_ED_FT
Follow up with your primary care doctor and a DENTIST within the next 3-5 days.     Continue taking your CLINDAMYCIN antibiotic as prescribed.    Please take ACETAMINOPHEN and IBUPROFEN as directed for your pain.

## 2020-01-12 NOTE — ED ADULT NURSE NOTE - OBJECTIVE STATEMENT
Received Pt in room 7. Pt A&OX3, ambulatory. Pt c/o facial swelling beginning 3 days ago, worsening. Pt with abscess to the left side of mouth, left side swollen, red and tender to touch. Pt also c/o nausea. Pt with history of colon CA (in remission), Afib. States her teeth started to rot after chemotherapy for cancer. Appears to be stable and in no apparent distress at this time. Vitals stable as noted. Airway patent, pt speaking in full sentences. respirations are equal and nonlabored, no respiratory distress noted, sating 100% on room air. Denies any other medical complaints. denies chest pain, sob, vomiting, diarrhea, fever, cough, dizziness, headache. No stridor, difficulty breathing or drooling noted. pt stable, awaiting further plan Received Pt in room 7. Pt A&OX3, ambulatory. Pt c/o facial swelling beginning 3 days ago, worsening. Pt with abscess to the left side of mouth, left side swollen, red and tender to touch. Pt also c/o nausea. Pt with history of colon CA (in remission), Afib. States her teeth started to rot after chemotherapy for cancer. Appears to be stable and in no apparent distress at this time. Vitals stable as noted. Airway patent, pt speaking in full sentences. respirations are equal and nonlabored, no respiratory distress noted, sating 100% on room air. Denies any other medical complaints. denies chest pain, sob, vomiting, diarrhea, fever, cough, dizziness, headache. No stridor, difficulty breathing or drooling noted. 20 gauge IV placed in the left ac, labs sent. pt stable, awaiting further plan

## 2020-01-12 NOTE — ED PROVIDER NOTE - CLINICAL SUMMARY MEDICAL DECISION MAKING FREE TEXT BOX
64F h/o lung cancer s/p chemo (now in remission), shingles, p/w L jaw swelling since yesterday. ? sialoadenitis v parotitis

## 2020-01-12 NOTE — ED PROVIDER NOTE - ATTENDING CONTRIBUTION TO CARE
Attending MD Marcos.  Agree with above.  Pt is a 65 yo female with pmhx of afib on coumadin and lung CA in remission who presents with L cheek/mandibular swelling that began on Friday.  PT seen at urgent care and rxed clindamycin which she began yesterday.  No submental swelling/brawny induration, no trismus or pain with jaw movement.  Firm induration lateral to L mandible.  Multiple dental caries without clear infection/swelling around individual tooth. PT protecting airway.  Exam c/w sialoadenitis vs. parotitis vs. dental abscess.  PT well appearing, signed out to incoming team pending CT and dispo per CT findings.

## 2020-01-12 NOTE — ED PROVIDER NOTE - PROGRESS NOTE DETAILS
Nelson Willett PGY3: sx improved CT w/ cellulitis and tooth infection - no abscess, will dc w/ close outpt dental f/u

## 2020-01-12 NOTE — ED ADULT TRIAGE NOTE - CHIEF COMPLAINT QUOTE
facial swelling    abscess left side of mouth... states her teeth started rotting after chemo for colon cancer (in remission).  left cheek very swollen and nauseated.   no stridor, diff breathing, drooling.

## 2020-01-12 NOTE — ED PROVIDER NOTE - PHYSICAL EXAMINATION
Vitals: WNL  Gen: laying comfortably in NAD  Head: NCAT  ENT: poor dentition, hard swelling/tenderness L mandible, no swelling of gums, no pus expressed intraorally  CV: RRR. Audible S1 and S2. No murmurs, rubs, gallops, S3, nor S4  Pulm: Clear to auscultation bilaterally. No wheezes, rales, or rhonchi  Abd: soft, normoactive BS x4, NTND, no rebound, no guarding, no rashes  Musculoskeletal:  No peripheral edema  Skin: no lesions or scars noted  Neurologic: AAOx3  : no CVA tenderness  Psych: no SI/HI

## 2020-01-12 NOTE — ED PROVIDER NOTE - NSFOLLOWUPCLINICS_GEN_ALL_ED_FT
[facility]  Dental  [address] [place] [id number]  Phone: [phone]  Fax:   Follow Up Time:     [facility]  Dentistry  .  [place] [address]  Phone: [phone]  Fax:   Follow Up Time:

## 2020-01-12 NOTE — ED PROVIDER NOTE - PATIENT PORTAL LINK FT
You can access the FollowMyHealth Patient Portal offered by [facility] by registering at the following website: [email or web address]. By joining [facility]’s FollowMyHealth portal, you will also be able to view your health information using other applications (apps) compatible with our system.

## 2020-01-12 NOTE — ED PROVIDER NOTE - NS ED ROS FT
Gen: No fever, normal appetite  Eyes: No eye irritation or discharge  ENT: + L jaw pain/swelling  Resp: No cough or trouble breathing  Cardiovascular: No chest pain or palpitation  Gastroenteric: No nausea/vomiting, diarrhea, constipation  : No dysuria  MS: No joint or muscle pain  Skin: No rashes  Neuro: No headache  Remainder negative, except as per the HPI

## 2020-05-20 NOTE — PATIENT PROFILE ADULT. - PATIENT REPRESENTATIVE: ( YOU CAN CHOOSE ANY PERSON THAT CAN ASSIST YOU WITH YOUR HEALTH CARE PREFERENCES, DOES NOT HAVE TO BE A SPOUSE, IMMEDIATE FAMILY OR SIGNIFICANT OTHER/PARTNER)
----- Message from Luis Dumont M.D. sent at 5/19/2020  6:18 PM PDT -----  Regarding: [email or web address]  Contact: [phone]  Please call [facility] and obtain:    EBV antibody test (titers)      ----- Message -----  From: Silvestre Montero III  Sent: 5/19/2020   5:54 AM PDT  To: Luis Dumont M.D.  Subject: [email or web address]                                    Please request the results from [facility] for the Mono tests that were requested.      Thank you   Silvestre Montero III    
Yes

## 2020-05-22 NOTE — ED PROVIDER NOTE - CRITICAL CARE INDICATION, MLM
Has The Growth Been Previously Biopsied?: has been previously biopsied Body Location Override (Optional): Inferior mid forehead Year Removed: Yrs ago Location: Right forearm patient was critically ill... Patient was critically ill with a high probability of imminent or life threatening deterioration.

## 2020-07-05 ENCOUNTER — TRANSCRIPTION ENCOUNTER (OUTPATIENT)
Age: 65
End: 2020-07-05

## 2020-11-09 NOTE — PATIENT PROFILE ADULT. - PRO SERVICES AT DISCH
Patient taken to room 26 changed to gown warm blankets applied- daughter at bedside updated on plan of care by Dr. Lesli Flores none

## 2020-12-20 ENCOUNTER — INPATIENT (INPATIENT)
Facility: HOSPITAL | Age: 65
LOS: 10 days | Discharge: HOME | End: 2020-12-31
Attending: INTERNAL MEDICINE | Admitting: INTERNAL MEDICINE
Payer: MEDICAID

## 2020-12-20 VITALS
HEART RATE: 111 BPM | DIASTOLIC BLOOD PRESSURE: 86 MMHG | SYSTOLIC BLOOD PRESSURE: 102 MMHG | TEMPERATURE: 98 F | HEIGHT: 66 IN | OXYGEN SATURATION: 97 % | RESPIRATION RATE: 20 BRPM

## 2020-12-20 DIAGNOSIS — N17.9 ACUTE KIDNEY FAILURE, UNSPECIFIED: ICD-10-CM

## 2020-12-20 DIAGNOSIS — C34.90 MALIGNANT NEOPLASM OF UNSPECIFIED PART OF UNSPECIFIED BRONCHUS OR LUNG: ICD-10-CM

## 2020-12-20 DIAGNOSIS — A41.9 SEPSIS, UNSPECIFIED ORGANISM: ICD-10-CM

## 2020-12-20 DIAGNOSIS — I48.91 UNSPECIFIED ATRIAL FIBRILLATION: ICD-10-CM

## 2020-12-20 DIAGNOSIS — Z98.890 OTHER SPECIFIED POSTPROCEDURAL STATES: Chronic | ICD-10-CM

## 2020-12-20 DIAGNOSIS — R06.02 SHORTNESS OF BREATH: ICD-10-CM

## 2020-12-20 DIAGNOSIS — Z98.891 HISTORY OF UTERINE SCAR FROM PREVIOUS SURGERY: Chronic | ICD-10-CM

## 2020-12-20 DIAGNOSIS — Z29.9 ENCOUNTER FOR PROPHYLACTIC MEASURES, UNSPECIFIED: ICD-10-CM

## 2020-12-20 DIAGNOSIS — R74.01 ELEVATION OF LEVELS OF LIVER TRANSAMINASE LEVELS: ICD-10-CM

## 2020-12-20 LAB
ADD ON TEST-SPECIMEN IN LAB: SIGNIFICANT CHANGE UP
ADD ON TEST-SPECIMEN IN LAB: SIGNIFICANT CHANGE UP
ALBUMIN SERPL ELPH-MCNC: 4.4 G/DL — SIGNIFICANT CHANGE UP (ref 3.3–5)
ALP SERPL-CCNC: 101 U/L — SIGNIFICANT CHANGE UP (ref 40–120)
ALT FLD-CCNC: 46 U/L — HIGH (ref 4–33)
ANION GAP SERPL CALC-SCNC: 14 MMOL/L — SIGNIFICANT CHANGE UP (ref 7–14)
APTT BLD: 25.7 SEC — LOW (ref 27–36.3)
AST SERPL-CCNC: 55 U/L — HIGH (ref 4–32)
BASOPHILS # BLD AUTO: 0.06 K/UL — SIGNIFICANT CHANGE UP (ref 0–0.2)
BASOPHILS NFR BLD AUTO: 0.5 % — SIGNIFICANT CHANGE UP (ref 0–2)
BILIRUB DIRECT SERPL-MCNC: 0.3 MG/DL — HIGH (ref 0–0.2)
BILIRUB SERPL-MCNC: 1.7 MG/DL — HIGH (ref 0.2–1.2)
BLOOD GAS VENOUS COMPREHENSIVE RESULT: SIGNIFICANT CHANGE UP
BUN SERPL-MCNC: 15 MG/DL — SIGNIFICANT CHANGE UP (ref 7–23)
CALCIUM SERPL-MCNC: 9.3 MG/DL — SIGNIFICANT CHANGE UP (ref 8.4–10.5)
CHLORIDE SERPL-SCNC: 99 MMOL/L — SIGNIFICANT CHANGE UP (ref 98–107)
CO2 SERPL-SCNC: 24 MMOL/L — SIGNIFICANT CHANGE UP (ref 22–31)
CREAT SERPL-MCNC: 1.31 MG/DL — HIGH (ref 0.5–1.3)
CRP SERPL-MCNC: 116.8 MG/L — HIGH
EOSINOPHIL # BLD AUTO: 0.12 K/UL — SIGNIFICANT CHANGE UP (ref 0–0.5)
EOSINOPHIL NFR BLD AUTO: 1 % — SIGNIFICANT CHANGE UP (ref 0–6)
GLUCOSE SERPL-MCNC: 88 MG/DL — SIGNIFICANT CHANGE UP (ref 70–99)
HCT VFR BLD CALC: 36.9 % — SIGNIFICANT CHANGE UP (ref 34.5–45)
HGB BLD-MCNC: 12.2 G/DL — SIGNIFICANT CHANGE UP (ref 11.5–15.5)
IANC: 9.55 K/UL — HIGH (ref 1.5–8.5)
IMM GRANULOCYTES NFR BLD AUTO: 0.6 % — SIGNIFICANT CHANGE UP (ref 0–1.5)
INR BLD: 1.39 RATIO — HIGH (ref 0.88–1.17)
LDH SERPL L TO P-CCNC: 618 U/L — HIGH (ref 135–225)
LYMPHOCYTES # BLD AUTO: 1.52 K/UL — SIGNIFICANT CHANGE UP (ref 1–3.3)
LYMPHOCYTES # BLD AUTO: 12.2 % — LOW (ref 13–44)
MAGNESIUM SERPL-MCNC: 1.9 MG/DL — SIGNIFICANT CHANGE UP (ref 1.6–2.6)
MCHC RBC-ENTMCNC: 32.9 PG — SIGNIFICANT CHANGE UP (ref 27–34)
MCHC RBC-ENTMCNC: 33.1 GM/DL — SIGNIFICANT CHANGE UP (ref 32–36)
MCV RBC AUTO: 99.5 FL — SIGNIFICANT CHANGE UP (ref 80–100)
MONOCYTES # BLD AUTO: 1.14 K/UL — HIGH (ref 0–0.9)
MONOCYTES NFR BLD AUTO: 9.1 % — SIGNIFICANT CHANGE UP (ref 2–14)
NEUTROPHILS # BLD AUTO: 9.55 K/UL — HIGH (ref 1.8–7.4)
NEUTROPHILS NFR BLD AUTO: 76.6 % — SIGNIFICANT CHANGE UP (ref 43–77)
NRBC # BLD: 0 /100 WBCS — SIGNIFICANT CHANGE UP
NRBC # FLD: 0 K/UL — SIGNIFICANT CHANGE UP
NT-PROBNP SERPL-SCNC: 9657 PG/ML — HIGH
PLATELET # BLD AUTO: 240 K/UL — SIGNIFICANT CHANGE UP (ref 150–400)
POTASSIUM SERPL-MCNC: 3.4 MMOL/L — LOW (ref 3.5–5.3)
POTASSIUM SERPL-SCNC: 3.4 MMOL/L — LOW (ref 3.5–5.3)
PROCALCITONIN SERPL-MCNC: 0.08 NG/ML — SIGNIFICANT CHANGE UP (ref 0.02–0.1)
PROT SERPL-MCNC: 8 G/DL — SIGNIFICANT CHANGE UP (ref 6–8.3)
PROTHROM AB SERPL-ACNC: 15.6 SEC — HIGH (ref 9.8–13.1)
RBC # BLD: 3.71 M/UL — LOW (ref 3.8–5.2)
RBC # FLD: 12.8 % — SIGNIFICANT CHANGE UP (ref 10.3–14.5)
SARS-COV-2 RNA SPEC QL NAA+PROBE: SIGNIFICANT CHANGE UP
SODIUM SERPL-SCNC: 137 MMOL/L — SIGNIFICANT CHANGE UP (ref 135–145)
TROPONIN T, HIGH SENSITIVITY RESULT: 33 NG/L — SIGNIFICANT CHANGE UP
TROPONIN T, HIGH SENSITIVITY RESULT: 40 NG/L — SIGNIFICANT CHANGE UP
WBC # BLD: 12.46 K/UL — HIGH (ref 3.8–10.5)
WBC # FLD AUTO: 12.46 K/UL — HIGH (ref 3.8–10.5)

## 2020-12-20 PROCEDURE — 99223 1ST HOSP IP/OBS HIGH 75: CPT

## 2020-12-20 PROCEDURE — 71045 X-RAY EXAM CHEST 1 VIEW: CPT | Mod: 26

## 2020-12-20 PROCEDURE — 99285 EMERGENCY DEPT VISIT HI MDM: CPT

## 2020-12-20 RX ORDER — DILTIAZEM HCL 120 MG
60 CAPSULE, EXT RELEASE 24 HR ORAL ONCE
Refills: 0 | Status: COMPLETED | OUTPATIENT
Start: 2020-12-20 | End: 2020-12-20

## 2020-12-20 RX ORDER — SODIUM CHLORIDE 9 MG/ML
500 INJECTION, SOLUTION INTRAVENOUS ONCE
Refills: 0 | Status: DISCONTINUED | OUTPATIENT
Start: 2020-12-20 | End: 2020-12-20

## 2020-12-20 RX ORDER — SODIUM CHLORIDE 9 MG/ML
500 INJECTION INTRAMUSCULAR; INTRAVENOUS; SUBCUTANEOUS ONCE
Refills: 0 | Status: COMPLETED | OUTPATIENT
Start: 2020-12-20 | End: 2020-12-20

## 2020-12-20 RX ORDER — POTASSIUM CHLORIDE 20 MEQ
40 PACKET (EA) ORAL ONCE
Refills: 0 | Status: COMPLETED | OUTPATIENT
Start: 2020-12-20 | End: 2020-12-20

## 2020-12-20 RX ORDER — WARFARIN SODIUM 2.5 MG/1
5 TABLET ORAL ONCE
Refills: 0 | Status: COMPLETED | OUTPATIENT
Start: 2020-12-20 | End: 2020-12-20

## 2020-12-20 RX ORDER — ACETAMINOPHEN 500 MG
650 TABLET ORAL ONCE
Refills: 0 | Status: COMPLETED | OUTPATIENT
Start: 2020-12-20 | End: 2020-12-20

## 2020-12-20 RX ORDER — DILTIAZEM HCL 120 MG
60 CAPSULE, EXT RELEASE 24 HR ORAL
Refills: 0 | Status: DISCONTINUED | OUTPATIENT
Start: 2020-12-20 | End: 2020-12-23

## 2020-12-20 RX ADMIN — Medication 40 MILLIEQUIVALENT(S): at 18:08

## 2020-12-20 RX ADMIN — SODIUM CHLORIDE 2000 MILLILITER(S): 9 INJECTION INTRAMUSCULAR; INTRAVENOUS; SUBCUTANEOUS at 16:26

## 2020-12-20 RX ADMIN — Medication 650 MILLIGRAM(S): at 15:45

## 2020-12-20 RX ADMIN — Medication 60 MILLIGRAM(S): at 16:26

## 2020-12-20 RX ADMIN — Medication 60 MILLIGRAM(S): at 22:02

## 2020-12-20 NOTE — ED PROVIDER NOTE - PHYSICAL EXAMINATION
General: elderly female in NAD on RA, does appear SOB with prolonged speaking   HEENT: NCAT.  PERRL.  EOMI.  No scleral icterus or injection.  Moist MM.  No oropharyngeal exudates. No head and neck LAD   Heart: irregular irregular. Normal S1 and S2.  No murmurs, rubs, or gallops. No JVD.   Lungs: CTAB. No wheezes, crackles, or rhonchi.    Abdomen: BS+, soft, NT/ND.  No organomegaly.  Skin: Warm and dry. Dry dermatomal rash. Right upper back, no vesicles.   Extremities: No peripheral edema   Neuro: A&Ox3.

## 2020-12-20 NOTE — H&P ADULT - NSHPSOCIALHISTORY_GEN_ALL_CORE
Patient lives at home with her son and daughter. She smoked cigarettes ( 1PPD since she was a teenager) until about 2 years ago; she quit when she was diagnosed with lung cancer. She drinks about 1-2 bottles of wine per week; her last drink was about 5 days ago, when she began having symptoms.

## 2020-12-20 NOTE — H&P ADULT - PROBLEM SELECTOR PLAN 5
-currently in remission -continue Cardizem  -will dose coumadin 5 mg, INR is currently subtherapeutic   -f/u INR in am

## 2020-12-20 NOTE — H&P ADULT - NSHPREVIEWOFSYSTEMS_GEN_ALL_CORE
REVIEW OF SYSTEMS:    CONSTITUTIONAL: No weakness, fevers or chills  EYES/ENT: No visual changes;  No vertigo or throat pain   NECK: No pain or stiffness  RESPIRATORY: No cough, wheezing, hemoptysis; + shortness of breath  CARDIOVASCULAR: No chest pain or palpitations  GASTROINTESTINAL: No abdominal or epigastric pain. No nausea, vomiting, or hematemesis; No diarrhea or constipation. No melena or hematochezia.  GENITOURINARY: No dysuria, frequency or hematuria  NEUROLOGICAL: No numbness or weakness  PSYCH: No A/V hallucinations  MSK: No joint pain  SKIN: No itching, rashes

## 2020-12-20 NOTE — ED PROVIDER NOTE - OBJECTIVE STATEMENT
66 yo female with a PMHx of HOCM s/p septal ab 66 yo female with a PMHx of HOCM s/p septal ablation, afib on coumadin, HFpEF (EF 55% 9/2019), lung cancer (s/p chemo, in remission), right sided shingles presenting w/ 4 days of dry cough, sob, fatigue. Denies fevers, chills, sweats, orthopnea, LE edema, abdominal pain, dysuria. Endorses nausea and diarrhea 2 days PTP which she attributes to eating Mexican food. No sick contacts or recent travel. Endorses taking coumadin as prescribed. Rectal temp on presentation 102.3. 64 yo female with a PMHx of HOCM s/p septal ablation, afib on coumadin, HFpEF (EF 55% 9/2019), lung cancer (s/p chemo, in remission), right sided shingles presenting w/ 4 days of dry cough, sob, fatigue. Denies fevers, chills, sweats, orthopnea, LE edema, abdominal pain vomiting, dysuria. Endorses nausea and diarrhea 2 days PTP which she attributes to eating Mexican food. No sick contacts or recent travel. Endorses taking coumadin as prescribed. Rectal temp on presentation 102.3.

## 2020-12-20 NOTE — ED ADULT TRIAGE NOTE - CHIEF COMPLAINT QUOTE
Patient c/o sob, fatigue, cough, no appetite, chills, did not check her temp.  H/o lung ca 4 years ago.

## 2020-12-20 NOTE — ED ADULT NURSE REASSESSMENT NOTE - NS ED NURSE REASSESS COMMENT FT1
Pt received from day shift RN. Vitals as noted. Pt in no acute distress at this time. Respirations even and unlabored. Pt on cardiac monitor. Comfort measures provided. Will continue to monitor.

## 2020-12-20 NOTE — ED ADULT NURSE NOTE - NSIMPLEMENTINTERV_GEN_ALL_ED
Implemented All Fall Risk Interventions:  [place] to call system. Call bell, personal items and telephone within reach. Instruct patient to call for assistance. Room bathroom lighting operational. Non-slip footwear when patient is off stretcher. Physically safe environment: no spills, clutter or unnecessary equipment. Stretcher in lowest position, wheels locked, appropriate side rails in place. Provide visual cue, wrist band, yellow gown, etc. Monitor gait and stability. Monitor for mental status changes and reorient to person, place, and time. Review medications for side effects contributing to fall risk. Reinforce activity limits and safety measures with patient and family.

## 2020-12-20 NOTE — H&P ADULT - NSICDXPASTMEDICALHX_GEN_ALL_CORE_FT
PAST MEDICAL HISTORY:  AF (atrial fibrillation) On Coumadin    CAP (community acquired pneumonia) RML/RLL at [facility] 3/2017    Chronic diastolic (congestive) heart failure     GERD (gastroesophageal reflux disease)     HOCM (hypertrophic obstructive cardiomyopathy) S/P septal ablation    Lung cancer s/p chemotherapy    Lung cancer s/p chemotherapy ~2017 now in remission    Pleural effusion Right parapneumonic drained twice 3/2017 at [facility]

## 2020-12-20 NOTE — H&P ADULT - NSHPPHYSICALEXAM_GEN_ALL_CORE
Vital Signs Last 24 Hrs  T(C): 39.1 (20 Dec 2020 15:12), Max: 39.1 (20 Dec 2020 15:12)  T(F): 102.3 (20 Dec 2020 15:12), Max: 102.3 (20 Dec 2020 15:12)  HR: 105 (20 Dec 2020 15:14) (105 - 135)  BP: 140/94 (20 Dec 2020 15:12) (102/86 - 140/94)  BP(mean): --  RR: 32 (20 Dec 2020 15:12) (20 - 32)  SpO2: 97% (20 Dec 2020 15:12) (97% - 97%)    General: WN/WD NAD  Neurology: A&Ox3, nonfocal, GARCIA x 4  Eyes: PERRLA/ EOMI, Gross vision intact  ENT/Neck: Neck supple, trachea midline, No JVD, Gross hearing intact  Respiratory: Normal respiratory rate. CTA B/L, No wheezing, rales, rhonchi  CV: irregularly irregular rhythm, S1S2, no murmurs, rubs or gallops  Abdominal: Soft, NT, ND +BS,   Extremities: No edema, + peripheral pulses  Skin: No Rashes, Hematoma, Ecchymosis

## 2020-12-20 NOTE — H&P ADULT - PROBLEM SELECTOR PLAN 2
-Patient with fever, tachycardia, and leukocytosis  -procalcitonin is negative; pt has respiratory symptoms but no EKG changes. No wheezing  -suspect viral respiratory infection, most likely COVID-19  -procalcitonin also negative, will monitor off antibiotics at this point  -f/u COVID-19 PCR and RVP -Patient with fever, tachycardia, and leukocytosis  -procalcitonin is negative; pt has respiratory symptoms but no EKG changes. No wheezing  -suspect viral respiratory infection, most likely COVID-19  -procalcitonin also negative, will monitor off antibiotics at this point  -f/u COVID-19 PCR and RVP  -will hold lasix for today, can re-evaluate in morning

## 2020-12-20 NOTE — ED PROVIDER NOTE - CLINICAL SUMMARY MEDICAL DECISION MAKING FREE TEXT BOX
66 yo female with a PMHx of HOCM s/p septal ablation, afib on coumadin, HFpEF (EF 55% 9/2019), lung cancer (s/p chemo, in remission), right sided shingles presenting w/ 4 days of dry cough, sob, fatigue found to be septic on presentation Tmax 102.3, , RR 20. ddx includes viral (including Covid 19) vs. bacterial PNA vs. ?HF exacerbation, though less likely as patient euvolemic. CBC/CMP, CXR, Covid 19, UA, UCx, HST and BNP. Likely admission. 66 yo female with a PMHx of HOCM s/p septal ablation, afib on coumadin, HFpEF (EF 55% 9/2019), lung cancer (s/p chemo, in remission), right sided shingles presenting w/ 4 days of dry cough, sob, fatigue found to have sirs criteria on presentation Tmax 102.3, , RR 20. ddx includes viral (including Covid 19) vs. PNA vs. ?HF exacerbation, though less likely as patient euvolemic. CBC/CMP, CXR, Covid 19, UA, UCx, HST and BNP. Likely admission.  will hold on abx for now given higher suspicion fo viral etiology in setting of covid 19 pandemic.

## 2020-12-20 NOTE — H&P ADULT - HISTORY OF PRESENT ILLNESS
65 y.o. F with PMH of HOCM s/p septal ablation, afib on coumadin, HFpEF (EF 55% 9/2019), lung cancer (diagnosed about 2 years ago, s/p chemo, in remission), presents  w/ 4 days of worsening dry cough. Patient states that she was trying lozenges and tea at home, but these were not helping her symptoms. Further, she states that about 2-3 days ago, she developed "violent abdominal pain" and diarrhea, which resolved on its own approximately 1 day later. She had attributed her symptoms to eating Mexican food. She otherwise denies fevers at home. ROS is positive for SOB that accompanies cough. Patient denies sick contacts; she states her son and daughter, with home she lives with, are asymptomatic, as is her boyfriend.  65 y.o. F with PMH of HOCM s/p septal ablation, afib on coumadin, HFpEF (EF 55% 9/2019), lung cancer (diagnosed about 2 years ago, s/p chemo, in remission), presents  w/ 4 days of worsening dry cough. Patient states that she was trying lozenges and tea at home, but these were not helping her symptoms. Further, she states that about 2-3 days ago, she developed "violent abdominal pain" and diarrhea, which resolved on its own approximately 1 day later. She had attributed her symptoms to eating Mexican food. She otherwise denies fevers at home. ROS is positive for SOB that accompanies cough. Patient denies sick contacts; she states her son and daughter, with home she lives with, are asymptomatic, as is her boyfriend.     In the ED, patient was found to be febrile to tmax of 102.3 F.   65 y.o. F with PMH of HOCM s/p septal ablation, afib on coumadin, HFpEF (EF 55% 9/2019), lung cancer (diagnosed about 2 years ago, s/p chemo, in remission), who presents w/ 4 days of worsening dry cough. Patient states that she was trying lozenges and tea at home, but these were not helping her symptoms. Further, she states that about 2-3 days ago, she developed "violent abdominal pain" and diarrhea, which resolved on its own approximately 1 day later. She had attributed her symptoms to eating Mexican food. She otherwise denies fevers at home. ROS is positive for SOB that accompanies cough. Patient denies sick contacts; she states her son and daughter, with home she lives with, are asymptomatic, as is her boyfriend.     In the ED, patient was found to be febrile to tmax of 102.3 F.

## 2020-12-20 NOTE — H&P ADULT - PROBLEM SELECTOR PLAN 4
-continue Cardizem  -will dose coumadin 5 mg, INR is currently subtherapeutic   -f/u INR in am -pt with creatinine elevated from baseline  -suspect pre-renal in setting of sepsis and hypovolemia  -s/p 1L IVF in ED, hold today's dose of lasix  -f/u urine studies

## 2020-12-20 NOTE — H&P ADULT - ASSESSMENT
65 y.o. F with PMH of HOCM s/p septal ablation, afib on coumadin, HFpEF (EF 55% 9/2019), lung cancer (diagnosed about 2 years ago, s/p chemo, in remission) who presents with sepsis likely in setting of COVID-19.

## 2020-12-20 NOTE — ED ADULT NURSE REASSESSMENT NOTE - NS ED NURSE REASSESS COMMENT FT1
Pt resting comfortably in stretcher. Vitals as noted. Pt in no acute distress at this time. Respirations even and unlabored. Comfort measures provided. Will continue to monitor.

## 2020-12-20 NOTE — ED PROVIDER NOTE - PROGRESS NOTE DETAILS
Labs c/w Covid-19, PCR still pending. Will admit to medicine Dr. Baker. Labs c/w more liklely viral ie Covid-19, PCR still pending. Will admit to medicine Dr. Baker.

## 2020-12-20 NOTE — H&P ADULT - NSHPLABSRESULTS_GEN_ALL_CORE
12.2   12.46 )-----------( 240      ( 20 Dec 2020 16:00 )             36.9     Hgb Trend: 12.2<--  12-20    137  |  99  |  15  ----------------------------<  88  3.4<L>   |  24  |  1.31<H>    Ca    9.3      20 Dec 2020 16:03  Mg     1.9     12-20    TPro  8.0  /  Alb  4.4  /  TBili  1.7<H>  /  DBili  x   /  AST  55<H>  /  ALT  46<H>  /  AlkPhos  101  12-20    Creatinine Trend: 1.31<--  PT/INR - ( 20 Dec 2020 16:06 )   PT: 15.6 sec;   INR: 1.39 ratio         PTT - ( 20 Dec 2020 16:06 )  PTT:25.7 sec      EKG: Afib, rate controlled, my read    CXR: No patchy consolidations, my read

## 2020-12-20 NOTE — ED ADULT NURSE NOTE - OBJECTIVE STATEMENT
Parag RN: 66 yo female, hx of afib (on coumadin), Lung Ca (4 years ago), CHF, GERD, c/o faitgue, cough, nausea, decreased appetite x 1 week. pt tachypneic, febrile upon exam. pt denies headache, dizziness, chest pain, abdominal pain, urinary sx. 20g IV established to L AC, labs sent as ordered. handoff report given to primary RN.

## 2020-12-20 NOTE — H&P ADULT - PROBLEM SELECTOR PLAN 1
-patient with cough and SOB x 4 days. No evidence of consolidations on CXR. Patient is not hypoxic.  -EKG without acute ischemic changes, patient does not appear volume overloaded on exam  -symptomatically better s/p hycodan in ED; will continue PRN Tessalon Perles and Hycodan  -f/u COVID-19 PCR  -PRN albuterol  -Wells score of 1.5, i.e. low risk for PE

## 2020-12-20 NOTE — ED PROVIDER NOTE - PMH
AF (atrial fibrillation)  On Coumadin  CAP (community acquired pneumonia)  RML/RLL at [facility] 3/2017  Chronic diastolic (congestive) heart failure    GERD (gastroesophageal reflux disease)    HOCM (hypertrophic obstructive cardiomyopathy)  S/P septal ablation  Lung cancer  s/p chemotherapy  Lung cancer  s/p chemotherapy ~2017 now in remission  Pleural effusion  Right parapneumonic drained twice 3/2017 at [facility]

## 2020-12-20 NOTE — ED ADULT NURSE NOTE - ED STAT RN HANDOFF DETAILS
Report given to EVA Pratt. pt a&ox4 and ambulatory. pt respirations even and unlabored. Pt aware of plan of care and awaiting transport at this time. Vital signs as noted, call bell in reach, comfort measures provided, will continue to monitor till transport.

## 2020-12-20 NOTE — ED PROVIDER NOTE - ATTENDING CONTRIBUTION TO CARE
ZULEYMA Meyer: I have personally performed a face to face bedside history and physical examination of this patient. I have discussed the history, examination, review of systems, assessment and plan of management with the resident. I have reviewed the electronic medical record and amended it to reflect my history, review of systems, physical exam, assessment and plan.

## 2020-12-20 NOTE — H&P ADULT - PROBLEM SELECTOR PLAN 3
-patient with transaminitis, likely in setting of EtOH consumption.   -T bili elevated, f/u D bili  -it has been 5 days since pt's last drink, will not initiate CIWA protocol

## 2020-12-20 NOTE — ED PROVIDER NOTE - NS ED ROS FT
REVIEW OF SYSTEMS:    CONSTITUTIONAL: per HPI   EYES/ENT: No visual changes;  No vertigo or throat pain   NECK: No pain or stiffness  RESPIRATORY: per HPI   CARDIOVASCULAR: No chest pain or palpitations  GASTROINTESTINAL: +N, +D, no vomiting   GENITOURINARY: No dysuria, frequency or hematuria  NEUROLOGICAL: No numbness or weakness  SKIN: No itching, burning, rashes, or lesions   All other review of systems is negative unless indicated above.

## 2020-12-21 LAB
ALBUMIN SERPL ELPH-MCNC: 3.6 G/DL — SIGNIFICANT CHANGE UP (ref 3.3–5)
ALP SERPL-CCNC: 85 U/L — SIGNIFICANT CHANGE UP (ref 40–120)
ALT FLD-CCNC: 36 U/L — HIGH (ref 4–33)
ANION GAP SERPL CALC-SCNC: 14 MMOL/L — SIGNIFICANT CHANGE UP (ref 7–14)
APPEARANCE UR: ABNORMAL
APTT BLD: 25.6 SEC — LOW (ref 27–36.3)
AST SERPL-CCNC: 36 U/L — HIGH (ref 4–32)
B PERT DNA SPEC QL NAA+PROBE: SIGNIFICANT CHANGE UP
BACTERIA # UR AUTO: ABNORMAL
BASOPHILS # BLD AUTO: 0.07 K/UL — SIGNIFICANT CHANGE UP (ref 0–0.2)
BASOPHILS NFR BLD AUTO: 0.7 % — SIGNIFICANT CHANGE UP (ref 0–2)
BILIRUB SERPL-MCNC: 1.5 MG/DL — HIGH (ref 0.2–1.2)
BILIRUB UR-MCNC: NEGATIVE — SIGNIFICANT CHANGE UP
BUN SERPL-MCNC: 14 MG/DL — SIGNIFICANT CHANGE UP (ref 7–23)
C PNEUM DNA SPEC QL NAA+PROBE: SIGNIFICANT CHANGE UP
CALCIUM SERPL-MCNC: 8.8 MG/DL — SIGNIFICANT CHANGE UP (ref 8.4–10.5)
CHLORIDE SERPL-SCNC: 101 MMOL/L — SIGNIFICANT CHANGE UP (ref 98–107)
CHOLEST SERPL-MCNC: 171 MG/DL — SIGNIFICANT CHANGE UP
CO2 SERPL-SCNC: 21 MMOL/L — LOW (ref 22–31)
COLOR SPEC: YELLOW — SIGNIFICANT CHANGE UP
CREAT ?TM UR-MCNC: 87 MG/DL — SIGNIFICANT CHANGE UP
CREAT SERPL-MCNC: 1.05 MG/DL — SIGNIFICANT CHANGE UP (ref 0.5–1.3)
D DIMER BLD IA.RAPID-MCNC: 368 NG/ML DDU — HIGH
DIFF PNL FLD: NEGATIVE — SIGNIFICANT CHANGE UP
EOSINOPHIL # BLD AUTO: 0.41 K/UL — SIGNIFICANT CHANGE UP (ref 0–0.5)
EOSINOPHIL NFR BLD AUTO: 4.2 % — SIGNIFICANT CHANGE UP (ref 0–6)
EPI CELLS # UR: 16 /HPF — HIGH (ref 0–5)
FERRITIN SERPL-MCNC: 244 NG/ML — HIGH (ref 15–150)
FLUAV H1 2009 PAND RNA SPEC QL NAA+PROBE: SIGNIFICANT CHANGE UP
FLUAV H1 RNA SPEC QL NAA+PROBE: SIGNIFICANT CHANGE UP
FLUAV H3 RNA SPEC QL NAA+PROBE: SIGNIFICANT CHANGE UP
FLUAV SUBTYP SPEC NAA+PROBE: SIGNIFICANT CHANGE UP
FLUBV RNA SPEC QL NAA+PROBE: SIGNIFICANT CHANGE UP
GLUCOSE SERPL-MCNC: 85 MG/DL — SIGNIFICANT CHANGE UP (ref 70–99)
GLUCOSE UR QL: NEGATIVE — SIGNIFICANT CHANGE UP
HADV DNA SPEC QL NAA+PROBE: SIGNIFICANT CHANGE UP
HCOV PNL SPEC NAA+PROBE: SIGNIFICANT CHANGE UP
HCT VFR BLD CALC: 32.8 % — LOW (ref 34.5–45)
HCV AB S/CO SERPL IA: 0.09 S/CO — SIGNIFICANT CHANGE UP (ref 0–0.99)
HCV AB SERPL-IMP: SIGNIFICANT CHANGE UP
HDLC SERPL-MCNC: 54 MG/DL — SIGNIFICANT CHANGE UP
HGB BLD-MCNC: 10.6 G/DL — LOW (ref 11.5–15.5)
HMPV RNA SPEC QL NAA+PROBE: SIGNIFICANT CHANGE UP
HPIV1 RNA SPEC QL NAA+PROBE: SIGNIFICANT CHANGE UP
HPIV2 RNA SPEC QL NAA+PROBE: SIGNIFICANT CHANGE UP
HPIV3 RNA SPEC QL NAA+PROBE: SIGNIFICANT CHANGE UP
HPIV4 RNA SPEC QL NAA+PROBE: SIGNIFICANT CHANGE UP
HYALINE CASTS # UR AUTO: 0 /LPF — SIGNIFICANT CHANGE UP (ref 0–7)
IANC: 7.38 K/UL — SIGNIFICANT CHANGE UP (ref 1.5–8.5)
IMM GRANULOCYTES NFR BLD AUTO: 0.5 % — SIGNIFICANT CHANGE UP (ref 0–1.5)
INR BLD: 1.45 RATIO — HIGH (ref 0.88–1.17)
KETONES UR-MCNC: NEGATIVE — SIGNIFICANT CHANGE UP
LEUKOCYTE ESTERASE UR-ACNC: ABNORMAL
LIPID PNL WITH DIRECT LDL SERPL: 102 MG/DL — HIGH
LYMPHOCYTES # BLD AUTO: 0.86 K/UL — LOW (ref 1–3.3)
LYMPHOCYTES # BLD AUTO: 8.9 % — LOW (ref 13–44)
MAGNESIUM SERPL-MCNC: 1.9 MG/DL — SIGNIFICANT CHANGE UP (ref 1.6–2.6)
MCHC RBC-ENTMCNC: 32.3 GM/DL — SIGNIFICANT CHANGE UP (ref 32–36)
MCHC RBC-ENTMCNC: 32.9 PG — SIGNIFICANT CHANGE UP (ref 27–34)
MCV RBC AUTO: 101.9 FL — HIGH (ref 80–100)
MONOCYTES # BLD AUTO: 0.88 K/UL — SIGNIFICANT CHANGE UP (ref 0–0.9)
MONOCYTES NFR BLD AUTO: 9.1 % — SIGNIFICANT CHANGE UP (ref 2–14)
NEUTROPHILS # BLD AUTO: 7.38 K/UL — SIGNIFICANT CHANGE UP (ref 1.8–7.4)
NEUTROPHILS NFR BLD AUTO: 76.6 % — SIGNIFICANT CHANGE UP (ref 43–77)
NITRITE UR-MCNC: NEGATIVE — SIGNIFICANT CHANGE UP
NON HDL CHOLESTEROL: 117 MG/DL — SIGNIFICANT CHANGE UP
NRBC # BLD: 0 /100 WBCS — SIGNIFICANT CHANGE UP
NRBC # FLD: 0 K/UL — SIGNIFICANT CHANGE UP
PH UR: 6 — SIGNIFICANT CHANGE UP (ref 5–8)
PHOSPHATE SERPL-MCNC: 2.7 MG/DL — SIGNIFICANT CHANGE UP (ref 2.5–4.5)
PLATELET # BLD AUTO: 195 K/UL — SIGNIFICANT CHANGE UP (ref 150–400)
POTASSIUM SERPL-MCNC: 3.7 MMOL/L — SIGNIFICANT CHANGE UP (ref 3.5–5.3)
POTASSIUM SERPL-SCNC: 3.7 MMOL/L — SIGNIFICANT CHANGE UP (ref 3.5–5.3)
PROT SERPL-MCNC: 7.2 G/DL — SIGNIFICANT CHANGE UP (ref 6–8.3)
PROT UR-MCNC: ABNORMAL
PROTHROM AB SERPL-ACNC: 16.4 SEC — HIGH (ref 9.8–13.1)
RAPID RVP RESULT: SIGNIFICANT CHANGE UP
RBC # BLD: 3.22 M/UL — LOW (ref 3.8–5.2)
RBC # FLD: 12.7 % — SIGNIFICANT CHANGE UP (ref 10.3–14.5)
RBC CASTS # UR COMP ASSIST: 7 /HPF — HIGH (ref 0–4)
RSV RNA SPEC QL NAA+PROBE: SIGNIFICANT CHANGE UP
RV+EV RNA SPEC QL NAA+PROBE: SIGNIFICANT CHANGE UP
SARS-COV-2 RNA SPEC QL NAA+PROBE: SIGNIFICANT CHANGE UP
SODIUM SERPL-SCNC: 136 MMOL/L — SIGNIFICANT CHANGE UP (ref 135–145)
SP GR SPEC: 1.01 — SIGNIFICANT CHANGE UP (ref 1.01–1.02)
TRIGL SERPL-MCNC: 73 MG/DL — SIGNIFICANT CHANGE UP
UROBILINOGEN FLD QL: SIGNIFICANT CHANGE UP
UUN UR-MCNC: 490.9 MG/DL — SIGNIFICANT CHANGE UP
WBC # BLD: 9.65 K/UL — SIGNIFICANT CHANGE UP (ref 3.8–10.5)
WBC # FLD AUTO: 9.65 K/UL — SIGNIFICANT CHANGE UP (ref 3.8–10.5)
WBC UR QL: >50 /HPF — HIGH (ref 0–5)

## 2020-12-21 PROCEDURE — 71250 CT THORAX DX C-: CPT | Mod: 26

## 2020-12-21 PROCEDURE — 99231 SBSQ HOSP IP/OBS SF/LOW 25: CPT

## 2020-12-21 PROCEDURE — 99223 1ST HOSP IP/OBS HIGH 75: CPT

## 2020-12-21 PROCEDURE — 74177 CT ABD & PELVIS W/CONTRAST: CPT | Mod: 26

## 2020-12-21 RX ORDER — ENOXAPARIN SODIUM 100 MG/ML
70 INJECTION SUBCUTANEOUS EVERY 12 HOURS
Refills: 0 | Status: DISCONTINUED | OUTPATIENT
Start: 2020-12-21 | End: 2020-12-26

## 2020-12-21 RX ORDER — AZTREONAM 2 G
VIAL (EA) INJECTION
Refills: 0 | Status: DISCONTINUED | OUTPATIENT
Start: 2020-12-21 | End: 2020-12-21

## 2020-12-21 RX ORDER — WARFARIN SODIUM 2.5 MG/1
5 TABLET ORAL ONCE
Refills: 0 | Status: COMPLETED | OUTPATIENT
Start: 2020-12-21 | End: 2020-12-21

## 2020-12-21 RX ORDER — AZTREONAM 2 G
1000 VIAL (EA) INJECTION EVERY 12 HOURS
Refills: 0 | Status: DISCONTINUED | OUTPATIENT
Start: 2020-12-21 | End: 2020-12-21

## 2020-12-21 RX ORDER — CEFTRIAXONE 500 MG/1
1000 INJECTION, POWDER, FOR SOLUTION INTRAMUSCULAR; INTRAVENOUS EVERY 24 HOURS
Refills: 0 | Status: COMPLETED | OUTPATIENT
Start: 2020-12-21 | End: 2020-12-23

## 2020-12-21 RX ORDER — AZTREONAM 2 G
1000 VIAL (EA) INJECTION ONCE
Refills: 0 | Status: COMPLETED | OUTPATIENT
Start: 2020-12-21 | End: 2020-12-21

## 2020-12-21 RX ORDER — INFLUENZA VIRUS VACCINE 15; 15; 15; 15 UG/.5ML; UG/.5ML; UG/.5ML; UG/.5ML
0.5 SUSPENSION INTRAMUSCULAR ONCE
Refills: 0 | Status: DISCONTINUED | OUTPATIENT
Start: 2020-12-21 | End: 2020-12-31

## 2020-12-21 RX ADMIN — Medication 1 TABLET(S): at 13:23

## 2020-12-21 RX ADMIN — Medication 60 MILLIGRAM(S): at 13:23

## 2020-12-21 RX ADMIN — CEFTRIAXONE 100 MILLIGRAM(S): 500 INJECTION, POWDER, FOR SOLUTION INTRAMUSCULAR; INTRAVENOUS at 16:37

## 2020-12-21 RX ADMIN — ENOXAPARIN SODIUM 70 MILLIGRAM(S): 100 INJECTION SUBCUTANEOUS at 16:37

## 2020-12-21 RX ADMIN — Medication 100 MILLIGRAM(S): at 16:37

## 2020-12-21 RX ADMIN — Medication 60 MILLIGRAM(S): at 06:23

## 2020-12-21 RX ADMIN — Medication 50 MILLIGRAM(S): at 06:23

## 2020-12-21 RX ADMIN — Medication 60 MILLIGRAM(S): at 17:51

## 2020-12-21 RX ADMIN — Medication 100 MILLIGRAM(S): at 09:52

## 2020-12-21 RX ADMIN — Medication 100 MILLIGRAM(S): at 02:19

## 2020-12-21 RX ADMIN — WARFARIN SODIUM 5 MILLIGRAM(S): 2.5 TABLET ORAL at 17:51

## 2020-12-21 NOTE — PROGRESS NOTE ADULT - PROBLEM SELECTOR PLAN 4
-pt with creatinine elevated from baseline  -suspect pre-renal in setting of sepsis and hypovolemia  -s/p 1L IVF in ED, hold today's dose of lasix  -f/u urine studies -pt with creatinine elevated from baseline  -suspect pre-renal in setting of sepsis and hypovolemia  -

## 2020-12-21 NOTE — SBIRT NOTE ADULT - NSSBIRTALCPOSREINDET_GEN_A_CORE
Provided SBIRT services: Full screen Positive. Positive reinforcement provided given patient currently within healthy guidelines. Education materials reviewed and given to patient.

## 2020-12-21 NOTE — CHART NOTE - NSCHARTNOTEFT_GEN_A_CORE
[facility] MEDICINE NIGHT COVERAGE    Discussed w/ on call radiology re: preliminary results of CTCAP. DDx: Pyelonephritis vs. Renal infarction. Results as below. Discussed findings w/ Dr. Baker (). Recommending Renal US (ordered), c/w full dose Lovenox, and f/u D-dimer. Will continue to monitor closely.    Vital Signs Last 24 Hrs  T(C): 36.8 (21 Dec 2020 21:00), Max: 37.2 (21 Dec 2020 13:32)  T(F): 98.3 (21 Dec 2020 21:00), Max: 98.9 (21 Dec 2020 13:32)  HR: 78 (21 Dec 2020 21:00) (78 - 105)  BP: 111/71 (21 Dec 2020 21:00) (103/72 - 129/84)  RR: 20 (21 Dec 2020 21:00) (18 - 20)  SpO2: 98% (21 Dec 2020 21:00) (95% - 98%)    < from: CT Chest No Cont (12.21.20 @ 19:57) >  ******PRELIMINARY REPORT******        INTERPRETATION:  Left kidney lower pole hypoenhancement and hazy contour with associated perinephric and paranephric stranding. Finding is consistent with pyelonephritis in the context of fever. Renal infarction is a differential diagnosis..  LUIS MIGUEL Gomez was informed.    LUIS MIGUEL Haque-C  Medicine [id number] [facility] MEDICINE NIGHT COVERAGE    Discussed w/ on call radiology re: preliminary results of CTCAP. DDx: Pyelonephritis vs. Renal infarction. Results as below. Discussed findings w/ Dr. Baker (). Recommending Renal US (ordered), c/w full dose Lovenox, f/u D-dimer, and c/w CTX. Will continue to monitor closely.    Vital Signs Last 24 Hrs  T(C): 36.8 (21 Dec 2020 21:00), Max: 37.2 (21 Dec 2020 13:32)  T(F): 98.3 (21 Dec 2020 21:00), Max: 98.9 (21 Dec 2020 13:32)  HR: 78 (21 Dec 2020 21:00) (78 - 105)  BP: 111/71 (21 Dec 2020 21:00) (103/72 - 129/84)  RR: 20 (21 Dec 2020 21:00) (18 - 20)  SpO2: 98% (21 Dec 2020 21:00) (95% - 98%)    < from: CT Chest No Cont (12.21.20 @ 19:57) >  ******PRELIMINARY REPORT******        INTERPRETATION:  Left kidney lower pole hypoenhancement and hazy contour with associated perinephric and paranephric stranding. Finding is consistent with pyelonephritis in the context of fever. Renal infarction is a differential diagnosis..  LUIS MIGUEL Gomez was informed.    LUIS MIGUEL Haque-C  Medicine [id number] [facility] MEDICINE NIGHT COVERAGE    Discussed w/ on call radiology re: preliminary results of CTCAP. DDx: Pyelonephritis vs. Renal infarction. Results as below. Discussed findings w/ Dr. Baker (). Recommending Renal US (ordered), c/w full dose Lovenox, f/u D-dimer, and c/w CTX. Will continue to monitor closely.    Vital Signs Last 24 Hrs  T(C): 36.8 (21 Dec 2020 21:00), Max: 37.2 (21 Dec 2020 13:32)  T(F): 98.3 (21 Dec 2020 21:00), Max: 98.9 (21 Dec 2020 13:32)  HR: 78 (21 Dec 2020 21:00) (78 - 105)  BP: 111/71 (21 Dec 2020 21:00) (103/72 - 129/84)  RR: 20 (21 Dec 2020 21:00) (18 - 20)  SpO2: 98% (21 Dec 2020 21:00) (95% - 98%)    < from: CT Chest No Cont (20 @ 19:57) >  ******PRELIMINARY REPORT******        INTERPRETATION:  Left kidney lower pole hypoenhancement and hazy contour with associated perinephric and paranephric stranding. Finding is consistent with pyelonephritis in the context of fever. Renal infarction is a differential diagnosis..  LUIS MIGUEL Gomez was informed.    Labs:                        10.6   9.65  )-----------( 195      ( 21 Dec 2020 06:40 )             32.8         136  |  101  |  14  ----------------------------<  85  3.7   |  21<L>  |  1.05    Ca    8.8      21 Dec 2020 06:40  Phos  2.7       Mg     1.9         TPro  7.2  /  Alb  3.6  /  TBili  1.5<H>  /  DBili  x   /  AST  36<H>  /  ALT  36<H>  /  AlkPhos  85      PT/INR: PT: 16.4 sec | INR: 1.45 ratio (20 @ 06:40)  PTT: 25.6 sec (20 @ 06:40)  PT/INR: PT: 15.6 sec | INR: 1.39 ratio (20 @ 16:06)  PTT: 25.7 sec (20 @ 16:06)    Serum Pro-Brain Natriuretic Peptide : 9657 pg/mL<H> (20 @ 16:03)     D-Dimer Assay: 368 ng/mL DDU<H> (20 @ 06:40)     Blood Gas Venous:  pH: 7.46 | HCO3: 25 | pCO2: 35 | pO2: 30 | Lactate: 1.6 (20 @ 16:06)    Urinanalysis Basic (20 @ 23:40):  Color: Yellow / Appearance: Slightly Turbid / S.011 / pH: x  Gluc: x / Ketone: Negative / Bili: Negative / Urobili: <2 mg/dL  Blood: x / Protein: Trace / Nitrite: Negative  Leuk Esterase: Large / RBC: 7 / WBC: >50  Sq Epi: x / Non Sq Epi: x / Bacteria: Few    COVID-19 PCR:  NotDetec (20 @ 17:04)    [x] Low complexity (Time < 45min)  Miya Gomez PA-C  Medicine [id number] [facility] MEDICINE NIGHT COVERAGE    Discussed w/ on call radiology re: preliminary results of CTCAP. DDx: Pyelonephritis vs. Renal infarction. Results as below. Discussed findings w/ Dr. Baker (). Recommending Renal US (ordered), c/w full dose Lovenox, and c/w IV Ceftriaxone. Will continue to monitor closely.    Vital Signs Last 24 Hrs  T(C): 36.8 (21 Dec 2020 21:00), Max: 37.2 (21 Dec 2020 13:32)  T(F): 98.3 (21 Dec 2020 21:00), Max: 98.9 (21 Dec 2020 13:32)  HR: 78 (21 Dec 2020 21:00) (78 - 105)  BP: 111/71 (21 Dec 2020 21:00) (103/72 - 129/84)  RR: 20 (21 Dec 2020 21:00) (18 - 20)  SpO2: 98% (21 Dec 2020 21:00) (95% - 98%)    < from: CT Chest No Cont (20 @ 19:57) >  ******PRELIMINARY REPORT******        INTERPRETATION:  Left kidney lower pole hypo-enhancement and hazy contour with associated perinephric and paranephric stranding. Finding is consistent with pyelonephritis in the context of fever. Renal infarction is a differential diagnosis..  LUIS MIGUEL Gomez was informed.    Labs:                        10.6   9.65  )-----------( 195      ( 21 Dec 2020 06:40 )             32.8         136  |  101  |  14  ----------------------------<  85  3.7   |  21<L>  |  1.05    Ca    8.8      21 Dec 2020 06:40  Phos  2.7       Mg     1.9         TPro  7.2  /  Alb  3.6  /  TBili  1.5<H>  /  DBili  x   /  AST  36<H>  /  ALT  36<H>  /  AlkPhos  85      PT/INR: PT: 16.4 sec | INR: 1.45 ratio (20 @ 06:40)  PTT: 25.6 sec (20 @ 06:40)  PT/INR: PT: 15.6 sec | INR: 1.39 ratio (20 @ 16:06)  PTT: 25.7 sec (20 @ 16:06)    Serum Pro-Brain Natriuretic Peptide : 9657 pg/mL<H> (20 @ 16:03)     D-Dimer Assay: 368 ng/mL DDU<H> (20 @ 06:40)     Blood Gas Venous:  pH: 7.46 | HCO3: 25 | pCO2: 35 | pO2: 30 | Lactate: 1.6 (20 @ 16:06)    Urinanalysis Basic (20 @ 23:40):  Color: Yellow / Appearance: Slightly Turbid / S.011 / pH: x  Gluc: x / Ketone: Negative / Bili: Negative / Urobili: <2 mg/dL  Blood: x / Protein: Trace / Nitrite: Negative  Leuk Esterase: Large / RBC: 7 / WBC: >50  Sq Epi: x / Non Sq Epi: x / Bacteria: Few    COVID-19 PCR:  NotDetec (20 @ 17:04)    [x] Low complexity (Time < 45min)  Miya Gomez PA-C  Medicine [id number]

## 2020-12-21 NOTE — ED ADULT NURSE REASSESSMENT NOTE - NS ED NURSE REASSESS COMMENT FT1
Pt a&ox4 and ambulatory. ACP contacted regarding 60mg PO of Cardizem. Medication not given at this time due to recent dose. Vital signs as noted, call bell in reach, will continue to monitor.

## 2020-12-21 NOTE — PROGRESS NOTE ADULT - SUBJECTIVE AND OBJECTIVE BOX
Medicine Progress Note    Patient is a 65y old  Female who presents with a chief complaint of Cough x 4 days (20 Dec 2020 18:31)      SUBJECTIVE / OVERNIGHT EVENTS: pt seen and examined    ADDITIONAL REVIEW OF SYSTEMS: Denies CP, Palpitations    MEDICATIONS  (STANDING):  aztreonam  IVPB      aztreonam  IVPB 1000 milliGRAM(s) IV Intermittent every 12 hours  diltiazem    Tablet 60 milliGRAM(s) Oral four times a day  influenza   Vaccine 0.5 milliLiter(s) IntraMuscular once  multivitamin 1 Tablet(s) Oral daily    MEDICATIONS  (PRN):  benzonatate 100 milliGRAM(s) Oral three times a day PRN Cough  hydrocodone/homatropine Syrup 5 milliLiter(s) Oral four times a day PRN Cough    CAPILLARY BLOOD GLUCOSE        I&O's Summar  Vital Signs Last 24 Hrs  T(C): 37.1 (21 Dec 2020 06:20), Max: 39.1 (20 Dec 2020 15:12)  T(F): 98.7 (21 Dec 2020 06:20), Max: 102.3 (20 Dec 2020 15:12)  HR: 105 (21 Dec 2020 06:20) (86 - 135)  BP: 105/64 (21 Dec 2020 06:20) (102/86 - 140/94)  BP(mean): --  RR: 20 (21 Dec 2020 06:20) (20 - 32)  SpO2: 95% (21 Dec 2020 06:20) (95% - 98%)    Physical exam:  CONSTITUTIONAL: NAD  ENMT: Moist oral mucosa  RESPIRATORY: lungs are clear to auscultation bilaterally  CARDIOVASCULAR: S1 and S2  ABDOMEN: Nontender, Soft  Extremities : no edema  Neuro: awake, alert  LABS:                        10.6   9.65  )-----------( 195      ( 21 Dec 2020 06:40 )             32.8     12-21    136  |  101  |  14  ----------------------------<  85  3.7   |  21<L>  |  1.05    Ca    8.8      21 Dec 2020 06:40  Phos  2.7     12-21  Mg     1.9     12-21    TPro  7.2  /  Alb  3.6  /  TBili  1.5<H>  /  DBili  x   /  AST  36<H>  /  ALT  36<H>  /  AlkPhos  85  12-21    PT/INR - ( 21 Dec 2020 06:40 )   PT: 16.4 sec;   INR: 1.45 ratio         PTT - ( 21 Dec 2020 06:40 )  PTT:25.6 sec      Urinalysis Basic - ( 21 Dec 2020 02:37 )    Color: Yellow / Appearance: Slightly Turbid / S.011 / pH: x  Gluc: x / Ketone: Negative  / Bili: Negative / Urobili: <2 mg/dL   Blood: x / Protein: Trace / Nitrite: Negative   Leuk Esterase: Large / RBC: 7 /HPF / WBC >50 /HPF   Sq Epi: x / Non Sq Epi: 16 /HPF / Bacteria: Few        SARS-CoV-2: NotDetec (20 Dec 2020 22:29)  COVID-19 PCR: NotDetec (20 Dec 2020 17:04)      RADIOLOGY & ADDITIONAL TESTS:  Imaging from Last 24 Hours:    Electrocardiogram/QTc Interval:    COORDINATION OF CARE:  Care Discussed with Consultants/Other Providers:

## 2020-12-21 NOTE — PROGRESS NOTE ADULT - PROBLEM SELECTOR PLAN 2
-Patient with fever, tachycardia, and leukocytosis  -procalcitonin is negative; pt has respiratory symptoms but no EKG changes. No wheezing  -suspect viral respiratory infection, most likely COVID-19  -procalcitonin also negative, will monitor off antibiotics at this point  -COVID times 2 neg: RVP neg  -will hold lasix for today, can re-evaluate in morning abx as per ID   f/u cx

## 2020-12-21 NOTE — PROGRESS NOTE ADULT - PROBLEM SELECTOR PLAN 3
-patient with transaminitis, likely in setting of EtOH consumption.   -T bili elevated, f/u D bili  -it has been 5 days since pt's last drink, will not initiate CIWA protocol monitor closely

## 2020-12-21 NOTE — SBIRT NOTE ADULT - NSSBIRTDRGBRIEFINTDET_GEN_A_CORE
Pt notes engagement in mja brownies to assist with her daily pain, increase her appetite, and aide in her sleep. This writer reviewed Mja Effects on the Body literature. Motivational interviewing provided and risk reduction discussed. This writer educated and encouraged pt to discuss medical mja card with PCP, if deemed medically eligible.

## 2020-12-21 NOTE — CONSULT NOTE ADULT - SUBJECTIVE AND OBJECTIVE BOX
Pulmonary Consult Note    FERDINAND ALMENDAREZ  MRN-[id number]    Chief Complaint: Patient is a 65y old  Female who presents with a chief complaint of Cough x 4 days (21 Dec 2020 11:07)      HPI:  Per chart review:  65 y.o. F with PMH of HOCM s/p septal ablation, afib on coumadin, HFpEF (EF 55% 9/2019), lung cancer (diagnosed about 2 years ago, s/p chemo, in remission), who presents w/ 4 days of worsening dry cough. Patient states that she was trying lozenges and tea at home, but these were not helping her symptoms. Further, she states that about 2-3 days ago, she developed "violent abdominal pain" and diarrhea, which resolved on its own approximately 1 day later. She had attributed her symptoms to eating Mexican food. She otherwise denies fevers at home. ROS is positive for SOB that accompanies cough. Patient denies sick contacts; she states her son and daughter, with home she lives with, are asymptomatic, as is her boyfriend.     In the ED, patient was found to be febrile to tmax of 102.3 F.     -  -  -on my exam:  -has been feeling weak for about 7 days, GI upset, WALKER, dry persistent cough.  Hx of lung ca s/p resection and chemo (does not know what stage/type), no inhaler use at home.  Former smoker x 40 years 1 ppd, quit 2 yrs ago.    ROS:  -  -  All other systems reviewed and negative    PAST MEDICAL HISTORY: HEALTH ISSUES - PROBLEM Dx:  RONY (acute kidney injury)  RONY (acute kidney injury)    Need for prophylactic measure  Need for prophylactic measure    Lung cancer  Lung cancer    Atrial fibrillation  Atrial fibrillation    Transaminitis  Transaminitis    Sepsis  Sepsis    Shortness of breath  Shortness of breath        SOCIAL HISTORY: former smoker    ACTIVE MEDICATION LIST:  MEDICATIONS  (STANDING):  cefTRIAXone   IVPB 1000 milliGRAM(s) IV Intermittent every 24 hours  diltiazem    Tablet 60 milliGRAM(s) Oral four times a day  enoxaparin Injectable 70 milliGRAM(s) SubCutaneous every 12 hours  influenza   Vaccine 0.5 milliLiter(s) IntraMuscular once  multivitamin 1 Tablet(s) Oral daily  warfarin 5 milliGRAM(s) Oral once    MEDICATIONS  (PRN):  benzonatate 100 milliGRAM(s) Oral three times a day PRN Cough  hydrocodone/homatropine Syrup 5 milliLiter(s) Oral four times a day PRN Cough      EXAM:  Vital Signs Last 24 Hrs  T(C): 37.2 (21 Dec 2020 13:32), Max: 39.1 (20 Dec 2020 15:12)  T(F): 98.9 (21 Dec 2020 13:32), Max: 102.3 (20 Dec 2020 15:12)  HR: 89 (21 Dec 2020 13:32) (86 - 135)  BP: 104/62 (21 Dec 2020 13:32) (103/72 - 140/94)  BP(mean): --  RR: 18 (21 Dec 2020 13:32) (18 - 32)  SpO2: 95% (21 Dec 2020 13:32) (95% - 98%)  GENERAL: No acute distress  NEURO: Alert and oriented x 3  LUNGS: Clear to auscultation bilaterally, no rales or wheezes  CV: S1/S2  ABDOMEN: +BS, nontender  EXTREMITIES: no clubbing, cyanosis, edema    LABS/IMAGING: reviewed                        10.6   9.65  )-----------( 195      ( 21 Dec 2020 06:40 )             32.8       12-21    136  |  101  |  14  ----------------------------<  85  3.7   |  21<L>  |  1.05    Ca    8.8      21 Dec 2020 06:40  Phos  2.7     12-21  Mg     1.9     12-21    TPro  7.2  /  Alb  3.6  /  TBili  1.5<H>  /  DBili  x   /  AST  36<H>  /  ALT  36<H>  /  AlkPhos  85  12-21    < from: Xray Chest 1 View- PORTABLE-Urgent (Xray Chest 1 View- PORTABLE-Urgent .) (12.20.20 @ 15:18) >    EXAM:  XR CHEST PORTABLE URGENT 1V        PROCEDURE DATE:  Dec 20 2020         INTERPRETATION:  INDICATION: Rule out pneumonia. No other clinical history is provided.    TECHNIQUE: 2 portable views of the chest.    COMPARISON: 11/17/2018    FINDINGS: The heart size cannot be adequately assessed on this single view. The left costophrenic angle was omitted from these films. There are no focal consolidations or significant pleural effusions. The hilar and mediastinal structures appear unremarkable.    IMPRESSION: Clear lungs as visualized.        < end of copied text >      < from: CT Maxillofacial w/ IV Cont (01.12.20 @ 09:44) >    EXAM:  CT MAXILLOFACIAL  IC        PROCEDURE DATE:  Jan 12 2020         INTERPRETATION:  CLINICAL INFORMATION: Right mandibular swelling and pain.    TECHNIQUE: CT scan of the maxillofacial bones was performed after the administration of intravenous contrast. 90 cc of Omnipaque 350 was administered and 10 cc was discarded. Thin section axial images with sagittal and coronal reformations were obtained.    COMPARISON: No similar prior studies available for comparison.    FINDINGS:    There is soft tissue swelling and fat stranding of the left chin with no discrete fluid collection or abscess visualized, consistent with cellulitis. There is a periapical lucency surrounding the left mandibular first molar tooth with a tract towards the area of cellulitis.    No acute facial bone fractures are visualized. The temporomandibular joints are intact. No septal hematoma is seen.    There are no traumatic hemorrhagic air-fluid levels within the paranasal sinuses. Mild mucosal thickening of the leftmaxillary sinus.    The globes are symmetric in size and contour. Extraocular muscles are not enlarged or deviated. No radiopaque orbital foreign bodies are visualized. The retrobulbar fat is preserved.    IMPRESSION:   Left chin cellulitis, likely secondary to a left mandibular molar tooth infection.          < end of copied text >    PROBLEM LIST:  65yFemale with HEALTH ISSUES - PROBLEM Dx:  RONY (acute kidney injury)  RONY (acute kidney injury)      Lung cancer  Lung cancer    Atrial fibrillation  Atrial fibrillation    Transaminitis  Transaminitis    Sepsis  Sepsis    Shortness of breath  Shortness of breath    RECS:  -Presumed COVID infection, cont isolation  -monitor O2 sats  -DVT prophylaxis  -if sats drop would start dexamethasone  -check ct chest/abdomen per ID recs, pt also with hx of frequent tooth infections and recent cellulitis of face..  -supportive care        Thank you for this consultation, please feel free to call with any questions [phone]  Brittany Harrington MD

## 2020-12-21 NOTE — CONSULT NOTE ADULT - ASSESSMENT
64 y/o F PMHx of HOCM s/p septal ablation, Afib, Lung ca (last chemo 2 years ago, in remission) presented with a variety of symptoms starting 3 weeks prior, worsening over the last week. Pt with signs of sepsis on admission now resolved, symptoms suggestive of possible COVID infection in spite of negative PCRs.    Sepsis  -could this all be from previous COVID infection? would send COVID serology  -CT Chest without contrast, CT abdomen/pelvis with contrast to evaluate for source of infection given symptoms of dry cough, GI symptoms now resolved with elevated bilirubin and transaminases  -f/u Bcx  -unclear allergy hx but patient states she tolerated amoxicillin. Would d/c aztreonam and treat with CTX empirically pending further work up  -UA contaminated, pt without symptoms    Discussed recommendations with primary team 66 y/o F PMHx of HOCM s/p septal ablation, Afib, Lung ca (last chemo 2 years ago, in remission) presented with a variety of symptoms starting 3 weeks prior, worsening over the last week. Pt with signs of sepsis on admission now resolved, symptoms suggestive of possible COVID infection in spite of negative PCRs.    Severe Sepsis on presentation, fever, leucocytosis, RONY, ? etiology. + Transaminitis, elevated markers of inflammation. Abnormal U/A.       Plan:   -could this all be from previous COVID infection? would send COVID serology  -CT Chest without contrast, CT abdomen/pelvis with contrast to evaluate for source of infection given symptoms of dry cough, GI symptoms now resolved with elevated bilirubin and transaminases  -f/u Bcx  -unclear allergy hx but patient states she tolerated amoxicillin. Would d/c aztreonam and treat with CTX empirically pending further work up  -UA contaminated, with many epithelial cells. pt without symptoms  - c/w isolation      Discussed recommendations with primary team

## 2020-12-21 NOTE — PATIENT PROFILE ADULT - NSPROPTRIGHTCAREGIVER_GEN_A_NUR
08/21/19 1707   Events/Summary/Plan   Events/Summary/Plan albuterol 5mg/atrovent tx given   Interdisciplinary Plan of Care-Goals (Indications)   Bronchodilator Indications Physical Exam / Hyperinflation / Wheezing (bronchospasm)   Interdisciplinary Plan of Care-Outcomes    Bronchodilator Outcome Diminished Wheezing and Volume of Air Movement Increased   Education   Education Yes - Pt. / Family has been Instructed in use of Respiratory Medications and Adverse Reactions   RT Assessment of Delivered Medications   Evaluation of Medication Delivery Daily Yes-- Pt /Family has been Instructed in use of Respiratory Medications and Adverse Reactions   SVN Group   #SVN Performed Yes   Given By: Mask   Respiratory WDL   Respiratory (WDL) X   Chest Exam   Work Of Breathing / Effort Moderate   Respiration 26   Pulse 124   Breath Sounds   RUL Breath Sounds Expiratory Wheezes   RML Breath Sounds Expiratory Wheezes   RLL Breath Sounds Expiratory Wheezes   TOM Breath Sounds Expiratory Wheezes   LLL Breath Sounds Expiratory Wheezes   Oximetry   Continuous Oximetry Yes   Oxygen   Pulse Oximetry 94 %   O2 (LPM) 0   O2 Daily Delivery Respiratory  Room Air with O2 Available      no

## 2020-12-21 NOTE — CONSULT NOTE ADULT - SUBJECTIVE AND OBJECTIVE BOX
Patient is a 65y old  Female who presents with a chief complaint of Cough x 4 days (21 Dec 2020 10:19)    HPI:  66 y/o F PMHx of HOCM s/p septal ablation, Afib, Lung ca (last chemo 2 years ago, in remission) presented with a variety of symptoms starting 3 weeks prior, worsening over the last week. Pt states that since the beginning of the month she has felt weakness and malaise which have impacted her yoga regimen. Over the last week she developed significant dry cough, non-productive and not associated with SOB. Pt has one day of nausea, vomiting, abdominal pain, and a loose BM  which she attributed to her mexican takeout meal, now resolved Otherwise denies systemic symptoms including significant fever, chills, dysuria, flank pain, HA, loss of taste and smell. Pt was swabbed for COVID approximately one month ago, which was negative. Was not experiencing symptoms, just thought she should be checked. She lives with her children and daughters boyfriend, none of whom have symptoms. Patient has been isolating at home but her children leave the house. She checks a pulse oximetry at home regularly and has noted some hypoxia with SPO2 89 and tachycardia to 130s on ambulation. No known sick contacts.    Pt is listed as having several antibiotics allergies but does not recall any adverse     prior hospital charts reviewed [  ]  primary team notes reviewed [  ]  other consultant notes reviewed [  ]    PAST MEDICAL & SURGICAL HISTORY:  Lung cancer  s/p chemotherapy ~ now in remission    GERD (gastroesophageal reflux disease)    CAP (community acquired pneumonia)  RML/RLL at [facility] 3/2017    Lung cancer  s/p chemotherapy    Chronic diastolic (congestive) heart failure    AF (atrial fibrillation)  On Coumadin    Pleural effusion  Right parapneumonic drained twice 3/2017 at [facility]    HOCM (hypertrophic obstructive cardiomyopathy)  S/P septal ablation    H/O cardiac radiofrequency ablation  for HOCM - 16 years ago    History of   15 years ago      ANTIMICROBIALS:    Allergies  cefdinir (Rash)  moxifloxacin (Rash)  penicillin (Rash)    ANTIMICROBIALS (past 90 days)  MEDICATIONS  (STANDING):    aztreonam  IVPB   50 mL/Hr IV Intermittent (20 @ 06:23)        aztreonam  IVPB    aztreonam  IVPB 1000 every 12 hours    OTHER MEDS: MEDICATIONS  (STANDING):  benzonatate 100 three times a day PRN  diltiazem    Tablet 60 four times a day  hydrocodone/homatropine Syrup 5 four times a day PRN  influenza   Vaccine 0.5 once    SOCIAL HISTORY:   hx smoking  non-smoker    FAMILY HISTORY:  Family history of psoriasis (Sibling)  Brother    Family history of ulcerative colitis (Sibling)  Brother    Family history of liver cancer  Mother      REVIEW OF SYSTEMS  [  ] ROS unobtainable because:    [  ] All other systems negative except as noted below:	    Constitutional:  [ ] fever [ ] chills  [ ] weight loss  [ ] weakness  Skin:  [ ] rash [ ] phlebitis	  Eyes: [ ] icterus [ ] pain  [ ] discharge	  ENMT: [ ] sore throat  [ ] thrush [ ] ulcers [ ] exudates  Respiratory: [ ] dyspnea [ ] hemoptysis [ ] cough [ ] sputum	  Cardiovascular:  [ ] chest pain [ ] palpitations [ ] edema	  Gastrointestinal:  [ ] nausea [ ] vomiting [ ] diarrhea [ ] constipation [ ] pain	  Genitourinary:  [ ] dysuria [ ] frequency [ ] hematuria [ ] discharge [ ] flank pain  [ ] incontinence  Musculoskeletal:  [ ] myalgias [ ] arthralgias [ ] arthritis  [ ] back pain  Neurological:  [ ] headache [ ] seizures  [ ] confusion/altered mental status  Psychiatric:  [ ] anxiety [ ] depression	  Hematology/Lymphatics:  [ ] lymphadenopathy  Endocrine:  [ ] adrenal [ ] thyroid  Allergic/Immunologic:	 [ ] transplant [ ] seasonal    Vital Signs Last 24 Hrs  T(F): 98.7 (20 @ 06:20), Max: 102.3 (20 @ 15:12)  Vital Signs Last 24 Hrs  HR: 105 (20 @ 06:20) (86 - 135)  BP: 105/64 (20 @ 06:20) (102/86 - 140/94)  RR: 20 (20 @ 06:20)  SpO2: 95% (20 @ 06:20) (95% - 98%)  Wt(kg): --    PHYSICAL EXAM:  Constitutional: non-toxic, no distress  HEAD/EYES: anicteric, no conjunctival injection  ENT:  supple, no thrush  Cardiovascular:   normal S1, S2, no murmur, no edema  Respiratory:  clear BS bilaterally, no wheezes, no rales  GI:  soft, non-tender, normal bowel sounds  :  no max, no CVA tenderness  Musculoskeletal:  no synovitis, normal ROM  Neurologic: awake and alert, normal strength, no focal findings  Skin:  no rash, no erythema, no phlebitis  Heme/Onc: no lymphadenopathy   Psychiatric:  awake, alert, appropriate mood                            10.6   9.65  )-----------( 195      ( 21 Dec 2020 06:40 )             32.8       136  |  101  |  14  ----------------------------<  85  3.7   |  21<L>  |  1.05    Ca    8.8      21 Dec 2020 06:40  Phos  2.7       Mg     1.9         TPro  7.2  /  Alb  3.6  /  TBili  1.5<H>  /  DBili  x   /  AST  36<H>  /  ALT  36<H>  /  AlkPhos  85      Urinalysis Basic - ( 21 Dec 2020 02:37 )    Color: Yellow / Appearance: Slightly Turbid / S.011 / pH: x  Gluc: x / Ketone: Negative  / Bili: Negative / Urobili: <2 mg/dL   Blood: x / Protein: Trace / Nitrite: Negative   Leuk Esterase: Large / RBC: 7 /HPF / WBC >50 /HPF   Sq Epi: x / Non Sq Epi: 16 /HPF / Bacteria: Few    MICROBIOLOGY:          Rapid RVP Result: Deysi ( @ 22:29)      RADIOLOGY:  imaging below personally reviewed     Patient is a 65y old  Female who presents with a chief complaint of Cough x 4 days (21 Dec 2020 10:19)    HPI:  64 y/o F PMHx of HOCM s/p septal ablation, Afib, Lung ca (last chemo 2 years ago, in remission) presented with a variety of symptoms starting 3 weeks prior, worsening over the last week. Pt states that since the beginning of the month she has felt weakness and malaise which have impacted her yoga regimen. Over the last week she developed significant dry cough, non-productive and not associated with SOB. Pt has one day of nausea, vomiting, abdominal pain, and a loose BM  which she attributed to her mexican takeout meal, now resolved Otherwise denies systemic symptoms including significant fever, chills, dysuria, flank pain, HA, loss of taste and smell. Pt was swabbed for COVID approximately one month ago, which was negative. Was not experiencing symptoms, just thought she should be checked. She lives with her children and daughters boyfriend, none of whom have symptoms. Patient has been isolating at home but her children leave the house. She checks a pulse oximetry at home regularly and has noted some hypoxia with SPO2 89 and tachycardia to 130s on ambulation. No known sick contacts.    In the hospital pt with fever to 102.3, leukocytosis 12.46, mildly elevated T Bili and transaminates, and elevated inflammatory markers. UA is contaminated by epithelial cells. CXR unremarkable. COVID PCR negative x 2. Pt was given aztreonam, WBC and fever have since resolved.    Pt is listed as having several antibiotics allergies but does not recall any adverse reactions including rash, has tolerated amoxicillin previously per patient.     prior hospital charts reviewed [ x ]  primary team notes reviewed [ x ]  other consultant notes reviewed [ x ]    PAST MEDICAL & SURGICAL HISTORY:  Lung cancer  s/p chemotherapy ~ now in remission    GERD (gastroesophageal reflux disease)    CAP (community acquired pneumonia)  RML/RLL at [facility] 3/2017    Lung cancer  s/p chemotherapy    Chronic diastolic (congestive) heart failure    AF (atrial fibrillation)  On Coumadin    Pleural effusion  Right parapneumonic drained twice 3/2017 at [facility]    HOCM (hypertrophic obstructive cardiomyopathy)  S/P septal ablation    H/O cardiac radiofrequency ablation  for HOCM - 16 years ago    History of   15 years ago      ANTIMICROBIALS:    Allergies  cefdinir (Rash) - pt does not recall  moxifloxacin (Rash) - pt does not recall  penicillin (Rash) - childhood, pt states she has tolerated amoxicillin    ANTIMICROBIALS (past 90 days)  MEDICATIONS  (STANDING):    aztreonam  IVPB   50 mL/Hr IV Intermittent (20 @ 06:23)        aztreonam  IVPB    aztreonam  IVPB 1000 every 12 hours    OTHER MEDS: MEDICATIONS  (STANDING):  benzonatate 100 three times a day PRN  diltiazem    Tablet 60 four times a day  hydrocodone/homatropine Syrup 5 four times a day PRN  influenza   Vaccine 0.5 once    SOCIAL HISTORY:   former smoker, former significant alcohol use now drinks bottle of wine weekly, no recreational drug use    FAMILY HISTORY:  Family history of psoriasis (Sibling)  Brother    Family history of ulcerative colitis (Sibling)  Brother    Family history of liver cancer  Mother      REVIEW OF SYSTEMS  [  ] ROS unobtainable because:    [x  ] All other systems negative except as noted below:	    Constitutional:  [ ] fever [ ] chills  [ ] weight loss  [x ] weakness  Skin:  [ ] rash [ ] phlebitis	  Eyes: [ ] icterus [ ] pain  [ ] discharge	  ENMT: [ ] sore throat  [ ] thrush [ ] ulcers [ ] exudates  Respiratory: [ ] dyspnea [ ] hemoptysis [x ] cough [ ] sputum	  Cardiovascular:  [ ] chest pain [ ] palpitations [ ] edema	  Gastrointestinal:  [ ] nausea [ ] vomiting [ ] diarrhea [ ] constipation [ ] pain	  Genitourinary:  [ ] dysuria [ ] frequency [ ] hematuria [ ] discharge [ ] flank pain  [ ] incontinence  Musculoskeletal:  [ ] myalgias [ ] arthralgias [ ] arthritis  [ ] back pain  Neurological:  [ ] headache [ ] seizures  [ ] confusion/altered mental status  Psychiatric:  [ ] anxiety [ ] depression	  Hematology/Lymphatics:  [ ] lymphadenopathy  Endocrine:  [ ] adrenal [ ] thyroid  Allergic/Immunologic:	 [ ] transplant [ ] seasonal    Vital Signs Last 24 Hrs  T(F): 98.7 (20 @ 06:20), Max: 102.3 (20 @ 15:12)  Vital Signs Last 24 Hrs  HR: 105 (20 @ 06:20) (86 - 135)  BP: 105/64 (20 @ 06:20) (102/86 - 140/94)  RR: 20 (20 @ 06:20)  SpO2: 95% (20 @ 06:20) (95% - 98%)  Wt(kg): --    PHYSICAL EXAM:  Constitutional: non-toxic, anxious, minimal dry cough  HEAD/EYES: anicteric, no conjunctival injection  ENT:  supple, no thrush, somewhat poor dentition  Cardiovascular:   normal S1, S2, no murmur, no edema  Respiratory:  clear BS bilaterally, no wheezes, no rales  GI:  soft, non-tender, normal bowel sounds  :  no max, no CVA tenderness  Musculoskeletal:  no synovitis, normal ROM  Neurologic: awake and alert, normal strength, no focal findings  Skin:  no rash, no erythema, no phlebitis  Heme/Onc: no lymphadenopathy   Psychiatric:  awake, alert, anxious                            10.6   9.65  )-----------( 195      ( 21 Dec 2020 06:40 )             32.8       136  |  101  |  14  ----------------------------<  85  3.7   |  21<L>  |  1.05    Ca    8.8      21 Dec 2020 06:40  Phos  2.7       Mg     1.9         TPro  7.2  /  Alb  3.6  /  TBili  1.5<H>  /  DBili  x   /  AST  36<H>  /  ALT  36<H>  /  AlkPhos  85      Urinalysis Basic - ( 21 Dec 2020 02:37 )    Color: Yellow / Appearance: Slightly Turbid / S.011 / pH: x  Gluc: x / Ketone: Negative  / Bili: Negative / Urobili: <2 mg/dL   Blood: x / Protein: Trace / Nitrite: Negative   Leuk Esterase: Large / RBC: 7 /HPF / WBC >50 /HPF   Sq Epi: x / Non Sq Epi: 16 /HPF / Bacteria: Few    MICROBIOLOGY:    COVID-19 PCR . (20 @ 17:04)   COVID-19 PCR: NotDetec: You can help in the fight against COVID-19. [facility] may contact       Rapid RVP Result: NotDetec ( @ 22:29)      RADIOLOGY:  < from: Xray Chest 1 View- PORTABLE-Urgent (Xray Chest 1 View- PORTABLE-Urgent .) (20 @ 15:18) >    IMPRESSION: Clear lungs as visualized.      < end of copied text >       Patient is a 65y old  Female who presents with a chief complaint of Cough x 4 days (21 Dec 2020 10:19)    HPI:  66 y/o F PMHx of HOCM s/p septal ablation, Afib, Lung ca (last chemo 2 years ago, in remission) presented with a variety of symptoms starting 3 weeks prior, worsening over the last week. Pt states that since the beginning of the month she has felt weakness and malaise which have impacted her yoga regimen. Over the last week she developed significant dry cough, non-productive and not associated with SOB. Pt has one day of nausea, vomiting, abdominal pain, and a loose BM  which she attributed to her mexican takeout meal, now resolved Otherwise denies systemic symptoms including significant fever, chills, dysuria, flank pain, HA, loss of taste and smell. Pt was swabbed for COVID approximately one month ago, which was negative. Was not experiencing symptoms, just thought she should be checked. She lives with her children and daughters boyfriend, none of whom have symptoms. Patient has been isolating at home but her children leave the house. She checks a pulse oximetry at home regularly and has noted some hypoxia with SPO2 89 and tachycardia to 130s on ambulation. No known sick contacts.    In the hospital pt with fever to 102.3, leukocytosis 12.46, mildly elevated T Bili and transaminates, and elevated inflammatory markers. UA is contaminated by epithelial cells. CXR unremarkable. COVID PCR negative x 2. Pt was given aztreonam, WBC and fever have since resolved.  Pt is listed as having several antibiotics allergies but does not recall any adverse reactions including rash, has tolerated amoxicillin previously per patient. The only thing she remembers is upset stomach. No sick contacts.      prior hospital charts reviewed [ x ]  primary team notes reviewed [ x ]  other consultant notes reviewed [ x ]    PAST MEDICAL & SURGICAL HISTORY:  Lung cancer  s/p chemotherapy ~2017 now in remission    GERD (gastroesophageal reflux disease)    CAP (community acquired pneumonia)  RML/RLL at [facility] 3/2017    Lung cancer  s/p chemotherapy    Chronic diastolic (congestive) heart failure    AF (atrial fibrillation)  On Coumadin    Pleural effusion  Right parapneumonic drained twice 3/2017 at [facility]    HOCM (hypertrophic obstructive cardiomyopathy)  S/P septal ablation    H/O cardiac radiofrequency ablation  for HOCM - 16 years ago    History of   15 years ago      ANTIMICROBIALS:    Allergies  cefdinir (Rash) - pt does not recall  moxifloxacin (Rash) - pt does not recall  penicillin (Rash) - childhood, pt states she has tolerated amoxicillin    ANTIMICROBIALS (past 90 days)  MEDICATIONS  (STANDING):    aztreonam  IVPB   50 mL/Hr IV Intermittent (20 @ 06:23)        aztreonam  IVPB    aztreonam  IVPB 1000 every 12 hours    OTHER MEDS: MEDICATIONS  (STANDING):  benzonatate 100 three times a day PRN  diltiazem    Tablet 60 four times a day  hydrocodone/homatropine Syrup 5 four times a day PRN  influenza   Vaccine 0.5 once    SOCIAL HISTORY:   former smoker, former significant alcohol use now drinks bottle of wine weekly, no recreational drug use      FAMILY HISTORY:  Family history of psoriasis (Sibling)  Brother    Family history of ulcerative colitis (Sibling)  Brother    Family history of liver cancer  Mother      REVIEW OF SYSTEMS  [  ] ROS unobtainable because:    [x  ] All other systems negative except as noted below:	    Constitutional:  [ ] fever [ ] chills  [ ] weight loss  [x ] weakness  Skin:  [ ] rash [ ] phlebitis	  Eyes: [ ] icterus [ ] pain  [ ] discharge	  ENMT: [ ] sore throat  [ ] thrush [ ] ulcers [ ] exudates  Respiratory: [ ] dyspnea [ ] hemoptysis [x ] cough [ ] sputum	  Cardiovascular:  [ ] chest pain [ ] palpitations [ ] edema	  Gastrointestinal:  [ ] nausea [ ] vomiting [ ] diarrhea [ ] constipation [ ] pain	  Genitourinary:  [ ] dysuria [ ] frequency [ ] hematuria [ ] discharge [ ] flank pain  [ ] incontinence  Musculoskeletal:  [ ] myalgias [ ] arthralgias [ ] arthritis  [ ] back pain  Neurological:  [ ] headache [ ] seizures  [ ] confusion/altered mental status  Psychiatric:  [ ] anxiety [ ] depression	  Endocrine:  [ ] adrenal [ ] thyroid  Allergic/Immunologic:	 [ ] transplant [ ] seasonal      Vital Signs Last 24 Hrs  T(F): 98.7 (20 @ 06:20), Max: 102.3 (20 @ 15:12)  Vital Signs Last 24 Hrs  HR: 105 (20 @ 06:20) (86 - 135)  BP: 105/64 (20 @ 06:20) (102/86 - 140/94)  RR: 20 (20 @ 06:20)  SpO2: 95% (20 @ 06:20) (95% - 98%)  Wt(kg): --      PHYSICAL EXAM:  Constitutional: non-toxic, anxious, minimal dry cough  HEAD/EYES: anicteric, no conjunctival injection  ENT:  supple, no thrush, somewhat poor dentition  Cardiovascular:   normal S1, S2, tachy   Respiratory:  clear BS bilaterally,   GI:  soft, non-tender, normal bowel sounds  :  no max, no CVA tenderness  Musculoskeletal:  no synovitis, normal ROM  Neurologic: awake and alert, normal strength, no focal findings  Skin:  no rash, no erythema, no phlebitis  Extremities: no swelling   Vascular: palpable pulses.   Psychiatric:  awake, alert, anxious                            10.6   9.65  )-----------( 195      ( 21 Dec 2020 06:40 )             32.8       136  |  101  |  14  ----------------------------<  85  3.7   |  21<L>  |  1.05    Ca    8.8      21 Dec 2020 06:40  Phos  2.7       Mg     1.9         TPro  7.2  /  Alb  3.6  /  TBili  1.5<H>  /  DBili  x   /  AST  36<H>  /  ALT  36<H>  /  AlkPhos  85      Urinalysis Basic - ( 21 Dec 2020 02:37 )    Color: Yellow / Appearance: Slightly Turbid / S.011 / pH: x  Gluc: x / Ketone: Negative  / Bili: Negative / Urobili: <2 mg/dL   Blood: x / Protein: Trace / Nitrite: Negative   Leuk Esterase: Large / RBC: 7 /HPF / WBC >50 /HPF   Sq Epi: x / Non Sq Epi: 16 /HPF / Bacteria: Few      MICROBIOLOGY:  COVID 19 PCR . (20 @ 17:04)   COVID-19 PCR: NotDetec: You can help in the fight against COVID-19. [facility] may contact     Rapid RVP Result: NotDetec ( @ 22:29)        RADIOLOGY: Imaging reviewed personally and interpretation as mentioned below.     < from: Xray Chest 1 View- PORTABLE-Urgent (Xray Chest 1 View- PORTABLE-Urgent .) (20.20 @ 15:18) >    IMPRESSION: Clear lungs as visualized.

## 2020-12-22 LAB
ALBUMIN SERPL ELPH-MCNC: 3.8 G/DL — SIGNIFICANT CHANGE UP (ref 3.3–5)
ALP SERPL-CCNC: 102 U/L — SIGNIFICANT CHANGE UP (ref 40–120)
ALT FLD-CCNC: 43 U/L — HIGH (ref 4–33)
ANION GAP SERPL CALC-SCNC: 13 MMOL/L — SIGNIFICANT CHANGE UP (ref 7–14)
AST SERPL-CCNC: 39 U/L — HIGH (ref 4–32)
BILIRUB SERPL-MCNC: 1 MG/DL — SIGNIFICANT CHANGE UP (ref 0.2–1.2)
BUN SERPL-MCNC: 11 MG/DL — SIGNIFICANT CHANGE UP (ref 7–23)
CALCIUM SERPL-MCNC: 9.2 MG/DL — SIGNIFICANT CHANGE UP (ref 8.4–10.5)
CHLORIDE SERPL-SCNC: 98 MMOL/L — SIGNIFICANT CHANGE UP (ref 98–107)
CO2 SERPL-SCNC: 23 MMOL/L — SIGNIFICANT CHANGE UP (ref 22–31)
CREAT SERPL-MCNC: 1.07 MG/DL — SIGNIFICANT CHANGE UP (ref 0.5–1.3)
CULTURE RESULTS: SIGNIFICANT CHANGE UP
GLUCOSE SERPL-MCNC: 102 MG/DL — HIGH (ref 70–99)
HCT VFR BLD CALC: 34 % — LOW (ref 34.5–45)
HGB BLD-MCNC: 11.2 G/DL — LOW (ref 11.5–15.5)
INR BLD: 1.56 RATIO — HIGH (ref 0.88–1.17)
MAGNESIUM SERPL-MCNC: 2.1 MG/DL — SIGNIFICANT CHANGE UP (ref 1.6–2.6)
MCHC RBC-ENTMCNC: 32.9 GM/DL — SIGNIFICANT CHANGE UP (ref 32–36)
MCHC RBC-ENTMCNC: 32.9 PG — SIGNIFICANT CHANGE UP (ref 27–34)
MCV RBC AUTO: 100 FL — SIGNIFICANT CHANGE UP (ref 80–100)
NRBC # BLD: 0 /100 WBCS — SIGNIFICANT CHANGE UP
NRBC # FLD: 0 K/UL — SIGNIFICANT CHANGE UP
PHOSPHATE SERPL-MCNC: 3.5 MG/DL — SIGNIFICANT CHANGE UP (ref 2.5–4.5)
PLATELET # BLD AUTO: 225 K/UL — SIGNIFICANT CHANGE UP (ref 150–400)
POTASSIUM SERPL-MCNC: 3.6 MMOL/L — SIGNIFICANT CHANGE UP (ref 3.5–5.3)
POTASSIUM SERPL-SCNC: 3.6 MMOL/L — SIGNIFICANT CHANGE UP (ref 3.5–5.3)
PROT SERPL-MCNC: 7.8 G/DL — SIGNIFICANT CHANGE UP (ref 6–8.3)
PROTHROM AB SERPL-ACNC: 17.4 SEC — HIGH (ref 9.8–13.1)
RBC # BLD: 3.4 M/UL — LOW (ref 3.8–5.2)
RBC # FLD: 12.5 % — SIGNIFICANT CHANGE UP (ref 10.3–14.5)
SODIUM SERPL-SCNC: 134 MMOL/L — LOW (ref 135–145)
SPECIMEN SOURCE: SIGNIFICANT CHANGE UP
WBC # BLD: 6.83 K/UL — SIGNIFICANT CHANGE UP (ref 3.8–10.5)
WBC # FLD AUTO: 6.83 K/UL — SIGNIFICANT CHANGE UP (ref 3.8–10.5)

## 2020-12-22 PROCEDURE — 99232 SBSQ HOSP IP/OBS MODERATE 35: CPT

## 2020-12-22 PROCEDURE — 99233 SBSQ HOSP IP/OBS HIGH 50: CPT

## 2020-12-22 PROCEDURE — 93306 TTE W/DOPPLER COMPLETE: CPT | Mod: 26

## 2020-12-22 RX ORDER — WARFARIN SODIUM 2.5 MG/1
5 TABLET ORAL ONCE
Refills: 0 | Status: COMPLETED | OUTPATIENT
Start: 2020-12-22 | End: 2020-12-22

## 2020-12-22 RX ADMIN — Medication 60 MILLIGRAM(S): at 11:39

## 2020-12-22 RX ADMIN — ENOXAPARIN SODIUM 70 MILLIGRAM(S): 100 INJECTION SUBCUTANEOUS at 05:39

## 2020-12-22 RX ADMIN — Medication 100 MILLIGRAM(S): at 06:39

## 2020-12-22 RX ADMIN — Medication 1 TABLET(S): at 11:39

## 2020-12-22 RX ADMIN — Medication 60 MILLIGRAM(S): at 17:46

## 2020-12-22 RX ADMIN — Medication 100 MILLIGRAM(S): at 20:53

## 2020-12-22 RX ADMIN — WARFARIN SODIUM 5 MILLIGRAM(S): 2.5 TABLET ORAL at 17:42

## 2020-12-22 RX ADMIN — ENOXAPARIN SODIUM 70 MILLIGRAM(S): 100 INJECTION SUBCUTANEOUS at 17:45

## 2020-12-22 RX ADMIN — CEFTRIAXONE 100 MILLIGRAM(S): 500 INJECTION, POWDER, FOR SOLUTION INTRAMUSCULAR; INTRAVENOUS at 13:58

## 2020-12-22 RX ADMIN — Medication 100 MILLIGRAM(S): at 13:42

## 2020-12-22 NOTE — PROGRESS NOTE ADULT - PROBLEM SELECTOR PLAN 1
-patient with cough and SOB x 4 days. No evidence of consolidations on CXR. Patient is not hypoxic.  -EKG without acute ischemic changes, patient does not appear volume overloaded on exam  -symptomatically better s/p hycodan in ED; will continue PRN Tessalon Perles and Hycodan  -COVID neg  -PRN albuterol  -Wells score of 1.5, i.e. low risk for PE
-patient with cough and SOB x 4 days. No evidence of consolidations on CXR. Patient is not hypoxic.  -EKG without acute ischemic changes, patient does not appear volume overloaded on exam  -symptomatically better s/p hycodan in ED; will continue PRN Tessalon Perles and Hycodan  -COVID neg  -PRN albuterol  -Wells score of 1.5, i.e. low risk for PE

## 2020-12-22 NOTE — PROGRESS NOTE ADULT - SUBJECTIVE AND OBJECTIVE BOX
Pulmonary Follow Up Note    FERDINAND ALMENDAREZ  [id number]    Chief Complaint: Patient is a 65y old  Female who presents with a chief complaint of Cough x 4 days (21 Dec 2020 11:07)      HPI:  doing ok today  still not requiring O2        SOCIAL HISTORY: former smoker    MEDICATIONS  (STANDING):  cefTRIAXone   IVPB 1000 milliGRAM(s) IV Intermittent every 24 hours  diltiazem    Tablet 60 milliGRAM(s) Oral four times a day  enoxaparin Injectable 70 milliGRAM(s) SubCutaneous every 12 hours  influenza   Vaccine 0.5 milliLiter(s) IntraMuscular once  multivitamin 1 Tablet(s) Oral daily  warfarin 5 milliGRAM(s) Oral once    MEDICATIONS  (PRN):  benzonatate 100 milliGRAM(s) Oral three times a day PRN Cough  hydrocodone/homatropine Syrup 5 milliLiter(s) Oral four times a day PRN Cough        EXAM:  Vital Signs Last 24 Hrs  T(C): 36.9 (22 Dec 2020 11:32), Max: 37.2 (21 Dec 2020 13:32)  T(F): 98.5 (22 Dec 2020 11:32), Max: 98.9 (21 Dec 2020 13:32)  HR: 100 (22 Dec 2020 11:32) (78 - 100)  BP: 102/72 (22 Dec 2020 11:32) (99/58 - 120/83)  BP(mean): --  RR: 18 (22 Dec 2020 11:32) (18 - 20)  SpO2: 96% (22 Dec 2020 11:32) (95% - 98%)  GENERAL: No acute distress  NEURO: Alert and oriented x 3  LUNGS: Clear to auscultation bilaterally, no rales or wheezes      LABS/IMAGING: reviewed                                   11.2   6.83  )-----------( 225      ( 22 Dec 2020 08:03 )             34.0   12-22    134<L>  |  98  |  11  ----------------------------<  102<H>  3.6   |  23  |  1.07    Ca    9.2      22 Dec 2020 08:03  Phos  3.5     12-22  Mg     2.1     12-22    TPro  7.8  /  Alb  3.8  /  TBili  1.0  /  DBili  x   /  AST  39<H>  /  ALT  43<H>  /  AlkPhos  102  12-22      < from: CT Chest No Cont (12.21.20 @ 19:57) >    EXAM:  CT CHEST      EXAM:  CT ABDOMEN AND PELVIS IC        PROCEDURE DATE:  Dec 21 2020         INTERPRETATION:  CLINICAL INFORMATION: Fevers    COMPARISON: CT chest, abdomen and pelvis 9/1/2019.    PROCEDURE:  CT of the Chest, Abdomen and Pelvis was performed with intravenous contrast.  Intravenous contrast: 90 ml Omnipaque 350. 10 ml discarded.  Oral contrast: None.  Sagittal and coronal reformats were performed.    FINDINGS:  CHEST:  LUNGS AND LARGE AIRWAYS: Patent central airways. Bibasilar linear type atelectasis. Mosaic attenuation, likely air trapping.  PLEURA: No pleural effusion.  VESSELS: Within normal limits.  HEART: Cardiomegaly. No pericardial effusion.  MEDIASTINUM AND BRANDON: No lymphadenopathy.  CHEST WALL AND LOWER NECK: Within normal limits.    ABDOMEN AND PELVIS:  LIVER: Within normal limits.  BILE DUCTS: Normal caliber.  GALLBLADDER: Within normal limits.  SPLEEN: Within normal limits.  PANCREAS: Within normal limits.  ADRENALS: Within normal limits.  KIDNEYS/URETERS:Heterogeneous enhancement involving the left lower pole. No urinary tract calculus or hydronephrosis. Adjacent perinephric fat stranding.    BLADDER: Minimally distended.  REPRODUCTIVE ORGANS: Uterus and adnexa within normal limits.    BOWEL: No bowel obstruction. Appendix is normal.  PERITONEUM: No ascites.  VESSELS: Atherosclerotic changes.  RETROPERITONEUM/LYMPH NODES: No lymphadenopathy.  ABDOMINAL WALL: Small inguinal hernias.  BONES: Degenerative changes.    IMPRESSION:  Heterogeneous enhancement of the lower pole of the left kidney. Favor pyelonephritis.              WILLA DAS MD; Resident Interventional Radiology  This document has been electronically signed.  SHIVANI TORRES    < end of copied text >    < from: Xray Chest 1 View- PORTABLE-Urgent (Xray Chest 1 View- PORTABLE-Urgent .) (12.20.20 @ 15:18) >    EXAM:  XR CHEST PORTABLE URGENT 1V        PROCEDURE DATE:  Dec 20 2020         INTERPRETATION:  INDICATION: Rule out pneumonia. No other clinical history is provided.    TECHNIQUE: 2 portable views of the chest.    COMPARISON: 11/17/2018    FINDINGS: The heart size cannot be adequately assessed on this single view. The left costophrenic angle was omitted from these films. There are no focal consolidations or significant pleural effusions. The hilar and mediastinal structures appear unremarkable.    IMPRESSION: Clear lungs as visualized.        < end of copied text >      < from: CT Maxillofacial w/ IV Cont (01.12.20 @ 09:44) >    EXAM:  CT MAXILLOFACIAL  IC        PROCEDURE DATE:  Jan 12 2020         INTERPRETATION:  CLINICAL INFORMATION: Right mandibular swelling and pain.    TECHNIQUE: CT scan of the maxillofacial bones was performed after the administration of intravenous contrast. 90 cc of Omnipaque 350 was administered and 10 cc was discarded. Thin section axial images with sagittal and coronal reformations were obtained.    COMPARISON: No similar prior studies available for comparison.    FINDINGS:    There is soft tissue swelling and fat stranding of the left chin with no discrete fluid collection or abscess visualized, consistent with cellulitis. There is a periapical lucency surrounding the left mandibular first molar tooth with a tract towards the area of cellulitis.    No acute facial bone fractures are visualized. The temporomandibular joints are intact. No septal hematoma is seen.    There are no traumatic hemorrhagic air-fluid levels within the paranasal sinuses. Mild mucosal thickening of the leftmaxillary sinus.    The globes are symmetric in size and contour. Extraocular muscles are not enlarged or deviated. No radiopaque orbital foreign bodies are visualized. The retrobulbar fat is preserved.    IMPRESSION:   Left chin cellulitis, likely secondary to a left mandibular molar tooth infection.          < end of copied text >    PROBLEM LIST:  65yFemale with HEALTH ISSUES - PROBLEM Dx:  RONY (acute kidney injury)  RONY (acute kidney injury)      Lung cancer  Lung cancer    Atrial fibrillation  Atrial fibrillation    Transaminitis  Transaminitis    Sepsis  Sepsis    Shortness of breath  Shortness of breath    RECS:  -covid vs pyelo? ID fu  -monitor O2 sats  -DVT prophylaxis  -supportive care         please feel free to call with any questions [phone]  Brittany Harrington MD

## 2020-12-22 NOTE — PROGRESS NOTE ADULT - PROBLEM SELECTOR PLAN 5
- on coumadin  continue Cardizem  - INR is currently subtherapeutic   -f/u INR in am
- on coumadin  continue Cardizem  - INR is currently subtherapeutic   -f/u INR in am

## 2020-12-22 NOTE — PROGRESS NOTE ADULT - SUBJECTIVE AND OBJECTIVE BOX
65y old  Female who presents with a chief complaint of Cough x 4 days (22 Dec 2020 12:34)      Interval history:  Afebrile, cough improving, denies any other complains. Feeling a little better. tolerating abx well.     Allergies:   cefdinir (Rash)  moxifloxacin (Rash)  penicillin (Rash)    Antimicrobials:  cefTRIAXone   IVPB 1000 milliGRAM(s) IV Intermittent every 24 hours    REVIEW OF SYSTEMS:  No chest pain   No SOB  No N/V  No dysuria or frequency  No rash.     Vital Signs Last 24 Hrs  T(C): 37 (12-22-20 @ 16:15), Max: 37 (12-22-20 @ 16:15)  T(F): 98.6 (12-22-20 @ 16:15), Max: 98.6 (12-22-20 @ 16:15)  HR: 90 (12-22-20 @ 16:15) (78 - 100)  BP: 102/68 (12-22-20 @ 16:15) (99/58 - 111/71)  BP(mean): --  RR: 18 (12-22-20 @ 16:15) (18 - 20)  SpO2: 95% (12-22-20 @ 16:15) (95% - 98%)    PHYSICAL EXAM:  Patient in no acute distress. AAOX3.  No icterus, no oral ulcers.  Cardiovascular: S1S2 normal.  Lungs: Good air entry B/L lung fields.  Gastrointestinal: soft, nontender, nondistended.  Extremities: no edema.  IV sites not inflamed.                           11.2   6.83  )-----------( 225      ( 22 Dec 2020 08:03 )             34.0   12-22    134<L>  |  98  |  11  ----------------------------<  102<H>  3.6   |  23  |  1.07    Ca    9.2      22 Dec 2020 08:03  Phos  3.5     12-22  Mg     2.1     12-22    TPro  7.8  /  Alb  3.8  /  TBili  1.0  /  DBili  x   /  AST  39<H>  /  ALT  43<H>  /  AlkPhos  102  12-22      LIVER FUNCTIONS - ( 22 Dec 2020 08:03 )  Alb: 3.8 g/dL / Pro: 7.8 g/dL / ALK PHOS: 102 U/L / ALT: 43 U/L / AST: 39 U/L / GGT: x               Culture - Urine (collected 21 Dec 2020 09:13)  Source: .Urine Clean Catch (Midstream)  Final Report (22 Dec 2020 10:15):    <10,000 CFU/mL Normal Urogenital Ethel    Culture - Blood (collected 20 Dec 2020 18:53)  Source: .Blood Blood-Venous  Preliminary Report (21 Dec 2020 19:02):    No growth to date.    Culture - Blood (collected 20 Dec 2020 18:53)  Source: .Blood Blood-Peripheral  Preliminary Report (21 Dec 2020 19:02):    No growth to date.        Radiology: Imaging reviewed personally and interpretation as mentioned below.     < from: CT Chest No Cont (12.21.20 @ 19:57) >  IMPRESSION:  Heterogeneous enhancement of the lower pole of the left kidney. Favor pyelonephritis.

## 2020-12-22 NOTE — PROGRESS NOTE ADULT - ASSESSMENT
66 y/o F PMHx of HOCM s/p septal ablation, Afib, Lung ca (last chemo 2 years ago, in remission) presented with a variety of symptoms starting 3 weeks prior, worsening over the last week. Pt with signs of sepsis on admission now resolved, symptoms suggestive of possible COVID infection in spite of negative PCRs.    Severe Sepsis on presentation, fever, leucocytosis, RONY, ? etiology. + Transaminitis, elevated markers of inflammation. Abnormal U/A.   pyelo on CT   no evidence of pneumonia     Plan:   - doubt COVID infection in absence of negative PCR and CT chest not consistent with COVID infection  - can d/c isolation   - PT WAS USING HERBAL TEAS, ? IN RESPONSE TO THAT   -f/u Bcx, ntd   -c/w ceftriaxone for now, urine cx negative and pt with no symptoms, will attempt to review scan with radiology in am   - trend LFT's

## 2020-12-22 NOTE — PROGRESS NOTE ADULT - SUBJECTIVE AND OBJECTIVE BOX
SUBJECTIVE / OVERNIGHT EVENTS: pt denies chest pain, shortness of breath       MEDICATIONS  (STANDING):  cefTRIAXone   IVPB 1000 milliGRAM(s) IV Intermittent every 24 hours  diltiazem    Tablet 60 milliGRAM(s) Oral four times a day  enoxaparin Injectable 70 milliGRAM(s) SubCutaneous every 12 hours  influenza   Vaccine 0.5 milliLiter(s) IntraMuscular once  multivitamin 1 Tablet(s) Oral daily    MEDICATIONS  (PRN):  benzonatate 100 milliGRAM(s) Oral three times a day PRN Cough  hydrocodone/homatropine Syrup 5 milliLiter(s) Oral four times a day PRN Cough    Vital Signs Last 24 Hrs  T(C): 37 (22 Dec 2020 16:15), Max: 37 (22 Dec 2020 16:15)  T(F): 98.6 (22 Dec 2020 16:15), Max: 98.6 (22 Dec 2020 16:15)  HR: 90 (22 Dec 2020 16:15) (83 - 100)  BP: 102/68 (22 Dec 2020 16:15) (99/58 - 102/72)  BP(mean): --  RR: 18 (22 Dec 2020 16:15) (18 - 20)  SpO2: 95% (22 Dec 2020 16:15) (95% - 98%)    CAPILLARY BLOOD GLUCOSE        I&O's Summary      Constitutional: No fever, fatigue  Skin: No rash.  Eyes: No recent vision problems or eye pain.  ENT: No congestion, ear pain, or sore throat.  Cardiovascular: No chest pain or palpation.  Respiratory: No cough, shortness of breath, congestion, or wheezing.  Gastrointestinal: No abdominal pain, nausea, vomiting, or diarrhea.  Genitourinary: No dysuria.  Musculoskeletal: No joint swelling.  Neurologic: No headache.    PHYSICAL EXAM:  GENERAL: NAD  EYES: EOMI, PERRLA  NECK: Supple, No JVD  CHEST/LUNG: dec breath sounds at bases   HEART:  S1 , S2 +  ABDOMEN: soft , sb+  EXTREMITIES:  trace edema+  NEUROLOGY:alert awake      LABS:                        11.2   6.83  )-----------( 225      ( 22 Dec 2020 08:03 )             34.0     12-22    134<L>  |  98  |  11  ----------------------------<  102<H>  3.6   |  23  |  1.07    Ca    9.2      22 Dec 2020 08:03  Phos  3.5     12-  Mg     2.1     12-    TPro  7.8  /  Alb  3.8  /  TBili  1.0  /  DBili  x   /  AST  39<H>  /  ALT  43<H>  /  AlkPhos  102  12-    PT/INR - ( 22 Dec 2020 08:03 )   PT: 17.4 sec;   INR: 1.56 ratio         PTT - ( 21 Dec 2020 06:40 )  PTT:25.6 sec      Urinalysis Basic - ( 21 Dec 2020 02:37 )    Color: Yellow / Appearance: Slightly Turbid / S.011 / pH: x  Gluc: x / Ketone: Negative  / Bili: Negative / Urobili: <2 mg/dL   Blood: x / Protein: Trace / Nitrite: Negative   Leuk Esterase: Large / RBC: 7 /HPF / WBC >50 /HPF   Sq Epi: x / Non Sq Epi: 16 /HPF / Bacteria: Few        RADIOLOGY & ADDITIONAL TESTS:    Imaging Personally Reviewed:    Consultant(s) Notes Reviewed:      Care Discussed with Consultants/Other Providers:

## 2020-12-22 NOTE — PROGRESS NOTE ADULT - PROBLEM SELECTOR PLAN 4
-pt with creatinine elevated from baseline  -suspect pre-renal in setting of sepsis and hypovolemia  -

## 2020-12-23 LAB
ALBUMIN SERPL ELPH-MCNC: 3.7 G/DL — SIGNIFICANT CHANGE UP (ref 3.3–5)
ALP SERPL-CCNC: 105 U/L — SIGNIFICANT CHANGE UP (ref 40–120)
ALT FLD-CCNC: 42 U/L — HIGH (ref 4–33)
ANION GAP SERPL CALC-SCNC: 10 MMOL/L — SIGNIFICANT CHANGE UP (ref 7–14)
AST SERPL-CCNC: 34 U/L — HIGH (ref 4–32)
BILIRUB SERPL-MCNC: 0.7 MG/DL — SIGNIFICANT CHANGE UP (ref 0.2–1.2)
BUN SERPL-MCNC: 10 MG/DL — SIGNIFICANT CHANGE UP (ref 7–23)
CALCIUM SERPL-MCNC: 9 MG/DL — SIGNIFICANT CHANGE UP (ref 8.4–10.5)
CHLORIDE SERPL-SCNC: 102 MMOL/L — SIGNIFICANT CHANGE UP (ref 98–107)
CO2 SERPL-SCNC: 23 MMOL/L — SIGNIFICANT CHANGE UP (ref 22–31)
CREAT SERPL-MCNC: 1 MG/DL — SIGNIFICANT CHANGE UP (ref 0.5–1.3)
GLUCOSE SERPL-MCNC: 86 MG/DL — SIGNIFICANT CHANGE UP (ref 70–99)
HCT VFR BLD CALC: 31.5 % — LOW (ref 34.5–45)
HGB BLD-MCNC: 10.5 G/DL — LOW (ref 11.5–15.5)
INR BLD: 1.63 RATIO — HIGH (ref 0.88–1.17)
MAGNESIUM SERPL-MCNC: 2 MG/DL — SIGNIFICANT CHANGE UP (ref 1.6–2.6)
MCHC RBC-ENTMCNC: 32.8 PG — SIGNIFICANT CHANGE UP (ref 27–34)
MCHC RBC-ENTMCNC: 33.3 GM/DL — SIGNIFICANT CHANGE UP (ref 32–36)
MCV RBC AUTO: 98.4 FL — SIGNIFICANT CHANGE UP (ref 80–100)
NRBC # BLD: 0 /100 WBCS — SIGNIFICANT CHANGE UP
NRBC # FLD: 0 K/UL — SIGNIFICANT CHANGE UP
PHOSPHATE SERPL-MCNC: 3.5 MG/DL — SIGNIFICANT CHANGE UP (ref 2.5–4.5)
PLATELET # BLD AUTO: 237 K/UL — SIGNIFICANT CHANGE UP (ref 150–400)
POTASSIUM SERPL-MCNC: 3.5 MMOL/L — SIGNIFICANT CHANGE UP (ref 3.5–5.3)
POTASSIUM SERPL-SCNC: 3.5 MMOL/L — SIGNIFICANT CHANGE UP (ref 3.5–5.3)
PROT SERPL-MCNC: 7.2 G/DL — SIGNIFICANT CHANGE UP (ref 6–8.3)
PROTHROM AB SERPL-ACNC: 18.3 SEC — HIGH (ref 9.8–13.1)
RBC # BLD: 3.2 M/UL — LOW (ref 3.8–5.2)
RBC # FLD: 12.4 % — SIGNIFICANT CHANGE UP (ref 10.3–14.5)
SARS-COV-2 IGG SERPL QL IA: NEGATIVE — SIGNIFICANT CHANGE UP
SARS-COV-2 IGM SERPL IA-ACNC: 0.01 INDEX — SIGNIFICANT CHANGE UP
SODIUM SERPL-SCNC: 135 MMOL/L — SIGNIFICANT CHANGE UP (ref 135–145)
WBC # BLD: 5.32 K/UL — SIGNIFICANT CHANGE UP (ref 3.8–10.5)
WBC # FLD AUTO: 5.32 K/UL — SIGNIFICANT CHANGE UP (ref 3.8–10.5)

## 2020-12-23 PROCEDURE — 99233 SBSQ HOSP IP/OBS HIGH 50: CPT

## 2020-12-23 RX ORDER — DILTIAZEM HCL 120 MG
30 CAPSULE, EXT RELEASE 24 HR ORAL
Refills: 0 | Status: DISCONTINUED | OUTPATIENT
Start: 2020-12-23 | End: 2020-12-26

## 2020-12-23 RX ORDER — WARFARIN SODIUM 2.5 MG/1
5 TABLET ORAL ONCE
Refills: 0 | Status: COMPLETED | OUTPATIENT
Start: 2020-12-23 | End: 2020-12-23

## 2020-12-23 RX ADMIN — ENOXAPARIN SODIUM 70 MILLIGRAM(S): 100 INJECTION SUBCUTANEOUS at 18:01

## 2020-12-23 RX ADMIN — Medication 100 MILLIGRAM(S): at 13:18

## 2020-12-23 RX ADMIN — CEFTRIAXONE 100 MILLIGRAM(S): 500 INJECTION, POWDER, FOR SOLUTION INTRAMUSCULAR; INTRAVENOUS at 13:16

## 2020-12-23 RX ADMIN — Medication 30 MILLIGRAM(S): at 23:06

## 2020-12-23 RX ADMIN — Medication 60 MILLIGRAM(S): at 12:33

## 2020-12-23 RX ADMIN — Medication 30 MILLIGRAM(S): at 18:01

## 2020-12-23 RX ADMIN — Medication 1 TABLET(S): at 12:34

## 2020-12-23 RX ADMIN — ENOXAPARIN SODIUM 70 MILLIGRAM(S): 100 INJECTION SUBCUTANEOUS at 06:17

## 2020-12-23 RX ADMIN — WARFARIN SODIUM 5 MILLIGRAM(S): 2.5 TABLET ORAL at 18:02

## 2020-12-23 RX ADMIN — Medication 100 MILLIGRAM(S): at 06:52

## 2020-12-23 NOTE — PROGRESS NOTE ADULT - SUBJECTIVE AND OBJECTIVE BOX
Received refill request for gabapentin  Last office visit: 12/6/19  Last filled 3/10/2020    According to the [facility] refill policy, Alize Weber's Prescription refill request Approved for 90 day supply    Medicine Progress Note    Patient is a 65y old  Female who presents with a chief complaint of Cough x 4 days (22 Dec 2020 18:15)      SUBJECTIVE / OVERNIGHT EVENTS: pt seen and examined    ADDITIONAL REVIEW OF SYSTEMS: Denies CP, SOB, Palpitations, N/V/D/Constipation    MEDICATIONS  (STANDING):  diltiazem    Tablet 60 milliGRAM(s) Oral four times a day  enoxaparin Injectable 70 milliGRAM(s) SubCutaneous every 12 hours  influenza   Vaccine 0.5 milliLiter(s) IntraMuscular once  multivitamin 1 Tablet(s) Oral daily  warfarin 5 milliGRAM(s) Oral once    MEDICATIONS  (PRN):  benzonatate 100 milliGRAM(s) Oral three times a day PRN Cough  hydrocodone/homatropine Syrup 5 milliLiter(s) Oral four times a day PRN Cough    CAPILLARY BLOOD GLUCOSE        I&O's Summary    Vital Signs Last 24 Hrs  T(C): 36.7 (23 Dec 2020 12:32), Max: 37 (22 Dec 2020 16:15)  T(F): 98 (23 Dec 2020 12:32), Max: 98.6 (22 Dec 2020 16:15)  HR: 99 (23 Dec 2020 12:32) (86 - 99)  BP: 118/83 (23 Dec 2020 12:32) (99/62 - 118/83)  BP(mean): --  RR: 20 (23 Dec 2020 12:32) (18 - 20)  SpO2: 99% (23 Dec 2020 12:32) (95% - 99%)    Physical Exam:  CONSTITUTIONAL: NAD  ENMT: Moist oral mucosa  RESPIRATORY:   CARDIOVASCULAR: S1 and S2  ABDOMEN: Soft, Nontender  Extremities: no edema  Neuro: awake, alert    LABS:                        10.5   5.32  )-----------( 237      ( 23 Dec 2020 07:16 )             31.5     12-23    135  |  102  |  10  ----------------------------<  86  3.5   |  23  |  1.00    Ca    9.0      23 Dec 2020 07:16  Phos  3.5     12-23  Mg     2.0     12-23    TPro  7.2  /  Alb  3.7  /  TBili  0.7  /  DBili  x   /  AST  34<H>  /  ALT  42<H>  /  AlkPhos  105  12-23    PT/INR - ( 23 Dec 2020 07:16 )   PT: 18.3 sec;   INR: 1.63 ratio                   Culture - Urine (collected 21 Dec 2020 09:13)  Source: .Urine Clean Catch (Midstream)  Final Report (22 Dec 2020 10:15):    <10,000 CFU/mL Normal Urogenital Ethle    Culture - Blood (collected 20 Dec 2020 18:53)  Source: .Blood Blood-Venous  Preliminary Report (21 Dec 2020 19:02):    No growth to date.    Culture - Blood (collected 20 Dec 2020 18:53)  Source: .Blood Blood-Peripheral  Preliminary Report (21 Dec 2020 19:02):    No growth to date.      SARS-CoV-2: NotDetec (20 Dec 2020 22:29)  COVID-19 PCR: NotDetec (20 Dec 2020 17:04)      < from: CT Chest No Cont (12.21.20 @ 19:57) >  MPRESSION:  Heterogeneous enhancement of the lower pole of the left kidney. Favor pyelonephritis.  t< from: Transthoracic Echocardiogram (12.22.20 @ 19:24) >  CONCLUSIONS:  1. Normal mitral valve. Moderate mitral regurgitation.  2. Calcified trileaflet aortic valve with normal opening.  Minimal aortic regurgitation.  3. Severely dilated left atrium.  LA volume index = 81  cc/m2.  4. Endocardium not well visualized; grossly normal left  ventricular systolic function. Basal septal hypetrophy.  5. Severe right atrial enlargement.  6. Normal right ventricular size and function.  7. Normal tricuspid valve. Moderate tricuspid  regurgitation.  8. Estimated pulmonary artery systolic pressure equals 48  mm Hg, assuming right atrial pressure equals 10  mm Hg,  consistent with mild pulmonary hypertension.    < end of copied text >    < from: US Duplex Venous Lower Ext Complete, Bilateral (11.20.18 @ 09:13) >  IMPRESSION:     No evidence of bilateral lower extremity deep venous thrombosis.      < end of copied text >      < end of copied text >  RADIOLOGY & ADDITIONAL TESTS:  Imaging from Last 24 Hours:    Electrocardiogram/QTc Interval:    COORDINATION OF CARE:  Care Discussed with Consultants/Other Providers:

## 2020-12-23 NOTE — PROGRESS NOTE ADULT - SUBJECTIVE AND OBJECTIVE BOX
65y old  Female who presents with a chief complaint of Cough x 4 days (23 Dec 2020 15:43)      Interval history:  Afebrile, cough better, denies any other complains.       Allergies:   cefdinir (Rash)  moxifloxacin (Rash)  penicillin (Rash)    Antimicrobials:      REVIEW OF SYSTEMS:  No chest pain   No N/V/D, no abdominal pain  No dysuria   No rash.       Vital Signs Last 24 Hrs  T(C): 36.7 (12-23-20 @ 12:32), Max: 37 (12-22-20 @ 16:15)  T(F): 98 (12-23-20 @ 12:32), Max: 98.6 (12-22-20 @ 16:15)  HR: 99 (12-23-20 @ 12:32) (86 - 99)  BP: 118/83 (12-23-20 @ 12:32) (99/62 - 118/83)  BP(mean): --  RR: 20 (12-23-20 @ 12:32) (18 - 20)  SpO2: 99% (12-23-20 @ 12:32) (95% - 99%)      PHYSICAL EXAM:  Patient in no acute distress. AAOX3.  No icterus, no oral ulcers.  Cardiovascular: S1S2 normal.  Lungs: Good air entry B/L lung fields.  Gastrointestinal: soft, nontender, nondistended.  Extremities: no edema.  IV sites not inflamed.                             10.5   5.32  )-----------( 237      ( 23 Dec 2020 07:16 )             31.5   12-23    135  |  102  |  10  ----------------------------<  86  3.5   |  23  |  1.00    Ca    9.0      23 Dec 2020 07:16  Phos  3.5     12-23  Mg     2.0     12-23    TPro  7.2  /  Alb  3.7  /  TBili  0.7  /  DBili  x   /  AST  34<H>  /  ALT  42<H>  /  AlkPhos  105  12-23      LIVER FUNCTIONS - ( 23 Dec 2020 07:16 )  Alb: 3.7 g/dL / Pro: 7.2 g/dL / ALK PHOS: 105 U/L / ALT: 42 U/L / AST: 34 U/L / GGT: x               Culture - Urine (collected 21 Dec 2020 09:13)  Source: .Urine Clean Catch (Midstream)  Final Report (22 Dec 2020 10:15):    <10,000 CFU/mL Normal Urogenital Ethel    Culture - Blood (collected 20 Dec 2020 18:53)  Source: .Blood Blood-Venous  Preliminary Report (21 Dec 2020 19:02):    No growth to date.    Culture - Blood (collected 20 Dec 2020 18:53)  Source: .Blood Blood-Peripheral  Preliminary Report (21 Dec 2020 19:02):    No growth to date.

## 2020-12-23 NOTE — PROGRESS NOTE ADULT - ASSESSMENT
65 y.o. F with PMH of HOCM s/p septal ablation, afib on coumadin, HFpEF (EF 55% 9/2019), lung cancer (diagnosed about 2 years ago, s/p chemo, in remission) who presents with sepsis likely in setting of COVID-19.     Plan:  1. Shortness of breath.    -cough and SOB x 4 days   CXR.- no consolidation  -EKG without acute ischemic changes, patient does not appear volume overloaded on exam  --COVID neg  -monitor O2  - US duplex- neg for DVT       2.Sepsis.    -abx as per ID - Ceftriaxone  -Urine Cx neg  -f/u blood Cx  -monitor BP    3.Transaminitis.    - monitor closely.  -trend LFT's       4. RONY (acute kidney injury)  - resolving  -Cr. -1.00 today      5. Atrial fibrillation.    - on coumadin & Lovenox  - c/w Cardizem with holding parameters    6. Prophylactic Measure  - on Lovenox and Coumadin for afib

## 2020-12-23 NOTE — PROGRESS NOTE ADULT - ASSESSMENT
64 y/o F PMHx of HOCM s/p septal ablation, Afib, Lung ca (last chemo 2 years ago, in remission) presented with a variety of symptoms starting 3 weeks prior, worsening over the last week. Pt with signs of sepsis on admission now resolved, symptoms suggestive of possible COVID infection in spite of negative PCRs.    Severe Sepsis on presentation, fever, leucocytosis, RONY, ? etiology. + Transaminitis, elevated markers of inflammation. Abnormal U/A.   pyelo on CT   no evidence of pneumonia     Plan:   - doubt COVID infection in absence of negative PCR and CT chest not consistent with COVID infection  - PT WAS USING HERBAL TEAS, ? IN RESPONSE TO THAT   -f/u Bcx, ntd   -reviewed scan with radiology, findings consistent with pyelo vs infarct.   -s/p 3 days of ceftriaxone, given urine cx negative and pt with no symptoms, can stop abx and observe off. pt did have afib on admission with subtherapeutic INR.   - trend LFT's

## 2020-12-24 LAB
ALBUMIN SERPL ELPH-MCNC: 3.8 G/DL — SIGNIFICANT CHANGE UP (ref 3.3–5)
ALP SERPL-CCNC: 105 U/L — SIGNIFICANT CHANGE UP (ref 40–120)
ALT FLD-CCNC: 39 U/L — HIGH (ref 4–33)
ANION GAP SERPL CALC-SCNC: 12 MMOL/L — SIGNIFICANT CHANGE UP (ref 7–14)
AST SERPL-CCNC: 32 U/L — SIGNIFICANT CHANGE UP (ref 4–32)
BILIRUB SERPL-MCNC: 0.5 MG/DL — SIGNIFICANT CHANGE UP (ref 0.2–1.2)
BUN SERPL-MCNC: 9 MG/DL — SIGNIFICANT CHANGE UP (ref 7–23)
CALCIUM SERPL-MCNC: 9.1 MG/DL — SIGNIFICANT CHANGE UP (ref 8.4–10.5)
CHLORIDE SERPL-SCNC: 103 MMOL/L — SIGNIFICANT CHANGE UP (ref 98–107)
CO2 SERPL-SCNC: 23 MMOL/L — SIGNIFICANT CHANGE UP (ref 22–31)
CREAT SERPL-MCNC: 1.02 MG/DL — SIGNIFICANT CHANGE UP (ref 0.5–1.3)
GLUCOSE SERPL-MCNC: 86 MG/DL — SIGNIFICANT CHANGE UP (ref 70–99)
HCT VFR BLD CALC: 34.3 % — LOW (ref 34.5–45)
HGB BLD-MCNC: 11.1 G/DL — LOW (ref 11.5–15.5)
INR BLD: 1.89 RATIO — HIGH (ref 0.88–1.16)
MAGNESIUM SERPL-MCNC: 2.1 MG/DL — SIGNIFICANT CHANGE UP (ref 1.6–2.6)
MCHC RBC-ENTMCNC: 32.4 GM/DL — SIGNIFICANT CHANGE UP (ref 32–36)
MCHC RBC-ENTMCNC: 32.9 PG — SIGNIFICANT CHANGE UP (ref 27–34)
MCV RBC AUTO: 101.8 FL — HIGH (ref 80–100)
NRBC # BLD: 0 /100 WBCS — SIGNIFICANT CHANGE UP
NRBC # FLD: 0 K/UL — SIGNIFICANT CHANGE UP
PHOSPHATE SERPL-MCNC: 3.3 MG/DL — SIGNIFICANT CHANGE UP (ref 2.5–4.5)
PLATELET # BLD AUTO: 243 K/UL — SIGNIFICANT CHANGE UP (ref 150–400)
POTASSIUM SERPL-MCNC: 3.5 MMOL/L — SIGNIFICANT CHANGE UP (ref 3.5–5.3)
POTASSIUM SERPL-SCNC: 3.5 MMOL/L — SIGNIFICANT CHANGE UP (ref 3.5–5.3)
PROT SERPL-MCNC: 7 G/DL — SIGNIFICANT CHANGE UP (ref 6–8.3)
PROTHROM AB SERPL-ACNC: 20.9 SEC — HIGH (ref 10.6–13.6)
RBC # BLD: 3.37 M/UL — LOW (ref 3.8–5.2)
RBC # FLD: 12.5 % — SIGNIFICANT CHANGE UP (ref 10.3–14.5)
SODIUM SERPL-SCNC: 138 MMOL/L — SIGNIFICANT CHANGE UP (ref 135–145)
WBC # BLD: 4.67 K/UL — SIGNIFICANT CHANGE UP (ref 3.8–10.5)
WBC # FLD AUTO: 4.67 K/UL — SIGNIFICANT CHANGE UP (ref 3.8–10.5)

## 2020-12-24 PROCEDURE — 93010 ELECTROCARDIOGRAM REPORT: CPT

## 2020-12-24 RX ORDER — WARFARIN SODIUM 2.5 MG/1
5 TABLET ORAL ONCE
Refills: 0 | Status: COMPLETED | OUTPATIENT
Start: 2020-12-24 | End: 2020-12-24

## 2020-12-24 RX ADMIN — ENOXAPARIN SODIUM 70 MILLIGRAM(S): 100 INJECTION SUBCUTANEOUS at 17:41

## 2020-12-24 RX ADMIN — Medication 1 TABLET(S): at 12:51

## 2020-12-24 RX ADMIN — Medication 100 MILLIGRAM(S): at 18:16

## 2020-12-24 RX ADMIN — Medication 30 MILLIGRAM(S): at 17:42

## 2020-12-24 RX ADMIN — ENOXAPARIN SODIUM 70 MILLIGRAM(S): 100 INJECTION SUBCUTANEOUS at 05:37

## 2020-12-24 RX ADMIN — Medication 30 MILLIGRAM(S): at 12:51

## 2020-12-24 RX ADMIN — WARFARIN SODIUM 5 MILLIGRAM(S): 2.5 TABLET ORAL at 17:44

## 2020-12-24 RX ADMIN — Medication 30 MILLIGRAM(S): at 05:37

## 2020-12-24 NOTE — PROGRESS NOTE ADULT - SUBJECTIVE AND OBJECTIVE BOX
SUBJECTIVE / OVERNIGHT EVENTS: pt seen and examined      MEDICATIONS  (STANDING):  diltiazem    Tablet 30 milliGRAM(s) Oral four times a day  enoxaparin Injectable 70 milliGRAM(s) SubCutaneous every 12 hours  influenza   Vaccine 0.5 milliLiter(s) IntraMuscular once  multivitamin 1 Tablet(s) Oral daily    MEDICATIONS  (PRN):  benzonatate 100 milliGRAM(s) Oral three times a day PRN Cough  hydrocodone/homatropine Syrup 5 milliLiter(s) Oral four times a day PRN Cough        CAPILLARY BLOOD GLUCOSE  Vital Signs Last 24 Hrs  T(C): 37.1 (24 Dec 2020 21:24), Max: 37.1 (24 Dec 2020 21:24)  T(F): 98.8 (24 Dec 2020 21:24), Max: 98.8 (24 Dec 2020 21:24)  HR: 89 (24 Dec 2020 21:24) (85 - 100)  BP: 101/64 (24 Dec 2020 21:24) (101/64 - 122/84)  BP(mean): --  RR: 18 (24 Dec 2020 21:24) (18 - 19)  SpO2: 95% (24 Dec 2020 21:24) (95% - 100%)      I&O's Summary      Constitutional: No fever, fatigue  Skin: No rash.  Eyes: No recent vision problems or eye pain.  ENT: No congestion, ear pain, or sore throat.  Cardiovascular: No chest pain or palpation.  Respiratory: No cough, shortness of breath, congestion, or wheezing.  Gastrointestinal: No abdominal pain, nausea, vomiting, or diarrhea.  Genitourinary: No dysuria.  Musculoskeletal: No joint swelling.  Neurologic: No headache.    PHYSICAL EXAM:  GENERAL: NAD  EYES: EOMI, PERRLA  NECK: Supple, No JVD  CHEST/LUNG: dec breath sounds at bases   HEART:  S1 , S2 +  ABDOMEN: soft , bs+  EXTREMITIES:  trace edema  NEUROLOGY:alert awake      LABS:                        11.1   4.67  )-----------( 243      ( 24 Dec 2020 07:23 )             34.3     12-24    138  |  103  |  9   ----------------------------<  86  3.5   |  23  |  1.02    Ca    9.1      24 Dec 2020 07:23  Phos  3.3     12-24  Mg     2.1     12-24    TPro  7.0  /  Alb  3.8  /  TBili  0.5  /  DBili  x   /  AST  32  /  ALT  39<H>  /  AlkPhos  105  12-24    PT/INR - ( 24 Dec 2020 07:23 )   PT: 20.9 sec;   INR: 1.89 ratio                   RADIOLOGY & ADDITIONAL TESTS:    Imaging Personally Reviewed:    Consultant(s) Notes Reviewed:      Care Discussed with Consultants/Other Providers:

## 2020-12-25 LAB
ALBUMIN SERPL ELPH-MCNC: 3.8 G/DL — SIGNIFICANT CHANGE UP (ref 3.3–5)
ALP SERPL-CCNC: 104 U/L — SIGNIFICANT CHANGE UP (ref 40–120)
ALT FLD-CCNC: 43 U/L — HIGH (ref 4–33)
ANION GAP SERPL CALC-SCNC: 12 MMOL/L — SIGNIFICANT CHANGE UP (ref 7–14)
AST SERPL-CCNC: 36 U/L — HIGH (ref 4–32)
BILIRUB SERPL-MCNC: 0.4 MG/DL — SIGNIFICANT CHANGE UP (ref 0.2–1.2)
BUN SERPL-MCNC: 8 MG/DL — SIGNIFICANT CHANGE UP (ref 7–23)
CALCIUM SERPL-MCNC: 9 MG/DL — SIGNIFICANT CHANGE UP (ref 8.4–10.5)
CHLORIDE SERPL-SCNC: 104 MMOL/L — SIGNIFICANT CHANGE UP (ref 98–107)
CO2 SERPL-SCNC: 23 MMOL/L — SIGNIFICANT CHANGE UP (ref 22–31)
CREAT SERPL-MCNC: 1.01 MG/DL — SIGNIFICANT CHANGE UP (ref 0.5–1.3)
CULTURE RESULTS: SIGNIFICANT CHANGE UP
CULTURE RESULTS: SIGNIFICANT CHANGE UP
GLUCOSE SERPL-MCNC: 94 MG/DL — SIGNIFICANT CHANGE UP (ref 70–99)
INR BLD: 1.97 RATIO — HIGH (ref 0.88–1.16)
POTASSIUM SERPL-MCNC: 3.7 MMOL/L — SIGNIFICANT CHANGE UP (ref 3.5–5.3)
POTASSIUM SERPL-SCNC: 3.7 MMOL/L — SIGNIFICANT CHANGE UP (ref 3.5–5.3)
PROT SERPL-MCNC: 7 G/DL — SIGNIFICANT CHANGE UP (ref 6–8.3)
PROTHROM AB SERPL-ACNC: 21.9 SEC — HIGH (ref 10.6–13.6)
SODIUM SERPL-SCNC: 139 MMOL/L — SIGNIFICANT CHANGE UP (ref 135–145)
SPECIMEN SOURCE: SIGNIFICANT CHANGE UP
SPECIMEN SOURCE: SIGNIFICANT CHANGE UP

## 2020-12-25 PROCEDURE — 71045 X-RAY EXAM CHEST 1 VIEW: CPT | Mod: 26

## 2020-12-25 RX ORDER — FUROSEMIDE 40 MG
20 TABLET ORAL ONCE
Refills: 0 | Status: COMPLETED | OUTPATIENT
Start: 2020-12-25 | End: 2020-12-25

## 2020-12-25 RX ORDER — BENZOCAINE AND MENTHOL 5; 1 G/100ML; G/100ML
1 LIQUID ORAL EVERY 4 HOURS
Refills: 0 | Status: DISCONTINUED | OUTPATIENT
Start: 2020-12-25 | End: 2020-12-31

## 2020-12-25 RX ORDER — FUROSEMIDE 40 MG
40 TABLET ORAL DAILY
Refills: 0 | Status: DISCONTINUED | OUTPATIENT
Start: 2020-12-26 | End: 2020-12-26

## 2020-12-25 RX ORDER — WARFARIN SODIUM 2.5 MG/1
5 TABLET ORAL ONCE
Refills: 0 | Status: COMPLETED | OUTPATIENT
Start: 2020-12-25 | End: 2020-12-25

## 2020-12-25 RX ORDER — FUROSEMIDE 40 MG
40 TABLET ORAL DAILY
Refills: 0 | Status: DISCONTINUED | OUTPATIENT
Start: 2020-12-25 | End: 2020-12-25

## 2020-12-25 RX ADMIN — BENZOCAINE AND MENTHOL 1 LOZENGE: 5; 1 LIQUID ORAL at 13:16

## 2020-12-25 RX ADMIN — Medication 20 MILLIGRAM(S): at 17:02

## 2020-12-25 RX ADMIN — BENZOCAINE AND MENTHOL 1 LOZENGE: 5; 1 LIQUID ORAL at 17:03

## 2020-12-25 RX ADMIN — Medication 30 MILLIGRAM(S): at 12:06

## 2020-12-25 RX ADMIN — BENZOCAINE AND MENTHOL 1 LOZENGE: 5; 1 LIQUID ORAL at 21:34

## 2020-12-25 RX ADMIN — Medication 100 MILLIGRAM(S): at 06:02

## 2020-12-25 RX ADMIN — ENOXAPARIN SODIUM 70 MILLIGRAM(S): 100 INJECTION SUBCUTANEOUS at 05:08

## 2020-12-25 RX ADMIN — Medication 100 MILLIGRAM(S): at 12:06

## 2020-12-25 RX ADMIN — Medication 1 TABLET(S): at 12:06

## 2020-12-25 RX ADMIN — Medication 30 MILLIGRAM(S): at 17:03

## 2020-12-25 RX ADMIN — Medication 30 MILLIGRAM(S): at 23:11

## 2020-12-25 RX ADMIN — Medication 30 MILLIGRAM(S): at 05:09

## 2020-12-25 RX ADMIN — ENOXAPARIN SODIUM 70 MILLIGRAM(S): 100 INJECTION SUBCUTANEOUS at 17:02

## 2020-12-25 RX ADMIN — WARFARIN SODIUM 5 MILLIGRAM(S): 2.5 TABLET ORAL at 17:03

## 2020-12-25 NOTE — PROGRESS NOTE ADULT - SUBJECTIVE AND OBJECTIVE BOX
SUBJECTIVE / OVERNIGHT EVENTS: pt seen and examined    MEDICATIONS  (STANDING):  benzocaine 15 mG/menthol 3.6 mG (Sugar-Free) Lozenge 1 Lozenge Oral every 4 hours  diltiazem    Tablet 30 milliGRAM(s) Oral four times a day  enoxaparin Injectable 70 milliGRAM(s) SubCutaneous every 12 hours  furosemide    Tablet 40 milliGRAM(s) Oral daily  influenza   Vaccine 0.5 milliLiter(s) IntraMuscular once  multivitamin 1 Tablet(s) Oral daily    MEDICATIONS  (PRN):  benzonatate 100 milliGRAM(s) Oral three times a day PRN Cough    Vital Signs Last 24 Hrs  T(C): 36.7 (25 Dec 2020 23:10), Max: 36.7 (25 Dec 2020 05:06)  T(F): 98 (25 Dec 2020 23:10), Max: 98.1 (25 Dec 2020 05:06)  HR: 90 (25 Dec 2020 23:10) (90 - 99)  BP: 119/80 (25 Dec 2020 23:10) (103/66 - 120/80)  BP(mean): --  RR: 18 (25 Dec 2020 23:10) (18 - 18)  SpO2: 97% (25 Dec 2020 23:10) (96% - 98%)    Constitutional: No fever, fatigue  Skin: No rash.  Eyes: No recent vision problems or eye pain.  ENT: No congestion, ear pain, or sore throat.  Cardiovascular: No chest pain or palpation.  Respiratory: No cough, shortness of breath, congestion, or wheezing.  Gastrointestinal: No abdominal pain, nausea, vomiting, or diarrhea.  Genitourinary: No dysuria.  Musculoskeletal: No joint swelling.  Neurologic: No headache.    PHYSICAL EXAM:  GENERAL: NAD  EYES: EOMI, PERRLA  NECK: Supple, No JVD  CHEST/LUNG: dec breath sounds at bases   HEART:  S1 , S2 +  ABDOMEN: soft , bs+  EXTREMITIES:  trace edema  NEUROLOGY:alert awake      LABS:      139  |  104  |  8   ----------------------------<  94  3.7   |  23  |  1.01    Ca    9.0      25 Dec 2020 06:32  Phos  3.3     12-24  Mg     2.1     12-24    TPro  7.0  /  Alb  3.8  /  TBili  0.4  /  DBili      /  AST  36<H>  /  ALT  43<H>  /  AlkPhos  104      Creatinine Trend: 1.01 <--, 1.02 <--, 1.00 <--, 1.07 <--, 1.05 <--, 1.31 <--                        11.1   4.67  )-----------( 243      ( 24 Dec 2020 07:23 )             34.3     Urine Studies:  Urinalysis Basic - ( 21 Dec 2020 02:37 )    Color: Yellow / Appearance: Slightly Turbid / S.011 / pH:   Gluc:  / Ketone: Negative  / Bili: Negative / Urobili: <2 mg/dL   Blood:  / Protein: Trace / Nitrite: Negative   Leuk Esterase: Large / RBC: 7 /HPF / WBC >50 /HPF   Sq Epi:  / Non Sq Epi: 16 /HPF / Bacteria: Few      Creatinine, Random Urine: 87 mg/dL ( @ 02:37)          LIVER FUNCTIONS - ( 25 Dec 2020 06:32 )  Alb: 3.8 g/dL / Pro: 7.0 g/dL / ALK PHOS: 104 U/L / ALT: 43 U/L / AST: 36 U/L / GGT: x           PT/INR - ( 25 Dec 2020 06:32 )   PT: 21.9 sec;   INR: 1.97 ratio             Care Discussed with Consultants/Other Providers:

## 2020-12-26 ENCOUNTER — TRANSCRIPTION ENCOUNTER (OUTPATIENT)
Age: 65
End: 2020-12-26

## 2020-12-26 LAB
ALBUMIN SERPL ELPH-MCNC: 3.8 G/DL — SIGNIFICANT CHANGE UP (ref 3.3–5)
ALP SERPL-CCNC: 104 U/L — SIGNIFICANT CHANGE UP (ref 40–120)
ALT FLD-CCNC: 43 U/L — HIGH (ref 4–33)
ANION GAP SERPL CALC-SCNC: 13 MMOL/L — SIGNIFICANT CHANGE UP (ref 7–14)
AST SERPL-CCNC: 38 U/L — HIGH (ref 4–32)
BILIRUB SERPL-MCNC: 0.4 MG/DL — SIGNIFICANT CHANGE UP (ref 0.2–1.2)
BUN SERPL-MCNC: 9 MG/DL — SIGNIFICANT CHANGE UP (ref 7–23)
CALCIUM SERPL-MCNC: 9.4 MG/DL — SIGNIFICANT CHANGE UP (ref 8.4–10.5)
CHLORIDE SERPL-SCNC: 103 MMOL/L — SIGNIFICANT CHANGE UP (ref 98–107)
CK MB BLD-MCNC: 1.3 % — SIGNIFICANT CHANGE UP (ref 0–2.5)
CK MB CFR SERPL CALC: 2 NG/ML — SIGNIFICANT CHANGE UP
CK SERPL-CCNC: 159 U/L — SIGNIFICANT CHANGE UP (ref 25–170)
CO2 SERPL-SCNC: 23 MMOL/L — SIGNIFICANT CHANGE UP (ref 22–31)
CREAT SERPL-MCNC: 1 MG/DL — SIGNIFICANT CHANGE UP (ref 0.5–1.3)
GLUCOSE SERPL-MCNC: 88 MG/DL — SIGNIFICANT CHANGE UP (ref 70–99)
HCT VFR BLD CALC: 34.6 % — SIGNIFICANT CHANGE UP (ref 34.5–45)
HGB BLD-MCNC: 11.4 G/DL — LOW (ref 11.5–15.5)
INR BLD: 2.18 RATIO — HIGH (ref 0.88–1.16)
MAGNESIUM SERPL-MCNC: 2.2 MG/DL — SIGNIFICANT CHANGE UP (ref 1.6–2.6)
MCHC RBC-ENTMCNC: 32.6 PG — SIGNIFICANT CHANGE UP (ref 27–34)
MCHC RBC-ENTMCNC: 32.9 GM/DL — SIGNIFICANT CHANGE UP (ref 32–36)
MCV RBC AUTO: 98.9 FL — SIGNIFICANT CHANGE UP (ref 80–100)
NRBC # BLD: 0 /100 WBCS — SIGNIFICANT CHANGE UP
NRBC # FLD: 0 K/UL — SIGNIFICANT CHANGE UP
NT-PROBNP SERPL-SCNC: 2212 PG/ML — HIGH
PHOSPHATE SERPL-MCNC: 3.5 MG/DL — SIGNIFICANT CHANGE UP (ref 2.5–4.5)
PLATELET # BLD AUTO: 296 K/UL — SIGNIFICANT CHANGE UP (ref 150–400)
POTASSIUM SERPL-MCNC: 3.8 MMOL/L — SIGNIFICANT CHANGE UP (ref 3.5–5.3)
POTASSIUM SERPL-SCNC: 3.8 MMOL/L — SIGNIFICANT CHANGE UP (ref 3.5–5.3)
PROT SERPL-MCNC: 7.6 G/DL — SIGNIFICANT CHANGE UP (ref 6–8.3)
PROTHROM AB SERPL-ACNC: 23.9 SEC — HIGH (ref 10.6–13.6)
RBC # BLD: 3.5 M/UL — LOW (ref 3.8–5.2)
RBC # FLD: 12.4 % — SIGNIFICANT CHANGE UP (ref 10.3–14.5)
SODIUM SERPL-SCNC: 139 MMOL/L — SIGNIFICANT CHANGE UP (ref 135–145)
TROPONIN T, HIGH SENSITIVITY RESULT: 19 NG/L — SIGNIFICANT CHANGE UP
WBC # BLD: 4.87 K/UL — SIGNIFICANT CHANGE UP (ref 3.8–10.5)
WBC # FLD AUTO: 4.87 K/UL — SIGNIFICANT CHANGE UP (ref 3.8–10.5)

## 2020-12-26 PROCEDURE — 99221 1ST HOSP IP/OBS SF/LOW 40: CPT | Mod: GC

## 2020-12-26 PROCEDURE — 93010 ELECTROCARDIOGRAM REPORT: CPT

## 2020-12-26 PROCEDURE — 71275 CT ANGIOGRAPHY CHEST: CPT | Mod: 26

## 2020-12-26 RX ORDER — DILTIAZEM HCL 120 MG
60 CAPSULE, EXT RELEASE 24 HR ORAL
Refills: 0 | Status: DISCONTINUED | OUTPATIENT
Start: 2020-12-26 | End: 2020-12-31

## 2020-12-26 RX ORDER — ALPRAZOLAM 0.25 MG
0.25 TABLET ORAL ONCE
Refills: 0 | Status: DISCONTINUED | OUTPATIENT
Start: 2020-12-26 | End: 2020-12-26

## 2020-12-26 RX ORDER — WARFARIN SODIUM 2.5 MG/1
5 TABLET ORAL ONCE
Refills: 0 | Status: COMPLETED | OUTPATIENT
Start: 2020-12-26 | End: 2020-12-26

## 2020-12-26 RX ORDER — WARFARIN SODIUM 2.5 MG/1
1 TABLET ORAL
Qty: 10 | Refills: 0
Start: 2020-12-26 | End: 2021-01-04

## 2020-12-26 RX ORDER — WARFARIN SODIUM 2.5 MG/1
1 TABLET ORAL
Qty: 0 | Refills: 0 | DISCHARGE

## 2020-12-26 RX ADMIN — ENOXAPARIN SODIUM 70 MILLIGRAM(S): 100 INJECTION SUBCUTANEOUS at 05:37

## 2020-12-26 RX ADMIN — Medication 0.25 MILLIGRAM(S): at 17:28

## 2020-12-26 RX ADMIN — Medication 30 MILLIGRAM(S): at 13:46

## 2020-12-26 RX ADMIN — BENZOCAINE AND MENTHOL 1 LOZENGE: 5; 1 LIQUID ORAL at 05:36

## 2020-12-26 RX ADMIN — Medication 30 MILLIGRAM(S): at 05:36

## 2020-12-26 RX ADMIN — WARFARIN SODIUM 5 MILLIGRAM(S): 2.5 TABLET ORAL at 17:28

## 2020-12-26 RX ADMIN — Medication 60 MILLIGRAM(S): at 17:28

## 2020-12-26 RX ADMIN — Medication 40 MILLIGRAM(S): at 05:37

## 2020-12-26 RX ADMIN — BENZOCAINE AND MENTHOL 1 LOZENGE: 5; 1 LIQUID ORAL at 21:29

## 2020-12-26 RX ADMIN — Medication 1 TABLET(S): at 13:47

## 2020-12-26 NOTE — CONSULT NOTE ADULT - ASSESSMENT
Patient is a  65 y.o. F with PMH of HOCM s/p septal ablation, afib on coumadin, HFpEF (EF 55% 9/2019), lung cancer (diagnosed about 2 years ago, s/p chemo, in remission), who presents with pyleonephritis s/p ceftriaxone x 3 days, now with afib rate of 113 and worsening shortness of breath.    PLAN:  Shortness of breath:  -Transfer patient to telemetry for closer monitoring  -CT angio of chest  to rule out PE/any further heart/lung pathology  -EKG 12 lead reveals atrial fibrillation with rate of 112, no ischemic changes    Atrial fibrillation with rapid rate:  -CHADSVASC is elevated to 4  -continue to anticoagulate with coumadin  -INR goal is 2-3  -would continue the cardizem at 60 po [id number]  -no need for an urgent afib ablation, can follow up outpatient  -strategy for rate control  -hold lasix for now, appears more dry    HOCM s/p septal ablation  -rate control with cardizem  -volume status is dry, would hold lasix for now      This plan has been discussed with cardiology fellow, Dr. Mcginnis.

## 2020-12-26 NOTE — DISCHARGE NOTE PROVIDER - NSDCACTIVITY_GEN_ALL_CORE
Bathing allowed/Walking - Indoors allowed/Walking - Outdoors allowed No restrictions/Bathing allowed/Walking - Indoors allowed/Walking - Outdoors allowed

## 2020-12-26 NOTE — PROGRESS NOTE ADULT - SUBJECTIVE AND OBJECTIVE BOX
SUBJECTIVE / OVERNIGHT EVENTS: pt seen and examined  pt started crying saying that she still feels shortness of breath on exertion    MEDICATIONS  (STANDING):  benzocaine 15 mG/menthol 3.6 mG (Sugar-Free) Lozenge 1 Lozenge Oral every 4 hours  diltiazem    Tablet 60 milliGRAM(s) Oral four times a day  furosemide    Tablet 40 milliGRAM(s) Oral daily  influenza   Vaccine 0.5 milliLiter(s) IntraMuscular once  multivitamin 1 Tablet(s) Oral daily    MEDICATIONS  (PRN):  benzonatate 100 milliGRAM(s) Oral three times a day PRN Cough    Vital Signs Last 24 Hrs  T(C): 36.7 (26 Dec 2020 13:30), Max: 36.7 (25 Dec 2020 23:10)  T(F): 98 (26 Dec 2020 13:30), Max: 98 (25 Dec 2020 23:10)  HR: 114 (26 Dec 2020 17:27) (87 - 114)  BP: 131/79 (26 Dec 2020 17:27) (112/78 - 131/79)  BP(mean): --  RR: 16 (26 Dec 2020 17:27) (16 - 18)  SpO2: 100% (26 Dec 2020 17:27) (95% - 100%)  Constitutional: No fever, fatigue  Skin: No rash.  Eyes: No recent vision problems or eye pain.  ENT: No congestion, ear pain, or sore throat.  Cardiovascular: No chest pain or palpation.  Respiratory: shortness of breath on exertion  Gastrointestinal: No abdominal pain, nausea, vomiting, or diarrhea.  Genitourinary: No dysuria.  Musculoskeletal: No joint swelling.  Neurologic: No headache.    PHYSICAL EXAM:  GENERAL: NAD  EYES: EOMI, PERRLA  NECK: Supple, No JVD  CHEST/LUNG: dec breath sounds at bases   HEART:  S1 , S2 +  ABDOMEN: soft , bs+  EXTREMITIES:  trace edema  NEUROLOGY:alert awake      LABS:      139  |  103  |  9   ----------------------------<  88  3.8   |  23  |  1.00    Ca    9.4      26 Dec 2020 07:11  Phos  3.5       Mg     2.2         TPro  7.6  /  Alb  3.8  /  TBili  0.4  /  DBili      /  AST  38<H>  /  ALT  43<H>  /  AlkPhos  104      Creatinine Trend: 1.00 <--, 1.01 <--, 1.02 <--, 1.00 <--, 1.07 <--, 1.05 <--, 1.31 <--                        11.4   4.87  )-----------( 296      ( 26 Dec 2020 07:11 )             34.6     Urine Studies:  Urinalysis Basic - ( 21 Dec 2020 02:37 )    Color: Yellow / Appearance: Slightly Turbid / S.011 / pH:   Gluc:  / Ketone: Negative  / Bili: Negative / Urobili: <2 mg/dL   Blood:  / Protein: Trace / Nitrite: Negative   Leuk Esterase: Large / RBC: 7 /HPF / WBC >50 /HPF   Sq Epi:  / Non Sq Epi: 16 /HPF / Bacteria: Few      Creatinine, Random Urine: 87 mg/dL ( @ 02:37)          LIVER FUNCTIONS - ( 26 Dec 2020 07:11 )  Alb: 3.8 g/dL / Pro: 7.6 g/dL / ALK PHOS: 104 U/L / ALT: 43 U/L / AST: 38 U/L / GGT: x           PT/INR - ( 26 Dec 2020 07:11 )   PT: 23.9 sec;   INR: 2.18 ratio

## 2020-12-26 NOTE — DISCHARGE NOTE PROVIDER - CARE PROVIDER_API CALL
Benito Talavera  CARDIOLOGY  [address] [address] [place] [address], [place] [address]  Phone: [phone]  Fax: [phone]  Follow Up Time:     Allen Keene)  Cardiology  [address] [place] [address]  Phone: [phone]  Fax: [phone]  Follow Up Time:    Benito Talavera  CARDIOLOGY  [address] [address] [place] [address], [place] [address]  Phone: [phone]  Fax: [phone]  Follow Up Time:     Allen Keene)  Cardiology  [address] [place] [address]  Phone: [phone]  Fax: [phone]  Follow Up Time:     Brittany Harrington)  Critical Care Medicine; Internal Medicine; Pulmonary Disease  [address] [facility], [address]  [place] [address]  Phone: [phone]  Fax: [phone]  Follow Up Time:    Allen Keene)  Cardiology  [address] [place] [address]  Phone: [phone]  Fax: [phone]  Established Patient  Scheduled Appointment: 01/05/2021    Brittany Harrington)  Critical Care Medicine; Internal Medicine; Pulmonary Disease  [address] [facility], [address]  [place] [address]  Phone: [phone]  Fax: [phone]  Follow Up Time:     Bonifacio House J  HEMATOLOGY  [address]  [place] [address]  Phone: [phone]  Fax: [phone]  Established Patient  Follow Up Time:

## 2020-12-26 NOTE — PROGRESS NOTE ADULT - ASSESSMENT
65 y.o. F with PMH of HOCM s/p septal ablation, afib on coumadin, HFpEF (EF 55% 9/2019), lung cancer (diagnosed about 2 years ago, s/p chemo, in remission) who presents with sepsis likely in setting of COVID-19.     Plan:  1. Shortness of breath.    -cough and SOB x 4 days   CXR.- no consolidation  -EKG without acute ischemic changes, patient does not appear volume overloaded on exam  --COVID neg  -monitor O2  - US duplex- neg for DVT  - cards eval       2.Sepsis- ruled out    3.Transaminitis.    - monitor closely.  -trend LFT's       4. RONY (acute kidney injury)  - resolving  -Cr. -1.00 today      5. Atrial fibrillation.    - on coumadin & Lovenox  - c/w Cardizem with holding parameters    6. Prophylactic Measure  - on Lovenox and Coumadin for afib

## 2020-12-26 NOTE — DISCHARGE NOTE PROVIDER - HOSPITAL COURSE
65 y.o. F with PMH of HOCM s/p septal ablation, afib on coumadin, HFpEF (EF 55% 9/2019), lung cancer (diagnosed about 2 years ago, s/p chemo, in remission) who presents with sepsis     Shortness of breath  - pt with cough and SOB x 4 days. No evidence of consolidations on CXR, not hypoxic.  - EKG without acute ischemic changes  - Covid negative x 2, RVP- neg, PRN albuterol, 12/25 Lasix resumed  - outpt f/u with Dr Samantha Collins    Sepsis  - Patient with fever, tachycardia, and leukocytosis  - Procalcitonin is negative; pt has respiratory symptoms but no EKG changes  - COVID neg x2, RVP neg, BCx NGTD  - holding lasix for now in acute illness, will re-evaluate as pt improves  - L pyelo on CT scan - s/p 3 days CTX  - ?renal infarct on CT - VA duplex neg for stenosis or thrombosis, cont bridging to therapeutic INR     Transaminitis  - patient with transaminitis, likely in setting of EtOH consumption.   - T bili elevated, f/u D bili  - it has been 5 days since pt's last drink, will not initiate CIWA protocol    RONY   - pt with creatinine elevated from baseline  - s/p IVF, lasix held, resolved. Lasix resumed    Atrial fibrillation  - continue Cardizem  - on Coumadin 5 mg, INR is currently subtherapeutic   - cont lovenox bridge until INR > 2 per attending    Lung cancer  - currently in remission, outpt f/u with Dr House    Need for prophylactic measure  - DVT ppx: on coumadin for Afib   65 y.o. F with PMH of HOCM s/p septal ablation, afib on coumadin, HFpEF (EF 55% 9/2019), lung cancer (diagnosed about 2 years ago, s/p chemo, in remission) who presents with sepsis     Shortness of breath- pt with cough and SOB x 4 days. No evidence of consolidations on CXR, not hypoxic.  - EKG without acute ischemic changes  - Covid negative x 2, RVP- neg, PRN albuterol, 12/25 Lasix resumed  - outpt f/u with Dr Samantha Collins    Sepsis- Patient with fever, tachycardia, and leukocytosis  - Procalcitonin is negative; pt has respiratory symptoms but no EKG changes  - COVID neg x2, RVP neg, BCx NGTD  - holding lasix for now in acute illness, will re-evaluate as pt improves  - L pyelo on CT scan - s/p 3 days CTX  - ?renal infarct on CT - VA duplex neg for stenosis or thrombosis, cont bridging to therapeutic INR     Transaminitis- patient with transaminitis, likely in setting of EtOH consumption.   - T bili elevated, f/u D bili  - it has been 5 days since pt's last drink, will not initiate CIWA protocol    RONY - pt with creatinine elevated from baseline  - s/p IVF, lasix held, resolved. Lasix resumed    Atrial fibrillation- continue Cardizem  - on Coumadin 5 mg, INR is currently subtherapeutic   - cont lovenox bridge until INR > 2 per attending    Lung cancer- currently in remission, outpt f/u with Dr House    Need for prophylactic measure  - DVT ppx: on coumadin for Afib   65 y.o. F with PMH of HOCM s/p septal ablation, afib on coumadin, HFpEF (EF 55% 9/2019), lung cancer (diagnosed about 2 years ago, s/p chemo, in remission) who presents with 4 days of dry cough, sob, fatigue found with signs of sepsis on admission now resolved c/b  atrial fibrillation RVR, failed  DCCV. Started on amiodarone PO load.s/p atrial fibrillation  ablation (12/30/20) transfer to CCU for further monitoring.    Shortness of breath- pt with cough and SOB x 4 days. No evidence of consolidations on CXR, not hypoxic.  - EKG without acute ischemic changes  - Covid negative x 2, RVP- neg, PRN albuterol, 12/25 Lasix resumed  - outpt f/u with Pulm ( PFT/sleep apnea study and CT follow)    Sepsis- Patient with fever, tachycardia, and leukocytosis  - Procalcitonin was negative; pt has respiratory symptoms but no EKG changes  - COVID neg x2, RVP neg, BCx /Ucx -NGTDot  - L pyelo on CT scan - s/p 3 days CTX  - ?renal infarct on CT - VA duplex neg for stenosis or thrombosis, cont bridging to therapeutic INR     Transaminitis- patient with transaminitis, likely in setting of EtOH consumption( drink 1 bottle wine /week)   -improved    HFpEF  -lasix on hold for now.   -f/u cardiologist Dr Keene (tuesday) to restarting  lasix    RONY - pt with creatinine elevated from baseline  - s/p IVF,  resolved. Lasix held during hospital stay   Lasix 40mg IVP x1 post EP procedure    Atrial fibrillation- s/p atrial fibrillation ablation -continue Cardizem and amiodarone load x 12doses- will send home on amiodarone 40mg bid x 5days then to 200mg daily x one month  - protonix 40mg PO x 6week  -CHADSVASC is elevated to 4  - switch coumadin to eliquis 5mg bid, No valvular issue on echo   - f/u with EP- Dr clark        Lung cancer- Patchy areas of ground glass and lucency resembling mosaic attenuation are noted within both lungs. The lucent areas appear more lucent and remain in the same size on the expiratory phase. The findings suggest air trapping.  -Heterogeneous enhancement of the lower pole of the left kidney. Favor pyelonephritis.  - US Thyroid + Parathyroid (12.28.20 ) revealed Right thyroid nodules, the largest measuring 1.4 cm  - no intervention in patient - outpt f/u with Dr House       65 y.o. F with PMH of HOCM s/p septal ablation, afib on coumadin, HFpEF (EF 55% 9/2019), lung cancer (diagnosed about 2 years ago, s/p chemo, in remission) who presents with 4 days of dry cough, sob, fatigue found with signs of sepsis on admission now resolved c/b  atrial fibrillation RVR, failed  DCCV. Started on amiodarone PO load.s/p atrial fibrillation  ablation (12/30/20) transfer to CCU for further monitoring.    Shortness of breath- pt with cough and SOB x 4 days. No evidence of consolidations on CXR, not hypoxic.  - EKG without acute ischemic changes  - Covid negative x 2, RVP- neg, PRN albuterol, 12/25 Lasix resumed  - outpt f/u with Pulm ( PFT/sleep apnea study and CT follow)    Sepsis- Patient with fever, tachycardia, and leukocytosis  - Procalcitonin was negative; pt has respiratory symptoms but no EKG changes  - COVID neg x2, RVP neg, BCx /Ucx -NGTDot  - L pyelo on CT scan - s/p 3 days CTX  - ?renal infarct on CT - VA duplex neg for stenosis or thrombosis, cont bridging to therapeutic INR     Transaminitis- patient with transaminitis, likely in setting of EtOH consumption( drink 1 bottle wine /week)   -improved    HFpEF  -lasix on hold for now.   -f/u cardiologist Dr Keene (tuesday) to restarting  lasix    RONY - pt with creatinine elevated from baseline  - s/p IVF,  resolved. Lasix held during hospital stay   Lasix 40mg IVP x1 post EP procedure    Atrial fibrillation- s/p atrial fibrillation ablation -continue Cardizem and amiodarone load x 12doses- will send home on amiodarone 40mg bid x 5days then to 200mg daily x one month  - protonix 40mg PO x 6week  -CHADSVASC is elevated to 4  - switch coumadin to eliquis 5mg bid, No valvular issue on echo   - f/u with EP- Dr clark        Lung cancer- Patchy areas of ground glass and lucency resembling mosaic attenuation are noted within both lungs. The lucent areas appear more lucent and remain in the same size on the expiratory phase. The findings suggest air trapping.  -Heterogeneous enhancement of the lower pole of the left kidney. Favor pyelonephritis.  - US Thyroid + Parathyroid (12.28.20 ) revealed Right thyroid nodules, the largest measuring 1.4 cm  - no intervention in patient - outpt f/u with Dr House    Dispo: d/c home

## 2020-12-26 NOTE — PROVIDER CONTACT NOTE (OTHER) - ACTION/TREATMENT ORDERED:
Held Cardizem as per parameter. Will continue to monitor
Recheck HR in 1 hr
Held Cardizem as per parameter. Will continue to monitor
Obtain EKG, provider will come to assess pt at bedside

## 2020-12-26 NOTE — CHART NOTE - NSCHARTNOTEFT_GEN_A_CORE
Notified by RN that pt c/o chest pain, mild discomfort and very upset. Pt was seen and examined at bedside noted to be very anxious and upset, crying due to not feeling well. VS as noted, Pt states that she has been having pain since she was admitted and its not getting better, reports WALKER & Tachycardia. No evidence of consolidations on CXR, not hypoxic. EKG shows A-Fib 112, without acute ischemic changes. Pt Covid negative x 2, RVP- negative. Lasix was held since admission, resumed on 12/25/20, Cardizem was decreased to 30mg po 4xdaily due to soft BP.  I was personally seeing this pt for the past 3 days and she offered no significant complains except for dry cough when talking, generalized weakness and tachycardia on ambulation. I would discuss with pt her plan of care, disease process and discharge planning on daily basis for approximately 40min. Pt reports occasional non-compliance to her meds and Coumadin. Her INR on admission was 1.39. Pt was treated with Lovenox 70mg BID with Coumadin bridge. INR as of today 2.18. As of this morning, pt was sitting in chair watching her show comfortable in no distress. Since pt was ordered Cepacol lozenges yesterday, her cough got better and she was able to talk normally without coughing. Pt was asking me if should go home today with outpt Card f/u.     Vital Signs Last 24 Hrs  T(C): 36.7 (26 Dec 2020 13:30), Max: 36.7 (25 Dec 2020 23:10)  T(F): 98 (26 Dec 2020 13:30), Max: 98 (25 Dec 2020 23:10)  HR: 114 (26 Dec 2020 17:27) (87 - 114)  BP: 131/79 (26 Dec 2020 17:27) (112/78 - 131/79)  BP(mean): --  RR: 16 (26 Dec 2020 17:27) (16 - 18)  SpO2: 100% (26 Dec 2020 17:27) (95% - 100%)    MEDICATIONS  (STANDING):  benzocaine 15 mG/menthol 3.6 mG (Sugar-Free) Lozenge 1 Lozenge Oral every 4 hours  diltiazem    Tablet 60 milliGRAM(s) Oral four times a day  furosemide    Tablet 40 milliGRAM(s) Oral daily  influenza   Vaccine 0.5 milliLiter(s) IntraMuscular once  multivitamin 1 Tablet(s) Oral daily    MEDICATIONS  (PRN):  benzonatate 100 milliGRAM(s) Oral three times a day PRN Cough    CBC Full  -  ( 26 Dec 2020 07:11 )     WBC Count : 4.87 K/uL  RBC Count : 3.50 M/uL  Hemoglobin : 11.4 g/dL  Hematocrit : 34.6 %  Platelet Count - Automated : 296 K/uL  Mean Cell Volume : 98.9 fL  Mean Cell Hemoglobin : 32.6 pg  Mean Cell Hemoglobin Concentration : 32.9 gm/dL    12-26    139  |  103  |  9   ----------------------------<  88  3.8   |  23  |  1.00    Ca    9.4      26 Dec 2020 07:11  Phos  3.5     12-26  Mg     2.2     12-26    TPro  7.6  /  Alb  3.8  /  TBili  0.4  /  DBili  x   /  AST  38<H>  /  ALT  43<H>  /  AlkPhos  104  12-26    A/P 65 y.o. F with PMH of HOCM s/p septal ablation, afib on coumadin, HFpEF (EF 55% 9/2019), lung cancer (diagnosed about 2 years ago, s/p chemo, in remission) who presents with sepsis, SOB and Tachycardia now c/o chest pain    - will check EKG, Cardiac enzymes, CTA Chest r/o PE  - will transfer to Tele unit for continues tele monitoring  - Cardizem increased to 60mg po q6hrs for better rate control (pts home dose)  - will c/w Coumadin 5mg po qHs as ordered  - Card consult Dr Talavera called  Case discussed with Attending Dr Baker who is in agreement with POC Notified by RN that pt c/o chest pain, mild discomfort and very upset. Pt was seen and examined at bedside noted to be very anxious and upset, crying due to not feeling well. VS as noted, Pt states that she has been having pain since she was admitted and its not getting better, reports WALKER & Tachycardia. No evidence of consolidations on CXR, not hypoxic. EKG shows A-Fib 112, without acute ischemic changes. Pt Covid negative x 2, RVP- negative. Lasix was held since admission, resumed on 12/25/20, Cardizem was decreased to 30mg po 4xdaily due to soft BP.  I was personally seeing this pt for the past 3 days and she offered no significant complains except for dry cough when talking, generalized weakness and tachycardia on ambulation. I would discuss with pt her plan of care, disease process and discharge planning on daily basis for approximately 40min. Pt reports occasional non-compliance to her meds and Coumadin. Her INR on admission was 1.39. Pt was treated with Lovenox 70mg BID with Coumadin bridge. INR as of today 2.18. As of this morning, pt was sitting in chair watching her show comfortable in no distress. Since pt was ordered Cepacol lozenges yesterday, her cough got better and she was able to talk normally without coughing. Pt was asking me if should go home today with outpt Card f/u.     Vital Signs Last 24 Hrs  T(C): 36.7 (26 Dec 2020 13:30), Max: 36.7 (25 Dec 2020 23:10)  T(F): 98 (26 Dec 2020 13:30), Max: 98 (25 Dec 2020 23:10)  HR: 114 (26 Dec 2020 17:27) (87 - 114)  BP: 131/79 (26 Dec 2020 17:27) (112/78 - 131/79)  BP(mean): --  RR: 16 (26 Dec 2020 17:27) (16 - 18)  SpO2: 100% (26 Dec 2020 17:27) (95% - 100%)    MEDICATIONS  (STANDING):  benzocaine 15 mG/menthol 3.6 mG (Sugar-Free) Lozenge 1 Lozenge Oral every 4 hours  diltiazem    Tablet 60 milliGRAM(s) Oral four times a day  furosemide    Tablet 40 milliGRAM(s) Oral daily  influenza   Vaccine 0.5 milliLiter(s) IntraMuscular once  multivitamin 1 Tablet(s) Oral daily    MEDICATIONS  (PRN):  benzonatate 100 milliGRAM(s) Oral three times a day PRN Cough    CBC Full  -  ( 26 Dec 2020 07:11 )     WBC Count : 4.87 K/uL  RBC Count : 3.50 M/uL  Hemoglobin : 11.4 g/dL  Hematocrit : 34.6 %  Platelet Count - Automated : 296 K/uL  Mean Cell Volume : 98.9 fL  Mean Cell Hemoglobin : 32.6 pg  Mean Cell Hemoglobin Concentration : 32.9 gm/dL    12-26    139  |  103  |  9   ----------------------------<  88  3.8   |  23  |  1.00    Ca    9.4      26 Dec 2020 07:11  Phos  3.5     12-26  Mg     2.2     12-26    TPro  7.6  /  Alb  3.8  /  TBili  0.4  /  DBili  x   /  AST  38<H>  /  ALT  43<H>  /  AlkPhos  104  12-26    A/P 65 y.o. F with PMH of HOCM s/p septal ablation, afib on coumadin, HFpEF (EF 55% 9/2019), lung cancer (diagnosed about 2 years ago, s/p chemo, in remission) who presents with sepsis, SOB and Tachycardia now c/o chest pain    - will check EKG, Cardiac enzymes, CTA Chest r/o PE  - will transfer to Tele unit for continues tele monitoring  - Cardizem increased to 60mg po q6hrs for better rate control (pts home dose)  - will c/w Coumadin 5mg po qHs as ordered  - Card consult house called  Case discussed with Attending Dr Baker who is in agreement with POC

## 2020-12-26 NOTE — DISCHARGE NOTE PROVIDER - NSDCCAREPROVSEEN_GEN_ALL_CORE_FT
Cari, Fernando Harrington, Brittany Baker, Edgar AMANDA Cari, Fernando Harrington, Brittany Baker, Edgar Keene, Allen Paige, Sylvia Schuster, Ondina CASTREJON

## 2020-12-26 NOTE — PROVIDER CONTACT NOTE (OTHER) - RECOMMENDATIONS
Continue to monitor, Cardizem given
Continue to monitor, Hold Cardizem
Obtain EKG
Continue to monitor, Hold Cardizem

## 2020-12-26 NOTE — PROVIDER CONTACT NOTE (OTHER) - ASSESSMENT
Pt denies dizziness, asymptomatic
Pt complaints of chest pressure/chest pain but states this pain is not new. States she feels palpations. and feels SOB with increased activity.
Pt denies dizziness, asymptomatic
Pt denies dizziness, asymptomatic

## 2020-12-26 NOTE — DISCHARGE NOTE PROVIDER - NSDCMRMEDTOKEN_GEN_ALL_CORE_FT
benzonatate 100 mg oral capsule: 1 cap(s) orally 3 times a day, As needed, Cough  Cardizem 60 mg oral tablet: 1 tab(s) orally 4 times a day  furosemide 20 mg oral tablet: 1 tab(s) orally once a day  Multiple Vitamins oral tablet: 1 tab(s) orally once a day  warfarin 5 mg oral tablet: 1 tab(s) orally once a day (at bedtime)    amiodarone 200 mg oral tablet: 1 tab(s) orally once a day  start taking on 1/5/2021   amiodarone 400 mg oral tablet: 1 tab(s) orally 2 times a day   take amiodarone 400mg PO twice- starting 1/1/2021 to 1/4/2021  apixaban 5 mg oral tablet: 1 tab(s) orally every 12 hours  apixaban 5 mg oral tablet: 1 tab(s) orally every 12 hours  take one tablet in the evening on 12/31/2020  then take as instructed starting from 1/1/2021   benzonatate 100 mg oral capsule: 1 cap(s) orally 3 times a day, As needed, Cough  Cardizem 60 mg oral tablet: 1 tab(s) orally 4 times a day  Multiple Vitamins oral tablet: 1 tab(s) orally once a day  pantoprazole 40 mg oral delayed release tablet: 1 tab(s) orally once a day (before a meal)   amiodarone 200 mg oral tablet: 1 tab(s) orally once a day  start taking on 1/5/2021   amiodarone 400 mg oral tablet: 1 tab(s) orally 2 times a day   take amiodarone 400mg PO twice- starting 1/1/2021 to 1/4/2021  apixaban 5 mg oral tablet: 1 tab(s) orally every 12 hours  benzonatate 100 mg oral capsule: 1 cap(s) orally 3 times a day, As needed, Cough  Cardizem 60 mg oral tablet: 1 tab(s) orally 4 times a day  Multiple Vitamins oral tablet: 1 tab(s) orally once a day  pantoprazole 40 mg oral delayed release tablet: 1 tab(s) orally once a day (before a meal)

## 2020-12-26 NOTE — DISCHARGE NOTE PROVIDER - PROVIDER TOKENS
PROVIDER:[TOKEN:[[id number]]],PROVIDER:[TOKEN:[[id number]]] PROVIDER:[TOKEN:[[id number]]],PROVIDER:[TOKEN:[[id number]]],PROVIDER:[TOKEN:[[id number]]] PROVIDER:[TOKEN:[[id number]],SCHEDULEDAPPT:[01/05/2021],ESTABLISHEDPATIENT:[T]],PROVIDER:[TOKEN:[[id number]]],PROVIDER:[TOKEN:[[id number]],ESTABLISHEDPATIENT:[T]]

## 2020-12-26 NOTE — PROVIDER CONTACT NOTE (OTHER) - BACKGROUND
Dx SOB, Hx CHF
admitted with SOB, cough. PMHx of Afib, HOCM. On coumadin and cardizem
Dx SOB, Hx CHF
Dx SOB, Hx CHF

## 2020-12-26 NOTE — DISCHARGE NOTE PROVIDER - NSDCCPCAREPLAN_GEN_ALL_CORE_FT
PRINCIPAL DISCHARGE DIAGNOSIS  Diagnosis: Shortness of breath  Assessment and Plan of Treatment: - COVID- negative  - EKG without acute ischemic changes  - Covid negative x 2, RVP- neg, PRN albuterol, 12/25 Lasix resumed  - follow up with Dr Samantha Collins as outpatient        SECONDARY DISCHARGE DIAGNOSES  Diagnosis: Sepsis  Assessment and Plan of Treatment: - you were admitted with fever, tachycardia, and elevated white count  - COVID negative x2, Infectious work up was negative  - Left Pyelonephritis on CT scan - you were treated with IV antibiotics  - ?renal infarct on CT scan- VA duplex negative for stenosis or thrombosis, continue taking Coumadin    Diagnosis: Atrial fibrillation  Assessment and Plan of Treatment: - Continue to take your blood thinner Coumadin as prescribed  - follow with Dr Sadaf meza to monitor your INR levels  - continue with Low fat diet, reduce caffeine intake, and exercise at least 30 minutes daily    Diagnosis: Transaminitis  Assessment and Plan of Treatment: - mildly elevated, monitor your labs with your PCP as outpatient for further management    Diagnosis: RONY (acute kidney injury)  Assessment and Plan of Treatment: - your creatinine was mildly elevated from baseline  - you were treated with Fluids, Lasix was held, resolved. Lasix resumed. Continue taking medications as prescribed    Diagnosis: Lung cancer  Assessment and Plan of Treatment: - Follow up with your Oncologist Dr House as outpatient for further management     PRINCIPAL DISCHARGE DIAGNOSIS  Diagnosis: Shortness of breath  Assessment and Plan of Treatment: - COVID- negative. EKG without acute ischemic changes  - CT Chest -negative. You were seen by Pulm Dr Harrington in the hospital  - follow up with Dr Samantha Collins as outpatient for further management        SECONDARY DISCHARGE DIAGNOSES  Diagnosis: Sepsis  Assessment and Plan of Treatment: - you were admitted with fever, tachycardia, and elevated white count  - COVID negative x2, Infectious work up was negative  - Left Pyelonephritis on CT scan - you were treated with IV antibiotics  - ?renal infarct on CT scan- VA duplex negative for stenosis or thrombosis, continue taking Coumadin    Diagnosis: Atrial fibrillation  Assessment and Plan of Treatment: - Continue to take your blood thinner Coumadin as prescribed  - follow with Dr Sadaf meza to monitor your INR levels  - continue with Low fat diet, reduce caffeine intake, and exercise at least 30 minutes daily    Diagnosis: Transaminitis  Assessment and Plan of Treatment: - mildly elevated, monitor your labs with your PCP as outpatient for further management    Diagnosis: RONY (acute kidney injury)  Assessment and Plan of Treatment: - your creatinine was mildly elevated from baseline  - you were treated with Fluids, Lasix was held, resolved. Lasix resumed. Continue taking medications as prescribed    Diagnosis: Lung cancer  Assessment and Plan of Treatment: - Follow up with your Oncologist Dr House as outpatient for further management     PRINCIPAL DISCHARGE DIAGNOSIS  Diagnosis: Shortness of breath  Assessment and Plan of Treatment: - COVID- negative. EKG without acute ischemic changes  - CT Chest -negative. You were seen by Pulm Dr Harrington in the hospital  - follow up with Dr Samantha Collins as outpatient for further management        SECONDARY DISCHARGE DIAGNOSES  Diagnosis: Sepsis  Assessment and Plan of Treatment: - you were admitted with fever, tachycardia, and elevated white count  - COVID negative x2, Infectious work up was negative  - Left Pyelonephritis on CT scan - you were treated with IV antibiotics  - ?renal infarct on CT scan- VA duplex negative for stenosis or thrombosis, continue taking eliquis    Diagnosis: RONY (acute kidney injury)  Assessment and Plan of Treatment: - your creatinine was mildly elevated from baseline  - you were treated with Fluids, Lasix was held, resolved. f/u Dr Keene for reelvatuion to restart lasix. Continue taking medications as prescribed    Diagnosis: Transaminitis  Assessment and Plan of Treatment: - mildly elevated, monitor your labs with your PCP as outpatient for further management    Diagnosis: Lung cancer  Assessment and Plan of Treatment: - Follow up with your Oncologist Dr House as outpatient for further management    Diagnosis: Atrial fibrillation  Assessment and Plan of Treatment: -s/p afib ablation  -c/w cardizem and complete amiodarone 400mg bid x  5days,then 200mg x 1month  - switch coumadin to eliquis to 5mg bid as prescribed-   - continue with Low fat diet, reduce caffeine intake, and exercise at least 30 minutes daily  - f/u EDMOND Paige

## 2020-12-26 NOTE — DISCHARGE NOTE PROVIDER - CARE PROVIDERS DIRECT ADDRESSES
,[email or web address],DirectAddress_Unknown ,[email or web address],DirectAddress_Unknown,[email or web address] ,DirectAddress_Unknown,[email or web address],DirectAddress_Unknown

## 2020-12-27 LAB
ANION GAP SERPL CALC-SCNC: 12 MMOL/L — SIGNIFICANT CHANGE UP (ref 7–14)
BUN SERPL-MCNC: 9 MG/DL — SIGNIFICANT CHANGE UP (ref 7–23)
CALCIUM SERPL-MCNC: 9.5 MG/DL — SIGNIFICANT CHANGE UP (ref 8.4–10.5)
CHLORIDE SERPL-SCNC: 100 MMOL/L — SIGNIFICANT CHANGE UP (ref 98–107)
CO2 SERPL-SCNC: 24 MMOL/L — SIGNIFICANT CHANGE UP (ref 22–31)
CREAT SERPL-MCNC: 0.99 MG/DL — SIGNIFICANT CHANGE UP (ref 0.5–1.3)
D DIMER BLD IA.RAPID-MCNC: 190 NG/ML DDU — HIGH
GLUCOSE SERPL-MCNC: 93 MG/DL — SIGNIFICANT CHANGE UP (ref 70–99)
HCT VFR BLD CALC: 36.5 % — SIGNIFICANT CHANGE UP (ref 34.5–45)
HGB BLD-MCNC: 11.9 G/DL — SIGNIFICANT CHANGE UP (ref 11.5–15.5)
INR BLD: 2.14 RATIO — HIGH (ref 0.88–1.16)
MAGNESIUM SERPL-MCNC: 2 MG/DL — SIGNIFICANT CHANGE UP (ref 1.6–2.6)
MCHC RBC-ENTMCNC: 31.9 PG — SIGNIFICANT CHANGE UP (ref 27–34)
MCHC RBC-ENTMCNC: 32.6 GM/DL — SIGNIFICANT CHANGE UP (ref 32–36)
MCV RBC AUTO: 97.9 FL — SIGNIFICANT CHANGE UP (ref 80–100)
NRBC # BLD: 0 /100 WBCS — SIGNIFICANT CHANGE UP
NRBC # FLD: 0 K/UL — SIGNIFICANT CHANGE UP
PHOSPHATE SERPL-MCNC: 3.4 MG/DL — SIGNIFICANT CHANGE UP (ref 2.5–4.5)
PLATELET # BLD AUTO: 319 K/UL — SIGNIFICANT CHANGE UP (ref 150–400)
POTASSIUM SERPL-MCNC: 4 MMOL/L — SIGNIFICANT CHANGE UP (ref 3.5–5.3)
POTASSIUM SERPL-SCNC: 4 MMOL/L — SIGNIFICANT CHANGE UP (ref 3.5–5.3)
PROTHROM AB SERPL-ACNC: 23.7 SEC — HIGH (ref 10.6–13.6)
RBC # BLD: 3.73 M/UL — LOW (ref 3.8–5.2)
RBC # FLD: 12.5 % — SIGNIFICANT CHANGE UP (ref 10.3–14.5)
SODIUM SERPL-SCNC: 136 MMOL/L — SIGNIFICANT CHANGE UP (ref 135–145)
WBC # BLD: 5.63 K/UL — SIGNIFICANT CHANGE UP (ref 3.8–10.5)
WBC # FLD AUTO: 5.63 K/UL — SIGNIFICANT CHANGE UP (ref 3.8–10.5)

## 2020-12-27 PROCEDURE — 71250 CT THORAX DX C-: CPT | Mod: 26

## 2020-12-27 PROCEDURE — 99232 SBSQ HOSP IP/OBS MODERATE 35: CPT | Mod: GC

## 2020-12-27 RX ORDER — WARFARIN SODIUM 2.5 MG/1
5 TABLET ORAL ONCE
Refills: 0 | Status: COMPLETED | OUTPATIENT
Start: 2020-12-27 | End: 2020-12-27

## 2020-12-27 RX ADMIN — WARFARIN SODIUM 5 MILLIGRAM(S): 2.5 TABLET ORAL at 17:33

## 2020-12-27 RX ADMIN — Medication 60 MILLIGRAM(S): at 13:59

## 2020-12-27 RX ADMIN — Medication 1 TABLET(S): at 14:04

## 2020-12-27 RX ADMIN — BENZOCAINE AND MENTHOL 1 LOZENGE: 5; 1 LIQUID ORAL at 01:39

## 2020-12-27 RX ADMIN — BENZOCAINE AND MENTHOL 1 LOZENGE: 5; 1 LIQUID ORAL at 19:48

## 2020-12-27 RX ADMIN — Medication 60 MILLIGRAM(S): at 08:34

## 2020-12-27 RX ADMIN — Medication 60 MILLIGRAM(S): at 19:48

## 2020-12-27 RX ADMIN — Medication 60 MILLIGRAM(S): at 01:39

## 2020-12-27 RX ADMIN — Medication 100 MILLIGRAM(S): at 19:45

## 2020-12-27 NOTE — PROGRESS NOTE ADULT - ASSESSMENT
65 y.o. F with PMH of HOCM s/p septal ablation, afib on coumadin, HFpEF (EF 55% 9/2019), lung cancer (diagnosed about 2 years ago, s/p chemo, in remission) who presents with sepsis likely in setting of COVID-19.     Plan:  1. Shortness of breath.    -< from: CT Angio Chest w/ IV Cont (12.26.20 @ 21:45) >  No pulmonary embolism.    Cardiomegaly.    Mosaic attenuation of the lungs as on the prior chest CT of September 1, 2019. Differential diagnosis include pulmonary air entrapment due tosmall airway disease (including asthma or emphysema) or small vessel disease. Inspiratory and expiratory CT of the chest can be performed for further evaluation as clinically warranted.      < end of copied text >  - pulm f/u     2.Sepsis- ruled out    3.Transaminitis.    - monitor closely.  -trend LFT's       4. RONY (acute kidney injury)  - resolving  -Cr. -1.00 today      5. Atrial fibrillation.    - on coumadin & Lovenox  - c/w Cardizem with holding parameters    6. Prophylactic Measure  - on Lovenox and Coumadin for afib

## 2020-12-27 NOTE — PROGRESS NOTE ADULT - SUBJECTIVE AND OBJECTIVE BOX
SUBJECTIVE / OVERNIGHT EVENTS: pt seen and examined  pt started crying saying that she still feels shortness of breath on exertion    MEDICATIONS  (STANDING):  benzocaine 15 mG/menthol 3.6 mG (Sugar-Free) Lozenge 1 Lozenge Oral every 4 hours  diltiazem    Tablet 60 milliGRAM(s) Oral four times a day  influenza   Vaccine 0.5 milliLiter(s) IntraMuscular once  multivitamin 1 Tablet(s) Oral daily    MEDICATIONS  (PRN):  benzonatate 100 milliGRAM(s) Oral three times a day PRN Cough    Vital Signs Last 24 Hrs  T(C): 36.4 (27 Dec 2020 20:00), Max: 36.6 (27 Dec 2020 13:58)  T(F): 97.5 (27 Dec 2020 20:00), Max: 97.8 (27 Dec 2020 13:58)  HR: 98 (27 Dec 2020 20:00) (98 - 117)  BP: 127/88 (27 Dec 2020 20:00) (108/67 - 142/83)  BP(mean): --  RR: 16 (27 Dec 2020 20:00) (16 - 18)  SpO2: 100% (27 Dec 2020 20:00) (100% - 100%)    Constitutional: No fever, fatigue  Skin: No rash.  Eyes: No recent vision problems or eye pain.  ENT: No congestion, ear pain, or sore throat.  Cardiovascular: No chest pain or palpation.  Respiratory: shortness of breath on exertion  Gastrointestinal: No abdominal pain, nausea, vomiting, or diarrhea.  Genitourinary: No dysuria.  Musculoskeletal: No joint swelling.  Neurologic: No headache.    PHYSICAL EXAM:  GENERAL: NAD  EYES: EOMI, PERRLA  NECK: Supple, No JVD  CHEST/LUNG: dec breath sounds at bases   HEART:  S1 , S2 +  ABDOMEN: soft , bs+  EXTREMITIES:  trace edema  NEUROLOGY:alert awake    LABS:      136  |  100  |  9   ----------------------------<  93  4.0   |  24  |  0.99    Ca    9.5      27 Dec 2020 08:53  Phos  3.4       Mg     2.0         TPro  7.6  /  Alb  3.8  /  TBili  0.4  /  DBili      /  AST  38<H>  /  ALT  43<H>  /  AlkPhos  104  12-26    Creatinine Trend: 0.99 <--, 1.00 <--, 1.01 <--, 1.02 <--, 1.00 <--, 1.07 <--, 1.05 <--                        11.9   5.63  )-----------( 319      ( 27 Dec 2020 08:53 )             36.5     Urine Studies:  Urinalysis Basic - ( 21 Dec 2020 02:37 )    Color: Yellow / Appearance: Slightly Turbid / S.011 / pH:   Gluc:  / Ketone: Negative  / Bili: Negative / Urobili: <2 mg/dL   Blood:  / Protein: Trace / Nitrite: Negative   Leuk Esterase: Large / RBC: 7 /HPF / WBC >50 /HPF   Sq Epi:  / Non Sq Epi: 16 /HPF / Bacteria: Few      Creatinine, Random Urine: 87 mg/dL ( @ 02:37)    CARDIAC MARKERS ( 26 Dec 2020 18:53 )  x     / x     / 159 U/L / x     / 2.0 ng/mL        LIVER FUNCTIONS - ( 26 Dec 2020 07:11 )  Alb: 3.8 g/dL / Pro: 7.6 g/dL / ALK PHOS: 104 U/L / ALT: 43 U/L / AST: 38 U/L / GGT: x           PT/INR - ( 27 Dec 2020 07:53 )   PT: 23.7 sec;   INR: 2.14 ratio

## 2020-12-27 NOTE — PROGRESS NOTE ADULT - ASSESSMENT
Patient is a  65 y.o. F with PMH of HOCM s/p septal ablation, afib on coumadin, HFpEF (EF 55% 9/2019), lung cancer (diagnosed about 2 years ago, s/p chemo, in remission), who presents with pyleonephritis s/p ceftriaxone x 3 days, now with afib rate of 113 and worsening shortness of breath.    Atrial fibrillation with rapid rate:  -CHADSVASC is elevated to 4  -continue to anticoagulate with coumadin  -INR goal is 2-3  -would continue the cardizem at 60 po [id number]  -no need for an urgent afib ablation, can follow up outpatient  -strategy for rate control  -hold lasix for now, appears more dry    HOCM s/p septal ablation  -rate control with cardizem  -volume status is dry, would hold lasix for now    Aniket Walker MD  Cardiology Fellow  [phone]  All Cardiology service information can be found 24/7 on [email or web address], password: [email or web address]

## 2020-12-27 NOTE — PROGRESS NOTE ADULT - SUBJECTIVE AND OBJECTIVE BOX
Aniket Walker MD  Cardiology Fellow  [phone]  All Cardiology service information can be found  on [email or web address], password: [email or web address]    Patient seen and examined at bedside.    Overnight Events: No acute events.    REVIEW OF SYSTEMS:  General: no fatigue/malaise, weight loss/gain.  Skin: no rashes.  Ophthalmologic: no blurred vision, no loss of vision. 	  ENT: no sore throat, rhinorrhea, sinus congestion.  Respiratory: no SOB, cough or wheeze.  CV: No chest pain. No palpitations.   Gastrointestinal:  no N/V/D, no melena/hematemesis/hematochezia.  Genitourinary: no dysuria/hesitancy or hematuria.  Musculoskeletal: no myalgias or arthralgias.  Neurological: no changes in vision or hearing, no lightheadedness/dizziness, no syncope/near syncope	  Psychiatric: no unusual stress/anxiety.   Hematology/Lymphatics: no unusual bleeding, bruising and no lymphadenopathy.  Endocrine: no unusual thirst.        Current Meds:  benzocaine 15 mG/menthol 3.6 mG (Sugar-Free) Lozenge 1 Lozenge Oral every 4 hours  benzonatate 100 milliGRAM(s) Oral three times a day PRN  diltiazem    Tablet 60 milliGRAM(s) Oral four times a day  influenza   Vaccine 0.5 milliLiter(s) IntraMuscular once  multivitamin 1 Tablet(s) Oral daily  warfarin 5 milliGRAM(s) Oral once      PAST MEDICAL & SURGICAL HISTORY:  Lung cancer  s/p chemotherapy ~ now in remission    GERD (gastroesophageal reflux disease)    CAP (community acquired pneumonia)  RML[facility]RLL [facility] 3/2017    Lung cancer  s/p chemotherapy    Chronic diastolic (congestive) heart failure    AF (atrial fibrillation)  On Coumadin    Pleural effusion  Right parapneumonic drained twice 3/2017 at [facility]    HOCM (hypertrophic obstructive cardiomyopathy)  S/P septal ablation    H/O cardiac radiofrequency ablation  for HOCM - 16 years ago    History of   15 years ago        Vitals:  T(F): 97.5 (-), Max: 98 (-)  HR: 102 (-) (102 - 117)  BP: 108/67 (12-) (108/67 - 135/82)  RR: 17 (-)  SpO2: 100% ()  I&O's Summary      Physical Exam:  Appearance: No acute distress  Eyes: PERRL, EOMI, pink conjunctiva  HENT: Normal oral mucosa  Cardiovascular: Irregular irregular, no murmurs, rubs, or gallops; no edema; no JVD  Respiratory: Clear to auscultation bilaterally  Gastrointestinal: soft, non-tender, non-distended with normal bowel sounds  Musculoskeletal: No clubbing; no joint deformity   Neurologic: Non-focal  Lymphatic: No lymphadenopathy  Psychiatry: AAOx3, mood & affect appropriate  Skin: No rashes, ecchymoses, or cyanosis                          11.9   5.63  )-----------( 319      ( 27 Dec 2020 08:53 )             36.5         136  |  100  |  9   ----------------------------<  93  4.0   |  24  |  0.99    Ca    9.5      27 Dec 2020 08:53  Phos  3.4       Mg     2.0         TPro  7.6  /  Alb  3.8  /  TBili  0.4  /  DBili  x   /  AST  38<H>  /  ALT  43<H>  /  AlkPhos  104      PT/INR - ( 27 Dec 2020 07:53 )   PT: 23.7 sec;   INR: 2.14 ratio           CARDIAC MARKERS ( 26 Dec 2020 18:53 )  x     / x     / 159 U/L / x     / 2.0 ng/mL      Serum Pro-Brain Natriuretic Peptide: 2212 pg/mL ( @ 07:11)  Serum Pro-Brain Natriuretic Peptide: 9657 pg/mL ( @ 16:03)          New ECG(s): Personally reviewed    Echo: < from: Transthoracic Echocardiogram (20 @ 19:24) >  DIMENSIONS:  Dimensions:     Normal Values:  LA:     5.8 cm    2.0 - 4.0 cm  Ao:     3.6 cm    2.0 - 3.8 cm  SEPTUM: 1.1 cm    0.6 -1.2 cm  PWT:    0.9 cm    0.6 - 1.1 cm  LVIDd:  6.1 cm    3.0 - 5.6 cm  LVIDs:    ---     1.8 - 4.0 cm  Derived Variables:  LVMI: 143 g/m2  RWT: 0.29  ------------------------------------------------------------------------  OBSERVATIONS:  Mitral Valve: Normal mitral valve. Moderate mitral  regurgitation.  Aortic Root: Normal aortic root.  Aortic Valve: Calcified trileaflet aortic valve with normal  opening. Minimal aortic regurgitation.  Left Atrium: Severely dilated left atrium.  LA volume index  = 81 cc/m2.  Left Ventricle: Endocardium not well visualized; grossly  normal left ventricular systolic function. Basal septal  hypetrophy.  Couldnot be accurately determined due to  technically limited images.  Right Heart: Severe right atrial enlargement. Normal right  ventricular size and function. Normal tricuspid valve.  Moderate tricuspid regurgitation. Pulmonic valve not well  visualized.  Pericardium/PleuraNormal pericardium with no pericardial  effusion.  Hemodynamic: Estimated right ventricular systolic pressure  equals 48 mm Hg, assuming right atrial pressure equals 10  mm Hg, consistent with mild pulmonary hypertension.  ------------------------------------------------------------------------  CONCLUSIONS:  1. Normal mitral valve. Moderate mitral regurgitation.  2. Calcified trileaflet aortic valve with normal opening.  Minimal aortic regurgitation.  3. Severely dilated left atrium.  LA volume index = 81  cc/m2.  4. Endocardium not well visualized; grossly normal left  ventricular systolic function. Basal septal hypetrophy.  5. Severe right atrial enlargement.  6. Normal right ventricular size and function.  7. Normal tricuspid valve. Moderate tricuspid  regurgitation.  8. Estimated pulmonary artery systolic pressure equals 48  mm Hg, assuming right atrial pressure equals 10  mm Hg,  consistent with mild pulmonary hypertension.    < end of copied text >      Stress Testing:     Cath:    Imaging:    Interpretation of Telemetry: A-fib 60-90

## 2020-12-28 LAB
ALBUMIN SERPL ELPH-MCNC: 4.4 G/DL — SIGNIFICANT CHANGE UP (ref 3.3–5)
ALP SERPL-CCNC: 110 U/L — SIGNIFICANT CHANGE UP (ref 40–120)
ALT FLD-CCNC: 48 U/L — HIGH (ref 4–33)
ANION GAP SERPL CALC-SCNC: 11 MMOL/L — SIGNIFICANT CHANGE UP (ref 7–14)
AST SERPL-CCNC: 41 U/L — HIGH (ref 4–32)
BILIRUB SERPL-MCNC: 0.5 MG/DL — SIGNIFICANT CHANGE UP (ref 0.2–1.2)
BUN SERPL-MCNC: 12 MG/DL — SIGNIFICANT CHANGE UP (ref 7–23)
CALCIUM SERPL-MCNC: 9.5 MG/DL — SIGNIFICANT CHANGE UP (ref 8.4–10.5)
CHLORIDE SERPL-SCNC: 101 MMOL/L — SIGNIFICANT CHANGE UP (ref 98–107)
CO2 SERPL-SCNC: 25 MMOL/L — SIGNIFICANT CHANGE UP (ref 22–31)
CREAT SERPL-MCNC: 1.14 MG/DL — SIGNIFICANT CHANGE UP (ref 0.5–1.3)
GLUCOSE SERPL-MCNC: 93 MG/DL — SIGNIFICANT CHANGE UP (ref 70–99)
HCT VFR BLD CALC: 35.8 % — SIGNIFICANT CHANGE UP (ref 34.5–45)
HGB BLD-MCNC: 11.8 G/DL — SIGNIFICANT CHANGE UP (ref 11.5–15.5)
INR BLD: 2.26 RATIO — HIGH (ref 0.88–1.16)
MAGNESIUM SERPL-MCNC: 2.1 MG/DL — SIGNIFICANT CHANGE UP (ref 1.6–2.6)
MCHC RBC-ENTMCNC: 32.7 PG — SIGNIFICANT CHANGE UP (ref 27–34)
MCHC RBC-ENTMCNC: 33 GM/DL — SIGNIFICANT CHANGE UP (ref 32–36)
MCV RBC AUTO: 99.2 FL — SIGNIFICANT CHANGE UP (ref 80–100)
NRBC # BLD: 0 /100 WBCS — SIGNIFICANT CHANGE UP
NRBC # FLD: 0 K/UL — SIGNIFICANT CHANGE UP
PHOSPHATE SERPL-MCNC: 3.3 MG/DL — SIGNIFICANT CHANGE UP (ref 2.5–4.5)
PLATELET # BLD AUTO: 343 K/UL — SIGNIFICANT CHANGE UP (ref 150–400)
POTASSIUM SERPL-MCNC: 3.8 MMOL/L — SIGNIFICANT CHANGE UP (ref 3.5–5.3)
POTASSIUM SERPL-SCNC: 3.8 MMOL/L — SIGNIFICANT CHANGE UP (ref 3.5–5.3)
PROT SERPL-MCNC: 7.9 G/DL — SIGNIFICANT CHANGE UP (ref 6–8.3)
PROTHROM AB SERPL-ACNC: 25 SEC — HIGH (ref 10.6–13.6)
RBC # BLD: 3.61 M/UL — LOW (ref 3.8–5.2)
RBC # FLD: 12.6 % — SIGNIFICANT CHANGE UP (ref 10.3–14.5)
SODIUM SERPL-SCNC: 137 MMOL/L — SIGNIFICANT CHANGE UP (ref 135–145)
WBC # BLD: 5.45 K/UL — SIGNIFICANT CHANGE UP (ref 3.8–10.5)
WBC # FLD AUTO: 5.45 K/UL — SIGNIFICANT CHANGE UP (ref 3.8–10.5)

## 2020-12-28 PROCEDURE — 99232 SBSQ HOSP IP/OBS MODERATE 35: CPT

## 2020-12-28 PROCEDURE — 76536 US EXAM OF HEAD AND NECK: CPT | Mod: 26

## 2020-12-28 PROCEDURE — 99233 SBSQ HOSP IP/OBS HIGH 50: CPT

## 2020-12-28 RX ORDER — WARFARIN SODIUM 2.5 MG/1
5 TABLET ORAL ONCE
Refills: 0 | Status: COMPLETED | OUTPATIENT
Start: 2020-12-28 | End: 2020-12-28

## 2020-12-28 RX ORDER — ALPRAZOLAM 0.25 MG
0.25 TABLET ORAL ONCE
Refills: 0 | Status: DISCONTINUED | OUTPATIENT
Start: 2020-12-28 | End: 2020-12-28

## 2020-12-28 RX ADMIN — Medication 60 MILLIGRAM(S): at 14:44

## 2020-12-28 RX ADMIN — Medication 1 TABLET(S): at 14:44

## 2020-12-28 RX ADMIN — Medication 0.25 MILLIGRAM(S): at 22:09

## 2020-12-28 RX ADMIN — WARFARIN SODIUM 5 MILLIGRAM(S): 2.5 TABLET ORAL at 18:35

## 2020-12-28 RX ADMIN — Medication 60 MILLIGRAM(S): at 23:28

## 2020-12-28 RX ADMIN — Medication 60 MILLIGRAM(S): at 02:04

## 2020-12-28 RX ADMIN — Medication 60 MILLIGRAM(S): at 09:00

## 2020-12-28 RX ADMIN — Medication 60 MILLIGRAM(S): at 18:35

## 2020-12-28 RX ADMIN — Medication 100 MILLIGRAM(S): at 02:04

## 2020-12-28 RX ADMIN — BENZOCAINE AND MENTHOL 1 LOZENGE: 5; 1 LIQUID ORAL at 02:04

## 2020-12-28 NOTE — DIETITIAN INITIAL EVALUATION ADULT. - ADD RECOMMEND
1. Encourage PO intake and honor food preferences as able. 2. F/u with nutrition education as needed.

## 2020-12-28 NOTE — DIETITIAN INITIAL EVALUATION ADULT. - PERTINENT MEDS FT
MEDICATIONS  (STANDING):  benzocaine 15 mG/menthol 3.6 mG (Sugar-Free) Lozenge 1 Lozenge Oral every 4 hours  diltiazem    Tablet 60 milliGRAM(s) Oral four times a day  influenza   Vaccine 0.5 milliLiter(s) IntraMuscular once  multivitamin 1 Tablet(s) Oral daily

## 2020-12-28 NOTE — PATIENT PROFILE ADULT. - BILL OF RIGHTS/ADMISSION INFORMATION PROVIDED TO:
Patient MR thoracic/lumbar shows multilevel degenerative disease with several areas of severe neural foramen stenosis likely leading to LLE weakness, no evidence of cord compression   pain control as above  PT consult  Ortho reccs

## 2020-12-28 NOTE — DIETITIAN INITIAL EVALUATION ADULT. - ORAL INTAKE PTA/DIET HISTORY
Pt states she mostly cooks at home. [place] how to bake bread recently. Diet recall high in processed meats, uses himalayan salt to flavor meals.

## 2020-12-28 NOTE — PROGRESS NOTE ADULT - SUBJECTIVE AND OBJECTIVE BOX
SUBJECTIVE / OVERNIGHT EVENTS: pt seen and examined    MEDICATIONS  (STANDING):  benzocaine 15 mG/menthol 3.6 mG (Sugar-Free) Lozenge 1 Lozenge Oral every 4 hours  diltiazem    Tablet 60 milliGRAM(s) Oral four times a day  influenza   Vaccine 0.5 milliLiter(s) IntraMuscular once  multivitamin 1 Tablet(s) Oral daily    MEDICATIONS  (PRN):  benzonatate 100 milliGRAM(s) Oral three times a day PRN Cough    Vital Signs Last 24 Hrs  T(C): 36.7 (28 Dec 2020 20:00), Max: 36.7 (28 Dec 2020 12:00)  T(F): 98 (28 Dec 2020 20:00), Max: 98.1 (28 Dec 2020 12:00)  HR: 94 (28 Dec 2020 20:00) (71 - 94)  BP: 116/67 (28 Dec 2020 20:00) (116/67 - 122/61)  BP(mean): --  RR: 16 (28 Dec 2020 20:00) (16 - 18)  SpO2: 100% (28 Dec 2020 20:00) (99% - 100%)    Constitutional: No fever, fatigue  Skin: No rash.  Eyes: No recent vision problems or eye pain.  ENT: No congestion, ear pain, or sore throat.  Cardiovascular: No chest pain or palpation.  Respiratory: shortness of breath on exertion  Gastrointestinal: No abdominal pain, nausea, vomiting, or diarrhea.  Genitourinary: No dysuria.  Musculoskeletal: No joint swelling.  Neurologic: No headache.    PHYSICAL EXAM:  GENERAL: NAD  EYES: EOMI, PERRLA  NECK: Supple, No JVD  CHEST/LUNG: dec breath sounds at bases   HEART:  S1 , S2 +  ABDOMEN: soft , bs+  EXTREMITIES:  trace edema  NEUROLOGY:alert awake    LABS:  12-28    137  |  101  |  12  ----------------------------<  93  3.8   |  25  |  1.14    Ca    9.5      28 Dec 2020 09:02  Phos  3.3     12-28  Mg     2.1     12-28    TPro  7.9  /  Alb  4.4  /  TBili  0.5  /  DBili      /  AST  41<H>  /  ALT  48<H>  /  AlkPhos  110  12-28    Creatinine Trend: 1.14 <--, 0.99 <--, 1.00 <--, 1.01 <--, 1.02 <--, 1.00 <--, 1.07 <--                        11.8   5.45  )-----------( 343      ( 28 Dec 2020 09:02 )             35.8     Urine Studies:            LIVER FUNCTIONS - ( 28 Dec 2020 09:02 )  Alb: 4.4 g/dL / Pro: 7.9 g/dL / ALK PHOS: 110 U/L / ALT: 48 U/L / AST: 41 U/L / GGT: x           PT/INR - ( 28 Dec 2020 09:02 )   PT: 25.0 sec;   INR: 2.26 ratio             CARDIAC MARKERS ( 26 Dec 2020 18:53 )  x     / x     / 159 U/L / x     / 2.0 ng/mL        LIVER FUNCTIONS - ( 26 Dec 2020 07:11 )  Alb: 3.8 g/dL / Pro: 7.6 g/dL / ALK PHOS: 104 U/L / ALT: 43 U/L / AST: 38 U/L / GGT: x           PT/INR - ( 27 Dec 2020 07:53 )   PT: 23.7 sec;   INR: 2.14 ratio

## 2020-12-28 NOTE — DIETITIAN INITIAL EVALUATION ADULT. - OTHER INFO
PO intake <75% meals due to decrease in appetite that started a couple days PTA. Denies any GI issues (nausea/vomiting/diarrhea/constipation.) Denies swallowing issues. Pt states she has been choosing vegetarian options since she can't chew the meat.  pounds.   Pt amenable to Ensure Enlive. Heart failure diet education provided.

## 2020-12-28 NOTE — CONSULT NOTE ADULT - ASSESSMENT
65yoF w/ PMHx afib on coumadin, HFpEF, GERD, HLD, HOCM s/p septal ablation, lung Ca s/p chemo, MR and TR, HTN initially presented with coughing likely in setting of emphysema versus COPD improving neb treatment and lasix.  Patient states she is feeling better but is concerned about her heart rate.  On telemetry, patient's atrial fibrillation remains rate controlled 90's while at rest but 110-120's while ambulating.  Patient states when she feels palpitations at home, she will check her HR on her pulse oximeter and sometimes it would be in the 130's.  Also states she sometimes feel chest pressure with activity.  Last LHC done in 11/18 showed normal coronaries.  TTE done during this admission showed moderate MR, severely dilated LA, severe RA enlargement, mod TR, normal LV systolic function.    #atrial fibrillation   Increase cardizem to 90mg q6 hours   Continue to monitor on telemetry  Continue coumadin for goal INR 2-3    65yoF w/ PMHx afib on coumadin, HFpEF, GERD, HLD, HOCM s/p septal ablation, lung Ca s/p chemo, MR and TR, HTN initially presented with coughing likely in setting of emphysema versus COPD improving neb treatment and lasix.  Patient states she is feeling better but is concerned about her heart rate.  On telemetry, patient's atrial fibrillation remains rate controlled 90's while at rest but 110-120's while ambulating.  Patient states when she feels palpitations at home, she will check her HR on her pulse oximeter and sometimes it would be in the 130's.  Also states she sometimes feel chest pressure with activity.  Last LHC done in 11/18 showed normal coronaries.  TTE done during this admission showed moderate MR, severely dilated LA, severe RA enlargement, mod TR, normal LV systolic function.    #atrial fibrillation   Increase cardizem to 90mg q6 hours   Continue to monitor on telemetry  Continue coumadin for goal INR 2-3   NPO after midnight for RICCARDO/DCCV tomorrow; consent in chart

## 2020-12-28 NOTE — CONSULT NOTE ADULT - SUBJECTIVE AND OBJECTIVE BOX
Date of Admission: 2020    CHIEF COMPLAINT: cough    HISTORY OF PRESENT ILLNESS:  This is a 65yoF w/ PMHx afib on coumadin, HFpEF, GERD, HLD, HOCM s/p septal ablation, lung Ca s/p chemo, MR and TR, HTN initially presented with coughing likely in setting of emphysema versus COPD improving neb treatment and lasix.  Patient states she is feeling better but is concerned about her heart rate.  On telemetry, patient's atrial fibrillation remains rate controlled 90's while at rest but 110-120's while ambulating.  Patient states when she feels palpitations at home, she will check her HR on her pulse oximeter and sometimes it would be in the 130's.  Also states she sometimes feel chest pressure with activity.  Last LHC done in  showed normal coronaries.  TTE done during this admission showed moderate MR, severely dilated LA, severe RA enlargement, mod TR, normal LV systolic function.    Allergies  cefdinir (Rash)  moxifloxacin (Rash)  penicillin (Rash)	    MEDICATIONS:  diltiazem    Tablet 60 milliGRAM(s) Oral four times a day  benzonatate 100 milliGRAM(s) Oral three times a day PRN  benzocaine 15 mG/menthol 3.6 mG (Sugar-Free) Lozenge 1 Lozenge Oral every 4 hours  influenza   Vaccine 0.5 milliLiter(s) IntraMuscular once  multivitamin 1 Tablet(s) Oral daily    PAST MEDICAL & SURGICAL HISTORY:  Lung cancer  s/p chemotherapy ~ now in remission  GERD (gastroesophageal reflux disease)  CAP (community acquired pneumonia)  RML/RLL at [facility] 3/2017  Lung cancer  s/p chemotherapy  Chronic diastolic (congestive) heart failure  AF (atrial fibrillation)  On Coumadin  Pleural effusion  Right parapneumonic drained twice 3/2017 at [facility]  HOCM (hypertrophic obstructive cardiomyopathy)  S/P septal ablation  H/O cardiac radiofrequency ablation  for HOCM - 16 years ago  History of   15 years ago    FAMILY HISTORY:  Family history of psoriasis (Sibling)  Brother    Family history of ulcerative colitis (Sibling)  Brother    Family history of liver cancer  Mother    SOCIAL HISTORY:    Former smoker    REVIEW OF SYSTEMS:  See HPI. Otherwise, 10 point ROS done and otherwise negative.    PHYSICAL EXAM:  T(C): 36.3 (20 @ 02:00), Max: 36.6 (20 @ 13:58)  HR: 71 (12-28-20 @ 04:00) (71 - 108)  BP: 117/87 (20 @ 02:00) (117/87 - 142/83)  RR: 16 (20 @ 04:00) (16 - 18)  SpO2: 100% (20 @ 04:00) (100% - 100%)  Wt(kg): --  I&O's Summary    27 Dec 2020 07:01  -  28 Dec 2020 07:00  --------------------------------------------------------  IN: 400 mL / OUT: 0 mL / NET: 400 mL    Appearance: Normal	  HEENT:   Normal oral mucosa, PERRL, EOMI	  Lymphatic: No lymphadenopathy  Cardiovascular: Irregular, No JVD, No murmurs, No edema  Respiratory: Lungs clear to auscultation	  Psychiatry: A & O x 3, Mood & affect appropriate  Gastrointestinal:  Soft, Non-tender, + BS	  Skin: No rashes, No ecchymoses, No cyanosis	  Neurologic: Non-focal  Extremities: Normal range of motion, No clubbing, cyanosis or edema  Vascular: Peripheral pulses palpable 2+ bilaterally    LABS:	 	  CBC Full  -  ( 28 Dec 2020 09:02 )  WBC Count : 5.45 K/uL  Hemoglobin : 11.8 g/dL  Hematocrit : 35.8 %  Platelet Count - Automated : 343 K/uL  Mean Cell Volume : 99.2 fL  Mean Cell Hemoglobin : 32.7 pg  Mean Cell Hemoglobin Concentration : 33.0 gm/dL  Auto Neutrophil # : x  Auto Lymphocyte # : x  Auto Monocyte # : x  Auto Eosinophil # : x  Auto Basophil # : x  Auto Neutrophil % : x  Auto Lymphocyte % : x  Auto Monocyte % : x  Auto Eosinophil % : x  Auto Basophil % : x        137  |  101  |  12  ----------------------------<  93  3.8   |  25  |  1.14      136  |  100  |  9   ----------------------------<  93  4.0   |  24  |  0.99    Ca    9.5      28 Dec 2020 09:02  Ca    9.5      27 Dec 2020 08:53  Phos  3.3       Phos  3.4       Mg     2.1       Mg     2.0         TPro  7.9  /  Alb  4.4  /  TBili  0.5  /  DBili  x   /  AST  41<H>  /  ALT  48<H>  /  AlkPhos  110      < from: Transthoracic Echocardiogram (20 @ 19:24) >  ------------------------------------------------------------------------  CONCLUSIONS:  1. Normal mitral valve. Moderate mitral regurgitation.  2. Calcified trileaflet aortic valve with normal opening.  Minimal aortic regurgitation.  3. Severely dilated left atrium.  LA volume index = 81  cc/m2.  4. Endocardium not well visualized; grossly normal left  ventricular systolic function. Basal septal hypetrophy.  5. Severe right atrial enlargement.  6. Normal right ventricular size and function.  7. Normal tricuspid valve. Moderate tricuspid  regurgitation.  8. Estimated pulmonary artery systolic pressure equals 48  mm Hg, assuming right atrial pressure equals 10  mm Hg,  consistent with mild pulmonary hypertension.  ------------------------------------------------------------------------    < end of copied text >    < from: Cardiac Cath Lab - Adult (18 @ 15:51) >  CORONARY VESSELS: The coronary circulation is right dominant.  LM:   --  LM: Normal.  LAD:   --  Proximal LAD: The vessel was medium to large sized. Angiography  showed minor luminal irregularities with no flow limiting lesions.  --  D1: Normal.  --  D2: Normal.  CX:   --  Circumflex: Normal.  RI:   --  Ramus intermedius: Normal.  RCA:   --  RCA: Normal.  --  RPDA: Normal.  --  RPLS: Normal.  COMPLICATIONS: There were no complications. Nocomplications occurred  during the cath lab visit.  DIAGNOSTIC IMPRESSIONS: There is mild irregularity of the coronary anatomy.    < end of copied text >

## 2020-12-28 NOTE — DIETITIAN INITIAL EVALUATION ADULT. - PROBLEM SELECTOR PLAN 4
-pt with creatinine elevated from baseline  -suspect pre-renal in setting of sepsis and hypovolemia  -s/p 1L IVF in ED, hold today's dose of lasix  -f/u urine studies

## 2020-12-28 NOTE — DIETITIAN INITIAL EVALUATION ADULT. - PROBLEM SELECTOR PLAN 2
-Patient with fever, tachycardia, and leukocytosis  -procalcitonin is negative; pt has respiratory symptoms but no EKG changes. No wheezing  -suspect viral respiratory infection, most likely COVID-19  -procalcitonin also negative, will monitor off antibiotics at this point  -f/u COVID-19 PCR and RVP  -will hold lasix for today, can re-evaluate in morning

## 2020-12-28 NOTE — PROGRESS NOTE ADULT - ASSESSMENT
65 y.o. F with PMH of HOCM s/p septal ablation, afib on coumadin, HFpEF (EF 55% 9/2019), lung cancer (diagnosed about 2 years ago, s/p chemo, in remission) who presents with sepsis likely in setting of COVID-19.     Plan:  1. Shortness of breath.    -< from: CT Angio Chest w/ IV Cont (12.26.20 @ 21:45) >  No pulmonary embolism.    Cardiomegaly.    Mosaic attenuation of the lungs as on the prior chest CT of September 1, 2019. Differential diagnosis include pulmonary air entrapment due tosmall airway disease (including asthma or emphysema) or small vessel disease. Inspiratory and expiratory CT of the chest can be performed for further evaluation as clinically warranted.      < end of copied text >  - pulm f/u     2.Sepsis- ruled out    3.Transaminitis.    - monitor closely.  -trend LFT's       4. RONY (acute kidney injury)  - resolving  -Cr. -1.00 today      5. Atrial fibrillation.    NPO after midnight for RICCARDO/DCCV tomorrow  -   6. Prophylactic Measure

## 2020-12-28 NOTE — PROGRESS NOTE ADULT - SUBJECTIVE AND OBJECTIVE BOX
PULMONARY PROGRESS NOTE    FERDINAND ALMENDAREZ  MRN-[id number]    Patient is a 65y old  Female who presents with a chief complaint of Cough x 4 days (27 Dec 2020 11:59)      HPI:  on room air  worried about her HR  former smoker  not on any chronic inhalers due to "palpitaitons"    ROS:   -    ACTIVE MEDICATION LIST:  MEDICATIONS  (STANDING):  benzocaine 15 mG/menthol 3.6 mG (Sugar-Free) Lozenge 1 Lozenge Oral every 4 hours  diltiazem    Tablet 60 milliGRAM(s) Oral four times a day  influenza   Vaccine 0.5 milliLiter(s) IntraMuscular once  multivitamin 1 Tablet(s) Oral daily    MEDICATIONS  (PRN):  benzonatate 100 milliGRAM(s) Oral three times a day PRN Cough      EXAM:  Vital Signs Last 24 Hrs  T(C): 36.3 (28 Dec 2020 02:00), Max: 36.6 (27 Dec 2020 13:58)  T(F): 97.4 (28 Dec 2020 02:00), Max: 97.8 (27 Dec 2020 13:58)  HR: 71 (28 Dec 2020 04:00) (71 - 108)  BP: 117/87 (28 Dec 2020 02:00) (117/87 - 142/83)  BP(mean): --  RR: 16 (28 Dec 2020 04:00) (16 - 18)  SpO2: 100% (28 Dec 2020 04:00) (100% - 100%)    GENERAL: The patient is awake and alert in no apparent distress.     LUNGS: Clear to auscultation without wheezing, rales or rhonchi; respirations unlabored    HEART: Regular rate and rhythm without murmur.                            11.8   5.45  )-----------( 343      ( 28 Dec 2020 09:02 )             35.8       12-27    136  |  100  |  9   ----------------------------<  93  4.0   |  24  |  0.99    Ca    9.5      27 Dec 2020 08:53  Phos  3.4     12-27  Mg     2.0     12-27      ad< from: CT Angio Chest w/ IV Cont (12.26.20 @ 21:45) >    EXAM:  CT ANGIO CHEST (W)AW IC        PROCEDURE DATE:  Dec 26 2020         INTERPRETATION:  CLINICAL INFORMATION: Shortness of breath and tachycardia, atrial fibrillation noncompliant with Coumadin, lung cancer    COMPARISON: CT chest from 12/21/2020, CTA chest from 9/1/2019    PROCEDURE:  CT Angiography of the Chest.  90 ml of Omnipaque 350 was injected intravenously. 10 ml were discarded.  Sagittal and coronal reformats were performed as well as 3D (MIP) reconstructions.    FINDINGS:    LUNGSAND AIRWAYS: Patent central airways.  Alternating pulmonary parenchymal density or mosaic attenuation of the lungs as seen on September 1, 2019 although with mild interval increase in conspicuity 2 mm left upper lobe peripheral pulmonary nodule on image 25 of series 4 is unchanged since May 28, 2019.  PLEURA: No pleural effusion.  MEDIASTINUM AND BRANDON: No lymphadenopathy.  VESSELS: No pulmonary embolism. Aortic atherosclerotic disease. Main pulmonary artery is borderline enlarged and measures about 3.2 cm.  HEART: Cardiomegaly. No pericardial effusion.  CHEST WALL AND LOWER NECK: 1 cm hypoattenuating right thyroid lobe nodule, unchanged from 9/1/2019  VISUALIZED UPPER ABDOMEN: Left lower pole renal cyst. Nodularity of the hepatic surface suggestive of cirrhosis.  BONES: Degenerative changes.    IMPRESSION:  No pulmonary embolism.    Cardiomegaly.    Mosaic attenuation of the lungs as on the prior chest CT of September 1, 2019. Differential diagnosis include pulmonary air entrapment due tosmall airway disease (including asthma or emphysema) or small vessel disease. Inspiratory and expiratory CT of the chest can be performed for further evaluation as clinically warranted.            CARINE BUNCH MD; Resident Radiology  This document has been electronically signed.  WEST GOMEZ MD; Attending Radiologist  This document has been electronically signed. Dec 27 2020 10:22AM    < end of copied text >      PROBLEM LIST:  65y Female with HEALTH ISSUES - PROBLEM Dx:  RONY (acute kidney injury)  RONY (acute kidney injury)    Need for prophylactic measure  Need for prophylactic measure    Lung cancer  Lung cancer    Atrial fibrillation  Atrial fibrillation    Transaminitis  Transaminitis    Sepsis  Sepsis    Shortness of breath  Shortness of breath      RECS:  needs formal pfts outpatient  trial of symbicort given her smoking history/possible COPD  f/u cardio  can f/u the inspiratory/expiratory CT  f/u nodules outpatient- high risk patient      Please call with any questions.    Antonina Marin DO  [facility] Pulmonary/Sleep Medicine  [phone]

## 2020-12-29 LAB
ALBUMIN SERPL ELPH-MCNC: 3.8 G/DL — SIGNIFICANT CHANGE UP (ref 3.3–5)
ALP SERPL-CCNC: 94 U/L — SIGNIFICANT CHANGE UP (ref 40–120)
ALT FLD-CCNC: 39 U/L — HIGH (ref 4–33)
ANION GAP SERPL CALC-SCNC: 12 MMOL/L — SIGNIFICANT CHANGE UP (ref 7–14)
AST SERPL-CCNC: 31 U/L — SIGNIFICANT CHANGE UP (ref 4–32)
BILIRUB SERPL-MCNC: 0.4 MG/DL — SIGNIFICANT CHANGE UP (ref 0.2–1.2)
BUN SERPL-MCNC: 11 MG/DL — SIGNIFICANT CHANGE UP (ref 7–23)
CALCIUM SERPL-MCNC: 9.1 MG/DL — SIGNIFICANT CHANGE UP (ref 8.4–10.5)
CHLORIDE SERPL-SCNC: 103 MMOL/L — SIGNIFICANT CHANGE UP (ref 98–107)
CO2 SERPL-SCNC: 22 MMOL/L — SIGNIFICANT CHANGE UP (ref 22–31)
CREAT SERPL-MCNC: 0.9 MG/DL — SIGNIFICANT CHANGE UP (ref 0.5–1.3)
GLUCOSE SERPL-MCNC: 91 MG/DL — SIGNIFICANT CHANGE UP (ref 70–99)
HCT VFR BLD CALC: 33.2 % — LOW (ref 34.5–45)
HGB BLD-MCNC: 11.3 G/DL — LOW (ref 11.5–15.5)
INR BLD: 2.37 RATIO — HIGH (ref 0.88–1.16)
MAGNESIUM SERPL-MCNC: 2.1 MG/DL — SIGNIFICANT CHANGE UP (ref 1.6–2.6)
MCHC RBC-ENTMCNC: 33.2 PG — SIGNIFICANT CHANGE UP (ref 27–34)
MCHC RBC-ENTMCNC: 34 GM/DL — SIGNIFICANT CHANGE UP (ref 32–36)
MCV RBC AUTO: 97.6 FL — SIGNIFICANT CHANGE UP (ref 80–100)
NRBC # BLD: 0 /100 WBCS — SIGNIFICANT CHANGE UP
NRBC # FLD: 0 K/UL — SIGNIFICANT CHANGE UP
PHOSPHATE SERPL-MCNC: 3.2 MG/DL — SIGNIFICANT CHANGE UP (ref 2.5–4.5)
PLATELET # BLD AUTO: 314 K/UL — SIGNIFICANT CHANGE UP (ref 150–400)
POTASSIUM SERPL-MCNC: 3.8 MMOL/L — SIGNIFICANT CHANGE UP (ref 3.5–5.3)
POTASSIUM SERPL-SCNC: 3.8 MMOL/L — SIGNIFICANT CHANGE UP (ref 3.5–5.3)
PROT SERPL-MCNC: 7.1 G/DL — SIGNIFICANT CHANGE UP (ref 6–8.3)
PROTHROM AB SERPL-ACNC: 25.9 SEC — HIGH (ref 10.6–13.6)
RBC # BLD: 3.4 M/UL — LOW (ref 3.8–5.2)
RBC # FLD: 12.3 % — SIGNIFICANT CHANGE UP (ref 10.3–14.5)
SODIUM SERPL-SCNC: 137 MMOL/L — SIGNIFICANT CHANGE UP (ref 135–145)
WBC # BLD: 5.04 K/UL — SIGNIFICANT CHANGE UP (ref 3.8–10.5)
WBC # FLD AUTO: 5.04 K/UL — SIGNIFICANT CHANGE UP (ref 3.8–10.5)

## 2020-12-29 PROCEDURE — 93010 ELECTROCARDIOGRAM REPORT: CPT

## 2020-12-29 PROCEDURE — 76376 3D RENDER W/INTRP POSTPROCES: CPT | Mod: 26

## 2020-12-29 PROCEDURE — 93320 DOPPLER ECHO COMPLETE: CPT | Mod: 26,GC

## 2020-12-29 PROCEDURE — 93325 DOPPLER ECHO COLOR FLOW MAPG: CPT | Mod: 26,GC

## 2020-12-29 PROCEDURE — 99232 SBSQ HOSP IP/OBS MODERATE 35: CPT

## 2020-12-29 PROCEDURE — 92960 CARDIOVERSION ELECTRIC EXT: CPT

## 2020-12-29 PROCEDURE — 99232 SBSQ HOSP IP/OBS MODERATE 35: CPT | Mod: 25

## 2020-12-29 PROCEDURE — 93312 ECHO TRANSESOPHAGEAL: CPT | Mod: 26

## 2020-12-29 RX ORDER — ACETAMINOPHEN 500 MG
650 TABLET ORAL ONCE
Refills: 0 | Status: COMPLETED | OUTPATIENT
Start: 2020-12-29 | End: 2020-12-29

## 2020-12-29 RX ORDER — AMIODARONE HYDROCHLORIDE 400 MG/1
TABLET ORAL
Refills: 0 | Status: DISCONTINUED | OUTPATIENT
Start: 2020-12-29 | End: 2020-12-31

## 2020-12-29 RX ORDER — AMIODARONE HYDROCHLORIDE 400 MG/1
400 TABLET ORAL EVERY 12 HOURS
Refills: 0 | Status: DISCONTINUED | OUTPATIENT
Start: 2020-12-29 | End: 2020-12-31

## 2020-12-29 RX ORDER — WARFARIN SODIUM 2.5 MG/1
2.5 TABLET ORAL ONCE
Refills: 0 | Status: COMPLETED | OUTPATIENT
Start: 2020-12-29 | End: 2020-12-29

## 2020-12-29 RX ORDER — AMIODARONE HYDROCHLORIDE 400 MG/1
200 TABLET ORAL DAILY
Refills: 0 | Status: CANCELLED | OUTPATIENT
Start: 2021-01-04 | End: 2020-12-31

## 2020-12-29 RX ADMIN — Medication 1 APPLICATION(S): at 23:53

## 2020-12-29 RX ADMIN — Medication 60 MILLIGRAM(S): at 23:54

## 2020-12-29 RX ADMIN — Medication 650 MILLIGRAM(S): at 13:37

## 2020-12-29 RX ADMIN — AMIODARONE HYDROCHLORIDE 400 MILLIGRAM(S): 400 TABLET ORAL at 17:28

## 2020-12-29 RX ADMIN — WARFARIN SODIUM 2.5 MILLIGRAM(S): 2.5 TABLET ORAL at 17:28

## 2020-12-29 RX ADMIN — Medication 60 MILLIGRAM(S): at 17:28

## 2020-12-29 NOTE — PROGRESS NOTE ADULT - SUBJECTIVE AND OBJECTIVE BOX
PULMONARY PROGRESS NOTE    FERDINAND ALMENDAREZ  MRN-[id number]    Patient is a 65y old  Female who presents with a chief complaint of Cough x 4 days, SOB (28 Dec 2020 11:44)      HPI:  still with occasional cough  on room air    ROS:   -    ACTIVE MEDICATION LIST:  MEDICATIONS  (STANDING):  benzocaine 15 mG/menthol 3.6 mG (Sugar-Free) Lozenge 1 Lozenge Oral every 4 hours  diltiazem    Tablet 60 milliGRAM(s) Oral four times a day  influenza   Vaccine 0.5 milliLiter(s) IntraMuscular once  multivitamin 1 Tablet(s) Oral daily    MEDICATIONS  (PRN):  benzonatate 100 milliGRAM(s) Oral three times a day PRN Cough      EXAM:  Vital Signs Last 24 Hrs  T(C): 36.4 (29 Dec 2020 05:11), Max: 36.7 (28 Dec 2020 12:00)  T(F): 97.6 (29 Dec 2020 05:11), Max: 98.1 (28 Dec 2020 12:00)  HR: 72 (29 Dec 2020 05:11) (72 - 94)  BP: 116/79 (29 Dec 2020 05:11) (116/67 - 121/75)  BP(mean): --  RR: 16 (29 Dec 2020 05:11) (16 - 18)  SpO2: 100% (29 Dec 2020 05:11) (100% - 100%)    GENERAL: The patient is awake and alert in no apparent distress.     LUNGS: Clear to auscultation without wheezing, rales or rhonchi; respirations unlabored    HEART: Regular rate and rhythm without murmur.                            11.3   5.04  )-----------( 314      ( 29 Dec 2020 07:18 )             33.2       12-29    137  |  103  |  11  ----------------------------<  91  3.8   |  22  |  0.90    Ca    9.1      29 Dec 2020 07:18  Phos  3.2     12-29  Mg     2.1     12-29    TPro  7.1  /  Alb  3.8  /  TBili  0.4  /  DBili  x   /  AST  31  /  ALT  39<H>  /  AlkPhos  94  12-29     ra< from: CT Chest No Cont (12.27.20 @ 21:28) >    EXAM:  CT CHEST        PROCEDURE DATE:  Dec 27 2020         INTERPRETATION:  CLINICAL INFORMATION: Inspiratory and expiratory chest CT to evaluate for mosaic attenuation seen on prior chest CT.    COMPARISON: Chest CT 12/26/2020.    PROCEDURE:  CT of the Chest was performed without intravenous contrast.  Sagittal and coronal reformats were performed.    FINDINGS:    LUNGS AND AIRWAYS: Patent central airways.  Patchy areas of groundglass and lucency resembling mosaic attenuation are noted withinboth lungs. The lucent areas appear more lucent and remain in the same size on the expiratory phase. The findings suggest air trapping. Unchanged 2 mm left upper lobe nodule (series 2 image 29).  PLEURA: No pleural effusion. No pneumothorax.  MEDIASTINUM AND BRANDON: No lymphadenopathy.  HEART: Cardiomegaly. No pericardial effusion. Fatty infiltration is noted involving the interventricular septum of the left ventricular myocardium suggestive of prior infarct.  CHEST WALL AND LOWER NECK: Unchanged subcentimeter hypodense right thyroid nodule.  VISUALIZED UPPER ABDOMEN: Unchanged mild hepatic surface nodularity.  BONES: Within normal limits.    IMPRESSION:  Patchy areas of groundglass and lucency resembling mosaic attenuation are noted within both lungs. The lucent areas appear more lucent and remain in the same size on the expiratory phase. The findings suggest air trapping.              LUIS M NOVOA MD; Resident Radiology  This document has been electronically signed.  DEBORAH SANTOS MD; Attending Radiologist  This document has been electronically signed. Dec 28 2020 11:59AM    < end of copied text >      PROBLEM LIST:  65y Female with HEALTH ISSUES - PROBLEM Dx:  RONY (acute kidney injury)  RONY (acute kidney injury)    Need for prophylactic measure  Need for prophylactic measure    Lung cancer  Lung cancer    Atrial fibrillation  Atrial fibrillation    Transaminitis  Transaminitis    Sepsis  Sepsis    Shortness of breath  Shortness of breath         RECS:  f/u EPS re: DCCV  again suggested trial of ICS/LABA But she would like to hold off  gave her office info- needs formal pfts  would also pursue sleep apnea work up      Please call with any questions.    Antonina Marin, DO  [facility] Pulmonary/Sleep Medicine  [phone]

## 2020-12-29 NOTE — PROGRESS NOTE ADULT - SUBJECTIVE AND OBJECTIVE BOX
Telemetry reviewed and shows atrial fibrillation with VR 60s-100s. NPO p MN for possible RICCARDO/DCCV. Consent in the chart. Patient understands risks/benefits and alternatives of the procedure. Assessment: please see EP preprocedure assessment record.

## 2020-12-29 NOTE — PROGRESS NOTE ADULT - SUBJECTIVE AND OBJECTIVE BOX
SUBJECTIVE / OVERNIGHT EVENTS: pt seen and examined    MEDICATIONS  (STANDING):  aMIOdarone    Tablet   Oral   aMIOdarone    Tablet 400 milliGRAM(s) Oral every 12 hours  benzocaine 15 mG/menthol 3.6 mG (Sugar-Free) Lozenge 1 Lozenge Oral every 4 hours  diltiazem    Tablet 60 milliGRAM(s) Oral four times a day  influenza   Vaccine 0.5 milliLiter(s) IntraMuscular once  multivitamin 1 Tablet(s) Oral daily    MEDICATIONS  (PRN):  benzonatate 100 milliGRAM(s) Oral three times a day PRN Cough    Vital Signs Last 24 Hrs  T(C): 36.6 (29 Dec 2020 17:09), Max: 36.6 (29 Dec 2020 17:09)  T(F): 97.8 (29 Dec 2020 17:09), Max: 97.8 (29 Dec 2020 17:09)  HR: 88 (29 Dec 2020 17:09) (72 - 88)  BP: 108/72 (29 Dec 2020 17:09) (108/72 - 121/75)  BP(mean): --  RR: 18 (29 Dec 2020 17:09) (16 - 18)  SpO2: 100% (29 Dec 2020 17:09) (100% - 100%)    Constitutional: No fever, fatigue  Skin: No rash.  Eyes: No recent vision problems or eye pain.  ENT: No congestion, ear pain, or sore throat.  Cardiovascular: No chest pain or palpation.  Respiratory: shortness of breath on exertion  Gastrointestinal: No abdominal pain, nausea, vomiting, or diarrhea.  Genitourinary: No dysuria.  Musculoskeletal: No joint swelling.  Neurologic: No headache.    PHYSICAL EXAM:  GENERAL: NAD  EYES: EOMI, PERRLA  NECK: Supple, No JVD  CHEST/LUNG: dec breath sounds at bases   HEART:  S1 , S2 +  ABDOMEN: soft , bs+  EXTREMITIES:  trace edema  NEUROLOGY:alert awake      LABS:  12-29    137  |  103  |  11  ----------------------------<  91  3.8   |  22  |  0.90    Ca    9.1      29 Dec 2020 07:18  Phos  3.2     12-29  Mg     2.1     12-29    TPro  7.1  /  Alb  3.8  /  TBili  0.4  /  DBili      /  AST  31  /  ALT  39<H>  /  AlkPhos  94  12-29    Creatinine Trend: 0.90 <--, 1.14 <--, 0.99 <--, 1.00 <--, 1.01 <--, 1.02 <--, 1.00 <--                        11.3   5.04  )-----------( 314      ( 29 Dec 2020 07:18 )             33.2     Urine Studies:            LIVER FUNCTIONS - ( 29 Dec 2020 07:18 )  Alb: 3.8 g/dL / Pro: 7.1 g/dL / ALK PHOS: 94 U/L / ALT: 39 U/L / AST: 31 U/L / GGT: x           PT/INR - ( 29 Dec 2020 07:18 )   PT: 25.9 sec;   INR: 2.37 ratio             LIVER FUNCTIONS - ( 28 Dec 2020 09:02 )  Alb: 4.4 g/dL / Pro: 7.9 g/dL / ALK PHOS: 110 U/L / ALT: 48 U/L / AST: 41 U/L / GGT: x         PT/INR - ( 28 Dec 2020 09:02 )   PT: 25.0 sec;   INR: 2.26 ratio             CARDIAC MARKERS ( 26 Dec 2020 18:53 )  x     / x     / 159 U/L / x     / 2.0 ng/mL        LIVER FUNCTIONS - ( 26 Dec 2020 07:11 )  Alb: 3.8 g/dL / Pro: 7.6 g/dL / ALK PHOS: 104 U/L / ALT: 43 U/L / AST: 38 U/L / GGT: x           PT/INR - ( 27 Dec 2020 07:53 )   PT: 23.7 sec;   INR: 2.14 ratio

## 2020-12-30 LAB
ALBUMIN SERPL ELPH-MCNC: 4 G/DL — SIGNIFICANT CHANGE UP (ref 3.3–5)
ALP SERPL-CCNC: 100 U/L — SIGNIFICANT CHANGE UP (ref 40–120)
ALT FLD-CCNC: 36 U/L — HIGH (ref 4–33)
ANION GAP SERPL CALC-SCNC: 10 MMOL/L — SIGNIFICANT CHANGE UP (ref 7–14)
AST SERPL-CCNC: 33 U/L — HIGH (ref 4–32)
BILIRUB SERPL-MCNC: 0.6 MG/DL — SIGNIFICANT CHANGE UP (ref 0.2–1.2)
BUN SERPL-MCNC: 12 MG/DL — SIGNIFICANT CHANGE UP (ref 7–23)
CALCIUM SERPL-MCNC: 9.5 MG/DL — SIGNIFICANT CHANGE UP (ref 8.4–10.5)
CHLORIDE SERPL-SCNC: 102 MMOL/L — SIGNIFICANT CHANGE UP (ref 98–107)
CO2 SERPL-SCNC: 24 MMOL/L — SIGNIFICANT CHANGE UP (ref 22–31)
CREAT SERPL-MCNC: 1.03 MG/DL — SIGNIFICANT CHANGE UP (ref 0.5–1.3)
GLUCOSE SERPL-MCNC: 96 MG/DL — SIGNIFICANT CHANGE UP (ref 70–99)
HCT VFR BLD CALC: 33.5 % — LOW (ref 34.5–45)
HGB BLD-MCNC: 11.2 G/DL — LOW (ref 11.5–15.5)
INR BLD: 2.04 RATIO — HIGH (ref 0.88–1.16)
MAGNESIUM SERPL-MCNC: 2.2 MG/DL — SIGNIFICANT CHANGE UP (ref 1.6–2.6)
MCHC RBC-ENTMCNC: 32.8 PG — SIGNIFICANT CHANGE UP (ref 27–34)
MCHC RBC-ENTMCNC: 33.4 GM/DL — SIGNIFICANT CHANGE UP (ref 32–36)
MCV RBC AUTO: 98.2 FL — SIGNIFICANT CHANGE UP (ref 80–100)
NRBC # BLD: 0 /100 WBCS — SIGNIFICANT CHANGE UP
NRBC # FLD: 0 K/UL — SIGNIFICANT CHANGE UP
PLATELET # BLD AUTO: 331 K/UL — SIGNIFICANT CHANGE UP (ref 150–400)
POTASSIUM SERPL-MCNC: 3.9 MMOL/L — SIGNIFICANT CHANGE UP (ref 3.5–5.3)
POTASSIUM SERPL-SCNC: 3.9 MMOL/L — SIGNIFICANT CHANGE UP (ref 3.5–5.3)
PROT SERPL-MCNC: 7.6 G/DL — SIGNIFICANT CHANGE UP (ref 6–8.3)
PROTHROM AB SERPL-ACNC: 22.7 SEC — HIGH (ref 10.6–13.6)
RBC # BLD: 3.41 M/UL — LOW (ref 3.8–5.2)
RBC # FLD: 12.3 % — SIGNIFICANT CHANGE UP (ref 10.3–14.5)
SODIUM SERPL-SCNC: 136 MMOL/L — SIGNIFICANT CHANGE UP (ref 135–145)
WBC # BLD: 6.02 K/UL — SIGNIFICANT CHANGE UP (ref 3.8–10.5)
WBC # FLD AUTO: 6.02 K/UL — SIGNIFICANT CHANGE UP (ref 3.8–10.5)

## 2020-12-30 PROCEDURE — 93656 COMPRE EP EVAL ABLTJ ATR FIB: CPT

## 2020-12-30 PROCEDURE — 71045 X-RAY EXAM CHEST 1 VIEW: CPT | Mod: 26

## 2020-12-30 PROCEDURE — 93662 INTRACARDIAC ECG (ICE): CPT | Mod: 26

## 2020-12-30 PROCEDURE — 99232 SBSQ HOSP IP/OBS MODERATE 35: CPT

## 2020-12-30 PROCEDURE — 93613 INTRACARDIAC EPHYS 3D MAPG: CPT

## 2020-12-30 RX ORDER — WARFARIN SODIUM 2.5 MG/1
4 TABLET ORAL ONCE
Refills: 0 | Status: COMPLETED | OUTPATIENT
Start: 2020-12-30 | End: 2020-12-30

## 2020-12-30 RX ORDER — PANTOPRAZOLE SODIUM 20 MG/1
40 TABLET, DELAYED RELEASE ORAL ONCE
Refills: 0 | Status: COMPLETED | OUTPATIENT
Start: 2020-12-30 | End: 2020-12-30

## 2020-12-30 RX ORDER — CHLORHEXIDINE GLUCONATE 213 G/1000ML
1 SOLUTION TOPICAL
Refills: 0 | Status: DISCONTINUED | OUTPATIENT
Start: 2020-12-30 | End: 2020-12-31

## 2020-12-30 RX ORDER — PANTOPRAZOLE SODIUM 20 MG/1
40 TABLET, DELAYED RELEASE ORAL
Refills: 0 | Status: DISCONTINUED | OUTPATIENT
Start: 2020-12-30 | End: 2020-12-31

## 2020-12-30 RX ORDER — FUROSEMIDE 40 MG
40 TABLET ORAL ONCE
Refills: 0 | Status: COMPLETED | OUTPATIENT
Start: 2020-12-30 | End: 2020-12-30

## 2020-12-30 RX ADMIN — PANTOPRAZOLE SODIUM 40 MILLIGRAM(S): 20 TABLET, DELAYED RELEASE ORAL at 23:26

## 2020-12-30 RX ADMIN — BENZOCAINE AND MENTHOL 1 LOZENGE: 5; 1 LIQUID ORAL at 14:51

## 2020-12-30 RX ADMIN — Medication 60 MILLIGRAM(S): at 23:30

## 2020-12-30 RX ADMIN — BENZOCAINE AND MENTHOL 1 LOZENGE: 5; 1 LIQUID ORAL at 11:36

## 2020-12-30 RX ADMIN — Medication 40 MILLIGRAM(S): at 23:23

## 2020-12-30 RX ADMIN — Medication 60 MILLIGRAM(S): at 05:15

## 2020-12-30 RX ADMIN — Medication 1 TABLET(S): at 11:36

## 2020-12-30 RX ADMIN — Medication 60 MILLIGRAM(S): at 11:36

## 2020-12-30 RX ADMIN — AMIODARONE HYDROCHLORIDE 400 MILLIGRAM(S): 400 TABLET ORAL at 05:15

## 2020-12-30 NOTE — PROGRESS NOTE ADULT - SUBJECTIVE AND OBJECTIVE BOX
PULMONARY PROGRESS NOTE    FERDINAND ALMENDAREZ  MRN-[id number]    Patient is a 65y old  Female who presents with a chief complaint of Cough x 4 days (29 Dec 2020 11:39)      HPI:  s/p DCCV  occasional cough  otherwise doing well      ROS:   -    ACTIVE MEDICATION LIST:  MEDICATIONS  (STANDING):  aMIOdarone    Tablet   Oral   aMIOdarone    Tablet 400 milliGRAM(s) Oral every 12 hours  benzocaine 15 mG/menthol 3.6 mG (Sugar-Free) Lozenge 1 Lozenge Oral every 4 hours  diltiazem    Tablet 60 milliGRAM(s) Oral four times a day  influenza   Vaccine 0.5 milliLiter(s) IntraMuscular once  multivitamin 1 Tablet(s) Oral daily  warfarin 4 milliGRAM(s) Oral once    MEDICATIONS  (PRN):  benzonatate 100 milliGRAM(s) Oral three times a day PRN Cough  silver sulfADIAZINE 1% Cream 1 Application(s) Topical two times a day PRN superficial skin burn from cardioversion      EXAM:  Vital Signs Last 24 Hrs  T(C): 36.6 (30 Dec 2020 11:36), Max: 36.7 (29 Dec 2020 22:00)  T(F): 97.8 (30 Dec 2020 11:36), Max: 98.1 (30 Dec 2020 05:13)  HR: 79 (30 Dec 2020 11:36) (79 - 105)  BP: 120/89 (30 Dec 2020 11:36) (108/72 - 120/89)  BP(mean): --  RR: 15 (30 Dec 2020 11:36) (15 - 18)  SpO2: 100% (30 Dec 2020 11:36) (99% - 100%)    GENERAL: The patient is awake and alert in no apparent distress.     LUNGS: Clear to auscultation without wheezing, rales or rhonchi; respirations unlabored    HEART: Regular rate and rhythm without murmur.                            11.2   6.02  )-----------( 331      ( 30 Dec 2020 07:22 )             33.5       12-30    136  |  102  |  12  ----------------------------<  96  3.9   |  24  |  1.03    Ca    9.5      30 Dec 2020 08:28  Phos  3.2     12-29  Mg     2.2     12-30    TPro  7.6  /  Alb  4.0  /  TBili  0.6  /  DBili  x   /  AST  33<H>  /  ALT  36<H>  /  AlkPhos  100  12-30     ra< from: CT Chest No Cont (12.27.20 @ 21:28) >    EXAM:  CT CHEST        PROCEDURE DATE:  Dec 27 2020         INTERPRETATION:  CLINICAL INFORMATION: Inspiratory and expiratory chest CT to evaluate for mosaic attenuation seen on prior chest CT.    COMPARISON: Chest CT 12/26/2020.    PROCEDURE:  CT of the Chest was performed without intravenous contrast.  Sagittal and coronal reformats were performed.    FINDINGS:    LUNGS AND AIRWAYS: Patent central airways.  Patchy areas of groundglass and lucency resembling mosaic attenuation are noted withinboth lungs. The lucent areas appear more lucent and remain in the same size on the expiratory phase. The findings suggest air trapping. Unchanged 2 mm left upper lobe nodule (series 2 image 29).  PLEURA: No pleural effusion. No pneumothorax.  MEDIASTINUM AND BRANDON: No lymphadenopathy.  HEART: Cardiomegaly. No pericardial effusion. Fatty infiltration is noted involving the interventricular septum of the left ventricular myocardium suggestive of prior infarct.  CHEST WALL AND LOWER NECK: Unchanged subcentimeter hypodense right thyroid nodule.  VISUALIZED UPPER ABDOMEN: Unchanged mild hepatic surface nodularity.  BONES: Within normal limits.    IMPRESSION:  Patchy areas of groundglass and lucency resembling mosaic attenuation are noted within both lungs. The lucent areas appear more lucent and remain in the same size on the expiratory phase. The findings suggest air trapping.              LUIS M NOVOA MD; Resident Radiology  This document has been electronically signed.  DEBORAH SANTOS MD; Attending Radiologist  This document has been electronically signed. Dec 28 2020 11:59AM    < end of copied text >    PROBLEM LIST:  65y Female with HEALTH ISSUES - PROBLEM Dx:  RONY (acute kidney injury)  RONY (acute kidney injury)    Need for prophylactic measure  Need for prophylactic measure    Lung cancer  Lung cancer    Atrial fibrillation  Atrial fibrillation    Transaminitis  Transaminitis    Sepsis  Sepsis    Shortness of breath  Shortness of breath           RECS:  f/u EPS  outpatient pft and sleep study  can repeat CT outpatient       Please call with any questions.    Antonina Marin DO  [facility] Pulmonary/Sleep Medicine  [phone]

## 2020-12-30 NOTE — PROGRESS NOTE ADULT - ASSESSMENT
65F with PMH of HOCM s/p septal ablation, afib on coumadin, HFpEF (EF 55% 9/2019), lung cancer (diagnosed about 2 years ago, s/p chemo, in remission) HTN, HLD presents with sepsis. Failed DCCV 12/29/20. Admited to CCU s/p septal ablation for further monitoring.     #s/p septal ablation  -currently SR on tele  -continue post EP procedure care.   -Right groin dressing c/d/I  -Lasix 40mg IV X 1 now (patient received ~2 liters of fluid in EP lab)  -protonix 40mg iv X 1 now, start po 40mg daily tomorrow.     #Atrial fibrillation with RVR  -CHADSVASC is elevated to 4  -continue to anticoagulate with coumadin  -INR goal is 2-3  -would continue the cardizem at 60 po [id number], amio 400mg po BID X12 doses    #HOCM, s/p septal ablation  -rate control with cardizem  -euvolemic on exam, would hold lasix for now    #HFpEF -12/29 echo shows severe MR, normal LVSF, severe LA enlargment.  -Euvolemic on exam  -Lasix 40mg IV X 1 now (patient received ~2 liters of fluid in EP lab)    # Lung CA  -f/u with pulm    #Prophylactic measure  -Coumadin with INR 2.06       65F with PMH of HOCM s/p septal ablation, afib on coumadin, HFpEF (EF 55% 9/2019), lung cancer (diagnosed about 2 years ago, s/p chemo, in remission) HTN, HLD presents with sepsis. Admited to CCU s/p septal ablation (12/30/20) for further monitoring.     #s/p septal ablation- Failed DCCV on 12/29/20 and septal ablation performed today.  -currently SR on tele  -continue post EP procedure care.   -Right groin dressing c/d/I  -Lasix 40mg IV X 1 now (patient received ~2 liters of fluid in EP lab)  -protonix 40mg iv X 1 now, start po 40mg daily tomorrow.       #Atrial fibrillation with RVR  -CHADSVASC is elevated to 4  -continue to anticoagulate with coumadin  -INR goal is 2-3  -would continue the cardizem at 60 po [id number], amio 400mg po BID X12 doses    #HOCM, s/p septal ablation  -rate control with cardizem  -euvolemic on exam, would hold lasix for now    #HFpEF -12/29 echo shows severe MR, normal LVSF, severe LA enlargment.  -Euvolemic on exam  -Lasix 40mg IV X 1 now (patient received ~2 liters of fluid in EP lab)    # Lung CA  -pulm rec appreciated    #Prophylactic measure  -Coumadin with INR 2.06       65F with PMH of HOCM s/p septal ablation, afib on coumadin, HFpEF (EF 55% 9/2019), lung cancer (diagnosed about 2 years ago, s/p chemo, in remission) HTN, presents with sepsis. Admited to CCU s/p septal ablation (12/30/20) for further monitoring.     #s/p septal ablation- Failed DCCV on 12/29/20 and septal ablation performed today.  -currently SR on tele  -continue post EP procedure care.   -Right groin dressing c/d/I  -Lasix 40mg IV X 1 now (patient received ~2 liters of fluid in EP lab)  -protonix 40mg iv X 1 now, start po 40mg daily tomorrow.       #Atrial fibrillation with RVR  -CHADSVASC is elevated to 4  -continue to anticoagulate with coumadin  -INR goal is 2-3  -would continue the cardizem at 60 po [id number], amio 400mg po BID X12 doses    #HOCM, s/p septal ablation  -euvolemic on exam, would hold lasix for now    #HFpEF -12/29 echo shows severe MR, normal LVSF, severe LA enlargment.  -Euvolemic on exam  -Lasix 40mg IV X 1 now (patient received ~2 liters of fluid in EP lab)    # Lung CA  -pulm rec appreciated    #Prophylactic measure  -Coumadin (dosing as per daily INR)

## 2020-12-30 NOTE — CHART NOTE - NSCHARTNOTEFT_GEN_A_CORE
Patient is seen and examined. Denies any chest pain, SOB, palpitations or dizziness. Telemetry reviewed and shows atrial fibrillation with VR 70s-100s. Patient is NPO for afib ablation. Discussed with patient regarding risks/benefits and alternatives of the procedure. All questions answered and patient consented for the procedure after understanding risks/benefits and alternatives of the procedure. Assessment: please see EP preprocedure assessment record

## 2020-12-30 NOTE — PROGRESS NOTE ADULT - SUBJECTIVE AND OBJECTIVE BOX
SUBJECTIVE / OVERNIGHT EVENTS: pt seen and examined    MEDICATIONS  (STANDING):  aMIOdarone    Tablet   Oral   aMIOdarone    Tablet 400 milliGRAM(s) Oral every 12 hours  benzocaine 15 mG/menthol 3.6 mG (Sugar-Free) Lozenge 1 Lozenge Oral every 4 hours  diltiazem    Tablet 60 milliGRAM(s) Oral four times a day  influenza   Vaccine 0.5 milliLiter(s) IntraMuscular once  multivitamin 1 Tablet(s) Oral daily  warfarin 4 milliGRAM(s) Oral once    MEDICATIONS  (PRN):  benzonatate 100 milliGRAM(s) Oral three times a day PRN Cough  silver sulfADIAZINE 1% Cream 1 Application(s) Topical two times a day PRN superficial skin burn from cardioversion    Vital Signs Last 24 Hrs  T(C): 36.6 (30 Dec 2020 11:36), Max: 36.7 (29 Dec 2020 22:00)  T(F): 97.8 (30 Dec 2020 11:36), Max: 98.1 (30 Dec 2020 05:13)  HR: 79 (30 Dec 2020 11:36) (79 - 105)  BP: 120/89 (30 Dec 2020 11:36) (112/80 - 120/89)  BP(mean): --  RR: 15 (30 Dec 2020 11:36) (15 - 18)  SpO2: 100% (30 Dec 2020 11:36) (99% - 100%)    Constitutional: No fever, fatigue  Skin: No rash.  Eyes: No recent vision problems or eye pain.  ENT: No congestion, ear pain, or sore throat.  Cardiovascular: No chest pain or palpation.  Respiratory: shortness of breath on exertion  Gastrointestinal: No abdominal pain, nausea, vomiting, or diarrhea.  Genitourinary: No dysuria.  Musculoskeletal: No joint swelling.  Neurologic: No headache.    PHYSICAL EXAM:  GENERAL: NAD  EYES: EOMI, PERRLA  NECK: Supple, No JVD  CHEST/LUNG: dec breath sounds at bases   HEART:  S1 , S2 +  ABDOMEN: soft , bs+  EXTREMITIES:  trace edema  NEUROLOGY:alert awake      LABS:  12-30    136  |  102  |  12  ----------------------------<  96  3.9   |  24  |  1.03    Ca    9.5      30 Dec 2020 08:28  Phos  3.2     12-29  Mg     2.2     12-30    TPro  7.6  /  Alb  4.0  /  TBili  0.6  /  DBili      /  AST  33<H>  /  ALT  36<H>  /  AlkPhos  100  12-30    Creatinine Trend: 1.03 <--, 0.90 <--, 1.14 <--, 0.99 <--, 1.00 <--, 1.01 <--, 1.02 <--                        11.2   6.02  )-----------( 331      ( 30 Dec 2020 07:22 )             33.5     Urine Studies:            LIVER FUNCTIONS - ( 30 Dec 2020 08:28 )  Alb: 4.0 g/dL / Pro: 7.6 g/dL / ALK PHOS: 100 U/L / ALT: 36 U/L / AST: 33 U/L / GGT: x           PT/INR - ( 30 Dec 2020 07:22 )   PT: 22.7 sec;   INR: 2.04 ratio           x     / x     / 159 U/L / x     / 2.0 ng/mL        LIVER FUNCTIONS - ( 26 Dec 2020 07:11 )  Alb: 3.8 g/dL / Pro: 7.6 g/dL / ALK PHOS: 104 U/L / ALT: 43 U/L / AST: 38 U/L / GGT: x           PT/INR - ( 27 Dec 2020 07:53 )   PT: 23.7 sec;   INR: 2.14 ratio

## 2020-12-30 NOTE — PROGRESS NOTE ADULT - ASSESSMENT
65 y.o. F with PMH of HOCM s/p septal ablation, afib on coumadin, HFpEF (EF 55% 9/2019), lung cancer (diagnosed about 2 years ago, s/p chemo, in remission) who presents with sepsis likely in setting of COVID-19.     Plan:  1. Shortness of breath.    -< from: CT Angio Chest w/ IV Cont (12.26.20 @ 21:45) >  No pulmonary embolism.    Cardiomegaly.    Mosaic attenuation of the lungs as on the prior chest CT of September 1, 2019. Differential diagnosis include pulmonary air entrapment due tosmall airway disease (including asthma or emphysema) or small vessel disease. Inspiratory and expiratory CT of the chest can be performed for further evaluation as clinically warranted.      < end of copied text >  - pulm f/u     2.Sepsis- ruled out    3.Transaminitis.    - monitor closely.  -trend LFT's       4. RONY (acute kidney injury)  - resolving  -Cr. -1.00 today      5. Atrial fibrillation.    failed cardioversion , poss ablation today  -   6. Prophylactic Measure

## 2020-12-30 NOTE — PROGRESS NOTE ADULT - SUBJECTIVE AND OBJECTIVE BOX
CCU Accept Note    Transfer from: (  ) Medicine    (X) Telemetry 505A   (  ) ED                               (  ) Palliative    (  ) Stroke Unit    (  ) MICU    (X) EP lab    FERDINAND ALMENDAREZ  65y  Patient is a 65y old  Female who presents with a chief complaint of Cough x 4 days (30 Dec 2020 12:09)    ====================  HPI & Hospital Course:   Patient is a 65y old  Female who presents with a chief complaint of Cough x 4 days (30 Dec 2020 12:09)    HPI:  65 y.o. F with PMH of HOCM s/p septal ablation, afib on coumadin, HFpEF (EF 55% 2019), lung cancer (diagnosed about 2 years ago, s/p chemo, in remission), who presents w/ 4 days of worsening dry cough. Patient states that she was trying lozenges and tea at home, but these were not helping her symptoms. Further, she states that about 2-3 days ago, she developed "violent abdominal pain" and diarrhea, which resolved on its own approximately 1 day later. She had attributed her symptoms to eating Mexican food. She otherwise denies fevers at home. ROS is positive for SOB that accompanies cough. Patient denies sick contacts; she states her son and daughter, with home she lives with, are asymptomatic, as is her boyfriend.     In the ED, patient was found to be febrile to tmax of 102.3 F.   (20 Dec 2020 18:31)        CONSTITUTIONAL: No fevers, No chills, No fatigue, No weight gain  EYES: No vision changes   ENT: No congestion, No ear pain, No sore throat.  NECK: No pain, No stiffness  RESPIRATORY: +shortness of breath, No cough, No wheezing, No hemoptysis  CARDIOVASCULAR: No chest pain. + palpitations, No WALKER, No orthopnea, No paroxysmal nocturnal dyspnea, No pleuritic pain  GASTROINTESTINAL: No abdominal pain, No nausea, No vomiting, No hematemesis, No diarrhea No constipation. No melena  GENITOURINARY: No dysuria, No frequency, No incontinence, No hematuria  NEUROLOGICAL: No dizziness, No lightheadedness, No syncope, No LOC, No headache, No numbness or weakness  MUSCULOSKELETAL: No Edema, No joint pain, No joint swelling.  PSYCHIATRIC: No anxiety, No depression  DERMATOLOGY: No diaphoresis. No itching, No rashes, No pressure ulcers  HEME/LYMPH: No easy bruising, or bleeding gums    All other review of systems is negative unless indicated above.    Allergies    cefdinir (Rash)  moxifloxacin (Rash)  penicillin (Rash)    Intolerances        PAST MEDICAL & SURGICAL HISTORY:  Lung cancer  s/p chemotherapy ~ now in remission    GERD (gastroesophageal reflux disease)    CAP (community acquired pneumonia)  RML/RLL at [facility] 3/2017    Lung cancer  s/p chemotherapy    Chronic diastolic (congestive) heart failure    AF (atrial fibrillation)  On Coumadin    Pleural effusion  Right parapneumonic drained twice 3/2017 at [facility]    HOCM (hypertrophic obstructive cardiomyopathy)  S/P septal ablation    H/O cardiac radiofrequency ablation  for HOCM - 16 years ago    History of   15 years ago        MEDICATIONS  (STANDING):  aMIOdarone    Tablet   Oral   aMIOdarone    Tablet 400 milliGRAM(s) Oral every 12 hours  benzocaine 15 mG/menthol 3.6 mG (Sugar-Free) Lozenge 1 Lozenge Oral every 4 hours  chlorhexidine 4% Liquid 1 Application(s) Topical <User Schedule>  diltiazem    Tablet 60 milliGRAM(s) Oral four times a day  furosemide   Injectable 40 milliGRAM(s) IV Push once  influenza   Vaccine 0.5 milliLiter(s) IntraMuscular once  multivitamin 1 Tablet(s) Oral daily  pantoprazole    Tablet 40 milliGRAM(s) Oral before breakfast  pantoprazole  Injectable 40 milliGRAM(s) IV Push once  warfarin 4 milliGRAM(s) Oral once    MEDICATIONS  (PRN):  benzonatate 100 milliGRAM(s) Oral three times a day PRN Cough  silver sulfADIAZINE 1% Cream 1 Application(s) Topical two times a day PRN superficial skin burn from cardioversion      Drug Dosing Weight  Height (cm): 167.6 (21 Dec 2020 02:01)  Weight (kg): 68.8 (21 Dec 2020 02:01)  BMI (kg/m2): 24.5 (21 Dec 2020 02:01)  BSA (m2): 1.78 (21 Dec 2020 02:01)    Home Medications:  Multiple Vitamins oral tablet: 1 tab(s) orally once a day (20 Dec 2020 18:26)      Current Medications:   MEDICATIONS  (STANDING):  aMIOdarone    Tablet   Oral   aMIOdarone    Tablet 400 milliGRAM(s) Oral every 12 hours  benzocaine 15 mG/menthol 3.6 mG (Sugar-Free) Lozenge 1 Lozenge Oral every 4 hours  chlorhexidine 4% Liquid 1 Application(s) Topical <User Schedule>  diltiazem    Tablet 60 milliGRAM(s) Oral four times a day  furosemide   Injectable 40 milliGRAM(s) IV Push once  influenza   Vaccine 0.5 milliLiter(s) IntraMuscular once  multivitamin 1 Tablet(s) Oral daily  pantoprazole    Tablet 40 milliGRAM(s) Oral before breakfast  pantoprazole  Injectable 40 milliGRAM(s) IV Push once  warfarin 4 milliGRAM(s) Oral once    MEDICATIONS  (PRN):  benzonatate 100 milliGRAM(s) Oral three times a day PRN Cough  silver sulfADIAZINE 1% Cream 1 Application(s) Topical two times a day PRN superficial skin burn from cardioversion        FAMILY HISTORY:  Family history of psoriasis (Sibling)  Brother    Family history of ulcerative colitis (Sibling)  Brother    Family history of liver cancer  Mother      Social History:   Smoker[ ]  Non smoker[ ]  Alcohol [ ]    ====================  Vital Signs Last 24 Hrs  T(C): 36.8 (30 Dec 2020 22:00), Max: 36.8 (30 Dec 2020 22:00)  T(F): 98.2 (30 Dec 2020 22:00), Max: 98.2 (30 Dec 2020 22:00)  HR: 70 (30 Dec 2020 22:00) (70 - 105)  BP: 105/76 (30 Dec 2020 22:00) (105/76 - 120/89)  BP(mean): 87 (30 Dec 2020 22:00) (87 - 87)  RR: 15 (30 Dec 2020 22:00) (15 - 18)  SpO2: 95% (30 Dec 2020 22:00) (95% - 100%)  ICU Vital Signs Last 24 Hrs  T(C): 36.8 (30 Dec 2020 22:00), Max: 36.8 (30 Dec 2020 22:00)  T(F): 98.2 (30 Dec 2020 22:00), Max: 98.2 (30 Dec 2020 22:00)  HR: 70 (30 Dec 2020 22:00) (70 - 105)  BP: 105/76 (30 Dec 2020 22:00) (105/76 - 120/89)  BP(mean): 87 (30 Dec 2020 22:00) (87 - 87)  ABP: 106/57 (30 Dec 2020 22:00) (106/57 - 106/57)  ABP(mean): 76 (30 Dec 2020 22:00) (76 - 76)  RR: 15 (30 Dec 2020 22:00) (15 - 18)  SpO2: 95% (30 Dec 2020 22:00) (95% - 100%)        I&O's Detail      Physical Exam:   General: NAD  HEENT: normal sclera and conjunctiva, no oral lesions  Neck: Supple, no JVD  Lungs: CTA bilaterally  Heart: RRR, normal S1S2, no murmurs/rubs/gallops  Abdomen: Soft, ND/NT  Extremities: 2+ peripheral pulses, no cyanosis/clubbing/edema, full ROM  Skin: Warm, well-perfused  Neuro: A&O x3, no focal deficits      ====================  Labs & Imaging:   CBC Full  -  ( 30 Dec 2020 07:22 )  WBC Count : 6.02 K/uL  RBC Count : 3.41 M/uL  Hemoglobin : 11.2 g/dL  Hematocrit : 33.5 %  Platelet Count - Automated : 331 K/uL  Mean Cell Volume : 98.2 fL  Mean Cell Hemoglobin : 32.8 pg  Mean Cell Hemoglobin Concentration : 33.4 gm/dL  Auto Neutrophil # : x  Auto Lymphocyte # : x  Auto Monocyte # : x  Auto Eosinophil # : x  Auto Basophil # : x  Auto Neutrophil % : x  Auto Lymphocyte % : x  Auto Monocyte % : x  Auto Eosinophil % : x  Auto Basophil % : x    12-30    136  |  102  |  12  ----------------------------<  96  3.9   |  24  |  1.03    Ca    9.5      30 Dec 2020 08:28  Phos  3.2     12-29  Mg     2.2     12-30    TPro  7.6  /  Alb  4.0  /  TBili  0.6  /  DBili  x   /  AST  33<H>  /  ALT  36<H>  /  AlkPhos  100  12-30    PT/INR - ( 30 Dec 2020 07:22 )   PT: 22.7 sec;   INR: 2.04 ratio         EKG: Sinus rhythm, HR 68    ECHO  < from: RICCARDO w/o TTE (w/3D Echo) (20 @ 13:46) >  CONCLUSIONS:  1. Mitral annular calcification, otherwise normal mitral  valve. Severe mitral regurgitation.  Vena contracta width  about  0.7-0.8 cm.  2. Calcified trileaflet aortic valve with normal opening.  Minimal aortic regurgitation.  3. Normal aortic root.  Mild non-mobile atherosclerotic  plaque in the aortic arch and descending thoracic aorta.  4. Severe left atrial enlargement.  No left atrium or left  atrial appendage thrombus.  5. Normal left ventricular systolic function. No segmental  wall motion abnormalities.  6. Normal rightventricular size and function.  7. Contrast injection demonstrates no evidence of a patent  foramen ovale.    < end of copied text >      RADIOLOGY STUDIES    CXR:     < from: Xray Chest 1 View- PORTABLE-Urgent (Xray Chest 1 View- PORTABLE-Urgent .) (20 @ 17:55) >  FINDINGS/  IMPRESSION:  Stable cardiomegaly.  The cardiomediastinal silhouette is preserved.  Pulmonary vascularity appears normal.  No focal consolidations. No pleural effusion or pneumothorax.        < end of copied text >    < from: CT Chest No Cont (20 @ 21:28) >  FINDINGS:    LUNGS AND AIRWAYS: Patent central airways.  Patchy areas of groundglass and lucency resembling mosaic attenuation are noted withinboth lungs. The lucent areas appear more lucent and remain in the same size on the expiratory phase. The findings suggest air trapping. Unchanged 2 mm left upper lobe nodule (series 2 image 29).  PLEURA: No pleural effusion. No pneumothorax.  MEDIASTINUM AND BRANDON: No lymphadenopathy.  HEART: Cardiomegaly. No pericardial effusion. Fatty infiltration is noted involving the interventricular septum of the left ventricular myocardium suggestive of prior infarct.  CHEST WALL AND LOWER NECK: Unchanged subcentimeter hypodense right thyroid nodule.  VISUALIZED UPPER ABDOMEN: Unchanged mild hepatic surface nodularity.  BONES: Within normal limits.    IMPRESSION:  Patchy areas of groundglass and lucency resembling mosaic attenuation are noted within both lungs. The lucent areas appear more lucent and remain in the same size on the expiratory phase. The findings suggest air trapping.      < end of copied text >   CCU Accept Note    Transfer from: (  ) Medicine    (X) Telemetry 505A   (  ) ED                               (  ) Palliative    (  ) Stroke Unit    (  ) MICU    (X) EP lab    FERDINAND ALMENDAREZ  65y  Patient is a 65y old  Female who presents with a chief complaint of Cough x 4 days (30 Dec 2020 12:09)    ====================  HPI & Hospital Course:   Patient is a 65y old  Female who presents with a chief complaint of Cough x 4 days (30 Dec 2020 12:09)    HPI:  65 y.o. F with PMH of HOCM s/p septal ablation, afib on coumadin, HFpEF (EF 55% 2019), lung cancer (diagnosed about 2 years ago, s/p chemo, in remission), who presents w/ 4 days of worsening dry cough. Patient states that she was trying lozenges and tea at home, but these were not helping her symptoms. Further, she states that about 2-3 days ago, she developed "violent abdominal pain" and diarrhea, which resolved on its own approximately 1 day later. She had attributed her symptoms to eating Mexican food. She otherwise denies fevers at home. ROS is positive for SOB that accompanies cough. Patient denies sick contacts; she states her son and daughter, with home she lives with, are asymptomatic, as is her boyfriend.   In the ED, patient was found to be febrile to tmax of 102.3 F.   (20 Dec 2020 18:31)  While on tele floor, patient noted for afib with RVR. 20 DCCV performed without success. Today, , s/p septal ablation performed and admitted to CCU for further monitoring.        ROS:  CONSTITUTIONAL: No fevers, No chills, No fatigue, No weight gain  EYES: No vision changes   ENT: No congestion, No ear pain, No sore throat.  NECK: No pain, No stiffness  RESPIRATORY: +shortness of breath, No cough, No wheezing, No hemoptysis  CARDIOVASCULAR: No chest pain. + palpitations, No WALKER, No orthopnea, No paroxysmal nocturnal dyspnea, No pleuritic pain  GASTROINTESTINAL: No abdominal pain, No nausea, No vomiting, No hematemesis, No diarrhea No constipation. No melena  GENITOURINARY: No dysuria, No frequency, No incontinence, No hematuria  NEUROLOGICAL: No dizziness, No lightheadedness, No syncope, No LOC, No headache, No numbness or weakness  MUSCULOSKELETAL: No Edema, No joint pain, No joint swelling.  PSYCHIATRIC: No anxiety, No depression  DERMATOLOGY: No diaphoresis. No itching, No rashes, No pressure ulcers  HEME/LYMPH: No easy bruising, or bleeding gums    All other review of systems is negative unless indicated above.    Allergies    cefdinir (Rash)  moxifloxacin (Rash)  penicillin (Rash)    Intolerances        PAST MEDICAL & SURGICAL HISTORY:  Lung cancer  s/p chemotherapy ~ now in remission    GERD (gastroesophageal reflux disease)    CAP (community acquired pneumonia)  RML/RLL at [facility] 3/2017    Lung cancer  s/p chemotherapy    Chronic diastolic (congestive) heart failure    AF (atrial fibrillation)  On Coumadin    Pleural effusion  Right parapneumonic drained twice 3/2017 at [facility]    HOCM (hypertrophic obstructive cardiomyopathy)  S/P septal ablation    H/O cardiac radiofrequency ablation  for HOCM - 16 years ago    History of   15 years ago        MEDICATIONS  (STANDING):  aMIOdarone    Tablet   Oral   aMIOdarone    Tablet 400 milliGRAM(s) Oral every 12 hours  benzocaine 15 mG/menthol 3.6 mG (Sugar-Free) Lozenge 1 Lozenge Oral every 4 hours  chlorhexidine 4% Liquid 1 Application(s) Topical <User Schedule>  diltiazem    Tablet 60 milliGRAM(s) Oral four times a day  furosemide   Injectable 40 milliGRAM(s) IV Push once  influenza   Vaccine 0.5 milliLiter(s) IntraMuscular once  multivitamin 1 Tablet(s) Oral daily  pantoprazole    Tablet 40 milliGRAM(s) Oral before breakfast  pantoprazole  Injectable 40 milliGRAM(s) IV Push once  warfarin 4 milliGRAM(s) Oral once    MEDICATIONS  (PRN):  benzonatate 100 milliGRAM(s) Oral three times a day PRN Cough  silver sulfADIAZINE 1% Cream 1 Application(s) Topical two times a day PRN superficial skin burn from cardioversion      Drug Dosing Weight  Height (cm): 167.6 (21 Dec 2020 02:01)  Weight (kg): 68.8 (21 Dec 2020 02:01)  BMI (kg/m2): 24.5 (21 Dec 2020 02:01)  BSA (m2): 1.78 (21 Dec 2020 02:01)    Home Medications:  Multiple Vitamins oral tablet: 1 tab(s) orally once a day (20 Dec 2020 18:26)      Current Medications:   MEDICATIONS  (STANDING):  aMIOdarone    Tablet   Oral   aMIOdarone    Tablet 400 milliGRAM(s) Oral every 12 hours  benzocaine 15 mG/menthol 3.6 mG (Sugar-Free) Lozenge 1 Lozenge Oral every 4 hours  chlorhexidine 4% Liquid 1 Application(s) Topical <User Schedule>  diltiazem    Tablet 60 milliGRAM(s) Oral four times a day  furosemide   Injectable 40 milliGRAM(s) IV Push once  influenza   Vaccine 0.5 milliLiter(s) IntraMuscular once  multivitamin 1 Tablet(s) Oral daily  pantoprazole    Tablet 40 milliGRAM(s) Oral before breakfast  pantoprazole  Injectable 40 milliGRAM(s) IV Push once  warfarin 4 milliGRAM(s) Oral once    MEDICATIONS  (PRN):  benzonatate 100 milliGRAM(s) Oral three times a day PRN Cough  silver sulfADIAZINE 1% Cream 1 Application(s) Topical two times a day PRN superficial skin burn from cardioversion        FAMILY HISTORY:  Family history of psoriasis (Sibling)  Brother    Family history of ulcerative colitis (Sibling)  Brother    Family history of liver cancer  Mother      Social History:   Smoker[ ]  Non smoker[ ]  Alcohol [ ]    ====================  Vital Signs Last 24 Hrs  T(C): 36.8 (30 Dec 2020 22:00), Max: 36.8 (30 Dec 2020 22:00)  T(F): 98.2 (30 Dec 2020 22:00), Max: 98.2 (30 Dec 2020 22:00)  HR: 70 (30 Dec 2020 22:00) (70 - 105)  BP: 105/76 (30 Dec 2020 22:00) (105/76 - 120/89)  BP(mean): 87 (30 Dec 2020 22:00) (87 - 87)  RR: 15 (30 Dec 2020 22:00) (15 - 18)  SpO2: 95% (30 Dec 2020 22:00) (95% - 100%)  ICU Vital Signs Last 24 Hrs  T(C): 36.8 (30 Dec 2020 22:00), Max: 36.8 (30 Dec 2020 22:00)  T(F): 98.2 (30 Dec 2020 22:00), Max: 98.2 (30 Dec 2020 22:00)  HR: 70 (30 Dec 2020 22:00) (70 - 105)  BP: 105/76 (30 Dec 2020 22:00) (105/76 - 120/89)  BP(mean): 87 (30 Dec 2020 22:00) (87 - 87)  ABP: 106/57 (30 Dec 2020 22:00) (106/57 - 106/57)  ABP(mean): 76 (30 Dec 2020 22:00) (76 - 76)  RR: 15 (30 Dec 2020 22:00) (15 - 18)  SpO2: 95% (30 Dec 2020 22:00) (95% - 100%)        I&O's Detail      Physical Exam:   General: NAD  HEENT: normal sclera and conjunctiva, no oral lesions  Neck: Supple, no JVD  Lungs: CTA bilaterally  Heart: RRR, normal S1S2, no murmurs/rubs/gallops  Abdomen: Soft, ND/NT  Extremities: 2+ peripheral pulses, no cyanosis/clubbing/edema, full ROM  Skin: Warm, well-perfused  Neuro: A&O x3, no focal deficits      ====================  Labs & Imaging:   CBC Full  -  ( 30 Dec 2020 07:22 )  WBC Count : 6.02 K/uL  RBC Count : 3.41 M/uL  Hemoglobin : 11.2 g/dL  Hematocrit : 33.5 %  Platelet Count - Automated : 331 K/uL  Mean Cell Volume : 98.2 fL  Mean Cell Hemoglobin : 32.8 pg  Mean Cell Hemoglobin Concentration : 33.4 gm/dL  Auto Neutrophil # : x  Auto Lymphocyte # : x  Auto Monocyte # : x  Auto Eosinophil # : x  Auto Basophil # : x  Auto Neutrophil % : x  Auto Lymphocyte % : x  Auto Monocyte % : x  Auto Eosinophil % : x  Auto Basophil % : x    12-30    136  |  102  |  12  ----------------------------<  96  3.9   |  24  |  1.03    Ca    9.5      30 Dec 2020 08:28  Phos  3.2     12-29  Mg     2.2     12-30    TPro  7.6  /  Alb  4.0  /  TBili  0.6  /  DBili  x   /  AST  33<H>  /  ALT  36<H>  /  AlkPhos  100  12-30    PT/INR - ( 30 Dec 2020 07:22 )   PT: 22.7 sec;   INR: 2.04 ratio         EKG: Sinus rhythm, HR 68    ECHO  < from: RICCARDO w/o TTE (w/3D Echo) (20 @ 13:46) >  CONCLUSIONS:  1. Mitral annular calcification, otherwise normal mitral  valve. Severe mitral regurgitation.  Vena contracta width  about  0.7-0.8 cm.  2. Calcified trileaflet aortic valve with normal opening.  Minimal aortic regurgitation.  3. Normal aortic root.  Mild non-mobile atherosclerotic  plaque in the aortic arch and descending thoracic aorta.  4. Severe left atrial enlargement.  No left atrium or left  atrial appendage thrombus.  5. Normal left ventricular systolic function. No segmental  wall motion abnormalities.  6. Normal rightventricular size and function.  7. Contrast injection demonstrates no evidence of a patent  foramen ovale.    < end of copied text >      RADIOLOGY STUDIES    CXR:     < from: Xray Chest 1 View- PORTABLE-Urgent (Xray Chest 1 View- PORTABLE-Urgent .) (20 @ 17:55) >  FINDINGS/  IMPRESSION:  Stable cardiomegaly.  The cardiomediastinal silhouette is preserved.  Pulmonary vascularity appears normal.  No focal consolidations. No pleural effusion or pneumothorax.        < end of copied text >    < from: CT Chest No Cont (20 @ 21:28) >  FINDINGS:    LUNGS AND AIRWAYS: Patent central airways.  Patchy areas of groundglass and lucency resembling mosaic attenuation are noted withinboth lungs. The lucent areas appear more lucent and remain in the same size on the expiratory phase. The findings suggest air trapping. Unchanged 2 mm left upper lobe nodule (series 2 image 29).  PLEURA: No pleural effusion. No pneumothorax.  MEDIASTINUM AND BRANDON: No lymphadenopathy.  HEART: Cardiomegaly. No pericardial effusion. Fatty infiltration is noted involving the interventricular septum of the left ventricular myocardium suggestive of prior infarct.  CHEST WALL AND LOWER NECK: Unchanged subcentimeter hypodense right thyroid nodule.  VISUALIZED UPPER ABDOMEN: Unchanged mild hepatic surface nodularity.  BONES: Within normal limits.    IMPRESSION:  Patchy areas of groundglass and lucency resembling mosaic attenuation are noted within both lungs. The lucent areas appear more lucent and remain in the same size on the expiratory phase. The findings suggest air trapping.      < end of copied text >

## 2020-12-30 NOTE — CHART NOTE - NSCHARTNOTEFT_GEN_A_CORE
Type of procedure: PVI  Licensed independent practitioner: Sylvia Paige MD  Assistant: None  Description of procedure: After informed consent was obtained, the patient was brought to the Electrophysiology laboratory in the fasting state, and was prepped and draped in the usual sterile fashion. The patient was electively intubated by members of the anesthesia department, who provided sedation throughout the case. In addition an esophageal temperature probe was placed in the esophagus to allow dynamic temperate monitoring during ablation. The patient was under uninterrupted apixaban therapy.  Sheaths were placed as described in the procedure report, and catheters were advanced into the heart under fluoroscopic guidance without complications. Baseline intra-cardiac echocardiography (ICE) demonstrated the following: The LV size and functions were normal. There was no pericardial effusion at baseline  IV Heparin bolus was given followed by continuous drip to maintain the -400s throughout the case.  The left atrium was entered under fluoroscopic and ICE guidance by a single transseptal approach using a medium curve Agilis sheath.  Entry of the LA was verified by pressure measurements (LA pressure 28 mmHg). There were no complications.  A PentaRay Herson F curve catheter and a 3.5 mm irrigated-tip [employer] ThermoCool D/F-curve ablation catheter were used for mapping and ablation. A 3D anatomy of the LA was performed using InPulse [employer] 3 V7. There were areas of low voltage in the posterior wall and septum of the LA.  We first isolated the left pulmonary veins in an antral approach with demonstration of entrance block. No joan lesions were required for isolation of the left PVs. We then similarly isolated the right pulmonary veins in an antral approach. The patient was externally cardioverted to sinus rhythm with 200J. Lesions were required in the septal joan near the RIPV, as well as in the posterior joan, near the RSPV, for isolation and LSPV for isolation. Bilateral PVs showed evidence of entrance and exit block after pacing maneuvers. With pacing inside the PV, there was local PV capture without capture of the LA in the LSPV, LIPV and RSPV. Esophageal temperatures katherin from a baseline of 35.8 degrees Celsius to a peak of 36.8 degrees Celsius.  The pulmonary veins remained isolated for >30 minutes, without evidence of acute reconnection. Burst pacing was performed from the proximal CS bipole dopwn to 220ms without induction of any atrial arrhythmias.  A posterior wall isolation and mitral flutter line was performed.  Next a CTI ablation was performed with evidence of bidirectional block.  As such, heparin was stopped and the sheaths were pulled back to the right atrium. Repeat ICE imaging revealed no pericardial effusion.  All catheters were removed from the body and sheaths were left in place for removal once ACT declines to below 200 seconds. The final left atrial pressure was 28 mmHg.  A total of 100mg of protamine was given to reverse the Heparin effect.    The patient tolerated the procedure well and was successfully extubated and transferred to the PACU for further monitoring. There were no complications.  During the procedure, a [employer] rep was present to manage a complex mapping system.    Findings of procedure: Successful isolation of the pulmonary veins in an antral circumferential approach, mitral flutter, posterior wall isolation and CTI ablation. No acute reconnections.  Estimated blood loss: <5cc  Specimen removed: N/A  Preoperative Dx: Afib  Postoperative Dx: Afib  Complications: None  Anesthesia type: General

## 2020-12-31 ENCOUNTER — TRANSCRIPTION ENCOUNTER (OUTPATIENT)
Age: 65
End: 2020-12-31

## 2020-12-31 VITALS
HEART RATE: 71 BPM | DIASTOLIC BLOOD PRESSURE: 72 MMHG | RESPIRATION RATE: 30 BRPM | SYSTOLIC BLOOD PRESSURE: 103 MMHG | OXYGEN SATURATION: 93 %

## 2020-12-31 LAB
ALBUMIN SERPL ELPH-MCNC: 3.7 G/DL — SIGNIFICANT CHANGE UP (ref 3.3–5)
ALP SERPL-CCNC: 91 U/L — SIGNIFICANT CHANGE UP (ref 40–120)
ALT FLD-CCNC: 31 U/L — SIGNIFICANT CHANGE UP (ref 4–33)
ANION GAP SERPL CALC-SCNC: 14 MMOL/L — SIGNIFICANT CHANGE UP (ref 7–14)
AST SERPL-CCNC: 47 U/L — HIGH (ref 4–32)
BILIRUB SERPL-MCNC: 0.5 MG/DL — SIGNIFICANT CHANGE UP (ref 0.2–1.2)
BUN SERPL-MCNC: 13 MG/DL — SIGNIFICANT CHANGE UP (ref 7–23)
CALCIUM SERPL-MCNC: 8.4 MG/DL — SIGNIFICANT CHANGE UP (ref 8.4–10.5)
CHLORIDE SERPL-SCNC: 100 MMOL/L — SIGNIFICANT CHANGE UP (ref 98–107)
CO2 SERPL-SCNC: 20 MMOL/L — LOW (ref 22–31)
CREAT SERPL-MCNC: 1.01 MG/DL — SIGNIFICANT CHANGE UP (ref 0.5–1.3)
GLUCOSE SERPL-MCNC: 141 MG/DL — HIGH (ref 70–99)
HCT VFR BLD CALC: 32.6 % — LOW (ref 34.5–45)
HGB BLD-MCNC: 10.8 G/DL — LOW (ref 11.5–15.5)
INR BLD: 1.82 RATIO — HIGH (ref 0.88–1.16)
MAGNESIUM SERPL-MCNC: 1.9 MG/DL — SIGNIFICANT CHANGE UP (ref 1.6–2.6)
MCHC RBC-ENTMCNC: 32.8 PG — SIGNIFICANT CHANGE UP (ref 27–34)
MCHC RBC-ENTMCNC: 33.1 GM/DL — SIGNIFICANT CHANGE UP (ref 32–36)
MCV RBC AUTO: 99.1 FL — SIGNIFICANT CHANGE UP (ref 80–100)
NRBC # BLD: 0 /100 WBCS — SIGNIFICANT CHANGE UP
NRBC # FLD: 0 K/UL — SIGNIFICANT CHANGE UP
PHOSPHATE SERPL-MCNC: 4.7 MG/DL — HIGH (ref 2.5–4.5)
PLATELET # BLD AUTO: 302 K/UL — SIGNIFICANT CHANGE UP (ref 150–400)
POTASSIUM SERPL-MCNC: 3.8 MMOL/L — SIGNIFICANT CHANGE UP (ref 3.5–5.3)
POTASSIUM SERPL-SCNC: 3.8 MMOL/L — SIGNIFICANT CHANGE UP (ref 3.5–5.3)
PROT SERPL-MCNC: 7.2 G/DL — SIGNIFICANT CHANGE UP (ref 6–8.3)
PROTHROM AB SERPL-ACNC: 20.2 SEC — HIGH (ref 10.6–13.6)
RBC # BLD: 3.29 M/UL — LOW (ref 3.8–5.2)
RBC # FLD: 12.6 % — SIGNIFICANT CHANGE UP (ref 10.3–14.5)
SODIUM SERPL-SCNC: 134 MMOL/L — LOW (ref 135–145)
WBC # BLD: 8.52 K/UL — SIGNIFICANT CHANGE UP (ref 3.8–10.5)
WBC # FLD AUTO: 8.52 K/UL — SIGNIFICANT CHANGE UP (ref 3.8–10.5)

## 2020-12-31 PROCEDURE — 99232 SBSQ HOSP IP/OBS MODERATE 35: CPT

## 2020-12-31 RX ORDER — PANTOPRAZOLE SODIUM 20 MG/1
1 TABLET, DELAYED RELEASE ORAL
Qty: 60 | Refills: 0
Start: 2020-12-31 | End: 2021-02-28

## 2020-12-31 RX ORDER — AMIODARONE HYDROCHLORIDE 400 MG/1
400 TABLET ORAL ONCE
Refills: 0 | Status: COMPLETED | OUTPATIENT
Start: 2020-12-31 | End: 2020-12-31

## 2020-12-31 RX ORDER — APIXABAN 2.5 MG/1
1 TABLET, FILM COATED ORAL
Qty: 0 | Refills: 0 | DISCHARGE
Start: 2020-12-31 | End: 2021-01-29

## 2020-12-31 RX ORDER — APIXABAN 2.5 MG/1
1 TABLET, FILM COATED ORAL
Qty: 60 | Refills: 0
Start: 2020-12-31 | End: 2021-01-29

## 2020-12-31 RX ORDER — APIXABAN 2.5 MG/1
5 TABLET, FILM COATED ORAL EVERY 12 HOURS
Refills: 0 | Status: DISCONTINUED | OUTPATIENT
Start: 2020-12-31 | End: 2020-12-31

## 2020-12-31 RX ORDER — MAGNESIUM SULFATE 500 MG/ML
1 VIAL (ML) INJECTION ONCE
Refills: 0 | Status: COMPLETED | OUTPATIENT
Start: 2020-12-31 | End: 2020-12-31

## 2020-12-31 RX ORDER — ACETAMINOPHEN 500 MG
650 TABLET ORAL ONCE
Refills: 0 | Status: COMPLETED | OUTPATIENT
Start: 2020-12-31 | End: 2020-12-31

## 2020-12-31 RX ORDER — POTASSIUM CHLORIDE 20 MEQ
20 PACKET (EA) ORAL ONCE
Refills: 0 | Status: COMPLETED | OUTPATIENT
Start: 2020-12-31 | End: 2020-12-31

## 2020-12-31 RX ADMIN — Medication 650 MILLIGRAM(S): at 06:00

## 2020-12-31 RX ADMIN — Medication 100 GRAM(S): at 06:58

## 2020-12-31 RX ADMIN — Medication 20 MILLIEQUIVALENT(S): at 06:58

## 2020-12-31 RX ADMIN — APIXABAN 5 MILLIGRAM(S): 2.5 TABLET, FILM COATED ORAL at 13:16

## 2020-12-31 RX ADMIN — PANTOPRAZOLE SODIUM 40 MILLIGRAM(S): 20 TABLET, DELAYED RELEASE ORAL at 05:26

## 2020-12-31 RX ADMIN — WARFARIN SODIUM 4 MILLIGRAM(S): 2.5 TABLET ORAL at 00:49

## 2020-12-31 RX ADMIN — AMIODARONE HYDROCHLORIDE 400 MILLIGRAM(S): 400 TABLET ORAL at 05:26

## 2020-12-31 RX ADMIN — Medication 60 MILLIGRAM(S): at 11:37

## 2020-12-31 RX ADMIN — Medication 60 MILLIGRAM(S): at 05:27

## 2020-12-31 RX ADMIN — Medication 1 TABLET(S): at 11:37

## 2020-12-31 RX ADMIN — AMIODARONE HYDROCHLORIDE 400 MILLIGRAM(S): 400 TABLET ORAL at 14:39

## 2020-12-31 RX ADMIN — Medication 650 MILLIGRAM(S): at 05:30

## 2020-12-31 RX ADMIN — CHLORHEXIDINE GLUCONATE 1 APPLICATION(S): 213 SOLUTION TOPICAL at 11:36

## 2020-12-31 NOTE — PROGRESS NOTE ADULT - ASSESSMENT
65yoF w/ PMHx afib on coumadin, HFpEF, GERD, HLD, HOCM s/p septal ablation, lung Ca s/p chemo, MR and TR, HTN initially presented with cough likely in the setting of emphysema versus COPD improved with neb treatment and Lasix.  On telemetry, patient's was in atrial fibrillation rate controlled VR 90's while at rest but 110-120's while ambulating. s/P failed DCCV 0n 12/29/20 and s/p afib ablation yesterday. TTE done during this admission showed moderate MR, severely dilated LA, severe RA enlargement, mod TR, normal LV systolic function. Right groin site clean, dry and intact with no bleeding or hematoma. Post procedure instructions given to patient. All questions answered. Patient is started on Amiodarone load.    #atrial fibrillation   Continue Amiodarone load to 5 gram. Change to 400mg BID up on discharge for 5 days and then change to 200mg daily  Continue to monitor on telemetry while in patient  Continue apixaban 5 mg BID for systemic anticoagulation. Was on Coumadin  Continue Cardizem and change to Cardizem CD 240mg daily up on discharge  May d/c home from EP standpoint  Discussed with Dr. Paige  F/u with Dr. Paige on 2/11/2021 @12 noon

## 2020-12-31 NOTE — PROGRESS NOTE ADULT - SUBJECTIVE AND OBJECTIVE BOX
Patient is seen and examined. Denies any chest pain, SOB, palpitations or dizziness. Feels well    PAST MEDICAL & SURGICAL HISTORY:  Lung cancer  s/p chemotherapy ~ now in remission    GERD (gastroesophageal reflux disease)    HLD (hyperlipidemia)    HTN (hypertension)    CAP (community acquired pneumonia)  RML/RLL at [facility] 3/2017    Lung cancer  s/p chemotherapy    MR (mitral regurgitation)    Chronic diastolic (congestive) heart failure    GERD (gastroesophageal reflux disease)    AF (atrial fibrillation)  On Coumadin    Gastritis and duodenitis  Mild - dx via EGD     Pleural effusion  Right parapneumonic drained twice 3/2017 at [facility]    Pneumonia  RML &amp; RLL at [facility] 3/2017    Chronic atrial fibrillation    Pulmonary hypertension  Moderate    Mitral valve insufficiency, unspecified etiology  Moderate    HF (heart failure), diastolic  Grade II DD    HOCM (hypertrophic obstructive cardiomyopathy)  S/P septal ablation    Chronic gastritis without bleeding, unspecified gastritis type    Gastritis and duodenitis    Cardiomyopathy, hypertrophic    PNA (pneumonia)    Atrial fibrillation    H/O cardiac radiofrequency ablation  for HOCM - 16 years ago    History of   15 years ago    H/O cardiac radiofrequency ablation        MEDICATIONS  (STANDING):  aMIOdarone    Tablet   Oral   aMIOdarone    Tablet 400 milliGRAM(s) Oral every 12 hours  aMIOdarone    Tablet 400 milliGRAM(s) Oral once  apixaban 5 milliGRAM(s) Oral every 12 hours  benzocaine 15 mG/menthol 3.6 mG (Sugar-Free) Lozenge 1 Lozenge Oral every 4 hours  chlorhexidine 4% Liquid 1 Application(s) Topical <User Schedule>  diltiazem    Tablet 60 milliGRAM(s) Oral four times a day  influenza   Vaccine 0.5 milliLiter(s) IntraMuscular once  multivitamin 1 Tablet(s) Oral daily  pantoprazole    Tablet 40 milliGRAM(s) Oral before breakfast    MEDICATIONS  (PRN):  benzonatate 100 milliGRAM(s) Oral three times a day PRN Cough  silver sulfADIAZINE 1% Cream 1 Application(s) Topical two times a day PRN superficial skin burn from cardioversion    Vital Signs Last 24 Hrs  T(C): 36.5 (31 Dec 2020 08:00), Max: 36.8 (30 Dec 2020 22:00)  T(F): 97.7 (31 Dec 2020 08:00), Max: 98.2 (30 Dec 2020 22:00)  HR: 70 (31 Dec 2020 11:00) (61 - 71)  BP: 113/76 (31 Dec 2020 11:00) (98/62 - 113/78)  BP(mean): 88 (31 Dec 2020 11:00) (71 - 94)  RR: 22 (31 Dec 2020 11:00) (10 - 25)  SpO2: 95% (31 Dec 2020 11:00) (93% - 100%)    INTERPRETATION OF TELEMETRY: Sinus rhythm with HR 60s-70s; APCs    LABS:                        10.8   8.52  )-----------( 302      ( 31 Dec 2020 05:30 )             32.6     12-    134<L>  |  100  |  13  ----------------------------<  141<H>  3.8   |  20<L>  |  1.01    Ca    8.4      31 Dec 2020 05:30  Phos  4.7     12-  Mg     1.9     12-    TPro  7.2  /  Alb  3.7  /  TBili  0.5  /  DBili  x   /  AST  47<H>  /  ALT  31  /  AlkPhos  91  12-31    PT/INR - ( 31 Dec 2020 05:30 )   PT: 20.2 sec;   INR: 1.82 ratio      I&O's Summary    30 Dec 2020 07:01  -  31 Dec 2020 07:00  --------------------------------------------------------  IN: 100 mL / OUT: 900 mL / NET: -800 mL      PHYSICAL EXAM:    GENERAL: In no apparent distress, well nourished, and hydrated.  HEART: Regular rate and rhythm; No murmurs, rubs, or gallops.  PULMONARY: Clear to auscultation and percussion.  No rales, wheezing, or rhonchi bilaterally.  ABDOMEN: Soft, Nontender, Nondistended; Bowel sounds present  EXTREMITIES:  2+ Peripheral Pulses, No clubbing, cyanosis, or edema

## 2020-12-31 NOTE — DISCHARGE NOTE NURSING/CASE MANAGEMENT/SOCIAL WORK - PATIENT PORTAL LINK FT
You can access the FollowMyHealth Patient Portal offered by [facility] by registering at the following website: [email or web address]. By joining [employer]’s FollowMyHealth portal, you will also be able to view your health information using other applications (apps) compatible with our system.

## 2020-12-31 NOTE — PROGRESS NOTE ADULT - SUBJECTIVE AND OBJECTIVE BOX
POST ANESTHESIA EVALUATION    65y Female POSTOP DAY 1 S/P afib ablation    MENTAL STATUS: Patient participation [ x] Awake     [  ] Arousable     [  ] Sedated    AIRWAY PATENCY: [x ] Satisfactory  [  ] Other:     Vital Signs Last 24 Hrs  T(C): 36.5 (31 Dec 2020 08:00), Max: 36.8 (30 Dec 2020 22:00)  T(F): 97.7 (31 Dec 2020 08:00), Max: 98.2 (30 Dec 2020 22:00)  HR: 63 (31 Dec 2020 09:00) (61 - 79)  BP: 100/68 (31 Dec 2020 08:00) (98/62 - 120/89)  BP(mean): 79 (31 Dec 2020 08:00) (71 - 94)  RR: 10 (31 Dec 2020 09:00) (10 - 25)  SpO2: 97% (31 Dec 2020 09:00) (93% - 100%)  I&O's Summary    30 Dec 2020 07:01  -  31 Dec 2020 07:00  --------------------------------------------------------  IN: 100 mL / OUT: 900 mL / NET: -800 mL          NAUSEA/ VOMITTING:  [ x] NONE  [  ] CONTROLLED [  ] OTHER     PAIN: [x ] CONTROLLED WITH CURRENT REGIMEN  [  ] OTHER    [x ] NO APPARENT ANESTHESIA COMPLICATIONS      Comments:

## 2020-12-31 NOTE — PROGRESS NOTE ADULT - SUBJECTIVE AND OBJECTIVE BOX
Chief compliant:    Interval history/Overnight events:    Tele events:    Vital Signs Last 24 Hrs  T(C): 36.7 (31 Dec 2020 04:00), Max: 36.8 (30 Dec 2020 22:00)  T(F): 98.1 (31 Dec 2020 04:00), Max: 98.2 (30 Dec 2020 22:00)  HR: 61 (31 Dec 2020 07:00) (61 - 79)  BP: 98/62 (31 Dec 2020 05:00) (98/62 - 120/89)  BP(mean): 71 (31 Dec 2020 05:00) (71 - 94)  RR: 20 (31 Dec 2020 07:00) (15 - 25)  SpO2: 95% (31 Dec 2020 07:00) (93% - 100%)    Allergies    cefdinir (Rash)  moxifloxacin (Rash)  penicillin (Rash)    Intolerances        MEDICATIONS  (STANDING):  aMIOdarone    Tablet   Oral   aMIOdarone    Tablet 400 milliGRAM(s) Oral every 12 hours  benzocaine 15 mG/menthol 3.6 mG (Sugar-Free) Lozenge 1 Lozenge Oral every 4 hours  chlorhexidine 4% Liquid 1 Application(s) Topical <User Schedule>  diltiazem    Tablet 60 milliGRAM(s) Oral four times a day  influenza   Vaccine 0.5 milliLiter(s) IntraMuscular once  multivitamin 1 Tablet(s) Oral daily  pantoprazole    Tablet 40 milliGRAM(s) Oral before breakfast    MEDICATIONS  (PRN):  benzonatate 100 milliGRAM(s) Oral three times a day PRN Cough  silver sulfADIAZINE 1% Cream 1 Application(s) Topical two times a day PRN superficial skin burn from cardioversion      CONSTITUTIONAL: No fevers, No chills, No fatigue, No weight gain  EYES: No vision changes   ENT: No congestion, No ear pain, No sore throat.  NECK: No pain, No stiffness  RESPIRATORY: No shortness of breath, No cough, No wheezing, No hemoptysis  CARDIOVASCULAR: No chest pain. No palpitations, No WALKER, No orthopnea, No paroxysmal nocturnal dyspnea, No pleuritic pain  GASTROINTESTINAL: No abdominal pain, No nausea, No vomiting, No hematemesis, No diarrhea No constipation. No melena  GENITOURINARY: No dysuria, No frequency, No incontinence, No hematuria  NEUROLOGICAL: No dizziness, No lightheadedness, No syncope, No LOC, No headache, No numbness or weakness  MUSCULOSKELETAL: No Edema, No joint pain, No joint swelling.  PSYCHIATRIC: No anxiety, No depression  DERMATOLOGY: No diaphoresis. No itching, No rashes, No pressure ulcers  HEME/LYMPH: No easy bruising, or bleeding gums        PHYSICAL EXAMINATION  Appearance: NAD, no distress  HEENT: Moist Mucous Membranes, Anicteric, PERRL, EOMI  Cardiovascular: Regular rate and rhythm, Normal S1 S2, No JVD, No murmurs  Respiratory: Lungs clear to auscultation. No rales, No rhonchi, No wheezing. No tenderness to palpation  Gastrointestinal:  Soft, Non-tender, + BS  Neurologic: Non-focal, A&Ox3  Skin: Warm and dry, No rashes, No ecchymosis, No cyanosis  Musculoskeletal: No clubbing, No cyanosis, No edema  Vascular: Peripheral pulses palpable 2+ bilaterally  Psychiatry: Mood & affect appropriate      	    		      I&O's Summary    30 Dec 2020 07:01  -  31 Dec 2020 07:00  --------------------------------------------------------  IN: 0 mL / OUT: 900 mL / NET: -900 mL    	        Imaging:  echo:  Stress test:  Cath:  CT scan:    Labs:                          10.8   8.52  )-----------( 302      ( 31 Dec 2020 05:30 )             32.6       12-31    134<L>  |  100  |  13  ----------------------------<  141<H>  3.8   |  20<L>  |  1.01    Ca    8.4      31 Dec 2020 05:30  Phos  4.7     12-31  Mg     1.9     12-31    TPro  7.2  /  Alb  3.7  /  TBili  0.5  /  DBili  x   /  AST  47<H>  /  ALT  31  /  AlkPhos  91  12-31          proBNP  Serum Pro-Brain Natriuretic Peptide: 2212 pg/mL (12-26 @ 07:11)  Serum Pro-Brain Natriuretic Peptide: 9657 pg/mL (12-20 @ 16:03)      Chol,Trig,LDL,HDL,A1C  Cholesterol, Serum: 171 mg/dL (12-21 @ 06:40)  HDL Cholesterol, Serum: 54 mg/dL (12-21 @ 06:40)  Triglycerides, Serum: 73 mg/dL (12-21 @ 06:40)      LIVER FUNCTIONS - ( 31 Dec 2020 05:30 )  Alb: 3.7 g/dL / Pro: 7.2 g/dL / ALK PHOS: 91 U/L / ALT: 31 U/L / AST: 47 U/L / GGT: x             PT/INR - ( 31 Dec 2020 05:30 )   PT: 20.2 sec;   INR: 1.82 ratio           LABORATORY VALUES	 	                          10.8   8.52  )-----------( 302      ( 31 Dec 2020 05:30 )             32.6       12-31    134<L>  |  100  |  13  ----------------------------<  141<H>  3.8   |  20<L>  |  1.01  12-30    136  |  102  |  12  ----------------------------<  96  3.9   |  24  |  1.03    Ca    8.4      31 Dec 2020 05:30  Ca    9.5      30 Dec 2020 08:28  Phos  4.7     12-31  Mg     1.9     12-31  Mg     2.2     12-30    TPro  7.2  /  Alb  3.7  /  TBili  0.5  /  DBili  x   /  AST  47<H>  /  ALT  31  /  AlkPhos  91  12-31  TPro  7.6  /  Alb  4.0  /  TBili  0.6  /  DBili  x   /  AST  33<H>  /  ALT  36<H>  /  AlkPhos  100  12-30    LIVER FUNCTIONS - ( 31 Dec 2020 05:30 )  Alb: 3.7 g/dL / Pro: 7.2 g/dL / ALK PHOS: 91 U/L / ALT: 31 U/L / AST: 47 U/L / GGT: x           Prothrombin Time, Plasma: 20.2 sec (12-31 @ 05:30)      CARDIAC MARKERS:          Serum Pro-Brain Natriuretic Peptide: 2212 pg/mL (12-26 @ 07:11)  Serum Pro-Brain Natriuretic Peptide: 9657 pg/mL (12-20 @ 16:03)      12-21 @ 06:40  Cholesterol, Serum - 171  Direct LDL- --  HDL Cholesterol, Serum- 54  Triglycerides, Serum- 73            CAPILLARY BLOOD GLUCOSE                CBC Full  -  ( 31 Dec 2020 05:30 )  WBC Count : 8.52 K/uL  Hemoglobin : 10.8 g/dL  Hematocrit : 32.6 %  Platelet Count - Automated : 302 K/uL  Mean Cell Volume : 99.1 fL  Mean Cell Hemoglobin : 32.8 pg  Mean Cell Hemoglobin Concentration : 33.1 gm/dL  Auto Neutrophil # : x  Auto Lymphocyte # : x  Auto Monocyte # : x  Auto Eosinophil # : x  Auto Basophil # : x  Auto Neutrophil % : x  Auto Lymphocyte % : x  Auto Monocyte % : x  Auto Eosinophil % : x  Auto Basophil % : x      12-31    134<L>  |  100  |  13  ----------------------------<  141<H>  3.8   |  20<L>  |  1.01  12-30    136  |  102  |  12  ----------------------------<  96  3.9   |  24  |  1.03    Ca    8.4      31 Dec 2020 05:30  Ca    9.5      30 Dec 2020 08:28  Phos  4.7     12-31  Mg     1.9     12-31  Mg     2.2     12-30    TPro  7.2  /  Alb  3.7  /  TBili  0.5  /  DBili  x   /  AST  47<H>  /  ALT  31  /  AlkPhos  91  12-31  TPro  7.6  /  Alb  4.0  /  TBili  0.6  /  DBili  x   /  AST  33<H>  /  ALT  36<H>  /  AlkPhos  100  12-30    LIVER FUNCTIONS - ( 31 Dec 2020 05:30 )  Alb: 3.7 g/dL / Pro: 7.2 g/dL / ALK PHOS: 91 U/L / ALT: 31 U/L / AST: 47 U/L / GGT: x             Serum Pro-Brain Natriuretic Peptide: 2212 pg/mL (12-26 @ 07:11)  Serum Pro-Brain Natriuretic Peptide: 9657 pg/mL (12-20 @ 16:03)          CAPILLARY BLOOD GLUCOSE          Assessment : 65F with PMH of HOCM s/p septal ablation, afib on coumadin, HFpEF (EF 55% 9/2019), lung cancer (diagnosed about 2 years ago, s/p chemo, in remission) HTN, presents with sepsis. Admitted to CCU s/p atrial fibrillation  ablation (12/30/20) for further monitoring.    Neuro:  no issue       CVS  #Atrial fibrillation with RVR  -Failed DCCV on 12/29/20 and Afib ablation --currently SR on tele  -continue post EP procedure care.   -Right groin dressing c/d/I  -Lasix 40mg IV X 1 post procedure (patient received ~2 liters of fluid in EP lab)  -protonix 40mg iv X 1 now, start po 40mg daily tomorrow.   -CHADSVASC is elevated to 4  -continue to anticoagulate with coumadin  -INR goal is 2-3  -would continue the cardizem at 60 po [id number], amio 400mg po BID X12 doses    #HOCM, s/p septal ablation  -euvolemic on exam, would hold lasix for now    #HFpEF -12/29 echo shows severe MR, normal LVSF, severe LA enlargment.  -Euvolemic on exam  -Lasix 40mg IV X 1 now (patient received ~2 liters of fluid in EP lab)    Pulm:  # Lung CA  -CT chest : air trapping  -pulm rec appreciated  -out pt follow       # ID  Sepsis- Patient with fever, tachycardia, and leukocytosis on admission- currently afebrile  - Procalcitonin is negative; pt has respiratory symptoms but no EKG changes  - COVID neg x2, RVP neg, BCx NGTD  - L pyelonephritis. on CT scan - s/p 3 days CTX        #Prophylactic measure  -Coumadin (dosing as per daily INR)                       Chief compliant:  dry cough, sob, fatigue x 4 days  Interval history/Overnight events: slept  on and off, reports feeling better .No complains    Tele events: NSR with rare PAC        Vital Signs Last 24 Hrs  T(C): 36.7 (31 Dec 2020 04:00), Max: 36.8 (30 Dec 2020 22:00)  T(F): 98.1 (31 Dec 2020 04:00), Max: 98.2 (30 Dec 2020 22:00)  HR: 61 (31 Dec 2020 07:00) (61 - 79)  BP: 98/62 (31 Dec 2020 05:00) (98/62 - 120/89)  BP(mean): 71 (31 Dec 2020 05:00) (71 - 94)  RR: 20 (31 Dec 2020 07:00) (15 - 25)  SpO2: 95% (31 Dec 2020 07:00) (93% - 100%)    Allergies    cefdinir (Rash)  moxifloxacin (Rash)  penicillin (Rash)          MEDICATIONS  (STANDING):  aMIOdarone    Tablet   Oral   aMIOdarone    Tablet 400 milliGRAM(s) Oral every 12 hours  benzocaine 15 mG/menthol 3.6 mG (Sugar-Free) Lozenge 1 Lozenge Oral every 4 hours  chlorhexidine 4% Liquid 1 Application(s) Topical <User Schedule>  diltiazem    Tablet 60 milliGRAM(s) Oral four times a day  influenza   Vaccine 0.5 milliLiter(s) IntraMuscular once  multivitamin 1 Tablet(s) Oral daily  pantoprazole    Tablet 40 milliGRAM(s) Oral before breakfast    MEDICATIONS  (PRN):  benzonatate 100 milliGRAM(s) Oral three times a day PRN Cough  silver sulfADIAZINE 1% Cream 1 Application(s) Topical two times a day PRN superficial skin burn from cardioversion      CONSTITUTIONAL: No fevers, No chills, No fatigue, No weight gain  EYES: No vision changes   ENT: No congestion, No ear pain, No sore throat.  NECK: No pain, No stiffness  RESPIRATORY: No shortness of breath, No cough, No wheezing, No hemoptysis  CARDIOVASCULAR: No chest pain. No palpitations, No WALKER, No orthopnea, No paroxysmal nocturnal dyspnea, No pleuritic pain  GASTROINTESTINAL: No abdominal pain, No nausea, No vomiting, No hematemesis, No diarrhea No constipation. No melena  GENITOURINARY: No dysuria, No frequency, No incontinence, No hematuria  NEUROLOGICAL: No dizziness, No lightheadedness, No syncope, No LOC, No headache, No numbness or weakness  MUSCULOSKELETAL: No Edema, No joint pain, No joint swelling.  PSYCHIATRIC: No anxiety, No depression  DERMATOLOGY: No diaphoresis. No itching, No rashes, No pressure ulcers  HEME/LYMPH: No easy bruising, or bleeding gums        PHYSICAL EXAMINATION  Appearance: NAD, no distress  HEENT: Moist Mucous Membranes, Anicteric, PERRL, EOMI  Cardiovascular: Regular rate and rhythm, Normal S1 S2, No JVD, No murmurs  Respiratory: Lungs clear to auscultation. No rales, No rhonchi, No wheezing. No tenderness to palpation  Gastrointestinal: Abd Soft, Non-tender, + BS,obese  Neurologic: Non-focal, A&Ox3  Skin: Warm and dry, No rashes, No ecchymosis, No cyanosis  Musculoskeletal: No clubbing, No cyanosis, No edema  Vascular: Peripheral pulses palpable 2+ bilaterally  Psychiatry: Mood & affect appropriate      	    		      I&O's Summary    30 Dec 2020 07:01  -  31 Dec 2020 07:00  --------------------------------------------------------  IN: 0 mL / OUT: 900 mL / NET: -900 mL    	        Imaging:  echo:  Stress test:  Cath:  CT scan:    Labs:                          10.8   8.52  )-----------( 302      ( 31 Dec 2020 05:30 )             32.6       12-31    134<L>  |  100  |  13  ----------------------------<  141<H>  3.8   |  20<L>  |  1.01    Ca    8.4      31 Dec 2020 05:30  Phos  4.7     12-31  Mg     1.9     12-31    TPro  7.2  /  Alb  3.7  /  TBili  0.5  /  DBili  x   /  AST  47<H>  /  ALT  31  /  AlkPhos  91  12-31          proBNP  Serum Pro-Brain Natriuretic Peptide: 2212 pg/mL (12-26 @ 07:11)  Serum Pro-Brain Natriuretic Peptide: 9657 pg/mL (12-20 @ 16:03)      Chol,Trig,LDL,HDL,A1C  Cholesterol, Serum: 171 mg/dL (12-21 @ 06:40)  HDL Cholesterol, Serum: 54 mg/dL (12-21 @ 06:40)  Triglycerides, Serum: 73 mg/dL (12-21 @ 06:40)      LIVER FUNCTIONS - ( 31 Dec 2020 05:30 )  Alb: 3.7 g/dL / Pro: 7.2 g/dL / ALK PHOS: 91 U/L / ALT: 31 U/L / AST: 47 U/L / GGT: x             PT/INR - ( 31 Dec 2020 05:30 )   PT: 20.2 sec;   INR: 1.82 ratio           LABORATORY VALUES	 	                          10.8   8.52  )-----------( 302      ( 31 Dec 2020 05:30 )             32.6       12-31    134<L>  |  100  |  13  ----------------------------<  141<H>  3.8   |  20<L>  |  1.01  12-30    136  |  102  |  12  ----------------------------<  96  3.9   |  24  |  1.03    Ca    8.4      31 Dec 2020 05:30  Ca    9.5      30 Dec 2020 08:28  Phos  4.7     12-31  Mg     1.9     12-31  Mg     2.2     12-30    TPro  7.2  /  Alb  3.7  /  TBili  0.5  /  DBili  x   /  AST  47<H>  /  ALT  31  /  AlkPhos  91  12-31  TPro  7.6  /  Alb  4.0  /  TBili  0.6  /  DBili  x   /  AST  33<H>  /  ALT  36<H>  /  AlkPhos  100  12-30    LIVER FUNCTIONS - ( 31 Dec 2020 05:30 )  Alb: 3.7 g/dL / Pro: 7.2 g/dL / ALK PHOS: 91 U/L / ALT: 31 U/L / AST: 47 U/L / GGT: x           Prothrombin Time, Plasma: 20.2 sec (12-31 @ 05:30)      CARDIAC MARKERS:          Serum Pro-Brain Natriuretic Peptide: 2212 pg/mL (12-26 @ 07:11)  Serum Pro-Brain Natriuretic Peptide: 9657 pg/mL (12-20 @ 16:03)      12-21 @ 06:40  Cholesterol, Serum - 171  Direct LDL- --  HDL Cholesterol, Serum- 54  Triglycerides, Serum- 73            CAPILLARY BLOOD GLUCOSE                CBC Full  -  ( 31 Dec 2020 05:30 )  WBC Count : 8.52 K/uL  Hemoglobin : 10.8 g/dL  Hematocrit : 32.6 %  Platelet Count - Automated : 302 K/uL  Mean Cell Volume : 99.1 fL  Mean Cell Hemoglobin : 32.8 pg  Mean Cell Hemoglobin Concentration : 33.1 gm/dL  Auto Neutrophil # : x  Auto Lymphocyte # : x  Auto Monocyte # : x  Auto Eosinophil # : x  Auto Basophil # : x  Auto Neutrophil % : x  Auto Lymphocyte % : x  Auto Monocyte % : x  Auto Eosinophil % : x  Auto Basophil % : x      12-31    134<L>  |  100  |  13  ----------------------------<  141<H>  3.8   |  20<L>  |  1.01  12-30    136  |  102  |  12  ----------------------------<  96  3.9   |  24  |  1.03    Ca    8.4      31 Dec 2020 05:30  Ca    9.5      30 Dec 2020 08:28  Phos  4.7     12-31  Mg     1.9     12-31  Mg     2.2     12-30    TPro  7.2  /  Alb  3.7  /  TBili  0.5  /  DBili  x   /  AST  47<H>  /  ALT  31  /  AlkPhos  91  12-31  TPro  7.6  /  Alb  4.0  /  TBili  0.6  /  DBili  x   /  AST  33<H>  /  ALT  36<H>  /  AlkPhos  100  12-30    LIVER FUNCTIONS - ( 31 Dec 2020 05:30 )  Alb: 3.7 g/dL / Pro: 7.2 g/dL / ALK PHOS: 91 U/L / ALT: 31 U/L / AST: 47 U/L / GGT: x             Serum Pro-Brain Natriuretic Peptide: 2212 pg/mL (12-26 @ 07:11)  Serum Pro-Brain Natriuretic Peptide: 9657 pg/mL (12-20 @ 16:03)          CAPILLARY BLOOD GLUCOSE          Assessment : 65F with PMH of HOCM s/p septal ablation, afib on coumadin, HFpEF (EF 55% 9/2019), lung cancer (diagnosed about 2 years ago, s/p chemo, in remission) HTN, recent Right sided shingles presenting w/ 4 days of dry cough, sob, fatigue found in  sepsis likely  2/2 pyelonephritis Admitted to CCU s/p atrial fibrillation  ablation (12/30/20) for further monitoring.    Neuro:  no issue       CVS  #Atrial fibrillation with RVR  -Failed DCCV on 12/29/20 and Afib ablation --currently SR on tele  -continue post EP procedure care.   -Right groin dressing c/d/I  -Lasix 40mg IV X 1 post procedure (patient received ~2 liters of fluid in EP lab)  -protonix 40mg iv X 1 now, start po 40mg daily tomorrow.   -CHADSVASC is elevated to 4  -continue to anticoagulate with coumadin  -INR goal is 2-3  -would continue the cardizem at 60 po [id number], amio 400mg po BID X12 doses    #HOCM, s/p septal ablation  -euvolemic on exam, would hold lasix for now    #HFpEF -12/29 echo shows severe MR, normal LVSF, severe LA enlargment.  -Euvolemic on exam  -Lasix 40mg IV X 1 now (patient received ~2 liters of fluid in EP lab)    Pulm:  # Lung CA  - on 3L n/c- pulse ox 93%  -CT chest : air trapping  -pulm rec appreciated  -out pt follow       # ID  Sepsis- Patient with fever, tachycardia, and leukocytosis on admission- currently afebrile  - Procalcitonin is negative; pt has respiratory symptoms but no EKG changes  - COVID neg x2, RVP neg, BCx NGTD  - L pyelonephritis. on CT scan - s/p 3 days CTX        #Prophylactic measure  -Coumadin (dosing as per daily INR)                       Chief compliant:  dry cough, sob, fatigue x 4 days  Interval history/Overnight events: slept  on and off, reports feeling better .No complains    Tele events: NSR with rare PAC        Vital Signs Last 24 Hrs  T(C): 36.7 (31 Dec 2020 04:00), Max: 36.8 (30 Dec 2020 22:00)  T(F): 98.1 (31 Dec 2020 04:00), Max: 98.2 (30 Dec 2020 22:00)  HR: 61 (31 Dec 2020 07:00) (61 - 79)  BP: 98/62 (31 Dec 2020 05:00) (98/62 - 120/89)  BP(mean): 71 (31 Dec 2020 05:00) (71 - 94)  RR: 20 (31 Dec 2020 07:00) (15 - 25)  SpO2: 95% (31 Dec 2020 07:00) (93% - 100%)    Allergies    cefdinir (Rash)  moxifloxacin (Rash)  penicillin (Rash)          MEDICATIONS  (STANDING):  aMIOdarone    Tablet   Oral   aMIOdarone    Tablet 400 milliGRAM(s) Oral every 12 hours  benzocaine 15 mG/menthol 3.6 mG (Sugar-Free) Lozenge 1 Lozenge Oral every 4 hours  chlorhexidine 4% Liquid 1 Application(s) Topical <User Schedule>  diltiazem    Tablet 60 milliGRAM(s) Oral four times a day  influenza   Vaccine 0.5 milliLiter(s) IntraMuscular once  multivitamin 1 Tablet(s) Oral daily  pantoprazole    Tablet 40 milliGRAM(s) Oral before breakfast    MEDICATIONS  (PRN):  benzonatate 100 milliGRAM(s) Oral three times a day PRN Cough  silver sulfADIAZINE 1% Cream 1 Application(s) Topical two times a day PRN superficial skin burn from cardioversion      CONSTITUTIONAL: No fevers, No chills, No fatigue, No weight gain  EYES: No vision changes   ENT: No congestion, No ear pain, No sore throat.  NECK: No pain, No stiffness  RESPIRATORY: No shortness of breath, No cough, No wheezing, No hemoptysis  CARDIOVASCULAR: No chest pain. No palpitations, No WALKER, No orthopnea, No paroxysmal nocturnal dyspnea, No pleuritic pain  GASTROINTESTINAL: No abdominal pain, No nausea, No vomiting, No hematemesis, No diarrhea No constipation. No melena  GENITOURINARY: No dysuria, No frequency, No incontinence, No hematuria  NEUROLOGICAL: No dizziness, No lightheadedness, No syncope, No LOC, No headache, No numbness or weakness  MUSCULOSKELETAL: No Edema, No joint pain, No joint swelling.  PSYCHIATRIC: No anxiety, No depression  DERMATOLOGY: No diaphoresis. No itching, No rashes, No pressure ulcers  HEME/LYMPH: No easy bruising, or bleeding gums        PHYSICAL EXAMINATION  Appearance: NAD, no distress  HEENT: Moist Mucous Membranes, Anicteric, PERRL, EOMI  Cardiovascular: Regular rate and rhythm, Normal S1 S2, No JVD, No murmurs  Respiratory: Lungs clear to auscultation. No rales, No rhonchi, No wheezing. No tenderness to palpation  Gastrointestinal: Abd Soft, Non-tender, + BS,obese  Neurologic: Non-focal, A&Ox3  Skin: Warm and dry, No rashes, No ecchymosis, No cyanosis  Musculoskeletal: No clubbing, No cyanosis, No edema  Vascular: Peripheral pulses palpable 2+ bilaterally  Psychiatry: Mood & affect appropriate      	    		      I&O's Summary    30 Dec 2020 07:01  -  31 Dec 2020 07:00  --------------------------------------------------------  IN: 0 mL / OUT: 900 mL / NET: -900 mL    	        Imaging:  echo:  Stress test:  Cath:  CT scan:    Labs:                          10.8   8.52  )-----------( 302      ( 31 Dec 2020 05:30 )             32.6       12-31    134<L>  |  100  |  13  ----------------------------<  141<H>  3.8   |  20<L>  |  1.01    Ca    8.4      31 Dec 2020 05:30  Phos  4.7     12-31  Mg     1.9     12-31    TPro  7.2  /  Alb  3.7  /  TBili  0.5  /  DBili  x   /  AST  47<H>  /  ALT  31  /  AlkPhos  91  12-31          proBNP  Serum Pro-Brain Natriuretic Peptide: 2212 pg/mL (12-26 @ 07:11)  Serum Pro-Brain Natriuretic Peptide: 9657 pg/mL (12-20 @ 16:03)      Chol,Trig,LDL,HDL,A1C  Cholesterol, Serum: 171 mg/dL (12-21 @ 06:40)  HDL Cholesterol, Serum: 54 mg/dL (12-21 @ 06:40)  Triglycerides, Serum: 73 mg/dL (12-21 @ 06:40)      LIVER FUNCTIONS - ( 31 Dec 2020 05:30 )  Alb: 3.7 g/dL / Pro: 7.2 g/dL / ALK PHOS: 91 U/L / ALT: 31 U/L / AST: 47 U/L / GGT: x             PT/INR - ( 31 Dec 2020 05:30 )   PT: 20.2 sec;   INR: 1.82 ratio           LABORATORY VALUES	 	                          10.8   8.52  )-----------( 302      ( 31 Dec 2020 05:30 )             32.6       12-31    134<L>  |  100  |  13  ----------------------------<  141<H>  3.8   |  20<L>  |  1.01  12-30    136  |  102  |  12  ----------------------------<  96  3.9   |  24  |  1.03    Ca    8.4      31 Dec 2020 05:30  Ca    9.5      30 Dec 2020 08:28  Phos  4.7     12-31  Mg     1.9     12-31  Mg     2.2     12-30    TPro  7.2  /  Alb  3.7  /  TBili  0.5  /  DBili  x   /  AST  47<H>  /  ALT  31  /  AlkPhos  91  12-31  TPro  7.6  /  Alb  4.0  /  TBili  0.6  /  DBili  x   /  AST  33<H>  /  ALT  36<H>  /  AlkPhos  100  12-30    LIVER FUNCTIONS - ( 31 Dec 2020 05:30 )  Alb: 3.7 g/dL / Pro: 7.2 g/dL / ALK PHOS: 91 U/L / ALT: 31 U/L / AST: 47 U/L / GGT: x           Prothrombin Time, Plasma: 20.2 sec (12-31 @ 05:30)      CARDIAC MARKERS:          Serum Pro-Brain Natriuretic Peptide: 2212 pg/mL (12-26 @ 07:11)  Serum Pro-Brain Natriuretic Peptide: 9657 pg/mL (12-20 @ 16:03)      12-21 @ 06:40  Cholesterol, Serum - 171  Direct LDL- --  HDL Cholesterol, Serum- 54  Triglycerides, Serum- 73            CAPILLARY BLOOD GLUCOSE                CBC Full  -  ( 31 Dec 2020 05:30 )  WBC Count : 8.52 K/uL  Hemoglobin : 10.8 g/dL  Hematocrit : 32.6 %  Platelet Count - Automated : 302 K/uL  Mean Cell Volume : 99.1 fL  Mean Cell Hemoglobin : 32.8 pg  Mean Cell Hemoglobin Concentration : 33.1 gm/dL  Auto Neutrophil # : x  Auto Lymphocyte # : x  Auto Monocyte # : x  Auto Eosinophil # : x  Auto Basophil # : x  Auto Neutrophil % : x  Auto Lymphocyte % : x  Auto Monocyte % : x  Auto Eosinophil % : x  Auto Basophil % : x      12-31    134<L>  |  100  |  13  ----------------------------<  141<H>  3.8   |  20<L>  |  1.01  12-30    136  |  102  |  12  ----------------------------<  96  3.9   |  24  |  1.03    Ca    8.4      31 Dec 2020 05:30  Ca    9.5      30 Dec 2020 08:28  Phos  4.7     12-31  Mg     1.9     12-31  Mg     2.2     12-30    TPro  7.2  /  Alb  3.7  /  TBili  0.5  /  DBili  x   /  AST  47<H>  /  ALT  31  /  AlkPhos  91  12-31  TPro  7.6  /  Alb  4.0  /  TBili  0.6  /  DBili  x   /  AST  33<H>  /  ALT  36<H>  /  AlkPhos  100  12-30    LIVER FUNCTIONS - ( 31 Dec 2020 05:30 )  Alb: 3.7 g/dL / Pro: 7.2 g/dL / ALK PHOS: 91 U/L / ALT: 31 U/L / AST: 47 U/L / GGT: x             Serum Pro-Brain Natriuretic Peptide: 2212 pg/mL (12-26 @ 07:11)  Serum Pro-Brain Natriuretic Peptide: 9657 pg/mL (12-20 @ 16:03)          CAPILLARY BLOOD GLUCOSE          Assessment : 65F with PMH of HOCM s/p septal ablation, afib on coumadin, HFpEF (EF 55% 9/2019), lung cancer (diagnosed about 2 years ago, s/p chemo, in remission) HTN, presenting w/ 4 days of dry cough, sob, fatigue found with signs of sepsis on admission now resolved,Admitted to CCU s/p atrial fibrillation  ablation (12/30/20) for further monitoring.    Neuro:  no issue       CVS  #Atrial fibrillation with RVR  -Failed DCCV on 12/29/20 and Afib ablation --currently SR on tele  -continue post EP procedure care.   -Right groin dressing c/d/I  -Lasix 40mg IV X 1 post procedure (patient received ~2 liters of fluid in EP lab)  -protonix 40mg iv X 1 now, start po 40mg daily tomorrow.   -CHADSVASC is elevated to 4  -continue to anticoagulate with coumadin  -INR goal is 2-3  -would continue the cardizem at 60 po [id number], amio 400mg po BID X12 doses    #HOCM, s/p septal ablation  -euvolemic on exam, would hold lasix for now    #HFpEF -12/29 echo shows severe MR, normal LVSF, severe LA enlargment.  -Euvolemic on exam  -Lasix 40mg IV X 1 now (patient received ~2 liters of fluid in EP lab)    Pulm:  # Lung CA  - on 3L n/c- pulse ox 93%  -CT chest : air trapping  -pulm rec appreciated  -out pt follow       # ID  Sepsis- Patient with fever, tachycardia, elevated markers of inflammation and leukocytosis on admission- currently afebrile  - Procalcitonin is negative; pt has respiratory symptoms but no EKG changes  - COVID neg x2, RVP neg, BCx / UCx NGTD  - L pyelonephritis. on CT scan - s/p 3 days CTX        #Prophylactic measure  -Coumadin (dosing as per daily INR)                       Chief compliant:  dry cough, sob, fatigue x 4 days  Interval history/Overnight events: slept  on and off, reports feeling better .No complains    Tele events: NSR with rare PAC        Vital Signs Last 24 Hrs  T(C): 36.7 (31 Dec 2020 04:00), Max: 36.8 (30 Dec 2020 22:00)  T(F): 98.1 (31 Dec 2020 04:00), Max: 98.2 (30 Dec 2020 22:00)  HR: 61 (31 Dec 2020 07:00) (61 - 79)  BP: 98/62 (31 Dec 2020 05:00) (98/62 - 120/89)  BP(mean): 71 (31 Dec 2020 05:00) (71 - 94)  RR: 20 (31 Dec 2020 07:00) (15 - 25)  SpO2: 95% (31 Dec 2020 07:00) (93% - 100%)    Allergies    cefdinir (Rash)  moxifloxacin (Rash)  penicillin (Rash)          MEDICATIONS  (STANDING):  aMIOdarone    Tablet   Oral   aMIOdarone    Tablet 400 milliGRAM(s) Oral every 12 hours  benzocaine 15 mG/menthol 3.6 mG (Sugar-Free) Lozenge 1 Lozenge Oral every 4 hours  chlorhexidine 4% Liquid 1 Application(s) Topical <User Schedule>  diltiazem    Tablet 60 milliGRAM(s) Oral four times a day  influenza   Vaccine 0.5 milliLiter(s) IntraMuscular once  multivitamin 1 Tablet(s) Oral daily  pantoprazole    Tablet 40 milliGRAM(s) Oral before breakfast    MEDICATIONS  (PRN):  benzonatate 100 milliGRAM(s) Oral three times a day PRN Cough  silver sulfADIAZINE 1% Cream 1 Application(s) Topical two times a day PRN superficial skin burn from cardioversion      CONSTITUTIONAL: No fevers, No chills, No fatigue, No weight gain  EYES: No vision changes   ENT: No congestion, No ear pain, No sore throat.  NECK: No pain, No stiffness  RESPIRATORY: No shortness of breath, No cough, No wheezing, No hemoptysis  CARDIOVASCULAR: No chest pain. No palpitations, No WALKER, No orthopnea, No paroxysmal nocturnal dyspnea, No pleuritic pain  GASTROINTESTINAL: No abdominal pain, No nausea, No vomiting, No hematemesis, No diarrhea No constipation. No melena  GENITOURINARY: No dysuria, No frequency, No incontinence, No hematuria  NEUROLOGICAL: No dizziness, No lightheadedness, No syncope, No LOC, No headache, No numbness or weakness  MUSCULOSKELETAL: No Edema, No joint pain, No joint swelling.  PSYCHIATRIC: No anxiety, No depression  DERMATOLOGY: No diaphoresis. No itching, No rashes, No pressure ulcers  HEME/LYMPH: No easy bruising, or bleeding gums        PHYSICAL EXAMINATION  Appearance: NAD, no distress  HEENT: Moist Mucous Membranes, Anicteric, PERRL, EOMI  Cardiovascular: Regular rate and rhythm, Normal S1 S2, No JVD, No murmurs  Respiratory: Lungs clear to auscultation. No rales, No rhonchi, No wheezing. No tenderness to palpation  Gastrointestinal: Abd Soft, Non-tender, + BS,obese  Neurologic: Non-focal, A&Ox3  Skin: Warm and dry, No rashes, No ecchymosis, No cyanosis  Musculoskeletal: No clubbing, No cyanosis, No edema  Vascular: Peripheral pulses palpable 2+ bilaterally  Psychiatry: Mood & affect appropriate      	    		      I&O's Summary    30 Dec 2020 07:01  -  31 Dec 2020 07:00  --------------------------------------------------------  IN: 0 mL / OUT: 900 mL / NET: -900 mL    	        Imaging:  echo:  Stress test:  Cath:  CT scan:    Labs:                          10.8   8.52  )-----------( 302      ( 31 Dec 2020 05:30 )             32.6       12-31    134<L>  |  100  |  13  ----------------------------<  141<H>  3.8   |  20<L>  |  1.01    Ca    8.4      31 Dec 2020 05:30  Phos  4.7     12-31  Mg     1.9     12-31    TPro  7.2  /  Alb  3.7  /  TBili  0.5  /  DBili  x   /  AST  47<H>  /  ALT  31  /  AlkPhos  91  12-31          proBNP  Serum Pro-Brain Natriuretic Peptide: 2212 pg/mL (12-26 @ 07:11)  Serum Pro-Brain Natriuretic Peptide: 9657 pg/mL (12-20 @ 16:03)      Chol,Trig,LDL,HDL,A1C  Cholesterol, Serum: 171 mg/dL (12-21 @ 06:40)  HDL Cholesterol, Serum: 54 mg/dL (12-21 @ 06:40)  Triglycerides, Serum: 73 mg/dL (12-21 @ 06:40)      LIVER FUNCTIONS - ( 31 Dec 2020 05:30 )  Alb: 3.7 g/dL / Pro: 7.2 g/dL / ALK PHOS: 91 U/L / ALT: 31 U/L / AST: 47 U/L / GGT: x             PT/INR - ( 31 Dec 2020 05:30 )   PT: 20.2 sec;   INR: 1.82 ratio           LABORATORY VALUES	 	                          10.8   8.52  )-----------( 302      ( 31 Dec 2020 05:30 )             32.6       12-31    134<L>  |  100  |  13  ----------------------------<  141<H>  3.8   |  20<L>  |  1.01  12-30    136  |  102  |  12  ----------------------------<  96  3.9   |  24  |  1.03    Ca    8.4      31 Dec 2020 05:30  Ca    9.5      30 Dec 2020 08:28  Phos  4.7     12-31  Mg     1.9     12-31  Mg     2.2     12-30    TPro  7.2  /  Alb  3.7  /  TBili  0.5  /  DBili  x   /  AST  47<H>  /  ALT  31  /  AlkPhos  91  12-31  TPro  7.6  /  Alb  4.0  /  TBili  0.6  /  DBili  x   /  AST  33<H>  /  ALT  36<H>  /  AlkPhos  100  12-30    LIVER FUNCTIONS - ( 31 Dec 2020 05:30 )  Alb: 3.7 g/dL / Pro: 7.2 g/dL / ALK PHOS: 91 U/L / ALT: 31 U/L / AST: 47 U/L / GGT: x           Prothrombin Time, Plasma: 20.2 sec (12-31 @ 05:30)      CARDIAC MARKERS:          Serum Pro-Brain Natriuretic Peptide: 2212 pg/mL (12-26 @ 07:11)  Serum Pro-Brain Natriuretic Peptide: 9657 pg/mL (12-20 @ 16:03)      12-21 @ 06:40  Cholesterol, Serum - 171  Direct LDL- --  HDL Cholesterol, Serum- 54  Triglycerides, Serum- 73            CAPILLARY BLOOD GLUCOSE                CBC Full  -  ( 31 Dec 2020 05:30 )  WBC Count : 8.52 K/uL  Hemoglobin : 10.8 g/dL  Hematocrit : 32.6 %  Platelet Count - Automated : 302 K/uL  Mean Cell Volume : 99.1 fL  Mean Cell Hemoglobin : 32.8 pg  Mean Cell Hemoglobin Concentration : 33.1 gm/dL  Auto Neutrophil # : x  Auto Lymphocyte # : x  Auto Monocyte # : x  Auto Eosinophil # : x  Auto Basophil # : x  Auto Neutrophil % : x  Auto Lymphocyte % : x  Auto Monocyte % : x  Auto Eosinophil % : x  Auto Basophil % : x      12-31    134<L>  |  100  |  13  ----------------------------<  141<H>  3.8   |  20<L>  |  1.01  12-30    136  |  102  |  12  ----------------------------<  96  3.9   |  24  |  1.03    Ca    8.4      31 Dec 2020 05:30  Ca    9.5      30 Dec 2020 08:28  Phos  4.7     12-31  Mg     1.9     12-31  Mg     2.2     12-30    TPro  7.2  /  Alb  3.7  /  TBili  0.5  /  DBili  x   /  AST  47<H>  /  ALT  31  /  AlkPhos  91  12-31  TPro  7.6  /  Alb  4.0  /  TBili  0.6  /  DBili  x   /  AST  33<H>  /  ALT  36<H>  /  AlkPhos  100  12-30    LIVER FUNCTIONS - ( 31 Dec 2020 05:30 )  Alb: 3.7 g/dL / Pro: 7.2 g/dL / ALK PHOS: 91 U/L / ALT: 31 U/L / AST: 47 U/L / GGT: x             Serum Pro-Brain Natriuretic Peptide: 2212 pg/mL (12-26 @ 07:11)  Serum Pro-Brain Natriuretic Peptide: 9657 pg/mL (12-20 @ 16:03)          CAPILLARY BLOOD GLUCOSE          Assessment : 65F with PMH of HOCM s/p septal ablation, afib on coumadin, HFpEF (EF 55% 9/2019), lung cancer (diagnosed about 2 years ago, s/p chemo, in remission) HTN, presenting w/ 4 days of dry cough, sob, fatigue found with signs of sepsis on admission now resolved c/b  atrial fibrillation RVR, failed  DCCV. Transfer  to CCU s/p atrial fibrillation  ablation (12/30/20) for further monitoring.    Neuro:  no issue       CVS  #Atrial fibrillation with RVR  -Failed DCCV on 12/29/20    s/p Afib ablation  on 12/30--currently SR on tele  -continue post EP procedure care.   -Right groin dressing c/d/I  -Lasix 40mg IV X 1 post procedure (patient received ~2 liters of fluid in EP lab)  Protonix 40mg iv X 1 , start po 40mg daily tomorrow.   -CHADSVASC is elevated to 4  -continue to anticoagulate with coumadin  -INR goal is 2-3  -would continue the Cardizem at 60 po [id number], amio 400mg po BID X12 doses    #HOCM, s/p septal ablation  -euvolemic on exam, would holding lasix for now    #HFpEF -12/29 echo shows severe MR, normal LVSF, severe LA enlargment.  -Euvolemic on exam  -Lasix 40mg IV X 1 now (patient received ~2 liters of fluid in EP lab)    Pulm:  # Lung CA  - on 3L n/c- pulse ox 93%  -CT chest : air trapping  -pulm rec appreciated  -out pt follow        ID  #Sepsis- Patient with fever, tachycardia, elevated markers of inflammation and leukocytosis on admission- currently afebrile  - Procalcitonin is negative; pt has respiratory symptoms but no EKG changes  - COVID neg x2, RVP neg, BCx / UCx NGTD  - L pyelonephritis on CT scan - s/p 3 days CTX        #Prophylactic measure  -Coumadin (dosing as per daily INR)                       Chief compliant:  dry cough, sob, fatigue x 4 days  Interval history/Overnight events: slept  on and off, reports feeling better .No complains    Tele events: NSR with rare PAC        Vital Signs Last 24 Hrs  T(C): 36.7 (31 Dec 2020 04:00), Max: 36.8 (30 Dec 2020 22:00)  T(F): 98.1 (31 Dec 2020 04:00), Max: 98.2 (30 Dec 2020 22:00)  HR: 61 (31 Dec 2020 07:00) (61 - 79)  BP: 98/62 (31 Dec 2020 05:00) (98/62 - 120/89)  BP(mean): 71 (31 Dec 2020 05:00) (71 - 94)  RR: 20 (31 Dec 2020 07:00) (15 - 25)  SpO2: 95% (31 Dec 2020 07:00) (93% - 100%)    Allergies    cefdinir (Rash)  moxifloxacin (Rash)  penicillin (Rash)          MEDICATIONS  (STANDING):  aMIOdarone    Tablet   Oral   aMIOdarone    Tablet 400 milliGRAM(s) Oral every 12 hours  benzocaine 15 mG/menthol 3.6 mG (Sugar-Free) Lozenge 1 Lozenge Oral every 4 hours  chlorhexidine 4% Liquid 1 Application(s) Topical <User Schedule>  diltiazem    Tablet 60 milliGRAM(s) Oral four times a day  influenza   Vaccine 0.5 milliLiter(s) IntraMuscular once  multivitamin 1 Tablet(s) Oral daily  pantoprazole    Tablet 40 milliGRAM(s) Oral before breakfast    MEDICATIONS  (PRN):  benzonatate 100 milliGRAM(s) Oral three times a day PRN Cough  silver sulfADIAZINE 1% Cream 1 Application(s) Topical two times a day PRN superficial skin burn from cardioversion      CONSTITUTIONAL: No fevers, No chills, No fatigue, No weight gain  EYES: No vision changes   ENT: No congestion, No ear pain, No sore throat.  NECK: No pain, No stiffness  RESPIRATORY: No shortness of breath, No cough, No wheezing, No hemoptysis  CARDIOVASCULAR: No chest pain. No palpitations, No WALKER, No orthopnea, No paroxysmal nocturnal dyspnea, No pleuritic pain  GASTROINTESTINAL: No abdominal pain, No nausea, No vomiting, No hematemesis, No diarrhea No constipation. No melena  GENITOURINARY: No dysuria, No frequency, No incontinence, No hematuria  NEUROLOGICAL: No dizziness, No lightheadedness, No syncope, No LOC, No headache, No numbness or weakness  MUSCULOSKELETAL: No Edema, No joint pain, No joint swelling.  PSYCHIATRIC: No anxiety, No depression  DERMATOLOGY: No diaphoresis. No itching, No rashes, No pressure ulcers  HEME/LYMPH: No easy bruising, or bleeding gums        PHYSICAL EXAMINATION  Appearance: NAD, no distress  HEENT: Moist Mucous Membranes, Anicteric, PERRL, EOMI  Cardiovascular: Regular rate and rhythm, Normal S1 S2, No JVD, No murmurs  Respiratory: Lungs clear to auscultation. No rales, No rhonchi, No wheezing. No tenderness to palpation  Gastrointestinal: Abd Soft, Non-tender, + BS,obese  Neurologic: Non-focal, A&Ox3  Skin: Warm and dry, No rashes, No ecchymosis, No cyanosis  Musculoskeletal: No clubbing, No cyanosis, No edema  Vascular: Peripheral pulses palpable 2+ bilaterally  Psychiatry: Mood & affect appropriate      	    		      I&O's Summary    30 Dec 2020 07:01  -  31 Dec 2020 07:00  --------------------------------------------------------  IN: 0 mL / OUT: 900 mL / NET: -900 mL    	        Imaging:  echo:  Stress test:  Cath:  CT scan:    Labs:                          10.8   8.52  )-----------( 302      ( 31 Dec 2020 05:30 )             32.6       12-31    134<L>  |  100  |  13  ----------------------------<  141<H>  3.8   |  20<L>  |  1.01    Ca    8.4      31 Dec 2020 05:30  Phos  4.7     12-31  Mg     1.9     12-31    TPro  7.2  /  Alb  3.7  /  TBili  0.5  /  DBili  x   /  AST  47<H>  /  ALT  31  /  AlkPhos  91  12-31          proBNP  Serum Pro-Brain Natriuretic Peptide: 2212 pg/mL (12-26 @ 07:11)  Serum Pro-Brain Natriuretic Peptide: 9657 pg/mL (12-20 @ 16:03)      Chol,Trig,LDL,HDL,A1C  Cholesterol, Serum: 171 mg/dL (12-21 @ 06:40)  HDL Cholesterol, Serum: 54 mg/dL (12-21 @ 06:40)  Triglycerides, Serum: 73 mg/dL (12-21 @ 06:40)      LIVER FUNCTIONS - ( 31 Dec 2020 05:30 )  Alb: 3.7 g/dL / Pro: 7.2 g/dL / ALK PHOS: 91 U/L / ALT: 31 U/L / AST: 47 U/L / GGT: x             PT/INR - ( 31 Dec 2020 05:30 )   PT: 20.2 sec;   INR: 1.82 ratio           LABORATORY VALUES	 	                          10.8   8.52  )-----------( 302      ( 31 Dec 2020 05:30 )             32.6       12-31    134<L>  |  100  |  13  ----------------------------<  141<H>  3.8   |  20<L>  |  1.01  12-30    136  |  102  |  12  ----------------------------<  96  3.9   |  24  |  1.03    Ca    8.4      31 Dec 2020 05:30  Ca    9.5      30 Dec 2020 08:28  Phos  4.7     12-31  Mg     1.9     12-31  Mg     2.2     12-30    TPro  7.2  /  Alb  3.7  /  TBili  0.5  /  DBili  x   /  AST  47<H>  /  ALT  31  /  AlkPhos  91  12-31  TPro  7.6  /  Alb  4.0  /  TBili  0.6  /  DBili  x   /  AST  33<H>  /  ALT  36<H>  /  AlkPhos  100  12-30    LIVER FUNCTIONS - ( 31 Dec 2020 05:30 )  Alb: 3.7 g/dL / Pro: 7.2 g/dL / ALK PHOS: 91 U/L / ALT: 31 U/L / AST: 47 U/L / GGT: x           Prothrombin Time, Plasma: 20.2 sec (12-31 @ 05:30)      CARDIAC MARKERS:          Serum Pro-Brain Natriuretic Peptide: 2212 pg/mL (12-26 @ 07:11)  Serum Pro-Brain Natriuretic Peptide: 9657 pg/mL (12-20 @ 16:03)      12-21 @ 06:40  Cholesterol, Serum - 171  Direct LDL- --  HDL Cholesterol, Serum- 54  Triglycerides, Serum- 73            CAPILLARY BLOOD GLUCOSE                CBC Full  -  ( 31 Dec 2020 05:30 )  WBC Count : 8.52 K/uL  Hemoglobin : 10.8 g/dL  Hematocrit : 32.6 %  Platelet Count - Automated : 302 K/uL  Mean Cell Volume : 99.1 fL  Mean Cell Hemoglobin : 32.8 pg  Mean Cell Hemoglobin Concentration : 33.1 gm/dL  Auto Neutrophil # : x  Auto Lymphocyte # : x  Auto Monocyte # : x  Auto Eosinophil # : x  Auto Basophil # : x  Auto Neutrophil % : x  Auto Lymphocyte % : x  Auto Monocyte % : x  Auto Eosinophil % : x  Auto Basophil % : x      12-31    134<L>  |  100  |  13  ----------------------------<  141<H>  3.8   |  20<L>  |  1.01  12-30    136  |  102  |  12  ----------------------------<  96  3.9   |  24  |  1.03    Ca    8.4      31 Dec 2020 05:30  Ca    9.5      30 Dec 2020 08:28  Phos  4.7     12-31  Mg     1.9     12-31  Mg     2.2     12-30    TPro  7.2  /  Alb  3.7  /  TBili  0.5  /  DBili  x   /  AST  47<H>  /  ALT  31  /  AlkPhos  91  12-31  TPro  7.6  /  Alb  4.0  /  TBili  0.6  /  DBili  x   /  AST  33<H>  /  ALT  36<H>  /  AlkPhos  100  12-30    LIVER FUNCTIONS - ( 31 Dec 2020 05:30 )  Alb: 3.7 g/dL / Pro: 7.2 g/dL / ALK PHOS: 91 U/L / ALT: 31 U/L / AST: 47 U/L / GGT: x             Serum Pro-Brain Natriuretic Peptide: 2212 pg/mL (12-26 @ 07:11)  Serum Pro-Brain Natriuretic Peptide: 9657 pg/mL (12-20 @ 16:03)          CAPILLARY BLOOD GLUCOSE          Assessment : 65F with PMH of HOCM s/p septal ablation, afib on coumadin, HFpEF (EF 55% 9/2019), lung cancer (diagnosed about 2 years ago, s/p chemo, in remission) HTN, presenting w/ 4 days of dry cough, sob, fatigue found with signs of sepsis on admission now resolved c/b  atrial fibrillation RVR, failed  DCCV. Transfer  to CCU s/p atrial fibrillation  ablation (12/30/20) for further monitoring.    Neuro:  no issue       CVS  #Atrial fibrillation with RVR  -Failed DCCV on 12/29/20    s/p Afib ablation  on 12/30--currently SR on tele  -continue post EP procedure care.   -Right groin dressing c/d/I  -Lasix 40mg IV X 1 post procedure (patient received ~2 liters of fluid in EP lab)  Protonix 40mg iv X 1 , start po 40mg daily tomorrow.   -CHADSVASC is elevated to 4  -continue to anticoagulate with coumadin  -INR goal is 2-3  -would continue the Cardizem at 60 po [id number], amio 400mg po BID X12 doses    #HOCM, s/p septal ablation  -euvolemic on exam, would holding lasix for now    #HFpEF -12/29 echo shows severe MR, normal LVSF, severe LA enlargment.  -Euvolemic on exam  -Lasix 40mg IV X 1 now (patient received ~2 liters of fluid in EP lab)    Pulm:  # Lung CA  - on 3L n/c- pulse ox 93%  -CT chest : air trapping  -pulm rec appreciated  -out pt follow        ID  #Sepsis- Patient with fever, tachycardia, elevated markers of inflammation and leukocytosis on admission- currently afebrile  - Procalcitonin is negative; pt has respiratory symptoms but no EKG changes  - COVID neg x2, RVP neg, BCx / UCx NGTD  - L pyelonephritis on CT scan - s/p 3 days CTX        #Prophylactic measure  -Coumadin (dosing as per daily INR)    Dispo  d/c home                       Chief compliant:  dry cough, sob, fatigue x 4 days  Interval history/Overnight events: slept  on and off, reports feeling better .No complains    Tele events: NSR with rare PAC        Vital Signs Last 24 Hrs  T(C): 36.7 (31 Dec 2020 04:00), Max: 36.8 (30 Dec 2020 22:00)  T(F): 98.1 (31 Dec 2020 04:00), Max: 98.2 (30 Dec 2020 22:00)  HR: 61 (31 Dec 2020 07:00) (61 - 79)  BP: 98/62 (31 Dec 2020 05:00) (98/62 - 120/89)  BP(mean): 71 (31 Dec 2020 05:00) (71 - 94)  RR: 20 (31 Dec 2020 07:00) (15 - 25)  SpO2: 95% (31 Dec 2020 07:00) (93% - 100%)    Allergies    cefdinir (Rash)  moxifloxacin (Rash)  penicillin (Rash)          MEDICATIONS  (STANDING):  aMIOdarone    Tablet   Oral   aMIOdarone    Tablet 400 milliGRAM(s) Oral every 12 hours  benzocaine 15 mG/menthol 3.6 mG (Sugar-Free) Lozenge 1 Lozenge Oral every 4 hours  chlorhexidine 4% Liquid 1 Application(s) Topical <User Schedule>  diltiazem    Tablet 60 milliGRAM(s) Oral four times a day  influenza   Vaccine 0.5 milliLiter(s) IntraMuscular once  multivitamin 1 Tablet(s) Oral daily  pantoprazole    Tablet 40 milliGRAM(s) Oral before breakfast    MEDICATIONS  (PRN):  benzonatate 100 milliGRAM(s) Oral three times a day PRN Cough  silver sulfADIAZINE 1% Cream 1 Application(s) Topical two times a day PRN superficial skin burn from cardioversion      CONSTITUTIONAL: No fevers, No chills, No fatigue, No weight gain  EYES: No vision changes   ENT: No congestion, No ear pain, No sore throat.  NECK: No pain, No stiffness  RESPIRATORY: No shortness of breath, No cough, No wheezing, No hemoptysis  CARDIOVASCULAR: No chest pain. No palpitations, No WALKER, No orthopnea, No paroxysmal nocturnal dyspnea, No pleuritic pain  GASTROINTESTINAL: No abdominal pain, No nausea, No vomiting, No hematemesis, No diarrhea No constipation. No melena  GENITOURINARY: No dysuria, No frequency, No incontinence, No hematuria  NEUROLOGICAL: No dizziness, No lightheadedness, No syncope, No LOC, No headache, No numbness or weakness  MUSCULOSKELETAL: No Edema, No joint pain, No joint swelling.  PSYCHIATRIC: No anxiety, No depression  DERMATOLOGY: No diaphoresis. No itching, No rashes, No pressure ulcers  HEME/LYMPH: No easy bruising, or bleeding gums        PHYSICAL EXAMINATION  Appearance: NAD, no distress  HEENT: Moist Mucous Membranes, Anicteric, PERRL, EOMI  Cardiovascular: Regular rate and rhythm, Normal S1 S2, No JVD, No murmurs  Respiratory: Lungs clear to auscultation. No rales, No rhonchi, No wheezing. No tenderness to palpation  Gastrointestinal: Abd Soft, Non-tender, + BS,obese  Neurologic: Non-focal, A&Ox3  Skin: Warm and dry, No rashes, No ecchymosis, No cyanosis  Musculoskeletal: No clubbing, No cyanosis, No edema  Vascular: Peripheral pulses palpable 2+ bilaterally  Psychiatry: Mood & affect appropriate      	    		      I&O's Summary    30 Dec 2020 07:01  -  31 Dec 2020 07:00  --------------------------------------------------------  IN: 0 mL / OUT: 900 mL / NET: -900 mL    	        Imaging:  echo:  Stress test:  Cath:  CT scan:    Labs:                          10.8   8.52  )-----------( 302      ( 31 Dec 2020 05:30 )             32.6       12-31    134<L>  |  100  |  13  ----------------------------<  141<H>  3.8   |  20<L>  |  1.01    Ca    8.4      31 Dec 2020 05:30  Phos  4.7     12-31  Mg     1.9     12-31    TPro  7.2  /  Alb  3.7  /  TBili  0.5  /  DBili  x   /  AST  47<H>  /  ALT  31  /  AlkPhos  91  12-31          proBNP  Serum Pro-Brain Natriuretic Peptide: 2212 pg/mL (12-26 @ 07:11)  Serum Pro-Brain Natriuretic Peptide: 9657 pg/mL (12-20 @ 16:03)      Chol,Trig,LDL,HDL,A1C  Cholesterol, Serum: 171 mg/dL (12-21 @ 06:40)  HDL Cholesterol, Serum: 54 mg/dL (12-21 @ 06:40)  Triglycerides, Serum: 73 mg/dL (12-21 @ 06:40)      LIVER FUNCTIONS - ( 31 Dec 2020 05:30 )  Alb: 3.7 g/dL / Pro: 7.2 g/dL / ALK PHOS: 91 U/L / ALT: 31 U/L / AST: 47 U/L / GGT: x             PT/INR - ( 31 Dec 2020 05:30 )   PT: 20.2 sec;   INR: 1.82 ratio           LABORATORY VALUES	 	                          10.8   8.52  )-----------( 302      ( 31 Dec 2020 05:30 )             32.6       12-31    134<L>  |  100  |  13  ----------------------------<  141<H>  3.8   |  20<L>  |  1.01  12-30    136  |  102  |  12  ----------------------------<  96  3.9   |  24  |  1.03    Ca    8.4      31 Dec 2020 05:30  Ca    9.5      30 Dec 2020 08:28  Phos  4.7     12-31  Mg     1.9     12-31  Mg     2.2     12-30    TPro  7.2  /  Alb  3.7  /  TBili  0.5  /  DBili  x   /  AST  47<H>  /  ALT  31  /  AlkPhos  91  12-31  TPro  7.6  /  Alb  4.0  /  TBili  0.6  /  DBili  x   /  AST  33<H>  /  ALT  36<H>  /  AlkPhos  100  12-30    LIVER FUNCTIONS - ( 31 Dec 2020 05:30 )  Alb: 3.7 g/dL / Pro: 7.2 g/dL / ALK PHOS: 91 U/L / ALT: 31 U/L / AST: 47 U/L / GGT: x           Prothrombin Time, Plasma: 20.2 sec (12-31 @ 05:30)      CARDIAC MARKERS:          Serum Pro-Brain Natriuretic Peptide: 2212 pg/mL (12-26 @ 07:11)  Serum Pro-Brain Natriuretic Peptide: 9657 pg/mL (12-20 @ 16:03)      12-21 @ 06:40  Cholesterol, Serum - 171  Direct LDL- --  HDL Cholesterol, Serum- 54  Triglycerides, Serum- 73            CAPILLARY BLOOD GLUCOSE                CBC Full  -  ( 31 Dec 2020 05:30 )  WBC Count : 8.52 K/uL  Hemoglobin : 10.8 g/dL  Hematocrit : 32.6 %  Platelet Count - Automated : 302 K/uL  Mean Cell Volume : 99.1 fL  Mean Cell Hemoglobin : 32.8 pg  Mean Cell Hemoglobin Concentration : 33.1 gm/dL  Auto Neutrophil # : x  Auto Lymphocyte # : x  Auto Monocyte # : x  Auto Eosinophil # : x  Auto Basophil # : x  Auto Neutrophil % : x  Auto Lymphocyte % : x  Auto Monocyte % : x  Auto Eosinophil % : x  Auto Basophil % : x      12-31    134<L>  |  100  |  13  ----------------------------<  141<H>  3.8   |  20<L>  |  1.01  12-30    136  |  102  |  12  ----------------------------<  96  3.9   |  24  |  1.03    Ca    8.4      31 Dec 2020 05:30  Ca    9.5      30 Dec 2020 08:28  Phos  4.7     12-31  Mg     1.9     12-31  Mg     2.2     12-30    TPro  7.2  /  Alb  3.7  /  TBili  0.5  /  DBili  x   /  AST  47<H>  /  ALT  31  /  AlkPhos  91  12-31  TPro  7.6  /  Alb  4.0  /  TBili  0.6  /  DBili  x   /  AST  33<H>  /  ALT  36<H>  /  AlkPhos  100  12-30    LIVER FUNCTIONS - ( 31 Dec 2020 05:30 )  Alb: 3.7 g/dL / Pro: 7.2 g/dL / ALK PHOS: 91 U/L / ALT: 31 U/L / AST: 47 U/L / GGT: x             Serum Pro-Brain Natriuretic Peptide: 2212 pg/mL (12-26 @ 07:11)  Serum Pro-Brain Natriuretic Peptide: 9657 pg/mL (12-20 @ 16:03)          CAPILLARY BLOOD GLUCOSE          Assessment : 65F with PMH of HOCM s/p septal ablation, afib on coumadin, HFpEF (EF 55% 9/2019), lung cancer (diagnosed about 2 years ago, s/p chemo, in remission) HTN, presenting w/ 4 days of dry cough, sob, fatigue found with signs of sepsis on admission now resolved c/b  atrial fibrillation RVR, failed  DCCV. Transfer  to CCU s/p atrial fibrillation  ablation (12/30/20) for further monitoring.    Neuro:  no issue       CVS  #Atrial fibrillation with RVR  -Failed DCCV on 12/29/20    s/p Afib ablation  on 12/30--currently SR on tele  -continue post EP procedure care.   -Right groin dressing c/d/I  -Lasix 40mg IV X 1 post procedure (patient received ~2 liters of fluid in EP lab)  Protonix 40mg iv X 1 , start po 40mg daily tomorrow.   -CHADSVASC is elevated to 4  -switch coumadin to eliquis 5mg bid  -INR goal is 2-3  -would continue the Cardizem at 60 po [id number], amio 400mg po BID X12 doses    #HOCM, s/p septal ablation  -euvolemic on exam, would holding lasix for now    #HFpEF -12/29 echo shows severe MR, normal LVSF, severe LA enlargment.  -Euvolemic on exam  -Lasix 40mg IV X 1 now (patient received ~2 liters of fluid in EP lab)    Pulm:  # Lung CA  - on 3L n/c- pulse ox 93%  -CT chest : air trapping  -pulm rec appreciated  -out pt follow        ID  #Sepsis- Patient with fever, tachycardia, elevated markers of inflammation and leukocytosis on admission- currently afebrile  - Procalcitonin is negative; pt has respiratory symptoms but no EKG changes  - COVID neg x2, RVP neg, BCx / UCx NGTD  - L pyelonephritis on CT scan - s/p 3 days CTX        #Prophylactic measure  -on eliquis    Dispo  d/c home

## 2020-12-31 NOTE — PROGRESS NOTE ADULT - ATTENDING COMMENTS
Fernando Alcala  Pager: [phone]. If no response or past 5 pm call [phone].     Will sign off, please call with questions.
Fernando Alcala  Pager: [phone]. If no response or past 5 pm call [phone].
65yoF w/ PMHx afib on coumadin, HFpEF, GERD, HLD, HOCM s/p septal ablation, lung Ca s/p chemo, MR and TR, HTN initially presented with cough likely in the setting of emphysema versus COPD improved with neb treatment and Lasix.  On telemetry, patient's was in atrial fibrillation rate controlled VR 90's while at rest but 110-120's while ambulating. s/P failed DCCV 0n 12/29/20 and s/p afib ablation yesterday. TTE done during this admission showed moderate MR, severely dilated LA, severe RA enlargement, mod TR, normal LV systolic function. Right groin site clean, dry and intact with no bleeding or hematoma. Post procedure instructions given to patient. All questions answered. Patient is started on Amiodarone load. Will fu as outpt.
Erica Horner is a 65 year old woman with history of HOCM s/p septal ablation, afib on warfarin, HFpEF (EF 55% 9/2019) and lung cancer (diagnosed 2 years ago, s/p chemo, in remission). She presented with pyelonephritis s/p ceftriaxone x 3 days, now complicated by afib at 113bpm associated with shortness of breath. Serum Pro-Brain Natriuretic Peptide 2212 pg/mL on 12/26/2020. Grossly normal left ventricular systolic function, with basal septal hypertrophy noted on TTE 12/22/2020.  CT angio of chest 12/26/2020 showed no pulmonary embolism. There was mosaic attenuation of the lungs as on the prior chest CT of September 1, 2019. Differential diagnosis included pulmonary air entrapment due to small airway disease (including asthma or emphysema) or small vessel disease. [id number] score 4 points. However, long term anticoagulation is warranted in all patients with HCM (independent of score). HR control is important for all patients with HCM and AF with either beta blocker of non-dihydropyridine CCB, Maintain warfarin with INR goal is 2-3. Direct oral anticoagulants (e.g. dabigatran, rivaroxaban, apixaban) may also be used. Maintain diltiazem (Cardizem) 60 mg oral every 6 hours
Patient history of HOCM sp Alcohol septal ablation and now in af with failed cardioversion in the past --> sp afib ablation, transferred to ccu for observation.  No overnight events. Was given Lasix x 1 for diuresis  -on Amidarone 400 mg BID x 12 doses and then will go to 200 mg daily  -continue diltiazem 60 mg q 6h  -on coumadin for afib

## 2021-01-01 ENCOUNTER — OUTPATIENT (OUTPATIENT)
Dept: OUTPATIENT SERVICES | Facility: HOSPITAL | Age: 66
LOS: 1 days | End: 2021-01-01
Payer: MEDICAID

## 2021-01-01 DIAGNOSIS — Z98.891 HISTORY OF UTERINE SCAR FROM PREVIOUS SURGERY: Chronic | ICD-10-CM

## 2021-01-01 DIAGNOSIS — Z98.890 OTHER SPECIFIED POSTPROCEDURAL STATES: Chronic | ICD-10-CM

## 2021-01-01 RX ORDER — AMIODARONE HYDROCHLORIDE 400 MG/1
1 TABLET ORAL
Qty: 8 | Refills: 0
Start: 2021-01-01 | End: 2021-01-04

## 2021-01-05 DIAGNOSIS — Z71.89 OTHER SPECIFIED COUNSELING: ICD-10-CM

## 2021-01-05 RX ORDER — AMIODARONE HYDROCHLORIDE 400 MG/1
1 TABLET ORAL
Qty: 30 | Refills: 0
Start: 2021-01-05 | End: 2021-02-03

## 2021-01-05 NOTE — CONSULT NOTE ADULT - CONSULT REASON
Dyspnea What Type Of Note Output Would You Prefer (Optional)?: Standard Output How Severe Is Your Skin Lesion?: moderate Has Your Skin Lesion Been Treated?: not been treated Is This A New Presentation, Or A Follow-Up?: Skin Lesions

## 2021-01-15 NOTE — ED ADULT NURSE NOTE - NS PRO AD NO ADVANCE DIRECTIVE
Attends Jewish service: Not on file     Active member of club or organization: Not on file     Attends meetings of clubs or organizations: Not on file     Relationship status: Not on file    Intimate partner violence     Fear of current or ex partner: Not on file     Emotionally abused: Not on file     Physically abused: Not on file     Forced sexual activity: Not on file   Other Topics Concern    Not on file   Social History Narrative    Not on file       MEDICATIONS:  No current facility-administered medications for this encounter. Current Outpatient Medications:     divalproex (DEPAKOTE) 250 MG DR tablet, Take 1 tablet by mouth every 12 hours, Disp: 60 tablet, Rfl: 0    OLANZapine (ZYPREXA) 20 MG tablet, Take 1 tablet by mouth every evening, Disp: 30 tablet, Rfl: 0    nicotine polacrilex (NICORETTE) 2 MG gum, Take 1 each by mouth as needed for Smoking cessation, Disp: 110 each, Rfl: 3    Multiple Vitamins-Minerals (THERAPEUTIC MULTIVITAMIN-MINERALS) tablet, Take 1 tablet by mouth daily, Disp: 30 tablet, Rfl: 0    thiamine mononitrate 100 MG tablet, Take 1 tablet by mouth daily, Disp: 30 tablet, Rfl: 0    LORazepam (ATIVAN) 0.5 MG tablet, Take 1 tablet by mouth every 8 hours as needed for Anxiety for up to 3 days. , Disp: 9 tablet, Rfl: 0    miconazole (MICONAZOLE ANTIFUNGAL) 2 % cream, Apply topically 2 times daily. , Disp: 14 g, Rfl: 0    Examination:  /71   Pulse 114   Temp 98.1 °F (36.7 °C) (Temporal)   Resp 18   Ht 5' 7\" (1.702 m)   Wt 150 lb (68 kg)   SpO2 98%   BMI 23.49 kg/m²   Gait - steady    HOSPITAL COURSE[de-identified]  Patient is been seen on 1/8/2021 and was closely monitored for psychosis and shandra he was evaluated street with Depakote and 50 mg daily and Zyprexa 20 mg at bedtime. Medical instrument significant patient continued to improve on the floor. He start coming out of his room he is attending groups and socializing with peers.   He never made any suicidal statements or suicidal gestures while in the unit. Patient reported that he did not have anybody to give any collateral information but he was staying with friends in the [place] area. Treatment team felt the patient attained a maximum benefit for his hospitalization and he was set up with outpatient mental health agency for outpatient health services. The time of discharge patient did not show impulsive behavior. He vehemently denied any suicidal homicidal ideations intent or plan. He was eating well sleeping well there are no neurovegetative signs or symptoms of depression. He denied any auditory or visual hallucinations were no overt overt signs psychosis. Appreciate help that he received here. This patient no longer meets criteria for inpatient hospitalization. No AVH or paranoid thoughts  No hopeless or worthless feeling  No active SI/HI  Appetite:  [x] Normal  [] Increased  [] Decreased    Sleep:       [x] Normal  [] Fair       [] Poor            Energy:    [x] Normal  [] Increased  [] Decreased     SI [] Present  [x] Absent  HI  []Present  [x] Absent   Aggression:  [] yes  [x] no  Patient is [x] able  [] unable to CONTRACT FOR SAFETY   Medication side effects(SE):  [x] None(Psych. Meds.) [] Other      Mental Status Examination on discharge:    Level of consciousness:  within normal limits   Appearance:  well-appearing  Behavior/Motor:  no abnormalities noted  Attitude toward examiner:  attentive and good eye contact  Speech:  spontaneous, normal rate and normal volume   Mood: \" My mood is good. \"  Affect: Appropriate pleasant  Thought processes: Linear without flight of ideas loose associations  Thought content: Devoid of any auditory visual hallucinations delusions or other perceptual denies.   Denies SI/HI intent or plan  Cognition:  oriented to person, place, and time   Concentration intact  Memory intact  Insight good   Judgement fair   Fund of Knowledge adequate      ASSESSMENT:  Patient symptoms are:  [x] Well controlled  [x] Improving  [] Worsening  [] No change    Reason for more than one antipsychotic:  [x] N/A  [] 3 Failed Monotherapy attempts (Drugs tried:)  [] Crossover to a new antipsychotic  [] Taper to Monotherapy from Polypharmacy  [] Augmentation of clozapine therapy due to treatment resistance to single therapy    Diagnosis:  Principal Problem:    Bipolar affective disorder, mixed, severe, with psychotic behavior ([facility]  Active Problems:    Polysubstance abuse ([facility]    Antisocial personality disorder ([facility]  Resolved Problems:    * No resolved hospital problems. *      LABS:    No results for input(s): WBC, HGB, PLT in the last 72 hours. No results for input(s): NA, K, CL, CO2, BUN, CREATININE, GLUCOSE in the last 72 hours. No results for input(s): BILITOT, ALKPHOS, AST, ALT in the last 72 hours. Lab Results   Component Value Date    LABAMPH NOT DETECTED 01/09/2021    BARBSCNU NOT DETECTED 01/09/2021    LABBENZ NOT DETECTED 01/09/2021    LABMETH NOT DETECTED 01/09/2021    OPIATESCREENURINE NOT DETECTED 01/09/2021    PHENCYCLIDINESCREENURINE NOT DETECTED 01/09/2021    ETOH 47 01/09/2021     Lab Results   Component Value Date    TSH 1.100 01/10/2019     No results found for: LITHIUM  Lab Results   Component Value Date    VALPROATE 3 (L) 01/09/2021       RISK ASSESSMENT AT DISCHARGE: Low risk for suicide and homicide. Treatment Plan:  Reviewed current Medications with the patient. Education provided on the complaince with treatment. Risks, benefits, side effects, drug-to-drug interactions and alternatives to treatment were discussed. Encourage patient to attend outpatient follow up appointment and therapy. Patient was advised to call the outpatient provider, visit the nearest ED or call 911 if symptoms are not manageable. Patient's family member was contacted prior to the discharge.          Medication List      START taking these medications    LORazepam 0.5 MG tablet  Commonly known as: ATIVAN  Take 1 tablet by mouth every 8 hours as needed for Anxiety for up to 3 days. nicotine polacrilex 2 MG gum  Commonly known as: NICORETTE  Take 1 each by mouth as needed for Smoking cessation     therapeutic multivitamin-minerals tablet  Take 1 tablet by mouth daily     thiamine mononitrate 100 MG tablet  Take 1 tablet by mouth daily        CHANGE how you take these medications    divalproex 250 MG DR tablet  Commonly known as: DEPAKOTE  Take 1 tablet by mouth every 12 hours  What changed:   · medication strength  · how much to take     OLANZapine 20 MG tablet  Commonly known as: ZYPREXA  Take 1 tablet by mouth every evening  What changed: Another medication with the same name was removed. Continue taking this medication, and follow the directions you see here. CONTINUE taking these medications    miconazole 2 % cream  Commonly known as: Miconazole Antifungal  Apply topically 2 times daily.            Where to Get Your Medications      These medications were sent to [facility] [address]    Phone: [phone]   · divalproex 250 MG DR tablet  · OLANZapine 20 MG tablet  · therapeutic multivitamin-minerals tablet  · thiamine mononitrate 100 MG tablet     You can get these medications from any pharmacy    Bring a paper prescription for each of these medications  · LORazepam 0.5 MG tablet     Information about where to get these medications is not yet available    Ask your nurse or doctor about these medications  · nicotine polacrilex 2 MG gum       Patient is counseled if he continues to abuse drugs or alcohol he could act out impulsively causing serious harm to himself or others even though may be unintentional.  He demonstrated understanding of this and has the capacity understand this    Patient's counselor most been compliant with all medications outpatient follow-up appointments    Patient is discharged home in stable condition    TIME SPEND - 35 MINUTES TO COMPLETE THE EVALUATION, DISCHARGE SUMMARY, MEDICATION RECONCILIATION AND FOLLOW UP CARE     Signed:  Rebel Tay  1/58/0687  0:66 PM No

## 2021-02-11 ENCOUNTER — APPOINTMENT (OUTPATIENT)
Dept: ELECTROPHYSIOLOGY | Facility: CLINIC | Age: 66
End: 2021-02-11
Payer: MEDICAID

## 2021-02-11 VITALS
DIASTOLIC BLOOD PRESSURE: 67 MMHG | SYSTOLIC BLOOD PRESSURE: 103 MMHG | OXYGEN SATURATION: 100 % | WEIGHT: 200 LBS | TEMPERATURE: 96.8 F | BODY MASS INDEX: 31.39 KG/M2 | HEART RATE: 52 BPM | HEIGHT: 67 IN

## 2021-02-11 DIAGNOSIS — Z79.899 OTHER LONG TERM (CURRENT) DRUG THERAPY: ICD-10-CM

## 2021-02-11 DIAGNOSIS — R53.81 OTHER MALAISE: ICD-10-CM

## 2021-02-11 DIAGNOSIS — R53.83 OTHER MALAISE: ICD-10-CM

## 2021-02-11 PROCEDURE — 93000 ELECTROCARDIOGRAM COMPLETE: CPT

## 2021-02-11 PROCEDURE — 99215 OFFICE O/P EST HI 40 MIN: CPT

## 2021-02-11 PROCEDURE — 99072 ADDL SUPL MATRL&STAF TM PHE: CPT

## 2021-02-11 RX ORDER — AMIODARONE HYDROCHLORIDE 200 MG/1
200 TABLET ORAL DAILY
Qty: 30 | Refills: 0 | Status: DISCONTINUED | COMMUNITY
Start: 2021-02-11 | End: 2021-02-11

## 2021-02-11 RX ORDER — ALBUTEROL SULFATE 2.5 MG/3ML
(2.5 MG/3ML) SOLUTION RESPIRATORY (INHALATION)
Qty: 120 | Refills: 3 | Status: DISCONTINUED | COMMUNITY
Start: 2017-05-22 | End: 2021-02-11

## 2021-02-11 RX ORDER — FUROSEMIDE 20 MG/1
20 TABLET ORAL
Qty: 15 | Refills: 2 | Status: DISCONTINUED | COMMUNITY
Start: 2017-04-14 | End: 2021-02-11

## 2021-02-11 RX ORDER — OXYCODONE 5 MG/1
5 TABLET ORAL
Qty: 40 | Refills: 0 | Status: DISCONTINUED | COMMUNITY
Start: 2017-05-01 | End: 2021-02-11

## 2021-02-11 RX ORDER — DIGOXIN 0.12 MG/1
125 TABLET ORAL
Qty: 30 | Refills: 0 | Status: DISCONTINUED | COMMUNITY
Start: 2017-04-12 | End: 2021-02-11

## 2021-02-11 RX ORDER — ASPIRIN 81 MG
81 TABLET, DELAYED RELEASE (ENTERIC COATED) ORAL
Refills: 0 | Status: DISCONTINUED | COMMUNITY
End: 2021-02-11

## 2021-02-11 RX ORDER — TRAMADOL HYDROCHLORIDE 50 MG/1
50 TABLET, COATED ORAL
Qty: 8 | Refills: 0 | Status: DISCONTINUED | COMMUNITY
Start: 2017-04-24 | End: 2021-02-11

## 2021-02-11 RX ORDER — OXYCODONE 5 MG/1
5 TABLET ORAL
Qty: 60 | Refills: 0 | Status: DISCONTINUED | COMMUNITY
Start: 2017-04-12 | End: 2021-02-11

## 2021-02-11 RX ORDER — OXYCODONE HYDROCHLORIDE 10 MG/1
10 TABLET, FILM COATED, EXTENDED RELEASE ORAL
Refills: 0 | Status: DISCONTINUED | COMMUNITY
End: 2021-02-11

## 2021-02-11 RX ORDER — GABAPENTIN 100 MG/1
100 CAPSULE ORAL
Qty: 90 | Refills: 0 | Status: DISCONTINUED | COMMUNITY
Start: 2017-04-12 | End: 2021-02-11

## 2021-02-11 RX ORDER — WARFARIN SODIUM 5 MG/1
5 TABLET ORAL DAILY
Refills: 0 | Status: DISCONTINUED | COMMUNITY
End: 2021-02-11

## 2021-02-11 NOTE — DISCUSSION/SUMMARY
[FreeTextEntry1] : Erica Callejas is a 64y/o woman with Hx of HOCM s/p septal ablation, chronic systolic CHF, LVEF 55%,  lung cancer (diagnosed about 2 years ago, s/p chemo, in remission) and persistent atrial fibrillation s/p attempted DCCV and eventual amiodarone load and s/p PVI, PWI, and CTI ablation on 12/30/2020 who presents today for routine f/u.\par \par Impression:\par \par 1. Persistent afib: s/p PVI, PWI, and CTI ablation on 12/30/2020. EKG today NSR. Given fatigue/weakness, likely secondary to Amiodarone. Will discontinue Amiodarone at this time. Resume Eliquis for thromboembolic prophylaxis. \par \par 2. Chronic systolic CHF: recent ECHO with normal LVEF. Resume f/u with Cardiologist. \par \par Will continue f/u with Cardiologist and may RTO as needed or if any new or worsening symptoms or findings occur.

## 2021-02-11 NOTE — HISTORY OF PRESENT ILLNESS
[FreeTextEntry1] : Allen Keene MD\par \par Erica Callejas is a 66y/o woman with Hx of HOCM s/p septal ablation, chronic systolic CHF, LVEF 55%,  lung cancer (diagnosed about 2 years ago, s/p chemo, in remission) and persistent atrial fibrillation s/p attempted DCCV and eventual amiodarone load and s/p PVI, PWI, and CTI ablation on 12/30/2020 who presents today for routine f/u. Admits feeling fatigue and weight gain post ablation as well as constipation. Has remained on Amiodarone 200mg daily post ablation. Denies chest pain, palpitations, SOB, syncope or near syncope. Right groin without pain, swelling, or bruising.\par \par

## 2021-02-11 NOTE — PHYSICAL EXAM
[General Appearance - Well Developed] : well developed [Normal Appearance] : normal appearance [Well Groomed] : well groomed [General Appearance - Well Nourished] : well nourished [No Deformities] : no deformities [General Appearance - In No Acute Distress] : no acute distress [Normal Conjunctiva] : the conjunctiva exhibited no abnormalities [Eyelids - No Xanthelasma] : the eyelids demonstrated no xanthelasmas [Normal Oral Mucosa] : normal oral mucosa [No Oral Pallor] : no oral pallor [No Oral Cyanosis] : no oral cyanosis [Normal Jugular Venous A Waves Present] : normal jugular venous A waves present [Normal Jugular Venous V Waves Present] : normal jugular venous V waves present [No Jugular Venous Gale A Waves] : no jugular venous gale A waves [Heart Rate And Rhythm] : heart rate and rhythm were normal [Heart Sounds] : normal S1 and S2 [Murmurs] : no murmurs present [Respiration, Rhythm And Depth] : normal respiratory rhythm and effort [Exaggerated Use Of Accessory Muscles For Inspiration] : no accessory muscle use [Auscultation Breath Sounds / Voice Sounds] : lungs were clear to auscultation bilaterally [Abdomen Soft] : soft [Abdomen Tenderness] : non-tender [Abdomen Mass (___ Cm)] : no abdominal mass palpated [Abnormal Walk] : normal gait [Gait - Sufficient For Exercise Testing] : the gait was sufficient for exercise testing [Nail Clubbing] : no clubbing of the fingernails [Cyanosis, Localized] : no localized cyanosis [Petechial Hemorrhages (___cm)] : no petechial hemorrhages [Skin Color & Pigmentation] : normal skin color and pigmentation [] : no rash [No Venous Stasis] : no venous stasis [Skin Lesions] : no skin lesions [No Skin Ulcers] : no skin ulcer [No Xanthoma] : no  xanthoma was observed [Oriented To Time, Place, And Person] : oriented to person, place, and time [Affect] : the affect was normal [Mood] : the mood was normal [No Anxiety] : not feeling anxious

## 2021-02-18 ENCOUNTER — NON-APPOINTMENT (OUTPATIENT)
Age: 66
End: 2021-02-18

## 2021-04-07 NOTE — CONSULT NOTE ADULT - PROBLEM/RECOMMENDATION-1
Medical Record reviewed.  Patient is being discharged from [facility] Transition in Care Program.  Lis Willson has not been readmitted to [facility] in 30 days since her previous discharge.    HRTIC Episode of care closed.  Please feel free to call me with any questions.    Jax Zheng, RN, BSN  Transitional Care RN  [phone]     
DISPLAY PLAN FREE TEXT
DISPLAY PLAN FREE TEXT

## 2021-04-13 ENCOUNTER — INPATIENT (INPATIENT)
Facility: HOSPITAL | Age: 66
LOS: 2 days | Discharge: HOME | End: 2021-04-16
Attending: INTERNAL MEDICINE | Admitting: INTERNAL MEDICINE
Payer: MEDICAID

## 2021-04-13 VITALS
HEART RATE: 73 BPM | TEMPERATURE: 97 F | OXYGEN SATURATION: 100 % | SYSTOLIC BLOOD PRESSURE: 135 MMHG | HEIGHT: 66 IN | DIASTOLIC BLOOD PRESSURE: 73 MMHG | RESPIRATION RATE: 18 BRPM

## 2021-04-13 DIAGNOSIS — L03.113 CELLULITIS OF RIGHT UPPER LIMB: ICD-10-CM

## 2021-04-13 DIAGNOSIS — I42.1 OBSTRUCTIVE HYPERTROPHIC CARDIOMYOPATHY: ICD-10-CM

## 2021-04-13 DIAGNOSIS — Z98.891 HISTORY OF UTERINE SCAR FROM PREVIOUS SURGERY: Chronic | ICD-10-CM

## 2021-04-13 DIAGNOSIS — Z98.890 OTHER SPECIFIED POSTPROCEDURAL STATES: Chronic | ICD-10-CM

## 2021-04-13 DIAGNOSIS — I48.0 PAROXYSMAL ATRIAL FIBRILLATION: ICD-10-CM

## 2021-04-13 LAB
ALBUMIN SERPL ELPH-MCNC: 4.3 G/DL — SIGNIFICANT CHANGE UP (ref 3.3–5)
ALP SERPL-CCNC: 73 U/L — SIGNIFICANT CHANGE UP (ref 40–120)
ALT FLD-CCNC: 16 U/L — SIGNIFICANT CHANGE UP (ref 4–33)
ANION GAP SERPL CALC-SCNC: 13 MMOL/L — SIGNIFICANT CHANGE UP (ref 7–14)
APTT BLD: 22.1 SEC — LOW (ref 27–36.3)
AST SERPL-CCNC: 23 U/L — SIGNIFICANT CHANGE UP (ref 4–32)
BASE EXCESS BLDV CALC-SCNC: 0.9 MMOL/L — SIGNIFICANT CHANGE UP (ref -3–2)
BASOPHILS # BLD AUTO: 0.06 K/UL — SIGNIFICANT CHANGE UP (ref 0–0.2)
BASOPHILS NFR BLD AUTO: 1.1 % — SIGNIFICANT CHANGE UP (ref 0–2)
BILIRUB SERPL-MCNC: 0.5 MG/DL — SIGNIFICANT CHANGE UP (ref 0.2–1.2)
BLD GP AB SCN SERPL QL: NEGATIVE — SIGNIFICANT CHANGE UP
BLOOD GAS VENOUS - CREATININE: 1.2 MG/DL — SIGNIFICANT CHANGE UP (ref 0.5–1.3)
BLOOD GAS VENOUS COMPREHENSIVE RESULT: SIGNIFICANT CHANGE UP
BUN SERPL-MCNC: 12 MG/DL — SIGNIFICANT CHANGE UP (ref 7–23)
CALCIUM SERPL-MCNC: 9.5 MG/DL — SIGNIFICANT CHANGE UP (ref 8.4–10.5)
CHLORIDE BLDV-SCNC: 111 MMOL/L — HIGH (ref 96–108)
CHLORIDE SERPL-SCNC: 102 MMOL/L — SIGNIFICANT CHANGE UP (ref 98–107)
CO2 SERPL-SCNC: 26 MMOL/L — SIGNIFICANT CHANGE UP (ref 22–31)
CREAT SERPL-MCNC: 1.18 MG/DL — SIGNIFICANT CHANGE UP (ref 0.5–1.3)
EOSINOPHIL # BLD AUTO: 0.17 K/UL — SIGNIFICANT CHANGE UP (ref 0–0.5)
EOSINOPHIL NFR BLD AUTO: 3.1 % — SIGNIFICANT CHANGE UP (ref 0–6)
GAS PNL BLDV: 137 MMOL/L — SIGNIFICANT CHANGE UP (ref 136–146)
GLUCOSE BLDV-MCNC: 91 MG/DL — SIGNIFICANT CHANGE UP (ref 70–99)
GLUCOSE SERPL-MCNC: 102 MG/DL — HIGH (ref 70–99)
HCO3 BLDV-SCNC: 25 MMOL/L — SIGNIFICANT CHANGE UP (ref 20–27)
HCT VFR BLD CALC: 36.6 % — SIGNIFICANT CHANGE UP (ref 34.5–45)
HCT VFR BLDA CALC: 35.2 % — SIGNIFICANT CHANGE UP (ref 34.5–46.5)
HGB BLD CALC-MCNC: 11.4 G/DL — LOW (ref 11.5–15.5)
HGB BLD-MCNC: 11.9 G/DL — SIGNIFICANT CHANGE UP (ref 11.5–15.5)
IANC: 3.79 K/UL — SIGNIFICANT CHANGE UP (ref 1.5–8.5)
IMM GRANULOCYTES NFR BLD AUTO: 0.2 % — SIGNIFICANT CHANGE UP (ref 0–1.5)
INR BLD: 1.15 RATIO — SIGNIFICANT CHANGE UP (ref 0.88–1.16)
LACTATE BLDV-MCNC: 0.7 MMOL/L — SIGNIFICANT CHANGE UP (ref 0.5–2)
LYMPHOCYTES # BLD AUTO: 0.98 K/UL — LOW (ref 1–3.3)
LYMPHOCYTES # BLD AUTO: 17.7 % — SIGNIFICANT CHANGE UP (ref 13–44)
MCHC RBC-ENTMCNC: 32.3 PG — SIGNIFICANT CHANGE UP (ref 27–34)
MCHC RBC-ENTMCNC: 32.5 GM/DL — SIGNIFICANT CHANGE UP (ref 32–36)
MCV RBC AUTO: 99.5 FL — SIGNIFICANT CHANGE UP (ref 80–100)
MONOCYTES # BLD AUTO: 0.54 K/UL — SIGNIFICANT CHANGE UP (ref 0–0.9)
MONOCYTES NFR BLD AUTO: 9.7 % — SIGNIFICANT CHANGE UP (ref 2–14)
NEUTROPHILS # BLD AUTO: 3.79 K/UL — SIGNIFICANT CHANGE UP (ref 1.8–7.4)
NEUTROPHILS NFR BLD AUTO: 68.2 % — SIGNIFICANT CHANGE UP (ref 43–77)
NRBC # BLD: 0 /100 WBCS — SIGNIFICANT CHANGE UP
NRBC # FLD: 0 K/UL — SIGNIFICANT CHANGE UP
PCO2 BLDV: 40 MMHG — SIGNIFICANT CHANGE UP (ref 39–42)
PH BLDV: 7.41 — SIGNIFICANT CHANGE UP (ref 7.32–7.43)
PLATELET # BLD AUTO: 250 K/UL — SIGNIFICANT CHANGE UP (ref 150–400)
PO2 BLDV: 60 MMHG — HIGH (ref 35–40)
POTASSIUM BLDV-SCNC: 3.8 MMOL/L — SIGNIFICANT CHANGE UP (ref 3.4–4.5)
POTASSIUM SERPL-MCNC: 4 MMOL/L — SIGNIFICANT CHANGE UP (ref 3.5–5.3)
POTASSIUM SERPL-SCNC: 4 MMOL/L — SIGNIFICANT CHANGE UP (ref 3.5–5.3)
PROT SERPL-MCNC: 7.9 G/DL — SIGNIFICANT CHANGE UP (ref 6–8.3)
PROTHROM AB SERPL-ACNC: 13.1 SEC — SIGNIFICANT CHANGE UP (ref 10.6–13.6)
RBC # BLD: 3.68 M/UL — LOW (ref 3.8–5.2)
RBC # FLD: 12.4 % — SIGNIFICANT CHANGE UP (ref 10.3–14.5)
RH IG SCN BLD-IMP: POSITIVE — SIGNIFICANT CHANGE UP
SAO2 % BLDV: 90.6 % — HIGH (ref 60–85)
SARS-COV-2 RNA SPEC QL NAA+PROBE: SIGNIFICANT CHANGE UP
SODIUM SERPL-SCNC: 141 MMOL/L — SIGNIFICANT CHANGE UP (ref 135–145)
WBC # BLD: 5.55 K/UL — SIGNIFICANT CHANGE UP (ref 3.8–10.5)
WBC # FLD AUTO: 5.55 K/UL — SIGNIFICANT CHANGE UP (ref 3.8–10.5)

## 2021-04-13 PROCEDURE — 99223 1ST HOSP IP/OBS HIGH 75: CPT

## 2021-04-13 PROCEDURE — 73110 X-RAY EXAM OF WRIST: CPT | Mod: 26,RT

## 2021-04-13 PROCEDURE — 73201 CT UPPER EXTREMITY W/DYE: CPT | Mod: 26,RT

## 2021-04-13 PROCEDURE — 73130 X-RAY EXAM OF HAND: CPT | Mod: 26,RT

## 2021-04-13 PROCEDURE — 73090 X-RAY EXAM OF FOREARM: CPT | Mod: 26,RT

## 2021-04-13 PROCEDURE — 99285 EMERGENCY DEPT VISIT HI MDM: CPT

## 2021-04-13 RX ORDER — OXYCODONE AND ACETAMINOPHEN 5; 325 MG/1; MG/1
1 TABLET ORAL ONCE
Refills: 0 | Status: DISCONTINUED | OUTPATIENT
Start: 2021-04-13 | End: 2021-04-13

## 2021-04-13 RX ORDER — LANOLIN ALCOHOL/MO/W.PET/CERES
3 CREAM (GRAM) TOPICAL ONCE
Refills: 0 | Status: COMPLETED | OUTPATIENT
Start: 2021-04-13 | End: 2021-04-13

## 2021-04-13 RX ORDER — APIXABAN 2.5 MG/1
5 TABLET, FILM COATED ORAL
Refills: 0 | Status: DISCONTINUED | OUTPATIENT
Start: 2021-04-13 | End: 2021-04-16

## 2021-04-13 RX ORDER — DILTIAZEM HCL 120 MG
60 CAPSULE, EXT RELEASE 24 HR ORAL
Refills: 0 | Status: DISCONTINUED | OUTPATIENT
Start: 2021-04-13 | End: 2021-04-16

## 2021-04-13 RX ORDER — ACETAMINOPHEN 500 MG
650 TABLET ORAL ONCE
Refills: 0 | Status: COMPLETED | OUTPATIENT
Start: 2021-04-13 | End: 2021-04-13

## 2021-04-13 RX ORDER — RABIES VACC, HUMAN DIPLOID/PF 2.5 UNIT
1 VIAL (EA) INTRAMUSCULAR ONCE
Refills: 0 | Status: DISCONTINUED | OUTPATIENT
Start: 2021-04-13 | End: 2021-04-13

## 2021-04-13 RX ORDER — RABIES VACC, HUMAN DIPLOID/PF 2.5 UNIT
1 VIAL (EA) INTRAMUSCULAR ONCE
Refills: 0 | Status: COMPLETED | OUTPATIENT
Start: 2021-04-13 | End: 2021-04-13

## 2021-04-13 RX ADMIN — Medication 3 MILLIGRAM(S): at 22:49

## 2021-04-13 RX ADMIN — Medication 1 TABLET(S): at 16:18

## 2021-04-13 RX ADMIN — Medication 650 MILLIGRAM(S): at 22:49

## 2021-04-13 RX ADMIN — Medication 60 MILLIGRAM(S): at 22:24

## 2021-04-13 RX ADMIN — OXYCODONE AND ACETAMINOPHEN 1 TABLET(S): 5; 325 TABLET ORAL at 15:57

## 2021-04-13 RX ADMIN — OXYCODONE AND ACETAMINOPHEN 1 TABLET(S): 5; 325 TABLET ORAL at 14:47

## 2021-04-13 RX ADMIN — Medication 100 MILLIGRAM(S): at 14:47

## 2021-04-13 RX ADMIN — Medication 1 MILLILITER(S): at 17:07

## 2021-04-13 RX ADMIN — Medication 100 MILLIGRAM(S): at 22:50

## 2021-04-13 NOTE — ED PROVIDER NOTE - ATTENDING CONTRIBUTION TO CARE
KENDRICK Saldivar MD: I have personally performed a face to face bedside history and physical examination of this patient. I have discussed the history, examination, review of systems, assessment and plan of management with the resident. I have reviewed the electronic medical record and amended it to reflect my history, review of systems, physical exam, assessment and plan.    Gen: Well appearing in NAD  Head: NC/AT  Neck: trachea midline  Resp:  No distress  Ext: swollen tender and red R wrist/hand with healing lacerations on distal forearm, ROM limited 2/2 pain, soft compartments  Neuro:  A&O appears non focal  Skin:  Warm and dry as visualized  Psych:  Normal affect and mood

## 2021-04-13 NOTE — ED PROVIDER NOTE - PMH
AF (atrial fibrillation)  On Coumadin  CAP (community acquired pneumonia)  RML/RLL at [facility] 3/2017  Chronic diastolic (congestive) heart failure    GERD (gastroesophageal reflux disease)    HOCM (hypertrophic obstructive cardiomyopathy)  S/P septal ablation  Lung cancer  s/p chemotherapy  Lung cancer  s/p chemotherapy ~2017 now in remission  Pleural effusion  Right parapneumonic drained twice 3/2017 at [facility]

## 2021-04-13 NOTE — H&P ADULT - HISTORY OF PRESENT ILLNESS
Patient is a 64 y/o F PMH HOCM s/p septal ablation, Afib on Apixaban s/p ablation, HFpEF, severe MR, Lung CA (diagnosed about 2 years ago, s/p chemo, in remission) p/w R hand/wrist/forearm pain, swelling, erythema despite oral antibiotics since 4/9/21. Was bit by neighbor's dog, unsure of vaccination status of dog, received first rabies vaccine but unsure of 2nd. Was initially treated at [facility] and was initially sutured and discharged. She then re-presented to the OSH secondary to erythema. The sutures were removed and she was treated with clindamycin as she is allergic to PCN.

## 2021-04-13 NOTE — CONSULT NOTE ADULT - SUBJECTIVE AND OBJECTIVE BOX
66 yo R hand dominant F s/p dog bite R hand/distal forearm 4 days prior to presenting to the emergency room at [facility] with R hand edema/erythema. She states that she was initially treated at [facility] and was initially sutured and discharged. She then re-presented to the OSH secondary to erythema; the sutures were removed and she was treated with clindamycin as she is allergic to PCN. She was discharged to home on PO antibiotics, however, she was concerned that she had increased edema and erythema R hand and presented to the [facility] ED. She had her first rabies series at [facility] as well as tetanus prophylaxis. She states that she has full AROM, minimal pain and no numbness or paresthesias. WBC 5.5K; XR neg for gas, FB or fracture. Patient recently cardioverted for a-fib and on Eliquis. Patient states that her erythema and edema have improved following IV clindamycin in ED.    PE: R hand with multiple lacerations dorsum of distal 1/3 of wrist; multiple R hand lacs; no pain with AROM/PROM wrist; minimal erythema dorsal wrist; moderate R dorsal hand edema; no lymphangitis; no fluctuance or gross abscesses; no neuro deficits    A+P: s/p dog bites with cellulitis RUE    -patient improving on IV clindamycin in ED; CT scan with contrast pending to r/o collection, however, there is a low clinical suspicion on exam  -Infectious Disease Consultation  -strict elevation R hand at all times  -soaks R hand with saline/betadine q4 hours x 20 min/soak  -no present surgical indications  -please call [phone] with any acute changes on exam; patient to f/u in our office 1 week after DC home  64 yo R hand dominant F s/p dog bite R hand/distal forearm 4 days prior to presenting to the emergency room at [facility] with R hand edema/erythema. She states that she was initially treated at [facility] and was initially sutured and discharged. She then re-presented to the OSH secondary to erythema; the sutures were removed and she was treated with clindamycin as she is allergic to PCN. She was discharged to home on PO antibiotics, however, she was concerned that she had increased edema and erythema R hand and presented to the [facility] ED. She had her first rabies series at [facility] as well as tetanus prophylaxis. She states that she has full AROM, minimal pain and no numbness or paresthesias. WBC 5.5K; XR neg for gas, FB or fracture. Patient recently cardioverted for a-fib and on Eliquis. Patient states that her erythema and edema have improved following IV clindamycin in ED.    PE: R hand with multiple lacerations dorsum of distal 1/3 of wrist; multiple R hand lacs; no pain with AROM/PROM wrist; minimal erythema dorsal wrist; moderate R dorsal hand edema; no lymphangitis; no fluctuance or gross abscesses; no neuro deficits    A+P: s/p dog bites with cellulitis RUE admitted to medical doctor for IV antibiotics    -patient improving on IV clindamycin in ED; CT scan with contrast pending to r/o collection, however, there is a low clinical suspicion on exam  -Infectious Disease Consultation  -strict elevation R hand at all times  -soaks R hand with saline/betadine q4 hours x 20 min/soak  -no present surgical indications  -please call [phone] with any acute changes on exam; patient to f/u in our office 1 week after DC home

## 2021-04-13 NOTE — H&P ADULT - NSHPPHYSICALEXAM_GEN_ALL_CORE
T(C): 36.7 (04-13-21 @ 20:18), Max: 36.7 (04-13-21 @ 19:38)  HR: 65 (04-13-21 @ 20:18) (65 - 73)  BP: 141/83 (04-13-21 @ 20:18) (135/73 - 147/86)  RR: 18 (04-13-21 @ 20:18) (18 - 18)  SpO2: 100% (04-13-21 @ 20:18) (100% - 100%)    Constitutional: NAD, well-developed, well-nourished  Ears, Nose, Mouth, and Throat: normal external ears and nose, normal hearing, moist oral mucosa  Eyes: normal conjunctiva, EOMI, PERRL  Neck: supple, no JVD  Respiratory: Clear to auscultation bilaterally. No wheezes, rales or rhonchi. Normal respiratory effort  Cardiovascular: RRR, no M/R/G, no edema, 2+ Peripheral Pulses  Gastrointestinal: soft, nontender, nondistended, +BS, no hernia  Skin: warm, +swollen, erythematous RUE w/ multiple lacerations  Neurologic: sensation grossly intact, CN grossly intact, non-focal exam  Musculoskeletal: no clubbing, no cyanosis, no joint swelling  Psychiatric: AOX3, appropriate mood, affect

## 2021-04-13 NOTE — H&P ADULT - NSICDXPASTMEDICALHX_GEN_ALL_CORE_FT
PAST MEDICAL HISTORY:  AF (atrial fibrillation) On Coumadin    CAP (community acquired pneumonia) RML/RLL at [facility] 3/2017    Chronic diastolic (congestive) heart failure     GERD (gastroesophageal reflux disease)     HOCM (hypertrophic obstructive cardiomyopathy) S/P septal ablation    Lung cancer s/p chemotherapy    Lung cancer s/p chemotherapy ~2017 now in remission    Pleural effusion Right parapneumonic drained twice 3/2017 at [facility]

## 2021-04-13 NOTE — ED PROVIDER NOTE - PHYSICAL EXAMINATION
Physical Exam:  Gen: NAD, AOx3, non-toxic appearing, able to ambulate without assistance  Head: NCAT  HEENT: EOMI, PEERLA, normal conjunctiva, tongue midline, oral mucosa moist  Lung: CTAB, no respiratory distress, no wheezes/rhonchi/rales B/L, speaking in full sentences  CV: RRR, no murmurs, rubs or gallops, distal pulses 2+ b/l  MSK: swollen tender and red R wrist/hand with healing lacerations on distal forearm, ROM limited 2/2 pain, soft compartments  Neuro: No focal sensory or motor deficits  Skin: Warm, well perfused, no rash, no leg swelling  Psych: normal affect, calm

## 2021-04-13 NOTE — ED ADULT TRIAGE NOTE - CHIEF COMPLAINT QUOTE
Pt states she was attacked by a dog on Friday seen at another hospital. pt complaining of R hand swelling and pain.

## 2021-04-13 NOTE — ED PROVIDER NOTE - PROGRESS NOTE DETAILS
Peter PGY2: AS per Fer, recommends ID consult, hand soaks, and CT scan. Peter PGY2: left message with [employer] answering service, awaiting callback Peter PGY2: Patient's pain and swelling improved, signed out to Dr. Keene. Peter PGY2: Patient's pain and swelling improved, signed out to Dr. Keene. ID consulted, agrees with current antibiotics and will see patient in AM.

## 2021-04-13 NOTE — ED PROVIDER NOTE - OBJECTIVE STATEMENT
64yo female HOCM s/p septal ablation, afib on coumadin, HFpEF (EF 55% 9/2019), lung cancer (diagnosed about 2 years ago, s/p chemo, in remission) p/w R hand/wrist/forearm pain, swelling, redness despite oral antibiotics since 4/9/21. Was bit by neighbor's dog, unsure of vaccination status of dog, received first rabies vaccine but unsure of 2nd. Was on doxycycline then switched to clinda without much improvement. Denies weakness, other injuries, fever.

## 2021-04-13 NOTE — H&P ADULT - ASSESSMENT
Patient is a 66 y/o F PMH HOCM s/p septal ablation, Afib on Apixaban s/p ablation, HFpEF, severe MR, Lung CA (diagnosed about 2 years ago, s/p chemo, in remission) p/w R hand/wrist/forearm cellulitis 2/2 dog bite

## 2021-04-13 NOTE — ED PROVIDER NOTE - CLINICAL SUMMARY MEDICAL DECISION MAKING FREE TEXT BOX
64yo female dog bite 4/9/21 p/w worsening pain, swelling and redness of R wrist/forearm/hand despite clinda, initially on doxy. Afebrile, ROM limited 2/2 pain, soft compartments. Concern for extensive soft tissue infection refractory to oral antibiotics. Labs, hand consult, IV antibiotics and admit.

## 2021-04-13 NOTE — H&P ADULT - NSHPLABSRESULTS_GEN_ALL_CORE
04-13    141  |  102  |  12  ----------------------------<  102<H>  4.0   |  26  |  1.18    Ca    9.5      13 Apr 2021 15:08    TPro  7.9  /  Alb  4.3  /  TBili  0.5  /  DBili  x   /  AST  23  /  ALT  16  /  AlkPhos  73  04-13                        11.9   5.55  )-----------( 250      ( 13 Apr 2021 15:15 )             36.6     LIVER FUNCTIONS - ( 13 Apr 2021 15:08 )  Alb: 4.3 g/dL / Pro: 7.9 g/dL / ALK PHOS: 73 U/L / ALT: 16 U/L / AST: 23 U/L / GGT: x           PT/INR - ( 13 Apr 2021 15:57 )   PT: 13.1 sec;   INR: 1.15 ratio       PTT - ( 13 Apr 2021 15:57 )  PTT:22.1 sec

## 2021-04-13 NOTE — H&P ADULT - PROBLEM SELECTOR PLAN 1
c/w clinda, bactrim, strict elevation R hand at all times, soaks R hand with saline/betadine q4 hours x 20 min/soak, f/u ID in AM

## 2021-04-14 LAB
ALBUMIN SERPL ELPH-MCNC: 3.5 G/DL — SIGNIFICANT CHANGE UP (ref 3.3–5)
ALP SERPL-CCNC: 65 U/L — SIGNIFICANT CHANGE UP (ref 40–120)
ALT FLD-CCNC: 12 U/L — SIGNIFICANT CHANGE UP (ref 4–33)
ANION GAP SERPL CALC-SCNC: 8 MMOL/L — SIGNIFICANT CHANGE UP (ref 7–14)
AST SERPL-CCNC: 18 U/L — SIGNIFICANT CHANGE UP (ref 4–32)
BASOPHILS # BLD AUTO: 0.05 K/UL — SIGNIFICANT CHANGE UP (ref 0–0.2)
BASOPHILS NFR BLD AUTO: 1.1 % — SIGNIFICANT CHANGE UP (ref 0–2)
BILIRUB SERPL-MCNC: 0.4 MG/DL — SIGNIFICANT CHANGE UP (ref 0.2–1.2)
BUN SERPL-MCNC: 11 MG/DL — SIGNIFICANT CHANGE UP (ref 7–23)
CALCIUM SERPL-MCNC: 8.8 MG/DL — SIGNIFICANT CHANGE UP (ref 8.4–10.5)
CHLORIDE SERPL-SCNC: 105 MMOL/L — SIGNIFICANT CHANGE UP (ref 98–107)
CO2 SERPL-SCNC: 24 MMOL/L — SIGNIFICANT CHANGE UP (ref 22–31)
COVID-19 SPIKE DOMAIN AB INTERP: POSITIVE
COVID-19 SPIKE DOMAIN AB INTERP: POSITIVE
COVID-19 SPIKE DOMAIN ANTIBODY RESULT: 3.33 U/ML — HIGH
COVID-19 SPIKE DOMAIN ANTIBODY RESULT: 3.36 U/ML — HIGH
CREAT SERPL-MCNC: 1.08 MG/DL — SIGNIFICANT CHANGE UP (ref 0.5–1.3)
EOSINOPHIL # BLD AUTO: 0.19 K/UL — SIGNIFICANT CHANGE UP (ref 0–0.5)
EOSINOPHIL NFR BLD AUTO: 4.3 % — SIGNIFICANT CHANGE UP (ref 0–6)
GLUCOSE SERPL-MCNC: 87 MG/DL — SIGNIFICANT CHANGE UP (ref 70–99)
HCT VFR BLD CALC: 30.7 % — LOW (ref 34.5–45)
HGB BLD-MCNC: 10.1 G/DL — LOW (ref 11.5–15.5)
IANC: 3.03 K/UL — SIGNIFICANT CHANGE UP (ref 1.5–8.5)
IMM GRANULOCYTES NFR BLD AUTO: 0.2 % — SIGNIFICANT CHANGE UP (ref 0–1.5)
LYMPHOCYTES # BLD AUTO: 0.63 K/UL — LOW (ref 1–3.3)
LYMPHOCYTES # BLD AUTO: 14.3 % — SIGNIFICANT CHANGE UP (ref 13–44)
MAGNESIUM SERPL-MCNC: 1.9 MG/DL — SIGNIFICANT CHANGE UP (ref 1.6–2.6)
MCHC RBC-ENTMCNC: 32.5 PG — SIGNIFICANT CHANGE UP (ref 27–34)
MCHC RBC-ENTMCNC: 32.9 GM/DL — SIGNIFICANT CHANGE UP (ref 32–36)
MCV RBC AUTO: 98.7 FL — SIGNIFICANT CHANGE UP (ref 80–100)
MONOCYTES # BLD AUTO: 0.5 K/UL — SIGNIFICANT CHANGE UP (ref 0–0.9)
MONOCYTES NFR BLD AUTO: 11.3 % — SIGNIFICANT CHANGE UP (ref 2–14)
NEUTROPHILS # BLD AUTO: 3.03 K/UL — SIGNIFICANT CHANGE UP (ref 1.8–7.4)
NEUTROPHILS NFR BLD AUTO: 68.8 % — SIGNIFICANT CHANGE UP (ref 43–77)
NRBC # BLD: 0 /100 WBCS — SIGNIFICANT CHANGE UP
NRBC # FLD: 0 K/UL — SIGNIFICANT CHANGE UP
PHOSPHATE SERPL-MCNC: 4.3 MG/DL — SIGNIFICANT CHANGE UP (ref 2.5–4.5)
PLATELET # BLD AUTO: 210 K/UL — SIGNIFICANT CHANGE UP (ref 150–400)
POTASSIUM SERPL-MCNC: 3.7 MMOL/L — SIGNIFICANT CHANGE UP (ref 3.5–5.3)
POTASSIUM SERPL-SCNC: 3.7 MMOL/L — SIGNIFICANT CHANGE UP (ref 3.5–5.3)
PROT SERPL-MCNC: 6.9 G/DL — SIGNIFICANT CHANGE UP (ref 6–8.3)
RBC # BLD: 3.11 M/UL — LOW (ref 3.8–5.2)
RBC # FLD: 12.4 % — SIGNIFICANT CHANGE UP (ref 10.3–14.5)
SARS-COV-2 IGG+IGM SERPL QL IA: 3.33 U/ML — HIGH
SARS-COV-2 IGG+IGM SERPL QL IA: 3.36 U/ML — HIGH
SARS-COV-2 IGG+IGM SERPL QL IA: POSITIVE
SARS-COV-2 IGG+IGM SERPL QL IA: POSITIVE
SODIUM SERPL-SCNC: 137 MMOL/L — SIGNIFICANT CHANGE UP (ref 135–145)
WBC # BLD: 4.41 K/UL — SIGNIFICANT CHANGE UP (ref 3.8–10.5)
WBC # FLD AUTO: 4.41 K/UL — SIGNIFICANT CHANGE UP (ref 3.8–10.5)

## 2021-04-14 PROCEDURE — 99223 1ST HOSP IP/OBS HIGH 75: CPT

## 2021-04-14 RX ORDER — AMPICILLIN SODIUM AND SULBACTAM SODIUM 250; 125 MG/ML; MG/ML
3 INJECTION, POWDER, FOR SUSPENSION INTRAMUSCULAR; INTRAVENOUS EVERY 6 HOURS
Refills: 0 | Status: DISCONTINUED | OUTPATIENT
Start: 2021-04-14 | End: 2021-04-16

## 2021-04-14 RX ORDER — OXYCODONE AND ACETAMINOPHEN 5; 325 MG/1; MG/1
1 TABLET ORAL ONCE
Refills: 0 | Status: DISCONTINUED | OUTPATIENT
Start: 2021-04-14 | End: 2021-04-14

## 2021-04-14 RX ADMIN — OXYCODONE AND ACETAMINOPHEN 1 TABLET(S): 5; 325 TABLET ORAL at 11:29

## 2021-04-14 RX ADMIN — APIXABAN 5 MILLIGRAM(S): 2.5 TABLET, FILM COATED ORAL at 23:06

## 2021-04-14 RX ADMIN — Medication 100 MILLIGRAM(S): at 07:38

## 2021-04-14 RX ADMIN — Medication 1 TABLET(S): at 05:49

## 2021-04-14 RX ADMIN — Medication 60 MILLIGRAM(S): at 12:29

## 2021-04-14 RX ADMIN — OXYCODONE AND ACETAMINOPHEN 1 TABLET(S): 5; 325 TABLET ORAL at 10:43

## 2021-04-14 RX ADMIN — APIXABAN 5 MILLIGRAM(S): 2.5 TABLET, FILM COATED ORAL at 00:42

## 2021-04-14 RX ADMIN — Medication 60 MILLIGRAM(S): at 23:06

## 2021-04-14 RX ADMIN — AMPICILLIN SODIUM AND SULBACTAM SODIUM 200 GRAM(S): 250; 125 INJECTION, POWDER, FOR SUSPENSION INTRAMUSCULAR; INTRAVENOUS at 23:06

## 2021-04-14 RX ADMIN — AMPICILLIN SODIUM AND SULBACTAM SODIUM 200 GRAM(S): 250; 125 INJECTION, POWDER, FOR SUSPENSION INTRAMUSCULAR; INTRAVENOUS at 17:58

## 2021-04-14 NOTE — PATIENT PROFILE ADULT - SAFE PLACE TO LIVE - DETAILS
Pt was in a recent dog attack neighbors \[facility]\"". Pt stated "they will not get rid of the dog"

## 2021-04-14 NOTE — CONSULT NOTE ADULT - ASSESSMENT
65F was bit by her neighbor's pit bull 4/9/21, admitted 4/13 for continued pain and swelling despite outpatient antibiotics.   There isn't much residual cellulitis or swelling. No purulence and CT without collections. Not systemically ill.   Her listed antibiotic allergies caused vague side effects, not allergic symptoms.   Reports receiving tetanus vaccine and starting rabies therapy.     Suggest  -I believe benefits outweigh risks of changing Clindamycin and Bactrim to Unasyn 3GM IV q6h and monitor for adverse effects   -if remains well can likely go home soon on PO  -considering a two-week course given inflammation abutting the tendinous junction and limited ROM  -will defer rabies vaccination to primary team     Spoke with medicine and message left with plastics     Nathanael Bae MD   Infectious Disease   Pager [phone]   After 5PM and on weekends please page fellow on call or call [phone]

## 2021-04-14 NOTE — CONSULT NOTE ADULT - SUBJECTIVE AND OBJECTIVE BOX
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   She went to [facility], received antibiotics and       PAST MEDICAL & SURGICAL HISTORY:  HOCM (hypertrophic obstructive cardiomyopathy)  S/P septal ablation    Pleural effusion  Right parapneumonic drained twice 3/2017 at [facility]    AF (atrial fibrillation)  On Coumadin    Chronic diastolic (congestive) heart failure    Lung cancer  s/p chemotherapy    CAP (community acquired pneumonia)  RML/RLL at [facility] 3/2017    GERD (gastroesophageal reflux disease)    Lung cancer  s/p chemotherapy ~ now in remission    History of   15 years ago    H/O cardiac radiofrequency ablation  for HOCM - 16 years ago        Allergies    cefdinir (Rash)  moxifloxacin (Rash)  penicillin (Rash)    Intolerances        ANTIMICROBIALS:  clindamycin IVPB 600 every 8 hours  trimethoprim  160 mG/sulfamethoxazole 800 mG 1 every 12 hours      OTHER MEDS:  apixaban 5 milliGRAM(s) Oral <User Schedule>  diltiazem    Tablet 60 milliGRAM(s) Oral four times a day      SOCIAL HISTORY:    FAMILY HISTORY:  Family history of liver cancer  Mother    Family history of ulcerative colitis (Sibling)  Brother    Family history of psoriasis (Sibling)  Brother        ROS:  Unobtainable because:   All other systems negative   Constitutional: no fever, no chills  Eye: no vision changes  ENT: no sore throat, no rhinorrhea  Cardiovascular: no chest pain, no palpitation  Respiratory: no SOB, no cough  GI:  no abd pain, no vomiting, no diarrhea  urinary: no dysuria, no hematuria, no flank pain  musculoskeletal: no joint pain, no joint swelling  skin: no rash  neurology: no headache, no change in mental status  psych: no anxiety    Physical Exam:  General: awake, alert, non toxic  Head: atraumatic, normocephalic  Eyes: normal sclera and conjunctiva  ENT: no oropharyngeal lesions, no LAD, neck supple  Cardio: regular rate and rhythm   Respiratory: nonlabored on room air, clear bilaterally, no wheezing  abd: soft, bowel sounds present, not tender  : no suprapubic tenderness, no max  Musculoskeletal: no joint swelling, no edema  Skin: no rash  vascular: no phlebitis  Neurologic: no focal deficits  psych: normal affect       Drug Dosing Weight  Height (cm): 167.6 (2021 23:42)  Weight (kg): 92.5 (2021 23:42)  BMI (kg/m2): 32.9 (2021 23:42)  BSA (m2): 2.02 (2021 23:42)    Vital Signs Last 24 Hrs  T(F): 97.4 (21 @ 05:35), Max: 98.1 (21 @ 19:38)    Vital Signs Last 24 Hrs  HR: 69 (21 @ 05:35) (65 - 73)  BP: 107/78 (21 @ 05:35) (107/78 - 147/86)  RR: 18 (21 @ 05:35)  SpO2: 97% (21 @ 05:35) (96% - 100%)  Wt(kg): --                          10.1   4.41  )-----------( 210      ( 2021 07:01 )             30.7           137  |  105  |  11  ----------------------------<  87  3.7   |  24  |  1.08    Ca    8.8      2021 07:01  Phos  4.3       Mg     1.9         TPro  6.9  /  Alb  3.5  /  TBili  0.4  /  DBili  x   /  AST  18  /  ALT  12  /  AlkPhos  65            MICROBIOLOGY:  v            RADIOLOGY:  Images below reviewed personally HPI:  65F with CHF, Atrial fibrillation, 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   She went to [facility], lesions were sutured and she received antibiotics.   Her wrist blew up the next day so she went back, had the sutures removed and was continued on antibiotics.   Due to continued swelling and pain she came to the ED and was admitted 21.   She feels much better since being here. Swelling is improved as well as erythema and tenderness. Still with limited ROM.   She's unclear what reaction she has to which antibiotics (penicillin, moxifloxacin and cefdinir) but they only cause things like diarrhea or dizziness, none of them have caused rash, pruritus, swelling or respiratory issues.   Reports receiving tetanus at [facility] [place] and rabies therapy.       PAST MEDICAL & SURGICAL HISTORY:  HOCM (hypertrophic obstructive cardiomyopathy) S/P septal ablation  Pleural effusion  Right parapneumonic drained twice 3/2017 at [facility]  AF (atrial fibrillation) On Coumadin  Chronic diastolic (congestive) heart failure  Lung cancer s/p chemotherapy  GERD (gastroesophageal reflux disease)  History of  15 years ago    Allergies  cefdinir (Rash)  moxifloxacin (Rash)  penicillin (Rash)    Intolerances        ANTIMICROBIALS:  clindamycin IVPB 600 every 8 hours  trimethoprim  160 mG/sulfamethoxazole 800 mG 1 every 12 hours      OTHER MEDS:  apixaban 5 milliGRAM(s) Oral <User Schedule>  diltiazem    Tablet 60 milliGRAM(s) Oral four times a day      SOCIAL HISTORY: Former smoker. Has wine sometimes. Works as an artists, creates sets for movies/shows etc.     FAMILY HISTORY:  Family history of liver cancer Mother  Family history of ulcerative colitis (Sibling) Brother  Family history of psoriasis (Sibling) Brother      ROS:  All other systems negative   Constitutional: no fever, no chills  Eye: no vision changes  ENT: no sore throat, no rhinorrhea  Cardiovascular: no chest pain, no palpitation  Respiratory: no SOB, no cough  GI:  no abd pain, no vomiting, no diarrhea  urinary: no dysuria, no hematuria, no flank pain  musculoskeletal: per HPI   skin: per HPI   neurology: no headache, no change in mental status  psych: anxious thinking about the dog bite incident     Physical Exam:  General: awake, alert, non toxic, overweight   Head: atraumatic, normocephalic  Eyes: normal sclera and conjunctiva  ENT: no gross oropharyngeal lesions, no LAD, neck supple  Cardio: regular rate   Respiratory: nonlabored on room air, clear bilaterally, no wheezing  abd: soft, bowel sounds present, not tender  : no max  Musculoskeletal: slight swelling right forearm/wrist with somewhat limited flexion/extension   Skin: several linear lacerations over volar aspect and more puncture like wounds on dorsal aspect, no open wounds, nontender, no fluctuance or significant erythema   vascular: no phlebitis  Neurologic: no focal deficits  psych: normal affect       Drug Dosing Weight  Height (cm): 167.6 (2021 23:42)  Weight (kg): 92.5 (2021 23:42)  BMI (kg/m2): 32.9 (2021 23:42)  BSA (m2): 2.02 (2021 23:42)    Vital Signs Last 24 Hrs  T(F): 97.4 (21 @ 05:35), Max: 98.1 (21 @ 19:38)    Vital Signs Last 24 Hrs  HR: 69 (21 @ 05:35) (65 - 73)  BP: 107/78 (21 @ 05:35) (107/78 - 147/86)  RR: 18 (21 @ 05:35)  SpO2: 97% (21 @ 05:35) (96% - 100%)  Wt(kg): --                          10.1   4.41  )-----------( 210      ( 2021 07:01 )             30.7           137  |  105  |  11  ----------------------------<  87  3.7   |  24  |  1.08    Ca    8.8      2021 07:01  Phos  4.3       Mg     1.9         TPro  6.9  /  Alb  3.5  /  TBili  0.4  /  DBili  x   /  AST  18  /  ALT  12  /  AlkPhos  65            MICROBIOLOGY:        RADIOLOGY:  Images below reviewed personally  CT Upper Extremity w/ IV Cont, Right (21 @ 19:30)   Cutaneous/subcutaneous irregularity along the dorsal aspect of the distal radial shaft which abuts the dorsal aspect of the first and second extensor myotendinous junctions.  No abscess or CT evidence of tenosynovitis.  Cellulitis along the dorsum of the hand.

## 2021-04-14 NOTE — PATIENT PROFILE ADULT - HAS THE PATIENT USED TOBACCO IN THE PAST 30 DAYS?
[facility]  [address] [place] [id number]  Phone: [phone]  Fax: [phone]    Marlen Saldana PA-C        October 19, 2020     Patient: Yfn Coffman   YOB: 1980   Date of Visit: 10/19/2020       To Whom It May Concern: It is my medical opinion that Nicolas Grant may return to work on 10/22/2020. If you have any questions or concerns, please don't hesitate to call.     Sincerely,          Marlen Saldana PA-C
No

## 2021-04-15 ENCOUNTER — TRANSCRIPTION ENCOUNTER (OUTPATIENT)
Age: 66
End: 2021-04-15

## 2021-04-15 LAB
ALBUMIN SERPL ELPH-MCNC: 3.7 G/DL — SIGNIFICANT CHANGE UP (ref 3.3–5)
ALP SERPL-CCNC: 70 U/L — SIGNIFICANT CHANGE UP (ref 40–120)
ALT FLD-CCNC: 14 U/L — SIGNIFICANT CHANGE UP (ref 4–33)
ANION GAP SERPL CALC-SCNC: 12 MMOL/L — SIGNIFICANT CHANGE UP (ref 7–14)
AST SERPL-CCNC: 18 U/L — SIGNIFICANT CHANGE UP (ref 4–32)
BASOPHILS # BLD AUTO: 0.05 K/UL — SIGNIFICANT CHANGE UP (ref 0–0.2)
BASOPHILS NFR BLD AUTO: 1.1 % — SIGNIFICANT CHANGE UP (ref 0–2)
BILIRUB SERPL-MCNC: 0.4 MG/DL — SIGNIFICANT CHANGE UP (ref 0.2–1.2)
BUN SERPL-MCNC: 10 MG/DL — SIGNIFICANT CHANGE UP (ref 7–23)
CALCIUM SERPL-MCNC: 9.1 MG/DL — SIGNIFICANT CHANGE UP (ref 8.4–10.5)
CHLORIDE SERPL-SCNC: 103 MMOL/L — SIGNIFICANT CHANGE UP (ref 98–107)
CO2 SERPL-SCNC: 23 MMOL/L — SIGNIFICANT CHANGE UP (ref 22–31)
CREAT SERPL-MCNC: 1.28 MG/DL — SIGNIFICANT CHANGE UP (ref 0.5–1.3)
EOSINOPHIL # BLD AUTO: 0.18 K/UL — SIGNIFICANT CHANGE UP (ref 0–0.5)
EOSINOPHIL NFR BLD AUTO: 4 % — SIGNIFICANT CHANGE UP (ref 0–6)
GLUCOSE SERPL-MCNC: 88 MG/DL — SIGNIFICANT CHANGE UP (ref 70–99)
HCT VFR BLD CALC: 32.7 % — LOW (ref 34.5–45)
HGB BLD-MCNC: 11 G/DL — LOW (ref 11.5–15.5)
IANC: 2.82 K/UL — SIGNIFICANT CHANGE UP (ref 1.5–8.5)
IMM GRANULOCYTES NFR BLD AUTO: 0.2 % — SIGNIFICANT CHANGE UP (ref 0–1.5)
LYMPHOCYTES # BLD AUTO: 0.92 K/UL — LOW (ref 1–3.3)
LYMPHOCYTES # BLD AUTO: 20.4 % — SIGNIFICANT CHANGE UP (ref 13–44)
MAGNESIUM SERPL-MCNC: 2.1 MG/DL — SIGNIFICANT CHANGE UP (ref 1.6–2.6)
MCHC RBC-ENTMCNC: 33.2 PG — SIGNIFICANT CHANGE UP (ref 27–34)
MCHC RBC-ENTMCNC: 33.6 GM/DL — SIGNIFICANT CHANGE UP (ref 32–36)
MCV RBC AUTO: 98.8 FL — SIGNIFICANT CHANGE UP (ref 80–100)
MONOCYTES # BLD AUTO: 0.52 K/UL — SIGNIFICANT CHANGE UP (ref 0–0.9)
MONOCYTES NFR BLD AUTO: 11.6 % — SIGNIFICANT CHANGE UP (ref 2–14)
NEUTROPHILS # BLD AUTO: 2.82 K/UL — SIGNIFICANT CHANGE UP (ref 1.8–7.4)
NEUTROPHILS NFR BLD AUTO: 62.7 % — SIGNIFICANT CHANGE UP (ref 43–77)
NRBC # BLD: 0 /100 WBCS — SIGNIFICANT CHANGE UP
NRBC # FLD: 0 K/UL — SIGNIFICANT CHANGE UP
PHOSPHATE SERPL-MCNC: 3.9 MG/DL — SIGNIFICANT CHANGE UP (ref 2.5–4.5)
PLATELET # BLD AUTO: 222 K/UL — SIGNIFICANT CHANGE UP (ref 150–400)
POTASSIUM SERPL-MCNC: 4 MMOL/L — SIGNIFICANT CHANGE UP (ref 3.5–5.3)
POTASSIUM SERPL-SCNC: 4 MMOL/L — SIGNIFICANT CHANGE UP (ref 3.5–5.3)
PROT SERPL-MCNC: 7.1 G/DL — SIGNIFICANT CHANGE UP (ref 6–8.3)
RBC # BLD: 3.31 M/UL — LOW (ref 3.8–5.2)
RBC # FLD: 12.5 % — SIGNIFICANT CHANGE UP (ref 10.3–14.5)
SODIUM SERPL-SCNC: 138 MMOL/L — SIGNIFICANT CHANGE UP (ref 135–145)
WBC # BLD: 4.5 K/UL — SIGNIFICANT CHANGE UP (ref 3.8–10.5)
WBC # FLD AUTO: 4.5 K/UL — SIGNIFICANT CHANGE UP (ref 3.8–10.5)

## 2021-04-15 PROCEDURE — 99232 SBSQ HOSP IP/OBS MODERATE 35: CPT

## 2021-04-15 RX ORDER — OXYCODONE AND ACETAMINOPHEN 5; 325 MG/1; MG/1
1 TABLET ORAL EVERY 6 HOURS
Refills: 0 | Status: DISCONTINUED | OUTPATIENT
Start: 2021-04-15 | End: 2021-04-16

## 2021-04-15 RX ORDER — ACETAMINOPHEN 500 MG
650 TABLET ORAL ONCE
Refills: 0 | Status: COMPLETED | OUTPATIENT
Start: 2021-04-15 | End: 2021-04-15

## 2021-04-15 RX ORDER — LANOLIN ALCOHOL/MO/W.PET/CERES
6 CREAM (GRAM) TOPICAL AT BEDTIME
Refills: 0 | Status: DISCONTINUED | OUTPATIENT
Start: 2021-04-15 | End: 2021-04-16

## 2021-04-15 RX ORDER — ONDANSETRON 8 MG/1
4 TABLET, FILM COATED ORAL ONCE
Refills: 0 | Status: COMPLETED | OUTPATIENT
Start: 2021-04-15 | End: 2021-04-15

## 2021-04-15 RX ADMIN — OXYCODONE AND ACETAMINOPHEN 1 TABLET(S): 5; 325 TABLET ORAL at 08:01

## 2021-04-15 RX ADMIN — Medication 6 MILLIGRAM(S): at 22:46

## 2021-04-15 RX ADMIN — Medication 60 MILLIGRAM(S): at 17:03

## 2021-04-15 RX ADMIN — AMPICILLIN SODIUM AND SULBACTAM SODIUM 200 GRAM(S): 250; 125 INJECTION, POWDER, FOR SUSPENSION INTRAMUSCULAR; INTRAVENOUS at 06:49

## 2021-04-15 RX ADMIN — ONDANSETRON 4 MILLIGRAM(S): 8 TABLET, FILM COATED ORAL at 10:58

## 2021-04-15 RX ADMIN — Medication 650 MILLIGRAM(S): at 20:49

## 2021-04-15 RX ADMIN — AMPICILLIN SODIUM AND SULBACTAM SODIUM 200 GRAM(S): 250; 125 INJECTION, POWDER, FOR SUSPENSION INTRAMUSCULAR; INTRAVENOUS at 22:46

## 2021-04-15 RX ADMIN — AMPICILLIN SODIUM AND SULBACTAM SODIUM 200 GRAM(S): 250; 125 INJECTION, POWDER, FOR SUSPENSION INTRAMUSCULAR; INTRAVENOUS at 10:59

## 2021-04-15 RX ADMIN — OXYCODONE AND ACETAMINOPHEN 1 TABLET(S): 5; 325 TABLET ORAL at 08:50

## 2021-04-15 RX ADMIN — AMPICILLIN SODIUM AND SULBACTAM SODIUM 200 GRAM(S): 250; 125 INJECTION, POWDER, FOR SUSPENSION INTRAMUSCULAR; INTRAVENOUS at 17:03

## 2021-04-15 RX ADMIN — Medication 650 MILLIGRAM(S): at 19:49

## 2021-04-15 RX ADMIN — Medication 60 MILLIGRAM(S): at 11:59

## 2021-04-15 RX ADMIN — APIXABAN 5 MILLIGRAM(S): 2.5 TABLET, FILM COATED ORAL at 22:46

## 2021-04-15 NOTE — DISCHARGE NOTE PROVIDER - HOSPITAL COURSE
Patient is a 66 y/o F PMH HOCM s/p septal ablation, Afib on Apixaban s/p ablation, HFpEF, severe MR, Lung CA (diagnosed about 2 years ago, s/p chemo, in remission) p/w R hand/wrist/forearm cellulitis 2/2 dog bite     Cellulitis of hand, right.  -s/p dog bite  -CT Scan right upper extremity: Cutaneous/subcutaneous irregularity along the dorsal aspect of the distal radial shaft which abuts the dorsal aspect of the first and second extensor myotendinous junctions.  No abscess or CT evidence of tenosynovitis. Cellulitis along the dorsum of the hand.  -s/p Clindamycin  -Unasyn 3GM IV q6h while inpt  -As per ID can discharge on Augmentin PO 875mg BID through Sat 4/24 for roughly a two-week course given CT findings   -Culture w/ NGTD.     HOCM (hypertrophic obstructive cardiomyopathy).    c/w cardizem.     Paroxysmal atrial fibrillation.    c/w cardizem, eliquis.     On 4/16/21 this case was reviewed with  ____, the patient is medically stable and optimized for discharge. All medications were reviewed and prescriptions were sent to mutually agreed upon pharmacy. The patient agrees to follow up with providers as recommended.   Patient is a 66 y/o F PMH HOCM s/p septal ablation, Afib on Apixaban s/p ablation, HFpEF, severe MR, Lung CA (diagnosed about 2 years ago, s/p chemo, in remission) p/w R hand/wrist/forearm cellulitis 2/2 dog bite     Cellulitis of hand, right.  -s/p dog bite  -CT Scan right upper extremity: Cutaneous/subcutaneous irregularity along the dorsal aspect of the distal radial shaft which abuts the dorsal aspect of the first and second extensor myotendinous junctions.  No abscess or CT evidence of tenosynovitis. Cellulitis along the dorsum of the hand.  Plastics was consulted rec: Soaks R hand with saline/betadine q4 hours x 20 min/soak. strict elevation R hand at all times  -s/p Clindamycin  -Unasyn 3GM IV q6h while inpt  -As per ID can discharge on Augmentin PO 875mg BID through Sat 4/24 for roughly a two-week course given CT findings   -Culture w/ NGTD.     HOCM (hypertrophic obstructive cardiomyopathy).    c/w cardizem.     Paroxysmal atrial fibrillation.    c/w cardizem, eliquis.     On 4/16/21 this case was reviewed with Dr. Baker , the patient is medically stable and optimized for discharge. All medications were reviewed and prescriptions were sent to mutually agreed upon pharmacy. The patient agrees to follow up with providers as recommended.

## 2021-04-15 NOTE — DISCHARGE NOTE PROVIDER - CARE PROVIDER_API CALL
[facility])  Plastic Surgery; Surgery  [address], [address]  [place] [address]  Phone: [phone]  Fax: [phone]  Follow Up Time:

## 2021-04-15 NOTE — DISCHARGE NOTE PROVIDER - NSDCCPCAREPLAN_GEN_ALL_CORE_FT
PRINCIPAL DISCHARGE DIAGNOSIS  Diagnosis: Cellulitis of hand, right  Assessment and Plan of Treatment: The CT scan of your right upper extremity shows cellulitis along the dorsum of the hand. You were on IV antibiotics inpatient but will take Augmentin orally as prescribed through Sat 4/24.       PRINCIPAL DISCHARGE DIAGNOSIS  Diagnosis: Cellulitis of hand, right  Assessment and Plan of Treatment: The CT scan of your right upper extremity shows cellulitis along the dorsum of the hand. You were on IV antibiotics inpatient but will take Augmentin orally as prescribed through Sat 4/24.  Follow up with Plastic Surgery in 1 week. Call [phone] for an appointment.   Soaks R hand with saline/betadine q 4 hours x 20 min/soak. strict elevation R hand at all times.

## 2021-04-15 NOTE — DISCHARGE NOTE PROVIDER - NSDCMRMEDTOKEN_GEN_ALL_CORE_FT
apixaban 5 mg oral tablet: 1 tab(s) orally once a day (at bedtime)  Cardizem 60 mg oral tablet: 1 tab(s) orally 4 times a day  Multiple Vitamins oral tablet: 1 tab(s) orally once a day   acetaminophen 325 mg oral tablet: 2 tab(s) orally every 6 hours, As Needed  apixaban 5 mg oral tablet: 1 tab(s) orally once a day (at bedtime)  Augmentin 875 mg-125 mg oral tablet: 1 tab(s) orally 2 times a day   Cardizem 60 mg oral tablet: 1 tab(s) orally 4 times a day  Multiple Vitamins oral tablet: 1 tab(s) orally once a day

## 2021-04-16 ENCOUNTER — TRANSCRIPTION ENCOUNTER (OUTPATIENT)
Age: 66
End: 2021-04-16

## 2021-04-16 VITALS
HEART RATE: 61 BPM | SYSTOLIC BLOOD PRESSURE: 126 MMHG | OXYGEN SATURATION: 99 % | RESPIRATION RATE: 17 BRPM | DIASTOLIC BLOOD PRESSURE: 74 MMHG

## 2021-04-16 LAB
ANION GAP SERPL CALC-SCNC: 11 MMOL/L — SIGNIFICANT CHANGE UP (ref 7–14)
BUN SERPL-MCNC: 10 MG/DL — SIGNIFICANT CHANGE UP (ref 7–23)
CALCIUM SERPL-MCNC: 9.1 MG/DL — SIGNIFICANT CHANGE UP (ref 8.4–10.5)
CHLORIDE SERPL-SCNC: 104 MMOL/L — SIGNIFICANT CHANGE UP (ref 98–107)
CO2 SERPL-SCNC: 23 MMOL/L — SIGNIFICANT CHANGE UP (ref 22–31)
CREAT SERPL-MCNC: 1.19 MG/DL — SIGNIFICANT CHANGE UP (ref 0.5–1.3)
GLUCOSE SERPL-MCNC: 93 MG/DL — SIGNIFICANT CHANGE UP (ref 70–99)
HCT VFR BLD CALC: 31.8 % — LOW (ref 34.5–45)
HGB BLD-MCNC: 10.5 G/DL — LOW (ref 11.5–15.5)
MCHC RBC-ENTMCNC: 33 GM/DL — SIGNIFICANT CHANGE UP (ref 32–36)
MCHC RBC-ENTMCNC: 33 PG — SIGNIFICANT CHANGE UP (ref 27–34)
MCV RBC AUTO: 100 FL — SIGNIFICANT CHANGE UP (ref 80–100)
NRBC # BLD: 0 /100 WBCS — SIGNIFICANT CHANGE UP
NRBC # FLD: 0 K/UL — SIGNIFICANT CHANGE UP
PLATELET # BLD AUTO: 229 K/UL — SIGNIFICANT CHANGE UP (ref 150–400)
POTASSIUM SERPL-MCNC: 4.1 MMOL/L — SIGNIFICANT CHANGE UP (ref 3.5–5.3)
POTASSIUM SERPL-SCNC: 4.1 MMOL/L — SIGNIFICANT CHANGE UP (ref 3.5–5.3)
RBC # BLD: 3.18 M/UL — LOW (ref 3.8–5.2)
RBC # FLD: 12.5 % — SIGNIFICANT CHANGE UP (ref 10.3–14.5)
SODIUM SERPL-SCNC: 138 MMOL/L — SIGNIFICANT CHANGE UP (ref 135–145)
WBC # BLD: 3.65 K/UL — LOW (ref 3.8–10.5)
WBC # FLD AUTO: 3.65 K/UL — LOW (ref 3.8–10.5)

## 2021-04-16 PROCEDURE — 99232 SBSQ HOSP IP/OBS MODERATE 35: CPT

## 2021-04-16 RX ORDER — ACETAMINOPHEN 500 MG
650 TABLET ORAL ONCE
Refills: 0 | Status: COMPLETED | OUTPATIENT
Start: 2021-04-16 | End: 2021-04-16

## 2021-04-16 RX ORDER — ACETAMINOPHEN 500 MG
650 TABLET ORAL EVERY 6 HOURS
Refills: 0 | Status: DISCONTINUED | OUTPATIENT
Start: 2021-04-16 | End: 2021-04-16

## 2021-04-16 RX ADMIN — Medication 650 MILLIGRAM(S): at 14:03

## 2021-04-16 RX ADMIN — AMPICILLIN SODIUM AND SULBACTAM SODIUM 200 GRAM(S): 250; 125 INJECTION, POWDER, FOR SUSPENSION INTRAMUSCULAR; INTRAVENOUS at 05:37

## 2021-04-16 RX ADMIN — Medication 650 MILLIGRAM(S): at 06:48

## 2021-04-16 RX ADMIN — Medication 60 MILLIGRAM(S): at 12:03

## 2021-04-16 RX ADMIN — AMPICILLIN SODIUM AND SULBACTAM SODIUM 200 GRAM(S): 250; 125 INJECTION, POWDER, FOR SUSPENSION INTRAMUSCULAR; INTRAVENOUS at 12:03

## 2021-04-16 RX ADMIN — Medication 650 MILLIGRAM(S): at 15:00

## 2021-04-16 RX ADMIN — Medication 650 MILLIGRAM(S): at 05:48

## 2021-04-16 NOTE — PROGRESS NOTE ADULT - SUBJECTIVE AND OBJECTIVE BOX
Follow Up: Dog bite     Interval History/ROS: Afebrile. At this point she's more emotionally disturbed from the dog attack than physically unwell. Improved wrist and finger movements. No chills. No diarrhea.     Allergies  cefdinir (Rash)  moxifloxacin (Rash)  penicillin (Rash)        ANTIMICROBIALS:  ampicillin/sulbactam  IVPB 3 every 6 hours      OTHER MEDS:  apixaban 5 milliGRAM(s) Oral <User Schedule>  diltiazem    Tablet 60 milliGRAM(s) Oral four times a day  oxycodone    5 mG/acetaminophen 325 mG 1 Tablet(s) Oral every 6 hours PRN      Vital Signs Last 24 Hrs  T(C): 36.4 (15 Apr 2021 11:57), Max: 37.1 (14 Apr 2021 21:06)  T(F): 97.6 (15 Apr 2021 11:57), Max: 98.7 (14 Apr 2021 21:06)  HR: 61 (15 Apr 2021 17:05) (59 - 72)  BP: 119/71 (15 Apr 2021 17:05) (109/70 - 137/76)  BP(mean): --  RR: 18 (15 Apr 2021 17:05) (17 - 18)  SpO2: 100% (15 Apr 2021 17:05) (99% - 100%)    Physical Exam:  General: awake, alert, non toxic  Head: atraumatic, normocephalic  Eye: normal sclera and conjunctiva  Cardio: regular rate   Respiratory: nonlabored on room air  abd: nondistended   Musculoskeletal: improved right wrist flexion/extension   Skin: stable bite lesions over right wrist/forearm, mild surrounding associated erythema without open wounds or purulence, fluctuance or significant tenderness                           11.0   4.50  )-----------( 222      ( 15 Apr 2021 06:40 )             32.7       04-15    138  |  103  |  10  ----------------------------<  88  4.0   |  23  |  1.28    Ca    9.1      15 Apr 2021 06:40  Phos  3.9     04-15  Mg     2.1     04-15    TPro  7.1  /  Alb  3.7  /  TBili  0.4  /  DBili  x   /  AST  18  /  ALT  14  /  AlkPhos  70  04-15          MICROBIOLOGY:  Culture - Blood (collected 04-13-21 @ 23:08)  Source: .Blood Blood-Venous  Preliminary Report (04-15-21 @ 01:03):    No growth to date.    Culture - Blood (collected 04-13-21 @ 23:05)  Source: .Blood Blood-Peripheral  Preliminary Report (04-15-21 @ 01:03):    No growth to date.        RADIOLOGY:  Images below reviewed personally  CT Upper Extremity w/ IV Cont, Right (04.13.21 @ 19:30)   Cutaneous/subcutaneous irregularity along the dorsal aspect of the distal radial shaft which abuts the dorsal aspect of the first and second extensor myotendinous junctions.  No abscess or CT evidence of tenosynovitis.  Cellulitis along the dorsum of the hand.
Follow Up: Dog bite    Interval History/ROS: Afebrile. Feels like her wrist/arm is still getting better. No diarrhea.     Allergies  cefdinir (Rash)  moxifloxacin (Rash)  penicillin (Rash)      ANTIMICROBIALS:  ampicillin/sulbactam  IVPB 3 every 6 hours      OTHER MEDS:  acetaminophen   Tablet .. 650 milliGRAM(s) Oral every 6 hours PRN  apixaban 5 milliGRAM(s) Oral <User Schedule>  diltiazem    Tablet 60 milliGRAM(s) Oral four times a day  melatonin 6 milliGRAM(s) Oral at bedtime  oxycodone    5 mG/acetaminophen 325 mG 1 Tablet(s) Oral every 6 hours PRN      Vital Signs Last 24 Hrs  T(C): 36.4 (16 Apr 2021 05:35), Max: 36.4 (16 Apr 2021 05:35)  T(F): 97.5 (16 Apr 2021 05:35), Max: 97.5 (16 Apr 2021 05:35)  HR: 61 (16 Apr 2021 12:01) (59 - 61)  BP: 126/74 (16 Apr 2021 12:01) (106/66 - 126/74)  BP(mean): --  RR: 17 (16 Apr 2021 12:01) (17 - 18)  SpO2: 99% (16 Apr 2021 12:01) (97% - 100%)    Physical Exam:  General: awake, alert, non toxic  Head: atraumatic, normocephalic  Eye: normal sclera and conjunctiva  Cardio: regular rate   Respiratory: nonlabored on room air  abd: soft, no tenderness  Musculoskeletal: stable/improving right wrist flexion/extension, stable/improving bite lesions with erythema, no open wounds, purulence or fluctuance or significant tenderness   psych: normal affect                          10.5   3.65  )-----------( 229      ( 16 Apr 2021 06:43 )             31.8       04-16    138  |  104  |  10  ----------------------------<  93  4.1   |  23  |  1.19    Ca    9.1      16 Apr 2021 06:43  Phos  3.9     04-15  Mg     2.1     04-15    TPro  7.1  /  Alb  3.7  /  TBili  0.4  /  DBili  x   /  AST  18  /  ALT  14  /  AlkPhos  70  04-15      MICROBIOLOGY:  Culture - Blood (collected 04-13-21 @ 23:08)  Source: .Blood Blood-Venous  Preliminary Report (04-15-21 @ 01:03):    No growth to date.    Culture - Blood (collected 04-13-21 @ 23:05)  Source: .Blood Blood-Peripheral  Preliminary Report (04-15-21 @ 01:03):    No growth to date.      RADIOLOGY:  Images below reviewed personally  CT Upper Extremity w/ IV Cont, Right (04.13.21 @ 19:30)   Cutaneous/subcutaneous irregularity along the dorsal aspect of the distal radial shaft which abuts the dorsal aspect of the first and second extensor myotendinous junctions.  No abscess or CT evidence of tenosynovitis.  Cellulitis along the dorsum of the hand.
    SUBJECTIVE / OVERNIGHT EVENTS: pt seen and examined       MEDICATIONS  (STANDING):  ampicillin/sulbactam  IVPB 3 Gram(s) IV Intermittent every 6 hours  apixaban 5 milliGRAM(s) Oral <User Schedule>  diltiazem    Tablet 60 milliGRAM(s) Oral four times a day    MEDICATIONS  (PRN):    Vital Signs Last 24 Hrs  T(C): 37.1 (14 Apr 2021 21:06), Max: 37.1 (14 Apr 2021 14:26)  T(F): 98.7 (14 Apr 2021 21:06), Max: 98.8 (14 Apr 2021 14:26)  HR: 72 (14 Apr 2021 21:06) (59 - 72)  BP: 137/76 (14 Apr 2021 21:06) (107/57 - 137/76)  BP(mean): --  RR: 18 (14 Apr 2021 21:06) (18 - 18)  SpO2: 99% (14 Apr 2021 21:06) (96% - 100%)    CAPILLARY BLOOD GLUCOSE        I&O's Summary      Constitutional: No fever, fatigue  Skin: No rash.  Eyes: No recent vision problems or eye pain.  ENT: No congestion, ear pain, or sore throat.  Cardiovascular: No chest pain or palpation.  Respiratory: No cough, shortness of breath, congestion, or wheezing.  Gastrointestinal: No abdominal pain, nausea, vomiting, or diarrhea.  Genitourinary: No dysuria.  Musculoskeletal: No joint swelling.  Neurologic: No headache.    PHYSICAL EXAM:  GENERAL: NAD  EYES: EOMI, PERRLA  NECK: Supple, No JVD  CHEST/LUNG: dec breath sounds at bases  HEART:  S1 , S2 +  ABDOMEN: soft, bs+  EXTREMITIES: rt wrist/ hand erythema+  NEUROLOGY:alert awake      LABS:                        10.1   4.41  )-----------( 210      ( 14 Apr 2021 07:01 )             30.7     04-14    137  |  105  |  11  ----------------------------<  87  3.7   |  24  |  1.08    Ca    8.8      14 Apr 2021 07:01  Phos  4.3     04-14  Mg     1.9     04-14    TPro  6.9  /  Alb  3.5  /  TBili  0.4  /  DBili  x   /  AST  18  /  ALT  12  /  AlkPhos  65  04-14    PT/INR - ( 13 Apr 2021 15:57 )   PT: 13.1 sec;   INR: 1.15 ratio         PTT - ( 13 Apr 2021 15:57 )  PTT:22.1 sec          RADIOLOGY & ADDITIONAL TESTS:    Imaging Personally Reviewed:    Consultant(s) Notes Reviewed:      Care Discussed with Consultants/Other Providers:  
    SUBJECTIVE / OVERNIGHT EVENTS: pt seen and examined     MEDICATIONS  (STANDING):  ampicillin/sulbactam  IVPB 3 Gram(s) IV Intermittent every 6 hours  apixaban 5 milliGRAM(s) Oral <User Schedule>  diltiazem    Tablet 60 milliGRAM(s) Oral four times a day  melatonin 6 milliGRAM(s) Oral at bedtime    MEDICATIONS  (PRN):  oxycodone    5 mG/acetaminophen 325 mG 1 Tablet(s) Oral every 6 hours PRN Severe Pain (7 - 10)    Vital Signs Last 24 Hrs  T(C): 36.3 (15 Apr 2021 21:29), Max: 36.4 (15 Apr 2021 11:57)  T(F): 97.4 (15 Apr 2021 21:29), Max: 97.6 (15 Apr 2021 11:57)  HR: 60 (15 Apr 2021 21:29) (60 - 61)  BP: 108/63 (15 Apr 2021 21:29) (108/63 - 119/71)  BP(mean): --  RR: 18 (15 Apr 2021 21:29) (17 - 18)  SpO2: 97% (15 Apr 2021 21:29) (97% - 100%)    Constitutional: No fever, fatigue  Skin: No rash.  Eyes: No recent vision problems or eye pain.  ENT: No congestion, ear pain, or sore throat.  Cardiovascular: No chest pain or palpation.  Respiratory: No cough, shortness of breath, congestion, or wheezing.  Gastrointestinal: No abdominal pain, nausea, vomiting, or diarrhea.  Genitourinary: No dysuria.  Musculoskeletal: No joint swelling.  Neurologic: No headache.    PHYSICAL EXAM:  GENERAL: NAD  EYES: EOMI, PERRLA  NECK: Supple, No JVD  CHEST/LUNG: dec breath sounds at bases  HEART:  S1 , S2 +  ABDOMEN: soft, bs+  EXTREMITIES: rt wrist/ hand erythema+  NEUROLOGY:alert awake    LABS:  04-15    138  |  103  |  10  ----------------------------<  88  4.0   |  23  |  1.28    Ca    9.1      15 Apr 2021 06:40  Phos  3.9     04-15  Mg     2.1     04-15    TPro  7.1  /  Alb  3.7  /  TBili  0.4  /  DBili      /  AST  18  /  ALT  14  /  AlkPhos  70  04-15    Creatinine Trend: 1.28 <--, 1.08 <--, 1.18 <--                        11.0   4.50  )-----------( 222      ( 15 Apr 2021 06:40 )             32.7     Urine Studies:            LIVER FUNCTIONS - ( 15 Apr 2021 06:40 )  Alb: 3.7 g/dL / Pro: 7.1 g/dL / ALK PHOS: 70 U/L / ALT: 14 U/L / AST: 18 U/L / GGT: x               Care Discussed with Consultants/Other Providers:

## 2021-04-16 NOTE — DISCHARGE NOTE NURSING/CASE MANAGEMENT/SOCIAL WORK - PATIENT PORTAL LINK FT
You can access the FollowMyHealth Patient Portal offered by [facility] by registering at the following website: [email or web address]. By joining [facility]’s FollowMyHealth portal, you will also be able to view your health information using other applications (apps) compatible with our system.

## 2021-04-16 NOTE — PROGRESS NOTE ADULT - ASSESSMENT
65F was bit by her neighbor's pit bull 4/9/21, admitted 4/13 for continued pain and swelling despite outpatient antibiotics.   Doing well, not much residual cellulitis and improving motion.   CT without collections although inflammation noted abutting extensor myotendinous junctions.   None of her listed antibiotic allergies caused allergic symptoms. Tolerating Unasyn.   Reports receiving tetanus vaccine and starting rabies therapy.     Suggest  -Unasyn 3GM IV q6h while here   -can discharge on Augmentin PO 875mg BID through Sat 4/24 for roughly a two-week course given CT findings   -will defer rabies vaccination to primary team     Spoke with medicine     Nathanael Bae MD   Infectious Disease   Pager [phone]   After 5PM and on weekends please page fellow on call or call [phone]
65F was bit by her neighbor's pit bull 4/9/21, admitted 4/13 for continued pain and swelling despite outpatient antibiotics.   Doing well, not much residual cellulitis and improving motion.   CT without collections although inflammation noted abutting extensor myotendinous junctions.   None of her listed antibiotic allergies caused allergic symptoms. Tolerating Unasyn.   Reports receiving tetanus vaccine and starting rabies therapy.     Suggest  -Unasyn 3GM IV q6h while here   -can discharge on Augmentin PO 875mg BID through Sat 4/24 for roughly a two-week course given CT findings   -will defer rabies vaccination to primary team     Spoke with medicine     Nathanael Bae MD   Infectious Disease   Pager [phone]   After 5PM and on weekends please page fellow on call or call [phone]
Patient is a 66 y/o F PMH HOCM s/p septal ablation, Afib on Apixaban s/p ablation, HFpEF, severe MR, Lung CA (diagnosed about 2 years ago, s/p chemo, in remission) p/w R hand/wrist/forearm cellulitis 2/2 dog bite
Patient is a 64 y/o F PMH HOCM s/p septal ablation, Afib on Apixaban s/p ablation, HFpEF, severe MR, Lung CA (diagnosed about 2 years ago, s/p chemo, in remission) p/w R hand/wrist/forearm cellulitis 2/2 dog bite

## 2021-04-19 LAB
CULTURE RESULTS: SIGNIFICANT CHANGE UP
CULTURE RESULTS: SIGNIFICANT CHANGE UP
SPECIMEN SOURCE: SIGNIFICANT CHANGE UP
SPECIMEN SOURCE: SIGNIFICANT CHANGE UP

## 2021-05-13 NOTE — DIETITIAN INITIAL EVALUATION ADULT. - ENERGY NEEDS
No Ht:  66" (167.64 cm), Wt: 179.8 pounds (81.7 kg, 4/11), , %%, BMI 29  Patient admitted for pleural effusion and with chronic atrial fibrillation and gastritis.  Patient with history of gastritis and duodenitis, pneumonia, pulmonary HTN, Mitral Valve insufficiency, and heart failure.  No edema.  Skin intact, Benedicto Score 21.

## 2021-05-24 ENCOUNTER — APPOINTMENT (OUTPATIENT)
Dept: NEUROLOGY | Facility: CLINIC | Age: 66
End: 2021-05-24
Payer: MEDICAID

## 2021-05-24 VITALS
SYSTOLIC BLOOD PRESSURE: 126 MMHG | HEART RATE: 83 BPM | DIASTOLIC BLOOD PRESSURE: 78 MMHG | WEIGHT: 200 LBS | TEMPERATURE: 97.2 F | HEIGHT: 67 IN | OXYGEN SATURATION: 97 % | BODY MASS INDEX: 31.39 KG/M2

## 2021-05-24 DIAGNOSIS — R41.3 OTHER AMNESIA: ICD-10-CM

## 2021-05-24 PROCEDURE — 99205 OFFICE O/P NEW HI 60 MIN: CPT

## 2021-05-25 NOTE — PHYSICAL EXAM
[Person] : oriented to person [Place] : oriented to place [Time] : oriented to time [Short Term Intact] : short term memory impaired [Concentration Intact] : normal concentrating ability [Visual Intact] : visual attention was ~T not ~L decreased [Naming Objects] : no difficulty naming common objects [Repeating Phrases] : no difficulty repeating a phrase [Writing A Sentence] : no difficulty writing a sentence [Fluency] : fluency intact [Comprehension] : comprehension intact [Reading] : reading intact [Past History] : adequate knowledge of personal past history [Cranial Nerves Optic (II)] : visual acuity intact bilaterally,  visual fields full to confrontation, pupils equal round and reactive to light [Cranial Nerves Oculomotor (III)] : extraocular motion intact [Cranial Nerves Trigeminal (V)] : facial sensation intact symmetrically [Cranial Nerves Facial (VII)] : face symmetrical [Cranial Nerves Vestibulocochlear (VIII)] : hearing was intact bilaterally [Cranial Nerves Glossopharyngeal (IX)] : tongue and palate midline [Cranial Nerves Accessory (XI - Cranial And Spinal)] : head turning and shoulder shrug symmetric [Cranial Nerves Hypoglossal (XII)] : there was no tongue deviation with protrusion [Motor Tone] : muscle tone was normal in all four extremities [Motor Strength] : muscle strength was normal in all four extremities [No Muscle Atrophy] : normal bulk in all four extremities [Paresis Pronator Drift Right-Sided] : no pronator drift on the right [Paresis Pronator Drift Left-Sided] : no pronator drift on the left [Motor Strength Upper Extremities Right] : strength was normal in the right upper extremity [Motor Strength Lower Extremities Right] : strength was normal in the right lower extremity [Motor Strength Lower Extremities Left] : there was weakness of the left lower extremity [4] : hip extension 4/5 [+4] : knee extension +4/5 [5] : toe flexion 5/5 [Sensation Tactile Decrease] : light touch was intact [Abnormal Walk] : normal gait [Balance] : balance was intact [Past-pointing] : there was no past-pointing [Tremor] : no tremor present [2+] : Ankle jerk left 2+ [Plantar Reflex Right Only] : normal on the right [Plantar Reflex Left Only] : normal on the left

## 2021-05-25 NOTE — REVIEW OF SYSTEMS
[Confused or Disoriented] : confusion [Memory Lapses or Loss] : memory loss [Decr. Concentrating Ability] : decreased concentrating ability [Negative] : Constitutional [Difficulty with Language] : no ~M difficulty with language [Changed Thought Patterns] : no change in thought patterns [Facial Weakness] : no facial weakness [Arm Weakness] : no arm weakness [Numbness] : no numbness [Tingling] : no tingling [Seizures] : no convulsions [Dizziness] : no dizziness [Fainting] : no fainting [Lightheadedness] : no lightheadedness [Migraine Headache] : no migraine headache [Difficulty Walking] : no difficulty walking [Inability to Walk] : able to walk [Ataxia] : no ataxia
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
## 2021-06-17 NOTE — H&P ADULT - NS NEC GEN PE MLT EXAM PC
Instructions: This plan will send the code FBSD to the PM system.  DO NOT or CHANGE the price.
Detail Level: Simple
Price (Do Not Change): 0.00
detailed exam

## 2021-07-05 NOTE — H&P ADULT - PROBLEM SELECTOR PROBLEM 1
Initial Anesthesia Post-op Note    Patient: Jorge Luis Merritt  Procedure(s) Performed: LEFT SHOULDER ARTHROSCOPY SUBACROMIAL DECOMPRESSION, DISTAL CLAVICLE EXCISION AND OPEN SUBPECTORAL BICEPS TENODESIS. - LEFTDECOMPRESSION, SUBACROMIAL SPACE - LEFTEXCISION, CLAVICLE, DISTAL - LEFTTENOTOMY, BICEPS - LEFT  Anesthesia type: General    Vitals Value Taken Time   Temp 97.4 07/05/21 1039   Pulse 92 07/05/21 1037   Resp 30 07/05/21 1037   SpO2 93 % 07/05/21 1037   /89 07/05/21 1037   Vitals shown include unvalidated device data.      Patient Location: PACU Phase 1  Post-op Vital Signs:stable  Level of Consciousness: awake and participates in exam  Respiratory Status: spontaneous ventilation and face mask  Cardiovascular stable  Hydration: euvolemic  Pain Management: well controlled  Handoff: Handoff to receiving clinician was performed and questions were answered  Vomiting: none  Nausea: None  Airway Patency:patent  Post-op Assessment: no complications, patient tolerated procedure well with no complications, regional anesthetic in place - able to participate and dentition within defined limits  Comments: Pt extubated in OR after criteria met; VSS, spont resp with supplemental O2 via CFM; transported to PACU, report given to RN; continue supplemental O2 as needed      No complications documented.   Cellulitis of hand, right

## 2021-08-03 ENCOUNTER — APPOINTMENT (OUTPATIENT)
Dept: NEUROLOGY | Facility: CLINIC | Age: 66
End: 2021-08-03

## 2021-08-04 ENCOUNTER — INPATIENT (INPATIENT)
Facility: HOSPITAL | Age: 66
LOS: 7 days | Discharge: HOME | End: 2021-08-12
Attending: INTERNAL MEDICINE | Admitting: INTERNAL MEDICINE
Payer: MEDICAID

## 2021-08-04 VITALS
HEART RATE: 94 BPM | TEMPERATURE: 98 F | HEIGHT: 66 IN | RESPIRATION RATE: 16 BRPM | SYSTOLIC BLOOD PRESSURE: 129 MMHG | OXYGEN SATURATION: 100 % | DIASTOLIC BLOOD PRESSURE: 94 MMHG

## 2021-08-04 DIAGNOSIS — Z98.891 HISTORY OF UTERINE SCAR FROM PREVIOUS SURGERY: Chronic | ICD-10-CM

## 2021-08-04 DIAGNOSIS — R60.9 EDEMA, UNSPECIFIED: ICD-10-CM

## 2021-08-04 DIAGNOSIS — Z98.890 OTHER SPECIFIED POSTPROCEDURAL STATES: Chronic | ICD-10-CM

## 2021-08-04 LAB
ALBUMIN SERPL ELPH-MCNC: 4.5 G/DL — SIGNIFICANT CHANGE UP (ref 3.3–5)
ALP SERPL-CCNC: 90 U/L — SIGNIFICANT CHANGE UP (ref 40–120)
ALT FLD-CCNC: 20 U/L — SIGNIFICANT CHANGE UP (ref 4–33)
ANION GAP SERPL CALC-SCNC: 16 MMOL/L — HIGH (ref 7–14)
APPEARANCE UR: CLEAR — SIGNIFICANT CHANGE UP
AST SERPL-CCNC: 22 U/L — SIGNIFICANT CHANGE UP (ref 4–32)
BASOPHILS # BLD AUTO: 0.05 K/UL — SIGNIFICANT CHANGE UP (ref 0–0.2)
BASOPHILS NFR BLD AUTO: 0.8 % — SIGNIFICANT CHANGE UP (ref 0–2)
BILIRUB SERPL-MCNC: 0.3 MG/DL — SIGNIFICANT CHANGE UP (ref 0.2–1.2)
BILIRUB UR-MCNC: NEGATIVE — SIGNIFICANT CHANGE UP
BUN SERPL-MCNC: 13 MG/DL — SIGNIFICANT CHANGE UP (ref 7–23)
CALCIUM SERPL-MCNC: 9.3 MG/DL — SIGNIFICANT CHANGE UP (ref 8.4–10.5)
CHLORIDE SERPL-SCNC: 101 MMOL/L — SIGNIFICANT CHANGE UP (ref 98–107)
CO2 SERPL-SCNC: 22 MMOL/L — SIGNIFICANT CHANGE UP (ref 22–31)
COLOR SPEC: COLORLESS — SIGNIFICANT CHANGE UP
CREAT SERPL-MCNC: 1.39 MG/DL — HIGH (ref 0.5–1.3)
DIFF PNL FLD: NEGATIVE — SIGNIFICANT CHANGE UP
EOSINOPHIL # BLD AUTO: 0.2 K/UL — SIGNIFICANT CHANGE UP (ref 0–0.5)
EOSINOPHIL NFR BLD AUTO: 3.1 % — SIGNIFICANT CHANGE UP (ref 0–6)
GLUCOSE SERPL-MCNC: 93 MG/DL — SIGNIFICANT CHANGE UP (ref 70–99)
GLUCOSE UR QL: NEGATIVE — SIGNIFICANT CHANGE UP
HCT VFR BLD CALC: 34.5 % — SIGNIFICANT CHANGE UP (ref 34.5–45)
HGB BLD-MCNC: 11.6 G/DL — SIGNIFICANT CHANGE UP (ref 11.5–15.5)
IANC: 3.85 K/UL — SIGNIFICANT CHANGE UP (ref 1.5–8.5)
IMM GRANULOCYTES NFR BLD AUTO: 0.2 % — SIGNIFICANT CHANGE UP (ref 0–1.5)
KETONES UR-MCNC: NEGATIVE — SIGNIFICANT CHANGE UP
LEUKOCYTE ESTERASE UR-ACNC: ABNORMAL
LYMPHOCYTES # BLD AUTO: 1.73 K/UL — SIGNIFICANT CHANGE UP (ref 1–3.3)
LYMPHOCYTES # BLD AUTO: 26.6 % — SIGNIFICANT CHANGE UP (ref 13–44)
MCHC RBC-ENTMCNC: 32.6 PG — SIGNIFICANT CHANGE UP (ref 27–34)
MCHC RBC-ENTMCNC: 33.6 GM/DL — SIGNIFICANT CHANGE UP (ref 32–36)
MCV RBC AUTO: 96.9 FL — SIGNIFICANT CHANGE UP (ref 80–100)
MONOCYTES # BLD AUTO: 0.66 K/UL — SIGNIFICANT CHANGE UP (ref 0–0.9)
MONOCYTES NFR BLD AUTO: 10.2 % — SIGNIFICANT CHANGE UP (ref 2–14)
NEUTROPHILS # BLD AUTO: 3.85 K/UL — SIGNIFICANT CHANGE UP (ref 1.8–7.4)
NEUTROPHILS NFR BLD AUTO: 59.1 % — SIGNIFICANT CHANGE UP (ref 43–77)
NITRITE UR-MCNC: NEGATIVE — SIGNIFICANT CHANGE UP
NRBC # BLD: 0 /100 WBCS — SIGNIFICANT CHANGE UP
NRBC # FLD: 0 K/UL — SIGNIFICANT CHANGE UP
NT-PROBNP SERPL-SCNC: 3436 PG/ML — HIGH
PH UR: 6 — SIGNIFICANT CHANGE UP (ref 5–8)
PLATELET # BLD AUTO: 242 K/UL — SIGNIFICANT CHANGE UP (ref 150–400)
POTASSIUM SERPL-MCNC: 3.3 MMOL/L — LOW (ref 3.5–5.3)
POTASSIUM SERPL-SCNC: 3.3 MMOL/L — LOW (ref 3.5–5.3)
PROT SERPL-MCNC: 7.7 G/DL — SIGNIFICANT CHANGE UP (ref 6–8.3)
PROT UR-MCNC: NEGATIVE — SIGNIFICANT CHANGE UP
RBC # BLD: 3.56 M/UL — LOW (ref 3.8–5.2)
RBC # FLD: 12 % — SIGNIFICANT CHANGE UP (ref 10.3–14.5)
SODIUM SERPL-SCNC: 139 MMOL/L — SIGNIFICANT CHANGE UP (ref 135–145)
SP GR SPEC: 1.01 — LOW (ref 1.01–1.02)
TROPONIN T, HIGH SENSITIVITY RESULT: 32 NG/L — SIGNIFICANT CHANGE UP
UROBILINOGEN FLD QL: SIGNIFICANT CHANGE UP
WBC # BLD: 6.5 K/UL — SIGNIFICANT CHANGE UP (ref 3.8–10.5)
WBC # FLD AUTO: 6.5 K/UL — SIGNIFICANT CHANGE UP (ref 3.8–10.5)

## 2021-08-04 PROCEDURE — 99285 EMERGENCY DEPT VISIT HI MDM: CPT

## 2021-08-04 PROCEDURE — 71046 X-RAY EXAM CHEST 2 VIEWS: CPT | Mod: 26

## 2021-08-04 PROCEDURE — 99223 1ST HOSP IP/OBS HIGH 75: CPT

## 2021-08-04 RX ADMIN — Medication 1 MILLIGRAM(S): at 21:38

## 2021-08-04 NOTE — H&P ADULT - PROBLEM SELECTOR PLAN 1
-unclear what is driving weight gain or swelling. Pt   -for now, will order tte, obtain spot Urine Cr/Pr, TSH, cortisol level  -c/w lasix, spironolactone -unclear what is driving weight gain or swelling. Pt   -for now, will order tte, obtain spot Urine Cr/Pr, TSH, cortisol level. duplex of legs, CT chest/abd/pelvis ordered   -c/w lasix, spironolactone -unclear what is driving weight gain or swelling.   -for now, will order tte, obtain spot Urine Cr/Pr, TSH, cortisol level. duplex of legs, CT chest/abd/pelvis ordered   -c/w lasix, spironolactone

## 2021-08-04 NOTE — ED PROVIDER NOTE - CARDIAC, MLM
Normal rate, regular rhythm.  Heart sounds S1, S2.  No murmurs, rubs or gallops. Normal rate, regular rhythm.  Heart sounds S1, S2.  No murmurs, rubs or gallops. No LE edema, non pitting

## 2021-08-04 NOTE — H&P ADULT - PROBLEM SELECTOR PLAN 4
lung cancer (s/p chemo, in remission); has follow up appointment with her Oncologist Dr Vega next week

## 2021-08-04 NOTE — H&P ADULT - NSHPLABSRESULTS_GEN_ALL_CORE
11.6   6.50  )-----------( 242      ( 04 Aug 2021 21:48 )             34.5     08-04    139  |  101  |  13  ----------------------------<  93  3.3<L>   |  22  |  1.39<H>    Ca    9.3      04 Aug 2021 21:48    TPro  7.7  /  Alb  4.5  /  TBili  0.3  /  DBili  x   /  AST  22  /  ALT  20  /  AlkPhos  90  08-04    CAPILLARY BLOOD GLUCOSE          Urinalysis Basic - ( 04 Aug 2021 23:00 )    Color: Colorless / Appearance: Clear / S.006 / pH: x  Gluc: x / Ketone: Negative  / Bili: Negative / Urobili: <2 mg/dL   Blood: x / Protein: Negative / Nitrite: Negative   Leuk Esterase: Moderate / RBC: 1 /HPF / WBC 12 /HPF   Sq Epi: x / Non Sq Epi: 1 /HPF / Bacteria: Negative      Vital Signs Last 24 Hrs  T(C): 36.5 (05 Aug 2021 01:20), Max: 36.9 (05 Aug 2021 00:14)  T(F): 97.7 (05 Aug 2021 01:20), Max: 98.4 (05 Aug 2021 00:14)  HR: 81 (05 Aug 2021 01:20) (76 - 94)  BP: 110/61 (05 Aug 2021 01:20) (110/61 - 129/94)  BP(mean): --  RR: 18 (05 Aug 2021 01:20) (16 - 18)  SpO2: 100% (05 Aug 2021 01:20) (99% - 100%)

## 2021-08-04 NOTE — H&P ADULT - NSICDXPASTMEDICALHX_GEN_ALL_CORE_FT
PAST MEDICAL HISTORY:  AF (atrial fibrillation) On Coumadin    CAP (community acquired pneumonia) RML/RLL at [facility] 3/2017    Chronic diastolic (congestive) heart failure     GERD (gastroesophageal reflux disease)     HOCM (hypertrophic obstructive cardiomyopathy) S/P septal ablation    Lung cancer s/p chemotherapy    Lung cancer s/p chemotherapy ~2017 now in remission    Pleural effusion Right parapneumonic drained twice 3/2017 at [facility]

## 2021-08-04 NOTE — H&P ADULT - HISTORY OF PRESENT ILLNESS
65y/o woman with Hx of HOCM s/p septal ablation, chronic systolic CHF, LVEF 55%, lung cancer (diagnosed about 2 years ago, s/p chemo, in remission) and persistent atrial fibrillation s/p ablation on 12/30/2020 who presents   65y/o woman with Hx of HOCM s/p septal ablation, chronic systolic CHF, LVEF 55%, lung cancer (diagnosed about 2 years ago, s/p chemo, in remission) and persistent atrial fibrillation  s/p ablation on 12/30/2020 (currently on eliquis). Pt reports increasing weight gain, diffuse whole body  swelling over last  few months that she reports accelerated this last week. She can no longer fit comfortably in her clothes. Reports reducing caloric intake few months ago  and was also  placed on lasix 3weeks ago and spironolactone yesterday but reports swelling has only worsened. Denies cp, sob, dizziness.    67y/o woman with Hx of HOCM s/p septal ablation, chronic systolic CHF, LVEF 55%, lung cancer (diagnosed about 2 years ago, s/p chemo, in remission) and persistent atrial fibrillation  s/p ablation on 12/30/2020 (currently on eliquis). Pt reports increasing weight gain, diffuse whole body  swelling over last  few months that she reports accelerated this last week. She can no longer fit comfortably in her clothes. Reports reducing caloric intake few months ago  and was also  placed on lasix 3weeks ago and spironolactone yesterday but reports swelling has only worsened. Denies cp, sob, dizziness. Notes taking extra dose of diltiazem (60mg/ 4 times daily instead of tid) but has been doing so for many years)   67y/o woman with Hx of HOCM s/p septal ablation, chronic systolic CHF, LVEF 55%, lung cancer (s/p chemo, in remission) and persistent atrial fibrillation  s/p ablation on 12/30/2020 (currently on eliquis). Pt reports increasing weight gain, diffuse whole body  swelling over last  few months that she reports accelerated this last week. She can no longer fit comfortably in her clothes. Reports reducing caloric intake few months ago  and was also  placed on lasix 3weeks ago and spironolactone yesterday but reports swelling has only worsened. Denies cp, sob, dizziness. Notes taking extra dose of diltiazem (60mg/ 4 times daily instead of tid) but has been doing so for many years)

## 2021-08-04 NOTE — ED PROVIDER NOTE - GASTROINTESTINAL, MLM
Abdomen soft, non-tender, no guarding. Abdomen soft, non-tender, no guarding. No abdominal distention.

## 2021-08-04 NOTE — H&P ADULT - NSHPREVIEWOFSYSTEMS_GEN_ALL_CORE
Review of Systems:   CONSTITUTIONAL: No fever; + weight gain  EYES: No eye pain, visual disturbances, or discharge  ENMT:  No difficulty hearing, tinnitus, vertigo; No sinus or throat pain  NECK: No pain or stiffness  RESPIRATORY: No cough, wheezing, chills or hemoptysis; No shortness of breath  CARDIOVASCULAR: No chest pain, palpitations, dizziness  GASTROINTESTINAL: No abdominal or epigastric pain. No nausea, vomiting, or hematemesis; No diarrhea or constipation. No melena or hematochezia.  GENITOURINARY: No dysuria, frequency, hematuria, or incontinence  NEUROLOGICAL: No headaches, memory loss, loss of strength, numbness, or tremors  SKIN: No itching, burning, rashes, or lesions   MUSCULOSKELETAL: No joint pain or swelling; No muscle, back, or extremity pain  PSYCHIATRIC: + anxiety

## 2021-08-04 NOTE — ED PROVIDER NOTE - OBJECTIVE STATEMENT
65 y/o female with pmhx of Afib s/p ablation, on Eliquis, CHF, lung CA in remission, presents to ED c/o weight gain and edema. Pt states she has gained 35 lbs in the last 7 months. States she noticed her breasts, abdomen and legs are swollen. Pt feels bloated. Worse over the last week where she no longer fits in her clothes. Pt states her doctor gave her another diuretic. Patient's cardiologist is Dr. Yecenia Villa, pt seen few days ago. Pt states her cardiologist told her cancer is probably back and brushed her off. Pt tearful, states she feels like she is going to die. Has apt with oncologist next week. Pt with history of anxiety, talks to psychiatrist, states they always try and give her medication for it when she's here. No fever, cp, sob, cough, n/v, calf pain. 65 y/o female with pmhx of Afib s/p ablation, on Eliquis, CHF, lung CA in remission, anxiety, presents to ED c/o weight gain and edema. Pt states she has gained 35 lbs in the last 7 months. States she noticed her breasts, abdomen and legs are swollen. Pt feels bloated. Worse over the last week where she no longer fits in her clothes. Pt states her doctor gave her another diuretic. Patient's cardiologist is Dr. Yecenia Villa, pt seen few days ago. Pt states her cardiologist told her cancer is probably back and brushed her off. Pt tearful, states she feels like she is going to die. Has apt with oncologist next week. Pt with history of anxiety, talks to psychiatrist, states they always try and give her medication for it when she's here. Denies SI or HI. States "I have so much to live for I have a son." No fever, cp, sob, cough, n/v, calf pain.

## 2021-08-04 NOTE — ED PROVIDER NOTE - PMH
AF (atrial fibrillation)  On Coumadin  CAP (community acquired pneumonia)  RML/RLL at [facility] 3/2017  Chronic diastolic (congestive) heart failure    GERD (gastroesophageal reflux disease)    HOCM (hypertrophic obstructive cardiomyopathy)  S/P septal ablation  Lung cancer  s/p chemotherapy  Lung cancer  s/p chemotherapy ~2017 now in remission  Pleural effusion  Right parapneumonic drained twice 3/2017 at [facility]

## 2021-08-04 NOTE — ED ADULT TRIAGE NOTE - CHIEF COMPLAINT QUOTE
pt endorsing swelling to bilateral upper and lower extremities as well as increased bloating. Pt states "I've gained 5 pounds in a couple days and I think I'm having a reaction to Eliquis." Pt very anxious in triage and tearful. Denies CP, SOB, Afib.

## 2021-08-04 NOTE — H&P ADULT - PROBLEM SELECTOR PLAN 3
mild aris likely reflects recent dual diuretic use but will obtain spot urine Cr/Pr to evaluate for possible cause of diffuse swelling

## 2021-08-04 NOTE — ED PROVIDER NOTE - ATTENDING CONTRIBUTION TO CARE
I performed a face to face evaluation of this patient and obtained a history and performed a full exam.  I agree with the history, physical exam and plan of the PA.    Brief HPI:  67 yo F with pmhx of Afib s/p ablation, on Eliquis, CHF, lung CA in remission, anxiety, presents to ED c/o weight gain and edema since changing Eliquis dose to bid ~1 week ago.  Denies sob, chest pain, nausea, vomiting.  States she is having swelling in arms, legs, abdomen, and b/l breasts.      Vitals:   Reviewed    Exam:    GEN:  Non-toxic appearing, non-distressed, speaking full sentences, non-diaphoretic, AAOx3  HEENT:  NCAT, neck supple, EOMI, PERRLA, sclera anicteric, no conjunctival pallor or injection, no stridor, normal voice, no tonsillar exudate, uvula midline  CV:  regular rhythm and rate, s1/s2 audible, no murmurs, rubs or gallops, peripheral pulses 2+ and symmetric  PULM:  non-labored respirations, lungs clear to auscultation bilaterally, no wheezes, crackles or rales  ABD:  non distended, non-tender, no rebound, no guarding, negative Santos's sign, bowel sounds normal, no cvat  MSK:  no gross deformity, non-tender extremities and joints, range of motion grossly normal appearing, no extremity edema, extremities warm and well perfused   NEURO:  AAOx3, CN II-XII intact, motor 5/5 in upper and lower extremities bilaterally, sensation grossly intact in extremities and trunk  SKIN:  warm, dry, no rash or vesicles     A/P:  67 yo F with pmhx of Afib s/p ablation, on Eliquis, CHF, lung CA in remission, anxiety, presents to ED c/o weight gain and edema since changing Eliquis dose to bid ~1 week ago.   VSS AF.  Exam non-toxic appearing.  EKG aflutter, hr 60s, no ischemic change.  Appears euvolemic on exam, lungs clear and no peripheral edema.  Unclear etiology of sx.  Low c/f acute decompensated hf.  Low c/f dvt as pt on a/c.  Plan for labs, cxr, supportive care.  Dispo pending.

## 2021-08-04 NOTE — ED PROVIDER NOTE - CLINICAL SUMMARY MEDICAL DECISION MAKING FREE TEXT BOX
67 y/o female with pmhx of Afib s/p ablation, on Eliquis, CHF, lung CA in remission, presents to ED c/o weight gain and edema. Pt states she has gained 35 lbs in the last 7 months. States she noticed her breasts, abdomen and legs are swollen. Pt feels bloated. Worse over the last week where she no longer fits in her clothes. Pt states her doctor gave her another diuretic. Patient's cardiologist is Dr. Yecenia Villa, pt seen few days ago. Pt states her cardiologist told her cancer is probably back and brushed her off. Pt tearful, states she feels like she is going to die. Has apt with oncologist next week. Pt with history of anxiety, talks to psychiatrist, states they always try and give her medication for it when she's here. No fever, cp, sob, cough, n/v, calf pain. on exam pt tearful, anxious, pressured and rapid speech, no le edema or abd distention on exam. lungs clear, no respiratory distress. plan to give ativan 1mg for anxiety, check labs including proBNP, troponin, cxr, UA, TSH and refer to cards outpatient for possible echo, oncology f.u

## 2021-08-04 NOTE — H&P ADULT - NSHPPHYSICALEXAM_GEN_ALL_CORE
PHYSICAL EXAM:      Constitutional: NAD, well-groomed, well-developed  HEENT: EOMI, Normal Hearing  Neck: No LAD, No JVD  Back: Normal spine flexure, No CVA tenderness  Respiratory: CTAB  Cardiovascular: S1 and S2, RRR  Gastrointestinal: BS+, soft, NT/ND  Extremities: No pitting leg edema   Vascular: 2+ peripheral pulses  Neurological: A/O x 3, no focal deficits  Psychiatric: Normal mood, mildly anxious affect  Musculoskeletal: 5/5 strength b/l upper and lower extremities  Skin: No rashes

## 2021-08-04 NOTE — ED PROVIDER NOTE - CROS ED ROS STATEMENT
Called and left detailed message.   Dr. Baez could treat the celiac axis, which has not been treated before.  This could help his pain.   A request for patient to call back with a decision.   all other ROS negative except as per HPI

## 2021-08-05 DIAGNOSIS — N17.9 ACUTE KIDNEY FAILURE, UNSPECIFIED: ICD-10-CM

## 2021-08-05 DIAGNOSIS — C34.90 MALIGNANT NEOPLASM OF UNSPECIFIED PART OF UNSPECIFIED BRONCHUS OR LUNG: ICD-10-CM

## 2021-08-05 DIAGNOSIS — R60.9 EDEMA, UNSPECIFIED: ICD-10-CM

## 2021-08-05 DIAGNOSIS — F41.9 ANXIETY DISORDER, UNSPECIFIED: ICD-10-CM

## 2021-08-05 DIAGNOSIS — I48.91 UNSPECIFIED ATRIAL FIBRILLATION: ICD-10-CM

## 2021-08-05 LAB
ALBUMIN SERPL ELPH-MCNC: 3.9 G/DL — SIGNIFICANT CHANGE UP (ref 3.3–5)
ALP SERPL-CCNC: 84 U/L — SIGNIFICANT CHANGE UP (ref 40–120)
ALT FLD-CCNC: 15 U/L — SIGNIFICANT CHANGE UP (ref 4–33)
ANION GAP SERPL CALC-SCNC: 16 MMOL/L — HIGH (ref 7–14)
AST SERPL-CCNC: 20 U/L — SIGNIFICANT CHANGE UP (ref 4–32)
BILIRUB SERPL-MCNC: 0.5 MG/DL — SIGNIFICANT CHANGE UP (ref 0.2–1.2)
BUN SERPL-MCNC: 13 MG/DL — SIGNIFICANT CHANGE UP (ref 7–23)
CALCIUM SERPL-MCNC: 8.8 MG/DL — SIGNIFICANT CHANGE UP (ref 8.4–10.5)
CHLORIDE SERPL-SCNC: 103 MMOL/L — SIGNIFICANT CHANGE UP (ref 98–107)
CHOLEST SERPL-MCNC: 209 MG/DL — HIGH
CO2 SERPL-SCNC: 20 MMOL/L — LOW (ref 22–31)
CREAT SERPL-MCNC: 1.1 MG/DL — SIGNIFICANT CHANGE UP (ref 0.5–1.3)
DIGOXIN SERPL-MCNC: <0.3 NG/ML — LOW (ref 0.8–2)
GLUCOSE SERPL-MCNC: 84 MG/DL — SIGNIFICANT CHANGE UP (ref 70–99)
HDLC SERPL-MCNC: 55 MG/DL — SIGNIFICANT CHANGE UP
LIPID PNL WITH DIRECT LDL SERPL: 131 MG/DL — HIGH
MAGNESIUM SERPL-MCNC: 2.2 MG/DL — SIGNIFICANT CHANGE UP (ref 1.6–2.6)
NON HDL CHOLESTEROL: 154 MG/DL — HIGH
PHOSPHATE SERPL-MCNC: 3.6 MG/DL — SIGNIFICANT CHANGE UP (ref 2.5–4.5)
POTASSIUM SERPL-MCNC: 3.5 MMOL/L — SIGNIFICANT CHANGE UP (ref 3.5–5.3)
POTASSIUM SERPL-SCNC: 3.5 MMOL/L — SIGNIFICANT CHANGE UP (ref 3.5–5.3)
PROT SERPL-MCNC: 6.7 G/DL — SIGNIFICANT CHANGE UP (ref 6–8.3)
SARS-COV-2 RNA SPEC QL NAA+PROBE: SIGNIFICANT CHANGE UP
SODIUM SERPL-SCNC: 139 MMOL/L — SIGNIFICANT CHANGE UP (ref 135–145)
TRIGL SERPL-MCNC: 114 MG/DL — SIGNIFICANT CHANGE UP
TROPONIN T, HIGH SENSITIVITY RESULT: 34 NG/L — SIGNIFICANT CHANGE UP
TSH SERPL-MCNC: 5.46 UIU/ML — HIGH (ref 0.27–4.2)

## 2021-08-05 PROCEDURE — 71250 CT THORAX DX C-: CPT | Mod: 26

## 2021-08-05 PROCEDURE — 93970 EXTREMITY STUDY: CPT | Mod: 26

## 2021-08-05 PROCEDURE — 74176 CT ABD & PELVIS W/O CONTRAST: CPT | Mod: 26

## 2021-08-05 PROCEDURE — 99233 SBSQ HOSP IP/OBS HIGH 50: CPT

## 2021-08-05 RX ORDER — DILTIAZEM HCL 120 MG
60 CAPSULE, EXT RELEASE 24 HR ORAL THREE TIMES A DAY
Refills: 0 | Status: DISCONTINUED | OUTPATIENT
Start: 2021-08-05 | End: 2021-08-11

## 2021-08-05 RX ORDER — FUROSEMIDE 40 MG
40 TABLET ORAL DAILY
Refills: 0 | Status: DISCONTINUED | OUTPATIENT
Start: 2021-08-05 | End: 2021-08-12

## 2021-08-05 RX ORDER — ACETAMINOPHEN 500 MG
650 TABLET ORAL EVERY 6 HOURS
Refills: 0 | Status: DISCONTINUED | OUTPATIENT
Start: 2021-08-05 | End: 2021-08-12

## 2021-08-05 RX ORDER — POTASSIUM CHLORIDE 20 MEQ
20 PACKET (EA) ORAL
Refills: 0 | Status: COMPLETED | OUTPATIENT
Start: 2021-08-05 | End: 2021-08-05

## 2021-08-05 RX ORDER — SPIRONOLACTONE 25 MG/1
25 TABLET, FILM COATED ORAL DAILY
Refills: 0 | Status: DISCONTINUED | OUTPATIENT
Start: 2021-08-05 | End: 2021-08-12

## 2021-08-05 RX ORDER — APIXABAN 2.5 MG/1
5 TABLET, FILM COATED ORAL EVERY 12 HOURS
Refills: 0 | Status: DISCONTINUED | OUTPATIENT
Start: 2021-08-05 | End: 2021-08-09

## 2021-08-05 RX ADMIN — SPIRONOLACTONE 25 MILLIGRAM(S): 25 TABLET, FILM COATED ORAL at 14:09

## 2021-08-05 RX ADMIN — APIXABAN 5 MILLIGRAM(S): 2.5 TABLET, FILM COATED ORAL at 17:27

## 2021-08-05 RX ADMIN — Medication 60 MILLIGRAM(S): at 14:09

## 2021-08-05 RX ADMIN — Medication 1 MILLIGRAM(S): at 21:15

## 2021-08-05 RX ADMIN — Medication 20 MILLIEQUIVALENT(S): at 06:43

## 2021-08-05 RX ADMIN — APIXABAN 5 MILLIGRAM(S): 2.5 TABLET, FILM COATED ORAL at 06:43

## 2021-08-05 RX ADMIN — Medication 20 MILLIEQUIVALENT(S): at 04:18

## 2021-08-05 RX ADMIN — Medication 40 MILLIGRAM(S): at 14:10

## 2021-08-05 RX ADMIN — Medication 60 MILLIGRAM(S): at 21:16

## 2021-08-05 NOTE — PROGRESS NOTE ADULT - SUBJECTIVE AND OBJECTIVE BOX
DATE OF SERVICE:  08/05/2021  Patient was seen and examined on  08/05/2021   .Interim events noted.Consultant notes ,Labs,Telemetry reviewed by me    PRESENTING CC:Edema    HPI and HOSPITAL COURSE: HPI:  67y/o woman with Hx of HOCM s/p septal ablation, chronic systolic CHF, LVEF 55%, lung cancer (s/p chemo, in remission) and persistent atrial fibrillation  s/p ablation on 12/30/2020 (currently on eliquis). Pt reports increasing weight gain, diffuse whole body  swelling over last  few months that she reports accelerated this last week. She can no longer fit comfortably in her clothes. Reports reducing caloric intake few months ago  and was also  placed on lasix 3weeks ago and spironolactone yesterday but reports swelling has only worsened. Denies cp, sob, dizziness. Notes taking extra dose of diltiazem (60mg/ 4 times daily instead of tid) but has been doing so for many years)       INTERIM EVENTS:Awake less anxious remains in Atrial flutter      PMH -reviewed admission note, no change since admission  Heart Failure: Acute [ ] Chronic [x ] Acute on Chronic [ ] Diastolic [x ] Systolic [ ] Combined Systolic and Diastolic[ ]  RONY[ ]  ATN[ ]  CKD I [ ] CKDII [ ] CKD III [ ] CKD IV [ ] CKD V [ ] ESRD[ ]  HTN[x ] CVA[ ] DM[ ] COPD[ ] COVID[ ] AF[x]  PPM[ ] ICD[ ]    MEDICATIONS  (STANDING):  apixaban 5 milliGRAM(s) Oral every 12 hours  diltiazem    Tablet 60 milliGRAM(s) Oral three times a day  furosemide    Tablet 40 milliGRAM(s) Oral daily  spironolactone 25 milliGRAM(s) Oral daily    MEDICATIONS  (PRN):  acetaminophen   Tablet .. 650 milliGRAM(s) Oral every 6 hours PRN Temp greater or equal to 38C (100.4F), Mild Pain (1 - 3), Moderate Pain (4 - 6)            REVIEW OF SYSTEMS:  Constitutional: [ ] fever, [ ]weight loss,  [x ]fatigue  Eyes: [ ] visual changes  Respiratory: [ ]shortness of breath;  [ ] cough, [ ]wheezing, [ ]chills, [ ]hemoptysis  Cardiovascular: [ ] chest pain, [ ]palpitations, [ ]dizziness,  [x ]leg swelling[ ]orthopnea[ ]PND  Gastrointestinal: [ ] abdominal pain, [ ]nausea, [ ]vomiting,  [ ]diarrhea [ ]Constipation [ ]Melena  Genitourinary: [ ] dysuria, [ ] hematuria [ ]Pena  Neurologic: [ ] headaches [ ] tremors[ ]weakness [ ]Paralysis Right[ ] Left[ ]  Skin: [ ] itching, [ ]burning, [ ] rashes  Endocrine: [ ] heat or cold intolerance  Musculoskeletal: [ ] joint pain or swelling; [ ] muscle, back, or extremity pain  Psychiatric: [ ] depression, [ ]anxiety, [ ]mood swings, or [ ]difficulty sleeping  Hematologic: [ ] easy bruising, [ ] bleeding gums    [x] All remaining systems negative except as per above.   [ ]Unable to obtain.    Vital Signs Last 24 Hrs  T(C): 36.8 (08-05-21 @ 14:00), Max: 36.9 (08-05-21 @ 00:14)  HR: 92 (08-05-21 @ 14:00) (76 - 94)  BP: 122/65 (08-05-21 @ 14:00) (105/68 - 129/94)  RR: 17 (08-05-21 @ 14:00) (16 - 18)  SpO2: 99% (08-05-21 @ 14:00) (99% - 100%)    PHYSICAL EXAM:  General: No acute distress BMI-30  HEENT: EOMI, PERRL  Neck: Supple, [ ] JVD  Lungs: Equal air entry bilaterally; [ ] rales [ ] wheezing [ ] rhonchi  Heart: Irregular rate and rhythm; [x ] murmur   2/6 [x] systolic [ ] diastolic [ ] radiation[ ] rubs [ ]  gallops  Abdomen: Nontender, bowel sounds present  Extremities: No clubbing, cyanosis, [x ] edema [ ]Pulses  equal and intact  Nervous system:  Alert & Oriented X3, no focal deficits  Psychiatric: Normal affect  Skin: No rashes or lesions    LABS:  08-05    139  |  103  |  13  ----------------------------<  84  3.5   |  20<L>  |  1.10    Ca    8.8      05 Aug 2021 06:22  Phos  3.6     08-05  Mg     2.20     08-05    TPro  6.7  /  Alb  3.9  /  TBili  0.5  /  DBili  x   /  AST  20  /  ALT  15  /  AlkPhos  84  08-05    Creatinine Trend: 1.10<--, 1.39<--                        11.6   6.50  )-----------( 242      ( 04 Aug 2021 21:48 )             34.5       ECHO:12/29/2020  CONCLUSIONS:  1. Mitral annular calcification, otherwise normal mitral  valve. Severe mitral regurgitation.  Vena contracta width  about  0.7-0.8 cm.  2. Calcified trileaflet aortic valve with normal opening.  Minimal aortic regurgitation.  3. Normal aortic root.  Mild non-mobile atherosclerotic  plaque in the aortic arch and descending thoracic aorta.  4. Severe left atrial enlargement.  No left atrium or left  atrial appendage thrombus.  5. Normal left ventricular systolic function. No segmental  wall motion abnormalities.  6. Normal rightventricular size and function.  7. Contrast injection demonstrates no evidence of a patent  foramen ovale.      IMPRESSION AND PLAN:  66 year old Female with PMH of  Lung CA s/p chemo (2017 in remission),  HOCM s/p septal ablation (2005), HFpEF  (55% severe MR) and AFib (on Eliquis, admits noncompliance at times) s/p ablation (12/2020). Patient presented to ED with c/o SOB/WALKER and increasing weight gain over the  last  few months,       Problem/Plan - 1:  ·  Problem: Swelling.  Plan: -unclear what is driving weight gain or swelling.   -for now, will order tte, obtain spot Urine Cr/Pr, TSH, cortisol level. duplex of legs,   CT chest/abd/pelvis ordered   -c/w lasix, spironolactone.    Problem/Plan - 2:  ·  Problem: Atrial fibrillation, unspecified type.  Plan: -c/w eliquis , diltiazem.   -Repeat TTE  EP consult consideration for ablation    Problem/Plan - 3:  ·  Problem: RONY (acute kidney injury).  Plan: mild rony likely reflects recent dual diuretic use but will obtain spot urine Cr/Pr to evaluate for possible cause of diffuse swelling.     Problem/Plan - 4:  ·  Problem: Malignant neoplasm of lung, unspecified laterality, unspecified part of lung.  Plan: lung cancer (s/p chemo, in remission); has follow up appointment with her Oncologist Dr Silvia next week.     Problem/Plan - 5:  ·  Problem: Anxiety.  Plan: responded well to ativan 1mg; will continue prn.

## 2021-08-05 NOTE — CONSULT NOTE ADULT - SUBJECTIVE AND OBJECTIVE BOX
Patient is a 66y old  Female who presents with a chief complaint of     HPI:  66 year old Female with PMH of  Lung CA s/p chemo ( in remission),  HOCM s/p septal ablation (), HFpEF  (55% severe MR) and AFib (on Eliquis, admits noncompliance at times) s/p ablation (2020). Patient presented to ED with c/o SOB/AWLKER and increasing weight gain over the  last  few months, started on Lasix by her cardiologist three weeks prior to admission and Spironolactone yesterday. She denies CP/palpitations, dizziness or lightheadedness.        PAST MEDICAL & SURGICAL HISTORY:    HOCM (hypertrophic obstructive cardiomyopathy)  S/P septal ablation    Pleural effusion  Right parapneumonic drained twice 3/2017 at [facility]    AF (atrial fibrillation)  On Coumadin    Chronic diastolic (congestive) heart failure    Lung cancer  s/p chemotherapy    CAP (community acquired pneumonia)  RML/RLL at [facility] 3/2017    GERD (gastroesophageal reflux disease)    Lung cancer  s/p chemotherapy ~ now in remission    History of   15 years ago    H/O cardiac radiofrequency ablation  for HOCM - 16 years ago        FAMILY HISTORY:  Family history of liver cancer  Mother    Family history of ulcerative colitis (Sibling)  Brother    Family history of psoriasis (Sibling)  Brother        Home Medications:  acetaminophen 325 mg oral tablet: 2 tab(s) orally every 6 hours, As Needed (2021 13:41)  apixaban 5 mg oral tablet: 1 tab(s) orally 2 times a day (05 Aug 2021 03:24)  Lasix 40 mg oral tablet: 1 tab(s) orally once a day (05 Aug 2021 03:25)  spironolactone 25 mg oral tablet: 1 tab(s) orally once a day (05 Aug 2021 03:25)      Medications:  acetaminophen   Tablet .. 650 milliGRAM(s) Oral every 6 hours PRN  apixaban 5 milliGRAM(s) Oral every 12 hours  diltiazem    Tablet 60 milliGRAM(s) Oral three times a day  furosemide    Tablet 40 milliGRAM(s) Oral daily  spironolactone 25 milliGRAM(s) Oral daily      Review of systems:  10 point review of systems completed and are negative unless noted in HPI    Vitals:  T(C): 36.8 (21 @ 14:00), Max: 36.9 (21 @ 00:14)  HR: 92 (21 @ 14:00) (76 - 94)  BP: 122/65 (21 @ 14:00) (105/68 - 129/94)  BP(mean): --  RR: 17 (21 @ 14:00) (16 - 18)  SpO2: 99% (21 @ 14:00) (99% - 100%)    Daily Height in cm: 167.64 (04 Aug 2021 19:28)          I&O's Summary      Physical Exam:  Appearance: No Acute Distress  Cardiovascular: Normal S1 S2  Respiratory: Clear to auscultation bilaterally  Gastrointestinal: Soft, Non-tender	  Skin: No cyanosis	  Neurologic: Non-focal  Extremities: 1+ BLE edema  Psychiatry: A & O x 3, Mood & affect appropriate    Labs:                        11.6   6.50  )-----------( 242      ( 04 Aug 2021 21:48 )             34.5     08-    139  |  103  |  13  ----------------------------<  84  3.5   |  20<L>  |  1.10    Ca    8.8      05 Aug 2021 06:22  Phos  3.6     08-05  Mg     2.20     08-05    TPro  6.7  /  Alb  3.9  /  TBili  0.5  /  DBili  x   /  AST  20  /  ALT  15  /  AlkPhos  84  08-05    TELEMETRY: AFlutter (HR 75-90)    Echocardiogram:    RICCARDO w/o TTE (w/3D Echo) (12.29.20 @ 13:46)   ------------------------------------------------------------------------  PROCEDURE: Transesophageal (RICCARDO) was performed in the  echocardiography laboratory.  Informed consent was first  obtained for RICCARDO.   The patient was sedated by the  anesthesiologist service (reported separately).   The  procedure was monitored with automatic blood pressure  monitoring, ECG tracings and pulse oximetry.  Gag reflex  was abolishedwith topical Cetacaine and the  transesophageal probe was placed in the esophagus posterior  to the heart without complications.  The patient tolerated  the procedure well.   Real-time and reconstructed  3-dimensional imaging was performed.  Color Doppler  analysis was carried out using both 2D and 3D mapping.  Patient was injected with 10 cc's of aerosolized saline.  Patient was injected with 10 cc's of aerosolized saline.  INDICATION: Unspecified atrial fibrillation (I48.91)  ------------------------------------------------------------------------  OBSERVATIONS:  Mitral Valve: Mitral annular calcification, otherwise  normal mitral valve. Severe mitral regurgitation.  Vena  contracta width about  0.7-0.8 cm.  Aortic Root: Normal aortic root. Mild non-mobile  atherosclerotic plaque in the aortic arch and descending  thoracic aorta.  Aortic Valve: Calcified trileaflet aortic valve with normal  opening. Minimal aortic regurgitation.  Left Atrium: Severe left atrial enlargement.  No left  atrium or left atrial appendage thrombus.  Left Ventricle: Normal left ventricular systolic function.  No segmental wall motion abnormalities.  Right Heart: Dilated right atrium. Normal right ventricular  size and function. Normal tricuspid valve.  Moderate-severe  tricuspid regurgitation. Normal pulmonic valve.  Pericardium/PleuraNormal pericardium with no pericardial  effusion.  ------------------------------------------------------------------------  CONCLUSIONS:  1. Mitral annular calcification, otherwise normal mitral  valve. Severe mitral regurgitation.  Vena contracta width  about  0.7-0.8 cm.  2. Calcified trileaflet aortic valve with normal opening.  Minimal aortic regurgitation.  3. Normal aortic root.  Mild non-mobile atherosclerotic  plaque in the aortic arch and descending thoracic aorta.  4. Severe left atrial enlargement.  No left atrium or left  atrial appendage thrombus.  5. Normal left ventricular systolic function. No segmental  wall motion abnormalities.  6. Normal right ventricular size and function.  7. Contrast injection demonstrates no evidence of a patent  foramen ovale        EK Lead ECG (21 @ 21:51)     Ventricular Rate 64 BPM    Atrial Rate 202 BPM    QRS Duration 114 ms    Q-T Interval 472 ms    QTC Calculation(Bazett) 486 ms    R Axis 2 degrees    T Axis 99 degrees    Diagnosis Line Atrial flutter with variable A-V block  Incomplete left bundle branch block  ST & T wave abnormality, consider lateral ischemia  Abnormal ECG       Patient is a 66y old  Female who presents with a chief complaint of     HPI:  66 year old Female with PMH of  Lung CA s/p chemo ( in remission),  HOCM s/p septal ablation (), HFpEF  (55% severe MR) and AFib (on Eliquis, admits noncompliance at times) s/p ablation (2020). Patient presented to ED with c/o SOB/WALKER and increasing weight gain over the  last  few months, started on Lasix by her cardiologist three weeks prior to admission and Spironolactone yesterday. She denies CP/palpitations, dizziness or lightheadedness.        PAST MEDICAL & SURGICAL HISTORY:    HOCM (hypertrophic obstructive cardiomyopathy)  S/P septal ablation    Pleural effusion  Right parapneumonic drained twice 3/2017 at [facility]    AF (atrial fibrillation)    Chronic diastolic (congestive) heart failure    Lung cancer  s/p chemotherapy    CAP (community acquired pneumonia)  RML/RLL at [facility] 3/2017    GERD (gastroesophageal reflux disease)    Lung cancer  s/p chemotherapy ~ now in remission    History of   15 years ago    H/O cardiac radiofrequency ablation  for HOCM - 16 years ago        FAMILY HISTORY:  Family history of liver cancer  Mother    Family history of ulcerative colitis (Sibling)  Brother    Family history of psoriasis (Sibling)  Brother        Home Medications:  acetaminophen 325 mg oral tablet: 2 tab(s) orally every 6 hours, As Needed (2021 13:41)  apixaban 5 mg oral tablet: 1 tab(s) orally 2 times a day (05 Aug 2021 03:24)  Lasix 40 mg oral tablet: 1 tab(s) orally once a day (05 Aug 2021 03:25)  spironolactone 25 mg oral tablet: 1 tab(s) orally once a day (05 Aug 2021 03:25)      Medications:  acetaminophen   Tablet .. 650 milliGRAM(s) Oral every 6 hours PRN  apixaban 5 milliGRAM(s) Oral every 12 hours  diltiazem    Tablet 60 milliGRAM(s) Oral three times a day  furosemide    Tablet 40 milliGRAM(s) Oral daily  spironolactone 25 milliGRAM(s) Oral daily      Review of systems:  10 point review of systems completed and are negative unless noted in HPI    Vitals:  T(C): 36.8 (21 @ 14:00), Max: 36.9 (21 @ 00:14)  HR: 92 (21 @ 14:00) (76 - 94)  BP: 122/65 (21 @ 14:00) (105/68 - 129/94)  BP(mean): --  RR: 17 (21 @ 14:00) (16 - 18)  SpO2: 99% (21 @ 14:00) (99% - 100%)    Daily Height in cm: 167.64 (04 Aug 2021 19:28)          I&O's Summary      Physical Exam:  Appearance: No Acute Distress  Cardiovascular: Normal S1 S2  Respiratory: Clear to auscultation bilaterally  Gastrointestinal: Soft, Non-tender	  Skin: No cyanosis	  Neurologic: Non-focal  Extremities: 1+ BLE edema  Psychiatry: A & O x 3, Mood & affect appropriate    Labs:                        11.6   6.50  )-----------( 242      ( 04 Aug 2021 21:48 )             34.5     -    139  |  103  |  13  ----------------------------<  84  3.5   |  20<L>  |  1.10    Ca    8.8      05 Aug 2021 06:22  Phos  3.6     08-  Mg     2.20         TPro  6.7  /  Alb  3.9  /  TBili  0.5  /  DBili  x   /  AST  20  /  ALT  15  /  AlkPhos  84  08-05    TELEMETRY: AFlutter (HR 75-90)    Echocardiogram:    RICCARDO w/o TTE (w/3D Echo) (12.29.20 @ 13:46)   ------------------------------------------------------------------------  PROCEDURE: Transesophageal (RICCARDO) was performed in the  echocardiography laboratory.  Informed consent was first  obtained for RICCARDO.   The patient was sedated by the  anesthesiologist service (reported separately).   The  procedure was monitored with automatic blood pressure  monitoring, ECG tracings and pulse oximetry.  Gag reflex  was abolishedwith topical Cetacaine and the  transesophageal probe was placed in the esophagus posterior  to the heart without complications.  The patient tolerated  the procedure well.   Real-time and reconstructed  3-dimensional imaging was performed.  Color Doppler  analysis was carried out using both 2D and 3D mapping.  Patient was injected with 10 cc's of aerosolized saline.  Patient was injected with 10 cc's of aerosolized saline.  INDICATION: Unspecified atrial fibrillation (I48.91)  ------------------------------------------------------------------------  OBSERVATIONS:  Mitral Valve: Mitral annular calcification, otherwise  normal mitral valve. Severe mitral regurgitation.  Vena  contracta width about  0.7-0.8 cm.  Aortic Root: Normal aortic root. Mild non-mobile  atherosclerotic plaque in the aortic arch and descending  thoracic aorta.  Aortic Valve: Calcified trileaflet aortic valve with normal  opening. Minimal aortic regurgitation.  Left Atrium: Severe left atrial enlargement.  No left  atrium or left atrial appendage thrombus.  Left Ventricle: Normal left ventricular systolic function.  No segmental wall motion abnormalities.  Right Heart: Dilated right atrium. Normal right ventricular  size and function. Normal tricuspid valve.  Moderate-severe  tricuspid regurgitation. Normal pulmonic valve.  Pericardium/PleuraNormal pericardium with no pericardial  effusion.  ------------------------------------------------------------------------  CONCLUSIONS:  1. Mitral annular calcification, otherwise normal mitral  valve. Severe mitral regurgitation.  Vena contracta width  about  0.7-0.8 cm.  2. Calcified trileaflet aortic valve with normal opening.  Minimal aortic regurgitation.  3. Normal aortic root.  Mild non-mobile atherosclerotic  plaque in the aortic arch and descending thoracic aorta.  4. Severe left atrial enlargement.  No left atrium or left  atrial appendage thrombus.  5. Normal left ventricular systolic function. No segmental  wall motion abnormalities.  6. Normal right ventricular size and function.  7. Contrast injection demonstrates no evidence of a patent  foramen ovale        EK Lead ECG (21 @ 21:51)     Ventricular Rate 64 BPM    Atrial Rate 202 BPM    QRS Duration 114 ms    Q-T Interval 472 ms    QTC Calculation(Bazett) 486 ms    R Axis 2 degrees    T Axis 99 degrees    Diagnosis Line Atrial flutter with variable A-V block  Incomplete left bundle branch block  ST & T wave abnormality, consider lateral ischemia  Abnormal ECG

## 2021-08-05 NOTE — CONSULT NOTE ADULT - ATTENDING COMMENTS
66 year old Female with PMH of  Lung CA s/p chemo (2017 in remission),  HOCM s/p septal ablation (2005), HFpEF  (55% severe MR) and AFib (on Eliquis, admits noncompliance at times) s/p ablation (12/2020). Patient presented to ED with c/o SOB/WALKER and increasing weight gain over the  last  few months, started on Lasix by her cardiologist three weeks prior to admission and Spironolactone yesterday. EKG consistent with atrial flutter, which appears atypical. He symptoms correlate with the timing of her arrhythmia onset about 3 weeks ago. Patient was taking Eliquis daily rather than BID until about 2 weeks ago. Will plan for RICCARDO/Ablation on this admission.

## 2021-08-05 NOTE — CONSULT NOTE ADULT - ASSESSMENT
66 year old Female with PMH of  Lung CA s/p chemo (2017 in remission),  HOCM s/p septal ablation (2005), HFpEF  (55% severe MR) and AFib (on Eliquis, admits noncompliance at times) s/p ablation (12/2020). Patient presented to ED with c/o SOB/WALKER and increasing weight gain over the  last  few months, started on Lasix by her cardiologist three weeks prior to admission and Spironolactone yesterday. She denies CP/palpitations, dizziness or lightheadedness.        Continue telemetry monitoring  Cardizem 60mg Q6H  CHADVASC Score= 3  Eliquis 5mg twice daily   Monitor lytes and replete K>4.0 and Mg>2.0   EKG completed (report above)  Echocardiogram ordered/pending  Plan for possible RICCARDO/AFlutter Ablation   Management per Primary Team           66 year old Female with PMH of  Lung CA s/p chemo (2017 in remission),  HOCM s/p septal ablation (2005), HFpEF  (55% severe MR) and AFib (on Eliquis, admits noncompliance at times) s/p ablation (12/2020). Patient presented to ED with c/o SOB/WALKER and increasing weight gain over the  last  few months, started on Lasix by her cardiologist three weeks prior to admission and Spironolactone yesterday. She denies CP/palpitations, dizziness or lightheadedness.        Continue telemetry monitoring  Cardizem 60mg Q6H  CHADVASC Score= 3  Eliquis 5mg twice daily   Monitor lytes and replete K>4.0 and Mg>2.0   EKG completed (report above)  Echocardiogram ordered/pending  Plan for RICCARDO/AFlutter Ablation when closer to euvolemic state (possibly 8/9)  Management per Primary Team

## 2021-08-06 LAB
ALBUMIN SERPL ELPH-MCNC: 4 G/DL — SIGNIFICANT CHANGE UP (ref 3.3–5)
ALP SERPL-CCNC: 83 U/L — SIGNIFICANT CHANGE UP (ref 40–120)
ALT FLD-CCNC: 16 U/L — SIGNIFICANT CHANGE UP (ref 4–33)
ANION GAP SERPL CALC-SCNC: 10 MMOL/L — SIGNIFICANT CHANGE UP (ref 7–14)
AST SERPL-CCNC: 21 U/L — SIGNIFICANT CHANGE UP (ref 4–32)
BASOPHILS # BLD AUTO: 0.05 K/UL — SIGNIFICANT CHANGE UP (ref 0–0.2)
BASOPHILS NFR BLD AUTO: 1.2 % — SIGNIFICANT CHANGE UP (ref 0–2)
BILIRUB SERPL-MCNC: 0.6 MG/DL — SIGNIFICANT CHANGE UP (ref 0.2–1.2)
BUN SERPL-MCNC: 14 MG/DL — SIGNIFICANT CHANGE UP (ref 7–23)
CALCIUM SERPL-MCNC: 9.3 MG/DL — SIGNIFICANT CHANGE UP (ref 8.4–10.5)
CHLORIDE SERPL-SCNC: 102 MMOL/L — SIGNIFICANT CHANGE UP (ref 98–107)
CO2 SERPL-SCNC: 24 MMOL/L — SIGNIFICANT CHANGE UP (ref 22–31)
COVID-19 SPIKE DOMAIN AB INTERP: POSITIVE
COVID-19 SPIKE DOMAIN ANTIBODY RESULT: >250 U/ML — HIGH
CREAT SERPL-MCNC: 1.19 MG/DL — SIGNIFICANT CHANGE UP (ref 0.5–1.3)
EOSINOPHIL # BLD AUTO: 0.2 K/UL — SIGNIFICANT CHANGE UP (ref 0–0.5)
EOSINOPHIL NFR BLD AUTO: 4.9 % — SIGNIFICANT CHANGE UP (ref 0–6)
GLUCOSE SERPL-MCNC: 91 MG/DL — SIGNIFICANT CHANGE UP (ref 70–99)
HCT VFR BLD CALC: 34.7 % — SIGNIFICANT CHANGE UP (ref 34.5–45)
HGB BLD-MCNC: 11.7 G/DL — SIGNIFICANT CHANGE UP (ref 11.5–15.5)
IANC: 2.23 K/UL — SIGNIFICANT CHANGE UP (ref 1.5–8.5)
IMM GRANULOCYTES NFR BLD AUTO: 0.2 % — SIGNIFICANT CHANGE UP (ref 0–1.5)
LYMPHOCYTES # BLD AUTO: 1.06 K/UL — SIGNIFICANT CHANGE UP (ref 1–3.3)
LYMPHOCYTES # BLD AUTO: 26 % — SIGNIFICANT CHANGE UP (ref 13–44)
MCHC RBC-ENTMCNC: 32.8 PG — SIGNIFICANT CHANGE UP (ref 27–34)
MCHC RBC-ENTMCNC: 33.7 GM/DL — SIGNIFICANT CHANGE UP (ref 32–36)
MCV RBC AUTO: 97.2 FL — SIGNIFICANT CHANGE UP (ref 80–100)
MONOCYTES # BLD AUTO: 0.52 K/UL — SIGNIFICANT CHANGE UP (ref 0–0.9)
MONOCYTES NFR BLD AUTO: 12.8 % — SIGNIFICANT CHANGE UP (ref 2–14)
NEUTROPHILS # BLD AUTO: 2.23 K/UL — SIGNIFICANT CHANGE UP (ref 1.8–7.4)
NEUTROPHILS NFR BLD AUTO: 54.9 % — SIGNIFICANT CHANGE UP (ref 43–77)
NRBC # BLD: 0 /100 WBCS — SIGNIFICANT CHANGE UP
NRBC # FLD: 0 K/UL — SIGNIFICANT CHANGE UP
PLATELET # BLD AUTO: 238 K/UL — SIGNIFICANT CHANGE UP (ref 150–400)
POTASSIUM SERPL-MCNC: 3.7 MMOL/L — SIGNIFICANT CHANGE UP (ref 3.5–5.3)
POTASSIUM SERPL-SCNC: 3.7 MMOL/L — SIGNIFICANT CHANGE UP (ref 3.5–5.3)
PROT SERPL-MCNC: 6.8 G/DL — SIGNIFICANT CHANGE UP (ref 6–8.3)
RBC # BLD: 3.57 M/UL — LOW (ref 3.8–5.2)
RBC # FLD: 12.2 % — SIGNIFICANT CHANGE UP (ref 10.3–14.5)
SARS-COV-2 IGG+IGM SERPL QL IA: >250 U/ML — HIGH
SARS-COV-2 IGG+IGM SERPL QL IA: POSITIVE
SODIUM SERPL-SCNC: 136 MMOL/L — SIGNIFICANT CHANGE UP (ref 135–145)
WBC # BLD: 4.07 K/UL — SIGNIFICANT CHANGE UP (ref 3.8–10.5)
WBC # FLD AUTO: 4.07 K/UL — SIGNIFICANT CHANGE UP (ref 3.8–10.5)

## 2021-08-06 PROCEDURE — 99232 SBSQ HOSP IP/OBS MODERATE 35: CPT

## 2021-08-06 PROCEDURE — 93306 TTE W/DOPPLER COMPLETE: CPT | Mod: 26

## 2021-08-06 RX ORDER — POTASSIUM CHLORIDE 20 MEQ
20 PACKET (EA) ORAL ONCE
Refills: 0 | Status: COMPLETED | OUTPATIENT
Start: 2021-08-06 | End: 2021-08-06

## 2021-08-06 RX ORDER — LANOLIN ALCOHOL/MO/W.PET/CERES
6 CREAM (GRAM) TOPICAL AT BEDTIME
Refills: 0 | Status: DISCONTINUED | OUTPATIENT
Start: 2021-08-06 | End: 2021-08-12

## 2021-08-06 RX ADMIN — SPIRONOLACTONE 25 MILLIGRAM(S): 25 TABLET, FILM COATED ORAL at 05:00

## 2021-08-06 RX ADMIN — APIXABAN 5 MILLIGRAM(S): 2.5 TABLET, FILM COATED ORAL at 05:00

## 2021-08-06 RX ADMIN — Medication 20 MILLIEQUIVALENT(S): at 13:29

## 2021-08-06 RX ADMIN — Medication 60 MILLIGRAM(S): at 13:29

## 2021-08-06 RX ADMIN — Medication 6 MILLIGRAM(S): at 21:54

## 2021-08-06 RX ADMIN — Medication 40 MILLIGRAM(S): at 05:00

## 2021-08-06 RX ADMIN — APIXABAN 5 MILLIGRAM(S): 2.5 TABLET, FILM COATED ORAL at 17:45

## 2021-08-06 RX ADMIN — Medication 60 MILLIGRAM(S): at 05:01

## 2021-08-06 RX ADMIN — Medication 60 MILLIGRAM(S): at 21:54

## 2021-08-06 NOTE — PROGRESS NOTE ADULT - ASSESSMENT
A/P: consult:   Ms. Horner is a 66 year old Female with PMH of  Lung CA s/p chemo (2017 in remission),  HOCM s/p septal ablation (2005), HFpEF  (55% severe MR) and AFib (on Eliquis, admits noncompliance at times) s/p ablation (12/2020)  presented to ED with c/o SOB/WALKER and increasing weight gain over the  last  few months, started on Lasix by her cardiologist three weeks prior to admission and Spironolactone yesterday. EKG consistent with atypical atrial flutter. EF preserved per echo from 12/20  Plan : Continue current management including Rate control with Diltiazem 60 mg Q8H   Continue Lasix 40 gm QD and Spironolactone 25 mg QD    Eliquis 5 mg bid   Repeat echo please.   Plan for RICCARDO / catheter ablation Monday 8/9/21  NPO after MN Sunday for ablation on Monday    A/P:   Ms. Horner is a 66 year old Female with PMH of  Lung CA s/p chemo (2017 in remission),  HOCM s/p septal ablation (2005), HFpEF  (55% severe MR) and AFib (on Eliquis, admits noncompliance at times) s/p ablation (12/2020)  presented to ED with c/o SOB/WALKER and increasing weight gain over the  last  few months, started on Lasix by her cardiologist three weeks prior to admission and Spironolactone yesterday. EKG consistent with atypical atrial flutter. EF preserved per echo from 12/20  Plan : Continue current management including Rate control with Diltiazem 60 mg Q8H   Continue Lasix 40 gm QD and Spironolactone 25 mg QD    Eliquis 5 mg bid   Repeat echo please.   Keep K >4.0 and Mg > 1.8  Plan for RICCARDO / catheter ablation Monday 8/9/21  NPO after MN Sunday for ablation on Monday   Consented for procedure ( catheter ablation of AFL ) on Monday.     Thank you.   Rocio Darling, FNP-C  [id number]

## 2021-08-06 NOTE — PROGRESS NOTE ADULT - SUBJECTIVE AND OBJECTIVE BOX
S/24 Events: Patient seen and examined. Denies CP, SOB, dizziness, LH, near syncope or sycope.   No acute events overnight. S/p IV lasix   Telemetry : - Atrial flutter 90's now . Over night- 50's - 80's   O:  T(C): 36.8 (21 @ 05:05), Max: 36.8 (21 @ 14:00)  HR: 86 (21 @ 05:05) (86 - 92)  BP: 114/65 (21 @ 05:05) (114/65 - 122/65)  RR: 16 (21 @ 05:05) (16 - 17)  SpO2: 100% (21 @ 05:05) (99% - 100%)        Daily Weight in k.7 (06 Aug 2021 05:05)  Gen: NAD  HEENT: EOMI  CV: RRR, normal S1 + S2, no m/r/g  Lungs: CTAB  Abd: soft, non-tender  Ext: No edema    Labs:                        11.7   4.07  )-----------( 238      ( 06 Aug 2021 07:58 )             34.7     08-    136  |  102  |  14  ----------------------------<  91  3.7   |  24  |  1.19    Ca    9.3      06 Aug 2021 07:58  Phos  3.6     08-05  Mg     2.20     08-05    TPro  6.8  /  Alb  4.0  /  TBili  0.6  /  DBili  x   /  AST  21  /  ALT  16  /  AlkPhos  83  -     DIagnostics :from: RICCARDO w/o TTE (w/3D Echo) (12.29.20 @ 13:46) >  CONCLUSIONS:  1. Mitral annular calcification, otherwise normal mitral  valve. Severe mitral regurgitation.  Vena contracta width  about  0.7-0.8 cm.  2. Calcified trileaflet aortic valve with normal opening.  Minimal aortic regurgitation.  3. Normal aortic root.  Mild non-mobile atherosclerotic  plaque in the aortic arch and descending thoracic aorta.  4. Severe left atrial enlargement.  No left atrium or left  atrial appendage thrombus.  5. Normal left ventricular systolic function. No segmental  wall motion abnormalities.  6. Normal rightventricular size and function.  7. Contrast injection demonstrates no evidence of a patent  foramen ovale.  ------------------------------------------------------------------------  Confirmed on  2020 - 12:59:16 by Santino Castellanos M.D.,  [facility], [place]  ------------------------------------------------------------------------    < end of copied text >      MEDICATIONS  (STANDING):  apixaban 5 milliGRAM(s) Oral every 12 hours  diltiazem    Tablet 60 milliGRAM(s) Oral three times a day  furosemide    Tablet 40 milliGRAM(s) Oral daily  spironolactone 25 milliGRAM(s) Oral daily

## 2021-08-07 LAB
ALBUMIN SERPL ELPH-MCNC: 3.8 G/DL — SIGNIFICANT CHANGE UP (ref 3.3–5)
ALP SERPL-CCNC: 81 U/L — SIGNIFICANT CHANGE UP (ref 40–120)
ALT FLD-CCNC: 15 U/L — SIGNIFICANT CHANGE UP (ref 4–33)
ANION GAP SERPL CALC-SCNC: 13 MMOL/L — SIGNIFICANT CHANGE UP (ref 7–14)
AST SERPL-CCNC: 19 U/L — SIGNIFICANT CHANGE UP (ref 4–32)
BILIRUB SERPL-MCNC: 0.6 MG/DL — SIGNIFICANT CHANGE UP (ref 0.2–1.2)
BUN SERPL-MCNC: 15 MG/DL — SIGNIFICANT CHANGE UP (ref 7–23)
CALCIUM SERPL-MCNC: 9.3 MG/DL — SIGNIFICANT CHANGE UP (ref 8.4–10.5)
CHLORIDE SERPL-SCNC: 103 MMOL/L — SIGNIFICANT CHANGE UP (ref 98–107)
CO2 SERPL-SCNC: 21 MMOL/L — LOW (ref 22–31)
CREAT SERPL-MCNC: 1.14 MG/DL — SIGNIFICANT CHANGE UP (ref 0.5–1.3)
GLUCOSE SERPL-MCNC: 90 MG/DL — SIGNIFICANT CHANGE UP (ref 70–99)
HCT VFR BLD CALC: 34.1 % — LOW (ref 34.5–45)
HGB BLD-MCNC: 11.4 G/DL — LOW (ref 11.5–15.5)
MAGNESIUM SERPL-MCNC: 2 MG/DL — SIGNIFICANT CHANGE UP (ref 1.6–2.6)
MCHC RBC-ENTMCNC: 32.9 PG — SIGNIFICANT CHANGE UP (ref 27–34)
MCHC RBC-ENTMCNC: 33.4 GM/DL — SIGNIFICANT CHANGE UP (ref 32–36)
MCV RBC AUTO: 98.3 FL — SIGNIFICANT CHANGE UP (ref 80–100)
NRBC # BLD: 0 /100 WBCS — SIGNIFICANT CHANGE UP
NRBC # FLD: 0 K/UL — SIGNIFICANT CHANGE UP
PHOSPHATE SERPL-MCNC: 3.6 MG/DL — SIGNIFICANT CHANGE UP (ref 2.5–4.5)
PLATELET # BLD AUTO: 231 K/UL — SIGNIFICANT CHANGE UP (ref 150–400)
POTASSIUM SERPL-MCNC: 4 MMOL/L — SIGNIFICANT CHANGE UP (ref 3.5–5.3)
POTASSIUM SERPL-SCNC: 4 MMOL/L — SIGNIFICANT CHANGE UP (ref 3.5–5.3)
PROT SERPL-MCNC: 6.5 G/DL — SIGNIFICANT CHANGE UP (ref 6–8.3)
RBC # BLD: 3.47 M/UL — LOW (ref 3.8–5.2)
RBC # FLD: 12.2 % — SIGNIFICANT CHANGE UP (ref 10.3–14.5)
SODIUM SERPL-SCNC: 137 MMOL/L — SIGNIFICANT CHANGE UP (ref 135–145)
WBC # BLD: 3.86 K/UL — SIGNIFICANT CHANGE UP (ref 3.8–10.5)
WBC # FLD AUTO: 3.86 K/UL — SIGNIFICANT CHANGE UP (ref 3.8–10.5)

## 2021-08-07 RX ADMIN — Medication 1 MILLIGRAM(S): at 21:00

## 2021-08-07 RX ADMIN — Medication 60 MILLIGRAM(S): at 21:00

## 2021-08-07 RX ADMIN — Medication 60 MILLIGRAM(S): at 05:42

## 2021-08-07 RX ADMIN — Medication 6 MILLIGRAM(S): at 21:00

## 2021-08-07 RX ADMIN — APIXABAN 5 MILLIGRAM(S): 2.5 TABLET, FILM COATED ORAL at 05:42

## 2021-08-07 RX ADMIN — SPIRONOLACTONE 25 MILLIGRAM(S): 25 TABLET, FILM COATED ORAL at 05:42

## 2021-08-07 RX ADMIN — APIXABAN 5 MILLIGRAM(S): 2.5 TABLET, FILM COATED ORAL at 16:10

## 2021-08-07 RX ADMIN — Medication 60 MILLIGRAM(S): at 12:35

## 2021-08-07 RX ADMIN — Medication 40 MILLIGRAM(S): at 05:42

## 2021-08-07 NOTE — PROGRESS NOTE ADULT - SUBJECTIVE AND OBJECTIVE BOX
DATE OF SERVICE:  2021    HPI and HOSPITAL COURSE: HPI:  67y/o woman with Hx of HOCM s/p septal ablation, chronic systolic CHF, LVEF 55%, lung cancer (s/p chemo, in remission) and persistent atrial fibrillation  s/p ablation on 2020 (currently on eliquis). Pt reports increasing weight gain, diffuse whole body  swelling over last  few months that she reports accelerated this last week. She can no longer fit comfortably in her clothes. Reports reducing caloric intake few months ago  and was also  placed on lasix 3weeks ago and spironolactone yesterday but reports swelling has only worsened. Denies cp, sob, dizziness. Notes taking extra dose of diltiazem (60mg/ 4 times daily instead of tid) but has been doing so for many years)       INTERIM EVENTS:Awake less anxious remains in Atrial flutter      PMH -reviewed admission note, no change since admission  Heart Failure: Acute [ ] Chronic [x ] Acute on Chronic [ ] Diastolic [x ] Systolic [ ] Combined Systolic and Diastolic[ ]  RONY[ ]  ATN[ ]  CKD I [ ] CKDII [ ] CKD III [ ] CKD IV [ ] CKD V [ ] ESRD[ ]  HTN[x ] CVA[ ] DM[ ] COPD[ ] COVID[ ] AF[x]  PPM[ ] ICD[ ]  MEDICATIONS  (STANDING):  apixaban 5 milliGRAM(s) Oral every 12 hours  diltiazem    Tablet 60 milliGRAM(s) Oral three times a day  furosemide    Tablet 40 milliGRAM(s) Oral daily  spironolactone 25 milliGRAM(s) Oral daily    MEDICATIONS  (PRN):  acetaminophen   Tablet .. 650 milliGRAM(s) Oral every 6 hours PRN Temp greater or equal to 38C (100.4F), Mild Pain (1 - 3), Moderate Pain (4 - 6)  melatonin 6 milliGRAM(s) Oral at bedtime PRN Sleep            REVIEW OF SYSTEMS:  Constitutional: [ ] fever, [ ]weight loss,  [x ]fatigue  Eyes: [ ] visual changes  Respiratory: [ ]shortness of breath;  [ ] cough, [ ]wheezing, [ ]chills, [ ]hemoptysis  Cardiovascular: [ ] chest pain, [ ]palpitations, [ ]dizziness,  [x ]leg swelling[ ]orthopnea[ ]PND  Gastrointestinal: [ ] abdominal pain, [ ]nausea, [ ]vomiting,  [ ]diarrhea [ ]Constipation [ ]Melena  Genitourinary: [ ] dysuria, [ ] hematuria [ ]Pena  Neurologic: [ ] headaches [ ] tremors[ ]weakness [ ]Paralysis Right[ ] Left[ ]  Skin: [ ] itching, [ ]burning, [ ] rashes  Endocrine: [ ] heat or cold intolerance  Musculoskeletal: [ ] joint pain or swelling; [ ] muscle, back, or extremity pain  Psychiatric: [ ] depression, [ ]anxiety, [ ]mood swings, or [ ]difficulty sleeping  Hematologic: [ ] easy bruising, [ ] bleeding gums    [x] All remaining systems negative except as per above.   [ ]Unable to obtain.    Vital Signs Last 24 Hrs  T(C): 36.7 (07 Aug 2021 20:55), Max: 36.8 (07 Aug 2021 05:15)  T(F): 98 (07 Aug 2021 20:55), Max: 98.2 (07 Aug 2021 05:15)  HR: 93 (07 Aug 2021 20:55) (70 - 93)  BP: 123/82 (07 Aug 2021 20:55) (120/66 - 124/62)  BP(mean): --  RR: 16 (07 Aug 2021 20:55) (16 - 16)  SpO2: 100% (07 Aug 2021 20:55) (100% - 100%)    PHYSICAL EXAM:  General: No acute distress BMI-30  HEENT: EOMI, PERRL  Neck: Supple, [ ] JVD  Lungs: Equal air entry bilaterally; [ ] rales [ ] wheezing [ ] rhonchi  Heart: Irregular rate and rhythm; [x ] murmur   2/6 [x] systolic [ ] diastolic [ ] radiation[ ] rubs [ ]  gallops  Abdomen: Nontender, bowel sounds present  Extremities: No clubbing, cyanosis, [x ] edema [ ]Pulses  equal and intact  Nervous system:  Alert & Oriented X3, no focal deficits  Psychiatric: Normal affect  Skin: No rashes or lesions    LABS:      137  |  103  |  15  ----------------------------<  90  4.0   |  21<L>  |  1.14    Ca    9.3      07 Aug 2021 06:38  Phos  3.6     08-07  Mg     2.00     08-07    TPro  6.5  /  Alb  3.8  /  TBili  0.6  /  DBili      /  AST  19  /  ALT  15  /  AlkPhos  81  08-    Creatinine Trend: 1.14 <--, 1.19 <--, 1.10 <--, 1.39 <--                        11.4   3.86  )-----------( 231      ( 07 Aug 2021 06:38 )             34.1     Urine Studies:  Urinalysis Basic - ( 04 Aug 2021 23:00 )    Color: Colorless / Appearance: Clear / S.006 / pH:   Gluc:  / Ketone: Negative  / Bili: Negative / Urobili: <2 mg/dL   Blood:  / Protein: Negative / Nitrite: Negative   Leuk Esterase: Moderate / RBC: 1 /HPF / WBC 12 /HPF   Sq Epi:  / Non Sq Epi: 1 /HPF / Bacteria: Negative              LIVER FUNCTIONS - ( 07 Aug 2021 06:38 )  Alb: 3.8 g/dL / Pro: 6.5 g/dL / ALK PHOS: 81 U/L / ALT: 15 U/L / AST: 19 U/L / GGT: x               ECHO:2020  CONCLUSIONS:  1. Mitral annular calcification, otherwise normal mitral  valve. Severe mitral regurgitation.  Vena contracta width  about  0.7-0.8 cm.  2. Calcified trileaflet aortic valve with normal opening.  Minimal aortic regurgitation.  3. Normal aortic root.  Mild non-mobile atherosclerotic  plaque in the aortic arch and descending thoracic aorta.  4. Severe left atrial enlargement.  No left atrium or left  atrial appendage thrombus.  5. Normal left ventricular systolic function. No segmental  wall motion abnormalities.  6. Normal rightventricular size and function.  7. Contrast injection demonstrates no evidence of a patent  foramen ovale.      IMPRESSION AND PLAN:  66 year old Female with PMH of  Lung CA s/p chemo (2017 in remission),  HOCM s/p septal ablation (), HFpEF  (55% severe MR) and AFib (on Eliquis, admits noncompliance at times) s/p ablation (2020). Patient presented to ED with c/o SOB/WALKER and increasing weight gain over the  last  few months,       Problem/Plan - 1:  ·  Problem: Swelling.  Plan: -unclear what is driving weight gain or swelling.   -for now, will order tte, obtain spot Urine Cr/Pr, TSH, cortisol level. duplex of legs,   CT chest/abd/pelvis ordered   -c/w lasix, spironolactone.    Problem/Plan - 2:  ·  Problem: Atrial fibrillation, unspecified type.  Plan: -c/w eliquis , diltiazem.   -Repeat TTE  EP consult consideration for ablation    Problem/Plan - 3:  ·  Problem: RONY (acute kidney injury).  Plan: mild rony likely reflects recent dual diuretic use but will obtain spot urine Cr/Pr to evaluate for possible cause of diffuse swelling.     Problem/Plan - 4:  ·  Problem: Malignant neoplasm of lung, unspecified laterality, unspecified part of lung.  Plan: lung cancer (s/p chemo, in remission); has follow up appointment with her Oncologist Dr Vega next week.     Problem/Plan - 5:  ·  Problem: Anxiety.  Plan: responded well to ativan 1mg; will continue prn.

## 2021-08-07 NOTE — PROGRESS NOTE ADULT - SUBJECTIVE AND OBJECTIVE BOX
DATE OF SERVICE: 08-07-21 @ 10:12    Subjective: Patient seen and examined. No new events except as noted.     Covering Dr Keene     SUBJECTIVE/ROS:    No chest pain, dyspnea, palpitation, or dizziness.     MEDICATIONS:  MEDICATIONS  (STANDING):  apixaban 5 milliGRAM(s) Oral every 12 hours  diltiazem    Tablet 60 milliGRAM(s) Oral three times a day  furosemide    Tablet 40 milliGRAM(s) Oral daily  spironolactone 25 milliGRAM(s) Oral daily      PHYSICAL EXAM:  T(C): 36.8 (08-07-21 @ 05:15), Max: 36.8 (08-06-21 @ 21:11)  HR: 72 (08-07-21 @ 05:15) (72 - 93)  BP: 124/62 (08-07-21 @ 05:15) (110/73 - 124/62)  RR: 16 (08-07-21 @ 05:15) (16 - 17)  SpO2: 100% (08-07-21 @ 05:15) (99% - 100%)  Wt(kg): --  I&O's Summary          JVP: Normal  Neck: supple  Lung: clear   CV: S1 S2 , Murmur:  Abd: soft  Ext: No edema  neuro: Awake / alert  Psych: flat affect  Skin: normal``    LABS/DATA:    CARDIAC MARKERS:                                11.4   3.86  )-----------( 231      ( 07 Aug 2021 06:38 )             34.1     08-07    137  |  103  |  15  ----------------------------<  90  4.0   |  21<L>  |  1.14    Ca    9.3      07 Aug 2021 06:38  Phos  3.6     08-07  Mg     2.00     08-07    TPro  6.5  /  Alb  3.8  /  TBili  0.6  /  DBili  x   /  AST  19  /  ALT  15  /  AlkPhos  81  08-07    proBNP:   Lipid Profile:   HgA1c:   TSH:     TELE:  EKG:    < from: Transthoracic Echocardiogram (08.06.21 @ 10:01) >  CONCLUSIONS:  1. Normal trileaflet aortic valve. Mild aortic  regurgitation.  2. Severely dilated left atrium.  LA volume index = 69  cc/m2.  3. Endocardium not well visualized; grossly normal left  ventricular systolic function. Mid to distal inferoseptal  endocardium difficult to visualize. Possibly hypokinetic.  Significant basal septal hypertrophy.  4. Normal tricuspid valve. Mild tricuspid regurgitation.  5. Estimated pulmonary artery systolic pressure equals 43  mm Hg, assuming right atrial pressure equals 10  mm Hg,  consistent with mild pulmonary hypertension.  6. Agitated saline injection demonstrates evidence of a  patent foramen ovale vs a small atrial septal defect.  ------------------------------------------------------------------------  Confirmed on  8/6/2021 - 12:40:51 by Karime Briceño M.D. [facility]  ------------------------------------------------------------------------    < end of copied text >  < from: Cardiac Cath Lab - Adult (11.19.18 @ 15:51) >  LM:   --  LM: Normal.  LAD:   --  Proximal LAD: The vessel was medium to large sized. Angiography  showed minor luminal irregularities with no flow limiting lesions.  --  D1: Normal.  --  D2: Normal.  CX:   --  Circumflex: Normal.  RI:   --  Ramus intermedius: Normal.  RCA:   --  RCA: Normal.  --  RPDA: Normal.  --  RPLS: Normal.    < end of copied text >

## 2021-08-07 NOTE — PROGRESS NOTE ADULT - ASSESSMENT
Ms. Horner is a 66 year old Female with PMH of  Lung CA s/p chemo (2017 in remission),  HOCM s/p septal ablation (2005), HFpEF  (55% severe MR) and AFib (on Eliquis, admits noncompliance at times) s/p ablation (12/2020)  presented to ED with c/o SOB/WALKER and increasing weight gain over the  last  few months,  s/p diuretic therapy with clinical improvement now with aflutter planned for rik/ablation     aflutter  HR stable  cont current meds  a/c  fu with EP for RIK / Ablation     HTN   cont current meds    HCM  s/p septal ablation

## 2021-08-08 LAB
ALBUMIN SERPL ELPH-MCNC: 4.2 G/DL — SIGNIFICANT CHANGE UP (ref 3.3–5)
ALP SERPL-CCNC: 90 U/L — SIGNIFICANT CHANGE UP (ref 40–120)
ALT FLD-CCNC: 15 U/L — SIGNIFICANT CHANGE UP (ref 4–33)
ANION GAP SERPL CALC-SCNC: 14 MMOL/L — SIGNIFICANT CHANGE UP (ref 7–14)
AST SERPL-CCNC: 19 U/L — SIGNIFICANT CHANGE UP (ref 4–32)
BILIRUB SERPL-MCNC: 0.5 MG/DL — SIGNIFICANT CHANGE UP (ref 0.2–1.2)
BUN SERPL-MCNC: 14 MG/DL — SIGNIFICANT CHANGE UP (ref 7–23)
CALCIUM SERPL-MCNC: 9.5 MG/DL — SIGNIFICANT CHANGE UP (ref 8.4–10.5)
CHLORIDE SERPL-SCNC: 103 MMOL/L — SIGNIFICANT CHANGE UP (ref 98–107)
CO2 SERPL-SCNC: 22 MMOL/L — SIGNIFICANT CHANGE UP (ref 22–31)
CREAT SERPL-MCNC: 1.08 MG/DL — SIGNIFICANT CHANGE UP (ref 0.5–1.3)
GLUCOSE SERPL-MCNC: 91 MG/DL — SIGNIFICANT CHANGE UP (ref 70–99)
HCT VFR BLD CALC: 34.5 % — SIGNIFICANT CHANGE UP (ref 34.5–45)
HGB BLD-MCNC: 11.7 G/DL — SIGNIFICANT CHANGE UP (ref 11.5–15.5)
MAGNESIUM SERPL-MCNC: 2 MG/DL — SIGNIFICANT CHANGE UP (ref 1.6–2.6)
MCHC RBC-ENTMCNC: 33.1 PG — SIGNIFICANT CHANGE UP (ref 27–34)
MCHC RBC-ENTMCNC: 33.9 GM/DL — SIGNIFICANT CHANGE UP (ref 32–36)
MCV RBC AUTO: 97.7 FL — SIGNIFICANT CHANGE UP (ref 80–100)
NRBC # BLD: 0 /100 WBCS — SIGNIFICANT CHANGE UP
NRBC # FLD: 0 K/UL — SIGNIFICANT CHANGE UP
PHOSPHATE SERPL-MCNC: 4.1 MG/DL — SIGNIFICANT CHANGE UP (ref 2.5–4.5)
PLATELET # BLD AUTO: 233 K/UL — SIGNIFICANT CHANGE UP (ref 150–400)
POTASSIUM SERPL-MCNC: 4 MMOL/L — SIGNIFICANT CHANGE UP (ref 3.5–5.3)
POTASSIUM SERPL-SCNC: 4 MMOL/L — SIGNIFICANT CHANGE UP (ref 3.5–5.3)
PROT SERPL-MCNC: 6.9 G/DL — SIGNIFICANT CHANGE UP (ref 6–8.3)
RBC # BLD: 3.53 M/UL — LOW (ref 3.8–5.2)
RBC # FLD: 12.1 % — SIGNIFICANT CHANGE UP (ref 10.3–14.5)
SARS-COV-2 RNA SPEC QL NAA+PROBE: SIGNIFICANT CHANGE UP
SODIUM SERPL-SCNC: 139 MMOL/L — SIGNIFICANT CHANGE UP (ref 135–145)
WBC # BLD: 4.43 K/UL — SIGNIFICANT CHANGE UP (ref 3.8–10.5)
WBC # FLD AUTO: 4.43 K/UL — SIGNIFICANT CHANGE UP (ref 3.8–10.5)

## 2021-08-08 RX ADMIN — Medication 40 MILLIGRAM(S): at 05:32

## 2021-08-08 RX ADMIN — Medication 60 MILLIGRAM(S): at 21:03

## 2021-08-08 RX ADMIN — Medication 1 MILLIGRAM(S): at 15:14

## 2021-08-08 RX ADMIN — Medication 60 MILLIGRAM(S): at 05:32

## 2021-08-08 RX ADMIN — APIXABAN 5 MILLIGRAM(S): 2.5 TABLET, FILM COATED ORAL at 16:17

## 2021-08-08 RX ADMIN — SPIRONOLACTONE 25 MILLIGRAM(S): 25 TABLET, FILM COATED ORAL at 05:32

## 2021-08-08 RX ADMIN — Medication 6 MILLIGRAM(S): at 21:03

## 2021-08-08 RX ADMIN — APIXABAN 5 MILLIGRAM(S): 2.5 TABLET, FILM COATED ORAL at 05:32

## 2021-08-08 NOTE — PROGRESS NOTE ADULT - SUBJECTIVE AND OBJECTIVE BOX
DATE OF SERVICE:  2021    HPI and HOSPITAL COURSE: HPI:  65y/o woman with Hx of HOCM s/p septal ablation, chronic systolic CHF, LVEF 55%, lung cancer (s/p chemo, in remission) and persistent atrial fibrillation  s/p ablation on 2020 (currently on eliquis). Pt reports increasing weight gain, diffuse whole body  swelling over last  few months that she reports accelerated this last week. She can no longer fit comfortably in her clothes. Reports reducing caloric intake few months ago  and was also  placed on lasix 3weeks ago and spironolactone yesterday but reports swelling has only worsened. Denies cp, sob, dizziness. Notes taking extra dose of diltiazem (60mg/ 4 times daily instead of tid) but has been doing so for many years)       INTERIM EVENTS:Awake less anxious remains in Atrial flutter      PMH -reviewed admission note, no change since admission  Heart Failure: Acute [ ] Chronic [x ] Acute on Chronic [ ] Diastolic [x ] Systolic [ ] Combined Systolic and Diastolic[ ]  RONY[ ]  ATN[ ]  CKD I [ ] CKDII [ ] CKD III [ ] CKD IV [ ] CKD V [ ] ESRD[ ]  HTN[x ] CVA[ ] DM[ ] COPD[ ] COVID[ ] AF[x]  PPM[ ] ICD[ ]  MEDICATIONS  (STANDING):  apixaban 5 milliGRAM(s) Oral every 12 hours  diltiazem    Tablet 60 milliGRAM(s) Oral three times a day  furosemide    Tablet 40 milliGRAM(s) Oral daily  spironolactone 25 milliGRAM(s) Oral daily    MEDICATIONS  (PRN):  acetaminophen   Tablet .. 650 milliGRAM(s) Oral every 6 hours PRN Temp greater or equal to 38C (100.4F), Mild Pain (1 - 3), Moderate Pain (4 - 6)  melatonin 6 milliGRAM(s) Oral at bedtime PRN Sleep            REVIEW OF SYSTEMS:  Constitutional: [ ] fever, [ ]weight loss,  [x ]fatigue  Eyes: [ ] visual changes  Respiratory: [ ]shortness of breath;  [ ] cough, [ ]wheezing, [ ]chills, [ ]hemoptysis  Cardiovascular: [ ] chest pain, [ ]palpitations, [ ]dizziness,  [x ]leg swelling[ ]orthopnea[ ]PND  Gastrointestinal: [ ] abdominal pain, [ ]nausea, [ ]vomiting,  [ ]diarrhea [ ]Constipation [ ]Melena  Genitourinary: [ ] dysuria, [ ] hematuria [ ]Pena  Neurologic: [ ] headaches [ ] tremors[ ]weakness [ ]Paralysis Right[ ] Left[ ]  Skin: [ ] itching, [ ]burning, [ ] rashes  Endocrine: [ ] heat or cold intolerance  Musculoskeletal: [ ] joint pain or swelling; [ ] muscle, back, or extremity pain  Psychiatric: [ ] depression, [ ]anxiety, [ ]mood swings, or [ ]difficulty sleeping  Hematologic: [ ] easy bruising, [ ] bleeding gums    [x] All remaining systems negative except as per above.   [ ]Unable to obtain.    Vital Signs Last 24 Hrs  T(C): 36.8 (08 Aug 2021 11:10), Max: 36.9 (08 Aug 2021 05:20)  T(F): 98.2 (08 Aug 2021 11:10), Max: 98.4 (08 Aug 2021 05:20)  HR: 87 (08 Aug 2021 11:10) (69 - 93)  BP: 100/80 (08 Aug 2021 11:10) (100/80 - 123/82)  BP(mean): --  RR: 18 (08 Aug 2021 11:10) (16 - 18)  SpO2: 100% (08 Aug 2021 11:10) (100% - 100%)    PHYSICAL EXAM:  General: No acute distress BMI-30  HEENT: EOMI, PERRL  Neck: Supple, [ ] JVD  Lungs: Equal air entry bilaterally; [ ] rales [ ] wheezing [ ] rhonchi  Heart: Irregular rate and rhythm; [x ] murmur   2/6 [x] systolic [ ] diastolic [ ] radiation[ ] rubs [ ]  gallops  Abdomen: Nontender, bowel sounds present  Extremities: No clubbing, cyanosis, [x ] edema [ ]Pulses  equal and intact  Nervous system:  Alert & Oriented X3, no focal deficits  Psychiatric: Normal affect  Skin: No rashes or lesions    LABS:      139  |  103  |  14  ----------------------------<  91  4.0   |  22  |  1.08    Ca    9.5      08 Aug 2021 07:16  Phos  4.1     08  Mg     2.00     08-08    TPro  6.9  /  Alb  4.2  /  TBili  0.5  /  DBili      /  AST  19  /  ALT  15  /  AlkPhos  90  -    Creatinine Trend: 1.08 <--, 1.14 <--, 1.19 <--, 1.10 <--, 1.39 <--                        11.7   4.43  )-----------( 233      ( 08 Aug 2021 07:20 )             34.5     Urine Studies:  Urinalysis Basic - ( 04 Aug 2021 23:00 )    Color: Colorless / Appearance: Clear / S.006 / pH:   Gluc:  / Ketone: Negative  / Bili: Negative / Urobili: <2 mg/dL   Blood:  / Protein: Negative / Nitrite: Negative   Leuk Esterase: Moderate / RBC: 1 /HPF / WBC 12 /HPF   Sq Epi:  / Non Sq Epi: 1 /HPF / Bacteria: Negative              LIVER FUNCTIONS - ( 08 Aug 2021 07:16 )  Alb: 4.2 g/dL / Pro: 6.9 g/dL / ALK PHOS: 90 U/L / ALT: 15 U/L / AST: 19 U/L / GGT: x                   ECHO:2020  CONCLUSIONS:  1. Mitral annular calcification, otherwise normal mitral  valve. Severe mitral regurgitation.  Vena contracta width  about  0.7-0.8 cm.  2. Calcified trileaflet aortic valve with normal opening.  Minimal aortic regurgitation.  3. Normal aortic root.  Mild non-mobile atherosclerotic  plaque in the aortic arch and descending thoracic aorta.  4. Severe left atrial enlargement.  No left atrium or left  atrial appendage thrombus.  5. Normal left ventricular systolic function. No segmental  wall motion abnormalities.  6. Normal rightventricular size and function.  7. Contrast injection demonstrates no evidence of a patent  foramen ovale.      IMPRESSION AND PLAN:  66 year old Female with PMH of  Lung CA s/p chemo (2017 in remission),  HOCM s/p septal ablation (), HFpEF  (55% severe MR) and AFib (on Eliquis, admits noncompliance at times) s/p ablation (2020). Patient presented to ED with c/o SOB/WALKER and increasing weight gain over the  last  few months,       Problem/Plan - 1:  ·  Problem: Swelling.  Plan: -unclear what is driving weight gain or swelling.   -for now, will order tte, obtain spot Urine Cr/Pr, TSH, cortisol level. duplex of legs,   CT chest/abd/pelvis ordered   -c/w lasix, spironolactone.    Problem/Plan - 2:  ·  Problem: Atrial fibrillation, unspecified type.  Plan: -c/w eliquis , diltiazem.   -Repeat TTE  EP consult consideration for ablation    Problem/Plan - 3:  ·  Problem: RONY (acute kidney injury).  Plan: mild rony likely reflects recent dual diuretic use but will obtain spot urine Cr/Pr to evaluate for possible cause of diffuse swelling.     Problem/Plan - 4:  ·  Problem: Malignant neoplasm of lung, unspecified laterality, unspecified part of lung.  Plan: lung cancer (s/p chemo, in remission); has follow up appointment with her Oncologist Dr Vega next week.     Problem/Plan - 5:  ·  Problem: Anxiety.  Plan: responded well to ativan 1mg; will continue prn.

## 2021-08-08 NOTE — PROGRESS NOTE ADULT - ASSESSMENT
Ms. Horner is a 66 year old Female with PMH of  Lung CA s/p chemo (2017 in remission),  HOCM s/p septal ablation (2005), HFpEF  (55% severe MR) and AFib (on Eliquis, admits noncompliance at times) s/p ablation (12/2020)  presented to ED with c/o SOB/WALKER and increasing weight gain over the  last  few months, started on Lasix by her cardiologist three weeks prior to admission and Spironolactone yesterday. EKG consistent with atypical atrial flutter. EF preserved per echo from 12/20    Plan : Continue current management including Rate control with Diltiazem 60 mg Q8H   Continue Lasix 40 gm QD and Spironolactone 25 mg QD    Eliquis 5 mg bid   TTE with preserved LV function  Keep K >4.0 and Mg > 1.8  Plan for RICCARDO / catheter ablation Monday 8/9/21  NPO after MN Sunday for ablation on Monday   Consented for procedure ( catheter ablation of AFL ) on Monday.

## 2021-08-08 NOTE — PROGRESS NOTE ADULT - SUBJECTIVE AND OBJECTIVE BOX
DATE OF SERVICE: 08-08-21 @ 07:03    Subjective: Patient seen and examined. No new events except as noted.     SUBJECTIVE/ROS:        MEDICATIONS:  MEDICATIONS  (STANDING):  apixaban 5 milliGRAM(s) Oral every 12 hours  diltiazem    Tablet 60 milliGRAM(s) Oral three times a day  furosemide    Tablet 40 milliGRAM(s) Oral daily  spironolactone 25 milliGRAM(s) Oral daily      PHYSICAL EXAM:  T(C): 36.9 (08-08-21 @ 05:20), Max: 36.9 (08-08-21 @ 05:20)  HR: 69 (08-08-21 @ 05:20) (69 - 93)  BP: 115/65 (08-08-21 @ 05:20) (115/65 - 123/82)  RR: 16 (08-08-21 @ 05:20) (16 - 16)  SpO2: 100% (08-08-21 @ 05:20) (100% - 100%)  Wt(kg): --  I&O's Summary          JVP: Normal  Neck: supple  Lung: clear   CV: S1 S2 , Murmur:  Abd: soft  Ext: No edema  neuro: Awake / alert  Psych: flat affect  Skin: normal``    LABS/DATA:    CARDIAC MARKERS:                                11.4   3.86  )-----------( 231      ( 07 Aug 2021 06:38 )             34.1     08-07    137  |  103  |  15  ----------------------------<  90  4.0   |  21<L>  |  1.14    Ca    9.3      07 Aug 2021 06:38  Phos  3.6     08-07  Mg     2.00     08-07    TPro  6.5  /  Alb  3.8  /  TBili  0.6  /  DBili  x   /  AST  19  /  ALT  15  /  AlkPhos  81  08-07    proBNP:   Lipid Profile:   HgA1c:   TSH:     TELE:  EKG:

## 2021-08-08 NOTE — PROGRESS NOTE ADULT - SUBJECTIVE AND OBJECTIVE BOX
Overnight Events:   No overnight events.       Current Meds:  acetaminophen   Tablet .. 650 milliGRAM(s) Oral every 6 hours PRN  apixaban 5 milliGRAM(s) Oral every 12 hours  diltiazem    Tablet 60 milliGRAM(s) Oral three times a day  furosemide    Tablet 40 milliGRAM(s) Oral daily  melatonin 6 milliGRAM(s) Oral at bedtime PRN  spironolactone 25 milliGRAM(s) Oral daily        Vitals:  ICU Vital Signs Last 24 Hrs  T(C): 36.9 (08 Aug 2021 05:20), Max: 36.9 (08 Aug 2021 05:20)  T(F): 98.4 (08 Aug 2021 05:20), Max: 98.4 (08 Aug 2021 05:20)  HR: 69 (08 Aug 2021 05:20) (69 - 93)  BP: 115/65 (08 Aug 2021 05:20) (115/65 - 123/82)  BP(mean): --  ABP: --  ABP(mean): --  RR: 16 (08 Aug 2021 05:20) (16 - 16)  SpO2: 100% (08 Aug 2021 05:20) (100% - 100%)      I&O's Summary    08 Aug 2021 07:01  -  08 Aug 2021 09:51  --------------------------------------------------------  IN: 100 mL / OUT: 400 mL / NET: -300 mL            Physical Exam:  Appearance: No acute distress; well appearing  Eyes: PERRL, EOMI, pink conjunctiva  HENT: Normal oral mucosa  Cardiovascular: RRR, S1, S2, no murmurs, rubs, or gallops; no edema; no JVD  Respiratory: Clear to auscultation bilaterally  Gastrointestinal: soft, non-tender, non-distended with normal bowel sounds  Musculoskeletal: No clubbing; no joint deformity   Neurologic: Non-focal  Lymphatic: No lymphadenopathy  Psychiatry: AAOx3, mood & affect appropriate  Skin: No rashes, ecchymoses, or cyanosis                          11.7   4.43  )-----------( 233      ( 08 Aug 2021 07:20 )             34.5     08-08    139  |  103  |  14  ----------------------------<  91  4.0   |  22  |  1.08    Ca    9.5      08 Aug 2021 07:16  Phos  4.1     08-08  Mg     2.00     08-08    TPro  6.9  /  Alb  4.2  /  TBili  0.5  /  DBili  x   /  AST  19  /  ALT  15  /  AlkPhos  90  08-08          Serum Pro-Brain Natriuretic Peptide: 3436 pg/mL (08-04 @ 21:48)          New ECG(s): Personally reviewed    Echo:    Stress Testing:     Cath:    Imaging:    Interpretation of Telemetry:  flutter

## 2021-08-09 LAB
ALBUMIN SERPL ELPH-MCNC: 4.7 G/DL — SIGNIFICANT CHANGE UP (ref 3.3–5)
ALP SERPL-CCNC: 96 U/L — SIGNIFICANT CHANGE UP (ref 40–120)
ALT FLD-CCNC: 17 U/L — SIGNIFICANT CHANGE UP (ref 4–33)
ANION GAP SERPL CALC-SCNC: 14 MMOL/L — SIGNIFICANT CHANGE UP (ref 7–14)
AST SERPL-CCNC: 22 U/L — SIGNIFICANT CHANGE UP (ref 4–32)
BILIRUB SERPL-MCNC: 0.6 MG/DL — SIGNIFICANT CHANGE UP (ref 0.2–1.2)
BLD GP AB SCN SERPL QL: NEGATIVE — SIGNIFICANT CHANGE UP
BUN SERPL-MCNC: 20 MG/DL — SIGNIFICANT CHANGE UP (ref 7–23)
CALCIUM SERPL-MCNC: 9.8 MG/DL — SIGNIFICANT CHANGE UP (ref 8.4–10.5)
CHLORIDE SERPL-SCNC: 98 MMOL/L — SIGNIFICANT CHANGE UP (ref 98–107)
CO2 SERPL-SCNC: 23 MMOL/L — SIGNIFICANT CHANGE UP (ref 22–31)
CREAT SERPL-MCNC: 1.23 MG/DL — SIGNIFICANT CHANGE UP (ref 0.5–1.3)
GLUCOSE SERPL-MCNC: 93 MG/DL — SIGNIFICANT CHANGE UP (ref 70–99)
HCT VFR BLD CALC: 38 % — SIGNIFICANT CHANGE UP (ref 34.5–45)
HGB BLD-MCNC: 13.1 G/DL — SIGNIFICANT CHANGE UP (ref 11.5–15.5)
MAGNESIUM SERPL-MCNC: 2 MG/DL — SIGNIFICANT CHANGE UP (ref 1.6–2.6)
MCHC RBC-ENTMCNC: 33.5 PG — SIGNIFICANT CHANGE UP (ref 27–34)
MCHC RBC-ENTMCNC: 34.5 GM/DL — SIGNIFICANT CHANGE UP (ref 32–36)
MCV RBC AUTO: 97.2 FL — SIGNIFICANT CHANGE UP (ref 80–100)
NRBC # BLD: 0 /100 WBCS — SIGNIFICANT CHANGE UP
NRBC # FLD: 0 K/UL — SIGNIFICANT CHANGE UP
PHOSPHATE SERPL-MCNC: 4.2 MG/DL — SIGNIFICANT CHANGE UP (ref 2.5–4.5)
PLATELET # BLD AUTO: 264 K/UL — SIGNIFICANT CHANGE UP (ref 150–400)
POTASSIUM SERPL-MCNC: 3.8 MMOL/L — SIGNIFICANT CHANGE UP (ref 3.5–5.3)
POTASSIUM SERPL-SCNC: 3.8 MMOL/L — SIGNIFICANT CHANGE UP (ref 3.5–5.3)
PROT SERPL-MCNC: 7.7 G/DL — SIGNIFICANT CHANGE UP (ref 6–8.3)
RBC # BLD: 3.91 M/UL — SIGNIFICANT CHANGE UP (ref 3.8–5.2)
RBC # FLD: 12.1 % — SIGNIFICANT CHANGE UP (ref 10.3–14.5)
RH IG SCN BLD-IMP: POSITIVE — SIGNIFICANT CHANGE UP
SODIUM SERPL-SCNC: 135 MMOL/L — SIGNIFICANT CHANGE UP (ref 135–145)
WBC # BLD: 4.53 K/UL — SIGNIFICANT CHANGE UP (ref 3.8–10.5)
WBC # FLD AUTO: 4.53 K/UL — SIGNIFICANT CHANGE UP (ref 3.8–10.5)

## 2021-08-09 PROCEDURE — 93662 INTRACARDIAC ECG (ICE): CPT | Mod: 26

## 2021-08-09 PROCEDURE — 93320 DOPPLER ECHO COMPLETE: CPT | Mod: 26,GC

## 2021-08-09 PROCEDURE — 93653 COMPRE EP EVAL TX SVT: CPT | Mod: 59

## 2021-08-09 PROCEDURE — 93312 ECHO TRANSESOPHAGEAL: CPT | Mod: 26,59

## 2021-08-09 PROCEDURE — 93613 INTRACARDIAC EPHYS 3D MAPG: CPT

## 2021-08-09 PROCEDURE — 93010 ELECTROCARDIOGRAM REPORT: CPT

## 2021-08-09 PROCEDURE — 93325 DOPPLER ECHO COLOR FLOW MAPG: CPT | Mod: 26,GC

## 2021-08-09 RX ORDER — SENNA PLUS 8.6 MG/1
2 TABLET ORAL AT BEDTIME
Refills: 0 | Status: DISCONTINUED | OUTPATIENT
Start: 2021-08-09 | End: 2021-08-12

## 2021-08-09 RX ORDER — POLYETHYLENE GLYCOL 3350 17 G/17G
17 POWDER, FOR SOLUTION ORAL DAILY
Refills: 0 | Status: DISCONTINUED | OUTPATIENT
Start: 2021-08-09 | End: 2021-08-12

## 2021-08-09 RX ORDER — CHLORHEXIDINE GLUCONATE 213 G/1000ML
1 SOLUTION TOPICAL
Refills: 0 | Status: DISCONTINUED | OUTPATIENT
Start: 2021-08-09 | End: 2021-08-12

## 2021-08-09 RX ORDER — FENTANYL CITRATE 50 UG/ML
25 INJECTION INTRAVENOUS
Refills: 0 | Status: DISCONTINUED | OUTPATIENT
Start: 2021-08-09 | End: 2021-08-10

## 2021-08-09 RX ORDER — SODIUM CHLORIDE 9 MG/ML
1000 INJECTION, SOLUTION INTRAVENOUS
Refills: 0 | Status: DISCONTINUED | OUTPATIENT
Start: 2021-08-09 | End: 2021-08-10

## 2021-08-09 RX ORDER — ONDANSETRON 8 MG/1
4 TABLET, FILM COATED ORAL ONCE
Refills: 0 | Status: DISCONTINUED | OUTPATIENT
Start: 2021-08-09 | End: 2021-08-10

## 2021-08-09 RX ADMIN — Medication 60 MILLIGRAM(S): at 05:04

## 2021-08-09 RX ADMIN — Medication 40 MILLIGRAM(S): at 05:04

## 2021-08-09 RX ADMIN — FENTANYL CITRATE 25 MICROGRAM(S): 50 INJECTION INTRAVENOUS at 23:08

## 2021-08-09 RX ADMIN — SPIRONOLACTONE 25 MILLIGRAM(S): 25 TABLET, FILM COATED ORAL at 05:04

## 2021-08-09 RX ADMIN — APIXABAN 5 MILLIGRAM(S): 2.5 TABLET, FILM COATED ORAL at 05:04

## 2021-08-09 NOTE — PACU DISCHARGE NOTE - PAIN:
Triage note: Pt states she was sent from her PCP's office due to RLQ pain, n/v, and hematuria for three days. Controlled with current regime

## 2021-08-09 NOTE — PROGRESS NOTE ADULT - SUBJECTIVE AND OBJECTIVE BOX
DATE OF SERVICE: 08-09-21 @ 15:24    Subjective: Patient seen and examined. No new events except as noted.     SUBJECTIVE/ROS:        MEDICATIONS:  MEDICATIONS  (STANDING):  apixaban 5 milliGRAM(s) Oral every 12 hours  diltiazem    Tablet 60 milliGRAM(s) Oral three times a day  furosemide    Tablet 40 milliGRAM(s) Oral daily  lactated ringers. 1000 milliLiter(s) (75 mL/Hr) IV Continuous <Continuous>  polyethylene glycol 3350 17 Gram(s) Oral daily  senna 2 Tablet(s) Oral at bedtime  spironolactone 25 milliGRAM(s) Oral daily      PHYSICAL EXAM:  T(C): 36.8 (08-09-21 @ 05:04), Max: 36.8 (08-09-21 @ 05:04)  HR: 91 (08-09-21 @ 05:04) (91 - 95)  BP: 112/65 (08-09-21 @ 05:04) (112/65 - 114/72)  RR: 18 (08-09-21 @ 05:04) (18 - 18)  SpO2: 100% (08-09-21 @ 05:04) (99% - 100%)  Wt(kg): --  I&O's Summary    08 Aug 2021 07:01  -  09 Aug 2021 07:00  --------------------------------------------------------  IN: 300 mL / OUT: 700 mL / NET: -400 mL            JVP: Normal  Neck: supple  Lung: clear   CV: S1 S2 , Murmur:  Abd: soft  Ext: No edema  neuro: Awake / alert  Psych: flat affect  Skin: normal``    LABS/DATA:    CARDIAC MARKERS:                                13.1   4.53  )-----------( 264      ( 09 Aug 2021 07:35 )             38.0     08-09    135  |  98  |  20  ----------------------------<  93  3.8   |  23  |  1.23    Ca    9.8      09 Aug 2021 07:35  Phos  4.2     08-09  Mg     2.00     08-09    TPro  7.7  /  Alb  4.7  /  TBili  0.6  /  DBili  x   /  AST  22  /  ALT  17  /  AlkPhos  96  08-09    proBNP:   Lipid Profile:   HgA1c:   TSH:     TELE:  EKG:

## 2021-08-09 NOTE — CHART NOTE - NSCHARTNOTEFT_GEN_A_CORE
NPO for AFlutter ablation today.  Assessment see preprocedure record.  Type Screen (2rd) sent this am.  Last Eliquis 5mg this am.
66 year old Female with PMH of  Lung CA s/p chemo (2017 in remission),  HOCM s/p septal ablation (2005), HFpEF  (55% severe MR) and AFib (on Eliquis, admits noncompliance at times) s/p ablation (12/2020). Patient presented to ED with c/o SOB/WALKER and increasing weight gain over the  last  few months, started on Lasix by her cardiologist three weeks prior to admission and Spironolactone.  EKG consistent with atypical atrial flutter. EF preserved per echo from 12/20    8/9/21: Patient s/p atypical atria aflutter ablation with Dr. Yasmeen madison. Procedure took roughly 6.5 hours and had multiple runs of SVT after case. Patient to be transferred to CCU for closer monitoring overnight    Plan:  - Admit to CCU overnight   - Initiate IV amio load overnight  - Restart eliquis 5mg PO BID tonight  - Monitor on tele  - Patient had traumatic RICCARDO, monitor for any bleeding  - Bedrest for 4 hours
FERDINAND ALMENDAREZ  [id number]    PROCEDURE:  Atypical flutter ablation          INDICATION:  AFL        ELECTROPHYSIOLOGIST(S):  MD Yasmeen      FINDINGS:  - Left atrial flutter with an atrial CL of 310 ms. Entrainment and map consistent with mitral AFL. Anterior and posterior line performed. Slowed with posterior line, terminated at the base of the appendage.       COMPLICATIONS:  None      RECOMMENDATIONS:  - Bedrest for 4 hours  - Please monitor for bleeding in the mouth as she had a traumatic RICCARDO.   - Straight cath x1  - Resume Eliquis tonight.

## 2021-08-09 NOTE — PROGRESS NOTE ADULT - TIME BILLING
-a.flutter-s/p ablation-Amio load.d/w .CCU monitoring.Resume ELIQUIS  -HTN-cARDIAZEM  -CHF-cont.lasix
- Review of records, telemetry, vital signs and daily labs.   - General and cardiovascular physical examination.  - Generation of cardiovascular treatment plan.  - Coordination of care.      Patient was seen and examined by me on 08/05/2021,interim events noted,labs and radiology studies reviewed.  Allen Keene MD,FACC.  [address] [place], [place] [id number].  [phone]

## 2021-08-09 NOTE — PROGRESS NOTE ADULT - SUBJECTIVE AND OBJECTIVE BOX
FERDINAND ALMENDAREZ  66y  Female      Patient is a 66y old  Female who presents with a chief complaint of Edema (08 Aug 2021 09:27)  Patient was seen and examined in PACU.covering .s/p AFLUTTER ABLATION.comfortable,nad  alert,awake,nad,responds to questiond..monitor ST,PAC's.  no sob,no cp,no fever,no cough    REVIEW OF SYSTEMS:  Rebel aboveINTERVAL HPI/OVERNIGHT EVENTS:  T(C): 36.2 (21 @ 21:15), Max: 36.9 (21 @ 09:35)  HR: 118 (21 @ 21:35) (82 - 118)  BP: 118/75 (21 @ 21:35) (108/71 - 133/86)  RR: 12 (21 @ 21:35) (12 - )  SpO2: 95% (21 @ 21:35) (92% - 100%)  Wt(kg): --  I&O's Summary    08 Aug 2021 07:01  -  09 Aug 2021 07:00  --------------------------------------------------------  IN: 300 mL / OUT: 700 mL / NET: -400 mL      T(C): 36.2 (21 @ 21:15), Max: 36.9 (21 @ 09:35)  HR: 118 (21 @ 21:35) (82 - 118)  BP: 118/75 (21 @ 21:35) (108/71 - 133/86)  RR: 12 (21 @ 21:35) (12 - )  SpO2: 95% (21 @ 21:35) (92% - 100%)  Wt(kg): --Vital Signs Last 24 Hrs  T(C): 36.2 (09 Aug 2021 21:15), Max: 36.9 (09 Aug 2021 09:35)  T(F): 97.2 (09 Aug 2021 21:15), Max: 98.4 (09 Aug 2021 09:35)  HR: 118 (09 Aug 2021 21:35) (82 - 118)  BP: 118/75 (09 Aug 2021 21:35) (108/71 - 133/86)  BP(mean): 85 (09 Aug 2021 21:35) (85 - 96)  RR: 12 (09 Aug 2021 21:35) (12 - 26)  SpO2: 95% (09 Aug 2021 21:35) (92% - 100%)    LABS:                        13.1   4.53  )-----------( 264      ( 09 Aug 2021 07:35 )             38.0     08-09    135  |  98  |  20  ----------------------------<  93  3.8   |  23  |  1.23    Ca    9.8      09 Aug 2021 07:35  Phos  4.2     08-09  Mg     2.00     08-09    TPro  7.7  /  Alb  4.7  /  TBili  0.6  /  DBili  x   /  AST  22  /  ALT  17  /  AlkPhos  96  08-09        CAPILLARY BLOOD GLUCOSE                PAST MEDICAL & SURGICAL HISTORY:  HOCM (hypertrophic obstructive cardiomyopathy)  S/P septal ablation    Pleural effusion  Right parapneumonic drained twice 3/2017 at [facility]    AF (atrial fibrillation)  On Coumadin    Chronic diastolic (congestive) heart failure    Lung cancer  s/p chemotherapy    CAP (community acquired pneumonia)  RML/RLL at [facility] 3/2017    GERD (gastroesophageal reflux disease)    Lung cancer  s/p chemotherapy ~2017 now in remission    History of   15 years ago    H/O cardiac radiofrequency ablation  for HOCM - 16 years ago        MEDICATIONS  (STANDING):  chlorhexidine 4% Liquid 1 Application(s) Topical <User Schedule>  diltiazem    Tablet 60 milliGRAM(s) Oral three times a day  furosemide    Tablet 40 milliGRAM(s) Oral daily  lactated ringers. 1000 milliLiter(s) (75 mL/Hr) IV Continuous <Continuous>  polyethylene glycol 3350 17 Gram(s) Oral daily  senna 2 Tablet(s) Oral at bedtime  spironolactone 25 milliGRAM(s) Oral daily    MEDICATIONS  (PRN):  acetaminophen   Tablet .. 650 milliGRAM(s) Oral every 6 hours PRN Temp greater or equal to 38C (100.4F), Mild Pain (1 - 3), Moderate Pain (4 - 6)  fentaNYL    Injectable 25 MICROGram(s) IV Push every 5 minutes PRN Moderate Pain (4 - 6)  melatonin 6 milliGRAM(s) Oral at bedtime PRN Sleep  ondansetron Injectable 4 milliGRAM(s) IV Push once PRN Nausea and/or Vomiting        RADIOLOGY & ADDITIONAL TESTS:    Imaging Personally Reviewed:  [ ] YES  [ ] NO    Consultant(s) Notes Reviewed:  [ ] YES  [ ] NO    PHYSICAL EXAM:  GENERAL: Alert and awake lying in bed in no distress  HEAD:  Atraumatic, Normocephalic  EYES: EOMI, HEIKE, conjunctiva and sclera clear  NECK: Supple, No JVD, Normal thyroid  NERVOUS SYSTEM:  Alert & Oriented X3, Motor and sensory systems are intact,   CHEST/LUNG: Bilateral clear breath sounds, no rhochi, no wheezing, no crepitations,  HEART:sinus tachy  ABDOMEN: Soft, Nontender, Nondistended; Bowel sounds present  EXTREMITIES:   Peripheral Pulses are palpable, no  edema        Care Discussed with Consultants/Other Providers [ x] YES  [ ] NO      Code Status: [] Full Code [] DNR [] DNI [] Goals of Care:   Disposition: [] ICU [] Stroke Unit [] RCU []PCU []Floor [] Discharge Home         ZACARIAS HernandezFACP

## 2021-08-10 LAB
ANION GAP SERPL CALC-SCNC: 14 MMOL/L — SIGNIFICANT CHANGE UP (ref 7–14)
BUN SERPL-MCNC: 18 MG/DL — SIGNIFICANT CHANGE UP (ref 7–23)
CALCIUM SERPL-MCNC: 7.8 MG/DL — LOW (ref 8.4–10.5)
CHLORIDE SERPL-SCNC: 104 MMOL/L — SIGNIFICANT CHANGE UP (ref 98–107)
CO2 SERPL-SCNC: 20 MMOL/L — LOW (ref 22–31)
CREAT SERPL-MCNC: 1.12 MG/DL — SIGNIFICANT CHANGE UP (ref 0.5–1.3)
GLUCOSE SERPL-MCNC: 128 MG/DL — HIGH (ref 70–99)
HCT VFR BLD CALC: 30.1 % — LOW (ref 34.5–45)
HCT VFR BLD CALC: 33 % — LOW (ref 34.5–45)
HGB BLD-MCNC: 10.1 G/DL — LOW (ref 11.5–15.5)
HGB BLD-MCNC: 11.1 G/DL — LOW (ref 11.5–15.5)
MAGNESIUM SERPL-MCNC: 1.5 MG/DL — LOW (ref 1.6–2.6)
MCHC RBC-ENTMCNC: 32.6 PG — SIGNIFICANT CHANGE UP (ref 27–34)
MCHC RBC-ENTMCNC: 33.1 PG — SIGNIFICANT CHANGE UP (ref 27–34)
MCHC RBC-ENTMCNC: 33.6 GM/DL — SIGNIFICANT CHANGE UP (ref 32–36)
MCHC RBC-ENTMCNC: 33.6 GM/DL — SIGNIFICANT CHANGE UP (ref 32–36)
MCV RBC AUTO: 97.1 FL — SIGNIFICANT CHANGE UP (ref 80–100)
MCV RBC AUTO: 98.7 FL — SIGNIFICANT CHANGE UP (ref 80–100)
NRBC # BLD: 0 /100 WBCS — SIGNIFICANT CHANGE UP
NRBC # BLD: 0 /100 WBCS — SIGNIFICANT CHANGE UP
NRBC # FLD: 0 K/UL — SIGNIFICANT CHANGE UP
NRBC # FLD: 0 K/UL — SIGNIFICANT CHANGE UP
PHOSPHATE SERPL-MCNC: 5 MG/DL — HIGH (ref 2.5–4.5)
PLATELET # BLD AUTO: 184 K/UL — SIGNIFICANT CHANGE UP (ref 150–400)
PLATELET # BLD AUTO: 209 K/UL — SIGNIFICANT CHANGE UP (ref 150–400)
POTASSIUM SERPL-MCNC: 3.5 MMOL/L — SIGNIFICANT CHANGE UP (ref 3.5–5.3)
POTASSIUM SERPL-SCNC: 3.5 MMOL/L — SIGNIFICANT CHANGE UP (ref 3.5–5.3)
RBC # BLD: 3.05 M/UL — LOW (ref 3.8–5.2)
RBC # BLD: 3.4 M/UL — LOW (ref 3.8–5.2)
RBC # FLD: 12.4 % — SIGNIFICANT CHANGE UP (ref 10.3–14.5)
RBC # FLD: 12.5 % — SIGNIFICANT CHANGE UP (ref 10.3–14.5)
SODIUM SERPL-SCNC: 138 MMOL/L — SIGNIFICANT CHANGE UP (ref 135–145)
WBC # BLD: 9.25 K/UL — SIGNIFICANT CHANGE UP (ref 3.8–10.5)
WBC # BLD: 9.81 K/UL — SIGNIFICANT CHANGE UP (ref 3.8–10.5)
WBC # FLD AUTO: 9.25 K/UL — SIGNIFICANT CHANGE UP (ref 3.8–10.5)
WBC # FLD AUTO: 9.81 K/UL — SIGNIFICANT CHANGE UP (ref 3.8–10.5)

## 2021-08-10 PROCEDURE — 99233 SBSQ HOSP IP/OBS HIGH 50: CPT

## 2021-08-10 PROCEDURE — 99232 SBSQ HOSP IP/OBS MODERATE 35: CPT

## 2021-08-10 PROCEDURE — 71045 X-RAY EXAM CHEST 1 VIEW: CPT | Mod: 26

## 2021-08-10 PROCEDURE — 90792 PSYCH DIAG EVAL W/MED SRVCS: CPT

## 2021-08-10 RX ORDER — APIXABAN 2.5 MG/1
5 TABLET, FILM COATED ORAL EVERY 12 HOURS
Refills: 0 | Status: DISCONTINUED | OUTPATIENT
Start: 2021-08-10 | End: 2021-08-12

## 2021-08-10 RX ORDER — FUROSEMIDE 40 MG
40 TABLET ORAL ONCE
Refills: 0 | Status: COMPLETED | OUTPATIENT
Start: 2021-08-10 | End: 2021-08-10

## 2021-08-10 RX ORDER — APIXABAN 2.5 MG/1
5 TABLET, FILM COATED ORAL ONCE
Refills: 0 | Status: COMPLETED | OUTPATIENT
Start: 2021-08-10 | End: 2021-08-10

## 2021-08-10 RX ORDER — ONDANSETRON 8 MG/1
4 TABLET, FILM COATED ORAL ONCE
Refills: 0 | Status: COMPLETED | OUTPATIENT
Start: 2021-08-10 | End: 2021-08-10

## 2021-08-10 RX ORDER — PANTOPRAZOLE SODIUM 20 MG/1
40 TABLET, DELAYED RELEASE ORAL
Refills: 0 | Status: DISCONTINUED | OUTPATIENT
Start: 2021-08-10 | End: 2021-08-12

## 2021-08-10 RX ORDER — AMIODARONE HYDROCHLORIDE 400 MG/1
150 TABLET ORAL ONCE
Refills: 0 | Status: COMPLETED | OUTPATIENT
Start: 2021-08-10 | End: 2021-08-10

## 2021-08-10 RX ORDER — LIDOCAINE 4 G/100G
1 CREAM TOPICAL ONCE
Refills: 0 | Status: COMPLETED | OUTPATIENT
Start: 2021-08-10 | End: 2021-08-10

## 2021-08-10 RX ORDER — LIDOCAINE 4 G/100G
1 CREAM TOPICAL ONCE
Refills: 0 | Status: DISCONTINUED | OUTPATIENT
Start: 2021-08-10 | End: 2021-08-10

## 2021-08-10 RX ORDER — ACETAMINOPHEN 500 MG
650 TABLET ORAL ONCE
Refills: 0 | Status: COMPLETED | OUTPATIENT
Start: 2021-08-10 | End: 2021-08-10

## 2021-08-10 RX ORDER — AMIODARONE HYDROCHLORIDE 400 MG/1
0.5 TABLET ORAL
Qty: 900 | Refills: 0 | Status: DISCONTINUED | OUTPATIENT
Start: 2021-08-10 | End: 2021-08-10

## 2021-08-10 RX ORDER — BENZOCAINE AND MENTHOL 5; 1 G/100ML; G/100ML
1 LIQUID ORAL
Refills: 0 | Status: DISCONTINUED | OUTPATIENT
Start: 2021-08-10 | End: 2021-08-12

## 2021-08-10 RX ORDER — COLCHICINE 0.6 MG
0.6 TABLET ORAL
Refills: 0 | Status: DISCONTINUED | OUTPATIENT
Start: 2021-08-10 | End: 2021-08-12

## 2021-08-10 RX ORDER — AMIODARONE HYDROCHLORIDE 400 MG/1
0.5 TABLET ORAL
Qty: 450 | Refills: 0 | Status: DISCONTINUED | OUTPATIENT
Start: 2021-08-10 | End: 2021-08-10

## 2021-08-10 RX ORDER — MAGNESIUM SULFATE 500 MG/ML
2 VIAL (ML) INJECTION
Refills: 0 | Status: COMPLETED | OUTPATIENT
Start: 2021-08-10 | End: 2021-08-10

## 2021-08-10 RX ORDER — AMIODARONE HYDROCHLORIDE 400 MG/1
1 TABLET ORAL
Qty: 450 | Refills: 0 | Status: COMPLETED | OUTPATIENT
Start: 2021-08-10 | End: 2021-08-10

## 2021-08-10 RX ORDER — ALPRAZOLAM 0.25 MG
0.5 TABLET ORAL
Refills: 0 | Status: DISCONTINUED | OUTPATIENT
Start: 2021-08-10 | End: 2021-08-10

## 2021-08-10 RX ORDER — POTASSIUM CHLORIDE 20 MEQ
40 PACKET (EA) ORAL ONCE
Refills: 0 | Status: COMPLETED | OUTPATIENT
Start: 2021-08-10 | End: 2021-08-10

## 2021-08-10 RX ADMIN — Medication 60 MILLIGRAM(S): at 01:16

## 2021-08-10 RX ADMIN — APIXABAN 5 MILLIGRAM(S): 2.5 TABLET, FILM COATED ORAL at 13:44

## 2021-08-10 RX ADMIN — LIDOCAINE 1 PATCH: 4 CREAM TOPICAL at 02:31

## 2021-08-10 RX ADMIN — Medication 0.6 MILLIGRAM(S): at 22:13

## 2021-08-10 RX ADMIN — Medication 6 MILLIGRAM(S): at 02:31

## 2021-08-10 RX ADMIN — PANTOPRAZOLE SODIUM 40 MILLIGRAM(S): 20 TABLET, DELAYED RELEASE ORAL at 16:22

## 2021-08-10 RX ADMIN — SPIRONOLACTONE 25 MILLIGRAM(S): 25 TABLET, FILM COATED ORAL at 05:35

## 2021-08-10 RX ADMIN — LIDOCAINE 1 PATCH: 4 CREAM TOPICAL at 04:11

## 2021-08-10 RX ADMIN — Medication 40 MILLIGRAM(S): at 05:35

## 2021-08-10 RX ADMIN — Medication 1 MILLIGRAM(S): at 07:58

## 2021-08-10 RX ADMIN — Medication 50 GRAM(S): at 05:36

## 2021-08-10 RX ADMIN — BENZOCAINE AND MENTHOL 1 LOZENGE: 5; 1 LIQUID ORAL at 18:24

## 2021-08-10 RX ADMIN — SENNA PLUS 2 TABLET(S): 8.6 TABLET ORAL at 22:13

## 2021-08-10 RX ADMIN — CHLORHEXIDINE GLUCONATE 1 APPLICATION(S): 213 SOLUTION TOPICAL at 13:44

## 2021-08-10 RX ADMIN — Medication 60 MILLIGRAM(S): at 13:44

## 2021-08-10 RX ADMIN — Medication 650 MILLIGRAM(S): at 04:07

## 2021-08-10 RX ADMIN — Medication 50 GRAM(S): at 07:58

## 2021-08-10 RX ADMIN — Medication 1 MILLIGRAM(S): at 16:22

## 2021-08-10 RX ADMIN — Medication 650 MILLIGRAM(S): at 19:45

## 2021-08-10 RX ADMIN — AMIODARONE HYDROCHLORIDE 618 MILLIGRAM(S): 400 TABLET ORAL at 01:16

## 2021-08-10 RX ADMIN — Medication 650 MILLIGRAM(S): at 12:20

## 2021-08-10 RX ADMIN — Medication 650 MILLIGRAM(S): at 20:22

## 2021-08-10 RX ADMIN — Medication 0.6 MILLIGRAM(S): at 10:48

## 2021-08-10 RX ADMIN — AMIODARONE HYDROCHLORIDE 33.3 MG/MIN: 400 TABLET ORAL at 01:17

## 2021-08-10 RX ADMIN — APIXABAN 5 MILLIGRAM(S): 2.5 TABLET, FILM COATED ORAL at 01:17

## 2021-08-10 RX ADMIN — Medication 650 MILLIGRAM(S): at 12:30

## 2021-08-10 RX ADMIN — Medication 40 MILLIGRAM(S): at 10:40

## 2021-08-10 RX ADMIN — POLYETHYLENE GLYCOL 3350 17 GRAM(S): 17 POWDER, FOR SOLUTION ORAL at 10:54

## 2021-08-10 RX ADMIN — Medication 650 MILLIGRAM(S): at 02:31

## 2021-08-10 RX ADMIN — ONDANSETRON 4 MILLIGRAM(S): 8 TABLET, FILM COATED ORAL at 13:43

## 2021-08-10 RX ADMIN — Medication 40 MILLIGRAM(S): at 18:23

## 2021-08-10 RX ADMIN — AMIODARONE HYDROCHLORIDE 33.3 MG/MIN: 400 TABLET ORAL at 07:59

## 2021-08-10 RX ADMIN — Medication 40 MILLIEQUIVALENT(S): at 05:35

## 2021-08-10 RX ADMIN — Medication 60 MILLIGRAM(S): at 10:39

## 2021-08-10 RX ADMIN — LIDOCAINE 1 PATCH: 4 CREAM TOPICAL at 14:45

## 2021-08-10 NOTE — BH CONSULTATION LIAISON ASSESSMENT NOTE - CURRENT MEDICATION
MEDICATIONS  (STANDING):  apixaban 5 milliGRAM(s) Oral every 12 hours  chlorhexidine 4% Liquid 1 Application(s) Topical <User Schedule>  colchicine 0.6 milliGRAM(s) Oral two times a day  diltiazem    Tablet 60 milliGRAM(s) Oral three times a day  furosemide    Tablet 40 milliGRAM(s) Oral daily  polyethylene glycol 3350 17 Gram(s) Oral daily  senna 2 Tablet(s) Oral at bedtime  spironolactone 25 milliGRAM(s) Oral daily    MEDICATIONS  (PRN):  acetaminophen   Tablet .. 650 milliGRAM(s) Oral every 6 hours PRN Temp greater or equal to 38C (100.4F), Mild Pain (1 - 3), Moderate Pain (4 - 6)  LORazepam     Tablet 1 milliGRAM(s) Oral every 6 hours PRN Anxiety  melatonin 6 milliGRAM(s) Oral at bedtime PRN Sleep

## 2021-08-10 NOTE — PROGRESS NOTE ADULT - ASSESSMENT
66 year old Female with PMH of  Lung CA s/p chemo (2017 in remission),  HOCM s/p septal ablation (2005), HFpEF  (55% severe MR) and AFib (on Eliquis, admits noncompliance at times) s/p ablation (12/2020)  presented to ED with c/o SOB/WALKER and increasing weight gain over the  last  few months, started on Lasix by her cardiologist three weeks prior to admission and Spironolactone yesterday. EKG consistent with atypical atrial flutter. Underwent RICCARDO and successful atypical atrial flutter ablation (mitral AFL) on 8/9.  Post ablation noted bursts of AT.  Started on IV Amiodarone gtt-now d/cd.  Currently maintained in SR with few APC's.  Hemodynamically stable.     Plan :   -Assist OOB later today  -Continue tele monitoring for AT, Amiodarone gtt d/cd this am per Dr. Arana  -Start Sotalol 80mg BID tomorrow for SR rhythm maintenance, check QT/QTc before Sotalol and after each dose tomorrow  -Replete K and Mg to keep K >4 and Mg>2  -Continue rate control with Diltiazem 60 mg Q8H   -Continue Colchicine 0.6mg BID for ?pericarditis  Continue Lasix 40 gm QD and Spironolactone 25 mg QD   -Resumed AC Eliquis 5 mg bid, closely monitor for R groin site bleeding

## 2021-08-10 NOTE — BH CONSULTATION LIAISON ASSESSMENT NOTE - NSBHREFERLPTEAMNAME_PSY_A_CORE_FT
"Cesar returned Deb's call. He is currently in the middle of his dialysis training.  I gave Cesar the recommendation to increase ferrous sulfate by one additional tab per day if he is tolerating.  He said they were talking about an IV iron infusion at the dialysis center today to treat his low iron levels.  Not sure when the \"home dialysis will begin.  He is aware the treatment and management of his anemia will be handled by the dialysis center.    Kate  " Cherelle

## 2021-08-10 NOTE — BH CONSULTATION LIAISON ASSESSMENT NOTE - NSBHCONSULTRECOMMENDOTHER_PSY_A_CORE FT
[facility]  [address] [place] [address]  [phone] (9a-7p)    [facility]  [phone]

## 2021-08-10 NOTE — PROGRESS NOTE ADULT - ASSESSMENT
Ms. Horner is a 66 year old Female with PMH of  Lung CA s/p chemo (2017 in remission),  HOCM s/p septal ablation (2005), HFpEF  (55% severe MR) and AFib (on Eliquis, admits noncompliance at times) s/p ablation (12/2020)  presented to ED with c/o SOB/WALKER and increasing weight gain over the  last  few months,  s/p diuretic therapy with clinical improvement now with aflutter planned for rik/ablation     aflutter  s/p ablation   a/c   fu with EP     Hypoxia/sob   suggest ctpa rule out PE  suggest pulm consult     HTN   cont current meds    HCM  s/p septal ablation

## 2021-08-10 NOTE — BH CONSULTATION LIAISON ASSESSMENT NOTE - CASE SUMMARY
I met with the patient along with Emma Ibrahim NP on 8/10/2021. We discussed the impression and plan and I am in agreement as documented above.   Continue plan.  [facility]  [address] [place] [id number] [phone] (9a-7p)    [facility]  [phone]

## 2021-08-10 NOTE — BH CONSULTATION LIAISON ASSESSMENT NOTE - SUMMARY
67yo disabled, , female domiciled with family with PMHx HOCM s/p septal ablation, chronic systolic CHF, LVEF 55%, lung cancer (s/p chemo, in remission), persistent atrial fibrillation (ablation 12/30/2020). PPHx no known inpatient psychiatric treatment, no hx si/sa/sib, no hx psychosis, no hx legal issues. Patient presents to [facility] with c/o increasing weight gain, diffuse whole body swelling over last  few months.    Patient a&o, expresses being very happy to have psychiatric consult.  She is tearful at times during interview especially when talking about recent incidence of being herself and her dog being bitten by a neighbor's pit bull approximately 3 months ago. She reports since incident she is fearful to go outside. She also becomes tearful when reporting since she has a change in income she has to find alternative housing. She denies any formal psychiatric treatment, however reports she has had 3 sessions with a therapist s/p recent trauma. She reports she is not happy with the therapist and plans on changing. She denies si/sa/sib, she denies ah/vh. Patient becomes animated when talking about recent opportunity to participate in an art project as well as enjoying rekindling current boyfriend of her youth.    Discussed with primary team following recommendations:    PLAN:  -defer to primary team  -Anxiety: Ativan 1mg q6h prn  -Holistic Nurse Consult-coping strategies   -Social Service Consult-support  -Care Management Consult-dispo, housing resources, transportation aides 67yo disabled, , female domiciled with family with PMHx HOCM s/p septal ablation, chronic systolic CHF, LVEF 55%, lung cancer (s/p chemo, in remission), persistent atrial fibrillation (ablation 12/30/2020). PPHx no known inpatient psychiatric treatment, no hx si/sa/sib, no hx psychosis, no hx legal issues. Patient presents to [facility] with c/o increasing weight gain, diffuse whole body swelling over last  few months.    Patient a&o, expresses being very happy to have psychiatric consult.  She is tearful at times during interview especially when talking about recent incidence of being herself and her dog being bitten by a neighbor's pit bull approximately 3 months ago. She reports since incident she is fearful to go outside. She also becomes tearful when reporting since she has a change in income she has to find alternative housing. She denies any formal psychiatric treatment, however reports she has had 3 sessions with a therapist s/p recent trauma. She reports she is not happy with the therapist and plans on changing. She denies si/sa/sib, she denies ah/vh. Patient becomes animated when talking about recent opportunity to participate in an art project as well as enjoying rekindling current boyfriend of her youth.    Discussed with primary team following recommendations:    PLAN:  -defer to primary team  -Anxiety: Ativan 1mg q6h prn PO   -Holistic Nurse Consult-coping strategies   -Social Service Consult-support  -Care Management Consult-dispo, housing resources, transportation aides

## 2021-08-10 NOTE — PROGRESS NOTE ADULT - SUBJECTIVE AND OBJECTIVE BOX
Patient is a 66y old  Female who presents with a chief complaint of s/p Aflutter ablation (10 Aug 2021 07:45)  +chest tenderness 3/10.  Mild nausea.  Denies palpitations or SOB.  Off Amiodarone gtt now.  Maintained in SR with few APC's.     PAST MEDICAL & SURGICAL HISTORY:  Atrial fibrillation    PNA (pneumonia)    Cardiomyopathy, hypertrophic    Gastritis and duodenitis    Chronic gastritis without bleeding, unspecified gastritis type    HOCM (hypertrophic obstructive cardiomyopathy)  S/P septal ablation    HF (heart failure), diastolic  Grade II DD    Mitral valve insufficiency, unspecified etiology  Moderate    Pulmonary hypertension  Moderate    Chronic atrial fibrillation    Pneumonia  RML &amp; RLL at [facility] 3/2017    Pleural effusion  Right parapneumonic drained twice 3/2017 at [facility]    Gastritis and duodenitis  Mild - dx via EGD     AF (atrial fibrillation)  On Coumadin    GERD (gastroesophageal reflux disease)    Chronic diastolic (congestive) heart failure    MR (mitral regurgitation)    Lung cancer  s/p chemotherapy    CAP (community acquired pneumonia)  RML/RLL at [facility] 3/2017    HTN (hypertension)    HLD (hyperlipidemia)    GERD (gastroesophageal reflux disease)    Lung cancer  s/p chemotherapy ~ now in remission    H/O cardiac radiofrequency ablation    History of   15 years ago    H/O cardiac radiofrequency ablation  for HOCM - 16 years ago        MEDICATIONS  (STANDING):  apixaban 5 milliGRAM(s) Oral every 12 hours  chlorhexidine 4% Liquid 1 Application(s) Topical <User Schedule>  colchicine 0.6 milliGRAM(s) Oral two times a day  diltiazem    Tablet 60 milliGRAM(s) Oral three times a day  furosemide    Tablet 40 milliGRAM(s) Oral daily  furosemide   Injectable 40 milliGRAM(s) IV Push once  polyethylene glycol 3350 17 Gram(s) Oral daily  senna 2 Tablet(s) Oral at bedtime  spironolactone 25 milliGRAM(s) Oral daily    MEDICATIONS  (PRN):  acetaminophen   Tablet .. 650 milliGRAM(s) Oral every 6 hours PRN Temp greater or equal to 38C (100.4F), Mild Pain (1 - 3), Moderate Pain (4 - 6)  melatonin 6 milliGRAM(s) Oral at bedtime PRN Sleep            Vital Signs Last 24 Hrs  T(C): 36.9 (10 Aug 2021 04:00), Max: 36.9 (09 Aug 2021 09:35)  T(F): 98.5 (10 Aug 2021 04:00), Max: 98.5 (10 Aug 2021 04:00)  HR: 74 (10 Aug 2021 06:45) (72 - 130)  BP: 123/82 (09 Aug 2021 23:30) (108/71 - 133/86)  BP(mean): 92 (09 Aug 2021 23:30) (85 - 96)  RR: 20 (10 Aug 2021 06:45) (8 - 31)  SpO2: 98% (10 Aug 2021 00:45) (92% - 99%)            INTERPRETATION OF TELEMETRY:  SR with few APC's; few bursts of transient AT at 150 bpm overnight seen on telemetry    ECG: SR with APC's; QT/QTc 420/502ms        LABS:                        10.1   9.81  )-----------( 184      ( 10 Aug 2021 03:55 )             30.1     08-10    138  |  104  |  18  ----------------------------<  128<H>  3.5   |  20<L>  |  1.12    Ca    7.8<L>      10 Aug 2021 03:55  Phos  5.0     08-10  Mg     1.50     08-10    TPro  7.7  /  Alb  4.7  /  TBili  0.6  /  DBili  x   /  AST  22  /  ALT  17  /  AlkPhos  96  08-09              BNP  RADIOLOGY & ADDITIONAL STUDIES:  CONCLUSIONS:  1. Tethered mitral valve leaflets with normal opening.  Severe mitral regurgitation.  2. Calcified trileaflet aortic valve with decreased  opening. Estimated aortic valve area equals 1.6 sqcm (by  planimetry), consistent with mild aortic stenosis.  3. Left atrial enlargement. No left atrial or left atrial  appendage thrombus.  4. Mild segmental left ventricular systolic dysfunction.  5. Normal right ventricular size and function. Device wire  is noted in the right heart.  6. Normal tricuspid valve. Moderate tricuspid  regurgitation.  7. Color flow Doppler demonstrates evidence of a small  interatrial defect with predominant left to right flow.  ------------------------------------------------------------------------  Confirmed on  2021 - 18:23:20 by ZACARIAS Tran        PHYSICAL EXAM:    GENERAL: In no apparent distress, well nourished, and hydrated.  NECK: Supple and normal thyroid.  No JVD or carotid bruit.  Carotid pulse is 2+ bilaterally.  HEART: Regular rate and rhythm; No murmurs, rubs, or gallops.  PULMONARY: Clear to auscultation and perfusion.  No rales, wheezing, or rhonchi bilaterally.  ABDOMEN: Soft, Nontender, Nondistended; Bowel sounds present  EXTREMITIES:  2+ Peripheral Pulses, No clubbing, cyanosis, or edema; R groin site no active bleeding or hematoma  NEUROLOGICAL: Grossly nonfocal

## 2021-08-10 NOTE — DIETITIAN INITIAL EVALUATION ADULT. - OTHER INFO
PMH HOCM, chronic CHF, lung Ca s/p chemo, Afib.  Presenting with weight gain and body swelling x few months.  Admitted with acute on chronic CHF, AFib, HTN.     Pt. denies food allergies, nausea/vomiting/diarrhea/constipation, or issues with swallowing.   Fair appetite/PO intake since hospitalization.  Advise diet change to Soft, DASH/TLC (cholesterol & Na restricted) with Ensure Enlive 8oz PO 2x daily    Noted with elevated phosphorus.... advised monitoring.      Weight loss since admission likely fluid related given diuresis.    Usual body weight ~170lbs with 30lb increase x 6 mths, then another 20lb increase x "few weeks", per Pt. Up to 220lbs.

## 2021-08-10 NOTE — BH CONSULTATION LIAISON ASSESSMENT NOTE - DETAILS
reports self and her dog being attacked by neighbor's pit bull sister-etoh; paternal side of family-etoh

## 2021-08-10 NOTE — BH CONSULTATION LIAISON ASSESSMENT NOTE - RISK ASSESSMENT
Risk Factors: acute medical condition, chronic medical comorbidities, PTSD s/p dog incident, anxiety, housing stressors, financial stressors  Protective Factors: residential stability, supportive family, treatment seeking, no psychiatric history, denies si/sa/sib, denies ah/vh, no access to firearms

## 2021-08-10 NOTE — BH CONSULTATION LIAISON ASSESSMENT NOTE - HPI (INCLUDE ILLNESS QUALITY, SEVERITY, DURATION, TIMING, CONTEXT, MODIFYING FACTORS, ASSOCIATED SIGNS AND SYMPTOMS)
67yo disabled, , female domiciled with family with PMHx HOCM s/p septal ablation, chronic systolic CHF, LVEF 55%, lung cancer (s/p chemo, in remission), persistent atrial fibrillation (ablation 12/30/2020). PPHx no known inpatient psychiatric treatment, no hx si/sa/sib, no hx psychosis, no hx legal issues. Patient presents to [facility] with c/o increasing weight gain, diffuse whole body swelling over last  few months.    [facility] Psychiatry consulted for anxiety. Patient received Ativan 1mg this morning.    Chart reviewed. Patient seen by writer as well as [facility] attending, Dr. Nino. Patient sitting in bedside chair, a&o, expresses being very happy to have psychiatric consult.  She is tearful at times during interview especially when talking about recent incidence of being herself and her dog being bitten by a neighbor's pit bull approximately 3 months ago. She reports since incident she is fearful to go outside. She also becomes tearful when reporting since she has a change in income she has to find alternative housing. She denies any formal psychiatric treatment, however reports she has had 3 sessions with a therapist s/p recent trauma. She reports she is not happy with the therapist and plans on changing. She denies si/sa/sib, she denies ah/vh. Patient becomes animated when talking about recent opportunity to participate in an art project as well as enjoying rekindling current boyfriend of her youth.

## 2021-08-10 NOTE — DIETITIAN INITIAL EVALUATION ADULT. - ETIOLOGY
Lack of previous diet/nutrition related education as it relates to cardiovascular disease, CHF, HTN.

## 2021-08-10 NOTE — BH CONSULTATION LIAISON ASSESSMENT NOTE - NSBHCHARTREVIEWLAB_PSY_A_CORE FT
CBC Full  -  ( 10 Aug 2021 12:40 )  WBC Count : 9.25 K/uL  RBC Count : 3.40 M/uL  Hemoglobin : 11.1 g/dL  Hematocrit : 33.0 %  Platelet Count - Automated : 209 K/uL  Mean Cell Volume : 97.1 fL  Mean Cell Hemoglobin : 32.6 pg  Mean Cell Hemoglobin Concentration : 33.6 gm/dL  Auto Neutrophil # : x  Auto Lymphocyte # : x  Auto Monocyte # : x  Auto Eosinophil # : x  Auto Basophil # : x  Auto Neutrophil % : x  Auto Lymphocyte % : x  Auto Monocyte % : x  Auto Eosinophil % : x  Auto Basophil % : x  08-10    138  |  104  |  18  ----------------------------<  128<H>  3.5   |  20<L>  |  1.12    Ca    7.8<L>      10 Aug 2021 03:55  Phos  5.0     08-10  Mg     1.50     08-10    TPro  7.7  /  Alb  4.7  /  TBili  0.6  /  DBili  x   /  AST  22  /  ALT  17  /  AlkPhos  96  08-09

## 2021-08-10 NOTE — PROGRESS NOTE ADULT - SUBJECTIVE AND OBJECTIVE BOX
Subjective/Objective: compliant of ear, throat, back and abdomen pain after procedure. Also compliant of feeling nervous and anxious. repots taking  POT every night for few months.    Overnight event: s/p Aflutter ablation, started on amiodarone  drip for SVT  Tele event:NSR with frequent PACs    MEDICATIONS    aMIOdarone Infusion 0.5 mG/Min IV Continuous <Continuous>  aMIOdarone Infusion 1 mG/Min IV Continuous <Continuous>  apixaban 5 milliGRAM(s) Oral every 12 hours  diltiazem    Tablet 60 milliGRAM(s) Oral three times a day  furosemide    Tablet 40 milliGRAM(s) Oral daily  spironolactone 25 milliGRAM(s) Oral daily  acetaminophen   Tablet .. 650 milliGRAM(s) Oral every 6 hours PRN  LORazepam     Tablet 1 milliGRAM(s) Oral once  melatonin 6 milliGRAM(s) Oral at bedtime PRN  polyethylene glycol 3350 17 Gram(s) Oral daily  senna 2 Tablet(s) Oral at bedtime  chlorhexidine 4% Liquid 1 Application(s) Topical <User Schedule>  magnesium sulfate  IVPB 2 Gram(s) IV Intermittent every 1 hour        REVIEW OF SYSTEMS    General: + fatigue, no malaise, + weight gain.  Skin: no rashes.  Ophthalmologic: no blurred vision, no loss of vision. 	  ENT: + sore throat,+ rhinorrhea,+ ear ache, sinus congestion.  Cardiovascular: no chest pain ,no palpitation, no dizziness, no diaphoresis, no edema  Respiratory: no SOB, cough or wheeze.  Gastrointestinal: + abdomen pain, no N/V/D, no melena/hematemesis/hematochezia.  Genitourinary: no dysuria/hesitancy or hematuria.  Musculoskeletal: no myalgias or arthralgias.  Neurological: no changes in vision or hearing, no lightheadedness/dizziness, no syncope/near syncope	  Psychiatric: no unusual stress/anxiety      	    ICU Vital Signs Last 24 Hrs  T(C): 36.9 (10 Aug 2021 04:00), Max: 36.9 (09 Aug 2021 09:35)  T(F): 98.5 (10 Aug 2021 04:00), Max: 98.5 (10 Aug 2021 04:00)  HR: 74 (10 Aug 2021 06:45) (72 - 130)  BP: 123/82 (09 Aug 2021 23:30) (108/71 - 133/86)  BP(mean): 92 (09 Aug 2021 23:30) (85 - 96)  ABP: 123/71 (10 Aug 2021 06:45) (108/63 - 161/82)  ABP(mean): 90 (10 Aug 2021 06:45) (-2 - 110)  RR: 20 (10 Aug 2021 06:45) (8 - 31)  SpO2: 98% (10 Aug 2021 00:45) (92% - 99%)      PHYSICAL EXAMINATION  Appearance: NAD, no distress  HEENT: Moist Mucous Membranes, Anicteric, PERRL, EOMI  Cardiovascular: Regular rate and rhythm, Normal S1 S2, No JVD, No murmurs  Respiratory: Lungs clear to auscultation. No rales, No rhonchi, No wheezing. No tenderness to palpation  Gastrointestinal:  Soft, Non-tender, + BS  Neurologic: Non-focal, A&Ox3  Skin: Warm and dry, No rashes, No ecchymosis, No cyanosis,+ blood stain dsg on right groin  Musculoskeletal: No clubbing, No cyanosis, No edema  Vascular: Peripheral pulses palpable 2+ bilaterally  Psychiatry: Mood & affect appropriate      	    		      I&O's Summary    09 Aug 2021 07:01  -  10 Aug 2021 07:00  --------------------------------------------------------  IN: 445 mL / OUT: 700 mL / NET: -255 mL    	 	  LABORATORY VALUES	 	                          10.1   9.81  )-----------( 184      ( 10 Aug 2021 03:55 )             30.1       08-10    138  |  104  |  18  ----------------------------<  128<H>  3.5   |  20<L>  |  1.12  08-09    135  |  98  |  20  ----------------------------<  93  3.8   |  23  |  1.23    Ca    7.8<L>      10 Aug 2021 03:55  Ca    9.8      09 Aug 2021 07:35  Phos  5.0     08-10  Phos  4.2     08-09  Mg     1.50     08-10  Mg     2.00     08-09    TPro  7.7  /  Alb  4.7  /  TBili  0.6  /  DBili  x   /  AST  22  /  ALT  17  /  AlkPhos  96  08-09    LIVER FUNCTIONS - ( 09 Aug 2021 07:35 )  Alb: 4.7 g/dL / Pro: 7.7 g/dL / ALK PHOS: 96 U/L / ALT: 17 U/L / AST: 22 U/L / GGT: x               CARDIAC MARKERS:          Serum Pro-Brain Natriuretic Peptide: 3436 pg/mL (08-04 @ 21:48)      08-05 @ 06:25  Cholesterol, Serum - 209  Direct LDL- --  HDL Cholesterol, Serum- 55  Triglycerides, Serum- 114      Thyroid Stimulating Hormone, Serum: 5.46 uIU/mL (08-05 @ 06:22)        CAPILLARY BLOOD GLUCOSE    EKG:  Diagnostic testing:  cath:  echo:< from: Transthoracic Echocardiogram (08.06.21 @ 10:01) >  1. Normal trileaflet aortic valve. Mild aortic regurgitation.  2. Severely dilated left atrium.  LA volume index = 69cc/m2.  3. Endocardium not well visualized; grossly normal leftventricular systolic function. Mid to distal inferoseptalendocardium difficult to visualize. Possibly hypokinetic.Significant basal septal hypertrophy.  4. Normal tricuspid valve. Mild tricuspid regurgitation.5. Estimated pulmonary artery systolic pressure equals 43mm Hg, assuming right atrial pressure equals 10  mm Hg,consistent with mild pulmonary hypertension.6. Agitated saline injection demonstrates evidence of apatent foramen ovale vs a small atrial septal defect.    < end of copied text >  < from: Transesophageal Echocardiogram w/o TTE (08.09.21 @ 20:01) >  1. Tethered mitral valve leaflets with normal opening..Severe mitral regurgitation  2. Calcified trileaflet aortic valve with decreasedopening. Estimated aortic valve area equals 1.6 sqcm (byplanimetry), consistent with mild aortic stenosis.  3. Left atrial enlargement. No left atrial or left atrialappendage thrombus.4. Mild segmental left ventricular systolic dysfunction.  5. Normal right ventricular size and function. Device wireis noted in the right heart.  6. Normal tricuspid valve. Moderate tricuspidregurgitation.  7. Color flow Doppler demonstrates evidence of a smallinteratrial defect with predominant left to right flow.    < end of copied text >        Assessment and Plan:  66 year old Female with PMH of  Lung CA s/p chemo (2017 in remission),  HOCM s/p septal ablation (2005), HFpEF  (55% severe MR) and AFib (on Eliquis, admits noncompliance at times) s/p ablation (12/2020), anxiety. Patient presented to ED with c/o SOB/WALKER and increasing weight gain over the  last  few months, started on Lasix by her cardiologist three weeks prior to admission and Spironolactone.  EKG consistent with atypical atrial flutter c/ b traumatic RICCARDO, bleeding from mouth. 8/9/21: Patient s/p atypical atria aflutter ablation with Dr. Yasmeen madison. Procedure took roughly 6.5 hours and had multiple runs of SVT after case. Patient to be transferred to CCU for closer monitoring overnight        Neuro:  -anxiety  -use POT at home  - currently on Ativan 1 mg PO as needed for anxiety    Pulm:  #h/o  Lung CA s/p chemo, in remission  - RA- pulse ox 93%  -Limited noncontrast CT  study showed No endobronchial lesions. A 3 mm anterior left upper lobe nodule (2:12) and a 1 mm right upper lobe nodule (2:27), stable since 12/21/2020.  -f/u with Oncologist  Dr Vega as out pt        CVS  #atypical Aflutter  -s/p aflutter ablation on 8/9 c/b SVT, on amiodarone drip  -continue post EP procedure care.   -Right groin dressing c/d/I  - c/w Protonix 40mg  po daily   -CHADSVASC is 4  c/w eliquis 5mg bid  -INR goal is 2-3  -c/w Cardizem 60mg tid and amiodarone drip at 0.5mg IV [id number] EP recommendation       #HOCM, s/p septal ablation  -euvolemic on exam     #HFpEF   -p/w sob and increased weight gain  -RICCARDO on 8/29 severe MR ( known), mild seg LVSD, severe LA enlargment.  -proBNP 3436 on admission  -Euvolemic on exam  -c/w lasix, spironolactone.      Renal  - GEORGE (acute kidney injury).   -mild George on admission  -resolved now        #Prophylactic measure  -on eliquis Subjective/Objective: compliant of ear, throat, back and abdomen pain after procedure. Also compliant of feeling nervous and anxious. repots taking  POT every night for few months.    Overnight event: s/p Aflutter ablation, started on amiodarone  drip for  SVT  Tele event:NSR with frequent PACs    MEDICATIONS    apixaban 5 milliGRAM(s) Oral every 12 hours  diltiazem    Tablet 60 milliGRAM(s) Oral three times a day  furosemide    Tablet 40 milliGRAM(s) Oral daily  spironolactone 25 milliGRAM(s) Oral daily  colchicine 0.6mg  oral twice a day  acetaminophen   Tablet .. 650 milliGRAM(s) Oral every 6 hours PRN  LORazepam     Tablet 1 milliGRAM(s) Oral once  melatonin 6 milliGRAM(s) Oral at bedtime PRN  polyethylene glycol 3350 17 Gram(s) Oral daily  senna 2 Tablet(s) Oral at bedtime  chlorhexidine 4% Liquid 1 Application(s) Topical <User Schedule>  magnesium sulfate  IVPB 2 Gram(s) IV Intermittent every 1 hour        REVIEW OF SYSTEMS    General: + fatigue, no malaise, + weight gain.  Skin: no rashes.  Ophthalmologic: no blurred vision, no loss of vision. 	  ENT: + sore throat,+ rhinorrhea,+ ear ache, sinus congestion.  Cardiovascular: + chest soreness ,no palpitation, no dizziness, no diaphoresis, no edema  Respiratory: no SOB, cough or wheeze.  Gastrointestinal: + abdomen pain, no N/V/D, no melena/hematemesis/hematochezia.  Genitourinary: no dysuria/hesitancy or hematuria.  Musculoskeletal: no myalgias or arthralgias.  Neurological: no changes in vision or hearing, no lightheadedness/dizziness, no syncope/near syncope	  Psychiatric: no unusual stress/anxiety      	    ICU Vital Signs Last 24 Hrs  T(C): 36.9 (10 Aug 2021 04:00), Max: 36.9 (09 Aug 2021 09:35)  T(F): 98.5 (10 Aug 2021 04:00), Max: 98.5 (10 Aug 2021 04:00)  HR: 74 (10 Aug 2021 06:45) (72 - 130)  BP: 123/82 (09 Aug 2021 23:30) (108/71 - 133/86)  BP(mean): 92 (09 Aug 2021 23:30) (85 - 96)  ABP: 123/71 (10 Aug 2021 06:45) (108/63 - 161/82)  ABP(mean): 90 (10 Aug 2021 06:45) (-2 - 110)  RR: 20 (10 Aug 2021 06:45) (8 - 31)  SpO2: 98% (10 Aug 2021 00:45) (92% - 99%)      PHYSICAL EXAMINATION  Appearance: NAD, no distress  HEENT: Moist Mucous Membranes, Anicteric, PERRL, EOMI  Cardiovascular: Regular rate and rhythm, Normal S1 S2, No JVD, No murmurs  Respiratory: Lungs clear to auscultation. No rales, No rhonchi, No wheezing. No tenderness to palpation  Gastrointestinal:  Soft, Non-tender, + BS  Neurologic: Non-focal, A&Ox3  Skin: Warm and dry, No rashes, No ecchymosis, No cyanosis,+ blood stain dsg on right groin  Musculoskeletal: No clubbing, No cyanosis, No edema  Vascular: Peripheral pulses palpable 2+ bilaterally  Psychiatry: Mood & affect appropriate      	    		      I&O's Summary    09 Aug 2021 07:01  -  10 Aug 2021 07:00  --------------------------------------------------------  IN: 445 mL / OUT: 700 mL / NET: -255 mL    	 	  LABORATORY VALUES	 	                          10.1   9.81  )-----------( 184      ( 10 Aug 2021 03:55 )             30.1       08-10    138  |  104  |  18  ----------------------------<  128<H>  3.5   |  20<L>  |  1.12  08-09    135  |  98  |  20  ----------------------------<  93  3.8   |  23  |  1.23    Ca    7.8<L>      10 Aug 2021 03:55  Ca    9.8      09 Aug 2021 07:35  Phos  5.0     08-10  Phos  4.2     08-09  Mg     1.50     08-10  Mg     2.00     08-09    TPro  7.7  /  Alb  4.7  /  TBili  0.6  /  DBili  x   /  AST  22  /  ALT  17  /  AlkPhos  96  08-09    LIVER FUNCTIONS - ( 09 Aug 2021 07:35 )  Alb: 4.7 g/dL / Pro: 7.7 g/dL / ALK PHOS: 96 U/L / ALT: 17 U/L / AST: 22 U/L / GGT: x               CARDIAC MARKERS:          Serum Pro-Brain Natriuretic Peptide: 3436 pg/mL (08-04 @ 21:48)      08-05 @ 06:25  Cholesterol, Serum - 209  Direct LDL- --  HDL Cholesterol, Serum- 55  Triglycerides, Serum- 114      Thyroid Stimulating Hormone, Serum: 5.46 uIU/mL (08-05 @ 06:22)        CAPILLARY BLOOD GLUCOSE    EKG:  Diagnostic testing:  cath:  echo:< from: Transthoracic Echocardiogram (08.06.21 @ 10:01) >  1. Normal trileaflet aortic valve. Mild aortic regurgitation.  2. Severely dilated left atrium.  LA volume index = 69cc/m2.  3. Endocardium not well visualized; grossly normal leftventricular systolic function. Mid to distal inferoseptalendocardium difficult to visualize. Possibly hypokinetic.Significant basal septal hypertrophy.  4. Normal tricuspid valve. Mild tricuspid regurgitation.5. Estimated pulmonary artery systolic pressure equals 43mm Hg, assuming right atrial pressure equals 10  mm Hg,consistent with mild pulmonary hypertension.6. Agitated saline injection demonstrates evidence of apatent foramen ovale vs a small atrial septal defect.    < end of copied text >  < from: Transesophageal Echocardiogram w/o TTE (08.09.21 @ 20:01) >  1. Tethered mitral valve leaflets with normal opening..Severe mitral regurgitation  2. Calcified trileaflet aortic valve with decreasedopening. Estimated aortic valve area equals 1.6 sqcm (byplanimetry), consistent with mild aortic stenosis.  3. Left atrial enlargement. No left atrial or left atrialappendage thrombus.4. Mild segmental left ventricular systolic dysfunction.  5. Normal right ventricular size and function. Device wireis noted in the right heart.  6. Normal tricuspid valve. Moderate tricuspidregurgitation.  7. Color flow Doppler demonstrates evidence of a smallinteratrial defect with predominant left to right flow.    < end of copied text >        Assessment and Plan:  66 year old Female with PMH of  Lung CA s/p chemo (2017 in remission),  HOCM s/p septal ablation (2005), HFpEF  (55% severe MR) and AFib (on Eliquis, admits noncompliance at times) s/p ablation (12/2020), anxiety. Patient presented to ED with c/o SOB/WALKER and increasing weight gain over the  last  few months, started on Lasix by her cardiologist three weeks prior to admission and Spironolactone.  EKG consistent with atypical atrial flutter c/ b traumatic RICCARDO, bleeding from mouth. 8/9/21: Patient s/p atypical atria aflutter ablation with Dr. Yasmeen madison. Procedure took roughly 6.5 hours and had multiple runs of SVT after case. Patient to be transferred to CCU for closer monitoring overnight        Neuro:  -anxiety  -use POT at home  - currently on Ativan 1 mg PO as needed for anxiety    Pulm:  #h/o  Lung CA s/p chemo, in remission  - RA- pulse ox 93%  -Limited noncontrast CT  study showed No endobronchial lesions. A 3 mm anterior left upper lobe nodule (2:12) and a 1 mm right upper lobe nodule (2:27), stable since 12/21/2020.  -f/u with Oncologist  Dr Vega as out pt        CVS  #atypical Aflutter  -s/p aflutter ablation on 8/9 c/b SVT, on amiodarone drip  -continue post EP procedure care.   -Right groin dressing c/d/I  - c/w Protonix 40mg  po daily   -CHADSVASC is 4  c/w eliquis 5mg bid  -INR goal is 2-3  -c/w Cardizem 60mg tid   -d/c amiodarone drip as per EP -plan to start sotalol tomorrow   -colchicine 0.6mg bid for pleuritic chest pain post ablation .     #HOCM, s/p septal ablation  -euvolemic on exam     #HFpEF   -p/w sob and increased weight gain  -RICCARDO on 8/29 severe MR ( known), mild seg LVSD, severe LA enlargment.  -proBNP 3436 on admission  -Euvolemic on exam  -c/w lasix, spironolactone.  -lasix 40mg IVP x1 today -pt received 3 L IVF during procedure       Renal  - GEORGE (acute kidney injury).   -mild George on admission  -resolved now        #Prophylactic measure  -on eliquis Subjective/Objective: compliant of ear, throat, back and abdomen pain after procedure. Also compliant of feeling nervous and anxious. repots taking  POT every night for few months.    Overnight event: s/p Aflutter ablation, started on amiodarone  drip for  SVT  Tele event:NSR with frequent PACs    MEDICATIONS    apixaban 5 milliGRAM(s) Oral every 12 hours  diltiazem    Tablet 60 milliGRAM(s) Oral three times a day  furosemide    Tablet 40 milliGRAM(s) Oral daily  spironolactone 25 milliGRAM(s) Oral daily  colchicine 0.6mg  oral twice a day  acetaminophen   Tablet .. 650 milliGRAM(s) Oral every 6 hours PRN  LORazepam     Tablet 1 milliGRAM(s) Oral once  melatonin 6 milliGRAM(s) Oral at bedtime PRN  polyethylene glycol 3350 17 Gram(s) Oral daily  senna 2 Tablet(s) Oral at bedtime  chlorhexidine 4% Liquid 1 Application(s) Topical <User Schedule>  magnesium sulfate  IVPB 2 Gram(s) IV Intermittent every 1 hour        REVIEW OF SYSTEMS    General: + fatigue, no malaise, + weight gain.  Skin: no rashes.  Ophthalmologic: no blurred vision, no loss of vision. 	  ENT: + sore throat,+ rhinorrhea,+ ear ache, sinus congestion.  Cardiovascular: + chest soreness ,no palpitation, no dizziness, no diaphoresis, no edema  Respiratory: no SOB, cough or wheeze.  Gastrointestinal: + abdomen pain, no N/V/D, no melena/hematemesis/hematochezia.  Genitourinary: no dysuria/hesitancy or hematuria.  Musculoskeletal: no myalgias or arthralgias.  Neurological: no changes in vision or hearing, no lightheadedness/dizziness, no syncope/near syncope	  Psychiatric: no unusual stress/anxiety      	    ICU Vital Signs Last 24 Hrs  T(C): 36.9 (10 Aug 2021 04:00), Max: 36.9 (09 Aug 2021 09:35)  T(F): 98.5 (10 Aug 2021 04:00), Max: 98.5 (10 Aug 2021 04:00)  HR: 74 (10 Aug 2021 06:45) (72 - 130)  BP: 123/82 (09 Aug 2021 23:30) (108/71 - 133/86)  BP(mean): 92 (09 Aug 2021 23:30) (85 - 96)  ABP: 123/71 (10 Aug 2021 06:45) (108/63 - 161/82)  ABP(mean): 90 (10 Aug 2021 06:45) (-2 - 110)  RR: 20 (10 Aug 2021 06:45) (8 - 31)  SpO2: 98% (10 Aug 2021 00:45) (92% - 99%)      PHYSICAL EXAMINATION  Appearance: NAD, no distress  HEENT: Moist Mucous Membranes, Anicteric, PERRL, EOMI  Cardiovascular: Regular rate and rhythm, Normal S1 S2, No JVD, No murmurs  Respiratory: Lungs clear to auscultation. No rales, No rhonchi, No wheezing. No tenderness to palpation  Gastrointestinal:  Soft, Non-tender, + BS  Neurologic: Non-focal, A&Ox3  Skin: Warm and dry, No rashes, No ecchymosis, No cyanosis,+ blood stain dsg on right groin  Musculoskeletal: No clubbing, No cyanosis, No edema  Vascular: Peripheral pulses palpable 2+ bilaterally  Psychiatry: Mood & affect appropriate      	    		      I&O's Summary    09 Aug 2021 07:01  -  10 Aug 2021 07:00  --------------------------------------------------------  IN: 445 mL / OUT: 700 mL / NET: -255 mL    	 	  LABORATORY VALUES	 	                          10.1   9.81  )-----------( 184      ( 10 Aug 2021 03:55 )             30.1       08-10    138  |  104  |  18  ----------------------------<  128<H>  3.5   |  20<L>  |  1.12  08-09    135  |  98  |  20  ----------------------------<  93  3.8   |  23  |  1.23    Ca    7.8<L>      10 Aug 2021 03:55  Ca    9.8      09 Aug 2021 07:35  Phos  5.0     08-10  Phos  4.2     08-09  Mg     1.50     08-10  Mg     2.00     08-09    TPro  7.7  /  Alb  4.7  /  TBili  0.6  /  DBili  x   /  AST  22  /  ALT  17  /  AlkPhos  96  08-09    LIVER FUNCTIONS - ( 09 Aug 2021 07:35 )  Alb: 4.7 g/dL / Pro: 7.7 g/dL / ALK PHOS: 96 U/L / ALT: 17 U/L / AST: 22 U/L / GGT: x               CARDIAC MARKERS:          Serum Pro-Brain Natriuretic Peptide: 3436 pg/mL (08-04 @ 21:48)      08-05 @ 06:25  Cholesterol, Serum - 209  Direct LDL- --  HDL Cholesterol, Serum- 55  Triglycerides, Serum- 114      Thyroid Stimulating Hormone, Serum: 5.46 uIU/mL (08-05 @ 06:22)        CAPILLARY BLOOD GLUCOSE    EKG:  Diagnostic testing:  cath:  echo:< from: Transthoracic Echocardiogram (08.06.21 @ 10:01) >  1. Normal trileaflet aortic valve. Mild aortic regurgitation.  2. Severely dilated left atrium.  LA volume index = 69cc/m2.  3. Endocardium not well visualized; grossly normal leftventricular systolic function. Mid to distal inferoseptalendocardium difficult to visualize. Possibly hypokinetic.Significant basal septal hypertrophy.  4. Normal tricuspid valve. Mild tricuspid regurgitation.5. Estimated pulmonary artery systolic pressure equals 43mm Hg, assuming right atrial pressure equals 10  mm Hg,consistent with mild pulmonary hypertension.6. Agitated saline injection demonstrates evidence of apatent foramen ovale vs a small atrial septal defect.    < end of copied text >  < from: Transesophageal Echocardiogram w/o TTE (08.09.21 @ 20:01) >  1. Tethered mitral valve leaflets with normal opening..Severe mitral regurgitation  2. Calcified trileaflet aortic valve with decreasedopening. Estimated aortic valve area equals 1.6 sqcm (byplanimetry), consistent with mild aortic stenosis.  3. Left atrial enlargement. No left atrial or left atrialappendage thrombus.4. Mild segmental left ventricular systolic dysfunction.  5. Normal right ventricular size and function. Device wireis noted in the right heart.  6. Normal tricuspid valve. Moderate tricuspidregurgitation.  7. Color flow Doppler demonstrates evidence of a smallinteratrial defect with predominant left to right flow.    < end of copied text >        Assessment and Plan:  66 year old Female with PMH of  Lung CA s/p chemo (2017 in remission),  HOCM s/p septal ablation (2005), HFpEF  (55% severe MR) and AFib (on Eliquis, admits noncompliance at times) s/p ablation (12/2020), anxiety. Patient presented to ED with c/o SOB/WALKER and increasing weight gain over the  last  few months, started on Lasix by her cardiologist three weeks prior to admission and Spironolactone.  EKG consistent with atypical atrial flutter c/ b traumatic RICCARDO, bleeding from mouth.  On 8/9/21: underwent atypical atria aflutter ablation with Dr. Arana and had multiple runs of SVT after case. Patient was transferred to CCU  for closer monitoring overnight. She was started on amiodarone drip for arrhthymias        Neuro:  -anxiety  -use POT at home  - currently on Ativan 1 mg PO as needed for anxiety    Pulm:  #h/o  Lung CA s/p chemo, in remission  - RA- pulse ox 93%  -Limited noncontrast CT  study showed No endobronchial lesions. A 3 mm anterior left upper lobe nodule (2:12) and a 1 mm right upper lobe nodule (2:27), stable since 12/21/2020.  -f/u with Oncologist  Dr Vega as out pt        CVS  #atypical Aflutter  -s/p aflutter ablation on 8/9 c/b SVT, on amiodarone drip  -continue post EP procedure care.   -Right groin dressing c/d/I  - c/w Protonix 40mg  po daily   -CHADSVASC is 4  c/w eliquis 5mg bid  -INR goal is 2-3  -c/w Cardizem 60mg tid   -d/c amiodarone drip as per EP -plan to start sotalol tomorrow   -colchicine 0.6mg bid for pleuritic chest pain post ablation .     #HOCM, s/p septal ablation  -euvolemic on exam     #HFpEF   -p/w sob and increased weight gain  -RICCARDO on 8/29 severe MR ( known), mild seg LVSD, severe LA enlargment.  -proBNP 3436 on admission  -Euvolemic on exam  -c/w lasix, spironolactone.  -lasix 40mg IVP x1 today -pt received 3 L IVF during procedure       Renal  - GEORGE (acute kidney injury).   -mild George on admission  -resolved now        #Prophylactic measure  -on eliquis Subjective/Objective: compliant of ear, throat, back and abdomen pain after procedure. Also compliant of feeling nervous and anxious. repots taking  POT every night for few months.    Overnight event: s/p Aflutter ablation, started on amiodarone  drip for  SVT  Tele event:NSR with frequent PACs    MEDICATIONS    apixaban 5 milliGRAM(s) Oral every 12 hours  diltiazem    Tablet 60 milliGRAM(s) Oral three times a day  furosemide    Tablet 40 milliGRAM(s) Oral daily  spironolactone 25 milliGRAM(s) Oral daily  colchicine 0.6mg  oral twice a day  acetaminophen   Tablet .. 650 milliGRAM(s) Oral every 6 hours PRN  LORazepam     Tablet 1 milliGRAM(s) Oral once  melatonin 6 milliGRAM(s) Oral at bedtime PRN  polyethylene glycol 3350 17 Gram(s) Oral daily  senna 2 Tablet(s) Oral at bedtime  chlorhexidine 4% Liquid 1 Application(s) Topical <User Schedule>  magnesium sulfate  IVPB 2 Gram(s) IV Intermittent every 1 hour        REVIEW OF SYSTEMS    General: + fatigue, no malaise, + weight gain.  Skin: no rashes.  Ophthalmologic: no blurred vision, no loss of vision. 	  ENT: + sore throat,+ rhinorrhea,+ ear ache, sinus congestion.  Cardiovascular: + chest soreness ,no palpitation, no dizziness, no diaphoresis, no edema  Respiratory: no SOB, cough or wheeze.  Gastrointestinal: + abdomen pain, no N/V/D, no melena/hematemesis/hematochezia.  Genitourinary: no dysuria/hesitancy or hematuria.  Musculoskeletal: no myalgias or arthralgias.  Neurological: no changes in vision or hearing, no lightheadedness/dizziness, no syncope/near syncope	  Psychiatric: no unusual stress/anxiety      	    ICU Vital Signs Last 24 Hrs  T(C): 36.9 (10 Aug 2021 04:00), Max: 36.9 (09 Aug 2021 09:35)  T(F): 98.5 (10 Aug 2021 04:00), Max: 98.5 (10 Aug 2021 04:00)  HR: 74 (10 Aug 2021 06:45) (72 - 130)  BP: 123/82 (09 Aug 2021 23:30) (108/71 - 133/86)  BP(mean): 92 (09 Aug 2021 23:30) (85 - 96)  ABP: 123/71 (10 Aug 2021 06:45) (108/63 - 161/82)  ABP(mean): 90 (10 Aug 2021 06:45) (-2 - 110)  RR: 20 (10 Aug 2021 06:45) (8 - 31)  SpO2: 98% (10 Aug 2021 00:45) (92% - 99%)      PHYSICAL EXAMINATION  Appearance: NAD, no distress  HEENT: Moist Mucous Membranes, Anicteric, PERRL, EOMI  Cardiovascular: Regular rate and rhythm, Normal S1 S2, No JVD, No murmurs  Respiratory: Lungs clear to auscultation. No rales, No rhonchi, No wheezing. No tenderness to palpation  Gastrointestinal:  Soft, Non-tender, + BS  Neurologic: Non-focal, A&Ox3  Skin: Warm and dry, No rashes, No ecchymosis, No cyanosis,+ blood stain dsg on right groin  Musculoskeletal: No clubbing, No cyanosis, No edema  Vascular: Peripheral pulses palpable 2+ bilaterally  Psychiatry: Mood & affect appropriate      	    		      I&O's Summary    09 Aug 2021 07:01  -  10 Aug 2021 07:00  --------------------------------------------------------  IN: 445 mL / OUT: 700 mL / NET: -255 mL    	 	  LABORATORY VALUES	 	                          10.1   9.81  )-----------( 184      ( 10 Aug 2021 03:55 )             30.1       08-10    138  |  104  |  18  ----------------------------<  128<H>  3.5   |  20<L>  |  1.12  08-09    135  |  98  |  20  ----------------------------<  93  3.8   |  23  |  1.23    Ca    7.8<L>      10 Aug 2021 03:55  Ca    9.8      09 Aug 2021 07:35  Phos  5.0     08-10  Phos  4.2     08-09  Mg     1.50     08-10  Mg     2.00     08-09    TPro  7.7  /  Alb  4.7  /  TBili  0.6  /  DBili  x   /  AST  22  /  ALT  17  /  AlkPhos  96  08-09    LIVER FUNCTIONS - ( 09 Aug 2021 07:35 )  Alb: 4.7 g/dL / Pro: 7.7 g/dL / ALK PHOS: 96 U/L / ALT: 17 U/L / AST: 22 U/L / GGT: x               CARDIAC MARKERS:          Serum Pro-Brain Natriuretic Peptide: 3436 pg/mL (08-04 @ 21:48)      08-05 @ 06:25  Cholesterol, Serum - 209  Direct LDL- --  HDL Cholesterol, Serum- 55  Triglycerides, Serum- 114      Thyroid Stimulating Hormone, Serum: 5.46 uIU/mL (08-05 @ 06:22)        CAPILLARY BLOOD GLUCOSE    EKG:  Diagnostic testing:  cath:  echo:< from: Transthoracic Echocardiogram (08.06.21 @ 10:01) >  1. Normal trileaflet aortic valve. Mild aortic regurgitation.  2. Severely dilated left atrium.  LA volume index = 69cc/m2.  3. Endocardium not well visualized; grossly normal leftventricular systolic function. Mid to distal inferoseptalendocardium difficult to visualize. Possibly hypokinetic.Significant basal septal hypertrophy.  4. Normal tricuspid valve. Mild tricuspid regurgitation.5. Estimated pulmonary artery systolic pressure equals 43mm Hg, assuming right atrial pressure equals 10  mm Hg,consistent with mild pulmonary hypertension.6. Agitated saline injection demonstrates evidence of apatent foramen ovale vs a small atrial septal defect.    < end of copied text >  < from: Transesophageal Echocardiogram w/o TTE (08.09.21 @ 20:01) >  1. Tethered mitral valve leaflets with normal opening..Severe mitral regurgitation  2. Calcified trileaflet aortic valve with decreasedopening. Estimated aortic valve area equals 1.6 sqcm (byplanimetry), consistent with mild aortic stenosis.  3. Left atrial enlargement. No left atrial or left atrialappendage thrombus.4. Mild segmental left ventricular systolic dysfunction.  5. Normal right ventricular size and function. Device wireis noted in the right heart.  6. Normal tricuspid valve. Moderate tricuspidregurgitation.  7. Color flow Doppler demonstrates evidence of a smallinteratrial defect with predominant left to right flow.    < end of copied text >        Assessment and Plan:  66 year old Female with PMH of  Lung CA s/p chemo (2017 in remission),  HOCM s/p septal ablation (2005), HFpEF  (55% severe MR) and AFib (on Eliquis, admits noncompliance at times) s/p ablation (12/2020), anxiety. Patient presented to ED with c/o SOB/WALKER and increasing weight gain over the  last  few months, started on Lasix by her cardiologist three weeks prior to admission and Spironolactone.  EKG consistent with atypical atrial flutter c/ b traumatic RICCARDO, bleeding from mouth.  On 8/9/21: underwent atypical atria aflutter ablation with Dr. Arana and had multiple runs of SVT after case. Patient was transferred to CCU  for closer monitoring overnight. She was started on amiodarone drip for arrhthymias        Neuro:  - moderated anxiety  -use POT at home  - improved with ativan 1 mg PO x1  -start on xanax 0.5mg PO as needed and psych consult    Pulm:  #h/o  Lung CA s/p chemo, in remission  - RA- pulse ox 93%  -Limited noncontrast CT  study showed No endobronchial lesions. A 3 mm anterior left upper lobe nodule (2:12) and a 1 mm right upper lobe nodule (2:27), stable since 12/21/2020.  -f/u with Oncologist  Dr Vega as out pt        CVS  #atypical Aflutter  -s/p aflutter ablation on 8/9 c/b SVT, on amiodarone drip  -continue post EP procedure care.   -Right groin dressing c/d/I  - c/w Protonix 40mg  po daily   -CHADSVASC is 4  c/w eliquis 5mg bid  -INR goal is 2-3  -c/w Cardizem 60mg tid   -d/c amiodarone drip as per EP -plan to start sotalol tomorrow   -colchicine 0.6mg bid for pleuritic chest pain post ablation .     #HOCM, s/p septal ablation  -euvolemic on exam     #HFpEF   -p/w sob and increased weight gain  -RICCARDO on 8/29 severe MR ( known), mild seg LVSD, severe LA enlargment.  -proBNP 3436 on admission  -Euvolemic on exam  -c/w lasix, spironolactone.  -lasix 40mg IVP x1 today -pt received 3 L IVF during procedure       Renal  - GEORGE (acute kidney injury).   -mild George on admission  -resolved now        #Prophylactic measure  -on eliquis Subjective/Objective: compliant of ear, throat, back and abdomen pain after procedure. Also compliant of feeling nervous and anxious. repots taking  POT every night for few months.    Overnight event: s/p Aflutter ablation, started on amiodarone  drip for  SVT  Tele event:NSR with frequent PACs    MEDICATIONS    apixaban 5 milliGRAM(s) Oral every 12 hours  diltiazem    Tablet 60 milliGRAM(s) Oral three times a day  furosemide    Tablet 40 milliGRAM(s) Oral daily  spironolactone 25 milliGRAM(s) Oral daily  colchicine 0.6mg  oral twice a day  acetaminophen   Tablet .. 650 milliGRAM(s) Oral every 6 hours PRN  LORazepam     Tablet 1 milliGRAM(s) Oral once  melatonin 6 milliGRAM(s) Oral at bedtime PRN  polyethylene glycol 3350 17 Gram(s) Oral daily  senna 2 Tablet(s) Oral at bedtime  chlorhexidine 4% Liquid 1 Application(s) Topical <User Schedule>  magnesium sulfate  IVPB 2 Gram(s) IV Intermittent every 1 hour        REVIEW OF SYSTEMS    General: + fatigue, no malaise, + weight gain.  Skin: no rashes.  Ophthalmologic: no blurred vision, no loss of vision. 	  ENT: + sore throat,+ rhinorrhea,+ ear ache, sinus congestion.  Cardiovascular: + chest soreness ,no palpitation, no dizziness, no diaphoresis, no edema  Respiratory: no SOB, cough or wheeze.  Gastrointestinal: + abdomen pain, no N/V/D, no melena/hematemesis/hematochezia.  Genitourinary: no dysuria/hesitancy or hematuria.  Musculoskeletal: no myalgias or arthralgias.  Neurological: no changes in vision or hearing, no lightheadedness/dizziness, no syncope/near syncope	  Psychiatric: no unusual stress/anxiety      	    ICU Vital Signs Last 24 Hrs  T(C): 36.9 (10 Aug 2021 04:00), Max: 36.9 (09 Aug 2021 09:35)  T(F): 98.5 (10 Aug 2021 04:00), Max: 98.5 (10 Aug 2021 04:00)  HR: 74 (10 Aug 2021 06:45) (72 - 130)  BP: 123/82 (09 Aug 2021 23:30) (108/71 - 133/86)  BP(mean): 92 (09 Aug 2021 23:30) (85 - 96)  ABP: 123/71 (10 Aug 2021 06:45) (108/63 - 161/82)  ABP(mean): 90 (10 Aug 2021 06:45) (-2 - 110)  RR: 20 (10 Aug 2021 06:45) (8 - 31)  SpO2: 98% (10 Aug 2021 00:45) (92% - 99%)      PHYSICAL EXAMINATION  Appearance: NAD, no distress  HEENT: Moist Mucous Membranes, Anicteric, PERRL, EOMI  Cardiovascular: Regular rate and rhythm, Normal S1 S2, No JVD, No murmurs  Respiratory: b/l Lungs crackles at the base to auscultation. No rales, No rhonchi, No wheezing. No tenderness to palpation  Gastrointestinal:  Soft, Non-tender, + BS  Neurologic: Non-focal, A&Ox3  Skin: Warm and dry, No rashes, No ecchymosis, No cyanosis,+ blood stain dsg on right groin  Musculoskeletal: No clubbing, No cyanosis, No edema  Vascular: Peripheral pulses palpable 2+ bilaterally  Psychiatry: Mood & affect appropriate      	    		      I&O's Summary    09 Aug 2021 07:01  -  10 Aug 2021 07:00  --------------------------------------------------------  IN: 445 mL / OUT: 700 mL / NET: -255 mL    	 	  LABORATORY VALUES	 	                          10.1   9.81  )-----------( 184      ( 10 Aug 2021 03:55 )             30.1       08-10    138  |  104  |  18  ----------------------------<  128<H>  3.5   |  20<L>  |  1.12  08-09    135  |  98  |  20  ----------------------------<  93  3.8   |  23  |  1.23    Ca    7.8<L>      10 Aug 2021 03:55  Ca    9.8      09 Aug 2021 07:35  Phos  5.0     08-10  Phos  4.2     08-09  Mg     1.50     08-10  Mg     2.00     08-09    TPro  7.7  /  Alb  4.7  /  TBili  0.6  /  DBili  x   /  AST  22  /  ALT  17  /  AlkPhos  96  08-09    LIVER FUNCTIONS - ( 09 Aug 2021 07:35 )  Alb: 4.7 g/dL / Pro: 7.7 g/dL / ALK PHOS: 96 U/L / ALT: 17 U/L / AST: 22 U/L / GGT: x               CARDIAC MARKERS:          Serum Pro-Brain Natriuretic Peptide: 3436 pg/mL (08-04 @ 21:48)      08-05 @ 06:25  Cholesterol, Serum - 209  Direct LDL- --  HDL Cholesterol, Serum- 55  Triglycerides, Serum- 114      Thyroid Stimulating Hormone, Serum: 5.46 uIU/mL (08-05 @ 06:22)        CAPILLARY BLOOD GLUCOSE    EKG:  Diagnostic testing:  cath:  echo:< from: Transthoracic Echocardiogram (08.06.21 @ 10:01) >  1. Normal trileaflet aortic valve. Mild aortic regurgitation.  2. Severely dilated left atrium.  LA volume index = 69cc/m2.  3. Endocardium not well visualized; grossly normal leftventricular systolic function. Mid to distal inferoseptalendocardium difficult to visualize. Possibly hypokinetic.Significant basal septal hypertrophy.  4. Normal tricuspid valve. Mild tricuspid regurgitation.5. Estimated pulmonary artery systolic pressure equals 43mm Hg, assuming right atrial pressure equals 10  mm Hg,consistent with mild pulmonary hypertension.6. Agitated saline injection demonstrates evidence of apatent foramen ovale vs a small atrial septal defect.    < end of copied text >  < from: Transesophageal Echocardiogram w/o TTE (08.09.21 @ 20:01) >  1. Tethered mitral valve leaflets with normal opening..Severe mitral regurgitation  2. Calcified trileaflet aortic valve with decreasedopening. Estimated aortic valve area equals 1.6 sqcm (byplanimetry), consistent with mild aortic stenosis.  3. Left atrial enlargement. No left atrial or left atrialappendage thrombus.4. Mild segmental left ventricular systolic dysfunction.  5. Normal right ventricular size and function. Device wireis noted in the right heart.  6. Normal tricuspid valve. Moderate tricuspidregurgitation.  7. Color flow Doppler demonstrates evidence of a smallinteratrial defect with predominant left to right flow.    < end of copied text >        Assessment and Plan:  66 year old Female with PMH of  Lung CA s/p chemo (2017 in remission),  HOCM s/p septal ablation (2005), HFpEF  (55% severe MR) and AFib (on Eliquis, admits noncompliance at times) s/p ablation (12/2020), anxiety. Patient presented to ED with c/o SOB/WALKER and increasing weight gain over the  last  few months, started on Lasix by her cardiologist three weeks prior to admission and Spironolactone.  EKG consistent with atypical atrial flutter c/ b traumatic RICCARDO, bleeding from mouth.  On 8/9/21: underwent atypical atria aflutter ablation with Dr. Arana and had multiple runs of SVT after case. Patient was transferred to CCU  for closer monitoring overnight. She was started on amiodarone drip for arrhthymias        Neuro:  - moderated anxiety  -use POT at home  - improved with ativan 1 mg PO x1  -start on xanax 0.5mg PO as needed and psych consult    Pulm:  #h/o  Lung CA s/p chemo, in remission  - RA- pulse ox 93%-> desat to 88%,placed on 4L N/C  -Limited noncontrast CT  study showed No endobronchial lesions. A 3 mm anterior left upper lobe nodule (2:12) and a 1 mm right upper lobe nodule (2:27), stable since 12/21/2020.  -f/u with Oncologist  Dr Vega as out pt        CVS  #atypical Aflutter  -s/p aflutter ablation on 8/9 c/b SVT, on amiodarone drip  -continue post EP procedure care.   -Right groin dressing c/d/I  - c/w Protonix 40mg  po daily   -CHADSVASC is 4  c/w eliquis 5mg bid  -INR goal is 2-3  -c/w Cardizem 60mg tid   -d/c amiodarone drip as per EP -plan to start sotalol tomorrow and check QT/QTc before Sotalol and after each dose  -colchicine 0.6mg bid for pleuritic chest pain post ablation .     #HOCM, s/p septal ablation  -euvolemic on exam     #HFpEF   -p/w sob and increased weight gain  -RICCARDO on 8/29 severe MR ( known), mild seg LVSD, severe LA enlargment.  -proBNP 3436 on admission-  -c/w lasix, spironolactone.  -lasix 40mg IVP x1 today -pt received 3 L IVF during procedure   -chest Xray showed pulm congestion      Renal  - GEORGE (acute kidney injury).   -mild George on admission  -resolved now        #Prophylactic measure  -on eliquis Subjective/Objective: compliant of ear, throat, back and abdomen pain after procedure. Also compliant of feeling nervous and anxious. repots taking  POT every night for few months.    Overnight event: s/p Aflutter ablation, started on amiodarone  drip for  SVT  Tele event:NSR with frequent PACs    MEDICATIONS    apixaban 5 milliGRAM(s) Oral every 12 hours  diltiazem    Tablet 60 milliGRAM(s) Oral three times a day  furosemide    Tablet 40 milliGRAM(s) Oral daily  spironolactone 25 milliGRAM(s) Oral daily  colchicine 0.6mg  oral twice a day  acetaminophen   Tablet .. 650 milliGRAM(s) Oral every 6 hours PRN  LORazepam     Tablet 1 milliGRAM(s) Oral once  melatonin 6 milliGRAM(s) Oral at bedtime PRN  polyethylene glycol 3350 17 Gram(s) Oral daily  senna 2 Tablet(s) Oral at bedtime  chlorhexidine 4% Liquid 1 Application(s) Topical <User Schedule>  magnesium sulfate  IVPB 2 Gram(s) IV Intermittent every 1 hour        REVIEW OF SYSTEMS    General: + fatigue, no malaise, + weight gain.  Skin: no rashes.  Ophthalmologic: no blurred vision, no loss of vision. 	  ENT: + sore throat,+ rhinorrhea,+ ear ache, sinus congestion.  Cardiovascular: + chest soreness ,no palpitation, no dizziness, no diaphoresis, no edema  Respiratory: no SOB, cough or wheeze.  Gastrointestinal: + abdomen pain, no N/V/D, no melena/hematemesis/hematochezia.  Genitourinary: no dysuria/hesitancy or hematuria.  Musculoskeletal: no myalgias or arthralgias.  Neurological: no changes in vision or hearing, no lightheadedness/dizziness, no syncope/near syncope	  Psychiatric: no unusual stress/anxiety      	    ICU Vital Signs Last 24 Hrs  T(C): 36.9 (10 Aug 2021 04:00), Max: 36.9 (09 Aug 2021 09:35)  T(F): 98.5 (10 Aug 2021 04:00), Max: 98.5 (10 Aug 2021 04:00)  HR: 74 (10 Aug 2021 06:45) (72 - 130)  BP: 123/82 (09 Aug 2021 23:30) (108/71 - 133/86)  BP(mean): 92 (09 Aug 2021 23:30) (85 - 96)  ABP: 123/71 (10 Aug 2021 06:45) (108/63 - 161/82)  ABP(mean): 90 (10 Aug 2021 06:45) (-2 - 110)  RR: 20 (10 Aug 2021 06:45) (8 - 31)  SpO2: 98% (10 Aug 2021 00:45) (92% - 99%)      PHYSICAL EXAMINATION  Appearance: NAD, no distress  HEENT: Moist Mucous Membranes, Anicteric, PERRL, EOMI  Cardiovascular: Regular rate and rhythm, Normal S1 S2, No JVD, No murmurs  Respiratory: b/l Lungs crackles at the base to auscultation. No rales, No rhonchi, No wheezing. No tenderness to palpation  Gastrointestinal:  Soft, Non-tender, + BS  Neurologic: Non-focal, A&Ox3  Skin: Warm and dry, No rashes, No ecchymosis, No cyanosis,+ blood stain dsg on right groin  Musculoskeletal: No clubbing, No cyanosis, No edema  Vascular: Peripheral pulses palpable 2+ bilaterally  Psychiatry: Mood & affect appropriate      	    		      I&O's Summary    09 Aug 2021 07:01  -  10 Aug 2021 07:00  --------------------------------------------------------  IN: 445 mL / OUT: 700 mL / NET: -255 mL    	 	  LABORATORY VALUES	 	                          10.1   9.81  )-----------( 184      ( 10 Aug 2021 03:55 )             30.1       08-10    138  |  104  |  18  ----------------------------<  128<H>  3.5   |  20<L>  |  1.12  08-09    135  |  98  |  20  ----------------------------<  93  3.8   |  23  |  1.23    Ca    7.8<L>      10 Aug 2021 03:55  Ca    9.8      09 Aug 2021 07:35  Phos  5.0     08-10  Phos  4.2     08-09  Mg     1.50     08-10  Mg     2.00     08-09    TPro  7.7  /  Alb  4.7  /  TBili  0.6  /  DBili  x   /  AST  22  /  ALT  17  /  AlkPhos  96  08-09    LIVER FUNCTIONS - ( 09 Aug 2021 07:35 )  Alb: 4.7 g/dL / Pro: 7.7 g/dL / ALK PHOS: 96 U/L / ALT: 17 U/L / AST: 22 U/L / GGT: x               CARDIAC MARKERS:          Serum Pro-Brain Natriuretic Peptide: 3436 pg/mL (08-04 @ 21:48)      08-05 @ 06:25  Cholesterol, Serum - 209  Direct LDL- --  HDL Cholesterol, Serum- 55  Triglycerides, Serum- 114      Thyroid Stimulating Hormone, Serum: 5.46 uIU/mL (08-05 @ 06:22)        CAPILLARY BLOOD GLUCOSE    EKG:  Diagnostic testing:  cath:  echo:< from: Transthoracic Echocardiogram (08.06.21 @ 10:01) >  1. Normal trileaflet aortic valve. Mild aortic regurgitation.  2. Severely dilated left atrium.  LA volume index = 69cc/m2.  3. Endocardium not well visualized; grossly normal leftventricular systolic function. Mid to distal inferoseptalendocardium difficult to visualize. Possibly hypokinetic.Significant basal septal hypertrophy.  4. Normal tricuspid valve. Mild tricuspid regurgitation.5. Estimated pulmonary artery systolic pressure equals 43mm Hg, assuming right atrial pressure equals 10  mm Hg,consistent with mild pulmonary hypertension.6. Agitated saline injection demonstrates evidence of apatent foramen ovale vs a small atrial septal defect.    < end of copied text >  < from: Transesophageal Echocardiogram w/o TTE (08.09.21 @ 20:01) >  1. Tethered mitral valve leaflets with normal opening..Severe mitral regurgitation  2. Calcified trileaflet aortic valve with decreasedopening. Estimated aortic valve area equals 1.6 sqcm (byplanimetry), consistent with mild aortic stenosis.  3. Left atrial enlargement. No left atrial or left atrialappendage thrombus.4. Mild segmental left ventricular systolic dysfunction.  5. Normal right ventricular size and function. Device wireis noted in the right heart.  6. Normal tricuspid valve. Moderate tricuspidregurgitation.  7. Color flow Doppler demonstrates evidence of a smallinteratrial defect with predominant left to right flow.    < end of copied text >        Assessment and Plan:  66 year old Female with PMH of  Lung CA s/p chemo (2017 in remission),  HOCM s/p septal ablation (2005), HFpEF  (55% severe MR) and AFib (on Eliquis, admits noncompliance at times) s/p ablation (12/2020), anxiety. Patient presented to ED with c/o SOB/WALKER and increasing weight gain over the  last  few months, started on Lasix by her cardiologist three weeks prior to admission and Spironolactone.  EKG consistent with atypical atrial flutter bleeding from mouth.  On 8/9/21: underwent atypical atria aflutter ablation with Dr. Arana and had multiple runs of SVT after case. Patient was transferred to CCU  for closer monitoring overnight. She was started on amiodarone drip for arrhthymias        Neuro:  - moderated anxiety  -use POT at home  - improved with ativan 1 mg PO x1  -start on xanax 0.5mg PO as needed and psych consult    Pulm:  #h/o  Lung CA s/p chemo, in remission  - RA- pulse ox 93%-> desat to 88%,placed on 4L N/C  -Limited noncontrast CT  study showed No endobronchial lesions. A 3 mm anterior left upper lobe nodule (2:12) and a 1 mm right upper lobe nodule (2:27), stable since 12/21/2020.  -f/u with Oncologist  Dr Vega as out pt        CVS  #atypical Aflutter  -s/p aflutter ablation on 8/9 c/b SVT, on amiodarone drip  -continue post EP procedure care.   -Right groin dressing c/d/I  - c/w Protonix 40mg  po daily   -CHADSVASC is 4  c/w eliquis 5mg bid  -INR goal is 2-3  -c/w Cardizem 60mg tid   -d/c amiodarone drip as per EP -plan to start sotalol tomorrow and check QT/QTc before Sotalol and after each dose  -colchicine 0.6mg bid for pleuritic chest pain post ablation .     #HOCM, s/p septal ablation  -euvolemic on exam     #HFpEF   -p/w sob and increased weight gain  -RICCARDO on 8/29 severe MR ( known), mild seg LVSD, severe LA enlargment.  -proBNP 3436 on admission-  -c/w lasix, spironolactone.  -lasix 40mg IVP x1 today -pt received 3 L IVF during procedure   -chest Xray showed pulm congestion      Renal  - GEORGE (acute kidney injury).   -mild George on admission  -resolved now        #Prophylactic measure  -on eliquis

## 2021-08-10 NOTE — DIETITIAN INITIAL EVALUATION ADULT. - ORAL INTAKE PTA/DIET HISTORY
Fair appetite/PO intake PTA.  Has been mindful of decreasing salt when cooking and has been flavoring more with fresh herbs.  Also been eating more fish lately.  Occasionally drinks Ensure, when not eating as well.

## 2021-08-10 NOTE — PROGRESS NOTE ADULT - SUBJECTIVE AND OBJECTIVE BOX
DATE OF SERVICE: 08-10-21 @ 16:17    Subjective: Patient seen and examined. No new events except as noted.     SUBJECTIVE/ROS:  s/p aflutter ablation   mild sob       MEDICATIONS:  MEDICATIONS  (STANDING):  apixaban 5 milliGRAM(s) Oral every 12 hours  chlorhexidine 4% Liquid 1 Application(s) Topical <User Schedule>  colchicine 0.6 milliGRAM(s) Oral two times a day  diltiazem    Tablet 60 milliGRAM(s) Oral three times a day  furosemide    Tablet 40 milliGRAM(s) Oral daily  furosemide   Injectable 40 milliGRAM(s) IV Push once  pantoprazole    Tablet 40 milliGRAM(s) Oral before breakfast  polyethylene glycol 3350 17 Gram(s) Oral daily  senna 2 Tablet(s) Oral at bedtime  spironolactone 25 milliGRAM(s) Oral daily      PHYSICAL EXAM:  T(C): 37 (08-10-21 @ 16:00), Max: 37 (08-10-21 @ 16:00)  HR: 74 (08-10-21 @ 16:00) (72 - 130)  BP: 101/81 (08-10-21 @ 16:00) (101/81 - 133/86)  RR: 17 (08-10-21 @ 16:00) (8 - 32)  SpO2: 95% (08-10-21 @ 16:00) (87% - 99%)  Wt(kg): --  I&O's Summary    09 Aug 2021 07:01  -  10 Aug 2021 07:00  --------------------------------------------------------  IN: 445 mL / OUT: 700 mL / NET: -255 mL    10 Aug 2021 07:01  -  10 Aug 2021 16:17  --------------------------------------------------------  IN: 170 mL / OUT: 800 mL / NET: -630 mL            JVP: Normal  Neck: supple  Lung: clear   CV: S1 S2 , Murmur:  Abd: soft  Ext: No edema  neuro: Awake / alert  Psych: flat affect  Skin: normal``    LABS/DATA:    CARDIAC MARKERS:                                11.1   9.25  )-----------( 209      ( 10 Aug 2021 12:40 )             33.0     08-10    138  |  104  |  18  ----------------------------<  128<H>  3.5   |  20<L>  |  1.12    Ca    7.8<L>      10 Aug 2021 03:55  Phos  5.0     08-10  Mg     1.50     08-10    TPro  7.7  /  Alb  4.7  /  TBili  0.6  /  DBili  x   /  AST  22  /  ALT  17  /  AlkPhos  96  08-09    proBNP:   Lipid Profile:   HgA1c:   TSH:     TELE:  EKG:

## 2021-08-10 NOTE — DIETITIAN INITIAL EVALUATION ADULT. - PROBLEM SELECTOR PLAN 1
-unclear what is driving weight gain or swelling.   -for now, will order tte, obtain spot Urine Cr/Pr, TSH, cortisol level. duplex of legs, CT chest/abd/pelvis ordered   -c/w lasix, spironolactone Zygomaticofacial Flap Text: Given the location of the defect, shape of the defect and the proximity to free margins a zygomaticofacial flap was deemed most appropriate for repair.  Using a sterile surgical marker, the appropriate flap was drawn incorporating the defect and placing the expected incisions within the relaxed skin tension lines where possible. The area thus outlined was incised deep to adipose tissue with a #15 scalpel blade with preservation of a vascular pedicle.  The skin margins were undermined to an appropriate distance in all directions utilizing iris scissors.  The flap was then placed into the defect and anchored with interrupted buried subcutaneous sutures.

## 2021-08-10 NOTE — BH CONSULTATION LIAISON ASSESSMENT NOTE - NSBHCHARTREVIEWVS_PSY_A_CORE FT
Vital Signs Last 24 Hrs  T(C): 36.8 (10 Aug 2021 12:00), Max: 36.9 (10 Aug 2021 04:00)  T(F): 98.2 (10 Aug 2021 12:00), Max: 98.5 (10 Aug 2021 04:00)  HR: 84 (10 Aug 2021 12:00) (72 - 130)  BP: 127/80 (10 Aug 2021 12:00) (113/77 - 133/86)  BP(mean): 96 (10 Aug 2021 12:00) (85 - 97)  RR: 29 (10 Aug 2021 12:00) (8 - 31)  SpO2: 95% (10 Aug 2021 12:00) (88% - 99%)

## 2021-08-10 NOTE — DIETITIAN INITIAL EVALUATION ADULT. - PERTINENT MEDS FT
MEDICATIONS  (STANDING):  apixaban 5 milliGRAM(s) Oral every 12 hours  chlorhexidine 4% Liquid 1 Application(s) Topical <User Schedule>  colchicine 0.6 milliGRAM(s) Oral two times a day  diltiazem    Tablet 60 milliGRAM(s) Oral three times a day  furosemide    Tablet 40 milliGRAM(s) Oral daily  furosemide   Injectable 40 milliGRAM(s) IV Push once  polyethylene glycol 3350 17 Gram(s) Oral daily  senna 2 Tablet(s) Oral at bedtime  spironolactone 25 milliGRAM(s) Oral daily

## 2021-08-10 NOTE — DIETITIAN INITIAL EVALUATION ADULT. - PERTINENT LABORATORY DATA
08-10    138  |  104  |  18  ----------------------------<  128<H>  3.5   |  20<L>  |  1.12    Ca    7.8<L>      10 Aug 2021 03:55  Phos  5.0     08-10  Mg     1.50     08-10    TPro  7.7  /  Alb  4.7  /  TBili  0.6  /  DBili  x   /  AST  22  /  ALT  17  /  AlkPhos  96  08-09    Chol 209 mg/dL<H> LDL 131mg/dL    HDL 55 mg/dL Trig 114 mg/dL

## 2021-08-11 LAB
ANION GAP SERPL CALC-SCNC: 15 MMOL/L — HIGH (ref 7–14)
ANION GAP SERPL CALC-SCNC: 15 MMOL/L — HIGH (ref 7–14)
BUN SERPL-MCNC: 18 MG/DL — SIGNIFICANT CHANGE UP (ref 7–23)
BUN SERPL-MCNC: 18 MG/DL — SIGNIFICANT CHANGE UP (ref 7–23)
CALCIUM SERPL-MCNC: 8.5 MG/DL — SIGNIFICANT CHANGE UP (ref 8.4–10.5)
CALCIUM SERPL-MCNC: 8.5 MG/DL — SIGNIFICANT CHANGE UP (ref 8.4–10.5)
CHLORIDE SERPL-SCNC: 96 MMOL/L — LOW (ref 98–107)
CHLORIDE SERPL-SCNC: 98 MMOL/L — SIGNIFICANT CHANGE UP (ref 98–107)
CO2 SERPL-SCNC: 24 MMOL/L — SIGNIFICANT CHANGE UP (ref 22–31)
CO2 SERPL-SCNC: 24 MMOL/L — SIGNIFICANT CHANGE UP (ref 22–31)
CREAT SERPL-MCNC: 1.26 MG/DL — SIGNIFICANT CHANGE UP (ref 0.5–1.3)
CREAT SERPL-MCNC: 1.39 MG/DL — HIGH (ref 0.5–1.3)
GLUCOSE SERPL-MCNC: 104 MG/DL — HIGH (ref 70–99)
GLUCOSE SERPL-MCNC: 105 MG/DL — HIGH (ref 70–99)
HCT VFR BLD CALC: 29.4 % — LOW (ref 34.5–45)
HGB BLD-MCNC: 10 G/DL — LOW (ref 11.5–15.5)
LACTATE SERPL-SCNC: 1.9 MMOL/L — SIGNIFICANT CHANGE UP (ref 0.5–2)
MAGNESIUM SERPL-MCNC: 2 MG/DL — SIGNIFICANT CHANGE UP (ref 1.6–2.6)
MAGNESIUM SERPL-MCNC: 2.1 MG/DL — SIGNIFICANT CHANGE UP (ref 1.6–2.6)
MCHC RBC-ENTMCNC: 32.8 PG — SIGNIFICANT CHANGE UP (ref 27–34)
MCHC RBC-ENTMCNC: 34 GM/DL — SIGNIFICANT CHANGE UP (ref 32–36)
MCV RBC AUTO: 96.4 FL — SIGNIFICANT CHANGE UP (ref 80–100)
NRBC # BLD: 0 /100 WBCS — SIGNIFICANT CHANGE UP
NRBC # FLD: 0 K/UL — SIGNIFICANT CHANGE UP
NT-PROBNP SERPL-SCNC: 3045 PG/ML — HIGH
PHOSPHATE SERPL-MCNC: 2.3 MG/DL — LOW (ref 2.5–4.5)
PHOSPHATE SERPL-MCNC: 3.7 MG/DL — SIGNIFICANT CHANGE UP (ref 2.5–4.5)
PLATELET # BLD AUTO: 172 K/UL — SIGNIFICANT CHANGE UP (ref 150–400)
POTASSIUM SERPL-MCNC: 3.4 MMOL/L — LOW (ref 3.5–5.3)
POTASSIUM SERPL-MCNC: 3.9 MMOL/L — SIGNIFICANT CHANGE UP (ref 3.5–5.3)
POTASSIUM SERPL-SCNC: 3.4 MMOL/L — LOW (ref 3.5–5.3)
POTASSIUM SERPL-SCNC: 3.9 MMOL/L — SIGNIFICANT CHANGE UP (ref 3.5–5.3)
RBC # BLD: 3.05 M/UL — LOW (ref 3.8–5.2)
RBC # FLD: 12.3 % — SIGNIFICANT CHANGE UP (ref 10.3–14.5)
SODIUM SERPL-SCNC: 135 MMOL/L — SIGNIFICANT CHANGE UP (ref 135–145)
SODIUM SERPL-SCNC: 137 MMOL/L — SIGNIFICANT CHANGE UP (ref 135–145)
WBC # BLD: 8.06 K/UL — SIGNIFICANT CHANGE UP (ref 3.8–10.5)
WBC # FLD AUTO: 8.06 K/UL — SIGNIFICANT CHANGE UP (ref 3.8–10.5)

## 2021-08-11 PROCEDURE — 93308 TTE F-UP OR LMTD: CPT | Mod: 26,GC

## 2021-08-11 PROCEDURE — 99232 SBSQ HOSP IP/OBS MODERATE 35: CPT

## 2021-08-11 PROCEDURE — 93325 DOPPLER ECHO COLOR FLOW MAPG: CPT | Mod: 26,GC

## 2021-08-11 PROCEDURE — 71045 X-RAY EXAM CHEST 1 VIEW: CPT | Mod: 26

## 2021-08-11 PROCEDURE — 93321 DOPPLER ECHO F-UP/LMTD STD: CPT | Mod: 26

## 2021-08-11 RX ORDER — POTASSIUM CHLORIDE 20 MEQ
20 PACKET (EA) ORAL ONCE
Refills: 0 | Status: COMPLETED | OUTPATIENT
Start: 2021-08-11 | End: 2021-08-11

## 2021-08-11 RX ORDER — ACETAMINOPHEN 500 MG
650 TABLET ORAL ONCE
Refills: 0 | Status: DISCONTINUED | OUTPATIENT
Start: 2021-08-11 | End: 2021-08-12

## 2021-08-11 RX ORDER — METOPROLOL TARTRATE 50 MG
25 TABLET ORAL DAILY
Refills: 0 | Status: DISCONTINUED | OUTPATIENT
Start: 2021-08-11 | End: 2021-08-12

## 2021-08-11 RX ORDER — KETOROLAC TROMETHAMINE 30 MG/ML
15 SYRINGE (ML) INJECTION ONCE
Refills: 0 | Status: DISCONTINUED | OUTPATIENT
Start: 2021-08-11 | End: 2021-08-11

## 2021-08-11 RX ORDER — POTASSIUM PHOSPHATE, MONOBASIC POTASSIUM PHOSPHATE, DIBASIC 236; 224 MG/ML; MG/ML
30 INJECTION, SOLUTION INTRAVENOUS ONCE
Refills: 0 | Status: COMPLETED | OUTPATIENT
Start: 2021-08-11 | End: 2021-08-11

## 2021-08-11 RX ORDER — KETOROLAC TROMETHAMINE 30 MG/ML
30 SYRINGE (ML) INJECTION ONCE
Refills: 0 | Status: DISCONTINUED | OUTPATIENT
Start: 2021-08-11 | End: 2021-08-11

## 2021-08-11 RX ORDER — METOPROLOL TARTRATE 50 MG
25 TABLET ORAL
Refills: 0 | Status: DISCONTINUED | OUTPATIENT
Start: 2021-08-11 | End: 2021-08-11

## 2021-08-11 RX ADMIN — APIXABAN 5 MILLIGRAM(S): 2.5 TABLET, FILM COATED ORAL at 15:47

## 2021-08-11 RX ADMIN — SENNA PLUS 2 TABLET(S): 8.6 TABLET ORAL at 21:05

## 2021-08-11 RX ADMIN — PANTOPRAZOLE SODIUM 40 MILLIGRAM(S): 20 TABLET, DELAYED RELEASE ORAL at 05:10

## 2021-08-11 RX ADMIN — Medication 15 MILLIGRAM(S): at 22:20

## 2021-08-11 RX ADMIN — Medication 60 MILLIGRAM(S): at 02:35

## 2021-08-11 RX ADMIN — Medication 30 MILLIGRAM(S): at 08:18

## 2021-08-11 RX ADMIN — Medication 0.6 MILLIGRAM(S): at 10:43

## 2021-08-11 RX ADMIN — Medication 650 MILLIGRAM(S): at 12:35

## 2021-08-11 RX ADMIN — Medication 650 MILLIGRAM(S): at 13:15

## 2021-08-11 RX ADMIN — SPIRONOLACTONE 25 MILLIGRAM(S): 25 TABLET, FILM COATED ORAL at 10:43

## 2021-08-11 RX ADMIN — APIXABAN 5 MILLIGRAM(S): 2.5 TABLET, FILM COATED ORAL at 02:35

## 2021-08-11 RX ADMIN — Medication 20 MILLIEQUIVALENT(S): at 05:09

## 2021-08-11 RX ADMIN — Medication 0.6 MILLIGRAM(S): at 21:05

## 2021-08-11 RX ADMIN — POLYETHYLENE GLYCOL 3350 17 GRAM(S): 17 POWDER, FOR SOLUTION ORAL at 11:57

## 2021-08-11 RX ADMIN — Medication 40 MILLIGRAM(S): at 05:09

## 2021-08-11 RX ADMIN — Medication 6 MILLIGRAM(S): at 21:05

## 2021-08-11 RX ADMIN — CHLORHEXIDINE GLUCONATE 1 APPLICATION(S): 213 SOLUTION TOPICAL at 10:25

## 2021-08-11 RX ADMIN — POTASSIUM PHOSPHATE, MONOBASIC POTASSIUM PHOSPHATE, DIBASIC 83.33 MILLIMOLE(S): 236; 224 INJECTION, SOLUTION INTRAVENOUS at 02:43

## 2021-08-11 RX ADMIN — Medication 15 MILLIGRAM(S): at 22:00

## 2021-08-11 RX ADMIN — Medication 650 MILLIGRAM(S): at 02:37

## 2021-08-11 RX ADMIN — Medication 30 MILLIGRAM(S): at 09:00

## 2021-08-11 RX ADMIN — Medication 650 MILLIGRAM(S): at 18:12

## 2021-08-11 NOTE — CONSULT NOTE ADULT - SUBJECTIVE AND OBJECTIVE BOX
CHIEF COMPLAINT:    HPI:  66 year old Female with PMH of  Lung CA s/p chemo ( in remission),  HOCM s/p septal ablation (), HFpEF  (55% severe MR) and AFib (on Eliquis, admits noncompliance at times) s/p ablation (2020)  presented to ED with c/o SOB/WALKER and increasing weight gain over the  last  few months,  s/p diuretic therapy with clinical improvement now with aflutter planned for rik/ablation     Pt reports that her breathing has improved significantly. She denies fevers  She reports occ cough no sputum, hemoptysis  Cough associated with muscular pain  Denies wheezing  Denies dysphagia or aspiration hx or sx  Never used inhalers, never dx w/ asthma/copd    AFter had chemotherapy for NSCLC reports in remission and no recurrence since    PAST MEDICAL & SURGICAL HISTORY:  HOCM (hypertrophic obstructive cardiomyopathy)  S/P septal ablation    Pleural effusion  Right parapneumonic drained twice 3/2017 at [facility]    AF (atrial fibrillation)  On Coumadin    Chronic diastolic (congestive) heart failure    Lung cancer  s/p chemotherapy    CAP (community acquired pneumonia)  RML/RLL at [facility] 3/2017    GERD (gastroesophageal reflux disease)    Lung cancer  s/p chemotherapy ~ now in remission    History of   15 years ago    H/O cardiac radiofrequency ablation  for HOCM - 16 years ago        FAMILY HISTORY:  Family history of liver cancer  Mother    Family history of ulcerative colitis (Sibling)  Brother    Family history of psoriasis (Sibling)  Brother        SOCIAL HISTORY:  exsmoker  previously worked  w/ exposure lots of chemicals  wore a respirator mask w/o any resp difficulty    Allergies    cefdinir (Rash)  moxifloxacin (Rash)  penicillin (Rash)    Intolerances        HOME MEDICATIONS:  Home Medications:  acetaminophen 325 mg oral tablet: 2 tab(s) orally every 6 hours, As Needed (2021 13:41)  apixaban 5 mg oral tablet: 1 tab(s) orally 2 times a day (05 Aug 2021 03:24)  Lasix 40 mg oral tablet: 1 tab(s) orally once a day (05 Aug 2021 03:25)  spironolactone 25 mg oral tablet: 1 tab(s) orally once a day (05 Aug 2021 03:25)      REVIEW OF SYSTEMS:  Constitutional: [x ] negative [ ] fevers [ ] chills [ ] weight loss [ ] weight gain  HEENT: [x ] negative [ ] dry eyes [ ] eye irritation [ ] postnasal drip [ ] nasal congestion  CV: [ ] negative  [ ] chest pain [ ] orthopnea [ ] palpitations [ ] murmur  Resp: [ ] negative [ ] cough [ ] shortness of breath [ ] dyspnea [ ] wheezing [ ] sputum [ ] hemoptysis  GI: [x ] negative [ ] nausea [ ] vomiting [ ] diarrhea [ ] constipation [ ] abd pain [ ] dysphagia   : [ ] negative [ ] dysuria [ ] nocturia [ ] hematuria [ ] increased urinary frequency  Musculoskeletal: [x ] negative [ ] back pain [ ] myalgias [ ] arthralgias [ ] fracture  Skin: [ ] negative [ ] rash [ ] itch  Neurological: [ ] negative [ ] headache [ ] dizziness [ ] syncope [ ] weakness [ ] numbness  Psychiatric: [ ] negative [ ] anxiety [ ] depression  Endocrine: [ ] negative [ ] diabetes [ ] thyroid problem  Hematologic/Lymphatic: [ ] negative [ ] anemia [ ] bleeding problem  Allergic/Immunologic: [ ] negative [ ] itchy eyes [ ] nasal discharge [ ] hives [ ] angioedema  [ ] All other systems negative  [ ] Unable to assess ROS because ________    OBJECTIVE:  ICU Vital Signs Last 24 Hrs  T(C): 37.3 (11 Aug 2021 07:43), Max: 37.5 (11 Aug 2021 04:00)  T(F): 99.1 (11 Aug 2021 07:43), Max: 99.5 (11 Aug 2021 04:00)  HR: 86 (11 Aug 2021 18:00) (70 - 89)  BP: 93/58 (11 Aug 2021 18:00) (78/42 - 119/75)  BP(mean): 69 (11 Aug 2021 18:00) (55 - 89)  ABP: --  ABP(mean): --  RR: 19 (11 Aug 2021 18:00) (13 - 29)  SpO2: 90% (11 Aug 2021 18:00) (90% - 99%)        08-10 @ 07:01  -  08- @ 07:00  --------------------------------------------------------  IN: 340 mL / OUT: 3900 mL / NET: -3560 mL     @ 07:01  -   @ 18:30  --------------------------------------------------------  IN: 880 mL / OUT: 1500 mL / NET: -620 mL      CAPILLARY BLOOD GLUCOSE          PHYSICAL EXAM:  General: NC 1L, comfortable   HEENT: tawnya  Neck: no accessory muscle use  Respiratory: b/l basal insp crackles  Cardiovascular: s1s2 reg no murmur  Abdomen: + bs soft nontender  Extremities: no pitting edema  Neurological: aox3, [facility] MEDICATIONS:  Standing Meds:  acetaminophen   Tablet .. 650 milliGRAM(s) Oral once  apixaban 5 milliGRAM(s) Oral every 12 hours  chlorhexidine 4% Liquid 1 Application(s) Topical <User Schedule>  colchicine 0.6 milliGRAM(s) Oral two times a day  furosemide    Tablet 40 milliGRAM(s) Oral daily  metoprolol succinate ER 25 milliGRAM(s) Oral daily  pantoprazole    Tablet 40 milliGRAM(s) Oral before breakfast  polyethylene glycol 3350 17 Gram(s) Oral daily  senna 2 Tablet(s) Oral at bedtime  spironolactone 25 milliGRAM(s) Oral daily      PRN Meds:  acetaminophen   Tablet .. 650 milliGRAM(s) Oral every 6 hours PRN  benzocaine 15 mG/menthol 3.6 mG (Sugar-Free) Lozenge 1 Lozenge Oral two times a day PRN  LORazepam     Tablet 1 milliGRAM(s) Oral every 6 hours PRN  melatonin 6 milliGRAM(s) Oral at bedtime PRN      LABS:                        10.0   8.06  )-----------( 172      ( 11 Aug 2021 00:41 )             29.4     Hgb Trend: 10.0<--, 11.1<--, 10.1<--, 13.1<--, 11.7<--  11    137  |  98  |  18  ----------------------------<  105<H>  3.9   |  24  |  1.39<H>    Ca    8.5      11 Aug 2021 16:25  Phos  3.7     08-11  Mg     2.00     08-11      Creatinine Trend: 1.39<--, 1.26<--, 1.12<--, 1.23<--, 1.08<--, 1.14<--        < from: CT Chest No Cont (21 @ 07:31) >  EXAM:  CT CHEST      EXAM:  CT ABDOMEN AND PELVIS        PROCEDURE DATE:  Aug  5 2021         INTERPRETATION:  CLINICAL INFORMATION: 66-year-old female with remote history of lung carcinoma (status post chemotherapy) presenting with diffuse swelling. Assess for malignancy.    COMPARISON: CT chest 2020. CT chest, abdomen and pelvis 2020.    CONTRAST/COMPLICATIONS:  IV Contrast: NONE  Oral Contrast: NONE  Complications: None reported at time of study completion    PROCEDURE:  CT of the Chest, Abdomen and Pelvis was performed.  Sagittal and coronal reformats were performed.    FINDINGS:    Evaluation of the solid visceral organs and vasculature is limited without the administration of intravenous contrast.    CHEST:  LUNGS AND LARGE AIRWAYS: Patent central airways. No endobronchial lesions. A 3 mm anterior left upper lobe nodule (2:12) and a 1 mm right upper lobe nodule (2:27), stable since 2020.  PLEURA: No pleural effusion.  VESSELS: Atherosclerotic change of a normal caliber thoracic aorta. Main pulmonary artery is normal in caliber.  HEART: Cardiomegaly with left atrial enlargement. No pericardial effusion. Coronary artery and mitral annular calcification.  MEDIASTINUM AND BRANDON: No lymphadenopathy.  CHEST WALL ANDLOWER NECK: A 1 cm hypodense nodule in the right lobe thyroid, unchanged.    ABDOMEN AND PELVIS:  LIVER: Mildly nodular contour.  BILE DUCTS: Normal caliber.  GALLBLADDER: Within normal limits.  SPLEEN: Within normal limits.  PANCREAS: Within normal limits.  ADRENALS: Within normal limits.  KIDNEYS/URETERS: Left lateral cortical scarring.    BLADDER: Within normal limits.  REPRODUCTIVE ORGANS: Normal uterus. No solid adnexal masses.    BOWEL: No bowel obstruction. Appendix is normal.  PERITONEUM:No ascites.  VESSELS: Atherosclerotic changes of the aorta and bilateral iliac arteries.  RETROPERITONEUM/LYMPH NODES: No lymphadenopathy.  ABDOMINAL WALL: Tiny fat-containing umbilical hernia. Small bilateral fat-containing inguinal hernias, left greater than right.  BONES: Within normal limits.    IMPRESSION:  Limited noncontrast study.    Stable exam.    < end of copied text >

## 2021-08-11 NOTE — PROGRESS NOTE ADULT - ASSESSMENT
66 year old Female with PMH of  Lung CA s/p chemo (2017 in remission),  HOCM s/p septal ablation (2005), HFpEF  (55% severe MR) and PAFib (on Eliquis, admits noncompliance at times) s/p ablation (12/2020)  presented to ED with c/o SOB/WALKER and increasing weight gain over the  last  few months, started on Lasix by her cardiologist three weeks prior to admission and Spironolactone.   EKG consistent with atypical atrial flutter. Underwent RICCARDO and successful atypical atrial flutter ablation (mitral AFL) on 8/9.  RICCARDO revealed severe MR and mildly reduced LV systolic dysfunction.  Post ablation complicated by bursts of AT and pericarditis. Started on IV Amiodarone gtt x1 day-now d/cd. Currently maintained in SR with APC's with less frequent short burst of PAT's. EKG demonstrates borderline prolonged QT/QTc~470ms.    Plan :   -Assist OOB and ambulate today   - hold off Sotalol per Dr. Arana, start low dose of BB Metoprolol Succinate 25 mg QD, uptitrate to 50mg daily if BP tolerate  -Replete K and Mg to keep K >4 and Mg>2  -Continue Diltiazem 60 mg Q8 for now   Continue Colchicine 0.6mg BID (will need Rx for one month for pericarditis)  Continue Lasix 40 gm QD and Spironolactone 25 mg QD for acute HF (likely tachy induced)   -Resumed AC Eliquis 5 mg bid, closely monitor for R groin site bleeding   -Discussed with Dr. Arana     66 year old Female with PMH of  Lung CA s/p chemo (2017 in remission),  HOCM s/p septal ablation (2005), HFpEF  (55% severe MR) and PAFib (on Eliquis, admits noncompliance at times) s/p ablation (12/2020)  presented to ED with c/o SOB/WALKER and increasing weight gain over the  last  few months, started on Lasix by her cardiologist three weeks prior to admission and Spironolactone.   EKG consistent with atypical atrial flutter. Underwent RICCARDO and successful atypical atrial flutter ablation (mitral AFL) on 8/9.  RICCARDO revealed severe MR and mildly reduced LV systolic dysfunction.  Post ablation complicated by bursts of AT and pericarditis. Started on IV Amiodarone gtt x1 day-now d/cd. Currently maintained in SR with APC's with less frequent short burst of PAT's. EKG demonstrates borderline prolonged QT/QTc~470ms.    Plan :   -Assist OOB and ambulate today   - hold off Sotalol per Dr. Arana  -Replete K and Mg to keep K >4 and Mg>2  -Change Diltiazem to Metoprolol Succinate 25mg BID for rate control per Dr. Arana  Continue Colchicine 0.6mg BID (will need Rx for one month for pericarditis)  Continue Lasix 40 gm QD and Spironolactone 25 mg QD for acute HF (likely tachy induced)   -Resumed AC Eliquis 5 mg bid, closely monitor for R groin site bleeding   -Discussed with Dr. Arana

## 2021-08-11 NOTE — PROGRESS NOTE ADULT - ASSESSMENT
Ms. Horner is a 66 year old Female with PMH of  Lung CA s/p chemo (2017 in remission),  HOCM s/p septal ablation (2005), HFpEF  (55% severe MR) and AFib (on Eliquis, admits noncompliance at times) s/p ablation (12/2020)  presented to ED with c/o SOB/WALKER and increasing weight gain over the  last  few months,  s/p diuretic therapy with clinical improvement now with aflutter planned for rik/ablation     aflutter  s/p ablation   a/c   fu with EP     Hypoxia/sob   suggest ctpa rule out PE  suggest pulm consult     HTN   cont current meds    HCM  s/p septal ablation     chest pain   appears to be pericarditis post ablation   fu with EP   agree with colchicine

## 2021-08-11 NOTE — PROGRESS NOTE ADULT - SUBJECTIVE AND OBJECTIVE BOX
Patient is a 66y old  Female who presents with a chief complaint of edema (10 Aug 2021 10:31)  c/o chest pain associated with coughing and deep breathing.  Noted a little nose bleeding this am.  Denies palpitations/SOB or N/V.       PAST MEDICAL & SURGICAL HISTORY:  Atrial fibrillation    PNA (pneumonia)    Cardiomyopathy, hypertrophic    Gastritis and duodenitis    Chronic gastritis without bleeding, unspecified gastritis type    HOCM (hypertrophic obstructive cardiomyopathy)  S/P septal ablation    HF (heart failure), diastolic  Grade II DD    Mitral valve insufficiency, unspecified etiology  Moderate    Pulmonary hypertension  Moderate    Chronic atrial fibrillation    Pneumonia  RML &amp; RLL at [facility] 3/2017    Pleural effusion  Right parapneumonic drained twice 3/2017 at [facility]    Gastritis and duodenitis  Mild - dx via EGD     AF (atrial fibrillation)  On Coumadin    GERD (gastroesophageal reflux disease)    Chronic diastolic (congestive) heart failure    MR (mitral regurgitation)    Lung cancer  s/p chemotherapy    CAP (community acquired pneumonia)  RML/RLL at [facility] 3/2017    HTN (hypertension)    HLD (hyperlipidemia)    GERD (gastroesophageal reflux disease)    Lung cancer  s/p chemotherapy ~ now in remission    H/O cardiac radiofrequency ablation    History of   15 years ago    H/O cardiac radiofrequency ablation  for HOCM - 16 years ago        MEDICATIONS  (STANDING):  apixaban 5 milliGRAM(s) Oral every 12 hours  chlorhexidine 4% Liquid 1 Application(s) Topical <User Schedule>  colchicine 0.6 milliGRAM(s) Oral two times a day  diltiazem    Tablet 60 milliGRAM(s) Oral three times a day  furosemide    Tablet 40 milliGRAM(s) Oral daily  pantoprazole    Tablet 40 milliGRAM(s) Oral before breakfast  polyethylene glycol 3350 17 Gram(s) Oral daily  senna 2 Tablet(s) Oral at bedtime  spironolactone 25 milliGRAM(s) Oral daily    MEDICATIONS  (PRN):  acetaminophen   Tablet .. 650 milliGRAM(s) Oral every 6 hours PRN Temp greater or equal to 38C (100.4F), Mild Pain (1 - 3), Moderate Pain (4 - 6)  benzocaine 15 mG/menthol 3.6 mG (Sugar-Free) Lozenge 1 Lozenge Oral two times a day PRN Sore Throat  LORazepam     Tablet 1 milliGRAM(s) Oral every 6 hours PRN Anxiety  melatonin 6 milliGRAM(s) Oral at bedtime PRN Sleep            Vital Signs Last 24 Hrs  T(C): 37.3 (11 Aug 2021 07:43), Max: 37.5 (11 Aug 2021 04:00)  T(F): 99.1 (11 Aug 2021 07:43), Max: 99.5 (11 Aug 2021 04:00)  HR: 74 (11 Aug 2021 08:00) (71 - 100)  BP: 102/78 (11 Aug 2021 08:00) (78/42 - 130/83)  BP(mean): 87 (11 Aug 2021 08:00) (55 - 98)  RR: 19 (11 Aug 2021 08:00) (17 - 32)  SpO2: 94% (11 Aug 2021 08:00) (87% - 97%)            INTERPRETATION OF TELEMETRY: SR 60-80 bpm with occasional APC's and few transient bursts of PAT (2-3 seconds) only    ECG: SR 81; QT/QTc 418/~470 ms        LABS:                        10.0   8.06  )-----------( 172      ( 11 Aug 2021 00:41 )             29.4     0811    135  |  96<L>  |  18  ----------------------------<  104<H>  3.4<L>   |  24  |  1.26    Ca    8.5      11 Aug 2021 00:41  Phos  2.3     11  Mg     2.10     08-11      BNPSerum Pro-Brain Natriuretic Peptide: 3045 pg/mL ( @ 00:56)    RADIOLOGY & ADDITIONAL STUDIES:  CONCLUSIONS:  1. Tethered mitral valve leaflets with normal opening.  Severe mitral regurgitation.  2. Calcified trileaflet aortic valve with decreased  opening. Estimated aortic valve area equals 1.6 sqcm (by  planimetry), consistent with mild aortic stenosis.  3. Left atrial enlargement. No left atrial or left atrial  appendage thrombus.  4. Mild segmental left ventricular systolic dysfunction.  5. Normal right ventricular size and function. Device wire  is noted in the right heart.  6. Normal tricuspid valve. Moderate tricuspid  regurgitation.  7. Color flow Doppler demonstrates evidence of a small  interatrial defect with predominant left to right flow.  ADDENDUM 8/10/2021: Marked septal hypertrophy  ------------------------------------------------------------------------  Revised on  8/10/2021 - 15:49:58 by Karime Briceño M.D.  RPVI  Confirmed on  8/10/2021 - 15:50:08 by Karime Briceño M.D. RPVI  ------------------------------------------------------------------------          PHYSICAL EXAM:    GENERAL: In no apparent distress, well nourished, and hydrated.  NECK: Supple and normal thyroid.  No JVD or carotid bruit.  Carotid pulse is 2+ bilaterally.  HEART: Regular rate and rhythm +ectopy, 2/6 systolic murmurs, no rubs, or gallops.  PULMONARY: faint basilar rales cruz with diminished breath sound, no wheezing, or rhonchi bilaterally.  ABDOMEN: Soft, Nontender, Nondistended; Bowel sounds present  EXTREMITIES:  2+ Peripheral Pulses, No clubbing, cyanosis, or edema; R groin site no bleeding or hematoma  NEUROLOGICAL: Grossly nonfocal

## 2021-08-11 NOTE — PROGRESS NOTE ADULT - SUBJECTIVE AND OBJECTIVE BOX
Subjective/Objective: compliant of ear, throat, back and abdomen pain after procedure. Also compliant of feeling nervous and anxious. repots taking  POT every night for few months.    Overnight event: s/p Aflutter ablation, started on amiodarone  drip for  SVT  Tele event:NSR with frequent PACs    MEDICATIONS    apixaban 5 milliGRAM(s) Oral every 12 hours  diltiazem    Tablet 60 milliGRAM(s) Oral three times a day  furosemide    Tablet 40 milliGRAM(s) Oral daily  spironolactone 25 milliGRAM(s) Oral daily  colchicine 0.6mg  oral twice a day  acetaminophen   Tablet .. 650 milliGRAM(s) Oral every 6 hours PRN  LORazepam     Tablet 1 milliGRAM(s) Oral once  melatonin 6 milliGRAM(s) Oral at bedtime PRN  polyethylene glycol 3350 17 Gram(s) Oral daily  senna 2 Tablet(s) Oral at bedtime  chlorhexidine 4% Liquid 1 Application(s) Topical <User Schedule>  magnesium sulfate  IVPB 2 Gram(s) IV Intermittent every 1 hour        REVIEW OF SYSTEMS    General: + fatigue, no malaise, + weight gain.  Skin: no rashes.  Ophthalmologic: no blurred vision, no loss of vision. 	  ENT: + sore throat,+ rhinorrhea,+ ear ache, sinus congestion.  Cardiovascular: + chest soreness ,no palpitation, no dizziness, no diaphoresis, no edema  Respiratory: no SOB, cough or wheeze.  Gastrointestinal: + abdomen pain, no N/V/D, no melena/hematemesis/hematochezia.  Genitourinary: no dysuria/hesitancy or hematuria.  Musculoskeletal: no myalgias or arthralgias.  Neurological: no changes in vision or hearing, no lightheadedness/dizziness, no syncope/near syncope	  Psychiatric: no unusual stress/anxiety      	    ICU Vital Signs Last 24 Hrs  T(C): 36.9 (10 Aug 2021 04:00), Max: 36.9 (09 Aug 2021 09:35)  T(F): 98.5 (10 Aug 2021 04:00), Max: 98.5 (10 Aug 2021 04:00)  HR: 74 (10 Aug 2021 06:45) (72 - 130)  BP: 123/82 (09 Aug 2021 23:30) (108/71 - 133/86)  BP(mean): 92 (09 Aug 2021 23:30) (85 - 96)  ABP: 123/71 (10 Aug 2021 06:45) (108/63 - 161/82)  ABP(mean): 90 (10 Aug 2021 06:45) (-2 - 110)  RR: 20 (10 Aug 2021 06:45) (8 - 31)  SpO2: 98% (10 Aug 2021 00:45) (92% - 99%)      PHYSICAL EXAMINATION  Appearance: NAD, no distress  HEENT: Moist Mucous Membranes, Anicteric, PERRL, EOMI  Cardiovascular: Regular rate and rhythm, Normal S1 S2, No JVD, No murmurs  Respiratory: b/l Lungs crackles at the base to auscultation. No rales, No rhonchi, No wheezing. No tenderness to palpation  Gastrointestinal:  Soft, Non-tender, + BS  Neurologic: Non-focal, A&Ox3  Skin: Warm and dry, No rashes, No ecchymosis, No cyanosis,+ blood stain dsg on right groin  Musculoskeletal: No clubbing, No cyanosis, No edema  Vascular: Peripheral pulses palpable 2+ bilaterally  Psychiatry: Mood & affect appropriate      	    		      I&O's Summary    09 Aug 2021 07:01  -  10 Aug 2021 07:00  --------------------------------------------------------  IN: 445 mL / OUT: 700 mL / NET: -255 mL    	 	  LABORATORY VALUES	 	                          10.1   9.81  )-----------( 184      ( 10 Aug 2021 03:55 )             30.1       08-10    138  |  104  |  18  ----------------------------<  128<H>  3.5   |  20<L>  |  1.12  08-09    135  |  98  |  20  ----------------------------<  93  3.8   |  23  |  1.23    Ca    7.8<L>      10 Aug 2021 03:55  Ca    9.8      09 Aug 2021 07:35  Phos  5.0     08-10  Phos  4.2     08-09  Mg     1.50     08-10  Mg     2.00     08-09    TPro  7.7  /  Alb  4.7  /  TBili  0.6  /  DBili  x   /  AST  22  /  ALT  17  /  AlkPhos  96  08-09    LIVER FUNCTIONS - ( 09 Aug 2021 07:35 )  Alb: 4.7 g/dL / Pro: 7.7 g/dL / ALK PHOS: 96 U/L / ALT: 17 U/L / AST: 22 U/L / GGT: x               CARDIAC MARKERS:          Serum Pro-Brain Natriuretic Peptide: 3436 pg/mL (08-04 @ 21:48)      08-05 @ 06:25  Cholesterol, Serum - 209  Direct LDL- --  HDL Cholesterol, Serum- 55  Triglycerides, Serum- 114      Thyroid Stimulating Hormone, Serum: 5.46 uIU/mL (08-05 @ 06:22)        CAPILLARY BLOOD GLUCOSE    EKG:  Diagnostic testing:  cath:  echo:< from: Transthoracic Echocardiogram (08.06.21 @ 10:01) >  1. Normal trileaflet aortic valve. Mild aortic regurgitation.  2. Severely dilated left atrium.  LA volume index = 69cc/m2.  3. Endocardium not well visualized; grossly normal leftventricular systolic function. Mid to distal inferoseptalendocardium difficult to visualize. Possibly hypokinetic.Significant basal septal hypertrophy.  4. Normal tricuspid valve. Mild tricuspid regurgitation.5. Estimated pulmonary artery systolic pressure equals 43mm Hg, assuming right atrial pressure equals 10  mm Hg,consistent with mild pulmonary hypertension.6. Agitated saline injection demonstrates evidence of apatent foramen ovale vs a small atrial septal defect.    < end of copied text >  < from: Transesophageal Echocardiogram w/o TTE (08.09.21 @ 20:01) >  1. Tethered mitral valve leaflets with normal opening..Severe mitral regurgitation  2. Calcified trileaflet aortic valve with decreasedopening. Estimated aortic valve area equals 1.6 sqcm (byplanimetry), consistent with mild aortic stenosis.  3. Left atrial enlargement. No left atrial or left atrialappendage thrombus.4. Mild segmental left ventricular systolic dysfunction.  5. Normal right ventricular size and function. Device wireis noted in the right heart.  6. Normal tricuspid valve. Moderate tricuspidregurgitation.  7. Color flow Doppler demonstrates evidence of a smallinteratrial defect with predominant left to right flow.    < end of copied text >        Assessment and Plan:  66 year old Female with PMH of  Lung CA s/p chemo (2017 in remission),  HOCM s/p septal ablation (2005), HFpEF  (55% severe MR) and AFib (on Eliquis, admits noncompliance at times) s/p ablation (12/2020), anxiety. Patient presented to ED with c/o SOB/WALKER and increasing weight gain over the  last  few months, started on Lasix by her cardiologist three weeks prior to admission and Spironolactone.  EKG consistent with atypical atrial flutter bleeding from mouth.  On 8/9/21: underwent atypical atria aflutter ablation with Dr. Arana and had multiple runs of SVT after case. Patient was transferred to CCU  for closer monitoring overnight. She was started on amiodarone drip for arrhthymias        Neuro:  - moderated anxiety  -use POT at home  - improved with ativan 1 mg PO x1  -start on xanax 0.5mg PO as needed and psych consult    Pulm:  #h/o  Lung CA s/p chemo, in remission  - RA- pulse ox 93%-> desat to 88%,placed on 4L N/C  -Limited noncontrast CT  study showed No endobronchial lesions. A 3 mm anterior left upper lobe nodule (2:12) and a 1 mm right upper lobe nodule (2:27), stable since 12/21/2020.  -f/u with Oncologist  Dr Vega as out pt        CVS  #atypical Aflutter  -s/p aflutter ablation on 8/9 c/b SVT, on amiodarone drip  -continue post EP procedure care.   -Right groin dressing c/d/I  - c/w Protonix 40mg  po daily   -CHADSVASC is 4  c/w eliquis 5mg bid  -INR goal is 2-3  -c/w Cardizem 60mg tid   -d/c amiodarone drip as per EP -plan to start sotalol tomorrow and check QT/QTc before Sotalol and after each dose  -colchicine 0.6mg bid for pleuritic chest pain post ablation .     #HOCM, s/p septal ablation  -euvolemic on exam     #HFpEF   -p/w sob and increased weight gain  -RICCARDO on 8/29 severe MR ( known), mild seg LVSD, severe LA enlargment.  -proBNP 3436 on admission-  -c/w lasix, spironolactone.  -lasix 40mg IVP x1 today -pt received 3 L IVF during procedure   -chest Xray showed pulm congestion      Renal  - GEORGE (acute kidney injury).   -mild George on admission  -resolved now        #Prophylactic measure  -on eliquis Subjective/Objective: compliant of ear, throat, back and abdomen pain after procedure. Also compliant of feeling nervous and anxious. repots taking  POT every night for few months.    Overnight event: Off amiodarone, started on colchicine for chest pain. Received IV toradol for pain with improvement.   Tele event: irregular rhythm overnight, EKG with NSR, QTc 545      MEDICATIONS    apixaban 5 milliGRAM(s) Oral every 12 hours  diltiazem    Tablet 60 milliGRAM(s) Oral three times a day  furosemide    Tablet 40 milliGRAM(s) Oral daily  spironolactone 25 milliGRAM(s) Oral daily  colchicine 0.6mg  oral twice a day  acetaminophen   Tablet .. 650 milliGRAM(s) Oral every 6 hours PRN  LORazepam     Tablet 1 milliGRAM(s) Oral once  melatonin 6 milliGRAM(s) Oral at bedtime PRN  polyethylene glycol 3350 17 Gram(s) Oral daily  senna 2 Tablet(s) Oral at bedtime  chlorhexidine 4% Liquid 1 Application(s) Topical <User Schedule>  magnesium sulfate  IVPB 2 Gram(s) IV Intermittent every 1 hour        REVIEW OF SYSTEMS    General: + fatigue, no malaise, + weight gain.  Skin: no rashes.  Ophthalmologic: no blurred vision, no loss of vision. 	  ENT: + sore throat,+ rhinorrhea,+ ear ache, sinus congestion.  Cardiovascular: + chest soreness , no palpitation, no dizziness, no diaphoresis, no edema  Respiratory: no SOB, cough or wheeze.  Gastrointestinal: + abdomen pain, no N/V/D, no melena/hematemesis/hematochezia.  Genitourinary: no dysuria/hesitancy or hematuria.  Musculoskeletal: no myalgias or arthralgias.  Neurological: no changes in vision or hearing, no lightheadedness/dizziness, no syncope/near syncope	  Psychiatric: no unusual stress/anxiety        ICU Vital Signs Last 24 Hrs  T(C): 36.9 (10 Aug 2021 04:00), Max: 36.9 (09 Aug 2021 09:35)  T(F): 98.5 (10 Aug 2021 04:00), Max: 98.5 (10 Aug 2021 04:00)  HR: 74 (10 Aug 2021 06:45) (72 - 130)  BP: 123/82 (09 Aug 2021 23:30) (108/71 - 133/86)  BP(mean): 92 (09 Aug 2021 23:30) (85 - 96)  ABP: 123/71 (10 Aug 2021 06:45) (108/63 - 161/82)  ABP(mean): 90 (10 Aug 2021 06:45) (-2 - 110)  RR: 20 (10 Aug 2021 06:45) (8 - 31)  SpO2: 98% (10 Aug 2021 00:45) (92% - 99%)      PHYSICAL EXAMINATION  Appearance: NAD, no distress  HEENT: Moist Mucous Membranes, Anicteric, PERRL, EOMI  Cardiovascular: regular rate, irregular rhythm, Normal S1 S2, No JVD, No murmurs  Respiratory: b/l Lungs crackles at the base to auscultation. No rales, No rhonchi, No wheezing. No tenderness to palpation  Gastrointestinal:  Soft, Non-tender, + BS  Neurologic: Non-focal, A&Ox3  Skin: Warm and dry, No rashes, No ecchymosis, No cyanosis,+ blood stain dsg on right groin  Musculoskeletal: No clubbing, No cyanosis, No edema  Vascular: Peripheral pulses palpable 2+ bilaterally  Psychiatry: Mood & affect appropriate      I&O's Summary    09 Aug 2021 07:01  -  10 Aug 2021 07:00  --------------------------------------------------------  IN: 445 mL / OUT: 700 mL / NET: -255 mL    	 	  LABORATORY VALUES	 	                          10.1   9.81  )-----------( 184      ( 10 Aug 2021 03:55 )             30.1       08-10    138  |  104  |  18  ----------------------------<  128<H>  3.5   |  20<L>  |  1.12  08-09    135  |  98  |  20  ----------------------------<  93  3.8   |  23  |  1.23    Ca    7.8<L>      10 Aug 2021 03:55  Ca    9.8      09 Aug 2021 07:35  Phos  5.0     08-10  Phos  4.2     08-09  Mg     1.50     08-10  Mg     2.00     08-09    TPro  7.7  /  Alb  4.7  /  TBili  0.6  /  DBili  x   /  AST  22  /  ALT  17  /  AlkPhos  96  08-09    LIVER FUNCTIONS - ( 09 Aug 2021 07:35 )  Alb: 4.7 g/dL / Pro: 7.7 g/dL / ALK PHOS: 96 U/L / ALT: 17 U/L / AST: 22 U/L / GGT: x               CARDIAC MARKERS:          Serum Pro-Brain Natriuretic Peptide: 3436 pg/mL (08-04 @ 21:48)      08-05 @ 06:25  Cholesterol, Serum - 209  Direct LDL- --  HDL Cholesterol, Serum- 55  Triglycerides, Serum- 114      Thyroid Stimulating Hormone, Serum: 5.46 uIU/mL (08-05 @ 06:22)        CAPILLARY BLOOD GLUCOSE    EKG:  Diagnostic testing:  cath:  echo:< from: Transthoracic Echocardiogram (08.06.21 @ 10:01) >  1. Normal trileaflet aortic valve. Mild aortic regurgitation.  2. Severely dilated left atrium.  LA volume index = 69cc/m2.  3. Endocardium not well visualized; grossly normal leftventricular systolic function. Mid to distal inferoseptalendocardium difficult to visualize. Possibly hypokinetic.Significant basal septal hypertrophy.  4. Normal tricuspid valve. Mild tricuspid regurgitation.5. Estimated pulmonary artery systolic pressure equals 43mm Hg, assuming right atrial pressure equals 10  mm Hg,consistent with mild pulmonary hypertension.6. Agitated saline injection demonstrates evidence of apatent foramen ovale vs a small atrial septal defect.    < end of copied text >  < from: Transesophageal Echocardiogram w/o TTE (08.09.21 @ 20:01) >  1. Tethered mitral valve leaflets with normal opening..Severe mitral regurgitation  2. Calcified trileaflet aortic valve with decreasedopening. Estimated aortic valve area equals 1.6 sqcm (byplanimetry), consistent with mild aortic stenosis.  3. Left atrial enlargement. No left atrial or left atrialappendage thrombus.4. Mild segmental left ventricular systolic dysfunction.  5. Normal right ventricular size and function. Device wireis noted in the right heart.  6. Normal tricuspid valve. Moderate tricuspidregurgitation.  7. Color flow Doppler demonstrates evidence of a smallinteratrial defect with predominant left to right flow.          Assessment and Plan:  66 year old Female with PMH of  Lung CA s/p chemo (2017 in remission),  HOCM s/p septal ablation (2005), HFpEF  (55% severe MR) and AFib (on Eliquis, admits noncompliance at times) s/p ablation (12/2020), anxiety. Patient presented to ED with c/o SOB/WALKER and increasing weight gain over the  last  few months, started on Lasix by her cardiologist three weeks prior to admission and Spironolactone.  EKG consistent with atypical atrial flutter bleeding from mouth.  On 8/9/21: underwent atypical atria aflutter ablation with Dr. Arana and had multiple runs of SVT after case. Patient was transferred to CCU  for closer monitoring overnight. She was started on amiodarone drip for arrhthymias        Neuro:  - moderated anxiety  -use POT at home  - improved with ativan 1 mg PO x1  -start on xanax 0.5mg PO as needed and psych consult    Pulm:  #h/o  Lung CA s/p chemo, in remission  - RA- pulse ox 93%-> desat to 88%,placed on 4L N/C  -Limited noncontrast CT  study showed No endobronchial lesions. A 3 mm anterior left upper lobe nodule (2:12) and a 1 mm right upper lobe nodule (2:27), stable since 12/21/2020.  -f/u with Oncologist  Dr Vega as out pt        CVS  #atypical Aflutter  -s/p aflutter ablation on 8/9 c/b SVT, on amiodarone drip  -continue post EP procedure care.   -Right groin dressing c/d/I  - c/w Protonix 40mg  po daily   -CHADSVASC is 4  c/w eliquis 5mg bid  -INR goal is 2-3  -c/w Cardizem 60mg tid   -d/c amiodarone drip as per EP -plan to start sotalol tomorrow and check QT/QTc before Sotalol and after each dose  -colchicine 0.6mg bid for pleuritic chest pain post ablation .     #HOCM, s/p septal ablation  -euvolemic on exam     #HFpEF   -p/w sob and increased weight gain  -RICCARDO on 8/29 severe MR ( known), mild seg LVSD, severe LA enlargment.  -proBNP 3436 on admission-  -c/w lasix, spironolactone.  -lasix 40mg IVP x1 today -pt received 3 L IVF during procedure   -chest Xray showed pulm congestion      Renal  - GEORGE (acute kidney injury).   -mild George on admission  -resolved now        #Prophylactic measure  -on eliquis Subjective/Objective: compliant of ear, throat, back and abdomen pain after procedure. Also compliant of feeling nervous and anxious. repots taking  POT every night for few months.    Overnight event: Off amiodarone, started on colchicine for chest pain. Received IV toradol for pain with improvement. Witnessed patient walking without oxygen with nurse assistance multiple times up and down hallway.   Tele event: irregular rhythm overnight, EKG with NSR, QTc 545      MEDICATIONS    apixaban 5 milliGRAM(s) Oral every 12 hours  diltiazem    Tablet 60 milliGRAM(s) Oral three times a day  furosemide    Tablet 40 milliGRAM(s) Oral daily  spironolactone 25 milliGRAM(s) Oral daily  colchicine 0.6mg  oral twice a day  acetaminophen   Tablet .. 650 milliGRAM(s) Oral every 6 hours PRN  LORazepam     Tablet 1 milliGRAM(s) Oral once  melatonin 6 milliGRAM(s) Oral at bedtime PRN  polyethylene glycol 3350 17 Gram(s) Oral daily  senna 2 Tablet(s) Oral at bedtime  chlorhexidine 4% Liquid 1 Application(s) Topical <User Schedule>  magnesium sulfate  IVPB 2 Gram(s) IV Intermittent every 1 hour        REVIEW OF SYSTEMS    General: + fatigue, no malaise, + weight gain.  Skin: no rashes.  Ophthalmologic: no blurred vision, no loss of vision. 	  ENT: + sore throat,+ rhinorrhea,+ ear ache, sinus congestion.  Cardiovascular: + chest soreness , no palpitation, no dizziness, no diaphoresis, no edema  Respiratory: no SOB, cough or wheeze.  Gastrointestinal: + abdomen pain, no N/V/D, no melena/hematemesis/hematochezia.  Genitourinary: no dysuria/hesitancy or hematuria.  Musculoskeletal: no myalgias or arthralgias.  Neurological: no changes in vision or hearing, no lightheadedness/dizziness, no syncope/near syncope	  Psychiatric: no unusual stress/anxiety        ICU Vital Signs Last 24 Hrs  T(C): 36.9 (10 Aug 2021 04:00), Max: 36.9 (09 Aug 2021 09:35)  T(F): 98.5 (10 Aug 2021 04:00), Max: 98.5 (10 Aug 2021 04:00)  HR: 74 (10 Aug 2021 06:45) (72 - 130)  BP: 123/82 (09 Aug 2021 23:30) (108/71 - 133/86)  BP(mean): 92 (09 Aug 2021 23:30) (85 - 96)  ABP: 123/71 (10 Aug 2021 06:45) (108/63 - 161/82)  ABP(mean): 90 (10 Aug 2021 06:45) (-2 - 110)  RR: 20 (10 Aug 2021 06:45) (8 - 31)  SpO2: 98% (10 Aug 2021 00:45) (92% - 99%)      PHYSICAL EXAMINATION  Appearance: NAD, no distress  HEENT: Moist Mucous Membranes, Anicteric, PERRL, EOMI  Cardiovascular: regular rate, irregular rhythm, Normal S1 S2, No JVD, No murmurs  Respiratory: b/l Lungs crackles at the base to auscultation. No rales, No rhonchi, No wheezing. No tenderness to palpation  Gastrointestinal:  Soft, Non-tender, + BS  Neurologic: Non-focal, A&Ox3  Skin: Warm and dry, No rashes, No ecchymosis, No cyanosis,+ blood stain dsg on right groin  Musculoskeletal: No clubbing, No cyanosis, No edema  Vascular: Peripheral pulses palpable 2+ bilaterally  Psychiatry: Mood & affect appropriate      I&O's Summary    09 Aug 2021 07:01  -  10 Aug 2021 07:00  --------------------------------------------------------  IN: 445 mL / OUT: 700 mL / NET: -255 mL    	 	  LABORATORY VALUES	 	                          10.1   9.81  )-----------( 184      ( 10 Aug 2021 03:55 )             30.1       08-10    138  |  104  |  18  ----------------------------<  128<H>  3.5   |  20<L>  |  1.12  08-09    135  |  98  |  20  ----------------------------<  93  3.8   |  23  |  1.23    Ca    7.8<L>      10 Aug 2021 03:55  Ca    9.8      09 Aug 2021 07:35  Phos  5.0     08-10  Phos  4.2     08-09  Mg     1.50     08-10  Mg     2.00     08-09    TPro  7.7  /  Alb  4.7  /  TBili  0.6  /  DBili  x   /  AST  22  /  ALT  17  /  AlkPhos  96  08-09    LIVER FUNCTIONS - ( 09 Aug 2021 07:35 )  Alb: 4.7 g/dL / Pro: 7.7 g/dL / ALK PHOS: 96 U/L / ALT: 17 U/L / AST: 22 U/L / GGT: x               CARDIAC MARKERS:          Serum Pro-Brain Natriuretic Peptide: 3436 pg/mL (08-04 @ 21:48)      08-05 @ 06:25  Cholesterol, Serum - 209  Direct LDL- --  HDL Cholesterol, Serum- 55  Triglycerides, Serum- 114      Thyroid Stimulating Hormone, Serum: 5.46 uIU/mL (08-05 @ 06:22)        CAPILLARY BLOOD GLUCOSE    EKG:  Diagnostic testing:  cath:  echo:< from: Transthoracic Echocardiogram (08.06.21 @ 10:01) >  1. Normal trileaflet aortic valve. Mild aortic regurgitation.  2. Severely dilated left atrium.  LA volume index = 69cc/m2.  3. Endocardium not well visualized; grossly normal leftventricular systolic function. Mid to distal inferoseptalendocardium difficult to visualize. Possibly hypokinetic.Significant basal septal hypertrophy.  4. Normal tricuspid valve. Mild tricuspid regurgitation.5. Estimated pulmonary artery systolic pressure equals 43mm Hg, assuming right atrial pressure equals 10  mm Hg,consistent with mild pulmonary hypertension.6. Agitated saline injection demonstrates evidence of apatent foramen ovale vs a small atrial septal defect.      < from: Transesophageal Echocardiogram w/o TTE (08.09.21 @ 20:01) >  1. Tethered mitral valve leaflets with normal opening..Severe mitral regurgitation  2. Calcified trileaflet aortic valve with decreasedopening. Estimated aortic valve area equals 1.6 sqcm (byplanimetry), consistent with mild aortic stenosis.  3. Left atrial enlargement. No left atrial or left atrialappendage thrombus.4. Mild segmental left ventricular systolic dysfunction.  5. Normal right ventricular size and function. Device wireis noted in the right heart.  6. Normal tricuspid valve. Moderate tricuspidregurgitation.  7. Color flow Doppler demonstrates evidence of a smallinteratrial defect with predominant left to right flow.          Assessment and Plan:  66 year old Female with PMH of  Lung CA s/p chemo (2017 in remission),  HOCM s/p septal ablation (2005), HFpEF  (55% severe MR) and AFib (on Eliquis, admits noncompliance at times) s/p ablation (12/2020), anxiety. Patient presented to ED with c/o SOB/WALKER and increasing weight gain over the  last  few months, started on Lasix by her cardiologist three weeks prior to admission and Spironolactone.  EKG consistent with atypical atrial flutter bleeding from mouth.  On 8/9/21: underwent atypical atria aflutter ablation with Dr. Arana and had multiple runs of SVT after case. Patient was transferred to CCU  for closer monitoring overnight. She was started on amiodarone drip for arrhthymias, now discontinued.         Neuro:  -moderated anxiety  -use POT at home  - improved with ativan 1 mg PO x1  -start on xanax 0.5mg PO as needed holistic nurse consult    Pulm:  #h/o  Lung CA s/p chemo, in remission  -RA- pulse ox 93%-> desat to 88%,placed on 4L N/C  -Limited noncontrast CT  study showed No endobronchial lesions. A 3 mm anterior left upper lobe nodule (2:12) and a 1 mm right upper lobe nodule (2:27), stable since 12/21/2020.  -f/u with Oncologist  Dr Vega as out pt  - Pulm consulted, patient de-escalated to 1L NC satting 95%, other etiologies more likely, no intervention at this time    CVS  #atypical Aflutter  -s/p aflutter ablation on 8/9 c/b SVT, s/p amio drip 1 day, no discontinued  -continue post EP procedure care  -Right groin dressing c/d/I  - c/w Protonix 40mg  po daily   -CHADSVASC is 4  c/w eliquis 5mg bid  -INR goal is 2-3  - unable to initiate sotalol this morning because QTc elongated at 470  -transition from cardizem to metoprolol succinate 25 BID  -s/p IV toradol this morning, continue colchicine 0.6mg bid for pleuritic chest pain post ablation, requires tx for one month    # Hypotension  - Hold parameters on BP meds toprol, lasix, and aldactone for SBP<110  - Mild GEORGE Cr 1.39, may be component of hypoperfusion from mild excess diuresis  - lactate 1.9, LFTs pending     #HOCM, s/p septal ablation  -euvolemic on exam     #HFpEF   -p/w sob and increased weight gain  -RICCARDO on 8/29 severe MR ( known), mild seg LVSD, severe LA enlargment  -proBNP 3436 on admission, downtrending to 3045  -c/w lasix, spironolactone (hold parameters for SBP<110)  -Net negative -5L over hospital course  -chest Xray showed pulm congestion    Renal  -GEORGE (acute kidney injury)  -mild George on admission  -Cr now 1.39      #Prophylactic measure  -on eliquis

## 2021-08-11 NOTE — PROGRESS NOTE ADULT - SUBJECTIVE AND OBJECTIVE BOX
DATE OF SERVICE: 08-11-21 @ 09:16    Subjective: Patient seen and examined. No new events except as noted.     SUBJECTIVE/ROS:  sob is better  has chest pain with movement     MEDICATIONS:  MEDICATIONS  (STANDING):  apixaban 5 milliGRAM(s) Oral every 12 hours  chlorhexidine 4% Liquid 1 Application(s) Topical <User Schedule>  colchicine 0.6 milliGRAM(s) Oral two times a day  diltiazem    Tablet 60 milliGRAM(s) Oral three times a day  furosemide    Tablet 40 milliGRAM(s) Oral daily  pantoprazole    Tablet 40 milliGRAM(s) Oral before breakfast  polyethylene glycol 3350 17 Gram(s) Oral daily  senna 2 Tablet(s) Oral at bedtime  spironolactone 25 milliGRAM(s) Oral daily      PHYSICAL EXAM:  T(C): 37.3 (08-11-21 @ 07:43), Max: 37.5 (08-11-21 @ 04:00)  HR: 74 (08-11-21 @ 08:00) (71 - 100)  BP: 102/78 (08-11-21 @ 08:00) (78/42 - 130/83)  RR: 19 (08-11-21 @ 08:00) (17 - 32)  SpO2: 94% (08-11-21 @ 08:00) (87% - 97%)  Wt(kg): --  I&O's Summary    10 Aug 2021 07:01  -  11 Aug 2021 07:00  --------------------------------------------------------  IN: 340 mL / OUT: 3900 mL / NET: -3560 mL            JVP: Normal  Neck: supple  Lung: clear   CV: S1 S2 , Murmur:  Abd: soft  Ext: No edema  neuro: Awake / alert  Psych: flat affect  Skin: normal``    LABS/DATA:    CARDIAC MARKERS:                                10.0   8.06  )-----------( 172      ( 11 Aug 2021 00:41 )             29.4     08-11    135  |  96<L>  |  18  ----------------------------<  104<H>  3.4<L>   |  24  |  1.26    Ca    8.5      11 Aug 2021 00:41  Phos  2.3     08-11  Mg     2.10     08-11      proBNP: Serum Pro-Brain Natriuretic Peptide: 3045 pg/mL (08-11 @ 00:56)    Lipid Profile:   HgA1c:   TSH:     TELE:  EKG:

## 2021-08-11 NOTE — CONSULT NOTE ADULT - ASSESSMENT
66 year old Female with PMH of  Lung CA s/p chemo (2017 in remission),  HOCM s/p septal ablation (2005), HFpEF  (55% severe MR) and AFib (on Eliquis, admits noncompliance at times) s/p ablation (12/2020) HD and volume overload and AFl s/p ablation. With diuresis pt reports significant improvement in edema and sx.  Pt currently only on 1L and sat 95%. Can d/c NC and monitor sat. Mild hypoxemia may be from atelectasis and residual edema.  cont IS  cont PT/ambulation as tolerated  cont diuresis as per cardiology  on a/c already for Afl   CXR appears to show edema but hard to r/o underlying consolidation COnt to monitor for fever/purulent sputum.    CT does not appear to show any recurrence of her NSCLC (which she was unsure about) - has small nodules that can be monitored as outpt  d/w primary team

## 2021-08-12 ENCOUNTER — TRANSCRIPTION ENCOUNTER (OUTPATIENT)
Age: 66
End: 2021-08-12

## 2021-08-12 VITALS — RESPIRATION RATE: 25 BRPM | HEART RATE: 66 BPM

## 2021-08-12 DIAGNOSIS — I31.9 DISEASE OF PERICARDIUM, UNSPECIFIED: ICD-10-CM

## 2021-08-12 LAB
ANION GAP SERPL CALC-SCNC: 16 MMOL/L — HIGH (ref 7–14)
BUN SERPL-MCNC: 16 MG/DL — SIGNIFICANT CHANGE UP (ref 7–23)
CALCIUM SERPL-MCNC: 8.9 MG/DL — SIGNIFICANT CHANGE UP (ref 8.4–10.5)
CHLORIDE SERPL-SCNC: 97 MMOL/L — LOW (ref 98–107)
CO2 SERPL-SCNC: 23 MMOL/L — SIGNIFICANT CHANGE UP (ref 22–31)
CREAT SERPL-MCNC: 1.36 MG/DL — HIGH (ref 0.5–1.3)
GLUCOSE SERPL-MCNC: 103 MG/DL — HIGH (ref 70–99)
HCT VFR BLD CALC: 33.4 % — LOW (ref 34.5–45)
HGB BLD-MCNC: 10.9 G/DL — LOW (ref 11.5–15.5)
MAGNESIUM SERPL-MCNC: 2.1 MG/DL — SIGNIFICANT CHANGE UP (ref 1.6–2.6)
MCHC RBC-ENTMCNC: 32.5 PG — SIGNIFICANT CHANGE UP (ref 27–34)
MCHC RBC-ENTMCNC: 32.6 GM/DL — SIGNIFICANT CHANGE UP (ref 32–36)
MCV RBC AUTO: 99.7 FL — SIGNIFICANT CHANGE UP (ref 80–100)
NRBC # BLD: 0 /100 WBCS — SIGNIFICANT CHANGE UP
NRBC # FLD: 0 K/UL — SIGNIFICANT CHANGE UP
PHOSPHATE SERPL-MCNC: 3.3 MG/DL — SIGNIFICANT CHANGE UP (ref 2.5–4.5)
PLATELET # BLD AUTO: 171 K/UL — SIGNIFICANT CHANGE UP (ref 150–400)
POTASSIUM SERPL-MCNC: 3.6 MMOL/L — SIGNIFICANT CHANGE UP (ref 3.5–5.3)
POTASSIUM SERPL-SCNC: 3.6 MMOL/L — SIGNIFICANT CHANGE UP (ref 3.5–5.3)
RBC # BLD: 3.35 M/UL — LOW (ref 3.8–5.2)
RBC # FLD: 12 % — SIGNIFICANT CHANGE UP (ref 10.3–14.5)
SODIUM SERPL-SCNC: 136 MMOL/L — SIGNIFICANT CHANGE UP (ref 135–145)
WBC # BLD: 6.15 K/UL — SIGNIFICANT CHANGE UP (ref 3.8–10.5)
WBC # FLD AUTO: 6.15 K/UL — SIGNIFICANT CHANGE UP (ref 3.8–10.5)

## 2021-08-12 PROCEDURE — 99232 SBSQ HOSP IP/OBS MODERATE 35: CPT

## 2021-08-12 RX ORDER — METOPROLOL TARTRATE 50 MG
1 TABLET ORAL
Qty: 0 | Refills: 0 | DISCHARGE
Start: 2021-08-12

## 2021-08-12 RX ORDER — SPIRONOLACTONE 25 MG/1
25 TABLET, FILM COATED ORAL DAILY
Refills: 0 | Status: DISCONTINUED | OUTPATIENT
Start: 2021-08-12 | End: 2021-08-12

## 2021-08-12 RX ORDER — METOPROLOL TARTRATE 50 MG
1 TABLET ORAL
Qty: 60 | Refills: 0
Start: 2021-08-12 | End: 2021-09-10

## 2021-08-12 RX ORDER — POTASSIUM CHLORIDE 20 MEQ
40 PACKET (EA) ORAL EVERY 4 HOURS
Refills: 0 | Status: COMPLETED | OUTPATIENT
Start: 2021-08-12 | End: 2021-08-12

## 2021-08-12 RX ORDER — COLCHICINE 0.6 MG
1 TABLET ORAL
Qty: 60 | Refills: 0
Start: 2021-08-12 | End: 2021-09-10

## 2021-08-12 RX ORDER — ONDANSETRON 8 MG/1
4 TABLET, FILM COATED ORAL ONCE
Refills: 0 | Status: COMPLETED | OUTPATIENT
Start: 2021-08-12 | End: 2021-08-12

## 2021-08-12 RX ORDER — COLCHICINE 0.6 MG
1 TABLET ORAL
Qty: 0 | Refills: 0 | DISCHARGE
Start: 2021-08-12

## 2021-08-12 RX ORDER — FUROSEMIDE 40 MG
40 TABLET ORAL DAILY
Refills: 0 | Status: DISCONTINUED | OUTPATIENT
Start: 2021-08-12 | End: 2021-08-12

## 2021-08-12 RX ORDER — METOPROLOL TARTRATE 50 MG
25 TABLET ORAL EVERY 12 HOURS
Refills: 0 | Status: DISCONTINUED | OUTPATIENT
Start: 2021-08-12 | End: 2021-08-12

## 2021-08-12 RX ORDER — APIXABAN 2.5 MG/1
1 TABLET, FILM COATED ORAL
Qty: 60 | Refills: 0
Start: 2021-08-12 | End: 2021-09-10

## 2021-08-12 RX ADMIN — CHLORHEXIDINE GLUCONATE 1 APPLICATION(S): 213 SOLUTION TOPICAL at 10:03

## 2021-08-12 RX ADMIN — Medication 650 MILLIGRAM(S): at 13:03

## 2021-08-12 RX ADMIN — SPIRONOLACTONE 25 MILLIGRAM(S): 25 TABLET, FILM COATED ORAL at 11:46

## 2021-08-12 RX ADMIN — ONDANSETRON 4 MILLIGRAM(S): 8 TABLET, FILM COATED ORAL at 07:29

## 2021-08-12 RX ADMIN — Medication 25 MILLIGRAM(S): at 10:00

## 2021-08-12 RX ADMIN — Medication 40 MILLIGRAM(S): at 11:46

## 2021-08-12 RX ADMIN — Medication 40 MILLIEQUIVALENT(S): at 10:00

## 2021-08-12 RX ADMIN — PANTOPRAZOLE SODIUM 40 MILLIGRAM(S): 20 TABLET, DELAYED RELEASE ORAL at 06:21

## 2021-08-12 RX ADMIN — Medication 0.6 MILLIGRAM(S): at 10:00

## 2021-08-12 RX ADMIN — APIXABAN 5 MILLIGRAM(S): 2.5 TABLET, FILM COATED ORAL at 06:14

## 2021-08-12 RX ADMIN — Medication 40 MILLIEQUIVALENT(S): at 06:14

## 2021-08-12 NOTE — DISCHARGE NOTE NURSING/CASE MANAGEMENT/SOCIAL WORK - PATIENT PORTAL LINK FT
You can access the FollowMyHealth Patient Portal offered by [facility] by registering at the following website: [email or web address]. By joining [employer]’s FollowMyHealth portal, you will also be able to view your health information using other applications (apps) compatible with our system.

## 2021-08-12 NOTE — PROGRESS NOTE ADULT - ASSESSMENT
66F with lung CA, s/p chemo, HOCM with septal ablation in 2005, HFpEF, and afib on eliquis, s/p ablation in 12/202 presents with decompensated HF in setting of atypical aflutter (mitral AFL). Now s/p successful mitral aflutter ablation. Course c/b runs of AT and pericarditis. Also with intermittent periods of hypotension which appears to have resolved.       Plan :   # s/p aflutter ablation   - continues in NSR with occ APCs, off amiodarone and diltiazem  - confirmed with Dr. Arana please start Toprol XL 25 mg PO BID with hold parameters SBP < 90, HR < 50 and observe    #Pericarditis  - continue colchicine as prescribed  - pain management as indicated    #HFpEF  - continue management per CCU team             66F with lung CA, s/p chemo, HOCM with septal ablation in 2005, HFpEF, and afib on eliquis, s/p ablation in 12/202 presents with decompensated HF in setting of atypical aflutter (mitral AFL). Now s/p successful mitral aflutter ablation. Course c/b runs of AT and pericarditis. Also with intermittent periods of hypotension which appears to have resolved.       Plan :   # s/p aflutter ablation   - continues in NSR with occ APCs, off amiodarone and diltiazem  - confirmed with Dr. Arana please start Toprol XL 25 mg PO BID with hold parameters SBP < 90, HR < 50 and observe  - continue apixaban for A/C    #Pericarditis  - continue colchicine as prescribed  - pain management as indicated    #HFpEF  - continue management per CCU team

## 2021-08-12 NOTE — PROGRESS NOTE ADULT - SUBJECTIVE AND OBJECTIVE BOX
Subjective/Objective: compliant of ear, throat, back and abdomen pain after procedure. Also compliant of feeling nervous and anxious. repots taking  POT every night for few months.    Overnight event: Off amiodarone, started on colchicine for chest pain. Received IV toradol for pain with improvement. Witnessed patient walking without oxygen with nurse assistance multiple times up and down hallway.   Tele event: irregular rhythm overnight, EKG with NSR, QTc 545      MEDICATIONS    apixaban 5 milliGRAM(s) Oral every 12 hours  diltiazem    Tablet 60 milliGRAM(s) Oral three times a day  furosemide    Tablet 40 milliGRAM(s) Oral daily  spironolactone 25 milliGRAM(s) Oral daily  colchicine 0.6mg  oral twice a day  acetaminophen   Tablet .. 650 milliGRAM(s) Oral every 6 hours PRN  LORazepam     Tablet 1 milliGRAM(s) Oral once  melatonin 6 milliGRAM(s) Oral at bedtime PRN  polyethylene glycol 3350 17 Gram(s) Oral daily  senna 2 Tablet(s) Oral at bedtime  chlorhexidine 4% Liquid 1 Application(s) Topical <User Schedule>  magnesium sulfate  IVPB 2 Gram(s) IV Intermittent every 1 hour        REVIEW OF SYSTEMS    General: + fatigue, no malaise, + weight gain.  Skin: no rashes.  Ophthalmologic: no blurred vision, no loss of vision. 	  ENT: + sore throat,+ rhinorrhea,+ ear ache, sinus congestion.  Cardiovascular: + chest soreness , no palpitation, no dizziness, no diaphoresis, no edema  Respiratory: no SOB, cough or wheeze.  Gastrointestinal: + abdomen pain, no N/V/D, no melena/hematemesis/hematochezia.  Genitourinary: no dysuria/hesitancy or hematuria.  Musculoskeletal: no myalgias or arthralgias.  Neurological: no changes in vision or hearing, no lightheadedness/dizziness, no syncope/near syncope	  Psychiatric: no unusual stress/anxiety        ICU Vital Signs Last 24 Hrs  T(C): 36.9 (10 Aug 2021 04:00), Max: 36.9 (09 Aug 2021 09:35)  T(F): 98.5 (10 Aug 2021 04:00), Max: 98.5 (10 Aug 2021 04:00)  HR: 74 (10 Aug 2021 06:45) (72 - 130)  BP: 123/82 (09 Aug 2021 23:30) (108/71 - 133/86)  BP(mean): 92 (09 Aug 2021 23:30) (85 - 96)  ABP: 123/71 (10 Aug 2021 06:45) (108/63 - 161/82)  ABP(mean): 90 (10 Aug 2021 06:45) (-2 - 110)  RR: 20 (10 Aug 2021 06:45) (8 - 31)  SpO2: 98% (10 Aug 2021 00:45) (92% - 99%)      PHYSICAL EXAMINATION  Appearance: NAD, no distress  HEENT: Moist Mucous Membranes, Anicteric, PERRL, EOMI  Cardiovascular: regular rate, irregular rhythm, Normal S1 S2, No JVD, No murmurs  Respiratory: b/l Lungs crackles at the base to auscultation. No rales, No rhonchi, No wheezing. No tenderness to palpation  Gastrointestinal:  Soft, Non-tender, + BS  Neurologic: Non-focal, A&Ox3  Skin: Warm and dry, No rashes, No ecchymosis, No cyanosis,+ blood stain dsg on right groin  Musculoskeletal: No clubbing, No cyanosis, No edema  Vascular: Peripheral pulses palpable 2+ bilaterally  Psychiatry: Mood & affect appropriate      I&O's Summary    09 Aug 2021 07:01  -  10 Aug 2021 07:00  --------------------------------------------------------  IN: 445 mL / OUT: 700 mL / NET: -255 mL    	 	  LABORATORY VALUES	 	                          10.1   9.81  )-----------( 184      ( 10 Aug 2021 03:55 )             30.1       08-10    138  |  104  |  18  ----------------------------<  128<H>  3.5   |  20<L>  |  1.12  08-09    135  |  98  |  20  ----------------------------<  93  3.8   |  23  |  1.23    Ca    7.8<L>      10 Aug 2021 03:55  Ca    9.8      09 Aug 2021 07:35  Phos  5.0     08-10  Phos  4.2     08-09  Mg     1.50     08-10  Mg     2.00     08-09    TPro  7.7  /  Alb  4.7  /  TBili  0.6  /  DBili  x   /  AST  22  /  ALT  17  /  AlkPhos  96  08-09    LIVER FUNCTIONS - ( 09 Aug 2021 07:35 )  Alb: 4.7 g/dL / Pro: 7.7 g/dL / ALK PHOS: 96 U/L / ALT: 17 U/L / AST: 22 U/L / GGT: x               CARDIAC MARKERS:          Serum Pro-Brain Natriuretic Peptide: 3436 pg/mL (08-04 @ 21:48)      08-05 @ 06:25  Cholesterol, Serum - 209  Direct LDL- --  HDL Cholesterol, Serum- 55  Triglycerides, Serum- 114      Thyroid Stimulating Hormone, Serum: 5.46 uIU/mL (08-05 @ 06:22)        CAPILLARY BLOOD GLUCOSE    EKG:  Diagnostic testing:  cath:  echo:< from: Transthoracic Echocardiogram (08.06.21 @ 10:01) >  1. Normal trileaflet aortic valve. Mild aortic regurgitation.  2. Severely dilated left atrium.  LA volume index = 69cc/m2.  3. Endocardium not well visualized; grossly normal leftventricular systolic function. Mid to distal inferoseptalendocardium difficult to visualize. Possibly hypokinetic.Significant basal septal hypertrophy.  4. Normal tricuspid valve. Mild tricuspid regurgitation.5. Estimated pulmonary artery systolic pressure equals 43mm Hg, assuming right atrial pressure equals 10  mm Hg,consistent with mild pulmonary hypertension.6. Agitated saline injection demonstrates evidence of apatent foramen ovale vs a small atrial septal defect.      < from: Transesophageal Echocardiogram w/o TTE (08.09.21 @ 20:01) >  1. Tethered mitral valve leaflets with normal opening..Severe mitral regurgitation  2. Calcified trileaflet aortic valve with decreasedopening. Estimated aortic valve area equals 1.6 sqcm (byplanimetry), consistent with mild aortic stenosis.  3. Left atrial enlargement. No left atrial or left atrialappendage thrombus.4. Mild segmental left ventricular systolic dysfunction.  5. Normal right ventricular size and function. Device wireis noted in the right heart.  6. Normal tricuspid valve. Moderate tricuspidregurgitation.  7. Color flow Doppler demonstrates evidence of a smallinteratrial defect with predominant left to right flow.          Assessment and Plan:  66 year old Female with PMH of  Lung CA s/p chemo (2017 in remission),  HOCM s/p septal ablation (2005), HFpEF  (55% severe MR) and AFib (on Eliquis, admits noncompliance at times) s/p ablation (12/2020), anxiety. Patient presented to ED with c/o SOB/WALKER and increasing weight gain over the  last  few months, started on Lasix by her cardiologist three weeks prior to admission and Spironolactone.  EKG consistent with atypical atrial flutter bleeding from mouth.  On 8/9/21: underwent atypical atria aflutter ablation with Dr. Arana and had multiple runs of SVT after case. Patient was transferred to CCU  for closer monitoring overnight. She was started on amiodarone drip for arrhthymias, now discontinued.         Neuro:  -moderated anxiety  -use POT at home  - improved with ativan 1 mg PO x1  -start on xanax 0.5mg PO as needed holistic nurse consult    Pulm:  #h/o  Lung CA s/p chemo, in remission  -RA- pulse ox 93%-> desat to 88%,placed on 4L N/C  -Limited noncontrast CT  study showed No endobronchial lesions. A 3 mm anterior left upper lobe nodule (2:12) and a 1 mm right upper lobe nodule (2:27), stable since 12/21/2020.  -f/u with Oncologist  Dr Vega as out pt  - Pulm consulted, patient de-escalated to 1L NC satting 95%, other etiologies more likely, no intervention at this time    CVS  #atypical Aflutter  -s/p aflutter ablation on 8/9 c/b SVT, s/p amio drip 1 day, no discontinued  -continue post EP procedure care  -Right groin dressing c/d/I  - c/w Protonix 40mg  po daily   -CHADSVASC is 4  c/w eliquis 5mg bid  -INR goal is 2-3  - unable to initiate sotalol this morning because QTc elongated at 470  -transition from cardizem to metoprolol succinate 25 BID  -s/p IV toradol this morning, continue colchicine 0.6mg bid for pleuritic chest pain post ablation, requires tx for one month    # Hypotension  - Hold parameters on BP meds toprol, lasix, and aldactone for SBP<110  - Mild GEORGE Cr 1.39, may be component of hypoperfusion from mild excess diuresis  - lactate 1.9, LFTs pending     #HOCM, s/p septal ablation  -euvolemic on exam     #HFpEF   -p/w sob and increased weight gain  -RICCARDO on 8/29 severe MR ( known), mild seg LVSD, severe LA enlargment  -proBNP 3436 on admission, downtrending to 3045  -c/w lasix, spironolactone (hold parameters for SBP<110)  -Net negative -5L over hospital course  -chest Xray showed pulm congestion    Renal  -GEORGE (acute kidney injury)  -mild George on admission  -Cr now 1.39      #Prophylactic measure  -on eliquis Subjective/Objective: compliant of ear, throat, back and abdomen pain after procedure. Also compliant of feeling nervous and anxious. repots taking  POT every night for few months.    Overnight event:  Received IV toradol for pain with improvement. She reports feeling well and would like to go home. Witnessed patient walking without oxygen independently up and down hallway again.   Tele event: irregular rhythm overnight, EKG with NSR, QTc 470      MEDICATIONS    apixaban 5 milliGRAM(s) Oral every 12 hours  diltiazem    Tablet 60 milliGRAM(s) Oral three times a day  furosemide    Tablet 40 milliGRAM(s) Oral daily  spironolactone 25 milliGRAM(s) Oral daily  colchicine 0.6mg  oral twice a day  acetaminophen   Tablet .. 650 milliGRAM(s) Oral every 6 hours PRN  LORazepam     Tablet 1 milliGRAM(s) Oral once  melatonin 6 milliGRAM(s) Oral at bedtime PRN  polyethylene glycol 3350 17 Gram(s) Oral daily  senna 2 Tablet(s) Oral at bedtime  chlorhexidine 4% Liquid 1 Application(s) Topical <User Schedule>  magnesium sulfate  IVPB 2 Gram(s) IV Intermittent every 1 hour        REVIEW OF SYSTEMS    General: + fatigue, no malaise, + weight gain.  Skin: no rashes.  Ophthalmologic: no blurred vision, no loss of vision. 	  ENT: + sore throat,+ rhinorrhea,+ ear ache, sinus congestion.  Cardiovascular: + chest soreness , no palpitation, no dizziness, no diaphoresis, no edema  Respiratory: no SOB, cough or wheeze.  Gastrointestinal: + abdomen pain, no N/V/D, no melena/hematemesis/hematochezia.  Genitourinary: no dysuria/hesitancy or hematuria.  Musculoskeletal: no myalgias or arthralgias.  Neurological: no changes in vision or hearing, no lightheadedness/dizziness, no syncope/near syncope	  Psychiatric: no unusual stress/anxiety        ICU Vital Signs Last 24 Hrs  T(C): 36.9 (10 Aug 2021 04:00), Max: 36.9 (09 Aug 2021 09:35)  T(F): 98.5 (10 Aug 2021 04:00), Max: 98.5 (10 Aug 2021 04:00)  HR: 74 (10 Aug 2021 06:45) (72 - 130)  BP: 123/82 (09 Aug 2021 23:30) (108/71 - 133/86)  BP(mean): 92 (09 Aug 2021 23:30) (85 - 96)  ABP: 123/71 (10 Aug 2021 06:45) (108/63 - 161/82)  ABP(mean): 90 (10 Aug 2021 06:45) (-2 - 110)  RR: 20 (10 Aug 2021 06:45) (8 - 31)  SpO2: 98% (10 Aug 2021 00:45) (92% - 99%)      PHYSICAL EXAMINATION  Appearance: NAD, no distress  HEENT: Moist Mucous Membranes, Anicteric, PERRL, EOMI  Cardiovascular: regular rate, irregular rhythm, Normal S1 S2, No JVD, No murmurs  Respiratory: b/l Lungs crackles at the base to auscultation. No rales, No rhonchi, No wheezing. No tenderness to palpation  Gastrointestinal:  Soft, Non-tender, + BS  Neurologic: Non-focal, A&Ox3  Skin: Warm and dry, No rashes, No ecchymosis, No cyanosis,+ blood stain dsg on right groin  Musculoskeletal: No clubbing, No cyanosis, No edema  Vascular: Peripheral pulses palpable 2+ bilaterally  Psychiatry: Mood & affect appropriate      I&O's Summary    09 Aug 2021 07:01  -  10 Aug 2021 07:00  --------------------------------------------------------  IN: 445 mL / OUT: 700 mL / NET: -255 mL    	 	  LABORATORY VALUES	 	                          10.1   9.81  )-----------( 184      ( 10 Aug 2021 03:55 )             30.1       08-10    138  |  104  |  18  ----------------------------<  128<H>  3.5   |  20<L>  |  1.12  08-09    135  |  98  |  20  ----------------------------<  93  3.8   |  23  |  1.23    Ca    7.8<L>      10 Aug 2021 03:55  Ca    9.8      09 Aug 2021 07:35  Phos  5.0     08-10  Phos  4.2     08-09  Mg     1.50     08-10  Mg     2.00     08-09    TPro  7.7  /  Alb  4.7  /  TBili  0.6  /  DBili  x   /  AST  22  /  ALT  17  /  AlkPhos  96  08-09    LIVER FUNCTIONS - ( 09 Aug 2021 07:35 )  Alb: 4.7 g/dL / Pro: 7.7 g/dL / ALK PHOS: 96 U/L / ALT: 17 U/L / AST: 22 U/L / GGT: x               CARDIAC MARKERS:          Serum Pro-Brain Natriuretic Peptide: 3436 pg/mL (08-04 @ 21:48)      08-05 @ 06:25  Cholesterol, Serum - 209  Direct LDL- --  HDL Cholesterol, Serum- 55  Triglycerides, Serum- 114      Thyroid Stimulating Hormone, Serum: 5.46 uIU/mL (08-05 @ 06:22)        CAPILLARY BLOOD GLUCOSE    EKG:  Diagnostic testing:  cath:  echo:< from: Transthoracic Echocardiogram (08.06.21 @ 10:01) >  1. Normal trileaflet aortic valve. Mild aortic regurgitation.  2. Severely dilated left atrium.  LA volume index = 69cc/m2.  3. Endocardium not well visualized; grossly normal leftventricular systolic function. Mid to distal inferoseptalendocardium difficult to visualize. Possibly hypokinetic.Significant basal septal hypertrophy.  4. Normal tricuspid valve. Mild tricuspid regurgitation.5. Estimated pulmonary artery systolic pressure equals 43mm Hg, assuming right atrial pressure equals 10  mm Hg,consistent with mild pulmonary hypertension.6. Agitated saline injection demonstrates evidence of apatent foramen ovale vs a small atrial septal defect.      < from: Transesophageal Echocardiogram w/o TTE (08.09.21 @ 20:01) >  1. Tethered mitral valve leaflets with normal opening..Severe mitral regurgitation  2. Calcified trileaflet aortic valve with decreasedopening. Estimated aortic valve area equals 1.6 sqcm (byplanimetry), consistent with mild aortic stenosis.  3. Left atrial enlargement. No left atrial or left atrialappendage thrombus.4. Mild segmental left ventricular systolic dysfunction.  5. Normal right ventricular size and function. Device wireis noted in the right heart.  6. Normal tricuspid valve. Moderate tricuspidregurgitation.  7. Color flow Doppler demonstrates evidence of a smallinteratrial defect with predominant left to right flow.          Assessment and Plan:  66 year old Female with PMH of  Lung CA s/p chemo (2017 in remission),  HOCM s/p septal ablation (2005), HFpEF  (55% severe MR) and AFib (on Eliquis, admits noncompliance at times) s/p ablation (12/2020), anxiety. Patient presented to ED with c/o SOB/WALKER and increasing weight gain over the  last  few months, started on Lasix by her cardiologist three weeks prior to admission and Spironolactone.  EKG consistent with atypical atrial flutter bleeding from mouth.  On 8/9/21: underwent atypical atria aflutter ablation with Dr. Arana and had multiple runs of SVT after case. Patient was transferred to CCU  for closer monitoring overnight. She was started on amiodarone drip for arrhthymias, now discontinued.         Neuro:  -moderated anxiety  - use POT at home  - improved with ativan 1 mg PO x1  -start on xanax 0.5mg PO as needed holistic nurse consult    Pulm:  #h/o  Lung CA s/p chemo, in remission  -RA- pulse ox 93%-> desat to 88%,placed on 4L N/C  -Limited noncontrast CT  study showed No endobronchial lesions. A 3 mm anterior left upper lobe nodule (2:12) and a 1 mm right upper lobe nodule (2:27), stable since 12/21/2020.  -f/u with Oncologist  Dr Vega as out pt  - Pulm consulted, patient de-escalated to 1L NC satting 95%, other etiologies more likely, no intervention at this time    CVS  #atypical Aflutter  -s/p aflutter ablation on 8/9 c/b SVT, s/p amio drip 1 day, no discontinued  -continue post EP procedure care  -Right groin dressing c/d/I  - c/w Protonix 40mg  po daily   -CHADSVASC is 4  - c/w eliquis 5mg bid  - INR goal is 2-3  - c/w metoprolol succinate 25 BID  -s/p IV toradol x2, continue colchicine 0.6mg bid for pleuritic chest pain post ablation, requires tx for one month  - will follow up with cardiology outpatient    # Hypotension, improved  - c/w BP meds toprol, lasix, and aldactone, hold for SBP<90, HR<50  - hold further toradol IV  - lactate 1.9  - repeat ECHO with small pericardial effusion, likely noncontributory  - Mild GEORGE Cr 1.36 from 1.39, may be component of hypoperfusion from mildly excess diuresis, held lasix one night with improvement in pressures     #HOCM, s/p septal ablation  -euvolemic on exam     #HFpEF   -p/w sob and increased weight gain  -RICCARDO on 8/29 severe MR ( known), mild seg LVSD, severe LA enlargment  -proBNP 3436 on admission, downtrending to 3045  -c/w lasix, spironolactone (hold parameters for SBP<90)  -Net negative -5L over hospital course  -chest Xray showed pulm congestion    Renal  -GEORGE (acute kidney injury)  -mild Goerge on admission  -Cr now 1.39      #Prophylactic measure  -on eliquis

## 2021-08-12 NOTE — PROGRESS NOTE ADULT - SUBJECTIVE AND OBJECTIVE BOX
DATE OF SERVICE: 08-12-21 @ 09:37    Subjective: Patient seen and examined. No new events except as noted.     SUBJECTIVE/ROS:  feels better       MEDICATIONS:  MEDICATIONS  (STANDING):  acetaminophen   Tablet .. 650 milliGRAM(s) Oral once  apixaban 5 milliGRAM(s) Oral every 12 hours  chlorhexidine 4% Liquid 1 Application(s) Topical <User Schedule>  colchicine 0.6 milliGRAM(s) Oral two times a day  furosemide    Tablet 40 milliGRAM(s) Oral daily  metoprolol succinate ER 25 milliGRAM(s) Oral every 12 hours  pantoprazole    Tablet 40 milliGRAM(s) Oral before breakfast  polyethylene glycol 3350 17 Gram(s) Oral daily  potassium chloride    Tablet ER 40 milliEquivalent(s) Oral every 4 hours  senna 2 Tablet(s) Oral at bedtime  spironolactone 25 milliGRAM(s) Oral daily      PHYSICAL EXAM:  T(C): 36.7 (08-12-21 @ 07:20), Max: 37.1 (08-11-21 @ 20:00)  HR: 74 (08-12-21 @ 09:00) (67 - 96)  BP: 102/63 (08-12-21 @ 09:00) (81/47 - 118/76)  RR: 26 (08-12-21 @ 09:00) (13 - 29)  SpO2: 96% (08-12-21 @ 09:00) (90% - 99%)  Wt(kg): --  I&O's Summary    11 Aug 2021 07:01  -  12 Aug 2021 07:00  --------------------------------------------------------  IN: 1120 mL / OUT: 1500 mL / NET: -380 mL            JVP: Normal  Neck: supple  Lung: clear   CV: S1 S2 , Murmur:  Abd: soft  Ext: No edema  neuro: Awake / alert  Psych: flat affect  Skin: normal``    LABS/DATA:    CARDIAC MARKERS:                                10.9   6.15  )-----------( 171      ( 12 Aug 2021 03:52 )             33.4     08-12    136  |  97<L>  |  16  ----------------------------<  103<H>  3.6   |  23  |  1.36<H>    Ca    8.9      12 Aug 2021 03:52  Phos  3.3     08-12  Mg     2.10     08-12      proBNP:   Lipid Profile:   HgA1c:   TSH:     TELE:  EKG:

## 2021-08-12 NOTE — DISCHARGE NOTE PROVIDER - NSDCMRMEDTOKEN_GEN_ALL_CORE_FT
acetaminophen 325 mg oral tablet: 2 tab(s) orally every 6 hours, As Needed  apixaban 5 mg oral tablet: 1 tab(s) orally 2 times a day  Cardizem 60 mg oral tablet: 1 tab(s) orally 4 times a day  Lasix 40 mg oral tablet: 1 tab(s) orally once a day  spironolactone 25 mg oral tablet: 1 tab(s) orally once a day   acetaminophen 325 mg oral tablet: 2 tab(s) orally every 6 hours, As Needed  apixaban 5 mg oral tablet: 1 tab(s) orally 2 times a day  colchicine 0.6 mg oral tablet: 1 tab(s) orally 2 times a day  Lasix 40 mg oral tablet: 1 tab(s) orally once a day  metoprolol succinate 25 mg oral tablet, extended release: 1 tab(s) orally every 12 hours  spironolactone 25 mg oral tablet: 1 tab(s) orally once a day

## 2021-08-12 NOTE — DISCHARGE NOTE NURSING/CASE MANAGEMENT/SOCIAL WORK - NSDCVIVACCINE_GEN_ALL_CORE_FT
rabies, intradermal injection; 13-Apr-2021 17:07; Sherine Hawkins (RN); [employer]; [id number] (Exp. Date: 17-Jun-2022); IntraMuscular.; Deltoid Left...; 1 milliLiter(s); VIS (VIS Published: 17-Jun-2022, VIS Presented: 13-Apr-2021);

## 2021-08-12 NOTE — DISCHARGE NOTE PROVIDER - NSDCCPCAREPLAN_GEN_ALL_CORE_FT
PRINCIPAL DISCHARGE DIAGNOSIS  Diagnosis: Cardiac arrhythmia  Assessment and Plan of Treatment:        PRINCIPAL DISCHARGE DIAGNOSIS  Diagnosis: Cardiac arrhythmia  Assessment and Plan of Treatment: You were admitted to the hospital and found to have an atrial flutter arrhythmia on your EKG. You had an imaging study (transesophageal echocardiogram) and therapeutic ablation of your heart to treat the abnormal rhythm. You were started on an anti-arrhythmic medication while in the hospital. No fruther abnormal rhythms were identified for the rest of your hospital stay. You will continue with medications as an outpatient to control the rate and rhythm of your heart.      SECONDARY DISCHARGE DIAGNOSES  Diagnosis: Acute on chronic systolic congestive heart failure  Assessment and Plan of Treatment: You presented with increased swelling of your abdomen and legs. You had a heart ultrasound which showed preserved heart function. You were continued on your heart failure medications while in the hospital. You will follow up with a cardiologist as an outpatient to have a post-discharge follow up visit.

## 2021-08-12 NOTE — PROGRESS NOTE ADULT - PROVIDER SPECIALTY LIST ADULT
CCU
Cardiology
Cardiology
Electrophysiology
CCU
Cardiology
Internal Medicine
Internal Medicine
Cardiology
Electrophysiology
Electrophysiology
Internal Medicine
CCU
Electrophysiology
Electrophysiology

## 2021-08-12 NOTE — DISCHARGE NOTE PROVIDER - PROVIDER TOKENS
PROVIDER:[TOKEN:[[id number]],FOLLOWUP:[1 week]],PROVIDER:[TOKEN:[[id number]],FOLLOWUP:[1 week]]

## 2021-08-12 NOTE — DISCHARGE NOTE PROVIDER - CARE PROVIDER_API CALL
Yessica Arana)  Cardiovascular Disease; Internal Medicine  [address] [place] [address]  Phone: [phone]  Fax: [phone]  Follow Up Time: 1 week    Ondina Schuster)  Cardiovascular Disease  [facility]-Dept of Cardiology, [address] [place] [address], [address]  [place] [address]  Phone: [phone]  Fax: [phone]  Follow Up Time: 1 week

## 2021-08-12 NOTE — DISCHARGE NOTE PROVIDER - CARE PROVIDERS DIRECT ADDRESSES
,DirectAddress_Unknown,[email or web address]

## 2021-08-12 NOTE — PROGRESS NOTE ADULT - ATTENDING COMMENTS
No further arrhythmia today, noted EP recs to start Sotalol tomorrow  Can change Cardizem to 180 daily  Additional dose of IV lasix today
No further arrhythmia today, noted EP recs to start Sotalol tomorrow  Can change Cardizem to 180 daily  Additional dose of IV lasix today
66 year old Female with PMH of  Lung CA s/p chemo (2017 in remission),  HOCM s/p septal ablation (2005), HFpEF  (55% severe MR) and AFib (on Eliquis, admits noncompliance at times) s/p ablation (12/2020)  presented to ED with c/o SOB/WALKER which correlated with onset of atrial flutter. Patient taken to the lab yesterday and underwent ablation of mitral flutter and ligament of heather tachycardia. Patient with short runs of non-sustained AT after. Given Amio overnight. Would stop today and start Sotalol tomorrow. Monitor during Sotalol load. Patient discussed with Dr Paige, her EP who recommended started Sotalol. Patient complaining of some pericardial pain, please start Colchicine 0.6mg BID. Continue with AC indefinitely.
66F with lung CA, s/p chemo, HOCM with septal ablation in 2005, HFpEF, and afib on eliquis, s/p ablation in 12/202 presents with decompensated HF in setting of atypical aflutter (mitral AFL). Now s/p successful mitral aflutter ablation. Course c/b runs of AT and pericarditis. Also with intermittent periods of hypotension which appears to have resolved.       Plan :   # s/p aflutter ablation   - continues in NSR with occ APCs, off amiodarone and diltiazem  - confirmed with Dr. Arana please start Toprol XL 25 mg PO BID with hold parameters SBP < 90, HR < 50 and observe  - continue apixaban for A/C    #Pericarditis  - continue colchicine as prescribed  - pain management as indicated    #HFpEF  - continue management per CCU team
66 year old Female with PMH of  Lung CA s/p chemo (2017 in remission),  HOCM s/p septal ablation (2005), HFpEF  (55% severe MR) and AFib (on Eliquis, admits noncompliance at times) s/p ablation (12/2020)  presented to ED with c/o SOB/WALKER which correlated with onset of atrial flutter. Patient taken to the lab yesterday and underwent ablation of mitral flutter and ligament of heather tachycardia. Patient with short runs of non-sustained AT after. Given Amio overnight first night post procedure, now stopped. QT was long so Sotalol not started. Continue Colchicine 0.6mg BID. Continue with AC indefinitely. Follow up as outpatient.
66 year old Female with PMH of  Lung CA s/p chemo (2017 in remission),  HOCM s/p septal ablation (2005), HFpEF  (55% severe MR) and AFib (on Eliquis, admits noncompliance at times) s/p ablation (12/2020). Patient presented to ED with c/o SOB/WALKER and increasing weight gain over the  last  few months, started on Lasix by her cardiologist three weeks prior to admission and Spironolactone yesterday. EKG consistent with atrial flutter, which appears atypical. Plan for RICCARDO/ablation on Monday for atypical flutter. Symptoms appear to correlate with onset of her arrhythmia.
No further arrhythmia today, noted EP recs to start Sotalol tomorrow  Can change Cardizem to 180 daily  Additional dose of IV lasix today

## 2021-08-12 NOTE — PROGRESS NOTE ADULT - SUBJECTIVE AND OBJECTIVE BOX
Subjective/Objective: OOB in chair, feels poorly today with continued pericardial chest discomfort post ablation though states it has improved since yesterday. VSS at present.     MEDICATIONS  (STANDING):  acetaminophen   Tablet .. 650 milliGRAM(s) Oral once  apixaban 5 milliGRAM(s) Oral every 12 hours  chlorhexidine 4% Liquid 1 Application(s) Topical <User Schedule>  colchicine 0.6 milliGRAM(s) Oral two times a day  furosemide    Tablet 40 milliGRAM(s) Oral daily  metoprolol succinate ER 25 milliGRAM(s) Oral daily  pantoprazole    Tablet 40 milliGRAM(s) Oral before breakfast  polyethylene glycol 3350 17 Gram(s) Oral daily  potassium chloride    Tablet ER 40 milliEquivalent(s) Oral every 4 hours  senna 2 Tablet(s) Oral at bedtime  spironolactone 25 milliGRAM(s) Oral daily    MEDICATIONS  (PRN):  acetaminophen   Tablet .. 650 milliGRAM(s) Oral every 6 hours PRN Temp greater or equal to 38C (100.4F), Mild Pain (1 - 3), Moderate Pain (4 - 6)  benzocaine 15 mG/menthol 3.6 mG (Sugar-Free) Lozenge 1 Lozenge Oral two times a day PRN Sore Throat  LORazepam     Tablet 1 milliGRAM(s) Oral every 6 hours PRN Anxiety  melatonin 6 milliGRAM(s) Oral at bedtime PRN Sleep          Vital Signs Last 24 Hrs  T(C): 36.7 (12 Aug 2021 07:20), Max: 37.1 (11 Aug 2021 20:00)  T(F): 98 (12 Aug 2021 07:20), Max: 98.8 (11 Aug 2021 20:00)  HR: 69 (12 Aug 2021 06:15) (67 - 96)  BP: 95/58 (12 Aug 2021 06:15) (81/47 - 108/69)  BP(mean): 71 (12 Aug 2021 06:15) (57 - 82)  RR: 20 (12 Aug 2021 06:15) (13 - 29)  SpO2: 97% (12 Aug 2021 06:15) (90% - 99%)  I&O's Detail    11 Aug 2021 07:01  -  12 Aug 2021 07:00  --------------------------------------------------------  IN:    Oral Fluid: 1120 mL  Total IN: 1120 mL    OUT:    Voided (mL): 1500 mL  Total OUT: 1500 mL    Total NET: -380 mL      PHYSICAL EXAM  GEN: mild distress with chest discomfort, skin W & D  RESP: CTA ant  CV: nl S1S2, no M/R/C  GI: sift, NT/ND, BS +  EXT: no C/C/E  NEURO: A & O X 3, mild anxiety      EKG/ TELEM: NSR with occ APCs    LABS:                          10.9   6.15  )-----------( 171      ( 12 Aug 2021 03:52 )             33.4       12 Aug 2021 03:52    136    |  97<L>  |  16     ----------------------------<  103<H>  3.6     |  23     |  1.36<H>    11 Aug 2021 16:25    137    |  98     |  18     ----------------------------<  105<H>  3.9     |  24     |  1.39<H>    Ca    8.9        12 Aug 2021 03:52  Ca    8.5        11 Aug 2021 16:25  Phos  3.3       12 Aug 2021 03:52  Phos  3.7       11 Aug 2021 16:25  Mg     2.10      12 Aug 2021 03:52  Mg     2.00      11 Aug 2021 16:25

## 2021-08-12 NOTE — DISCHARGE NOTE PROVIDER - HOSPITAL COURSE
65 y/o female, with a PmHx of HOCM s/p septal ablation, chronic systolic CHF, LVEF 55%, Lung cancer (diagnosed about 2 years ago, s/p chemo, in remission) and persistent atrial fibrillation s/p ablation on 12/30/2020 (currently on eliquis). Pt reports increasing weight gain, diffuse whole body swelling over last  few months that she reports accelerated this last week.     + Acute on Chronic Systolic Heart Failure - on Lasix 40mg po daily  +Aflutter- s/p ablation on 8/9      CCU 8/9 OVN: s/p atypical flutter ablation, 6.4 hours long with traumatic RICCARDO. Eliquis resumed, amio bolus and gtt initated, bedrest 4 hours  8/10 d/c amiodarone drip at 0840am, colchicine for pleurtic chest apin, lasix 40mg IVP x1, dest to 88%, placed briefly on 100% NRBM them changed to 4Ln/c,-> 50% VM incentive spirometer and chest PT ordered. Zofran for nausea/chills . Psych c/s for anxiety- c/w ativan PO prn   8/11: No Sotalol due to increased QTc on EKG. Switched cardizem to metoprolol succinate 25. Pulm consult, patient is now satting 95% of 1L NC, no intervention at this time. Received IV toradol this AM for chest pain, much improved, also on colchicine 0.6 BID. Pressures continue to be low in 80s/40s, put in hold parameters for BP meds. Mild RONY with repeat creatinine of 1.39 (too much diuresis?). Repeat ECHO pending.    Erica Horner is a 65 y/o female with a PmHx of HOCM s/p septal ablation, chronic systolic CHF, LVEF 55%, Lung cancer (diagnosed 2019), s/p chemo, in remission) and persistent atrial fibrillation s/p ablation on 12/30/2020 (currently on eliquis) who presents with increasing weight gain and diffuse whole body swelling over last  few months that she reports accelerated for the past week. She was admitted to medicine. CT chest showed patent central airways. No endobronchial lesions. A 3 mm anterior left upper lobe nodule (2:12) and a 1 mm right upper lobe nodule (2:27), stable since 12/21/2020.    Patient reported being started on lasix by her cardiologist three weeks prior to this admission. Patient reported taking one of eliquis daily until two weeks ago, then began eliquis BID. EP cardiology was consulted for aflutter on EKG. Rate controlled with diltizaem. Continued home lasix and spironolactone for HFpEF. TTE with preserved EF. On 8/9, patient had atypical flutter ablation, 6.4 hours long with traumatic RICCARDO. She had multiple runs of SVT post-op. Transferred to CCU for further monitoring.     Eliquis resumed, amiodarone bolus and gtt initiated. Amiodarone drip discontinued after one day. Patient was having non-radiating chest pain, started on colchicine. She desaturated to 88%, placed on 100% non re-breather mask, then transitioned to 4L NC. Pulmonology consulted, patient was saturating 95% on 1L NC, they recommended no intervention. She was weaned to room air, saturating at 98%. Sotalol was considered but QTc was too high on EKG. Diltiazem was switched to metoprolol succinate. She received 30 IV Toradol x2 for chest pain and standing tylenol which helped. She had a repeat ECHO for low blood pressures 80s/40s, which showed small pericardial effusion. More likely due to hypoperfusion 2/2 diuresis, she had mild RONY at 1.39 which resolved. Patient was eating, drinking, and independently ambulating satting well on room air at discharge.

## 2021-08-12 NOTE — PROGRESS NOTE ADULT - ASSESSMENT
Ms. Horner is a 66 year old Female with PMH of  Lung CA s/p chemo (2017 in remission),  HOCM s/p septal ablation (2005), HFpEF  (55% severe MR) and AFib (on Eliquis, admits noncompliance at times) s/p ablation (12/2020)  presented to ED with c/o SOB/WALKER and increasing weight gain over the  last  few months,  s/p diuretic therapy with clinical improvement now with aflutter planned for rik/ablation     aflutter  s/p ablation   a/c   fu with EP     Hypoxia/sob   improving  plan as per pulm     HTN   cont current meds    HCM  s/p septal ablation     chest pain   appears to be pericarditis post ablation   fu with EP   agree with colchicine   improving

## 2021-08-15 LAB
CORTICOSTEROID BINDING GLOBULIN RESULT: 2.1 MG/DL — SIGNIFICANT CHANGE UP
CORTIS F/TOTAL MFR SERPL: 11 % — SIGNIFICANT CHANGE UP
CORTIS SERPL-MCNC: 13 UG/DL — SIGNIFICANT CHANGE UP
CORTISOL, FREE RESULT: 1.4 UG/DL — SIGNIFICANT CHANGE UP

## 2021-08-24 LAB
ANA SER IF-ACNC: NEGATIVE
ERYTHROCYTE [SEDIMENTATION RATE] IN BLOOD BY WESTERGREN METHOD: 38 MM/HR
FOLATE SERPL-MCNC: >20 NG/ML
PARANEOPLASTIC AB PNL SER: NORMAL
RPR SER-TITR: NORMAL
VIT B12 SERPL-MCNC: 521 PG/ML

## 2021-08-25 RX ORDER — SPIRONOLACTONE 25 MG/1
25 TABLET ORAL
Refills: 0 | Status: ACTIVE | COMMUNITY

## 2021-08-25 RX ORDER — METOPROLOL SUCCINATE 25 MG/1
25 TABLET, EXTENDED RELEASE ORAL
Refills: 0 | Status: ACTIVE | COMMUNITY

## 2021-08-25 RX ORDER — FUROSEMIDE 40 MG/1
40 TABLET ORAL DAILY
Refills: 0 | Status: ACTIVE | COMMUNITY

## 2021-08-26 ENCOUNTER — APPOINTMENT (OUTPATIENT)
Dept: ELECTROPHYSIOLOGY | Facility: CLINIC | Age: 66
End: 2021-08-26
Payer: MEDICAID

## 2021-08-26 ENCOUNTER — NON-APPOINTMENT (OUTPATIENT)
Age: 66
End: 2021-08-26

## 2021-08-26 VITALS
HEIGHT: 67 IN | HEART RATE: 117 BPM | WEIGHT: 200 LBS | SYSTOLIC BLOOD PRESSURE: 92 MMHG | DIASTOLIC BLOOD PRESSURE: 70 MMHG | OXYGEN SATURATION: 98 % | BODY MASS INDEX: 31.39 KG/M2

## 2021-08-26 PROCEDURE — 93000 ELECTROCARDIOGRAM COMPLETE: CPT

## 2021-08-26 PROCEDURE — 99214 OFFICE O/P EST MOD 30 MIN: CPT

## 2021-08-27 DIAGNOSIS — Z87.898 PERSONAL HISTORY OF OTHER SPECIFIED CONDITIONS: ICD-10-CM

## 2021-08-27 DIAGNOSIS — I31.9 DISEASE OF PERICARDIUM, UNSPECIFIED: ICD-10-CM

## 2021-08-27 DIAGNOSIS — I50.9 HEART FAILURE, UNSPECIFIED: ICD-10-CM

## 2021-08-27 DIAGNOSIS — Z85.118 PERSONAL HISTORY OF OTHER MALIGNANT NEOPLASM OF BRONCHUS AND LUNG: ICD-10-CM

## 2021-08-27 DIAGNOSIS — N17.9 ACUTE KIDNEY FAILURE, UNSPECIFIED: ICD-10-CM

## 2021-08-27 DIAGNOSIS — Z87.19 PERSONAL HISTORY OF OTHER DISEASES OF THE DIGESTIVE SYSTEM: ICD-10-CM

## 2021-08-27 DIAGNOSIS — I49.9 CARDIAC ARRHYTHMIA, UNSPECIFIED: ICD-10-CM

## 2021-08-30 NOTE — HISTORY OF PRESENT ILLNESS
[FreeTextEntry1] : Dear Dr. Keene:\par  \par Ms. Horner was seen in the [facility] today. For our records, please allow me to summarize the history and my findings.\par  \par This pleasant 66 year old woman has a cardiovascular history significant for HOCM s/p septal ablation, chronic systolic CHF, LVEF 55%,  lung cancer (diagnosed about 2 years ago, s/p chemo, in remission) and persistent atrial fibrillation s/p attempted DCCV and eventual amiodarone load and s/p PVI, PWI, and CTI ablation on 12/30/2020. She was on amiodarone post ablation, but was subsequently stopped. She presented to [facility] ED with shortness of breath and atypical AFL. She was subsequently taken to the lab, and revealed to have mitral atrial flutter that terminated with ablation along the medial base of the TABATHA. 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 She was discharged home, but returns today for recurrent elevated heart rates. EKG today reveals again runs of AT. She states she feels well and noted the heart rate incidentally. \par \par \par Ms. Horner denies any recent history of chest pain, shortness of breath, palpitations, dizziness, or syncope.\par \par

## 2021-08-30 NOTE — DISCUSSION/SUMMARY
[FreeTextEntry1] : In summary, this is a 66 year old woman with history of HOCM s/p septal ablation, chronic systolic CHF, LVEF 55%,  lung cancer (diagnosed about 2 years ago, s/p chemo, in remission) and persistent atrial fibrillation s/p attempted DCCV and eventual amiodarone load and s/p PVI, PWI, and CTI ablation on 12/30/2020 and mitral alfutter/ligament of marshal AT ablation in July of 2021 who presents today with recurrent runs of non-sustained AT. At this time, she is asymptomatic. She would benefit from an AAD. QTc during hospital stay was long, so Sotalol was not utilized. We discussed started Amiodarone today. Will load for 1 week followed by 200mg daily. Will see her next week in clinic to see how she is doing. \par \par Ms. Horner appeared to understand the whole discussion and verbalized that all of her questions were answered to her satisfaction.\par  \par Thank you for allowing me to be involved in the care of this pleasant woman. Please feel free to contact me with any questions.

## 2021-08-30 NOTE — PHYSICAL EXAM
[General Appearance - Well Developed] : well developed [Normal Appearance] : normal appearance [Well Groomed] : well groomed [General Appearance - Well Nourished] : well nourished [No Deformities] : no deformities [General Appearance - In No Acute Distress] : no acute distress [Normal Conjunctiva] : the conjunctiva exhibited no abnormalities [Eyelids - No Xanthelasma] : the eyelids demonstrated no xanthelasmas [Normal Oral Mucosa] : normal oral mucosa [No Oral Pallor] : no oral pallor [No Oral Cyanosis] : no oral cyanosis [Normal Jugular Venous A Waves Present] : normal jugular venous A waves present [No Jugular Venous Gale A Waves] : no jugular venous gale A waves [Normal Jugular Venous V Waves Present] : normal jugular venous V waves present [Heart Sounds] : normal S1 and S2 [Murmurs] : no murmurs present [Arterial Pulses Normal] : the arterial pulses were normal [Edema] : no peripheral edema present [FreeTextEntry1] : Tachycardic [Respiration, Rhythm And Depth] : normal respiratory rhythm and effort [Exaggerated Use Of Accessory Muscles For Inspiration] : no accessory muscle use [Auscultation Breath Sounds / Voice Sounds] : lungs were clear to auscultation bilaterally [Abdomen Soft] : soft [Abdomen Tenderness] : non-tender [Abdomen Mass (___ Cm)] : no abdominal mass palpated [Abnormal Walk] : normal gait [Gait - Sufficient For Exercise Testing] : the gait was sufficient for exercise testing [Nail Clubbing] : no clubbing of the fingernails [Cyanosis, Localized] : no localized cyanosis [Petechial Hemorrhages (___cm)] : no petechial hemorrhages [Skin Color & Pigmentation] : normal skin color and pigmentation [] : no rash [No Venous Stasis] : no venous stasis [Skin Lesions] : no skin lesions [No Skin Ulcers] : no skin ulcer [No Xanthoma] : no  xanthoma was observed [Oriented To Time, Place, And Person] : oriented to person, place, and time [Affect] : the affect was normal [Mood] : the mood was normal [No Anxiety] : not feeling anxious

## 2021-08-30 NOTE — REASON FOR VISIT
[Arrhythmia/ECG Abnorrmalities] : arrhythmia/ECG abnormalities [FreeTextEntry3] : Allen Keene MD [Follow-Up - From Hospitalization] : follow-up of a recent hospitalization for [Atrial Fibrillation] : atrial fibrillation [Discharge Date: ___] : Discharge Date: [unfilled]

## 2021-09-01 ENCOUNTER — APPOINTMENT (OUTPATIENT)
Dept: ELECTROPHYSIOLOGY | Facility: CLINIC | Age: 66
End: 2021-09-01
Payer: MEDICAID

## 2021-09-01 VITALS
DIASTOLIC BLOOD PRESSURE: 68 MMHG | HEIGHT: 67 IN | HEART RATE: 107 BPM | SYSTOLIC BLOOD PRESSURE: 102 MMHG | OXYGEN SATURATION: 98 % | WEIGHT: 200 LBS | BODY MASS INDEX: 31.39 KG/M2 | RESPIRATION RATE: 16 BRPM | TEMPERATURE: 97.6 F

## 2021-09-01 PROCEDURE — 99213 OFFICE O/P EST LOW 20 MIN: CPT

## 2021-09-01 PROCEDURE — 93000 ELECTROCARDIOGRAM COMPLETE: CPT

## 2021-09-04 ENCOUNTER — NON-APPOINTMENT (OUTPATIENT)
Age: 66
End: 2021-09-04

## 2021-09-04 NOTE — DISCUSSION/SUMMARY
[FreeTextEntry1] : In summary, this is a 66 year old woman with history of HOCM s/p septal ablation, chronic systolic CHF, LVEF 55%,  lung cancer (diagnosed about 2 years ago, s/p chemo, in remission) and persistent atrial fibrillation s/p attempted DCCV and eventual amiodarone load and s/p PVI, PWI, and CTI ablation on 12/30/2020 and mitral alfutter/ligament of marshal AT ablation in July of 2021 who presents today with recurrent runs of non-sustained AT. At this time, she is asymptomatic. She is now being loaded with Amio. Will suspect better control once more Amio is onboard. Will need to discuss recurrent ablation in the future. \par \par Ms. Horner appeared to understand the whole discussion and verbalized that all of her questions were answered to her satisfaction.\par  \par Thank you for allowing me to be involved in the care of this pleasant woman. Please feel free to contact me with any questions.

## 2021-09-04 NOTE — REVIEW OF SYSTEMS
[Negative] : Heme/Lymph [SOB] : no shortness of breath [Dyspnea on exertion] : not dyspnea during exertion [Chest Discomfort] : no chest discomfort [Lower Ext Edema] : no extremity edema [Leg Claudication] : no intermittent leg claudication [Orthopnea] : no orthopnea [Palpitations] : no palpitations [PND] : no PND [Syncope] : no syncope

## 2021-09-04 NOTE — PHYSICAL EXAM
[General Appearance - Well Developed] : well developed [Normal Appearance] : normal appearance [Well Groomed] : well groomed [General Appearance - Well Nourished] : well nourished [General Appearance - In No Acute Distress] : no acute distress [No Deformities] : no deformities [Normal Conjunctiva] : the conjunctiva exhibited no abnormalities [Eyelids - No Xanthelasma] : the eyelids demonstrated no xanthelasmas [Normal Oral Mucosa] : normal oral mucosa [No Oral Pallor] : no oral pallor [No Oral Cyanosis] : no oral cyanosis [Normal Jugular Venous A Waves Present] : normal jugular venous A waves present [Normal Jugular Venous V Waves Present] : normal jugular venous V waves present [No Jugular Venous Gale A Waves] : no jugular venous gale A waves [Heart Sounds] : normal S1 and S2 [Murmurs] : no murmurs present [Arterial Pulses Normal] : the arterial pulses were normal [Edema] : no peripheral edema present [Respiration, Rhythm And Depth] : normal respiratory rhythm and effort [Exaggerated Use Of Accessory Muscles For Inspiration] : no accessory muscle use [Auscultation Breath Sounds / Voice Sounds] : lungs were clear to auscultation bilaterally [Abdomen Soft] : soft [Abdomen Tenderness] : non-tender [Abdomen Mass (___ Cm)] : no abdominal mass palpated [Abnormal Walk] : normal gait [Gait - Sufficient For Exercise Testing] : the gait was sufficient for exercise testing [Nail Clubbing] : no clubbing of the fingernails [Cyanosis, Localized] : no localized cyanosis [Petechial Hemorrhages (___cm)] : no petechial hemorrhages [Skin Color & Pigmentation] : normal skin color and pigmentation [] : no rash [No Venous Stasis] : no venous stasis [Skin Lesions] : no skin lesions [No Skin Ulcers] : no skin ulcer [No Xanthoma] : no  xanthoma was observed [Oriented To Time, Place, And Person] : oriented to person, place, and time [Affect] : the affect was normal [Mood] : the mood was normal [No Anxiety] : not feeling anxious [FreeTextEntry1] : Tachycardic

## 2021-09-04 NOTE — REASON FOR VISIT
[Arrhythmia/ECG Abnorrmalities] : arrhythmia/ECG abnormalities [Follow-Up - From Hospitalization] : follow-up of a recent hospitalization for [Atrial Fibrillation] : atrial fibrillation [Discharge Date: ___] : Discharge Date: [unfilled] [FreeTextEntry3] : Allen Keene MD

## 2021-09-04 NOTE — CARDIOLOGY SUMMARY
[___] : [unfilled] [___] : [unfilled] [de-identified] : 8/26/2021 - AT at 117 bpm with occasional sinus beats\par 9/1/2021 - AT at 107 bpm with occasional sinus beats

## 2021-09-04 NOTE — HISTORY OF PRESENT ILLNESS
[FreeTextEntry1] : Dear Dr. Keene:\par  \par Ms. Horner was seen in the [facility] Electrophysiology Clinic today. For our records, please allow me to summarize the history and my findings.\par  \par This pleasant 66 year old woman has a cardiovascular history significant for HOCM s/p septal ablation, chronic systolic CHF, LVEF 55%,  lung cancer (diagnosed about 2 years ago, s/p chemo, in remission) and persistent atrial fibrillation s/p attempted DCCV and eventual amiodarone load and s/p PVI, PWI, and CTI ablation on 12/30/2020. She was on amiodarone post ablation, but was subsequently stopped. She presented to [facility] ED with shortness of breath and atypical AFL. She was subsequently taken to the lab, and revealed to have mitral atrial flutter that terminated with ablation along the medial base of the TABATHA. 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 She was discharged home, but returned on 8/26/21 for recurrent elevated heart rates. EKG revealed again runs of AT. She states she feels well and noted the heart rate incidentally. She was started on Amio load, and returns today for follow up.\par \par She states she started Amio two days ago. She is doing well overall without complaints. Still notes elevated heart rates. EKG today reveals burst of AT with occasional sinus beats. \par \par \par Ms. Horner denies any recent history of chest pain, shortness of breath, palpitations, dizziness, or syncope.\par \par

## 2021-09-09 ENCOUNTER — APPOINTMENT (OUTPATIENT)
Dept: CARDIOLOGY | Facility: CLINIC | Age: 66
End: 2021-09-09

## 2021-09-24 ENCOUNTER — EMERGENCY (EMERGENCY)
Facility: HOSPITAL | Age: 66
LOS: 1 days | Discharge: HOME | End: 2021-09-24
Attending: EMERGENCY MEDICINE | Admitting: UROLOGY
Payer: MEDICAID

## 2021-09-24 VITALS
DIASTOLIC BLOOD PRESSURE: 78 MMHG | RESPIRATION RATE: 18 BRPM | TEMPERATURE: 99 F | OXYGEN SATURATION: 94 % | HEART RATE: 67 BPM | SYSTOLIC BLOOD PRESSURE: 117 MMHG

## 2021-09-24 VITALS
SYSTOLIC BLOOD PRESSURE: 133 MMHG | HEIGHT: 66 IN | DIASTOLIC BLOOD PRESSURE: 85 MMHG | HEART RATE: 60 BPM | OXYGEN SATURATION: 100 % | RESPIRATION RATE: 16 BRPM | TEMPERATURE: 99 F

## 2021-09-24 DIAGNOSIS — Z98.891 HISTORY OF UTERINE SCAR FROM PREVIOUS SURGERY: Chronic | ICD-10-CM

## 2021-09-24 DIAGNOSIS — Z98.890 OTHER SPECIFIED POSTPROCEDURAL STATES: Chronic | ICD-10-CM

## 2021-09-24 LAB
ALBUMIN SERPL ELPH-MCNC: 4.5 G/DL — SIGNIFICANT CHANGE UP (ref 3.3–5)
ALP SERPL-CCNC: 87 U/L — SIGNIFICANT CHANGE UP (ref 40–120)
ALT FLD-CCNC: 25 U/L — SIGNIFICANT CHANGE UP (ref 4–33)
ANION GAP SERPL CALC-SCNC: 15 MMOL/L — HIGH (ref 7–14)
AST SERPL-CCNC: 29 U/L — SIGNIFICANT CHANGE UP (ref 4–32)
B PERT DNA SPEC QL NAA+PROBE: SIGNIFICANT CHANGE UP
B PERT+PARAPERT DNA PNL SPEC NAA+PROBE: SIGNIFICANT CHANGE UP
BASE EXCESS BLDV CALC-SCNC: 0.5 MMOL/L — SIGNIFICANT CHANGE UP (ref -2–3)
BASOPHILS # BLD AUTO: 0.02 K/UL — SIGNIFICANT CHANGE UP (ref 0–0.2)
BASOPHILS NFR BLD AUTO: 0.3 % — SIGNIFICANT CHANGE UP (ref 0–2)
BILIRUB SERPL-MCNC: 1 MG/DL — SIGNIFICANT CHANGE UP (ref 0.2–1.2)
BLOOD GAS VENOUS COMPREHENSIVE RESULT: SIGNIFICANT CHANGE UP
BORDETELLA PARAPERTUSSIS (RAPRVP): SIGNIFICANT CHANGE UP
BUN SERPL-MCNC: 15 MG/DL — SIGNIFICANT CHANGE UP (ref 7–23)
C PNEUM DNA SPEC QL NAA+PROBE: SIGNIFICANT CHANGE UP
CALCIUM SERPL-MCNC: 9.1 MG/DL — SIGNIFICANT CHANGE UP (ref 8.4–10.5)
CHLORIDE BLDV-SCNC: 102 MMOL/L — SIGNIFICANT CHANGE UP (ref 96–108)
CHLORIDE SERPL-SCNC: 100 MMOL/L — SIGNIFICANT CHANGE UP (ref 98–107)
CO2 BLDV-SCNC: 26.7 MMOL/L — HIGH (ref 22–26)
CO2 SERPL-SCNC: 21 MMOL/L — LOW (ref 22–31)
CREAT SERPL-MCNC: 1.33 MG/DL — HIGH (ref 0.5–1.3)
EOSINOPHIL # BLD AUTO: 0.04 K/UL — SIGNIFICANT CHANGE UP (ref 0–0.5)
EOSINOPHIL NFR BLD AUTO: 0.6 % — SIGNIFICANT CHANGE UP (ref 0–6)
FLUAV SUBTYP SPEC NAA+PROBE: SIGNIFICANT CHANGE UP
FLUBV RNA SPEC QL NAA+PROBE: SIGNIFICANT CHANGE UP
GAS PNL BLDV: 134 MMOL/L — LOW (ref 136–145)
GLUCOSE BLDV-MCNC: 89 MG/DL — SIGNIFICANT CHANGE UP (ref 70–99)
GLUCOSE SERPL-MCNC: 103 MG/DL — HIGH (ref 70–99)
HADV DNA SPEC QL NAA+PROBE: SIGNIFICANT CHANGE UP
HCO3 BLDV-SCNC: 25 MMOL/L — SIGNIFICANT CHANGE UP (ref 22–29)
HCOV 229E RNA SPEC QL NAA+PROBE: SIGNIFICANT CHANGE UP
HCOV HKU1 RNA SPEC QL NAA+PROBE: SIGNIFICANT CHANGE UP
HCOV NL63 RNA SPEC QL NAA+PROBE: SIGNIFICANT CHANGE UP
HCOV OC43 RNA SPEC QL NAA+PROBE: SIGNIFICANT CHANGE UP
HCT VFR BLD CALC: 34.5 % — SIGNIFICANT CHANGE UP (ref 34.5–45)
HCT VFR BLDA CALC: 36 % — SIGNIFICANT CHANGE UP (ref 34.5–46.5)
HGB BLD CALC-MCNC: 12 G/DL — SIGNIFICANT CHANGE UP (ref 11.5–15.5)
HGB BLD-MCNC: 11.7 G/DL — SIGNIFICANT CHANGE UP (ref 11.5–15.5)
HMPV RNA SPEC QL NAA+PROBE: SIGNIFICANT CHANGE UP
HPIV1 RNA SPEC QL NAA+PROBE: SIGNIFICANT CHANGE UP
HPIV2 RNA SPEC QL NAA+PROBE: SIGNIFICANT CHANGE UP
HPIV3 RNA SPEC QL NAA+PROBE: SIGNIFICANT CHANGE UP
HPIV4 RNA SPEC QL NAA+PROBE: SIGNIFICANT CHANGE UP
IANC: 4.5 K/UL — SIGNIFICANT CHANGE UP (ref 1.5–8.5)
IMM GRANULOCYTES NFR BLD AUTO: 0.3 % — SIGNIFICANT CHANGE UP (ref 0–1.5)
LACTATE BLDV-MCNC: 1.5 MMOL/L — SIGNIFICANT CHANGE UP (ref 0.5–2)
LYMPHOCYTES # BLD AUTO: 0.97 K/UL — LOW (ref 1–3.3)
LYMPHOCYTES # BLD AUTO: 15.7 % — SIGNIFICANT CHANGE UP (ref 13–44)
M PNEUMO DNA SPEC QL NAA+PROBE: SIGNIFICANT CHANGE UP
MCHC RBC-ENTMCNC: 33.1 PG — SIGNIFICANT CHANGE UP (ref 27–34)
MCHC RBC-ENTMCNC: 33.9 GM/DL — SIGNIFICANT CHANGE UP (ref 32–36)
MCV RBC AUTO: 97.7 FL — SIGNIFICANT CHANGE UP (ref 80–100)
MONOCYTES # BLD AUTO: 0.62 K/UL — SIGNIFICANT CHANGE UP (ref 0–0.9)
MONOCYTES NFR BLD AUTO: 10 % — SIGNIFICANT CHANGE UP (ref 2–14)
NEUTROPHILS # BLD AUTO: 4.5 K/UL — SIGNIFICANT CHANGE UP (ref 1.8–7.4)
NEUTROPHILS NFR BLD AUTO: 73.1 % — SIGNIFICANT CHANGE UP (ref 43–77)
NRBC # BLD: 0 /100 WBCS — SIGNIFICANT CHANGE UP
NRBC # FLD: 0 K/UL — SIGNIFICANT CHANGE UP
PCO2 BLDV: 41 MMHG — SIGNIFICANT CHANGE UP (ref 39–42)
PH BLDV: 7.4 — SIGNIFICANT CHANGE UP (ref 7.32–7.43)
PLATELET # BLD AUTO: 198 K/UL — SIGNIFICANT CHANGE UP (ref 150–400)
PO2 BLDV: 32 MMHG — SIGNIFICANT CHANGE UP
POTASSIUM BLDV-SCNC: 4.3 MMOL/L — SIGNIFICANT CHANGE UP (ref 3.5–5.1)
POTASSIUM SERPL-MCNC: 4.1 MMOL/L — SIGNIFICANT CHANGE UP (ref 3.5–5.3)
POTASSIUM SERPL-SCNC: 4.1 MMOL/L — SIGNIFICANT CHANGE UP (ref 3.5–5.3)
PROT SERPL-MCNC: 8 G/DL — SIGNIFICANT CHANGE UP (ref 6–8.3)
RAPID RVP RESULT: DETECTED
RBC # BLD: 3.53 M/UL — LOW (ref 3.8–5.2)
RBC # FLD: 12.9 % — SIGNIFICANT CHANGE UP (ref 10.3–14.5)
RSV RNA SPEC QL NAA+PROBE: DETECTED
RV+EV RNA SPEC QL NAA+PROBE: SIGNIFICANT CHANGE UP
SAO2 % BLDV: 52.8 % — SIGNIFICANT CHANGE UP
SARS-COV-2 RNA SPEC QL NAA+PROBE: SIGNIFICANT CHANGE UP
SODIUM SERPL-SCNC: 136 MMOL/L — SIGNIFICANT CHANGE UP (ref 135–145)
WBC # BLD: 6.17 K/UL — SIGNIFICANT CHANGE UP (ref 3.8–10.5)
WBC # FLD AUTO: 6.17 K/UL — SIGNIFICANT CHANGE UP (ref 3.8–10.5)

## 2021-09-24 PROCEDURE — 99285 EMERGENCY DEPT VISIT HI MDM: CPT

## 2021-09-24 PROCEDURE — 71275 CT ANGIOGRAPHY CHEST: CPT | Mod: 26

## 2021-09-24 PROCEDURE — 71045 X-RAY EXAM CHEST 1 VIEW: CPT | Mod: 26

## 2021-09-24 NOTE — ED ADULT NURSE NOTE - OBJECTIVE STATEMENT
Parag RN: Pt presenting to room 16 AxOx4 ambulatory at baseline with c/o productive cough and fever since yesterday. PMH Afib with recent cardiac ablation. On arrival to ED pt's breathing is even and unlabored. Palor/diaphoresis not noted. O2 sat 95% on RA. Pt denies CP, N/V, SOB. pt NSR on cardiac monitor. IV established with 22g to right hand. Labs drawn and sent. Report given to primary RN. MD at bedside, will continue to monitor.

## 2021-09-24 NOTE — ED ADULT NURSE NOTE - NSICDXPASTMEDICALHX_GEN_ALL_CORE_FT
PAST MEDICAL HISTORY:  AF (atrial fibrillation) On Coumadin    CAP (community acquired pneumonia) RML/RLL at [facility] 3/2017    Chronic diastolic (congestive) heart failure     GERD (gastroesophageal reflux disease)     HOCM (hypertrophic obstructive cardiomyopathy) S/P septal ablation    Lung cancer s/p chemotherapy    Lung cancer s/p chemotherapy ~2017 now in remission    Pleural effusion Right parapneumonic drained twice 3/2017 at [facility]

## 2021-09-24 NOTE — ED PROVIDER NOTE - CARE PLAN
1 Principal Discharge DX:	Viral pneumonia   Principal Discharge DX:	Viral pneumonia  Secondary Diagnosis:	Thyroid nodule

## 2021-09-24 NOTE — ED ADULT TRIAGE NOTE - CHIEF COMPLAINT QUOTE
productive cough x 1 week worse 2 days and CP after coughing. Denies fevers at home. Seen at urgent care and told to come to ED to r/o COVID Pneumonia. Denies sick contacts. Fully Vaccinated in MAY 2021.

## 2021-09-24 NOTE — ED PROVIDER NOTE - OBJECTIVE STATEMENT
67 y/o female with a PmHx of HOCM s/p septal ablation, chronic systolic CHF, LVEF 55%, Lung cancer (diagnosed 2019), s/p chemo, in remission) and persistent atrial fibrillation s/p ablation on 12/30/2020 presents to ER c/o cough and sob. Pt. states for the past 4-5 days has been experiencing worsening productive cough and shortness of breath. went to urgent care today and had xr showing ?covid (pt. had covid vaccine may 2021). Sent to Er for further evaluation. Denies fever chills weakness dizziness nausea vomit.

## 2021-09-24 NOTE — ED PROVIDER NOTE - NSFOLLOWUPINSTRUCTIONS_ED_ALL_ED_FT
You were seen in the Emergency Department for: cough    Your presumptive diagnosis: Viral pneumonia    For pain/fever, you may take Tylenol (acetaminophen) 650 mg every 6 hours, OR Advil (ibuprofen) 600 mg every 8 hours.    Please follow up with your primary physician as discussed. If you do not have a primary physician or specialist of your needs, please call [phone] to find one convenient for you. At this number you will be able to locate a provider who accepts your insurance, as well as locate the right specialist for your needs.    You should return to the Emergency Department if you feel any new/worsening/persistent symptoms including but not limited to: chest pain, difficulty breathing, loss of consciousness, bleeding, uncontrolled pain, numbness/weakness of a body part

## 2021-09-24 NOTE — ED PROVIDER NOTE - ATTENDING CONTRIBUTION TO CARE
yasmany: pt here with cough, thought to be covid per uc.  here pt awake and alert, does hhave cough, vs including o2 sat are ok.  ct done, no inf, does have thyroid nodule, pt informed and will f/u with her dr.    I performed a history and physical exam of the patient and discussed their management with the resident and /or advanced care provider. I reviewed the resident and /or ACP's note and agree with the documented findings and plan of care. My medical decison making and observations are found above.

## 2021-09-24 NOTE — ED PROVIDER NOTE - PATIENT PORTAL LINK FT
You can access the FollowMyHealth Patient Portal offered by [facility] by registering at the following website: [email or web address]. By joining [employer]’s FollowMyHealth portal, you will also be able to view your health information using other applications (apps) compatible with our system.

## 2021-09-24 NOTE — ED PROVIDER NOTE - PROGRESS NOTE DETAILS
Umer PGY-2: Patient reassessed, continues to have coughing but vitals stable, O2 maintained with ambulation, discussed negative CTA for PE, rvp positive for RSV, symptoms very likely 2/2 viral pna. Will discharge with strict return precautions. Patient is agreeable.

## 2021-09-24 NOTE — ED PROVIDER NOTE - NSICDXPASTMEDICALHX_GEN_ALL_CORE_FT
PAST MEDICAL HISTORY:  AF (atrial fibrillation) On Coumadin    CAP (community acquired pneumonia) RML/RLL at [facility] 3/2017    Chronic diastolic (congestive) heart failure     GERD (gastroesophageal reflux disease)     HOCM (hypertrophic obstructive cardiomyopathy) S/P septal ablation    Lung cancer s/p chemotherapy    Lung cancer s/p chemotherapy ~2017 now in remission    Pleural effusion Right parapneumonic drained twice 3/2017 at [facility]

## 2021-09-24 NOTE — ED PROVIDER NOTE - CLINICAL SUMMARY MEDICAL DECISION MAKING FREE TEXT BOX
67 y/o female w/ signifigant PMH c/o cough sob x 4 days  -r/o covid r/o pna r/o pe  -labs   -cxr cta chest  -cough suppressants  -likely admit 65 y/o female w/ signifigant PMH c/o cough sob x 4 days  -r/o covid r/o pna r/o pe  -labs  -cxr cta chest  -cough suppressants  -likely admit    yasmany: pt here with cough, thought to be covid per uc.  here pt awake and alert, does hhave cough, vs including o2 sat are ok.  ct done, no inf, does have thyroid nodule, pt informed and will f/u with her

## 2021-10-06 ENCOUNTER — APPOINTMENT (OUTPATIENT)
Dept: ELECTROPHYSIOLOGY | Facility: CLINIC | Age: 66
End: 2021-10-06

## 2021-10-13 ENCOUNTER — NON-APPOINTMENT (OUTPATIENT)
Age: 66
End: 2021-10-13

## 2021-10-20 ENCOUNTER — APPOINTMENT (OUTPATIENT)
Dept: ELECTROPHYSIOLOGY | Facility: CLINIC | Age: 66
End: 2021-10-20
Payer: MEDICAID

## 2021-10-20 VITALS
BODY MASS INDEX: 31.39 KG/M2 | HEART RATE: 111 BPM | DIASTOLIC BLOOD PRESSURE: 87 MMHG | OXYGEN SATURATION: 95 % | HEIGHT: 67 IN | WEIGHT: 200 LBS | SYSTOLIC BLOOD PRESSURE: 117 MMHG

## 2021-10-20 PROCEDURE — 93000 ELECTROCARDIOGRAM COMPLETE: CPT

## 2021-10-20 PROCEDURE — 99214 OFFICE O/P EST MOD 30 MIN: CPT

## 2021-10-20 NOTE — DISCUSSION/SUMMARY
[FreeTextEntry1] : In summary, this is a 66 year old woman with history of HOCM s/p septal ablation, chronic systolic CHF, LVEF 55%,  lung cancer (diagnosed about 2 years ago, s/p chemo, in remission) and persistent atrial fibrillation s/p attempted DCCV and eventual amiodarone load and s/p PVI, PWI, and CTI ablation on 12/30/2020 and mitral alfutter/ligament of marshal AT ablation in July of 2021 who presents today for follow up. Her EKG reveals AT, which is likely contributing to her symptoms. I recommended she increase her Metoprolol to 50mg XL BID. We discussed the possibility of a repeat ablation given her symptoms. The rationale for the procedure as well as its risks--including but not limited to bleeding, vascular injury, pericardial effusion/tamponade, heart block requiring pacemaker, stroke, and death--were reviewed in detail. After consideration of this information, the decision was made to proceed with the procedure.\par \par Ms. Horner appeared to understand the whole discussion and verbalized that all of her questions were answered to her satisfaction.\par  \par Thank you for allowing me to be involved in the care of this pleasant woman. Please feel free to contact me with any questions.

## 2021-10-20 NOTE — CARDIOLOGY SUMMARY
[___] : [unfilled] [de-identified] : 8/26/2021 - AT at 117 bpm with occasional sinus beats\par 9/1/2021 - AT at 107 bpm with occasional sinus beats\par 10/20/21 - AT at 107 bpm.

## 2021-10-20 NOTE — HISTORY OF PRESENT ILLNESS
[FreeTextEntry1] : Dear Dr. Keene:\par  \par Ms. Horner was seen in the [facility] today. For our records, please allow me to summarize the history and my findings.\par  \par This pleasant 66 year old woman has a cardiovascular history significant for HOCM s/p septal ablation, chronic systolic CHF, LVEF 55%,  lung cancer (diagnosed about 2 years ago, s/p chemo, in remission) and persistent atrial fibrillation s/p attempted DCCV and eventual amiodarone load and s/p PVI, PWI, and CTI ablation on 12/30/2020. She was on amiodarone post ablation, but was subsequently stopped. She presented to [facility] ED with shortness of breath and atypical AFL. She was subsequently taken to the lab, and revealed to have mitral atrial flutter that terminated with ablation along the medial base of the TABATHA. 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 She was discharged home, but returned on 8/26/21 for recurrent elevated heart rates. EKG revealed again runs of AT. She states she feels well and noted the heart rate incidentally. She was started on Amio. \par \par She did well overall until several weeks ago when she had an RSV infection. Since she has felt fatigue and WALKER. Her HR has been above 100 bpm for the last week. \par \par Ms. Horner denies any recent history of chest pain, dizziness, or syncope.\par \par

## 2021-10-20 NOTE — REVIEW OF SYSTEMS
[Negative] : Heme/Lymph [SOB] : no shortness of breath [Dyspnea on exertion] : dyspnea during exertion [Chest Discomfort] : no chest discomfort [Lower Ext Edema] : no extremity edema [Leg Claudication] : no intermittent leg claudication [Palpitations] : palpitations [Orthopnea] : no orthopnea [PND] : no PND [Syncope] : no syncope

## 2021-10-20 NOTE — PHYSICAL EXAM
[General Appearance - Well Developed] : well developed [Normal Appearance] : normal appearance [Well Groomed] : well groomed [General Appearance - Well Nourished] : well nourished [No Deformities] : no deformities [General Appearance - In No Acute Distress] : no acute distress [Normal Conjunctiva] : the conjunctiva exhibited no abnormalities [Eyelids - No Xanthelasma] : the eyelids demonstrated no xanthelasmas [Normal Oral Mucosa] : normal oral mucosa [No Oral Pallor] : no oral pallor [No Oral Cyanosis] : no oral cyanosis [Normal Jugular Venous A Waves Present] : normal jugular venous A waves present [Normal Jugular Venous V Waves Present] : normal jugular venous V waves present [No Jugular Venous Gale A Waves] : no jugular venous gale A waves [Heart Sounds] : normal S1 and S2 [Murmurs] : no murmurs present [Arterial Pulses Normal] : the arterial pulses were normal [Edema] : no peripheral edema present [Respiration, Rhythm And Depth] : normal respiratory rhythm and effort [Exaggerated Use Of Accessory Muscles For Inspiration] : no accessory muscle use [Auscultation Breath Sounds / Voice Sounds] : lungs were clear to auscultation bilaterally [Abdomen Soft] : soft [Abdomen Tenderness] : non-tender [Abdomen Mass (___ Cm)] : no abdominal mass palpated [Abnormal Walk] : normal gait [Gait - Sufficient For Exercise Testing] : the gait was sufficient for exercise testing [Nail Clubbing] : no clubbing of the fingernails [Cyanosis, Localized] : no localized cyanosis [Petechial Hemorrhages (___cm)] : no petechial hemorrhages [Skin Color & Pigmentation] : normal skin color and pigmentation [] : no rash [No Venous Stasis] : no venous stasis [Skin Lesions] : no skin lesions [No Skin Ulcers] : no skin ulcer [No Xanthoma] : no  xanthoma was observed [Oriented To Time, Place, And Person] : oriented to person, place, and time [Affect] : the affect was normal [Mood] : the mood was normal [No Anxiety] : not feeling anxious [FreeTextEntry1] : Tachycardic

## 2021-10-22 ENCOUNTER — APPOINTMENT (OUTPATIENT)
Dept: DISASTER EMERGENCY | Facility: CLINIC | Age: 66
End: 2021-10-22

## 2021-10-22 ENCOUNTER — OUTPATIENT (OUTPATIENT)
Dept: OUTPATIENT SERVICES | Facility: HOSPITAL | Age: 66
LOS: 1 days | End: 2021-10-22

## 2021-10-22 ENCOUNTER — LABORATORY RESULT (OUTPATIENT)
Age: 66
End: 2021-10-22

## 2021-10-22 VITALS
RESPIRATION RATE: 16 BRPM | OXYGEN SATURATION: 96 % | SYSTOLIC BLOOD PRESSURE: 109 MMHG | HEART RATE: 107 BPM | TEMPERATURE: 98 F | DIASTOLIC BLOOD PRESSURE: 83 MMHG | HEIGHT: 65 IN | WEIGHT: 195.99 LBS

## 2021-10-22 DIAGNOSIS — Z98.890 OTHER SPECIFIED POSTPROCEDURAL STATES: Chronic | ICD-10-CM

## 2021-10-22 DIAGNOSIS — Z98.891 HISTORY OF UTERINE SCAR FROM PREVIOUS SURGERY: Chronic | ICD-10-CM

## 2021-10-22 DIAGNOSIS — Z98.890 OTHER SPECIFIED POSTPROCEDURAL STATES: ICD-10-CM

## 2021-10-22 LAB
ALBUMIN SERPL ELPH-MCNC: 4.8 G/DL — SIGNIFICANT CHANGE UP (ref 3.3–5)
ALP SERPL-CCNC: 91 U/L — SIGNIFICANT CHANGE UP (ref 40–120)
ALT FLD-CCNC: 21 U/L — SIGNIFICANT CHANGE UP (ref 4–33)
ANION GAP SERPL CALC-SCNC: 17 MMOL/L — HIGH (ref 7–14)
AST SERPL-CCNC: 21 U/L — SIGNIFICANT CHANGE UP (ref 4–32)
BILIRUB SERPL-MCNC: 0.7 MG/DL — SIGNIFICANT CHANGE UP (ref 0.2–1.2)
BLD GP AB SCN SERPL QL: NEGATIVE — SIGNIFICANT CHANGE UP
BUN SERPL-MCNC: 24 MG/DL — HIGH (ref 7–23)
CALCIUM SERPL-MCNC: 9.8 MG/DL — SIGNIFICANT CHANGE UP (ref 8.4–10.5)
CHLORIDE SERPL-SCNC: 96 MMOL/L — LOW (ref 98–107)
CO2 SERPL-SCNC: 23 MMOL/L — SIGNIFICANT CHANGE UP (ref 22–31)
CREAT SERPL-MCNC: 1.54 MG/DL — HIGH (ref 0.5–1.3)
GLUCOSE SERPL-MCNC: 99 MG/DL — SIGNIFICANT CHANGE UP (ref 70–99)
HCT VFR BLD CALC: 35.6 % — SIGNIFICANT CHANGE UP (ref 34.5–45)
HGB BLD-MCNC: 12.3 G/DL — SIGNIFICANT CHANGE UP (ref 11.5–15.5)
MCHC RBC-ENTMCNC: 33.2 PG — SIGNIFICANT CHANGE UP (ref 27–34)
MCHC RBC-ENTMCNC: 34.6 GM/DL — SIGNIFICANT CHANGE UP (ref 32–36)
MCV RBC AUTO: 96.2 FL — SIGNIFICANT CHANGE UP (ref 80–100)
NRBC # BLD: 0 /100 WBCS — SIGNIFICANT CHANGE UP
NRBC # FLD: 0 K/UL — SIGNIFICANT CHANGE UP
PLATELET # BLD AUTO: 295 K/UL — SIGNIFICANT CHANGE UP (ref 150–400)
POTASSIUM SERPL-MCNC: 3.7 MMOL/L — SIGNIFICANT CHANGE UP (ref 3.5–5.3)
POTASSIUM SERPL-SCNC: 3.7 MMOL/L — SIGNIFICANT CHANGE UP (ref 3.5–5.3)
PROT SERPL-MCNC: 8.1 G/DL — SIGNIFICANT CHANGE UP (ref 6–8.3)
RBC # BLD: 3.7 M/UL — LOW (ref 3.8–5.2)
RBC # FLD: 13.2 % — SIGNIFICANT CHANGE UP (ref 10.3–14.5)
RH IG SCN BLD-IMP: POSITIVE — SIGNIFICANT CHANGE UP
SODIUM SERPL-SCNC: 136 MMOL/L — SIGNIFICANT CHANGE UP (ref 135–145)
WBC # BLD: 6.99 K/UL — SIGNIFICANT CHANGE UP (ref 3.8–10.5)
WBC # FLD AUTO: 6.99 K/UL — SIGNIFICANT CHANGE UP (ref 3.8–10.5)

## 2021-10-22 RX ORDER — SODIUM CHLORIDE 9 MG/ML
3 INJECTION INTRAMUSCULAR; INTRAVENOUS; SUBCUTANEOUS EVERY 8 HOURS
Refills: 0 | Status: DISCONTINUED | OUTPATIENT
Start: 2021-10-25 | End: 2021-10-26

## 2021-10-22 RX ORDER — ALBUTEROL 90 UG/1
2 AEROSOL, METERED ORAL
Qty: 0 | Refills: 0 | DISCHARGE

## 2021-10-22 RX ORDER — SPIRONOLACTONE 25 MG/1
1 TABLET, FILM COATED ORAL
Qty: 0 | Refills: 0 | DISCHARGE

## 2021-10-22 RX ORDER — ALBUTEROL 90 UG/1
3 AEROSOL, METERED ORAL
Qty: 0 | Refills: 0 | DISCHARGE

## 2021-10-22 RX ORDER — PANTOPRAZOLE SODIUM 20 MG/1
1 TABLET, DELAYED RELEASE ORAL
Qty: 0 | Refills: 0 | DISCHARGE

## 2021-10-22 RX ORDER — FUROSEMIDE 40 MG
1 TABLET ORAL
Qty: 0 | Refills: 0 | DISCHARGE

## 2021-10-22 RX ORDER — LANOLIN ALCOHOL/MO/W.PET/CERES
1 CREAM (GRAM) TOPICAL
Qty: 0 | Refills: 0 | DISCHARGE

## 2021-10-22 RX ORDER — IBUPROFEN 200 MG
1 TABLET ORAL
Qty: 0 | Refills: 0 | DISCHARGE

## 2021-10-22 RX ORDER — ACETAMINOPHEN 500 MG
2 TABLET ORAL
Qty: 0 | Refills: 0 | DISCHARGE

## 2021-10-22 RX ORDER — SUCRALFATE 1 G
1 TABLET ORAL
Qty: 0 | Refills: 0 | DISCHARGE

## 2021-10-22 RX ORDER — TRAMADOL HYDROCHLORIDE 50 MG/1
1 TABLET ORAL
Qty: 0 | Refills: 0 | DISCHARGE

## 2021-10-22 RX ORDER — OXYCODONE HYDROCHLORIDE 5 MG/1
1 TABLET ORAL
Qty: 0 | Refills: 0 | DISCHARGE

## 2021-10-22 RX ORDER — MULTIVIT-MIN/FERROUS GLUCONATE 9 MG/15 ML
1 LIQUID (ML) ORAL
Qty: 0 | Refills: 0 | DISCHARGE

## 2021-10-22 RX ORDER — ATORVASTATIN CALCIUM 80 MG/1
1 TABLET, FILM COATED ORAL
Qty: 0 | Refills: 0 | DISCHARGE

## 2021-10-22 NOTE — H&P PST ADULT - NSICDXPASTSURGICALHX_GEN_ALL_CORE_FT
PAST SURGICAL HISTORY:  H/O cardiac radiofrequency ablation for HOCM - 16 years ago, 2020    H/O excision of ganglion cyst     History of  15 years ago

## 2021-10-22 NOTE — H&P PST ADULT - ALLERGY TYPES
outdoor environmental allergies Oxybutynin Counseling:  I discussed with the patient the risks of oxybutynin including but not limited to skin rash, drowsiness, dry mouth, difficulty urinating, and blurred vision.

## 2021-10-22 NOTE — H&P PST ADULT - NSICDXPASTMEDICALHX_GEN_ALL_CORE_FT
PAST MEDICAL HISTORY:  AF (atrial fibrillation)     CAP (community acquired pneumonia) RML/RLL at [facility] 3/2017    Chronic diastolic (congestive) heart failure     GERD (gastroesophageal reflux disease)     History of shingles 2020    HOCM (hypertrophic obstructive cardiomyopathy) S/P septal ablation    Lung cancer 2017 s/p chemotherapy    Pleural effusion Right parapneumonic drained twice 3/2017 at [facility]    Viral pneumonia 9/2021

## 2021-10-22 NOTE — H&P PST ADULT - PRO TOBACCO TYPE
Please make sure the following labs   CBC (blood count)  CMP (comprehensive metabolic panel : would include liver , kidney and electrolytes)    If not ,please call and I will place an order for these      If all labs are normal and med adjustment doesn't help, I would suggest that we update.    Physical therapy will call you to schedule     I would suggest that you get at least 1000units of Vitamin D      Make sure 6-8 glasses of water daily              
quit 2017/cigarettes

## 2021-10-22 NOTE — H&P PST ADULT - PROBLEM SELECTOR PLAN 1
Pt scheduled for surgery on 10/25/2021.  Pre-op instructions provided. Medications per cardiology. Pt verbalized understanding.   Pt given detailed verbal and written instructions on chlorhexidine wash. Pt verbalized understanding with teachback.   Pt states she is going for preop COVID testing today.

## 2021-10-22 NOTE — H&P PST ADULT - NEGATIVE NEUROLOGICAL SYMPTOMS
no weakness/no paresthesias/no generalized seizures/no focal seizures/no syncope/no vertigo/no headache

## 2021-10-22 NOTE — H&P PST ADULT - HISTORY OF PRESENT ILLNESS
66 year old female with hx of atrial fibrillation, has undergone attempted cardioversion and ablations in the past but still with afib and with c/o tachycardia, SOB with exertion  and palpitations. Pt presents today for presurgical evaluation for ... 66 year old female with hx of atrial fibrillation, has undergone attempted cardioversion and ablations in the past but with c/o tachycardia, SOB with exertion  and palpitations. Pt presents today for presurgical evaluation for AT Ablation with [employer].

## 2021-10-25 ENCOUNTER — INPATIENT (INPATIENT)
Facility: HOSPITAL | Age: 66
LOS: 0 days | Discharge: HOME | End: 2021-10-26
Attending: INTERNAL MEDICINE | Admitting: INTERNAL MEDICINE
Payer: MEDICAID

## 2021-10-25 VITALS
HEIGHT: 65.98 IN | RESPIRATION RATE: 17 BRPM | TEMPERATURE: 98 F | DIASTOLIC BLOOD PRESSURE: 86 MMHG | SYSTOLIC BLOOD PRESSURE: 101 MMHG | HEART RATE: 56 BPM | OXYGEN SATURATION: 100 %

## 2021-10-25 DIAGNOSIS — Z98.890 OTHER SPECIFIED POSTPROCEDURAL STATES: ICD-10-CM

## 2021-10-25 DIAGNOSIS — Z98.890 OTHER SPECIFIED POSTPROCEDURAL STATES: Chronic | ICD-10-CM

## 2021-10-25 DIAGNOSIS — Z98.891 HISTORY OF UTERINE SCAR FROM PREVIOUS SURGERY: Chronic | ICD-10-CM

## 2021-10-25 PROCEDURE — 93653 COMPRE EP EVAL TX SVT: CPT

## 2021-10-25 PROCEDURE — 93462 L HRT CATH TRNSPTL PUNCTURE: CPT

## 2021-10-25 PROCEDURE — 93010 ELECTROCARDIOGRAM REPORT: CPT

## 2021-10-25 PROCEDURE — 93662 INTRACARDIAC ECG (ICE): CPT | Mod: 26

## 2021-10-25 PROCEDURE — 93613 INTRACARDIAC EPHYS 3D MAPG: CPT

## 2021-10-25 PROCEDURE — 93655 ICAR CATH ABLTJ DSCRT ARRHYT: CPT | Mod: 59

## 2021-10-25 RX ORDER — SPIRONOLACTONE 25 MG/1
25 TABLET, FILM COATED ORAL DAILY
Refills: 0 | Status: DISCONTINUED | OUTPATIENT
Start: 2021-10-26 | End: 2021-10-26

## 2021-10-25 RX ORDER — METOPROLOL TARTRATE 50 MG
25 TABLET ORAL
Refills: 0 | Status: DISCONTINUED | OUTPATIENT
Start: 2021-10-25 | End: 2021-10-26

## 2021-10-25 RX ORDER — FUROSEMIDE 40 MG
40 TABLET ORAL DAILY
Refills: 0 | Status: DISCONTINUED | OUTPATIENT
Start: 2021-10-25 | End: 2021-10-26

## 2021-10-25 RX ORDER — AMIODARONE HYDROCHLORIDE 400 MG/1
200 TABLET ORAL DAILY
Refills: 0 | Status: DISCONTINUED | OUTPATIENT
Start: 2021-10-25 | End: 2021-10-26

## 2021-10-25 RX ORDER — LANOLIN ALCOHOL/MO/W.PET/CERES
3 CREAM (GRAM) TOPICAL AT BEDTIME
Refills: 0 | Status: DISCONTINUED | OUTPATIENT
Start: 2021-10-25 | End: 2021-10-26

## 2021-10-25 RX ORDER — ACETAMINOPHEN 500 MG
650 TABLET ORAL ONCE
Refills: 0 | Status: COMPLETED | OUTPATIENT
Start: 2021-10-25 | End: 2021-10-25

## 2021-10-25 RX ORDER — SODIUM CHLORIDE 9 MG/ML
500 INJECTION, SOLUTION INTRAVENOUS ONCE
Refills: 0 | Status: COMPLETED | OUTPATIENT
Start: 2021-10-25 | End: 2021-10-25

## 2021-10-25 RX ORDER — ACETAMINOPHEN 500 MG
1000 TABLET ORAL ONCE
Refills: 0 | Status: DISCONTINUED | OUTPATIENT
Start: 2021-10-25 | End: 2021-10-26

## 2021-10-25 RX ORDER — APIXABAN 2.5 MG/1
5 TABLET, FILM COATED ORAL EVERY 12 HOURS
Refills: 0 | Status: DISCONTINUED | OUTPATIENT
Start: 2021-10-25 | End: 2021-10-26

## 2021-10-25 RX ADMIN — SODIUM CHLORIDE 500 MILLILITER(S): 9 INJECTION, SOLUTION INTRAVENOUS at 20:33

## 2021-10-25 RX ADMIN — Medication 650 MILLIGRAM(S): at 20:32

## 2021-10-25 RX ADMIN — SODIUM CHLORIDE 3 MILLILITER(S): 9 INJECTION INTRAMUSCULAR; INTRAVENOUS; SUBCUTANEOUS at 22:00

## 2021-10-25 RX ADMIN — SODIUM CHLORIDE 3 MILLILITER(S): 9 INJECTION INTRAMUSCULAR; INTRAVENOUS; SUBCUTANEOUS at 20:32

## 2021-10-25 NOTE — CHART NOTE - NSCHARTNOTEFT_GEN_A_CORE
67 y/o F w/ PMH of HOCM s/p septal ablation, chronic systolic CHF, LVEF 55%, Lung CA (s/p chemo, in remission) and persistent atrial fibrillation s/p attempted DCCV and eventual amiodarone load and s/p PVI, PWI, and CTI ablation on 12/30/2020 presents for elective Atach ablation. Pt has been experiencing exertional fatigue and SOB for the past month. Pt had RSV one month ago and has been persistently tachycardic since that time.    PST H&P and labs reviewed  Pt is COVID negative as of 10/22/2021  Pt has no acute complaints at this time.
Patient s/p Atach and Aflutter ablation via RFV, pt seen and examined at bedside appears comfortable NAD, has no pain at procedure site,  Rt groin appears clean, dry, intact, no evidence of active bleeding, no hematoma, + pulses, will dose Eliquis 5mg po bid, first dose tonight at 10pm. Continue to monitor.

## 2021-10-26 ENCOUNTER — TRANSCRIPTION ENCOUNTER (OUTPATIENT)
Age: 66
End: 2021-10-26

## 2021-10-26 VITALS
TEMPERATURE: 98 F | RESPIRATION RATE: 18 BRPM | HEART RATE: 61 BPM | DIASTOLIC BLOOD PRESSURE: 68 MMHG | SYSTOLIC BLOOD PRESSURE: 105 MMHG | OXYGEN SATURATION: 97 %

## 2021-10-26 LAB
ANION GAP SERPL CALC-SCNC: 16 MMOL/L — HIGH (ref 7–14)
BUN SERPL-MCNC: 20 MG/DL — SIGNIFICANT CHANGE UP (ref 7–23)
CALCIUM SERPL-MCNC: 9.1 MG/DL — SIGNIFICANT CHANGE UP (ref 8.4–10.5)
CHLORIDE SERPL-SCNC: 98 MMOL/L — SIGNIFICANT CHANGE UP (ref 98–107)
CO2 SERPL-SCNC: 21 MMOL/L — LOW (ref 22–31)
CREAT SERPL-MCNC: 1.35 MG/DL — HIGH (ref 0.5–1.3)
GLUCOSE SERPL-MCNC: 128 MG/DL — HIGH (ref 70–99)
HCT VFR BLD CALC: 30.8 % — LOW (ref 34.5–45)
HGB BLD-MCNC: 10.5 G/DL — LOW (ref 11.5–15.5)
MAGNESIUM SERPL-MCNC: 2.1 MG/DL — SIGNIFICANT CHANGE UP (ref 1.6–2.6)
MCHC RBC-ENTMCNC: 33.5 PG — SIGNIFICANT CHANGE UP (ref 27–34)
MCHC RBC-ENTMCNC: 34.1 GM/DL — SIGNIFICANT CHANGE UP (ref 32–36)
MCV RBC AUTO: 98.4 FL — SIGNIFICANT CHANGE UP (ref 80–100)
NRBC # BLD: 0 /100 WBCS — SIGNIFICANT CHANGE UP
NRBC # FLD: 0 K/UL — SIGNIFICANT CHANGE UP
PHOSPHATE SERPL-MCNC: 4.5 MG/DL — SIGNIFICANT CHANGE UP (ref 2.5–4.5)
PLATELET # BLD AUTO: 229 K/UL — SIGNIFICANT CHANGE UP (ref 150–400)
POTASSIUM SERPL-MCNC: 4.2 MMOL/L — SIGNIFICANT CHANGE UP (ref 3.5–5.3)
POTASSIUM SERPL-SCNC: 4.2 MMOL/L — SIGNIFICANT CHANGE UP (ref 3.5–5.3)
RBC # BLD: 3.13 M/UL — LOW (ref 3.8–5.2)
RBC # FLD: 13.7 % — SIGNIFICANT CHANGE UP (ref 10.3–14.5)
SODIUM SERPL-SCNC: 135 MMOL/L — SIGNIFICANT CHANGE UP (ref 135–145)
WBC # BLD: 7.06 K/UL — SIGNIFICANT CHANGE UP (ref 3.8–10.5)
WBC # FLD AUTO: 7.06 K/UL — SIGNIFICANT CHANGE UP (ref 3.8–10.5)

## 2021-10-26 PROCEDURE — 99232 SBSQ HOSP IP/OBS MODERATE 35: CPT

## 2021-10-26 RX ORDER — AMIODARONE HYDROCHLORIDE 400 MG/1
1 TABLET ORAL
Qty: 0 | Refills: 0 | DISCHARGE

## 2021-10-26 RX ORDER — INFLUENZA VIRUS VACCINE 15; 15; 15; 15 UG/.5ML; UG/.5ML; UG/.5ML; UG/.5ML
0.5 SUSPENSION INTRAMUSCULAR ONCE
Refills: 0 | Status: DISCONTINUED | OUTPATIENT
Start: 2021-10-26 | End: 2021-10-26

## 2021-10-26 RX ORDER — CALCIUM CARBONATE 500(1250)
1 TABLET ORAL ONCE
Refills: 0 | Status: COMPLETED | OUTPATIENT
Start: 2021-10-26 | End: 2021-10-26

## 2021-10-26 RX ADMIN — Medication 1 TABLET(S): at 12:51

## 2021-10-26 RX ADMIN — Medication 40 MILLIGRAM(S): at 10:41

## 2021-10-26 RX ADMIN — APIXABAN 5 MILLIGRAM(S): 2.5 TABLET, FILM COATED ORAL at 10:40

## 2021-10-26 RX ADMIN — SPIRONOLACTONE 25 MILLIGRAM(S): 25 TABLET, FILM COATED ORAL at 10:40

## 2021-10-26 RX ADMIN — AMIODARONE HYDROCHLORIDE 200 MILLIGRAM(S): 400 TABLET ORAL at 10:40

## 2021-10-26 RX ADMIN — SODIUM CHLORIDE 3 MILLILITER(S): 9 INJECTION INTRAMUSCULAR; INTRAVENOUS; SUBCUTANEOUS at 06:16

## 2021-10-26 RX ADMIN — Medication 25 MILLIGRAM(S): at 10:41

## 2021-10-26 RX ADMIN — SODIUM CHLORIDE 3 MILLILITER(S): 9 INJECTION INTRAMUSCULAR; INTRAVENOUS; SUBCUTANEOUS at 13:36

## 2021-10-26 NOTE — DISCHARGE NOTE PROVIDER - NSDCMRMEDTOKEN_GEN_ALL_CORE_FT
amiodarone 200 mg oral tablet: 1 tab(s) orally once a day AM  Eliquis: 5 milligram(s) orally 2 times a day  furosemide 40 mg oral tablet: 1 tab(s) orally once a day AM  Hair, Skin &amp; Nails: 1 tab(s) orally once a day  Metoprolol Succinate ER 25 mg oral tablet, extended release: 1 tab(s) orally 2 times a day  Multiple Vitamins oral tablet: 1 tab(s) orally once a day  spironolactone 25 mg oral tablet: 1 tab(s) orally once a day AM  vitamin D: 1 tab(s) orally once a day   Eliquis: 5 milligram(s) orally 2 times a day  furosemide 40 mg oral tablet: 1 tab(s) orally once a day AM  Hair, Skin &amp; Nails: 1 tab(s) orally once a day  Metoprolol Succinate ER 25 mg oral tablet, extended release: 1 tab(s) orally 2 times a day  Multiple Vitamins oral tablet: 1 tab(s) orally once a day  spironolactone 25 mg oral tablet: 1 tab(s) orally once a day AM  vitamin D: 1 tab(s) orally once a day

## 2021-10-26 NOTE — DISCHARGE NOTE NURSING/CASE MANAGEMENT/SOCIAL WORK - NSDCFUADDAPPT_GEN_ALL_CORE_FT
Follow up with electrophysiology team within 1-2 weeks of discharge. You may shower after 24 hrs, otherwise keep groin incision sites dry and clean.  Avoid activities such as jogging/excessive stair climbing/weight lifting for the next 7 days. Take Acetaminophen (Tylenol) 500mg, one to two tablets every 8 hours as needed for pain relief.  Call [phone] if the following occurs: fever with temperature > 101, swelling or bleeding at the groin incision site(s), outpatient F/U is scheduled with Dr. Arana on 11/24/21 @ 9:30am    Follow up with PCP within 1-2 weeks of discharge. If you are in need of a general medicine physician and post-discharge medical follow-up for further care/recommendations you may contact the [facility] for an appointment at [phone].

## 2021-10-26 NOTE — DISCHARGE NOTE NURSING/CASE MANAGEMENT/SOCIAL WORK - PATIENT PORTAL LINK FT
You can access the FollowMyHealth Patient Portal offered by [facility] by registering at the following website: [email or web address]. By joining [employer]s FollowMyHealth portal, you will also be able to view your health information using other applications (apps) compatible with our system.

## 2021-10-26 NOTE — PROVIDER CONTACT NOTE (OTHER) - ASSESSMENT
patient denies chest pain, site pain, or shortness of breath. alert and oriented x4. BP 93/60, HR 56

## 2021-10-26 NOTE — DISCHARGE NOTE PROVIDER - CARE PROVIDER_API CALL
Yessica Arana)  Cardiovascular Disease; Internal Medicine  [phone] [place] [address]  Phone: [phone]  Fax: [phone]  Follow Up Time:

## 2021-10-26 NOTE — PROGRESS NOTE ADULT - SUBJECTIVE AND OBJECTIVE BOX
Patient is seen and examined. Denies any chest pain, SOB, palpitations or dizziness. No events overnight    PAST MEDICAL & SURGICAL HISTORY:  Atrial fibrillation    PNA (pneumonia)    Cardiomyopathy, hypertrophic    Gastritis and duodenitis    Chronic gastritis without bleeding, unspecified gastritis type    HOCM (hypertrophic obstructive cardiomyopathy)  S/P septal ablation    HF (heart failure), diastolic  Grade II DD    Mitral valve insufficiency, unspecified etiology  Moderate    Pulmonary hypertension  Moderate    Chronic atrial fibrillation    Pneumonia  RML &amp; RLL at [facility] 3/2017    Pleural effusion  Right parapneumonic drained twice 3/2017 at [facility]    Gastritis and duodenitis  Mild - dx via EGD     AF (atrial fibrillation)    GERD (gastroesophageal reflux disease)    Chronic diastolic (congestive) heart failure    MR (mitral regurgitation)    Lung cancer   s/p chemotherapy    CAP (community acquired pneumonia)  RML/RLL at [facility] 3/2017    HTN (hypertension)    HLD (hyperlipidemia)    GERD (gastroesophageal reflux disease)    Lung cancer  s/p chemotherapy ~ now in remission    History of shingles      Viral pneumonia  2021    H/O cardiac radiofrequency ablation    History of   15 years ago    H/O cardiac radiofrequency ablation  for HOCM - 16 years ago,     H/O excision of ganglion cyst        MEDICATIONS  (STANDING):  acetaminophen     Tablet .. 1000 milliGRAM(s) Oral once  aMIOdarone    Tablet 200 milliGRAM(s) Oral daily  apixaban 5 milliGRAM(s) Oral every 12 hours  furosemide    Tablet 40 milliGRAM(s) Oral daily  influenza   Vaccine 0.5 milliLiter(s) IntraMuscular once  metoprolol succinate ER 25 milliGRAM(s) Oral two times a day  sodium chloride 0.9% lock flush 3 milliLiter(s) IV Push every 8 hours  spironolactone 25 milliGRAM(s) Oral daily    MEDICATIONS  (PRN):  melatonin 3 milliGRAM(s) Oral at bedtime PRN Insomnia      Vital Signs Last 24 Hrs  T(C): 36.7 (26 Oct 2021 06:00), Max: 36.7 (26 Oct 2021 06:00)  T(F): 98 (26 Oct 2021 06:00), Max: 98 (26 Oct 2021 06:00)  HR: 56 (26 Oct 2021 06:00) (56 - 56)  BP: 93/60 (26 Oct 2021 06:00) (93/60 - 101/86)  BP(mean): --  RR: 18 (26 Oct 2021 06:00) (17 - 18)  SpO2: 99% (26 Oct 2021 06:00) (99% - 100%)    INTERPRETATION OF TELEMETRY: Sinus rhythm with HR 50s-60s  LABS:                        10.5   7.06  )-----------( 229      ( 26 Oct 2021 07:04 )             30.8     10-26    135  |  98  |  20  ----------------------------<  128<H>  4.2   |  21<L>  |  1.35<H>    Ca    9.1      26 Oct 2021 07:04  Phos  4.5     10-26  Mg     2.10     10-26    PHYSICAL EXAM:    GENERAL: In no apparent distress, well nourished, and hydrated.  HEART: Regular rate and rhythm; No murmurs, rubs, or gallops.  PULMONARY: Clear to auscultation and percussion.  No rales, wheezing, or rhonchi bilaterally.  ABDOMEN: Soft, Nontender, Nondistended; Bowel sounds present  EXTREMITIES:  2+ Peripheral Pulses, No clubbing, cyanosis, or edema

## 2021-10-26 NOTE — PROGRESS NOTE ADULT - ATTENDING COMMENTS
66 year old female with hx of atrial fibrillation, has undergone attempted cardioversion and ablations in the past but with c/o tachycardia, SOB with exertion  and palpitations. Patient presented yesterday for elective AT ablation. S/P AT ablation. Right groin site clean, dry and intact with no bleeding or hematoma. Doing well. Stop amio. Follow up as outpatient.

## 2021-10-26 NOTE — DISCHARGE NOTE PROVIDER - NSDCFUADDAPPT_GEN_ALL_CORE_FT
Follow up with electrophysiology team within 1-2 weeks of discharge. You may shower after 24 hrs, otherwise keep groin incision sites dry and clean.  Avoid activities such as jogging/excessive stair climbing/weight lifting for the next 7 days. Take Acetaminophen (Tylenol) 500mg, one to two tablets every 8 hours as needed for pain relief.  Call [phone] if the following occurs: fever with temperature > 101, swelling or bleeding at the groin incision site(s), outpatient F/U is scheduled with Dr. Arana on 11/24/21 @ 9:30am Follow up with electrophysiology team within 1-2 weeks of discharge. You may shower after 24 hrs, otherwise keep groin incision sites dry and clean.  Avoid activities such as jogging/excessive stair climbing/weight lifting for the next 7 days. Take Acetaminophen (Tylenol) 500mg, one to two tablets every 8 hours as needed for pain relief.  Call [phone] if the following occurs: fever with temperature > 101, swelling or bleeding at the groin incision site(s), outpatient F/U is scheduled with Dr. Arana on 11/24/21 @ 9:30am    Follow up with PCP within 1-2 weeks of discharge. If you are in need of a general medicine physician and post-discharge medical follow-up for further care/recommendations you may contact the [facility] for an appointment at [phone].

## 2021-10-26 NOTE — PROGRESS NOTE ADULT - ASSESSMENT
66 year old female with hx of atrial fibrillation, has undergone attempted cardioversion and ablations in the past but with c/o tachycardia, SOB with exertion  and palpitations. Patient presented yesterday for elective AT ablation. S/P AT ablation. Right groin site clean, dry and intact with no bleeding or hematoma. Post procedure instructions given to patient which includes:    - may shower after 24 hrs, otherwise keep groin incision sites dry and clean.    - Avoid activities such as jogging/excessive stair climbing/weight lifting for the next 7 days    - Take Acetaminophen (Tylenol) 500mg, one to two tablets every 8 hours as needed for pain relief.    - Pt was instructed to call [phone] if the following occurs:      - fever with temperature > 101      - swelling or bleeding at the groin incision site(s)  - Outpatient F/U is scheduled with Dr. Arana on 11/24/21 @ 9:30am  All questions answered to patient's satisfaction.  Follow up letter and instructions left in the care of the patient.   - D/c Amiodarone  - c/w apixaban and metoprolol  - May d/c home

## 2021-10-26 NOTE — DISCHARGE NOTE PROVIDER - NSDCCPCAREPLAN_GEN_ALL_CORE_FT
PRINCIPAL DISCHARGE DIAGNOSIS  Diagnosis: Other persistent atrial fibrillation  Assessment and Plan of Treatment: You presented with shortness of breath, and underwent an atrial tachycardia ablation. You are scheduled for outpatient follow up with Dr. Arana on 11/24/21 @ 9:30am. We stopped your Amiodarone. Continue Apixaban and Metoprolol.

## 2021-10-26 NOTE — DISCHARGE NOTE PROVIDER - HOSPITAL COURSE
66 year old female with PMHx of atrial fibrillation, has undergone attempted cardioversion and ablations in the past but with c/o tachycardia, SOB with exertion  and palpitations. Patient presented yesterday for elective AT ablation. Underwent successful AT ablation. Right groin site clean, dry and intact with no bleeding or hematoma. Post procedure instructions given to patient. Outpatient F/U is scheduled with Dr. Arana on 11/24/21 @ 9:30am. Per EP, Amiodarone was discontinued. To continue with Apixaban and Metoprolol. No objections to DC planning.   Patient seen and evaluated, no acute somatic complaints. Reviewed discharge medications with patient; All new medications requiring new prescriptions were sent to the pharmacy of patient's choice. Reviewed need for prescription for previous home medications and new prescriptions sent if requested. Medically cleared/stable for discharge as per Dr. ARANA with close outpatient follow up within 1-2 weeks of discharge. Patient understands and agrees with plan of care.

## 2021-10-26 NOTE — PATIENT PROFILE ADULT - FALLEN IN THE PAST
Post-Partum Day Number 1 Progress Note    Disha Beltran     Assessment: Doing well, post partum day 1    Plan:  1. Continue routine postpartum and perineal care as well as maternal education. FS glucose x 1  2. Discharge home tomorrow if stable. 3. The risks and benefits of the circumcision  procedure and anesthesia including: bleeding, infection, variability of cosmetic results were discussed at length with the mother. She is aware that future repeat procedures may be necessary. She gives informed consent to proceed as noted and her questions are answered. Information for the patient's :  Sheryl Noonan [[id number]]   Vaginal, Spontaneous    Patient doing well without significant complaint. Voiding without difficulty, normal lochia. Vitals:  Visit Vitals  BP 98/60   Pulse 85   Temp 98 °F (36.7 °C)   Resp 16   SpO2 96%   Breastfeeding Unknown     Temp (24hrs), Av °F (36.7 °C), Min:97.9 °F (36.6 °C), Max:98.1 °F (36.7 °C)         Exam:       Feeding: breast       Patient without distress. CVS S1 S2 normal.      RS normal breath sounds                Abdomen soft, fundus firm, no tenderness                Perineum with normal lochia noted.                 Lower extremities are positive  for swelling, cords or no tenderness    Labs:     Lab Results   Component Value Date/Time    WBC 5.6 2019 07:48 AM    WBC 7.6 2018 06:28 AM    HGB 13.3 2019 07:48 AM    HGB 12.8 2018 06:28 AM    HCT 41.8 2019 07:48 AM    HCT 37.5 2018 06:28 AM    PLATELET 981  07:48 AM    PLATELET 979  06:28 AM    Hgb, External 12.4 10/03/2017    Hct, External 36.6 10/03/2017       Recent Results (from the past 24 hour(s))   GYN RAPID GP B STREP, EXTERNAL    Collection Time: 21 12:00 AM   Result Value Ref Range    GrBStrep, External Negative no

## 2021-11-08 NOTE — DISCHARGE NOTE ADULT - NS AS DC FOLLOWUP STROKE INST
"After Visit Summary    Patient: Dot Thompson  Date of Visit: 11/8/2021    These notes are also available in your MyChart. Please take a few moments to find them under \"Past Appointments\" in the [email or web address] system, as Roxana will start to phase out e-mail communications.    Order of today's appointment:    Yumiko warm up    Blending phrases    I know an old lady (blending and ooo breath)  Frequency of practice: 2-3x/day    .Phrases for Blending   fall over   go into   put upon   leave on   the only   lose it   see it   the other   win it   do it    not even   that's enough   put on   not any   leave open   down under  cold as   one of   not old   the ice   she's ill   look at   he's ill   then add   the end   yes and   so it   high up   one at   Janki is    Fall_over_the_chair                     Go_(w)into the store  Leave_on_the_lights                    The(e)_(y)only one  See_it_over_there                        The(e)_(y)other day  Do_it_now     Not_even her mom  Put(d)_on_your_shoes         Not_any more  Down_under_the_stairs  Cold_as ice  Not_old_enough    the ice is cold  Look_at_the_sky    he's ill with the flu  The_end_of_the_story       yes_and no  High_up_on_the_shelf     one at a time    I KNOW AN OLD LADY    I know an old lady who swallowed a fly.  I don't know why she swallowed a fly.  Perhaps she'll die.    I know an old lady who swallowed a spider   that wriggled and wiggled and tickled inside her.  She swallowed the spider to catch the fly.  I don't know why she swallowed the fly.  Perhaps she'll die.    I know an old lady who swallowed a bird.  How absurd to swallow a bird!  She swallowed the bird to catch the spider   that wriggled and wiggled and tickled inside her.  She swallowed the spider to catch the fly.  I don't know why she swallowed the fly.  Perhaps she'll die.    I know an old lady who swallowed a cat.  Imagine that, swallowing a cat!  She swallowed the cat to catch the bird.  How absurd to " swallow a bird!  She swallowed the bird to catch the spider   that wriggled and wiggled and tickled inside her.  She swallowed the spider to catch the fly.  I don't know why she swallowed the fly.  Perhaps she'll die.    I know an old lady who swallowed a dog.  What a hog, to swallow a dog!  She swallowed the dog to catch the cat.  Imagine that, swallowing a cat!  She swallowed the cat to catch the bird.  How absurd to swallow a bird!  She swallowed the bird to catch the spider   that wriggled and wiggled and tickled inside her.  She swallowed the spider to catch the fly.  I don't know why she swallowed the fly.  Perhaps she'll die.    I know an old lady who swallowed a cow.  I don't know how she swallowed a cow.  She swallowed the cow to catch the dog.  What a hog, to swallow a dog!  She swallowed the dog to catch the cat.  Imagine that, swallowing a cat!  She swallowed the cat to catch the bird.  How absurd to swallow a bird!  She swallowed the bird to catch the spider   that wriggled and wiggled and tickled inside her.  She swallowed the spider to catch the fly.  I don't know why she swallowed the fly.  Perhaps she'll die.    I know an old lady who swallowed a horse.  Poor old lady, she  of course.      Roxana Lee M.M. (voice), M.A., CCC/SLP  Speech-Language Pathologist  [facility] Certified Vocologist  [facility]  [email or web address]  she/her     Coumadin/Warfarin

## 2021-11-24 ENCOUNTER — APPOINTMENT (OUTPATIENT)
Dept: ELECTROPHYSIOLOGY | Facility: CLINIC | Age: 66
End: 2021-11-24
Payer: MEDICAID

## 2021-11-24 VITALS
OXYGEN SATURATION: 99 % | DIASTOLIC BLOOD PRESSURE: 88 MMHG | HEART RATE: 66 BPM | BODY MASS INDEX: 31.08 KG/M2 | HEIGHT: 67 IN | WEIGHT: 198 LBS | SYSTOLIC BLOOD PRESSURE: 145 MMHG

## 2021-11-24 DIAGNOSIS — I42.1 OBSTRUCTIVE HYPERTROPHIC CARDIOMYOPATHY: ICD-10-CM

## 2021-11-24 DIAGNOSIS — Z98.890 OTHER SPECIFIED POSTPROCEDURAL STATES: ICD-10-CM

## 2021-11-24 DIAGNOSIS — Z86.79 OTHER SPECIFIED POSTPROCEDURAL STATES: ICD-10-CM

## 2021-11-24 DIAGNOSIS — I48.19 OTHER PERSISTENT ATRIAL FIBRILLATION: ICD-10-CM

## 2021-11-24 DIAGNOSIS — I47.1 SUPRAVENTRICULAR TACHYCARDIA: ICD-10-CM

## 2021-11-24 DIAGNOSIS — I48.4 ATYPICAL ATRIAL FLUTTER: ICD-10-CM

## 2021-11-24 DIAGNOSIS — R06.00 DYSPNEA, UNSPECIFIED: ICD-10-CM

## 2021-11-24 PROBLEM — Z86.19 PERSONAL HISTORY OF OTHER INFECTIOUS AND PARASITIC DISEASES: Chronic | Status: ACTIVE | Noted: 2021-10-22

## 2021-11-24 PROBLEM — J12.9 VIRAL PNEUMONIA, UNSPECIFIED: Chronic | Status: ACTIVE | Noted: 2021-10-22

## 2021-11-24 PROCEDURE — 93000 ELECTROCARDIOGRAM COMPLETE: CPT

## 2021-11-24 PROCEDURE — 99214 OFFICE O/P EST MOD 30 MIN: CPT

## 2021-11-24 RX ORDER — COLCHICINE 0.6 MG/1
0.6 TABLET, FILM COATED ORAL TWICE DAILY
Refills: 0 | Status: DISCONTINUED | COMMUNITY
End: 2021-11-24

## 2021-11-24 RX ORDER — DILTIAZEM HYDROCHLORIDE 60 MG/1
60 TABLET, COATED ORAL 4 TIMES DAILY
Refills: 0 | Status: DISCONTINUED | COMMUNITY
End: 2021-11-24

## 2021-11-24 RX ORDER — AMIODARONE HYDROCHLORIDE 200 MG/1
200 TABLET ORAL
Qty: 60 | Refills: 6 | Status: DISCONTINUED | COMMUNITY
Start: 2021-08-26 | End: 2021-11-24

## 2021-11-29 NOTE — HISTORY OF PRESENT ILLNESS
[FreeTextEntry1] : Erica Hornre is a 67y/o woman with Hx of HOCM s/p septal ablation, chronic systolic CHF, LVEF 55%, lung cancer (diagnosed about 2 years ago, s/p chemo, in remission) and persistent atrial fibrillation s/p attempted DCCV and eventual amiodarone load and s/p PVI, PWI, and CTI ablation on 12/30/2020. She was on amiodarone post ablation, but was subsequently stopped. She presented to [facility] ED with shortness of breath and atypical AFL. She was subsequently taken to the lab, and revealed to have mitral atrial flutter that terminated with ablation along the medial base of the TABATHA. 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 Since that time, she is now off Amiodarone and remains on Eliquis for thromboembolic prophylaxis. Feeling well. Some dyspnea on exertion. Taking furosemide and spironolactone. Denies chest pain, palpitations, SOB at rest, syncope or near syncope.\par

## 2021-11-29 NOTE — DISCUSSION/SUMMARY
[FreeTextEntry1] : Impression:\par \par 1. Afib/aflutter/AT: s/p PVI, PWI, and CTI ablation on 12/30/2020 and mitral flutter/TABATHA ablation on 10/25/2021. EKG performed today to assess for recurrence afib/AT and reveals NSR. Now off amiodarone. On metoprolol and Eliquis as prescribed. Denies any recurrence of AT. Resume rate control management as prescribed. \par \par 2. HOCM: review of most recent ECHO from 2020 with normal LVEF.\par \par

## 2021-11-30 ENCOUNTER — APPOINTMENT (OUTPATIENT)
Dept: NEUROLOGY | Facility: CLINIC | Age: 66
End: 2021-11-30
Payer: MEDICAID

## 2021-11-30 VITALS
HEART RATE: 51 BPM | SYSTOLIC BLOOD PRESSURE: 110 MMHG | RESPIRATION RATE: 16 BRPM | DIASTOLIC BLOOD PRESSURE: 72 MMHG | OXYGEN SATURATION: 98 % | TEMPERATURE: 98 F | HEIGHT: 67 IN | BODY MASS INDEX: 31.08 KG/M2 | WEIGHT: 198 LBS

## 2021-11-30 PROCEDURE — 99215 OFFICE O/P EST HI 40 MIN: CPT

## 2021-11-30 NOTE — REVIEW OF SYSTEMS
[Memory Lapses or Loss] : memory loss [Decr. Concentrating Ability] : decreased concentrating ability [Fainting] : fainting [Lightheadedness] : lightheadedness [Negative] : Heme/Lymph [Confused or Disoriented] : no confusion [Difficulty with Language] : no ~M difficulty with language [Arm Weakness] : no arm weakness [Hand Weakness] : no hand weakness [Leg Weakness] : no leg weakness [Poor Coordination] : good coordination [Difficulty Writing] : no difficulty writing [Numbness] : no numbness [Abnormal Sensation] : no abnormal sensation [Seizures] : no convulsions [Dizziness] : no dizziness [Vertigo] : no vertigo [Migraine Headache] : no migraine headache [Difficulty Walking] : no difficulty walking [Inability to Walk] : able to walk

## 2021-11-30 NOTE — PHYSICAL EXAM
[Person] : oriented to person [Place] : oriented to place [Time] : oriented to time [Concentration Intact] : normal concentrating ability [Visual Intact] : visual attention was ~T not ~L decreased [Naming Objects] : no difficulty naming common objects [Repeating Phrases] : no difficulty repeating a phrase [Writing A Sentence] : no difficulty writing a sentence [Fluency] : fluency intact [Comprehension] : comprehension intact [Reading] : reading intact [Past History] : adequate knowledge of personal past history [___ / 5] : Visuospatial / Executive: [unfilled] / 5 [___ / 3] : Attention (Serial 7 subtraction): [unfilled] / 3 [___ / 1] : Fluency: [unfilled] / 1 [___ / 2] : Abstraction: [unfilled] / 2 [___ / 5] : Delayed Recall: [unfilled] / 5 [___ / 6] : Orientation: [unfilled] / 6 [Cranial Nerves Optic (II)] : visual acuity intact bilaterally,  visual fields full to confrontation, pupils equal round and reactive to light [Cranial Nerves Oculomotor (III)] : extraocular motion intact [Cranial Nerves Trigeminal (V)] : facial sensation intact symmetrically [Cranial Nerves Facial (VII)] : face symmetrical [Cranial Nerves Vestibulocochlear (VIII)] : hearing was intact bilaterally [Cranial Nerves Glossopharyngeal (IX)] : tongue and palate midline [Cranial Nerves Accessory (XI - Cranial And Spinal)] : head turning and shoulder shrug symmetric [Cranial Nerves Hypoglossal (XII)] : there was no tongue deviation with protrusion [Motor Tone] : muscle tone was normal in all four extremities [Motor Strength] : muscle strength was normal in all four extremities [No Muscle Atrophy] : normal bulk in all four extremities [Sensation Tactile Decrease] : light touch was intact [Abnormal Walk] : normal gait [Balance] : balance was intact [2+] : Ankle jerk left 2+ [Sclera] : the sclera and conjunctiva were normal [PERRL With Normal Accommodation] : pupils were equal in size, round, reactive to light, with normal accommodation [Extraocular Movements] : extraocular movements were intact [Outer Ear] : the ears and nose were normal in appearance [Oropharynx] : the oropharynx was normal [Short Term Intact] : short term memory impaired [Motor Strength Upper Extremities Bilaterally] : strength was normal in both upper extremities [Motor Strength Lower Extremities Bilaterally] : strength was normal in both lower extremities [Past-pointing] : there was no past-pointing [Tremor] : no tremor present [Plantar Reflex Right Only] : normal on the right [Plantar Reflex Left Only] : normal on the left [MocaTotal] : 23

## 2021-11-30 NOTE — DISCUSSION/SUMMARY
[FreeTextEntry1] : I reviewed her normal blood tests.\par \par I reviewed the images of the MRI scan of the brain and evaluated the neuro quant report.  She does not have any features consistent with Alzheimer's disease.  However, certain brain volumes are in the lower percentile for her age although not in the single digits. \par I have advised neuropsychological evaluation to tease out the causes of memory problems due to anxiety and depression versus a neurodegenerative brain disorder.  Of also advised brain PET scan to investigate frontotemporal dementia which can present with all of her symptoms.

## 2021-11-30 NOTE — HISTORY OF PRESENT ILLNESS
[FreeTextEntry1] : She reports continuing stress in her living situation.  She is currently living with her daughter and son in an apartment that is in very bad state of disrepair (it remains in the bathroom).  She was bitten by a neighbor's dog (a pit bull) who still is kept next door and she has filed a case against the owner. She has also had to file a complaint against her landlord for all the problems in the building. She does not walk out of [facility] building. She has been forced to go on disability as she could not work because of her illnesses\par She has undergone 2 ablations for a heart condition since the last visit.  She has not had any episodes of loss of consciousness.  She walks half a mile every week for her therapy and has to stop for a break in the middle of the walk.\par

## 2021-12-01 PROCEDURE — G9005: CPT

## 2022-01-10 ENCOUNTER — EMERGENCY (EMERGENCY)
Facility: HOSPITAL | Age: 67
LOS: 1 days | Discharge: HOME | End: 2022-01-10
Attending: EMERGENCY MEDICINE | Admitting: EMERGENCY MEDICINE
Payer: MEDICAID

## 2022-01-10 VITALS
DIASTOLIC BLOOD PRESSURE: 79 MMHG | SYSTOLIC BLOOD PRESSURE: 136 MMHG | OXYGEN SATURATION: 100 % | HEART RATE: 61 BPM | RESPIRATION RATE: 17 BRPM

## 2022-01-10 VITALS
HEIGHT: 65 IN | TEMPERATURE: 98 F | OXYGEN SATURATION: 98 % | SYSTOLIC BLOOD PRESSURE: 132 MMHG | DIASTOLIC BLOOD PRESSURE: 62 MMHG | HEART RATE: 85 BPM | RESPIRATION RATE: 18 BRPM

## 2022-01-10 DIAGNOSIS — Z98.890 OTHER SPECIFIED POSTPROCEDURAL STATES: Chronic | ICD-10-CM

## 2022-01-10 DIAGNOSIS — Z98.891 HISTORY OF UTERINE SCAR FROM PREVIOUS SURGERY: Chronic | ICD-10-CM

## 2022-01-10 PROCEDURE — 99284 EMERGENCY DEPT VISIT MOD MDM: CPT

## 2022-01-10 PROCEDURE — 73630 X-RAY EXAM OF FOOT: CPT | Mod: 26,RT

## 2022-01-10 PROCEDURE — 73610 X-RAY EXAM OF ANKLE: CPT | Mod: 26,RT

## 2022-01-10 RX ORDER — ACETAMINOPHEN 500 MG
650 TABLET ORAL ONCE
Refills: 0 | Status: COMPLETED | OUTPATIENT
Start: 2022-01-10 | End: 2022-01-10

## 2022-01-10 RX ADMIN — Medication 650 MILLIGRAM(S): at 16:52

## 2022-01-10 NOTE — CONSULT NOTE ADULT - ASSESSMENT
66 F R ankle transverse fibular fracture  - pt seen and evaluated   - R Ankle XR: Acute transverse fracture of the distal fibula with slight posterolateral displacement of the distal fracture fragment, there is adjacent soft tissue swelling.  - RLE posterior splint applied  - nonweightbearing to RLE  - keep splint clean dry and intact  - rest ice elevate affected limb  - pt to f/u w/ Dr Espinoza in 1 week, call [phone] for appt  - stable for discharge from podiatry standpoint   - discussed w/ attending

## 2022-01-10 NOTE — ED PROVIDER NOTE - NSFOLLOWUPINSTRUCTIONS_ED_ALL_ED_FT
[facility]- Podiatry  [email or web address]/maps/place/[email or web address]\  [address] Claire geiger  [facility]   [phone]    [facility]  [email or web address]://[email or web address]/maps/place/[address],+[place],+[place]+[address]/data=[id number]  [address] [facility] [id number] [phone]    Ankle Fracture    WHAT YOU NEED TO KNOW:  An ankle fracture is a break in 1 or more of the bones in your ankle.  Foot Anatomy  DISCHARGE INSTRUCTIONS:  Call your local emergency number (911 in the ) for any of the following:  You feel lightheaded, short of breath, and have chest pain.  You cough up blood.  Seek care immediately if:  Your leg feels warm, tender, and painful. It may look swollen and red.  Blood soaks through your bandage.  You have severe pain in your ankle.  Your cast feels too tight.  Your foot or toes are cold or numb.  Your foot or toenails turn blue or gray.  Call your doctor if:  Your splint feels too tight.  Your swelling has increased or returned.  You have a fever.  Your pain does not go away, even after treatment.  You have questions or concerns about your condition or care.  Medicines: You may need any of the following:  Acetaminophen decreases pain and fever. It is available without a doctor's order. Ask how much to take and how often to take it. Follow directions. Read the labels of all other medicines you are using to see if they also contain acetaminophen, or ask your doctor or pharmacist. Acetaminophen can cause liver damage if not taken correctly. Do not use more than 4 grams (4,000 milligrams) total of acetaminophen in one day.  NSAIDs, such as ibuprofen, help decrease swelling, pain, and fever. This medicine is available with or without a doctor's order. NSAIDs can cause stomach bleeding or kidney problems in certain people. If you take blood thinner medicine, always ask your healthcare provider if NSAIDs are safe for you. Always read the medicine label and follow directions.  Prescription pain medicine may be given. Ask your healthcare provider how to take this medicine safely. Some prescription pain medicines contain acetaminophen. Do not take other medicines that contain acetaminophen without talking to your healthcare provider. Too much acetaminophen may cause liver damage. Prescription pain medicine may cause constipation. Ask your healthcare provider how to prevent or treat constipation.  Take your medicine as directed. Contact your healthcare provider if you think your medicine is not helping or if you have side effects. Tell him or her if you are allergic to any medicine. Keep a list of the medicines, vitamins, and herbs you take. Include the amounts, and when and why you take them. Bring the list or the pill bottles to follow-up visits. Carry your medicine list with you in case of an emergency.  Follow up with your doctor in 1 to 2 days: Your fracture may need to be reduced (bones pushed back into place) or you may need surgery. Write down your questions so you remember to ask them during your visits.  Support devices: You will be given a brace, cast, or splint to limit your movement and protect your ankle. You may need to use crutches to protect your ankle and decrease your pain as you move around. Do not remove your device and do not put weight on your injured ankle.  Splint and cast care: Cover the splint or cast before you bathe so it does not get wet. Tape 2 plastic trash bags to your skin above the cast. Try to keep your ankle out of the water as much as possible.  Rest: Rest your ankle so that it can heal. Return to normal activities as directed.  Ice: Apply ice on your ankle for 15 to 20 minutes every hour or as directed. Use an ice pack, or put crushed ice in a plastic bag. Cover it with a towel. Ice helps prevent tissue damage and decreases swelling and pain.  Elevate: Elevate your ankle above the level of your heart as often as you can. This will help decrease swelling and pain. Prop your ankle on pillows or blankets to keep it elevated comfortably. Follow up with your Primary Medical Doctor in 1-2 days.  Follow up with Podiatry Dr Espinoza in 1-2 days call to schedule an appointment [phone].(or see attached list.)  Non weigh bearing, use crutches to ambulate.  Rest.  Ice, Elevate foot.  Take Tylenol 650mg orally every 6 hours as needed for pain.  Return to the ER for any persistent/worsening or new symptoms weakness, numbness, tingling or any concerning symptoms.     [facility]- Podiatry  https://[email or web address]/maps/place/[address]+[place]+[address]\  [address] [facility]   [phone]    [facility]  https://www.[email or web address]/maps/place/[address]+[place],+[place]+[address]/data=[id number]  [address] [facility] [address]  [phone]    Ankle Fracture    WHAT YOU NEED TO KNOW:  An ankle fracture is a break in 1 or more of the bones in your ankle.  Foot Anatomy  DISCHARGE INSTRUCTIONS:  Call your local emergency number (911 in the ) for any of the following:  You feel lightheaded, short of breath, and have chest pain.  You cough up blood.  Seek care immediately if:  Your leg feels warm, tender, and painful. It may look swollen and red.  Blood soaks through your bandage.  You have severe pain in your ankle.  Your cast feels too tight.  Your foot or toes are cold or numb.  Your foot or toenails turn blue or gray.  Call your doctor if:  Your splint feels too tight.  Your swelling has increased or returned.  You have a fever.  Your pain does not go away, even after treatment.  You have questions or concerns about your condition or care.  Medicines: You may need any of the following:  Acetaminophen decreases pain and fever. It is available without a doctor's order. Ask how much to take and how often to take it. Follow directions. Read the labels of all other medicines you are using to see if they also contain acetaminophen, or ask your doctor or pharmacist. Acetaminophen can cause liver damage if not taken correctly. Do not use more than 4 grams (4,000 milligrams) total of acetaminophen in one day.  NSAIDs, such as ibuprofen, help decrease swelling, pain, and fever. This medicine is available with or without a doctor's order. NSAIDs can cause stomach bleeding or kidney problems in certain people. If you take blood thinner medicine, always ask your healthcare provider if NSAIDs are safe for you. Always read the medicine label and follow directions.  Prescription pain medicine may be given. Ask your healthcare provider how to take this medicine safely. Some prescription pain medicines contain acetaminophen. Do not take other medicines that contain acetaminophen without talking to your healthcare provider. Too much acetaminophen may cause liver damage. Prescription pain medicine may cause constipation. Ask your healthcare provider how to prevent or treat constipation.  Take your medicine as directed. Contact your healthcare provider if you think your medicine is not helping or if you have side effects. Tell him or her if you are allergic to any medicine. Keep a list of the medicines, vitamins, and herbs you take. Include the amounts, and when and why you take them. Bring the list or the pill bottles to follow-up visits. Carry your medicine list with you in case of an emergency.  Follow up with your doctor in 1 to 2 days: Your fracture may need to be reduced (bones pushed back into place) or you may need surgery. Write down your questions so you remember to ask them during your visits.  Support devices: You will be given a brace, cast, or splint to limit your movement and protect your ankle. You may need to use crutches to protect your ankle and decrease your pain as you move around. Do not remove your device and do not put weight on your injured ankle.  Splint and cast care: Cover the splint or cast before you bathe so it does not get wet. Tape 2 plastic trash bags to your skin above the cast. Try to keep your ankle out of the water as much as possible.  Rest: Rest your ankle so that it can heal. Return to normal activities as directed.  Ice: Apply ice on your ankle for 15 to 20 minutes every hour or as directed. Use an ice pack, or put crushed ice in a plastic bag. Cover it with a towel. Ice helps prevent tissue damage and decreases swelling and pain.  Elevate: Elevate your ankle above the level of your heart as often as you can. This will help decrease swelling and pain. Prop your ankle on pillows or blankets to keep it elevated comfortably.

## 2022-01-10 NOTE — ED PROVIDER NOTE - NSICDXPASTMEDICALHX_GEN_ALL_CORE_FT
PAST MEDICAL HISTORY:  AF (atrial fibrillation)     CAP (community acquired pneumonia) RML/RLL at [facility] 3/2017    Chronic diastolic (congestive) heart failure     GERD (gastroesophageal reflux disease)     History of shingles 2020    HOCM (hypertrophic obstructive cardiomyopathy) S/P septal ablation    Lung cancer 2017 s/p chemotherapy    Pleural effusion Right parapneumonic drained twice 3/2017 at [facility]    Viral pneumonia 9/2021

## 2022-01-10 NOTE — ED PROVIDER NOTE - PHYSICAL EXAMINATION
RLE: NV intact, +TTP over lateral malleolus with swelling and ecchymosis, 2+ pulses.  Mild ecchymosis noted to foot non tender moving all toes.  Knee with FROM non tender, no swelling, no deformity.  Pt ambulating with a cane.

## 2022-01-10 NOTE — ED PROVIDER NOTE - CLINICAL SUMMARY MEDICAL DECISION MAKING FREE TEXT BOX
67 y/o female with a hx of Lung CA(resmission), A Fib(s/p ablation), CHF, presents to the ER c/o right ankle pain s/p injury 4 days ago.  Pt states she slipped in the ice twisting her left ankle.  Pt is well appearing, NAD, sprain vs fx, will obtain xray, RICE, follow up Orthopedics. 67 y/o female with a hx of Lung CA(remission), A Fib(s/p ablation), CHF, presents to the ER c/o right ankle pain s/p injury 4 days ago.  Pt states she slipped in the ice twisting her left ankle.  Pt is well appearing, NAD, sprain vs fx, will obtain xray, RICE, follow up Orthopedics.

## 2022-01-10 NOTE — ED PROVIDER NOTE - CARE PROVIDER_API CALL
Vishnu Cosme (DPM)  Foot Surgery; Podiatric Medicine; Recon RearfootAnkle Surgery  [address] [facility], [address]  [place] [address]  Phone: [phone]  Fax: [phone]  Follow Up Time:

## 2022-01-10 NOTE — CONSULT NOTE ADULT - SUBJECTIVE AND OBJECTIVE BOX
Podiatry pager #: [phone] ([facility])/ [phone] ([facility])    Patient is a 66y old  Female who presents with a chief complaint of R Ankle pain.    HPI: 67 y/o female with a hx of Lung CA(resmission), A Fib(s/p ablation), CHF, presents to the ER c/o right ankle pain s/p injury 4 days ago.  Pt states she slipped in the ice twisting her left ankle.  Pt denies weakness, dizziness, chest pain, shortness of breath, head trauma, loc, headache, back pain, neck pain.  Pt denies any other injuries.      PAST MEDICAL & SURGICAL HISTORY:  HOCM (hypertrophic obstructive cardiomyopathy)  S/P septal ablation    Pleural effusion  Right parapneumonic drained twice 3/2017 at [facility]    AF (atrial fibrillation)    Chronic diastolic (congestive) heart failure    Lung cancer   s/p chemotherapy    CAP (community acquired pneumonia)  RML/RLL at [facility] 3/2017    GERD (gastroesophageal reflux disease)    History of shingles      Viral pneumonia  2021    History of   15 years ago    H/O cardiac radiofrequency ablation  for HOCM - 16 years ago,     H/O excision of ganglion cyst        MEDICATIONS  (STANDING):    MEDICATIONS  (PRN):      Allergies    cefdinir (Rash)  moxifloxacin (Rash)  penicillin (Rash)    Intolerances        VITALS:    Vital Signs Last 24 Hrs  T(C): 36.7 (10 Aron 2022 14:53), Max: 36.7 (10 Aron 2022 14:53)  T(F): 98 (10 Aron 2022 14:53), Max: 98 (10 Aron 2022 14:53)  HR: 85 (10 Aron 2022 14:53) (85 - 85)  BP: 132/62 (10 Aron 2022 14:53) (132/62 - 132/62)  BP(mean): --  RR: 18 (10 Aron 2022 14:53) (18 - 18)  SpO2: 98% (10 Aron 2022 14:53) (98% - 98%)    LABS:                CAPILLARY BLOOD GLUCOSE              LOWER EXTREMITY PHYSICAL EXAM:    Vascular: DP/PT 2/4 B/L, CFT <3 seconds B/L, Temperature gradient warm to cool B/L  Neuro: Epicritic sensation intact to the level of ankle B/L  Musculoskeletal/Ortho: unremarkable  Skin: ecchymosis lateral mall, no tenting, no blistering, no acute SOI       RADIOLOGY & ADDITIONAL STUDIES:  < from: Xray Ankle Complete 3 Views, Right (01.10.22 @ 17:04) >    ******PRELIMINARY REPORT******      ******PRELIMINARY REPORT******       ACC: [id number] EXAM:  XR FOOT COMP MIN 3 VIEWS RT                        ACC: [id number] EXAM:  XR ANKLE COMP MIN 3 VIEWS RT                          PROCEDURE DATE:  01/10/2022  ******PRELIMINARY REPORT******      ******PRELIMINARY REPORT******           INTERPRETATION:  CLINICAL INDICATION: Right ankle pain.    TECHNIQUE: 3 views the right ankle and 3 views of the right foot.    COMPARISON: None available.    FINDINGS:    X-ray right ankle:    Transverse fracture of the distal fibula with slight posterolateral   displacement of the distal fracture fragment, there is adjacent soft   tissue swelling. The joint spaces are maintained.    X-ray right foot:    No acute fracture or dislocation. Plantar calcaneal enthesophytes. Joint   spaces are maintained. Soft tissues are unremarkable.    IMPRESSION:    Acute transverse fracture of the distal fibula with slight posterolateral   displacement of the distal fracture fragment, there is adjacent soft   tissue swelling.        ******PRELIMINARY REPORT******      ******PRELIMINARY REPORT******       TESSIE ESCOBAR MD; Resident Radiology    < end of copied text >

## 2022-01-10 NOTE — ED PROVIDER NOTE - NS ED ATTENDING STATEMENT MOD
Attending with I have personally performed a face to face diagnostic evaluation on this patient. I have reviewed the ACP note and agree with the history, exam and plan of care, except as noted.

## 2022-01-10 NOTE — ED PROVIDER NOTE - ATTENDING CONTRIBUTION TO CARE
I have seen and examined the patient on the patient´s visit date. I have reviewed the note written by [facility], on that visit day. I have supervised and participated as necessary in the performance of procedures indicated for patient management and was available at all phases of the patient´s visit when needed. We discussed the history, physical exam findings, management plan, and  medical decision making. I have made my additions, exceptions, and revisions within the chart and I agree with H and P as documented in its entirety. The data and my interpretation of any data collected from labs, interventions and imaging appear below as well as my independent medical decision making and considerations    The patient is a 66y Female who has a past medical and surgery history of HOCM S/P septal ablation  AF dCHF Lung cancer s/p chemotherapy Pleural effusion parapneumonic drained twice 3/2017 at [facility] GERD Shingles Viral pneumonia of  and ganglion cyst PTED with persistent rt ankle pain and difficulty walking after a trip and fall 5 days ago   Vital Signs Stable  PE: as described; my additions and exceptions are noted in the chart     IMPRESSION/RISK:  Dx=?fx given swelling and deformity and failure to improve     Consideration include: analgesia and xray  Plan  as above  acetaminophen     Tablet .. 650 milliGRAM(s) Oral  reassess  Management as per results; dispo probable d/c

## 2022-01-10 NOTE — ED PROVIDER NOTE - PROGRESS NOTE DETAILS
LUIS MIGUEL Garner: pt seen and evaluated by Podiatry, splint placed by Podiatry.  Pt given crutches shown and demonstrated proper use.  Discharge and results reviewed with patient.

## 2022-01-10 NOTE — ED PROVIDER NOTE - OBJECTIVE STATEMENT
65 y/o female with a hx of Lung CA(resmission), A Fib(s/p ablation), CHF, presents to the ER c/o right ankle pain s/p injury 4 days ago.  Pt states she slipped in the ice twisting her left ankle.  Pt denies weakness, dizziness, chest pain, shortness of breath, head trauma, loc, headache, back pain, neck pain.  Pt denies any other injuries.

## 2022-01-10 NOTE — ED ADULT TRIAGE NOTE - CHIEF COMPLAINT QUOTE
p/t c/o of rt ankle pain s/p trip and fall 5 days ago neg loc, c/o of difficulty walking due to pain

## 2022-01-10 NOTE — ED PROVIDER NOTE - CARDIOVASCULAR NEGATIVE STATEMENT, MLM
Vaccine Information Statement    Hepatitis B Vaccine: What You Need to Know    Many Vaccine Information Statements are available in Persian and other languages. See www.immunize.org/vis  Hojas de información sobre vacunas están disponibles en español y en muchos otros idiomas. Visite www.immunize.org/vis    1. Why get vaccinated? Hepatitis B vaccine can prevent hepatitis B. Hepatitis B is a liver disease that can cause mild illness lasting a few weeks, or it can lead to a serious, lifelong illness.  Acute hepatitis B infection is a short-term illness that can lead to fever, fatigue, loss of appetite, nausea, vomiting, jaundice (yellow skin or eyes, dark urine, stuart-colored bowel movements), and pain in the muscles, joints, and stomach.  Chronic hepatitis B infection is a long-term illness that occurs when the hepatitis B virus remains in a persons body. Most people who go on to develop chronic hepatitis B do not have symptoms, but it is still very serious and can lead to liver damage (cirrhosis), liver cancer, and death. Chronically-infected people can spread hepatitis B virus to others, even if they do not feel or look sick themselves. Hepatitis B is spread when blood, semen, or other body fluid infected with the hepatitis B virus enters the body of a person who is not infected. People can become infected through:  [employer] (if a mother has hepatitis B, her baby can become infected)   Sharing items such as razors or toothbrushes with an infected person   Contact with the blood or open sores of an infected person   Sex with an infected partner   Sharing needles, syringes, or other drug-injection equipment   Exposure to blood from needlesticks or other sharp instruments    Most people who are vaccinated with hepatitis B vaccine are immune for life. 2. Hepatitis B vaccine    Hepatitis B vaccine is usually given as 2, 3, or 4 shots.     Infants should get their first dose of hepatitis B vaccine at birth and will usually complete the series at 7 months of age (sometimes it will take longer than 6 months to complete the series). Children and adolescents younger than 23years of age who have not yet gotten the vaccine should also be vaccinated. Hepatitis B vaccine is also recommended for certain unvaccinated adults:     People whose sex partners have hepatitis B   Sexually active persons who are not in a long-term monogamous relationship   Persons seeking evaluation or treatment for a sexually transmitted disease   Men who have sexual contact with other men   People who share needles, syringes, or other drug-injection equipment   People who have household contact with someone infected with the hepatitis B virus  [facility] [employer] care and public safety workers at risk for exposure to blood or body fluids   Residents and staff of facilities for developmentally disabled persons   Persons in correctional facilities   Victims of sexual assault or abuse   Travelers to regions with increased rates of hepatitis B   People with chronic liver disease, kidney disease, HIV infection, infection with hepatitis C, or diabetes   Anyone who wants to be protected from hepatitis B    Hepatitis B vaccine may be given at the same time as other vaccines. 3. Talk with your health care provider    Tell your vaccine provider if the person getting the vaccine:   Has had an allergic reaction after a previous dose of hepatitis B vaccine, or has any severe, life-threatening allergies. In some cases, your health care provider may decide to postpone hepatitis B vaccination to a future visit. People with minor illnesses, such as a cold, may be vaccinated. People who are moderately or severely ill should usually wait until they recover before getting hepatitis B vaccine. Your health care provider can give you more information.     4. Risks of a vaccine reaction     Soreness where the shot is given or fever can happen after hepatitis B vaccine. People sometimes faint after medical procedures, including vaccination. Tell your provider if you feel dizzy or have vision changes or ringing in the ears. As with any medicine, there is a very remote chance of a vaccine causing a severe allergic reaction, other serious injury, or death. 5. What if there is a serious problem? An allergic reaction could occur after the vaccinated person leaves the clinic. If you see signs of a severe allergic reaction (hives, swelling of the face and throat, difficulty breathing, a fast heartbeat, dizziness, or weakness), call 9-1-1 and get the person to the nearest hospital.    For other signs that concern you, call your health care provider. Adverse reactions should be reported to the Vaccine Adverse Event Reporting System (VAERS). Your health care provider will usually file this report, or you can do it yourself. Visit the VAERS website at www.vaers. hhs.gov or call 8-174.384.6137. VAERS is only for reporting reactions, and VAERS staff do not give medical advice. 6. The National Vaccine Injury Compensation Program    The [facility] Vaccine Injury Compensation Program (VICP) is a federal program that was created to compensate people who may have been injured by certain vaccines. Visit the VICP website at www.hrsa.gov/vaccinecompensation or call 2-642.157.6456 to learn about the program and about filing a claim. There is a time limit to file a claim for compensation. 7. How can I learn more?  Ask your health care provider.  Call your local or state health department.  Contact the Centers for Disease Control and Prevention (CDC):  - Call 1-598.801.9971 (1-800-CDC-INFO) or  - Visit CDCs website at www.cdc.gov/vaccines    Vaccine Information Statement (Interim)  Hepatitis B Vaccine   8/15/2019  [address]AR[address] Sahu [id number]   Department of Health and Human Services  Centers for Disease Control and Prevention    Office Use Only no chest pain and no edema.

## 2022-01-10 NOTE — ED PROVIDER NOTE - PATIENT PORTAL LINK FT
You can access the FollowMyHealth Patient Portal offered by [facility] by registering at the following website: [email or web address]. By joining [employer]’s FollowMyHealth portal, you will also be able to view your health information using other applications (apps) compatible with our system.

## 2022-01-20 ENCOUNTER — OUTPATIENT (OUTPATIENT)
Dept: OUTPATIENT SERVICES | Facility: HOSPITAL | Age: 67
LOS: 1 days | End: 2022-01-20
Payer: MEDICAID

## 2022-01-20 VITALS
SYSTOLIC BLOOD PRESSURE: 110 MMHG | RESPIRATION RATE: 16 BRPM | DIASTOLIC BLOOD PRESSURE: 73 MMHG | TEMPERATURE: 97 F | OXYGEN SATURATION: 97 % | WEIGHT: 199.96 LBS | HEIGHT: 66 IN | HEART RATE: 55 BPM

## 2022-01-20 DIAGNOSIS — S82.61XA DISPLACED FRACTURE OF LATERAL MALLEOLUS OF RIGHT FIBULA, INITIAL ENCOUNTER FOR CLOSED FRACTURE: ICD-10-CM

## 2022-01-20 DIAGNOSIS — Z01.818 ENCOUNTER FOR OTHER PREPROCEDURAL EXAMINATION: ICD-10-CM

## 2022-01-20 DIAGNOSIS — Z98.890 OTHER SPECIFIED POSTPROCEDURAL STATES: Chronic | ICD-10-CM

## 2022-01-20 DIAGNOSIS — M25.371 OTHER INSTABILITY, RIGHT ANKLE: ICD-10-CM

## 2022-01-20 DIAGNOSIS — M25.471 EFFUSION, RIGHT ANKLE: ICD-10-CM

## 2022-01-20 DIAGNOSIS — M25.571 PAIN IN RIGHT ANKLE AND JOINTS OF RIGHT FOOT: ICD-10-CM

## 2022-01-20 DIAGNOSIS — M24.271 DISORDER OF LIGAMENT, RIGHT ANKLE: ICD-10-CM

## 2022-01-20 DIAGNOSIS — I48.20 CHRONIC ATRIAL FIBRILLATION, UNSPECIFIED: ICD-10-CM

## 2022-01-20 DIAGNOSIS — Z98.891 HISTORY OF UTERINE SCAR FROM PREVIOUS SURGERY: Chronic | ICD-10-CM

## 2022-01-20 LAB
ANION GAP SERPL CALC-SCNC: 11 MMOL/L — SIGNIFICANT CHANGE UP (ref 5–17)
BUN SERPL-MCNC: 18 MG/DL — SIGNIFICANT CHANGE UP (ref 7–23)
CALCIUM SERPL-MCNC: 9.5 MG/DL — SIGNIFICANT CHANGE UP (ref 8.4–10.5)
CHLORIDE SERPL-SCNC: 100 MMOL/L — SIGNIFICANT CHANGE UP (ref 96–108)
CO2 SERPL-SCNC: 24 MMOL/L — SIGNIFICANT CHANGE UP (ref 22–31)
CREAT SERPL-MCNC: 1.19 MG/DL — SIGNIFICANT CHANGE UP (ref 0.5–1.3)
GLUCOSE SERPL-MCNC: 92 MG/DL — SIGNIFICANT CHANGE UP (ref 70–99)
HCT VFR BLD CALC: 34.2 % — LOW (ref 34.5–45)
HGB BLD-MCNC: 11.3 G/DL — LOW (ref 11.5–15.5)
MCHC RBC-ENTMCNC: 33 GM/DL — SIGNIFICANT CHANGE UP (ref 32–36)
MCHC RBC-ENTMCNC: 33.6 PG — SIGNIFICANT CHANGE UP (ref 27–34)
MCV RBC AUTO: 101.8 FL — HIGH (ref 80–100)
NRBC # BLD: 0 /100 WBCS — SIGNIFICANT CHANGE UP (ref 0–0)
PLATELET # BLD AUTO: 231 K/UL — SIGNIFICANT CHANGE UP (ref 150–400)
POTASSIUM SERPL-MCNC: 3.9 MMOL/L — SIGNIFICANT CHANGE UP (ref 3.5–5.3)
POTASSIUM SERPL-SCNC: 3.9 MMOL/L — SIGNIFICANT CHANGE UP (ref 3.5–5.3)
RBC # BLD: 3.36 M/UL — LOW (ref 3.8–5.2)
RBC # FLD: 12.6 % — SIGNIFICANT CHANGE UP (ref 10.3–14.5)
SODIUM SERPL-SCNC: 135 MMOL/L — SIGNIFICANT CHANGE UP (ref 135–145)
WBC # BLD: 5.73 K/UL — SIGNIFICANT CHANGE UP (ref 3.8–10.5)
WBC # FLD AUTO: 5.73 K/UL — SIGNIFICANT CHANGE UP (ref 3.8–10.5)

## 2022-01-20 PROCEDURE — 80048 BASIC METABOLIC PNL TOTAL CA: CPT

## 2022-01-20 PROCEDURE — 85027 COMPLETE CBC AUTOMATED: CPT

## 2022-01-20 PROCEDURE — G0463: CPT

## 2022-01-20 PROCEDURE — 36415 COLL VENOUS BLD VENIPUNCTURE: CPT

## 2022-01-20 RX ORDER — SPIRONOLACTONE 25 MG/1
1 TABLET, FILM COATED ORAL
Qty: 0 | Refills: 0 | DISCHARGE

## 2022-01-20 RX ORDER — APIXABAN 2.5 MG/1
5 TABLET, FILM COATED ORAL
Qty: 0 | Refills: 0 | DISCHARGE

## 2022-01-20 RX ORDER — ERGOCALCIFEROL 1.25 MG/1
1 CAPSULE ORAL
Qty: 0 | Refills: 0 | DISCHARGE

## 2022-01-20 RX ORDER — FUROSEMIDE 40 MG
1 TABLET ORAL
Qty: 0 | Refills: 0 | DISCHARGE

## 2022-01-20 RX ORDER — METOPROLOL TARTRATE 50 MG
1 TABLET ORAL
Qty: 0 | Refills: 0 | DISCHARGE

## 2022-01-20 NOTE — H&P PST ADULT - HISTORY OF PRESENT ILLNESS
66 year old female with hx of atrial fibrillation, has undergone attempted cardioversion and ablations in the past but with c/o tachycardia, SOB with exertion  and palpitations. Pt presents today for presurgical evaluation for AT Ablation with Tvinci.     Former Smoker (1 PPD x 40 years; Quit in 2017)HOCM s/p septal ablation (2004), Chronic Systolic HF, Lung CA (s/p VATS procedure - Dx 2 years ago s/p chemotherapy x 6 months), PNA Dx. 4 years ago c/b Atrial Fibrillation (on Eliquis) s/p attempted DCCV and CTI Ablation (2020) that was unsuccessful reports that she slipped on ice on the front step of her walkway and fell on 1/13/21. Pt denies LOC. Pt went to [facility] on 1/24/22 s/p imaging revealed a displaced fracture of the lateral malleolus and right fibula. Pt now presents to [facility] for scheduled R ankle ORIF on 1/21/22 with Dr. Espinoza.     Covid PCR test performed 1/18/22 at [facility] lab - Results negative  Denies Recent travel, Exposure or Covid symptoms   Covid vaccine Moderna 2nd dose received 4/21/21 66 year old female with PMH Former Smoker (1 PPD x 40 years; Quit in 2017), HOCM s/p septal ablation (2004), CHF (unsure of EF), Lung CA (Dx 2 years ago s/p chemotherapy x 6 months) and Atrial Fibrillation (on Eliquis) s/p attempted DCCV and CTI Ablation (2020) that was unsuccessful reports that she slipped on ice on the front step of her walkway and fell on 1/13/21. Pt denies LOC and went to [facility] on 1/24/22 s/p imaging revealed a displaced fracture of the lateral malleolus of the right fibula. Pt now presents to PST for scheduled R ankle ORIF on 1/21/22 with Dr. Espinoza.     **Of note, pt's last dose of Eliquis 5 mg was on 1/19/22 in the morning. As per Dr. Espinoza, okay to proceed with procedure on 1/21/22 as long as the patient does not take her eliquis medication going forward.**    Covid PCR test performed 1/18/22 at [facility] lab - Results negative and in chart  Denies Recent travel, Exposure or Covid symptoms   Covid vaccine Moderna 2nd dose received 4/21/21 66 year old female with PMH Former Smoker (1 PPD x 40 years; Quit in 2017), HOCM s/p septal ablation (2004), CHF (unsure of EF), Lung CA (Dx 2 years ago s/p chemotherapy x 6 months) and Atrial Fibrillation (on Eliquis) s/p attempted DCCV and CTI Ablation (2020) that was unsuccessful reports that she slipped on ice on the front step of her walkway and fell on 1/13/21. Pt denies LOC and went to [facility] on 1/24/22 s/p imaging revealed a displaced fracture of the lateral malleolus of the right fibula. Pt now presents to PST for scheduled R ankle ORIF with Syndemotic stabilization  on 1/21/22 with Dr. Espinoza.     **Of note, pt's last dose of Eliquis 5 mg was on 1/19/22 in the morning. As per Dr. Espinoza, okay to proceed with procedure on 1/21/22 as long as the patient does not take her eliquis medication going forward.**    Covid PCR test performed 1/18/22 at [facility] lab - Results negative and in chart  Denies Recent travel, Exposure or Covid symptoms   Covid vaccine Moderna 2nd dose received 4/21/21 66 year old female with PMH Former Smoker (1 PPD x 40 years; Quit in 2017), HOCM s/p septal ablation (2004), CHF (unsure of EF), Lung CA (Dx 2 years ago s/p chemotherapy x 6 months) and Atrial Fibrillation (on Eliquis) s/p attempted DCCV and CTI Ablation (2020) that was unsuccessful reports that she slipped on ice on the front step of her walkway and fell on 1/13/21. Pt denies LOC and went to [facility] on 1/24/22 s/p imaging revealed a displaced fracture of the lateral malleolus of the right fibula. Pt now presents to PST for scheduled R ankle ORIF with Syndemotic stabilization  on 1/21/22 with Dr. Espinoza.     **Of note, pt's last dose of Eliquis 5 mg was on 1/19/22 in the morning. As per Dr. Espinoza, okay to proceed with procedure on 1/21/22 as long as the patient does not take her eliquis medication going forward.** Discussed case with Dr. Chen from Ambulatory surgery, pt will be re-routed to main OR. OR scheduling and surgeon notified.    Covid PCR test performed 1/18/22 at [facility] lab - Results negative and in chart  Denies Recent travel, Exposure or Covid symptoms   Covid vaccine Moderna 2nd dose received 4/21/21 66 year old female with PMH Former Smoker (1 PPD x 40 years; Quit in 2017), HOCM s/p septal ablation (2004), CHF (unsure of EF), Lung CA (Dx 2 years ago s/p chemotherapy x 6 months) and Atrial Fibrillation (on Eliquis) s/p attempted DCCV and CTI Ablation (2020) that was unsuccessful reports that she slipped on ice on the front step of her walkway and fell on 1/13/21. Pt denies LOC and went to [facility] on 1/24/22 s/p imaging revealed a displaced fracture of the lateral malleolus of the right fibula. Pt now presents to PST for scheduled R ankle ORIF with Syndemotic stabilization with Dr. Espinoza (OR date TBD).    **Of note, pt's last dose of Eliquis 5 mg was on 1/19/22 in the morning. As per Dr. Espinoza, okay to proceed with procedure on 1/21/22 as long as the patient does not take her eliquis medication going forward.**     **Discussed case with Dr. Chen from Ambulatory surgery, pt will be re-routed to main OR. OR scheduling and surgeon/coordinator (Esha) notified.    Covid PCR test performed 1/18/22 at [facility] lab - Results negative and in chart  Denies Recent travel, Exposure or Covid symptoms   Covid vaccine Moderna 2nd dose received 4/21/21

## 2022-01-20 NOTE — H&P PST ADULT - FALL HARM RISK - HARM RISK INTERVENTIONS

## 2022-01-20 NOTE — H&P PST ADULT - PRIMARY CARE PROVIDER
Dr. Elsa Reyes [phone]; Last seen 1/17/22 when initially broke right ankle Dr. Elsa Reyes [phone]; Last seen 1/17/22 after mechanical fall

## 2022-01-20 NOTE — H&P PST ADULT - NSICDXPASTSURGICALHX_GEN_ALL_CORE_FT
PAST SURGICAL HISTORY:  H/O cardiac radiofrequency ablation for HOCM with septal ablation in     H/O excision of ganglion cyst at age 19    History of  2001

## 2022-01-20 NOTE — H&P PST ADULT - NSICDXPASTMEDICALHX_GEN_ALL_CORE_FT
PAST MEDICAL HISTORY:  AF (atrial fibrillation) on Eliquis    CAP (community acquired pneumonia) RML/RLL at [facility] 3/2017    Chronic diastolic (congestive) heart failure     Closed displaced fracture of lateral malleolus of right fibula 1/13/22 after slipping and falling on ice pending right ankle ORIF on 1/21/22    Former smoker 1 PPD x 40 years; Quit in 2017    History of shingles 2020    HOCM (hypertrophic obstructive cardiomyopathy) S/P septal ablation (2004)    Lung cancer 2017 s/p chemotherapy x 6 months    Pleural effusion Right Parapneumonic Pleural Effusion; drained twice 3/2017 at [facility]    Viral pneumonia 9/2021     PAST MEDICAL HISTORY:  AF (atrial fibrillation) on Eliquis    CAP (community acquired pneumonia) RML/RLL at [facility] 3/2017    Closed displaced fracture of lateral malleolus of right fibula 1/13/22 after slipping and falling on ice pending right ankle ORIF on 1/21/22    Congestive heart failure (CHF) pt unsure of EF    Former smoker 1 PPD x 40 years; Quit in 2017    History of shingles 2020    HOCM (hypertrophic obstructive cardiomyopathy) S/P septal ablation (2004)    Lung cancer 2017 s/p chemotherapy x 6 months    Pleural effusion Right Parapneumonic Pleural Effusion; drained twice 3/2017 at [facility]    Viral pneumonia 9/2021

## 2022-01-20 NOTE — H&P PST ADULT - PROBLEM SELECTOR PLAN 2
Discussed plan for Eliquis with Dr. Espinoza; Last dose of Eliquis 1/19/22 in the morning; As per Dr. Espinoza, okay to proceed with upcoming procedure tomorrow on 1/21/22 Discussed plan for Eliquis with Dr. Espinoza; Last dose of Eliquis 1/19/22 in the morning  New OR date to be discussed with pt; Pt advised to hold Eliquis 3 days prior to new surgical date

## 2022-01-20 NOTE — H&P PST ADULT - PROBLEM SELECTOR PLAN 1
Pt is scheduled for a right ankle ORIF with Dr. Espinoza on 1/21/22  Covid PCR performed on 1/19/22 at [facility] with Negative Results in chart  CBC, BMP ordered and obtained at PST Pt is scheduled for a right ankle ORIF with Dr. Espinoza (OR Date TBD)  Covid PCR performed on 1/18/22 at [facility] with Negative Results in chart  CBC, BMP ordered and obtained at PST

## 2022-01-24 ENCOUNTER — NON-APPOINTMENT (OUTPATIENT)
Age: 67
End: 2022-01-24

## 2022-01-25 PROBLEM — I48.91 UNSPECIFIED ATRIAL FIBRILLATION: Chronic | Status: ACTIVE | Noted: 2017-06-18

## 2022-01-25 PROBLEM — I50.9 HEART FAILURE, UNSPECIFIED: Chronic | Status: ACTIVE | Noted: 2022-01-20

## 2022-01-25 PROBLEM — I42.1 OBSTRUCTIVE HYPERTROPHIC CARDIOMYOPATHY: Chronic | Status: ACTIVE | Noted: 2017-04-02

## 2022-01-25 PROBLEM — S82.61XA DISPLACED FRACTURE OF LATERAL MALLEOLUS OF RIGHT FIBULA, INITIAL ENCOUNTER FOR CLOSED FRACTURE: Chronic | Status: ACTIVE | Noted: 2022-01-20

## 2022-01-25 PROBLEM — C34.90 MALIGNANT NEOPLASM OF UNSPECIFIED PART OF UNSPECIFIED BRONCHUS OR LUNG: Chronic | Status: ACTIVE | Noted: 2017-06-18

## 2022-01-25 PROBLEM — J90 PLEURAL EFFUSION, NOT ELSEWHERE CLASSIFIED: Chronic | Status: ACTIVE | Noted: 2017-04-02

## 2022-01-25 PROBLEM — Z87.891 PERSONAL HISTORY OF NICOTINE DEPENDENCE: Chronic | Status: ACTIVE | Noted: 2022-01-20

## 2022-01-26 ENCOUNTER — TRANSCRIPTION ENCOUNTER (OUTPATIENT)
Age: 67
End: 2022-01-26

## 2022-02-01 NOTE — H&P PST ADULT - HEIGHT IN FEET
OFFICE VISIT NOTE: FAMILY MEDICINE      CHIEF COMPLAINT    Mary Ellen See is a 61 year old female who presents for   Chief Complaint   Patient presents with   • ER F/U     ER follow up    • Diabetes     follow up for diabetes    • Refill Request     refill requests         HPI  She was in the ED on 1/28/22 for fatigue for the previous 3 days. She was also having dark urine and vaginal itching and she was found to have a UTI but nothing else besides mild hypokalemia.  She was instructed to finish the course of Macrobid and states she no longer has any UTI symptoms. Her creatinine was 1.40 (GFR 47).  On 1/25/22 her creatinine was 2.13 (GFR 29).  She has been taking the hydrochlorothiazide. She is again quite somnolent during her appointment. She says this is because she just got done working 3rd shift. She does sleep during the day when she works nights and catches up on sleep on the weekend.  She doesn't think she snores. She has never had a sleep study.     PROBLEM LIST  Patient Active Problem List   Diagnosis   • Type 2 diabetes mellitus without complication, without long-term current use of insulin (CMS/HCC)   • Essential hypertension   • Idiopathic chronic gout of multiple sites without tophus   • Pure hypercholesterolemia   • Intrinsic eczema   • Postmenopausal bleeding       MEDICAL HISTORY    Past Medical History:   Diagnosis Date   • Arthritis    • Diabetes mellitus (CMS/HCC)    • Essential (primary) hypertension    • High cholesterol    • History of chicken pox      Health Maintenance   Topic Date Due   • COVID-19 Vaccine (1) Never done   • Pneumococcal Vaccine 0-64 (1 of 2 - PPSV23) Never done   • Hepatitis B Vaccine (1 of 3 - Risk 3-dose series) Never done   • DTaP/Tdap/Td Vaccine (1 - Tdap) Never done   • Shingles Vaccine (1 of 2) Never done   • Influenza Vaccine (1) Never done   • Colorectal Cancer Screen-  05/10/2022   • Diabetes A1C  07/25/2022   • Diabetes Eye Exam  07/27/2022   • Depression Screening   09/17/2022   • DM/CKD Microalbumin  01/25/2023   • DM/CKD GFR  01/28/2023   • Diabetes Foot Exam  02/01/2023   • Breast Cancer Screening  06/01/2023   • Cervical Cancer Screen 30-64 -  10/09/2025   • Hepatitis C Screening  Completed   • Meningococcal Vaccine  Aged Out   • HPV Vaccine  Aged Out     Patient Care Team:  Abbie Perales MD as PCP - General (Family Practice)  Zonia Mary MD as Consulting Physician (Obstetrics & Gynecology)    SURGICAL HISTORY    Past Surgical History:   Procedure Laterality Date   • Anterior cruciate ligament repair Right    • Breast reduction surgery     • Knee surgery Left     Arthroscopic   • Tonsillectomy     • Tubal ligation         SOCIAL HISTORY    Social History     Tobacco Use   • Smoking status: Never Smoker   • Smokeless tobacco: Never Used   Vaping Use   • Vaping Use: never used   Substance Use Topics   • Alcohol use: Yes     Comment: Rarely   • Drug use: Never     Review of patient's social economics indicates:   [employer]           [facility] [employer]      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       MEDICATIONS    Current Outpatient Medications   Medication Sig   • metformin (GLUCOPHAGE) 1000 MG tablet TAKE 1 TABLET BY MOUTH TWICE DAILY WITH MEALS   • atorvastatin (LIPITOR) 40 MG tablet Take 1 tablet by mouth once daily   • glipiZIDE (GLUCOTROL) 10 MG tablet TAKE 1 TABLET BY MOUTH TWICE DAILY BEFORE MEAL(S)   • metoPROLOL succinate (TOPROL-XL) 50 MG 24 hr tablet Take 1 tablet by mouth once daily   • triamcinolone (ARISTOCORT) 0.1 % cream Apply topically 2 times daily as needed (Rash).   • clindamycin (CLEOCIN T) 1 % lotion APPLY LOTION TOPICALLY TWICE DAILY. APPLY TO ENTIRE FACE AND NECK LINE   • pioglitazone (Actos) 30 MG tablet Take 1 tablet by mouth daily.   • cloNIDine (CATAPRES) 0.1 MG tablet Take 1 tablet by mouth twice daily   • allopurinol (ZYLOPRIM) 100 MG tablet Take 1 tablet by mouth twice daily   •  ibuprofen (MOTRIN) 800 MG tablet Take 800 mg by mouth 3 times daily as needed.     No current facility-administered medications for this visit.       ALLERGIES    ALLERGIES:   Allergen Reactions   • Codeine GI UPSET   • Erythromycin HIVES   • Penicillins HIVES   • Sulfa Antibiotics HIVES       REVIEW OF SYSTEMS    Review of Systems   Constitutional: Positive for fatigue.   Respiratory: Negative for shortness of breath.    Cardiovascular: Negative for chest pain.   Gastrointestinal: Negative for abdominal pain.       All other systems reviewed and are negative.       PHYSICAL EXAM    Visit Vitals  /68 (BP Location: LUE - Left upper extremity, Patient Position: Sitting, Cuff Size: Regular)   Pulse 86   Temp 97.5 °F (36.4 °C) (Tympanic)   Resp 15   Ht 5' 4\" (1.626 m)   Wt 89.2 kg (196 lb 10.4 oz)   SpO2 98%   BMI 33.76 kg/m²      General:  Well developed, well nourished. In no apparent distress.  Somnolent.   Eyes:  Extraocular movements intact. Conjunctivae pink. Sclerae anicteric.    HENT:  Normocephalic, atraumatic. Bilateral external ears are normal.   Psychiatric: Cooperative. Appropriate mood and affect. Normal judgment.      Diabetic Foot Exam Documentation   Normal Bilateral Foot Exam:  Skin integrity is normal except for dry skin on the bottoms of her feet. Dorsalis pedis pulses are present.  Pressure sensation using the Gwinn-Angie monofilament is present.    ASSESSMENT & PLAN    Chronic fatigue  - THYROID STIMULATING HORMONE REFLEX; Future  I ordered a TSH since this hasn't been done. I offered to order a sleep study and she declined. It's possible she is tired from working third shift but her degree of somnolence is concerning.     Elevated serum creatinine  - COMPREHENSIVE METABOLIC PANEL; Future  If still elevated I would recommend she see Nephrology. I did ask her to stop the hydrochlorothiazide and monitor her BP at work.     Type 2 diabetes mellitus without complication, without long-term  current use of insulin (CMS/[facility])  Her most recent A1c was 6.8, which is excellent. Foot exam done. Continue current medications.     Return in about 7 weeks (around 3/23/2022) for Annual Exam, Follow Up.    Abbie Perales MD  Family Medicine  [facility] [id number]  Phone: [phone]  Fax: [phone]    There are no Patient Instructions on file for this visit.   5

## 2022-02-22 RX ORDER — APIXABAN 5 MG/1
5 TABLET, FILM COATED ORAL
Qty: 90 | Refills: 3 | Status: ACTIVE | COMMUNITY
Start: 2021-02-11 | End: 1900-01-01

## 2022-02-25 NOTE — ED PROVIDER NOTE - NS ED MD DISPO DISCHARGE
Home PAST SURGICAL HISTORY:  H/O arthroscopy of knee x2 15 years ago    H/O umbilical hernia repair 20 years ago    History of colon surgery

## 2022-03-01 NOTE — PATIENT PROFILE ADULT - NSALCOHOLLASTUSEDT_GEN_A_NUR
17-Aug-2018 Acitretin Pregnancy And Lactation Text: This medication is Pregnancy Category X and should not be given to women who are pregnant or may become pregnant in the future. This medication is excreted in breast milk.

## 2022-05-17 NOTE — PATIENT PROFILE ADULT - OVER THE PAST TWO WEEKS HAVE YOU FELT DOWN, DEPRESSED OR HOPELESS?
Section Procedure Note    Indications:   Labor in the setting of breech presentation    Pre-operative Diagnosis:   Patient Active Problem List   Diagnosis    Obesity affecting pregnancy in third trimester    Tobacco smoking affecting pregnancy in third trimester    Cervical shortening, second trimester    Right flank discomfort    Hematuria    Thrombocytopenia ([address] )    Breech presentation    35 weeks gestation of pregnancy    Prenatal care in third trimester    GBS (group B Streptococcus carrier), +RV culture, currently pregnant       Post-operative Diagnosis:   1LTCS  Patient Active Problem List   Diagnosis    Obesity affecting pregnancy in third trimester    Tobacco smoking affecting pregnancy in third trimester    Cervical shortening, second trimester    Right flank discomfort    Hematuria    Thrombocytopenia ([address] )    Breech presentation    35 weeks gestation of pregnancy    Prenatal care in third trimester    GBS (group B Streptococcus carrier), +RV culture, currently pregnant       Attending: Candido Orosco DO  Resident: Tiki Hamilton MD    Maternal Findings:  Normal uterus  Normal tubes and ovaries bilaterally  No adhesions     Findings:  Viable male weighing 7lbs 12 5oz;  Apgar scores of 3 at one minute, 6 at five minutes, and 8 at 10 minutes  Meconium stained amniotic fluid  Normal placenta with 3-vessel cord inserted centrally    Arterial and Venous Gases:  Umbilical Cord Venous Blood Gas:  Results from last 7 days   Lab Units 22  1816   PH COV  7 373   PCO2 COV mm HG 41 5   HCO3 COV mmol/L 23 6   BASE EXC COV mmol/L -1 6*   O2 CT CD VB mL/dL 7 4   O2 HGB, VENOUS CORD % 62 0     Umbilical Cord Arterial Blood Gas:  Results from last 7 days   Lab Units 22  1816   PH COA  7 286   PCO2 COA  46 5   PO2 COA mm HG <10 0   HCO3 COA mmol/L 21 7   BASE EXC COA mmol/L -5 1*   O2 HGB, ARTERIAL CORD % 8 3       Specimens: Arterial and venous cord gases, cord blood, segment of umbilical cord, placenta to storage    Quantitative Blood Loss: 822 mL    Drains: Fernandez catheter           Complications:  None; patient tolerated the procedure well  Disposition: PACU            Condition: stable    Brief Labor Course:   Patient was admitted for labor  She presented to triage in was 6 cm dilated and intact  Patient was confirmed to be breech  Decision was made to proceed with a primary low transverse  section in the setting of labor for breech presentation  Patient and family agreed to plan  Procedure Details   The patient was seen prior to the procedure  Risks, benefits, possible complications, alternate treatment options, and expected outcomes were discussed with the patient  The patient agreed with the proposed plan and gave informed consent for a primary low transverse  section  The patient was taken to the [facility] Operating Room where she received spine anesthesia  For infection prophylaxis, she received 2 g Ancef preoperatively  Fetal heart tones in the OR were assessed and noted to be within normal limits (reassuring, 140s) and a Fernandez catheter and SCDs were placed  The abdomen and vagina was prepped with Chloraprep, and following appropriate drying time, the patient was draped in the usual sterile manner  A Time Out was held and the above information confirmed  The patient was identified as Emmanuel Wayne and the procedure verified as a  Delivery for primary low transverse  section in the setting of breech presentation in labor  A Pfannenstiel incision was made and carried down through the underlying subcutaneous tissue to the fascia using a scalpel  The rectus fascia was then nicked in the midline and dissected laterally using Sean-Castorena method  The rectus muscles were  and the peritoneum was identified, entered, and extended longitudinally with blunt dissection   The bladder blade was inserted  A low transverse uterine incision was made with the scalpel and extended laterally with blunt dissection  The amnion was entered bluntly  Surgeons hand was inserted through the hysterotomy and the fetal sacrum was palpated, elevated, and delivered through the uterine incision with the assistance of gentle fundal pressure  Fetal body was delivered to the level of the scapula, after which time the legs delivered spontaneously  The left, anterior arm was palpated, elbow flexed, and arm swept down and across the chest for delivery  The contralateral arm was palpated and delivered in similar fashion  The fetal zygomatic arches were palpated and fetal head was flexed for delivery  There was noted to be spontaneous cry and good tone  There was no apparent injury to the   The umbilical cord was doubly clamped and cut after 30 seconds to allow for delayed cord clamping  The infant was handed off to the  providers  Arterial and venous cord gases, cord blood, and a segment of umbilical cord were obtained for evaluation  The placenta delivered spontaneously with uterine fundal massage and appeared normal  The uterus was exteriorized and cleaned out with a moist lap sponge  The uterine incision was closed with a running locked suture of 0 Monocryl  A second layer of the same suture was used to imbricate the first   Hemostasis was noted to be excellent  The uterus was returned to the abdomen  The paracolic gutters were inspected and cleared of all clots and debris with moist lap sponges  The peritoneum was closed with a running stitch of 3-0 plain gut  The fascia was closed with a running suture of 0 Vicryl  Subcutaneous adipose tissue was closed with an interrupted suture of 2-0 Plain gut  The skin was closed with a subcuticular running suture of 4-0 Monocryl Stratafix  Exofin was applied  The patient appeared to tolerate the procedure very well    Lap sponge, needle, and instrument counts were correct x2  The patient was transferred to her postpartum recovery room in stable condition and her infant went to the  nursery  Attending Attestation: Dr Tiffanie Wise, DO was present for the entire procedure  Phylicia Coulter MD  OB/GYN PGY-1  2022  6:58 PM Pt stated "over the last year"/yes

## 2022-05-25 ENCOUNTER — APPOINTMENT (OUTPATIENT)
Dept: ELECTROPHYSIOLOGY | Facility: CLINIC | Age: 67
End: 2022-05-25

## 2022-06-15 DIAGNOSIS — F03.90 UNSPECIFIED DEMENTIA W/OUT BEHAVIORAL DISTURBANCE: ICD-10-CM

## 2022-06-23 ENCOUNTER — APPOINTMENT (OUTPATIENT)
Dept: NUCLEAR MEDICINE | Facility: IMAGING CENTER | Age: 67
End: 2022-06-23

## 2022-07-09 NOTE — PATIENT PROFILE ADULT - FALL HARM RISK TYPE OF ASSESSMENT
return if symptoms persist or gets worse  follow up ct surgery and primary care doctor   continue taking medications admission

## 2023-01-04 NOTE — PHYSICAL THERAPY INITIAL EVALUATION ADULT - DIAGNOSIS, PT EVAL
Hematology/Oncology  Progress Note    Name: Evin Alanis  Date: 2023  : 1961    RADHA Richmond     Mr. Daria Smiley is a 63y.o. year old male who was seen for recurrent GIST. Subjective:     Mr. Daria Smiley is a 65-year-old man who was diagnosed with a pulmonary embolus in May of 2016. Patient was being followed by Dr. Cornelia Hernandez who retired. He states that he is taking Xarelto 20mg PO daily. He admits smoking 1 ppd from 1 /2ppd and states he is willing to cut back some more until he totally quits. He went to abdominal wall biopsy recently which confirmed recurrent GIST. He has hx of GIST in . He reported he was taking Gleevec for about one year after surgery at that time. He has started [facility] since 2021. Today he denied significant pain or vomiting or altered bowel movements. He is an active smoker. He did not receive his phlebotomy for months for his polycythemia. Today he reported doing stable with Gleevec. He states he is taking Gleevec as advised. He reported his abdominal pain has significantly improved. He still fu Surgery for hernia. He denied fever, chills, night sweat, unintentional weight loss, skin lumps or bumps, acute bleeding or bruising issues. No acute bleeding, blood in stool, dark stool, melena, hematochezia, hemoptysis, dark urine, or easily bruising. Denied headache, acute vision change, dizziness, chest pain, shortness of breath, palpitation, productive cough, vomiting, abdominal pain, altered bowel habits, dysuria, new bone pain or back pain, focal numbness or weakness. Past medical history, family history, and social history: these were reviewed and remains unchanged.     Past Medical History:   Diagnosis Date    CAD (coronary artery disease)     Carpal tunnel syndrome     COPD     Depression     H/O echocardiogram 2017    EF 35-40%, mild LVH, biatrial enlargement, mild TR, RVSP 41    Hemorrhoids     History of esophagitis     History of malignant gastrointestinal stromal tumor (GIST)     Hypercholesterolemia     Hypertension     Myocardial infarction [facility])     Pulmonary embolus [facility]) June 2016    Bilateral    S/P CABG x 3 2009    LIMA to LAD, SVG to RPDA, SVG to diagonal    S/P cardiac catheterization November 2011    Patent GALLAGHER to LAD, patent SVG to diet, and occluded SVG to RPDA,  native LAD 85% proximal stenosis, left circumflex 20% diffuse disease, RCA distal 70% stenosis,, EF 45%    Secondary polycythemia      Past Surgical History:   Procedure Laterality Date    HX CORONARY STENT PLACEMENT      HX CYST REMOVAL      HX GI      tumor near stomach    HX HEENT      deviated septum    HX HEMORRHOIDECTOMY      HX ORTHOPAEDIC      knee injury right    HX OTHER SURGICAL      resection of mesenteric mass    HI UNLISTED PROCEDURE CARDIAC SURGERY      stent     Social History     Socioeconomic History    Marital status:      Spouse name: Not on file    Number of children: Not on file    Years of education: Not on file    Highest education level: Not on file   Occupational History    Not on file   Tobacco Use    Smoking status: Every Day     Packs/day: 1.50     Types: Cigarettes    Smokeless tobacco: Never   Vaping Use    Vaping Use: Never used   Substance and Sexual Activity    Alcohol use: Yes     Comment: \"I haven't drank in 2 months, but I was drinking about 12 pk beer per week\"     Drug use: No    Sexual activity: Not on file   Other Topics Concern    Not on file   Social History Narrative    Not on file     Social Determinants of Health     Financial Resource Strain: Not on file   Food Insecurity: Not on file   Transportation Needs: Not on file   Physical Activity: Not on file   Stress: Not on file   Social Connections: Not on file   Intimate Partner Violence: Not on file   Housing Stability: Not on file     History reviewed. No pertinent family history.   Current Outpatient Medications   Medication Sig Dispense Refill    imatinib (Gleevec) 400 mg tablet Take one tab by mouth daily with a large glass of water 30 Tablet 3    ramipriL (ALTACE) 10 mg capsule take 1 capsule by mouth once daily 90 Capsule 3    hydroCHLOROthiazide (HYDRODIURIL) 25 mg tablet take 1 tablet by mouth once daily 30 Tablet 6    metoprolol tartrate (LOPRESSOR) 25 mg tablet take 1 tablet by mouth twice a day 180 Tablet 3    aspirin (ASPIRIN) 325 mg tablet Take 325 mg by mouth daily. traZODone (DESYREL) 150 mg tablet Take 150 mg by mouth nightly. Review of Systems   Constitutional:  Negative for chills, diaphoresis, fever, malaise/fatigue and weight loss. Respiratory:  Negative for cough, hemoptysis, shortness of breath and wheezing. Cardiovascular:  Negative for chest pain, palpitations and leg swelling. Gastrointestinal:  Negative for abdominal pain, diarrhea, heartburn, nausea and vomiting. Genitourinary:  Negative for dysuria, frequency, hematuria and urgency. Musculoskeletal:  Negative for joint pain and myalgias. Skin:  Negative for itching and rash. Neurological:  Negative for dizziness, seizures, weakness and headaches. Psychiatric/Behavioral:  Negative for depression. The patient does not have insomnia. Objective:     Visit Vitals  /87   Pulse (!) 56   Resp 18   Ht 5' 11\" (1.803 m)   Wt 106.8 kg (235 lb 6.4 oz)   SpO2 95%   BMI 32.83 kg/m²         ECOG Performance Status (grade): 0  0 - able to carry on all pre-disease activity w/out restriction  1 - restricted but able to carry out light work  2 - ambulatory and can self- care but unable to carry out work  3 - bed or chair >50% of waking hours  4 - completely disable, total care, confined to bed or chair    Physical Exam  Constitutional:       General: He is not in acute distress. HENT:      Head: Normocephalic and atraumatic. Eyes:      Pupils: Pupils are equal, round, and reactive to light. Cardiovascular:      Pulses: Normal pulses.       Heart sounds: Normal heart sounds. No murmur heard. Pulmonary:      Effort: Pulmonary effort is normal. No respiratory distress. Breath sounds: Normal breath sounds. Abdominal:      General: Bowel sounds are normal. There is no distension. Palpations: Abdomen is soft. There is no mass. Tenderness: There is no abdominal tenderness. There is no guarding. Musculoskeletal:         General: No swelling or tenderness. Cervical back: Neck supple. No rigidity. Lymphadenopathy:      Cervical: No cervical adenopathy. Skin:     General: Skin is warm. Findings: No rash. Neurological:      Mental Status: He is alert and oriented to person, place, and time. Mental status is at baseline. Cranial Nerves: No cranial nerve deficit. Psychiatric:         Mood and Affect: Mood normal.        Diagnostics:      No results found for this or any previous visit (from the past 96 hour(s)). Imaging:  No results found for this or any previous visit. Results for orders placed during the hospital encounter of 05/31/16    XR SWALLOW FUNC VIDEO    Narrative  Modified barium swallow with video recording:        INDICATION:    Dysphagia. The study has been performed under supervision of speech therapist.    The patient swallowed thin liquid but there is been deep penetration almost to  the level of vocal cord. Then patient swallowed thin liquid food with small sips  and with chin-tucked position. At  this time, there has been no penetration or  aspiration of thin liquid into larynx. Then patient swallowed barium tablet. There has  been penetration of thin liquid used for swallowing tablet into larynx. There  is been no evidence of stagnation of tablet in hypopharynx or in upper  esophagus. Patient swallowed puree and cracker normally without penetration or  nasopharyngeal into larynx. Fluoroscopy time utilized is 48 seconds. No fluoroscopy images is obtained. Impression  :     At first there is penetration of thin liquid into larynx as described. But when  the patient swallowed thin liquid in small sips with chin tuck position, there  is been no penetration or aspiration into larynx. Patient swallowed barium tablet but there has  been penetration of the liquid  used with a tablet. Swallowing of puree and cracker has been normal.      Results for orders placed in visit on 09/06/22    CT CHEST ABD PELV W CONT    Narrative  CT CHEST ABDOMEN AND PELVIS WITH CONTRAST        COMPARISON:  CT/CT June 2022 and earlier studies . INDICATIONS:    Gastrointestinal stromal tumor, metastatic. Following the uneventful administration of  oral contrast and  100 cc of Isovue  300 scanning of the chest, abdomen and pelvis is performed with a multislice  scanner. Coronal sagittal and axial reconstructions were created from the 3 D  data set. FINDINGS:    CT CHEST FINDINGS:    Thyroid/Base Of Neck: Normal.    Lungs:  Clear. .  Pleural Spaces:  Unremarkable. Chest Wall:  No breast mass is evident. No axillary lymphadenopathy is evident. Mediastinum, Kell, Great Vessels, Heart:  Unremarkable. CT ABDOMEN FINDINGS:    Liver/Biliary: Small hepatic cysts unchanged. No new lesions evident. Unremarkable biliary tree    Spleen: Normal.    Adrenal Glands: Small low-attenuation nodules unchanged. Kidneys: Normal.    Pancreas: Normal.    Stomach, Small Bowel, appendix, and Colon: Normal.    There is no significant adenopathy. The abdominal aorta is unremarkable. The IVC is unremarkable. Peritoneal Spaces: There is no free fluid or free air. A soft tissue mass  posterior to the left rectus muscle inferiorly on axial image 136 is decreased  from 3.8 x 2.0 cm to 3.6 x 1.5 cm. Abdominal Wall: A left abdominal wall mass on axial image 121 previously  measured 2.9 x 3.9 cm, now essentially unchanged at 3 x 4.1 cm. This measured  2.4 x 3.6 cm in January.  The marked interval decrease in size of other lesions  following January was not evident in this lesion. . Thickening of the anterior  abdominal wound/scarring show some subtle residual nodularity and areas of  previous lobular mass lesions. Bladder: Unremarkable. Osseous Structures Of The Chest, Abdomen And Pelvis: Unremarkable for age. Impression  The marked interval improvement which was apparent on June 2022 has persistent. There is been slight further decrease in size of the persistent nodule posterior  to the left rectus muscle. The majority of the previous multifocal lesions are  barely discernible. A left anterior abdominal nodule shows differing behavior  than the remainder of the disease-slightly increased from January and not  significantly changed from June. Significance of this behavior is uncertain. Overall the evidence of response to therapy evident in June is preserved    . All CT scans at this facility are performed using dose optimization technique as  appropriate to the performed exam, to include automated exposure control,  adjustment of the mA and/or kV according to patient's size (Including  appropriate matching for site-specific examinations), or use of iterative  reconstruction technique. PATHOLOGY  12/3/2020 ANTERIOR ABDOMINAL WALL MASS, BIOPSY:   GASTROINTESTINAL STROMAL TUMOR. GASTROINTESTINAL STROMAL TUMOR (GIST): Biopsy   SPECIMEN   Procedure: Core needle biopsy   TUMOR   Tumor Site: Anterior abdominal wall   Histologic Type: Gastrointestinal stromal tumor, spindle cell type   Histologic Grade: G1: Low grade; mitotic rate <= 5 / 5 mm2   Mitotic Rate: 1 mitoses per 5 mm2   Necrosis: Not identified   Risk Assessment: Cannot be determined: Recurrent   Treatment Effect: No known prebiopsy therapy     DIAGNOSIS COMMENT:   Given the patient's history of gastrointestinal stromal tumor, the findings are consistent with recurrent disease.     4/5/2022  Surgical Pathology Report                         Case: [id number] Authorizing Provider:  Naomi Gupta MD      Collected:           04/05/2022 0566               [facility]    Received:            04/06/2022 [address] [place] [address]                                                     Pathologist:           David Drew MD                                                             Specimens:   A) - Epigastrium, Subcutaneous epigastric tumor in 10% Formalin                                      B) - Abdomen, Subcutaneous mass mid-abdomen in 10% Formalin                                          C) - Abdomen, Subcutaneous mass mid-abdomen #2 in 10% Formalin                                      D) - Abdomen, Intra abdominal mass mid-abdomen in 10% Formalin                                      E) - Abdomen, Intra abdominal mass mid-abdomen #2 in 10% Formalin                                    F) - Abdomen, Intra abdominal mass mid-abdomen #3 in 10% Formalin                                    G) - Abdomen, Intra abdominal mass mid-abdomen #4 in 10% Formalin                                    H) - Abdomen, Intra abdominal mass mid-abdomen #5 in 10% Formalin                                    I) - Tissue, Pericolonic mass in 10% Formalin                                                        J) - Suprapubic, suprapubic mass in 10% formalin                                                    K) - Suprapubic, suprapubic nodule mass in 10% formalin                                              L) - Abdomen, intra abdominal mass in 10% formalin                                                  M) - Suprapubic, suprapubic mass in #2 in 10% formalin                                              N) - Suprapubic, suprapubic mass #3 in 10% formalin                                                  O) - Sigmoid, Sigmoid nodule in 10% formalin                                                        P) - Nodule, left lower quadrant nodule in 10% formalin. Q) - Tissue, left peritoneal nodule in 10% formalin. R) - Abdominal Wall, abdominal wall mass in 10% formalin. S) - Colon, colon mass in 10% formalin. T) - Mesentery, mesenteric mass in 10% formalin. U) - Omentum, omental nodule in 10% formalin. V) - Mesentery, mesenteric nodule in 10% formalin. W) - Tissue, right upper quadrant mass in 10% formalin. X) - Diaphragm, Right diaphragm mass in 10% formalin. Y) - Small Bowel, Small bowel                                                                        Z) - Diaphragm, Left Diaphragm                                                                      AA) - Nodule, subcutaneous nodule in 10% formalin. Diagnosis     A.  SUBCUTANEOUS EPIGASTRIC TUMOR, EXCISION:          -  METASTATIC GASTROINTESTINAL STROMAL TUMOR (GIST), 2.9 CM. B.  SUBCUTANEOUS MASS, MID ABDOMEN, EXCISION:          -  METASTATIC GASTROINTESTINAL STROMAL TUMOR (GIST), 5.9 CM WITH HEMORRHAGE AND THE TUMOR NECROSIS. C.  SUBCUTANEOUS MASS, MID ABDOMEN #2, EXCISION:          -  METASTATIC GASTROINTESTINAL STROMAL TUMOR (GIST), 7.5 CM. D.  INTRAABDOMINAL MASS, MID ABDOMEN, EXCISION:          -  METASTATIC GASTROINTESTINAL STROMAL TUMOR (GIST), 9.2 CM WITH FOCAL HEMORRHAGE AND NECROSIS. E.  INTRA-ABDOMINAL MASS, MID ABDOMEN, #2, EXCISION:          -  METASTATIC GASTROINTESTINAL STROMAL TUMOR (GIST), 3.7 CM WITH HEMORRHAGE.      F.  INTRA-ABDOMINAL MASS, MID ABDOMEN #3, EXCISION:           -  METASTATIC GASTROINTESTINAL STROMAL TUMOR (GIST), 4.3 CM WITH TUMOR NECROSIS AND HEMORRHAGE. G.  INTRA-ABDOMINAL MASS, MID ABDOMEN #4, EXCISION:           -  FOCI OF METASTATIC GASTROINTESTINAL STROMAL TUMOR (GIST) , 1.7 AND 7.5 CM, FOCALLY HEMORRHAGIC AND NECROTIC. H.  INTRA-ABDOMINAL MASS, MID ABDOMEN #5, EXCISION:            -  METASTATIC GASTROINTESTINAL STROMAL TUMOR (GIST), 8.5 CM WITH HEMORRHAGE AND SEVERAL MICROSCOPIC TUMOR FOCI ADJACENT TO THE TUMOR MASS. I.   PERICOLONIC MASS, EXCISION:            - METASTATIC GASTROINTESTINAL STROMAL TUMOR (GIST), 4.7 CM. J.  SUPRAPUBIC MASS, EXCISION:            - METASTATIC GASTROINTESTINAL STROMAL TUMOR (GIST), 7.8 CM WITH CYSTIC CHANGE.     K.  SUPRAPUBIC NODULE MASS, EXCISION:            - METASTATIC GASTROINTESTINAL STROMAL TUMOR (GIST), 5.5 CM WITH CYSTIC CHANGE.     L.  INTRAABDOMINAL MASS, EXCISION:           -  METASTATIC GASTROINTESTINAL STROMAL TUMOR (GIST), 17 CM CM WITH TUMOR NECROSIS AND HEMORRHAGE. Darlis Blade MASS #2, EXCISION:           -  METASTATIC GASTROINTESTINAL STROMAL TUMOR (GIST), 7.5 CM AT THE END. Floria Rust #3, EXCISION:           -  METASTATIC GASTROINTESTINAL STROMAL TUMOR (GIST), 6.7 CM AT THE END. O.  SIGMOID NODULE, EXCISION:                 -  METASTATIC GASTROINTESTINAL STROMAL TUMOR (GIST), 2,9 CM WITH MARKED NECROSIS/HYALINIZATION OF TUMOR. P.   LEFT LOWER QUADRANT NODULE, EXCISION:            -  METASTATIC GASTROINTESTINAL STROMAL TUMOR (GIST), 11.3 CM WITH TUMOR NECROSIS AND HEMORRHAGE. Q.  LEFT PERITONEAL NODULE, EXCISION:            -  METASTATIC GASTROINTESTINAL STROMAL TUMOR (GIST), 2.7 CM. R.  ABDOMINAL WALL MASS, EXCISION:            -  METASTATIC GASTROINTESTINAL STROMAL TUMOR (GIST), 5.5 CM WITH CYSTIC CHANGE AND HEMORRHAGE.       S.  COLON MASS, EXCISION:            -  METASTATIC GASTROINTESTINAL STROMAL TUMOR (GIST), TWO FOCI, ONE MEASURE 0.5 CM  AND THE OTHER 0.1 CM. T.   MESENTERIC MASS, EXCISION:             -  METASTATIC GASTROINTESTINAL STROMAL TUMOR (GIST), UP TO 9.7 CM WITH EXTENSIVE TUMOR NECROSIS, HEMORRHAGE AND FOCAL CYSTIC CHANGE. U.   OMENTAL NODULE, EXCISION:             -  METASTATIC GASTROINTESTINAL STROMAL TUMOR (GIST), UP TO 5.0 CM WITH EXTENSIVE TUMOR NECROSIS, HEMORRHAGE AND FOCAL CYSTIC CHANGE.     V.  MESENTERIC NODULE, EXCISION:             -  METASTATIC GASTROINTESTINAL STROMAL TUMOR (GIST), 0.4 CM. W.  RIGHT UPPER QUADRANT MASS, EXCISION:             -  METASTATIC GASTROINTESTINAL STROMAL TUMOR (GIST), UP TO 4.7 CM WITH MARKEDLY HEMORRHAGE AND CYSTIC CHANGE. X.    RIGHT DIAPHRAGM MASS, EXCISION:             -  METASTATIC GASTROINTESTINAL STROMAL TUMOR (GIST), UP TO 3.5 CM WITH EXTENSIVE TUMOR NECROSIS, HEMORRHAGE AND FOCAL CYSTIC CHANGE. Y.  SMALL BOWEL. RESECTION:            -  SEGMENT OF SMALL INTESTINE WITH FOCAL ADHESION AND SEROSITIS, NO TUMOR SEEN. Z.  LEFT DIAPHRAGM, EXCISION:            -  METASTATIC GASTROINTESTINAL STROMAL TUMOR (GIST), UP TO 1 CM. AA.  SUBCUTANEOUS NODULE, EXCISION:              -  METASTATIC GASTROINTESTINAL STROMAL TUMOR (GIST), UP TO 3.7 CM WITH FOCAL CYSTIC CHANGE.         (RP:ao)     NOTE:  Original GIST biopsy/resection was done at an outside hospital and the primary site is small intestine per clinical history. The morphological features and clinical presentation of this tumor are those of a malignant GIST. Electronic Signature: Licha Balderrama MD, PhD               Diagnosis:     1. Gastrointestinal stromal tumor ([facility] [address]    2. Polycythemia    3. On antineoplastic chemotherapy    4. Tobacco use    5. Encounter for antineoplastic chemotherapy    6. Abdominal mass, unspecified abdominal location          Assessment and Plan:     Recurrent GIST  Abdominal wall mass  History of GIST 2010:  -- History of GIST, Dx 2010.  S.p adjuvant Imatinib for about 1 year decreased functional mobility reportedly. -- 9/18/2020 CT reported extensive abdominal and pelvic mesenteric masses with some masses directly extending and penetrating through the anterior abdominal wall reflective of the given history of bulge. There are some regions of benign herniation but the vast majority is malignant. Findings may be primary and/or metastatic and within the spectrum of carcinomatosis. Left adrenal nodule new since prior exam. Metastasis is not excluded. Stable right adrenal nodule. Stable hepatic hypodensities. -- 12/3/2020 Abdominal wall biopsy confirmed recurrent GIST.  -- 1/14/2021 NGS Tumor [facility] reported KIT mutated variant exon 11, MSI stable, NTRK 1/2/3 fusion not detected, TMB low 3mut/Mb, BRAF/PDGFRA/NF1/SDHA/SDHB/SDHC/SDSD mutations not detected. Due to granular background staining, interpretation of PDL 1 immunohistochemical stain is not possible. -- He has seen Dr. Carlos Luque for initial evaluation of resection. I have discussed with Dr. Henri Almaraz who suggested to start neoadjuvant TKIs prior to surgical resection. He was not interested in XRT at this point. 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 -- Since 2/16/2021 he was started Imatinib at 400 mg daily. He has tolerated therapy without high graded toxicities. He reported his tumors became smaller with therapy. -- 4/7/2021 CT C/A/P reported multiple abdominal and pelvic peritoneal masses mostly show mildly decrease in size and much decreased peripheral enhancing components since the prior CT, suggesting partial response to the treatment. No new lesion seen. -- Since his last visit on 5/26/2021, the patient did not keep follow up visit with our clinic. The patient reported he took [address] as 7 days on/3 days off (due to worsening headache) prior to his surgical resection. He stated she stopped Gleevec about 2 months before surgery. He was off Xarelto since started [address].    -- 4/5/2022 s/p exploratory laparotomy, small bowel resection, bladder repair, left ureterolysis, and excision of multiple abdominal and subcutaneous tissue tumors. + Post-operatively his paz was left in following his case since he underwent a bladder repair. The patient had a severe lung disease/ hypoxia-related symptoms that improves with oxygen therapy.  + Pathology report as above, multiple tumor deposits. -- 6/9/2022 Brain MRI reported no finding for metastatic disease.  -- 6/9/2022 Restaging PET reported residual hypermetabolic metastatic deposits to include the anterior left pelvis omentum and left abdominal wall.  + Hypermetabolic soft tissue left retroperitoneum between the psoas muscle and common iliac chain. May represent residual metastatic tumor deposit however the ureter is in this area and could be a potential cause for false positive.  + There is soft tissue thickening with less intense but moderately increased metabolic activity in the midline at site of surgical incision and metastatic mass on prior CT, interval surgically excised. Residual malignant tumor in this area versus postsurgical change in the differential.  + There is very intense metabolic activity at the anus. This could be urologic however correlation with physical examination suggested to help exclude possibility of underlying tumor or inflammatory process in this area.  + No finding for FDG avid metastatic disease in the chest.  -- He states he will f/u Dr. Jere Villareal and [facility] for a very intense metabolic activity at the anus found on recent PET. Denied any symptomatic issues. -- At previous visit we have discussed with the patient about systemic therapy with TKI sunitinib. he refused Sunitinib given concerned S/Es. He opted to resume Gleevec at full dose. He states his cluster headache appeared unpredictable in the past and likely not related to [address].    -- Tumor profile [facility] report review (in media)  -- 9/20/2022 CT reported: the marked interval improvement which was apparent on June 2022 has persistent. There is been slight further decrease in size of the persistent nodule posterior  to the left rectus muscle. The majority of the previous multifocal lesions are barely discernible. A left anterior abdominal nodule shows differing behavior than the remainder of the disease-slightly increased from January and not significantly changed from June. Significance of this behavior is uncertain. Overall the evidence of response to therapy evident in June is preserved. -- Today he reported doing stable with Gleevec. He states he is taking Gleevec as advised. He reported his abdominal pain has significantly improved. He still fu Surgery for hernia. Recent labs reviewed. Plan:  -- He will continue Imatinib 400 mg once daily until disease progression or unacceptable toxicity. Educated patient the importance of medical adherence and compliance. -- Restaging CT CAP  -- Lab check CBC, CMP  -- He will f/u Dr. Gennaro Glnyn and  as needed   -- Advised to contact us if any issues or concerns. -- He will RTC 2 months. Always sooner if required. Cluster headache/ Intractable headache  -- Advised to f/u Neurology for long term control. -- Brain MRI as above  -- Short-course Prednisone will be prescribed if worsening headache  -- Denied any worsening issues at this time. CNIV  -- Zofran PRN      Pulmonary embolus  -- He was diagnosed with one-time pulmonary embolus in May of 2016. Patient was being followed by Dr. Kingsley Almaguer who retired recently. He states that he is taking Xarelto 20mg PO daily since then. Thrombophilia w/u was negative reportedly. -- 10/12/2020 CTA reported no evidence of pulmonary embolism. -- Xarelto was d/c. Patient was advised to continue on ASA 81 mg for thromboprophylaxis. Polycythemia  -- He is an active smoker. He did not receive his phlebotomy for months for his polycythemia.   Negative (wild type) for the JAK2 V617F.   -- His polycythemia was likely secondary 2/2 smoking, COPD. Recent CBC on 8/11/2022  showed improving H/H, 14.5/44. 1. I have counseled him on smoking cessation. -- Monitor CBC periodically      No orders of the defined types were placed in this encounter. All of patient's questions answered to their apparent satisfaction. They verbally show understanding and agreement with aforementioned plan. Dulce Grissom MD          Parts of this document has been produced using Dragon dictation system. Unrecognized errors in transcription may be present. Please do not hesitate to reach out for any questions or clarifications.       CC: RADHA Richmond;

## 2023-01-24 NOTE — H&P ADULT. - PROBLEM/PLAN-5
Wt Readings from Last 3 Encounters:   01/24/23 111 kg (245 lb 6 oz)   01/19/23 110 kg (243 lb 6 2 oz)   12/30/22 121 kg (265 lb 10 5 oz)     ECHO on 1/16 showed EF 65%, no RWMA, G1DD  Treated with Lasix in acute setting for volume overload  BNP 4475 on admission  Continue Lasix 40mg daily  Monitor daily weights and I&Os  Continue 1500FR  DISPLAY PLAN FREE TEXT

## 2023-01-31 NOTE — H&P ADULT - NSHPREVIEWOFSYSTEMS_GEN_ALL_CORE
Constitutional Symptoms: No weakness, fevers, chills, weight loss  Eyes: No visual changes, headache, eye pain, double vision  Ears, Nose, Mouth, Throat: No runny nose, sinus pain, ear pain, tinnitus, sore throat, dysphagia, odynophagia  Cardiovascular: No chest pain, palpitations, edema  Respiratory: No cough, wheezing, hemoptysis, shortness of breath  Gastrointestinal: No abdominal pain, dysphagia, anorexia, nausea/vomiting, diarrhea/constipation, hematemesis, BRBPR, melena  Genitourinary: No dysuria, frequency, hematuria  Musculoskeletal: No joint pain, joint swelling, decreased ROM  Skin: +erythema, wounds  Neurologic:  No seizures, headache, paraesthesia, numbness, limb weakness    Positives and pertinent negatives noted and all other systems negative. Retention Suture Text: Retention sutures were placed to support the closure and prevent dehiscence.

## 2023-02-07 NOTE — ED ADULT TRIAGE NOTE - RESPIRATORY RATE (BREATHS/MIN)
Patient is in the lateral left side position.   The body was positioned using the following devices: wedge.  Pt positioned self independently
18

## 2023-02-15 NOTE — CONSULT NOTE ADULT - ATTENDING COMMENTS
Erica Horner is a 65 year old woman with history of HOCM s/p septal ablation, afib on warfarin, HFpEF (EF 55% 9/2019) and lung cancer (diagnosed 2 years ago, s/p chemo, in remission). She presented with pyelonephritis s/p ceftriaxone x 3 days, now complicated by afib at 113bpm associated with shortness of breath. Serum Pro-Brain Natriuretic Peptide 2212 pg/mL on 12/26/2020. Grossly normal left ventricular systolic function, with basal septal hypetrophy noted on TTE 12/22/2020.  CT angio of chest ordered to assess for pulmonary embolism. [id number] score 4 points. However, long term anticoagulation is warranted in all patients with HCM (independent of score). HR control is important for all patients with HCM and AF with either beta blocker of non-dihydropyridine CCB, Maintain warfarin with INR goal is 2-3. Direct oral anticoagulants (e.g. dabigatran, rivaroxaban, apixaban) may also be used. Maintain diltiazem (Cardizem) 60 mg oral every 6 hours
65yoF w/ PMHx afib on coumadin, HFpEF, GERD, HLD, HOCM s/p septal ablation, lung Ca s/p chemo, MR and TR, HTN initially presented with coughing likely in setting of emphysema versus COPD improving neb treatment and lasix.  Patient states she is feeling better but is concerned about her heart rate.  On telemetry, patient's atrial fibrillation remains rate controlled 90's while at rest but 110-120's while ambulating.  Patient states when she feels palpitations at home, she will check her HR on her pulse oximeter and sometimes it would be in the 130's.  Also states she sometimes feel chest pressure with activity.  Last LHC done in 11/18 showed normal coronaries.  TTE done during this admission showed moderate MR, severely dilated LA, severe RA enlargement, mod TR, normal LV systolic function. Plan for RICCARDO/DCCV and amio tomorrow followed by AF ablation Thurs. Will follow
Fernando Alcala  Pager: [phone]. If no response or past 5 pm call [phone].
done

## 2023-05-18 NOTE — PROGRESS NOTE ADULT - PROBLEM SELECTOR PLAN 7
Cognitively impaired/May forget or chooses not to call
On Coumadin for Hx of atrial fibrillation

## 2023-06-05 NOTE — PATIENT PROFILE ADULT. - NS TRANSFER DISPOSITION PATIENT BELONGINGS
How Severe Are Your Spot(S)?: mild
Have Your Spot(S) Been Treated In The Past?: has not been treated
Hpi Title: Evaluation of Skin Lesions
with patient

## 2023-11-24 NOTE — H&P ADULT - PROBLEM SELECTOR PLAN 3
[facility] AT HOME ~ RN CASE MANAGER NOTE    Patient seen today for   Chief Complaint   Patient presents with   • Nursing Video Visit       Focus of care for today's visit includes the following topics: Medication Management  and Disease process education    There were no vitals filed for this visit.  ALLERGIES:  Not on File    Physical Assessment:  Pain:  Patient has no complaints of pain or discomfort  none Pain location  HEENT:  No complaints  Lungs:  no accessed  Cardiac:  Regular rate and rhythm  edema absent    : No complaints/ clear urine   GI:  Good appetite  Neuro: alert  Muscular:  Bed bound  weakness   Diet: mechnical soft*         Safety Concerns: High Crime area    Assessment finding:  Daughter reports no new concerns.  She is still able to care for her mom,  Family not ready for Hospice       Education provided: goals of care, disease process, safety in ADLS.      Education was provided to: Daughter    Topics educated on during visit today: Infection control including hand hygiene  Education was provided: verbal.    Medication barriers: No concerns identified   Response to teaching: Verbalized understanding    Additional Services working with pt:  None needed at this time   DME needs: No   Labs ordered: No   Med changes or refill needs: No     Collaboration of care completed with:  Angelina HICKEY         CT angio chest ordered to assess effusion and rule out PNA and PE given shortness of breath, tachycardia, hypoxia and new lung cancer  CT surgery consult called by Dr. Keene  Possible tap of pleural effusion  Oxygen PRN  Consider pulm consult

## 2024-01-23 NOTE — DISCHARGE NOTE ADULT - FUNCTIONAL SCREEN CURRENT LEVEL: DRESSING, MLM
[facility]  Vascular Neurology  Comprehensive Stroke Center  TeleVascular Neurology Acute Consultation Note        Consult Information  Consult to Telemedicine - Acute Stroke  Consult performed by: Doroteo Villanueva MD  Consult ordered by: Smitha Hearn MD          Consulting Provider: SMITHA HEARN   [place]:    [place] Coma Scale:     ABCD2 Score:    [id number] Maricruz Briggs MD;  Location: [facility];  Service: Endoscopy;  Laterality: N/A;     ESOPHAGOGASTRODUODENOSCOPY N/A 1/10/2019    Procedure: EGD (ESOPHAGOGASTRODUODENOSCOPY);  Surgeon: Maricruz Briggs MD;  Location: [facility];  Ravi Lawrence MD;  Location: [facility] beds at [facility] OR;  Service: Pain Management;  Laterality: Left;    TRANSFORAMINAL EPIDURAL INJECTION OF STEROID Bilateral 8/6/2019    Procedure: Injection,steroid,epidural,transforaminal approach;  Surgeon: Ravi Lawrence MD;  Location: [facility] beds at the Levine Children's 0) independent

## 2024-05-07 NOTE — DIETITIAN INITIAL EVALUATION ADULT. - EST PROTEIN NEEDS5
Returned call re: denial.  We were unable to get an appeal, I think due to the the date range of the denial, by the time we found out, Dr Faria started appeal, the date range had past.  I will follow up with Dr. Faria for next steps  
74.3

## 2024-08-29 NOTE — H&P PST ADULT - ACTIVITY
[Chaperone Present] : A chaperone was present in the examining room during all aspects of the physical examination [Oriented x3] : oriented x3 [Examination Of The Breasts] : a normal appearance [No Discharge] : no discharge [No Masses] : no breast masses were palpable [No Lesions] : no lesions  [Labia Majora] : normal [Labia Minora] : normal [No Bleeding] : There was no active vaginal bleeding [Normal] : normal [Normal Position] : in a normal position [Uterine Adnexae] : normal [FreeTextEntry2] : Appropriately responsive, alert, and no acute distress. [FreeTextEntry3] : No lymphadenopathy. Thyroid is non-enlarged, nontender, with no palpable nodules or goiter. [FreeTextEntry7] : Soft. Nondistended. Nontender. No rebound or guarding. There are no palpable masses. [FreeTextEntry1] : External genitalia are within normal limits with no lesions visualized. [FreeTextEntry4] : No vaginal discharge, blood, or any lesions noted within the vaginal vault. [FreeTextEntry5] : A pap smear of the cervix was obtained without difficulty. Normal. No cervical motion tenderness. [FreeTextEntry6] : The uterus is normal size.  No uterine or adnexa tenderness. No masses. Able to walk 2-3 blocks, 1 Flight of stairs, ADLs, Grocery Shopping

## 2024-12-01 NOTE — PROVIDER CONTACT NOTE (OTHER) - SITUATION
Provider at bedside.     Gladis Goncalves, RN  12/01/24 1792    
BP 99/70, P 97
Patient is tachycardic; 
BP 99/63, P 86
Tachycardic at 112

## 2025-04-15 NOTE — DISCHARGE NOTE ADULT - PHYSICIAN SECTION COMPLETE
Resolved. Treatment with vancomycin completed 4/12.     No endocarditis noted on echocardiogram.      Yes

## 2025-05-15 NOTE — PROVIDER CONTACT NOTE (OTHER) - SITUATION
How Is Your Wound Healing?: healing slowly
Refill request for levocetirizine  Last refill date on 9/27/2019 #30 tabs X2    7/23/2019 Last office visit with Ross Torres MD  Wt Readings from Last 1 Encounters:   02/16/20 51.7 kg        BP Readings from Last 2 Encounters:   02/16/20 100/70   01/06/20 104/62   ]    Lab Results   Component Value Date    SODIUM 136 08/06/2019    POTASSIUM 4.1 08/06/2019    CHLORIDE 106 08/06/2019    CO2 31 08/06/2019    BUN 10 08/06/2019    CREATININE 0.80 08/06/2019    GLUCOSE 84 08/06/2019     No results found for: HGBA1C  TSH (mcUnits/mL)   Date Value   07/23/2019 1.111     Lab Results   Component Value Date    CHOLESTEROL 171 07/23/2019    HDL 70 07/23/2019    CALCLDL 86 07/23/2019    TRIGLYCERIDE 73 07/23/2019     Lab Results   Component Value Date    AST 27 02/06/2018    GPT 20 02/06/2018    ALKPT 52 02/06/2018    BILIRUBIN 0.6 02/06/2018       
61y fm A&Ox4 hx of afib
Patient BP 91/62
Pts hr was in the 40s.

## 2025-06-19 NOTE — ED ADULT NURSE NOTE - NSFALLRSKASSESASSIST_ED_ALL_ED
Start taking amlodipine 2.5 mg 1 tablet daily.    I will prescribe you nitroglycerin sublingual 1 bottle sometimes at night when you get this pain lay in bed and take 1 under your tongue and see if it helps with the pain if it is helping with the pain let me know if it is not then do not use it.  I really think this is muscular pain.    I will see you in 6  
no